# Patient Record
Sex: FEMALE | Race: WHITE | Employment: OTHER | URBAN - METROPOLITAN AREA
[De-identification: names, ages, dates, MRNs, and addresses within clinical notes are randomized per-mention and may not be internally consistent; named-entity substitution may affect disease eponyms.]

---

## 2017-01-30 ENCOUNTER — VIRTUAL VISIT (OUTPATIENT)
Dept: NURSING | Facility: CLINIC | Age: 47
End: 2017-01-30

## 2017-01-30 ENCOUNTER — MYC MEDICAL ADVICE (OUTPATIENT)
Dept: CARDIOLOGY | Facility: CLINIC | Age: 47
End: 2017-01-30

## 2017-01-30 DIAGNOSIS — E11.9 TYPE 2 DIABETES MELLITUS WITHOUT COMPLICATION (H): Primary | ICD-10-CM

## 2017-01-30 DIAGNOSIS — E66.01 MORBID OBESITY DUE TO EXCESS CALORIES (H): ICD-10-CM

## 2017-01-30 DIAGNOSIS — I48.91 ATRIAL FIBRILLATION (H): Primary | ICD-10-CM

## 2017-01-30 PROCEDURE — 99207 ZZC HEALTH COACHING, NO CHARGE: CPT

## 2017-01-30 NOTE — PROGRESS NOTES
January 30, 2017    [facility]  [address]  [place] [address]  [phone]  Health Coaching Progress Note    Patient Name: Zaira Villatoro Date: January 30, 2017      Session Length: 30      DATA    PRM Master Survey Scores Reviewed: Yes    Core Healthy Days Survey:    Would you say that in general your health is: : Poor  Now thinking about your physical health, which includes physical illness and injury, for how many days during the past 30 days was your physical health not good?: 25  Now thinking about your mental health, which includes stress, depression, and problems with emotions, for how many days during the past 30 days was your mental health not good?: 18  During the past 30 days, for about how many days did poor physical or mental health keep you from doing your usual activities, such as self-care, work, or recreation?: 15    MICHAEL Score (Last Two) 11/7/2016 1/30/2017   MICHAEL Raw Score 33 29   Activation Score 65.8 52.9   MICHAEL Level 3 2       PHQ-2 Score 1/30/2017 11/7/2016   PHQ-2 Total Score Interpretation - Positive if 3 or more points; Administer PHQ-9 if positive 2 1   MyC PHQ-2 Score - -       No flowsheet data found.    Treatment Objective(s) Addressed in This Session:  Target Behavior(s): diet/weight loss and disease management/lifestyle changes stress management    Current Stressors / Issues:  Zaira states that she is not sure if she will be able to continue on Humira as some of her lab values are increasing. She will reassess this at her next follow up with her GI provider and will go from there as appropriate.    What Patient Does Well:   Zaira continues to follow up on her GI issues with her clinical team.    Previous Successes:   Zaira feels that the Humira is improving her UC but feels that it is still complicated by bouts of IBS and diverticulitis.     Areas in Need of Improvement:   Zaira identifies no specific  areas in need of improvement today. Zaira talks a bit about diet for diverticulitis today and states that she has never been able to identify any particular foods that trigger her symptoms and just tries to generally stay away from nuts and seeds.    Barriers to Change:   Zaira states that her main barrier at this time is fatigue and states that anytime she gets a moment to herself she usually just falls asleep.     Plan/Goal for the Next 4 Weeks:     GOAL #1: Continue to follow up with clinical team on GI issues  GOAL #1 Progress Toward Goal: 100%  GOAL #2: Continue trying insight timer for short meditations  GOAL #2 Progress Toward Goal: 75%  GOAL #3: Continue checking into [facility]  GOAL #3 Progress Toward Goal: 50%    Intervention:  Motivational Interviewing    MI Intervention: Expressed Empathy/Understanding, Supported Autonomy, Collaboration, Evocation, Permission to raise concern or advise, Open-ended questions, Reflections: simple and complex, Change talk (evoked) and Reframe     Change Talk Expressed by the Patient: Desire to change Committment to change Activation Taking steps    Provider Response to Change Talk: E - Evoked more info from patient about behavior change, A - Affirmed patient's thoughts, decisions, or attempts at behavior change, R - Reflected patient's change talk and S - Summarized patient's change talk statements    Assessment / Progress on Treatment Objective(s) / Homework:    Satisfactory progress - PREPARATION (Decided to change - considering how); Intervened by negotiating a change plan and determining options / strategies for behavior change, identifying triggers, exploring social supports, and working towards setting a date to begin behavior change         Plan: (Homework, other):  Patient was encouraged to continue to seek condition-related information and education. Patient has completed the health coaching program. Patient has set self-identified goals and will monitor progress  until the next appointment.  No further follow up scheduled with health coaching at this time.      Lino Dudley

## 2017-02-02 ENCOUNTER — VIRTUAL VISIT (OUTPATIENT)
Dept: EDUCATION SERVICES | Facility: CLINIC | Age: 47
End: 2017-02-02

## 2017-02-02 DIAGNOSIS — E11.9 TYPE 2 DIABETES MELLITUS WITHOUT COMPLICATION (H): Primary | ICD-10-CM

## 2017-02-02 PROCEDURE — 99207 ZZC NO BILLABLE SERVICE THIS VISIT: CPT

## 2017-02-02 NOTE — PROGRESS NOTES
See My Chart message re blood glucose information and insulin adjustment.    Shannon Kennedy RN  BSN CDE

## 2017-02-07 NOTE — PROGRESS NOTES
"  SUBJECTIVE:                                                    Zaira Villatoro is a 46 year old female who presents to clinic today for the following health issues:    Diabetes Follow-up    Patient is checking blood sugars: three to four times daily.   Blood sugar testing frequency justification: Uncontrolled diabetes and Adjustment of medication(s)  Results are as follows:         am - 200's         lunchtime - all over 140-over 200         suppertime - 160-180         bedtime - 180-237    Diabetic concerns: None and other - medication changes and now blood sugar seem uncontrolled and weight gain     Symptoms of hypoglycemia (low blood sugar): none     Paresthesias (numbness or burning in feet) or sores: No     Date of last diabetic eye exam: December 2016    Medications: Levemir 32-33 units PM, Victoza 18 mg/3mL, Humira 40 mg/0.8mL     Hypertension Follow-up      Outpatient blood pressures are not being checked.    Low Salt Diet: no added salt    Medications: atenololo 25 mg, losartan 25 mg       Amount of exercise or physical activity: None    Problems taking medications regularly: Yes,  Medication has been causing sleepiness and so may miss night medication    Medication side effects: sleepiness possible from UC medication    Diet: lower carb high protein    Diabetes has not been well managed, describes it has \"sucked,\" fasting sugars have been high, sugars before dinner has been stable, but bedtime sugars have been high. Admits she has not changed her eating habits, has been working with Deborah, the diabetic educator.     History of IBS and ulcerative colitis, reports ulcerative colitis has exacerbated again with ongoing rectal bleeding, not feeling well, has been communicating with GI specialist via email, has not seen them in clinic in awhile. Mentions she started Canasa suppositories, on azathioprine and humira as well.    Patient was tearful today in clinic, mood has been down, has been frustrated with " "uncontrolled diabetes and UC flare ups. Has gained 8 lbs since last visit 3 months ago. Does not have a lot of support from her family but does have support from 1 friend she's known since 2nd grade. Always feel tired and \"half asleep,\" has been unproductive due to this, stressed that she has not been there for her children as much as she wants to.    Problem list and histories reviewed & adjusted, as indicated.  Additional history: as documented    Patient Active Problem List   Diagnosis     IBS (irritable bowel syndrome)     Migraine     Ulcerative colitis (H)     Fatty liver     Chronic rhinitis     S/P left hemicolectomy     CARDIOVASCULAR SCREENING; LDL GOAL LESS THAN 100     Hypertension goal BP (blood pressure) < 140/90     History of seizures as a child     Type 2 diabetes mellitus without complication (H)     Morbid obesity due to excess calories (H)     History of diverticular abscess of colon     History of sinus surgery     Adjustment disorder with depressed mood     Immunosuppressed status (H)     Past Surgical History   Procedure Laterality Date     Cryotherapy, cervical  1988     Sinus surgery  2/11/03     LT sinus cyst removal      Arthroscopy knee rt/lt  2004     RT      Hc tooth extraction w/forcep  6/2003     Abscess Tooth / Hospitalized     Colonoscopy  2/12     lt sided colitis     Exploratory laparotomy, partial left vianey collectomy with hartmans procedure, colostomy  8/2013     lt vianey colectomy, bowel perforation, colostomy, Karen's pouche     Colonoscopy  1/9/2014     Appendectomy  april 2014     Biopsy       many     Hernia repair  april 2014     found at time of takedown     Cryotherapy, cervical       mild dysplasia     Takedown colostomy  4-2014     Biopsy  2011 and on     many     Gi surgery  2013     abrupted  bowl     Hernia repair  2014     found at time of takedown       Social History   Substance Use Topics     Smoking status: Former Smoker     Packs/day: 1.00     Years: 15.00 "     Types: Cigarettes     Start date: 1/1/1985     Quit date: 7/1/1998     Smokeless tobacco: Never Used      Comment: soke free household.     Alcohol use No      Comment: occ     Family History   Problem Relation Age of Onset     DIABETES Mother      Allergies Mother      Asthma Mother      Arthritis Mother      HEART DISEASE Mother      heart murmur     Depression Mother      Obesity Mother      Eye Disorder Father      DIABETES Father      CEREBROVASCULAR DISEASE Father      HEART DISEASE Father      Hypertension Father      Unknown/Adopted Paternal Grandmother      HEART DISEASE Paternal Grandfather      left ventrical failure     Gynecology Sister      endometriosis     Asthma Daughter      Breast Cancer Maternal Aunt      Thyroid Disease Maternal Aunt      Glaucoma No family hx of      Macular Degeneration No family hx of      Breast Cancer Other      fathers sister     Anesthesia Reaction Other      Thyroid Disease Other      Asthma Daughter      DIABETES Maternal Grandmother      CEREBROVASCULAR DISEASE Maternal Grandfather      CEREBROVASCULAR DISEASE Paternal Grandfather      Breast Cancer Other      mothers sister     OSTEOPOROSIS Other      Depression Sister      Depression Sister          BP Readings from Last 3 Encounters:   02/13/17 118/84   11/21/16 121/90   11/07/16 112/66    Wt Readings from Last 3 Encounters:   02/13/17 245 lb (111.1 kg)   11/21/16 238 lb (108 kg)   11/07/16 237 lb (107.5 kg)                  Labs reviewed in EPIC  Problem list, Medication list, Allergies, and Medical/Social/Surgical histories reviewed in [facility] and updated as appropriate.    ROS:  Aches and pains with humira, nasal congestion (chronic)10 point ROS of systems including Constitutional, ENT, Respiratory, Cardiovascular, Gastroenterology, Genitourinary, Integumentary, Musculoskeletal, Psychiatric were all negative except for pertinent positives noted in my HPI.    This document serves as a record of the services and  "decisions personally performed and made by  Indu Ramos MD. It was created on his/her behalf by Mirta Keith, a trained medical scribe. The creation of this document is based the provider's statements to the medical scribe.  Jose Kieth 10:28 AM, February 13, 2017    OBJECTIVE:                                                    /84 (BP Location: Right arm, Patient Position: Chair, Cuff Size: Adult Large)  Pulse 77  Temp 96.8  F (36  C) (Oral)  Ht 5' 4.8\" (1.646 m)  Wt 245 lb (111.1 kg)  BMI 41.02 kg/m2  Body mass index is 41.02 kg/(m^2).  GENERAL: alert, no distress and obese  HENT: ear canals and TM's normal, nose and mouth without ulcers or lesions  NECK: no adenopathy, no asymmetry, masses, or scars and thyroid normal to palpation  RESP: lungs clear to auscultation - no rales, rhonchi or wheezes  CV: regular rate and rhythm, normal S1 S2, no S3 or S4, no murmur, click or rub, no peripheral edema and peripheral pulses strong  ABDOMEN: soft, nontender, no hepatosplenomegaly, no masses and bowel sounds normal  PSYCH: affect flat and tearful    Diagnostic Test Results:  Results for orders placed or performed in visit on 02/13/17 (from the past 24 hour(s))   HEMOGLOBIN A1C   Result Value Ref Range    Hemoglobin A1C 8.1 (H) 4.3 - 6.0 %        ASSESSMENT/PLAN:                                                    Diabetes Type II, A1c Uncontrolled, insulin dependent   Associated with the following complications    Renal Complications:  None    Ophthalmologic Complications: None    Neurologic Complications: None    Peripheral Vascular Complications:  None    Other: None   Plan:  Labs:  A1C  The following changes are made - Increase Levemir to 33 units nightly    Hypertension; controlled   Associated with the following complications:    Diabetes   Plan:  No changes in the patient's current treatment plan      BMI:   Estimated body mass index is 41.02 kg/(m^2) as calculated from the following:    " "Height as of this encounter: 5' 4.8\" (1.646 m).    Weight as of this encounter: 245 lb (111.1 kg).   Weight management plan: Discussed healthy diet and exercise guidelines and patient will follow up in 6 months in clinic to re-evaluate.        ICD-10-CM    1. Type 2 diabetes mellitus without complication, with long-term current use of insulin (H) E11.9 HEMOGLOBIN A1C    Z79.4 Albumin Random Urine Quantitative     TSH WITH FREE T4 REFLEX   2. Morbid obesity due to excess calories (H) E66.01    3. Ulcerative colitis with rectal bleeding, unspecified location (H) K51.911    4. Hypertension goal BP (blood pressure) < 140/90 I10    5. Adjustment disorder with depressed mood F43.21    6. Immunosuppressed status (H) D89.9    type 2 DIABETES MELLITUS: A1C not at goal. Advised to increase levimir as directed previously to 33 units once a day. Then will have her communicate regularly with diabetic ed to adjust insulin and get BS down  UC: managed by GI  Morbid obesity: hard for her to make any lifestyle changes now, during her UC flare  Adjustment disorder with depressed mood: offered support. Discussed counseling and/or medication if persists.  Immunosuppression: due to meds for UC. Monitor  Blood pressure: controlled  Lipids: recommend statin typically with diabetes, but has elevated lfts and UC flare, not a good time to add.     Patient Instructions     MY DIABETES TODAY:    1)  Goal A1C is under <7.0  Mine is:      Lab Results   Component Value Date    A1C 8.1 02/13/2017       2)  Goal LDL (bad cholesterol) under 100  (measured at least yearly)- I am currently at:   Lab Results   Component Value Date    LDL 95 11/07/2016       3)  Goal blood pressure under 130/80- mine was 118/84 today    4)  Take aspirin daily     5)  No tobacco use        ACTION PLAN CHANGES FROM TODAY:    Care Plan changes:    Increase to 33 units of Levemir nightly. I'll let Deborah know about this change. Talk with Deborah on Friday with your sugars-- then " she can make another adjustment.     Labs:     I will schedule a lab appointment 3-5 days before my next appointment with my provider.     Follow up:    I will follow up with my physician:  1 month        The information in this document, created by the medical scribe for me, accurately reflects the services I personally performed and the decisions made by me. I have reviewed and approved this document for accuracy prior to leaving the patient care area.  Indu Ramos MD  10:28 AM, 02/13/17    Indu Ramos MD  [facility]

## 2017-02-09 ENCOUNTER — TELEPHONE (OUTPATIENT)
Dept: FAMILY MEDICINE | Facility: CLINIC | Age: 47
End: 2017-02-09

## 2017-02-09 NOTE — TELEPHONE ENCOUNTER
Reason for Call:  Other appointment    Detailed comments:  Patient would like to know if she needs to fast for her diabetes follow up appointment on Monday February 13th . Please call patient .    Phone Number Patient can be reached at: Home number on file [phone] (home)    Best Time: any    Can we leave a detailed message on this number? YES    Call taken on 2/9/2017 at 3:12 PM by Cydney Kincaid

## 2017-02-13 ENCOUNTER — OFFICE VISIT (OUTPATIENT)
Dept: FAMILY MEDICINE | Facility: CLINIC | Age: 47
End: 2017-02-13
Payer: COMMERCIAL

## 2017-02-13 VITALS
DIASTOLIC BLOOD PRESSURE: 84 MMHG | HEART RATE: 77 BPM | WEIGHT: 245 LBS | SYSTOLIC BLOOD PRESSURE: 118 MMHG | TEMPERATURE: 96.8 F | BODY MASS INDEX: 40.82 KG/M2 | HEIGHT: 65 IN

## 2017-02-13 DIAGNOSIS — D84.9 IMMUNOSUPPRESSED STATUS (H): ICD-10-CM

## 2017-02-13 DIAGNOSIS — E66.01 MORBID OBESITY DUE TO EXCESS CALORIES (H): ICD-10-CM

## 2017-02-13 DIAGNOSIS — F43.21 ADJUSTMENT DISORDER WITH DEPRESSED MOOD: ICD-10-CM

## 2017-02-13 DIAGNOSIS — K51.911 ULCERATIVE COLITIS WITH RECTAL BLEEDING, UNSPECIFIED LOCATION (H): ICD-10-CM

## 2017-02-13 DIAGNOSIS — E11.9 TYPE 2 DIABETES MELLITUS WITHOUT COMPLICATION, WITH LONG-TERM CURRENT USE OF INSULIN (H): Primary | ICD-10-CM

## 2017-02-13 DIAGNOSIS — I10 HYPERTENSION GOAL BP (BLOOD PRESSURE) < 140/90: ICD-10-CM

## 2017-02-13 DIAGNOSIS — Z13.6 CARDIOVASCULAR SCREENING; LDL GOAL LESS THAN 100: ICD-10-CM

## 2017-02-13 DIAGNOSIS — Z79.4 TYPE 2 DIABETES MELLITUS WITHOUT COMPLICATION, WITH LONG-TERM CURRENT USE OF INSULIN (H): Primary | ICD-10-CM

## 2017-02-13 LAB
CREAT UR-MCNC: 130 MG/DL
HBA1C MFR BLD: 8.1 % (ref 4.3–6)
MICROALBUMIN UR-MCNC: 7 MG/L
MICROALBUMIN/CREAT UR: 5.04 MG/G CR (ref 0–25)
TSH SERPL DL<=0.005 MIU/L-ACNC: 2.05 MU/L (ref 0.4–4)

## 2017-02-13 PROCEDURE — 99214 OFFICE O/P EST MOD 30 MIN: CPT | Performed by: FAMILY MEDICINE

## 2017-02-13 PROCEDURE — 83036 HEMOGLOBIN GLYCOSYLATED A1C: CPT | Performed by: FAMILY MEDICINE

## 2017-02-13 PROCEDURE — 84443 ASSAY THYROID STIM HORMONE: CPT | Performed by: FAMILY MEDICINE

## 2017-02-13 PROCEDURE — 82043 UR ALBUMIN QUANTITATIVE: CPT | Performed by: FAMILY MEDICINE

## 2017-02-13 PROCEDURE — 36415 COLL VENOUS BLD VENIPUNCTURE: CPT | Performed by: FAMILY MEDICINE

## 2017-02-13 RX ORDER — MESALAMINE 1000 MG/1
1000 SUPPOSITORY RECTAL DAILY
COMMUNITY
Start: 2016-05-17 | End: 2017-08-21

## 2017-02-13 ASSESSMENT — PAIN SCALES - GENERAL: PAINLEVEL: NO PAIN (0)

## 2017-02-13 NOTE — NURSING NOTE
"Chief Complaint   Patient presents with     Diabetes     Hypertension     Health Maintenance     A1C, microalbumin, TSH       Initial /84 (BP Location: Right arm, Patient Position: Chair, Cuff Size: Adult Large)  Pulse 77  Temp 96.8  F (36  C) (Oral)  Ht 5' 4.8\" (1.646 m)  Wt 245 lb (111.1 kg)  BMI 41.02 kg/m2 Estimated body mass index is 41.02 kg/(m^2) as calculated from the following:    Height as of this encounter: 5' 4.8\" (1.646 m).    Weight as of this encounter: 245 lb (111.1 kg).  Medication Reconciliation: complete    "

## 2017-02-13 NOTE — MR AVS SNAPSHOT
After Visit Summary   2/13/2017    Zaira Villatoro    MRN: [id number]           Patient Information     Date Of Birth          1970        Visit Information        Provider Department      2/13/2017 10:00 AM Indu Ramos MD [facility]        Today's Diagnoses     Type 2 diabetes mellitus without complication, with long-term current use of insulin (H)    -  1    Morbid obesity due to excess calories (H)        Ulcerative colitis with rectal bleeding, unspecified location (H)        Hypertension goal BP (blood pressure) < 140/90        Adjustment disorder with depressed mood        Immunosuppressed status (H)          Care Instructions    MY DIABETES TODAY:    1)  Goal A1C is under <7.0  Mine is:      Lab Results   Component Value Date    A1C 8.1 02/13/2017       2)  Goal LDL (bad cholesterol) under 100  (measured at least yearly)- I am currently at:   Lab Results   Component Value Date    LDL 95 11/07/2016       3)  Goal blood pressure under 130/80- mine was 118/84 today    4)  Take aspirin daily     5)  No tobacco use        ACTION PLAN CHANGES FROM TODAY:    Care Plan changes:    Increase to 33 units of Levemir nightly. I'll let Deborah know about this change. Talk with Deborah on Friday with your sugars-- then she can make another adjustment.     Labs:     I will schedule a lab appointment 3-5 days before my next appointment with my provider.     Follow up:    I will follow up with my physician:  1 month       [facility]    If you have any questions regarding to your visit please contact your care team:       Team Purple:   Clinic Hours Telephone Number   HANNAH Kumar Dr., Dr.   7am-7pm  Monday - Thursday   7am-5pm  Fridays  [phone]  (Appointment scheduling available 24/7)    Questions about your Visit?   Team Line:  [phone]   Urgent Care - [place] - 11am-9pm  Monday-Friday Saturday-Sunday- 9am-5pm   [place] - 5pm-9pm Monday-Friday Saturday-Sunday- 9am-5pm  [phone] - [place]   [phone] - [place]       What options do I have for visits at the clinic other than the traditional office visit?  To expand how we care for you, many of our providers are utilizing electronic visits (e-visits) and telephone visits, when medically appropriate, for interactions with their patients rather than a visit in the clinic.   We also offer nurse visits for many medical concerns. Just like any other service, we will bill your insurance company for this type of visit based on time spent on the phone with your provider. Not all insurance companies cover these visits. Please check with your medical insurance if this type of visit is covered. You will be responsible for any charges that are not paid by your insurance.      E-visits via Satmetrix:  generally incur a $35.00 fee.  Telephone visits:  Time spent on the phone: *charged based on time that is spent on the phone in increments of 10 minutes. Estimated cost:   5-10 mins $30.00   11-20 mins. $59.00   21-30 mins. $85.00     Use [email or web address] (secure email communication and access to your chart) to send your primary care provider a message or make an appointment. Ask someone on your Team how to sign up for Satmetrix.  For a Price Quote for your services, please call our Consumer Price Line at [phone].  As always, Thank you for trusting us with your health care needs!    Lizbeth Souza MA          Follow-ups after your visit        Follow-up notes from your care team     Return in about 4 weeks (around 3/13/2017) for recheck mood.      Your next 10 appointments already scheduled     Mar 14, 2017  1:30 PM CDT   Nurse Only with FZ ANCILLARY   [facility] ([facility])    [address] [place] [address]  [place] [address] [phone]            May 01, 2017  1:00 PM CDT   Return Visit with Aron Tellez MD  "  [facility] PHYSICIANS HEART AT [facility] ([facility])    [address] [place] [address] [address]nd Floor  [place] [address] [phone]              Who to contact     If you have questions or need follow up information about today's clinic visit or your schedule please contact [facility] directly at [phone].  Normal or non-critical lab and imaging results will be communicated to you by MyChart, letter or phone within 4 business days after the clinic has received the results. If you do not hear from us within 7 days, please contact the clinic through CloudBilthart or phone. If you have a critical or abnormal lab result, we will notify you by phone as soon as possible.  Submit refill requests through [email or web address] or call your pharmacy and they will forward the refill request to us. Please allow 3 business days for your refill to be completed.          Additional Information About Your Visit        CloudBiltharFitzeal Information     [email or web address] gives you secure access to your electronic health record. If you see a primary care provider, you can also send messages to your care team and make appointments. If you have questions, please call your primary care clinic.  If you do not have a primary care provider, please call [phone] and they will assist you.        Care EveryWhere ID     This is your Care EveryWhere ID. This could be used by other organizations to access your [place] medical records  [id number]        Your Vitals Were     Pulse Temperature Height BMI (Body Mass Index)          77 96.8  F (36  C) (Oral) 5' 4.8\" (1.646 m) 41.02 kg/m2         Blood Pressure from Last 3 Encounters:   02/13/17 118/84   11/21/16 121/90   11/07/16 112/66    Weight from Last 3 Encounters:   02/13/17 245 lb (111.1 kg)   11/21/16 238 lb (108 kg)   11/07/16 237 lb (107.5 kg)              We Performed the Following     Albumin Random Urine Quantitative     HEMOGLOBIN A1C     TSH WITH FREE T4 REFLEX        Primary " Care Provider Office Phone # Fax #    Indu Ramos [phone]       [address]  [place] [address]        Thank you!     Thank you for choosing [facility]  for your care. Our goal is always to provide you with excellent care. Hearing back from our patients is one way we can continue to improve our services. Please take a few minutes to complete the written survey that you may receive in the mail after your visit with us. Thank you!             Your Updated Medication List - Protect others around you: Learn how to safely use, store and throw away your medicines at www.disposemymeds.org.          This list is accurate as of: 2/13/17 11:01 AM.  Always use your most recent med list.                   Brand Name Dispense Instructions for use    aspirin 81 MG tablet     30 tablet    Take by mouth daily       atenolol 25 MG tablet    TENORMIN    180 tablet    Take 1 tablet (25 mg) by mouth 2 times daily       azaTHIOprine 50 MG tablet    IMURAN     Take 1 tablet by mouth daily Reported on 2/13/2017       BENADRYL ALLERGY 25 MG tablet   Generic drug:  diphenhydrAMINE      Reported on 2/13/2017       cholecalciferol 2000 UNITS tablet     30 tablet    Take 1 tablet by mouth daily       CLARITIN 10 MG tablet   Generic drug:  loratadine      Reported on 2/13/2017       DRAMAMINE PO      as needed       eletriptan 20 MG tablet    RELPAX    12 tablet    take 1-2 tablets by mouth at onset of headache for migraine. may repeat dose in 2 hours. do not exceed 80mg in 24 hours.       fluticasone 110 MCG/ACT Inhaler    FLOVENT HFA    36 Inhaler    Spray 2 puffs in nostril 2 times daily       HUMIRA PEN 40 MG/0.8ML pen kit   Generic drug:  adalimumab      Inject as directed every 14 days       insulin detemir 100 UNIT/ML injection    LEVEMIR FLEXTOUCH    3 mL    Take 32-33 units of Levemir nightly before bed.       insulin pen needle 31G X 5 MM     200 each    Use 1  pen needles twice daily or as directed.       * LIALDA 1.2 G EC tablet   Generic drug:  mesalamine      Take 1,200 mg by mouth 2 tablets daily       * mesalamine 1000 MG Suppository    CANASA     Place 1,000 mg rectally daily       losartan 25 MG tablet    COZAAR    15 tablet    TAKE ONE-HALF TABLET BY MOUTH DAILY       methocarbamol 750 MG tablet    ROBAXIN         * order for DME     1 each    Glucometer, brand as covered by insurance.       * order for DME     400 each    Test strips for pt's glucometer, brand as covered by insurance. Test four times daily and prn.       * order for DME     300 each    Lancets.  TID and prn. To go with Rigo Contour next EZ, smallest lancet possible (ie, least painful)       oxyCODONE-acetaminophen 5-325 MG per tablet    PERCOCET         STATIN NOT PRESCRIBED (INTENTIONAL)     0 each    1 each continuous prn Statin not prescribed intentionally due to Refusal by patient and Other:LDL is below 100.       TYLENOL CAPS 500 MG OR      1 CAPSULE EVERY 4 HOURS AS NEEDED       VICTOZA PEN 18 MG/3ML soln   Generic drug:  liraglutide     27 mL    INJECT 3 MG SUB-Q ONCE DAILY       * Notice:  This list has 5 medication(s) that are the same as other medications prescribed for you. Read the directions carefully, and ask your doctor or other care provider to review them with you.

## 2017-02-13 NOTE — PATIENT INSTRUCTIONS
MY DIABETES TODAY:    1)  Goal A1C is under <7.0  Mine is:      Lab Results   Component Value Date    A1C 8.1 02/13/2017       2)  Goal LDL (bad cholesterol) under 100  (measured at least yearly)- I am currently at:   Lab Results   Component Value Date    LDL 95 11/07/2016       3)  Goal blood pressure under 130/80- mine was 118/84 today    4)  Take aspirin daily     5)  No tobacco use        ACTION PLAN CHANGES FROM TODAY:    Care Plan changes:    Increase to 33 units of Levemir nightly. I'll let Deborah know about this change. Talk with Deborah on Friday with your sugars-- then she can make another adjustment.     Labs:     I will schedule a lab appointment 3-5 days before my next appointment with my provider.     Follow up:    I will follow up with my physician:  1 month       [facility]    If you have any questions regarding to your visit please contact your care team:       Team Purple:   Clinic Hours Telephone Number   HANNAH Kumar Dr., Dr.   7am-7pm  Monday - Thursday   7am-5pm  Fridays  [phone]  (Appointment scheduling available 24/7)    Questions about your Visit?   Team Line:  [phone]   Urgent Care - [place] - 11am-9pm Monday-Friday Saturday-Sunday- 9am-5pm   [place] - 5pm-9pm Monday-Friday Saturday-Sunday- 9am-5pm  [phone] - [place]   [phone] - [place]       What options do I have for visits at the clinic other than the traditional office visit?  To expand how we care for you, many of our providers are utilizing electronic visits (e-visits) and telephone visits, when medically appropriate, for interactions with their patients rather than a visit in the clinic.   We also offer nurse visits for many medical concerns. Just like any other service, we will bill your insurance company for this type of visit based on time spent on the phone with your provider. Not all insurance companies cover  these visits. Please check with your medical insurance if this type of visit is covered. You will be responsible for any charges that are not paid by your insurance.      E-visits via SportStylisthart:  generally incur a $35.00 fee.  Telephone visits:  Time spent on the phone: *charged based on time that is spent on the phone in increments of 10 minutes. Estimated cost:   5-10 mins $30.00   11-20 mins. $59.00   21-30 mins. $85.00     Use [employer]rion[employer] (secure email communication and access to your chart) to send your primary care provider a message or make an appointment. Ask someone on your Team how to sign up for Mitrionics.  For a Price Quote for your services, please call our Consumer Price Line at [phone].  As always, Thank you for trusting us with your health care needs!    Lizbeth Souza MA

## 2017-02-14 ASSESSMENT — PATIENT HEALTH QUESTIONNAIRE - PHQ9: SUM OF ALL RESPONSES TO PHQ QUESTIONS 1-9: 10

## 2017-02-17 ENCOUNTER — MYC MEDICAL ADVICE (OUTPATIENT)
Dept: EDUCATION SERVICES | Facility: CLINIC | Age: 47
End: 2017-02-17

## 2017-02-24 ENCOUNTER — VIRTUAL VISIT (OUTPATIENT)
Dept: EDUCATION SERVICES | Facility: CLINIC | Age: 47
End: 2017-02-24

## 2017-02-24 DIAGNOSIS — E11.9 TYPE 2 DIABETES MELLITUS WITHOUT COMPLICATION (H): ICD-10-CM

## 2017-02-24 NOTE — MR AVS SNAPSHOT
After Visit Summary   2/24/2017    Zaira Villatoro    MRN: [id number]           Patient Information     Date Of Birth          1970        Visit Information        Provider Department      2/24/2017 10:30 AM CP DIABETIC ED RESOURCE [facility]        Today's Diagnoses     Type 2 diabetes mellitus without complication (H)           Follow-ups after your visit        Your next 10 appointments already scheduled     Feb 24, 2017 10:30 AM CST   Telephone Visit with CP DIABETIC ED RESOURCE   [facility] ([facility])    [address] [facility] [address] [phone]           Note: this is not an onsite visit; there is no need to come to the facility.            Mar 09, 2017 10:15 AM CST   SHORT with Indu Ramos MD   [facility] ([facility])    [address] [place] [address]   [phone]            Mar 14, 2017  1:30 PM CDT   Nurse Only with FZ ANCILLARY   [facility] ([facility])    [address] [place] [address]   [phone]            May 01, 2017  1:00 PM CDT   Return Visit with Aron Tellez MD   [facility] PHYSICIANS HEART AT [facility] ([facility])    [address]nd Floor  [facility] [address] [phone]              Who to contact     If you have questions or need follow up information about today's clinic visit or your schedule please contact [facility] directly at [phone].  Normal or non-critical lab and imaging results will be communicated to you by MyChart, letter or phone within 4 business days after the clinic has received the results. If you do not hear from us within 7 days, please contact the clinic through MyChart or phone. If you have a critical or abnormal lab result, we will notify you by phone as soon as possible.  Submit  refill requests through [employer] or call your pharmacy and they will forward the refill request to us. Please allow 3 business days for your refill to be completed.          Additional Information About Your Visit        Aramscohart Information     [employer] gives you secure access to your electronic health record. If you see a primary care provider, you can also send messages to your care team and make appointments. If you have questions, please call your primary care clinic.  If you do not have a primary care provider, please call [phone] and they will assist you.        Care EveryWhere ID     This is your Care EveryWhere ID. This could be used by other organizations to access your [place] medical records  [id number]         Blood Pressure from Last 3 Encounters:   02/13/17 118/84   11/21/16 121/90   11/07/16 112/66    Weight from Last 3 Encounters:   02/13/17 111.1 kg (245 lb)   11/21/16 108 kg (238 lb)   11/07/16 107.5 kg (237 lb)              Today, you had the following     No orders found for display         Today's Medication Changes          These changes are accurate as of: 2/24/17  8:23 AM.  If you have any questions, ask your nurse or doctor.               These medicines have changed or have updated prescriptions.        Dose/Directions    insulin detemir 100 UNIT/ML injection   Commonly known as:  LEVEMIR FLEXTOUCH   This may have changed:  additional instructions   Used for:  Type 2 diabetes mellitus without complication (H)        Take 35 units of Levemir nightly before bed.   Quantity:  3 mL   Refills:  3            Where to get your medicines      These medications were sent to [place] [address] [place] [facility] [place] [address] [address] [address] [facility] [address]     Phone:  [phone]     insulin detemir 100 UNIT/ML injection                Primary Care Provider Office Phone # Fax #    Indu Ramos [phone]       [address]  [place] [address]        Thank you!     Thank you for choosing [facility]  for your care. Our goal is always to provide you with excellent care. Hearing back from our patients is one way we can continue to improve our services. Please take a few minutes to complete the written survey that you may receive in the mail after your visit with us. Thank you!             Your Updated Medication List - Protect others around you: Learn how to safely use, store and throw away your medicines at www.disposemymeds.org.          This list is accurate as of: 2/24/17  8:23 AM.  Always use your most recent med list.                   Brand Name Dispense Instructions for use    aspirin 81 MG tablet     30 tablet    Take by mouth daily       atenolol 25 MG tablet    TENORMIN    180 tablet    Take 1 tablet (25 mg) by mouth 2 times daily       azaTHIOprine 50 MG tablet    IMURAN     Take 1 tablet by mouth daily Reported on 2/13/2017       BENADRYL ALLERGY 25 MG tablet   Generic drug:  diphenhydrAMINE      Reported on 2/13/2017       cholecalciferol 2000 UNITS tablet     30 tablet    Take 1 tablet by mouth daily       CLARITIN 10 MG tablet   Generic drug:  loratadine      Reported on 2/13/2017       DRAMAMINE PO      as needed       eletriptan 20 MG tablet    RELPAX    12 tablet    take 1-2 tablets by mouth at onset of headache for migraine. may repeat dose in 2 hours. do not exceed 80mg in 24 hours.       fluticasone 110 MCG/ACT Inhaler    FLOVENT HFA    36 Inhaler    Spray 2 puffs in nostril 2 times daily       HUMIRA PEN 40 MG/0.8ML pen kit   Generic drug:  adalimumab      Inject as directed every 14 days       insulin detemir 100 UNIT/ML injection    LEVEMIR FLEXTOUCH    3 mL    Take 35 units of Levemir nightly before bed.       insulin pen needle 31G X 5 MM     200 each    Use 1 pen needles twice daily or as directed.       * LIALDA 1.2 G EC tablet   Generic drug:  mesalamine      Take 1,200 mg by mouth 2 tablets daily        * mesalamine 1000 MG Suppository    CANASA     Place 1,000 mg rectally daily       losartan 25 MG tablet    COZAAR    15 tablet    TAKE ONE-HALF TABLET BY MOUTH DAILY       methocarbamol 750 MG tablet    ROBAXIN         * order for DME     1 each    Glucometer, brand as covered by insurance.       * order for DME     400 each    Test strips for pt's glucometer, brand as covered by insurance. Test four times daily and prn.       * order for DME     300 each    Lancets.  TID and prn. To go with Rigo Contour next EZ, smallest lancet possible (ie, least painful)       oxyCODONE-acetaminophen 5-325 MG per tablet    PERCOCET         STATIN NOT PRESCRIBED (INTENTIONAL)     0 each    1 each continuous prn Statin not prescribed intentionally due to Refusal by patient and Other:LDL is below 100.       TYLENOL CAPS 500 MG OR      1 CAPSULE EVERY 4 HOURS AS NEEDED       VICTOZA PEN 18 MG/3ML soln   Generic drug:  liraglutide     27 mL    INJECT 3 MG SUB-Q ONCE DAILY       * Notice:  This list has 5 medication(s) that are the same as other medications prescribed for you. Read the directions carefully, and ask your doctor or other care provider to review them with you.

## 2017-03-01 ENCOUNTER — MYC MEDICAL ADVICE (OUTPATIENT)
Dept: EDUCATION SERVICES | Facility: CLINIC | Age: 47
End: 2017-03-01

## 2017-03-02 ENCOUNTER — VIRTUAL VISIT (OUTPATIENT)
Dept: EDUCATION SERVICES | Facility: CLINIC | Age: 47
End: 2017-03-02
Payer: COMMERCIAL

## 2017-03-02 DIAGNOSIS — E11.9 TYPE 2 DIABETES MELLITUS WITHOUT COMPLICATION (H): ICD-10-CM

## 2017-03-02 PROCEDURE — 99207 ZZC NO BILLABLE SERVICE THIS VISIT: CPT

## 2017-03-02 NOTE — PROGRESS NOTES
Please see My Chart message re insulin adjustment.  Patient BG values all above goal range thru the day, several values above 200 mg/dl.  Currently on 35 units of Levemir which is 0.31 u/kg.  Recommend that patient increase her Levemir dose up to 39 units which is 0.35 u/kg.  Request patient send in her BG values for review again next week.    Shannon Kennedy RN  BSN CDE

## 2017-03-02 NOTE — MR AVS SNAPSHOT
After Visit Summary   3/2/2017    Zaira Villatoro    MRN: [id number]           Patient Information     Date Of Birth          1970        Visit Information        Provider Department      3/2/2017 2:45 PM  DIABETIC ED RESOURCE [facility]        Today's Diagnoses     Type 2 diabetes mellitus without complication (H)           Follow-ups after your visit        Your next 10 appointments already scheduled     Mar 02, 2017  2:45 PM CST   Telephone Visit with  DIABETIC ED RESOURCE   [facility]    [address] [place] [address]   [phone]           Note: this is not an onsite visit; there is no need to come to the facility.            Mar 09, 2017 10:15 AM CST   SHORT with Indu Ramos MD   [facility]    [address] [place] [address]   [phone]            Mar 14, 2017  1:30 PM CDT   Nurse Only with FZ ANCILLARY   [facility]    [address] [place] [address]   [phone]            May 01, 2017  1:00 PM CDT   Return Visit with Aron Tellez MD   [facility]YS[facility] AT [facility] ([facility])    [address] [place] [address] 2nd Floor  [facility] [address]   [phone]              Who to contact     If you have questions or need follow up information about today's clinic visit or your schedule please contact [facility] directly at [phone].  Normal or non-critical lab and imaging results will be communicated to you by MyChart, letter or phone within 4 business days after the clinic has received the results. If you do not hear from us within 7 days, please contact the clinic through MyChart or phone. If you have a critical or abnormal lab result, we will notify you by phone as soon as possible.  Submit refill requests through [employer] or call your pharmacy and  they will forward the refill request to us. Please allow 3 business days for your refill to be completed.          Additional Information About Your Visit        Te[employer] Information     [employer] gives you secure access to your electronic health record. If you see a primary care provider, you can also send messages to your care team and make appointments. If you have questions, please call your primary care clinic.  If you do not have a primary care provider, please call [phone] and they will assist you.        Care EveryWhere ID     This is your Care EveryWhere ID. This could be used by other organizations to access your [facility] medical records  [id number]         Blood Pressure from Last 3 Encounters:   02/13/17 118/84   11/21/16 121/90   11/07/16 112/66    Weight from Last 3 Encounters:   02/13/17 245 lb (111.1 kg)   11/21/16 238 lb (108 kg)   11/07/16 237 lb (107.5 kg)              Today, you had the following     No orders found for display         Today's Medication Changes          These changes are accurate as of: 3/2/17 10:44 AM.  If you have any questions, ask your nurse or doctor.               These medicines have changed or have updated prescriptions.        Dose/Directions    insulin detemir 100 UNIT/ML injection   Commonly known as:  LEVEMIR FLEXTOUCH   This may have changed:  additional instructions   Used for:  Type 2 diabetes mellitus without complication (H)        Take 39 units of Levemir nightly before bed.   Quantity:  3 mL   Refills:  3            Where to get your medicines      These medications were sent to [place] [address] [place] [facility] [place] - [address] [facility] [address] [place] [address]     Phone:  [phone]     insulin detemir 100 UNIT/ML injection                Primary Care Provider Office Phone # Fax #    Indu Ramos [phone]       [address]  [place] [address]        Thank you!     Thank you for choosing  [facility]  for your care. Our goal is always to provide you with excellent care. Hearing back from our patients is one way we can continue to improve our services. Please take a few minutes to complete the written survey that you may receive in the mail after your visit with us. Thank you!             Your Updated Medication List - Protect others around you: Learn how to safely use, store and throw away your medicines at www.disposemymeds.org.          This list is accurate as of: 3/2/17 10:44 AM.  Always use your most recent med list.                   Brand Name Dispense Instructions for use    aspirin 81 MG tablet     30 tablet    Take by mouth daily       atenolol 25 MG tablet    TENORMIN    180 tablet    Take 1 tablet (25 mg) by mouth 2 times daily       azaTHIOprine 50 MG tablet    IMURAN     Take 1 tablet by mouth daily Reported on 2/13/2017       BENADRYL ALLERGY 25 MG tablet   Generic drug:  diphenhydrAMINE      Reported on 2/13/2017       cholecalciferol 2000 UNITS tablet     30 tablet    Take 1 tablet by mouth daily       CLARITIN 10 MG tablet   Generic drug:  loratadine      Reported on 2/13/2017       DRAMAMINE PO      as needed       eletriptan 20 MG tablet    RELPAX    12 tablet    take 1-2 tablets by mouth at onset of headache for migraine. may repeat dose in 2 hours. do not exceed 80mg in 24 hours.       fluticasone 110 MCG/ACT Inhaler    FLOVENT HFA    36 Inhaler    Spray 2 puffs in nostril 2 times daily       HUMIRA PEN 40 MG/0.8ML pen kit   Generic drug:  adalimumab      Inject as directed every 14 days       insulin detemir 100 UNIT/ML injection    LEVEMIR FLEXTOUCH    3 mL    Take 39 units of Levemir nightly before bed.       insulin pen needle 31G X 5 MM     200 each    Use 1 pen needles twice daily or as directed.       * LIALDA 1.2 G EC tablet   Generic drug:  mesalamine      Take 1,200 mg by mouth 2 tablets daily       * mesalamine 1000 MG Suppository    CANASA     Place  1,000 mg rectally daily       losartan 25 MG tablet    COZAAR    15 tablet    TAKE ONE-HALF TABLET BY MOUTH DAILY       methocarbamol 750 MG tablet    ROBAXIN         * order for DME     1 each    Glucometer, brand as covered by insurance.       * order for DME     400 each    Test strips for pt's glucometer, brand as covered by insurance. Test four times daily and prn.       * order for DME     300 each    Lancets.  TID and prn. To go with Rigo Contour next EZ, smallest lancet possible (ie, least painful)       oxyCODONE-acetaminophen 5-325 MG per tablet    PERCOCET         STATIN NOT PRESCRIBED (INTENTIONAL)     0 each    1 each continuous prn Statin not prescribed intentionally due to Refusal by patient and Other:LDL is below 100.       TYLENOL CAPS 500 MG OR      1 CAPSULE EVERY 4 HOURS AS NEEDED       VICTOZA PEN 18 MG/3ML soln   Generic drug:  liraglutide     27 mL    INJECT 3 MG SUB-Q ONCE DAILY       * Notice:  This list has 5 medication(s) that are the same as other medications prescribed for you. Read the directions carefully, and ask your doctor or other care provider to review them with you.

## 2017-03-07 ENCOUNTER — TELEPHONE (OUTPATIENT)
Dept: FAMILY MEDICINE | Facility: CLINIC | Age: 47
End: 2017-03-07

## 2017-03-07 NOTE — TELEPHONE ENCOUNTER
Received fax from pharmacy stating patient requires Prior Authorization for Levemir    Insurance information:  Name: [employer]  Phone number: [phone]  ID number: [id number]    Provider to address. Message route to Dr. Ramos. Initiate prior authorization or change medication?  Please advise.      **If a prior authorization is to be initiated, please list the following:    -Any medications the patient has tried and failed or any contraindications. **    -Is the patient currently on this medication, or has tried before? **    -What is the diagnosis? **    - Justification or other information that me by helpful. **

## 2017-03-08 ENCOUNTER — TELEPHONE (OUTPATIENT)
Dept: EDUCATION SERVICES | Facility: OTHER | Age: 47
End: 2017-03-08

## 2017-03-08 NOTE — TELEPHONE ENCOUNTER
See Character Booster message on 3/8/17.  Levemir was increased from 39 to 41 units.    Miracle Del Rio RD, LD, CDE

## 2017-03-09 ENCOUNTER — OFFICE VISIT (OUTPATIENT)
Dept: FAMILY MEDICINE | Facility: CLINIC | Age: 47
End: 2017-03-09
Payer: COMMERCIAL

## 2017-03-09 VITALS
OXYGEN SATURATION: 96 % | TEMPERATURE: 97.1 F | HEART RATE: 84 BPM | DIASTOLIC BLOOD PRESSURE: 76 MMHG | BODY MASS INDEX: 41.19 KG/M2 | WEIGHT: 246 LBS | SYSTOLIC BLOOD PRESSURE: 124 MMHG

## 2017-03-09 DIAGNOSIS — E11.9 TYPE 2 DIABETES MELLITUS WITHOUT COMPLICATION, WITH LONG-TERM CURRENT USE OF INSULIN (H): ICD-10-CM

## 2017-03-09 DIAGNOSIS — Z79.4 TYPE 2 DIABETES MELLITUS WITHOUT COMPLICATION, WITH LONG-TERM CURRENT USE OF INSULIN (H): ICD-10-CM

## 2017-03-09 DIAGNOSIS — K43.2 INCISIONAL HERNIA, WITHOUT OBSTRUCTION OR GANGRENE: ICD-10-CM

## 2017-03-09 DIAGNOSIS — F32.1 MODERATE SINGLE CURRENT EPISODE OF MAJOR DEPRESSIVE DISORDER (H): Primary | ICD-10-CM

## 2017-03-09 PROCEDURE — 99214 OFFICE O/P EST MOD 30 MIN: CPT | Performed by: FAMILY MEDICINE

## 2017-03-09 RX ORDER — LIRAGLUTIDE 6 MG/ML
INJECTION SUBCUTANEOUS
Qty: 27 ML | Refills: 3 | Status: CANCELLED | OUTPATIENT
Start: 2017-03-09

## 2017-03-09 RX ORDER — CITALOPRAM HYDROBROMIDE 20 MG/1
TABLET ORAL
Qty: 30 TABLET | Refills: 0 | Status: SHIPPED | OUTPATIENT
Start: 2017-03-09 | End: 2017-04-13

## 2017-03-09 ASSESSMENT — PAIN SCALES - GENERAL: PAINLEVEL: MODERATE PAIN (4)

## 2017-03-09 NOTE — MR AVS SNAPSHOT
After Visit Summary   3/9/2017    Zaira Villatoro    MRN: [id number]           Patient Information     Date Of Birth          1970        Visit Information        Provider Department      3/9/2017 10:15 AM Indu Ramos MD [facility]        Today's Diagnoses     Type 2 diabetes mellitus without complication, with long-term current use of insulin (H)    -  1    Incisional hernia, without obstruction or gangrene        Moderate single current episode of major depressive disorder (H)          Care Instructions    Will start on a new medication today called citalopram at 10mg. We will trial this for 1 week. After 1 week, follow-up with me and if you are not feeling any side effects, we will increase the dose up to 20mg.    I recommend you follow-up with the surgeon. I've given you a referral for this.    I'll check into the victoza and have someone get back to you  Indu Ramos MD   [facility]    If you have any questions regarding to your visit please contact your care team:       Team Purple:   Clinic Hours Telephone Number   [address] Dr.   7am-7pm  Monday - Thursday   7am-5pm  Fridays  [phone]  (Appointment scheduling available 24/7)    Questions about your Visit?   Team Line:  [phone]   Urgent Care - [place] - 11am-9pm Monday-Friday Saturday-Sunday- 9am-5pm   [place] - 5pm-9pm Monday-Friday Saturday-Sunday- 9am-5pm  [phone] [facility]   [phone] [facility]       What options do I have for visits at the clinic other than the traditional office visit?  To expand how we care for you, many of our providers are utilizing electronic visits (e-visits) and telephone visits, when medically appropriate, for interactions with their patients rather than a visit in the clinic.   We also offer nurse visits for many medical concerns. Just like any other  service, we will bill your insurance company for this type of visit based on time spent on the phone with your provider. Not all insurance companies cover these visits. Please check with your medical insurance if this type of visit is covered. You will be responsible for any charges that are not paid by your insurance.      E-visits via [employer]:  generally incur a $35.00 fee.  Telephone visits:  Time spent on the phone: *charged based on time that is spent on the phone in increments of 10 minutes. Estimated cost:   5-10 mins $30.00   11-20 mins. $59.00   21-30 mins. $85.00     Use Xtime (secure email communication and access to your chart) to send your primary care provider a message or make an appointment. Ask someone on your Team how to sign up for Xtime.  For a Price Quote for your services, please call our Consumer Price Line at [phone].  As always, Thank you for trusting us with your health care needs!    Lizbeth Souza MA          Follow-ups after your visit        Additional Services     GENERAL SURG ADULT REFERRAL       Your provider has referred you to: N: [facility] - [facility] [phone]   [email or web address]  [facility] [phone]   [email or web address]  [facility] [phone]   [email or web address]    Please be aware that coverage of these services is subject to the terms and limitations of your health insurance plan.  Call member services at your health plan with any benefit or coverage questions.      Please bring the following with you to your appointment:    (1) Any X-Rays, CTs or MRIs which have been performed.  Contact the facility where they were done to arrange for  prior to your scheduled appointment.   (2) List of current medications   (3) This referral request   (4) Any documents/labs given to you for this referral                  Your next 10 appointments already scheduled     Mar 14, 2017  1:30 PM  CDT   Nurse Only with FZ ANCILLARY   [facility]    [address]  [place] [address] [phone]            May 01, 2017  1:00 PM CDT   Return Visit with Aron Tellez MD   [facility] PHYSICIANS HEART AT [facility]    [address] [facility] 2nd [address]  [place] [address] [phone]              Who to contact     If you have questions or need follow up information about today's clinic visit or your schedule please contact [facility] directly at [phone].  Normal or non-critical lab and imaging results will be communicated to you by MyChart, letter or phone within 4 business days after the clinic has received the results. If you do not hear from us within 7 days, please contact the clinic through [employer] or phone. If you have a critical or abnormal lab result, we will notify you by phone as soon as possible.  Submit refill requests through [employer] or call your pharmacy and they will forward the refill request to us. Please allow 3 business days for your refill to be completed.          Additional Information About Your Visit        MyChart Information     [employer] gives you secure access to your electronic health record. If you see a primary care provider, you can also send messages to your care team and make appointments. If you have questions, please call your primary care clinic.  If you do not have a primary care provider, please call [phone] and they will assist you.        Care EveryWhere ID     This is your Care EveryWhere ID. This could be used by other organizations to access your [facility] medical records  [id number]        Your Vitals Were     Pulse Temperature Pulse Oximetry BMI (Body Mass Index)          84 97.1  F (36.2  C) (Oral) 96% 41.19 kg/m2         Blood Pressure from Last 3 Encounters:   03/09/17 124/76   02/13/17 118/84   11/21/16 121/90    Weight from Last 3 Encounters:    03/09/17 246 lb (111.6 kg)   02/13/17 245 lb (111.1 kg)   11/21/16 238 lb (108 kg)              We Performed the Following     GENERAL SURG ADULT REFERRAL          Today's Medication Changes          These changes are accurate as of: 3/9/17 11:06 AM.  If you have any questions, ask your nurse or doctor.               Start taking these medicines.        Dose/Directions    citalopram 20 MG tablet   Commonly known as:  celeXA   Used for:  Moderate single current episode of major depressive disorder (H)   Started by:  Indu Ramos MD        Take 1/2 tablet (10 mg) for 1-2 weeks, then increase to 1 tablet orally daily   Quantity:  30 tablet   Refills:  0            Where to get your medicines      These medications were sent to [place] [address] [place] [facility] [place] - [address]  [address] [place] [address]     Phone:  [phone]     citalopram 20 MG tablet                Primary Care Provider Office Phone # Fax #    nIdu Ramos [phone]       [address] [address]        Thank you!     Thank you for choosing [facility]  for your care. Our goal is always to provide you with excellent care. Hearing back from our patients is one way we can continue to improve our services. Please take a few minutes to complete the written survey that you may receive in the mail after your visit with us. Thank you!             Your Updated Medication List - Protect others around you: Learn how to safely use, store and throw away your medicines at www.disposemymeds.org.          This list is accurate as of: 3/9/17 11:06 AM.  Always use your most recent med list.                   Brand Name Dispense Instructions for use    aspirin 81 MG tablet     30 tablet    Take by mouth daily       atenolol 25 MG tablet    TENORMIN    180 tablet    Take 1 tablet (25 mg) by mouth 2 times daily       azaTHIOprine 50 MG tablet    IMURAN     Take 1 tablet by  mouth daily Reported on 3/9/2017       BENADRYL ALLERGY 25 MG tablet   Generic drug:  diphenhydrAMINE      Reported on 2/13/2017       cholecalciferol 2000 UNITS tablet     30 tablet    Take 1 tablet by mouth daily       citalopram 20 MG tablet    celeXA    30 tablet    Take 1/2 tablet (10 mg) for 1-2 weeks, then increase to 1 tablet orally daily       CLARITIN 10 MG tablet   Generic drug:  loratadine      Reported on 3/9/2017       DRAMAMINE PO      as needed       eletriptan 20 MG tablet    RELPAX    12 tablet    take 1-2 tablets by mouth at onset of headache for migraine. may repeat dose in 2 hours. do not exceed 80mg in 24 hours.       fluticasone 110 MCG/ACT Inhaler    FLOVENT HFA    36 Inhaler    Spray 2 puffs in nostril 2 times daily       HUMIRA PEN 40 MG/0.8ML pen kit   Generic drug:  adalimumab      Inject as directed every 14 days       insulin detemir 100 UNIT/ML injection    LEVEMIR FLEXTOUCH    3 mL    Take 39 units of Levemir nightly before bed.       insulin pen needle 31G X 5 MM     200 each    Use 1 pen needles twice daily or as directed.       * LIALDA 1.2 G EC tablet   Generic drug:  mesalamine      Take 1,200 mg by mouth 2 tablets daily       * mesalamine 1000 MG Suppository    CANASA     Place 1,000 mg rectally daily       losartan 25 MG tablet    COZAAR    15 tablet    TAKE ONE-HALF TABLET BY MOUTH DAILY       methocarbamol 750 MG tablet    ROBAXIN     Reported on 3/9/2017       * order for DME     1 each    Glucometer, brand as covered by insurance.       * order for DME     400 each    Test strips for pt's glucometer, brand as covered by insurance. Test four times daily and prn.       * order for DME     300 each    Lancets.  TID and prn. To go with Rigo Contour next EZ, smallest lancet possible (ie, least painful)       oxyCODONE-acetaminophen 5-325 MG per tablet    PERCOCET         STATIN NOT PRESCRIBED (INTENTIONAL)     0 each    1 each continuous prn Statin not prescribed intentionally  due to Refusal by patient and Other:LDL is below 100.       TYLENOL CAPS 500 MG OR      1 CAPSULE EVERY 4 HOURS AS NEEDED       VICTOZA PEN 18 MG/3ML soln   Generic drug:  liraglutide     27 mL    INJECT 3 MG SUB-Q ONCE DAILY       * Notice:  This list has 5 medication(s) that are the same as other medications prescribed for you. Read the directions carefully, and ask your doctor or other care provider to review them with you.

## 2017-03-09 NOTE — TELEPHONE ENCOUNTER
On victoza and levemir  Unable to take metformin due to diarrhea, has ulcerative colitis, glyburide not helpful  Indu Ramos MD

## 2017-03-09 NOTE — PROGRESS NOTES
"  SUBJECTIVE:                                                    Zaira Villatoro is a 47 year old female who presents to clinic today for the following health issues:    Depression Followup    Status since last visit:  Same to slightly Improved     See PHQ-9 for current symptoms.  Other associated symptoms: None    Complicating factors:   Significant life event:  No   Current substance abuse:  None  Anxiety or Panic symptoms:  Yes    PHQ-9  English PHQ-9   Any Language        PHQ-9 SCORE 3/9/2017   Total Score 12          Amount of exercise or physical activity: 3-4 days/week for an average of 15 minutes    Problems taking medications regularly: Yes,  problems remembering to take pills maybe twice per week    Medication side effects: headaches, leg pain, nausea and fatigue  Diet: High protein low carb    1. Mood has been the same since her last visit. Feels things have not been getting better, has been trying to go out and do active things to uplift her mood but states she does not have the energy to do so. Has tried counseling in the past, but feels it hasn't helped much. Feels that counseling \"won't turn things around\" and is hesitant on starting on a medication. Has been having sleeping trouble due waking up at night, will yell at her children but unable to recall it the next morning. She has never been on a medication in the past to help with her depression and mood. Feels her mood is an 8-10 (1 being the best she can feel, 10 being the worst).    2. Reports that she believes she \"ripped her hernia repair\" when she coughed strenuously and began to feel a burning feeling in her lower abdomen. States she will feel it \"popping out\".    Diabetes: needs refill on victoza. Was told taht she could take 3 mg daily  Problem list and histories reviewed & adjusted, as indicated.  Additional history: as documented    Patient Active Problem List   Diagnosis     IBS (irritable bowel syndrome)     Migraine     Ulcerative " colitis (H)     Fatty liver     Chronic rhinitis     S/P left hemicolectomy     CARDIOVASCULAR SCREENING; LDL GOAL LESS THAN 100     Hypertension goal BP (blood pressure) < 140/90     History of seizures as a child     Type 2 diabetes mellitus without complication (H)     Morbid obesity due to excess calories (H)     History of diverticular abscess of colon     History of sinus surgery     Adjustment disorder with depressed mood     Immunosuppressed status (H)     Moderate single current episode of major depressive disorder (H)     Incisional hernia, without obstruction or gangrene     Past Surgical History   Procedure Laterality Date     Cryotherapy, cervical  1988     Sinus surgery  2/11/03     LT sinus cyst removal      Arthroscopy knee rt/lt  2004     RT      Hc tooth extraction w/forcep  6/2003     Abscess Tooth / Hospitalized     Colonoscopy  2/12     lt sided colitis     Exploratory laparotomy, partial left vianey collectomy with hartmans procedure, colostomy  8/2013     lt vianey colectomy, bowel perforation, colostomy, Karen's pouche     Colonoscopy  1/9/2014     Appendectomy  april 2014     Biopsy       many     Hernia repair  april 2014     found at time of takedown     Cryotherapy, cervical       mild dysplasia     Takedown colostomy  4-2014     Biopsy  2011 and on     many     Gi surgery  2013     abrupted  bowl     Hernia repair  2014     found at time of takedown       Social History   Substance Use Topics     Smoking status: Former Smoker     Packs/day: 1.00     Years: 15.00     Types: Cigarettes     Start date: 1/1/1985     Quit date: 7/1/1998     Smokeless tobacco: Never Used      Comment: soke free household.     Alcohol use No      Comment: occ     Family History   Problem Relation Age of Onset     DIABETES Mother      Allergies Mother      Asthma Mother      Arthritis Mother      HEART DISEASE Mother      heart murmur     Depression Mother      Obesity Mother      Eye Disorder Father       DIABETES Father      CEREBROVASCULAR DISEASE Father      HEART DISEASE Father      Hypertension Father      DIABETES Maternal Grandmother      CEREBROVASCULAR DISEASE Maternal Grandfather      Unknown/Adopted Paternal Grandmother      HEART DISEASE Paternal Grandfather      left ventrical failure     CEREBROVASCULAR DISEASE Paternal Grandfather      Gynecology Sister      endometriosis     Depression Sister      Asthma Daughter      Breast Cancer Maternal Aunt      Thyroid Disease Maternal Aunt      Breast Cancer Other      fathers sister     Anesthesia Reaction Other      Thyroid Disease Other      OSTEOPOROSIS Other      Asthma Daughter      Breast Cancer Other      mothers sister     Glaucoma No family hx of      Macular Degeneration No family hx of          Current Outpatient Prescriptions   Medication Sig Dispense Refill     citalopram (CELEXA) 20 MG tablet Take 1/2 tablet (10 mg) for 1-2 weeks, then increase to 1 tablet orally daily 30 tablet 0     insulin detemir (LEVEMIR FLEXTOUCH) 100 UNIT/ML injection Take 39 units of Levemir nightly before bed. 3 mL 3     mesalamine (CANASA) 1000 MG Suppository Place 1,000 mg rectally daily       insulin pen needle 31G X 5 MM Use 1 pen needles twice daily or as directed. 200 each 3     VICTOZA PEN 18 MG/3ML soln INJECT 3 MG SUB-Q ONCE DAILY 27 mL 3     HUMIRA PEN 40 MG/0.8ML pen kit Inject as directed every 14 days  2     diphenhydrAMINE (BENADRYL ALLERGY) 25 MG tablet Reported on 2/13/2017       atenolol (TENORMIN) 25 MG tablet Take 1 tablet (25 mg) by mouth 2 times daily 180 tablet 3     losartan (COZAAR) 25 MG tablet TAKE ONE-HALF TABLET BY MOUTH DAILY 15 tablet 5     order for DME Lancets.  TID and prn. To go with Rigo Contour next EZ, smallest lancet possible (ie, least painful) 300 each 4     order for DME Glucometer, brand as covered by insurance. 1 each 0     order for DME Test strips for pt's glucometer, brand as covered by insurance. Test four times daily and  prn. 400 each 4     methocarbamol (ROBAXIN) 750 MG tablet Reported on 3/9/2017  0     oxyCODONE-acetaminophen (PERCOCET) 5-325 MG per tablet   0     fluticasone (FLOVENT HFA) 110 MCG/ACT inhaler Spray 2 puffs in nostril 2 times daily 36 Inhaler 1     eletriptan (RELPAX) 20 MG tablet take 1-2 tablets by mouth at onset of headache for migraine. may repeat dose in 2 hours. do not exceed 80mg in 24 hours. 12 tablet 1     cholecalciferol 2000 UNITS tablet Take 1 tablet by mouth daily  30 tablet      mesalamine (LIALDA) 1.2 G EC tablet Take 1,200 mg by mouth 2 tablets daily       aspirin 81 MG tablet Take by mouth daily 30 tablet 3     STATIN NOT PRESCRIBED, INTENTIONAL, 1 each continuous prn Statin not prescribed intentionally due to Refusal by patient and Other:LDL is below 100. 0 each 0     TYLENOL CAPS 500 MG OR 1 CAPSULE EVERY 4 HOURS AS NEEDED       DRAMAMINE OR as needed       loratadine (CLARITIN) 10 MG tablet Reported on 3/9/2017       azaTHIOprine (IMURAN) 50 MG tablet Take 1 tablet by mouth daily Reported on 3/9/2017  0     Allergies   Allergen Reactions     Demerol      Very low blood pressure     Metformin GI Disturbance     Nubain  [Nalbuphine]      Topamax [Topiramate] Other (See Comments)     Sleepy and confused.     Tylenol Sinus [Tylenol Sinus Max]      Feet swelling     Recent Labs   Lab Test  02/13/17   1020  11/07/16   1124  10/14/16   0859 07/12/16 03/17/16   0947  02/19/16   1316   11/04/15   1138   12/10/14   1034  08/04/13 07/24/13   A1C  8.1*   --   7.7*  8.0   < >   --    --    < >  6.6*   < >  6.9*   < >  8.3*   < >   --    LDL   --   95   --    --    --    --    --    --   82   --   89   < >   --    --    --    HDL   --   44*   --    --    --    --    --    --   42*   --   51   < >   --    --    --    TRIG   --   92   --    --    --    --    --    --   61   --   68   < >   --    --    --    ALT   --    --    --    --    --   22   --    --    --    --    --    --   8  8   --   38   CR   --     --    --    --    --   0.86  0.78   --    --    < >   --    < >  0.73   --    --    GFRESTIMATED   --    --    --    --    --   72  79   --    --    < >   --    < >   --    --    --    GFRESTBLACK   --    --    --    --    --   87  >90   GFR Calc     --    --    < >   --    < >   --    --    --    POTASSIUM   --    --    --    --    --   4.3  4.3   --    --    < >   --    < >  4.2   --    --    TSH  2.05   --    --    --    --    --    --    --    --    --   1.51   --    --    --    --     < > = values in this interval not displayed.      BP Readings from Last 3 Encounters:   03/09/17 124/76   02/13/17 118/84   11/21/16 121/90    Wt Readings from Last 3 Encounters:   03/09/17 246 lb (111.6 kg)   02/13/17 245 lb (111.1 kg)   11/21/16 238 lb (108 kg)           Reviewed and updated as needed this visit by clinical staff  Tobacco  Allergies  Meds       Reviewed and updated as needed this visit by Provider         ROS:  Constitutional, HEENT, cardiovascular, pulmonary, gi and gu systems are negative, except as otherwise noted.    This document serves as a record of the services and decisions personally performed and made by Indu Ramos MD. It was created on her behalf by Iraida Santoro, a trained medical scribe. The creation of this document is based the provider's statements to the medical scribe.    Iraida Santoro January 4, 2017 8:23 AM    OBJECTIVE:                                                    /76  Pulse 84  Temp 97.1  F (36.2  C) (Oral)  Wt 246 lb (111.6 kg)  SpO2 96%  BMI 41.19 kg/m2  Body mass index is 41.19 kg/(m^2).  GENERAL: obese, alert and no distress  ABDOMEN: Right lower quadrant tenderness to palpation without rebounding or guarding. Left lower quadrant had a pin point area of extreme tenderness to palpation   PSYCH: mentation appears normal, affect normal/bright    Diagnostic Test Results:  No results found for this or any previous visit (from the past 24 hour(s)).      ASSESSMENT/PLAN:                                                        ICD-10-CM    1. Moderate single current episode of major depressive disorder (H) F32.1 citalopram (CELEXA) 20 MG tablet   2. Incisional hernia, without obstruction or gangrene K43.2 GENERAL SURG ADULT REFERRAL   3. Type 2 diabetes mellitus without complication, with long-term current use of insulin (H) E11.9     Z79.4      Total visit time: > 25 min, over half spent counseling about depression, options for treatment    Complex patient with multiple medical problems  Mood definitely worsened by the stress of her chronic illness  Feeling poorly, ambivalent about treatment for her mood, not very engaged.  Very concerned about side effects from medication  Plan trial of citalopram, 10 mg x 1 week, increasing to 20 mg thereafter. Follow up with me in 4-6 weeks to recheck.  Incisional hernia, s/p prior repair, now with ongoing symptoms in this area and has some exquisite tenderness in LLQ. Always has a tender abdomen, but this is worse than her baseline. Recommend follow up with general surgery, has a binder at home, asked to wear it.    Patient Instructions   Will start on a new medication today called citalopram at 10mg. We will trial this for 1 week. After 1 week, follow-up with me and if you are not feeling any side effects, we will increase the dose up to 20mg.    I recommend you follow-up with the surgeon. I've given you a referral for this.    I'll check into the victoza and have someone get back to you    The information in this document, created by the medical scribe for me, accurately reflects the services I personally performed and the decisions made by me. I have reviewed and approved this document for accuracy prior to leaving the patient care area.    Indu Ramos MD  [facility]

## 2017-03-09 NOTE — PATIENT INSTRUCTIONS
Will start on a new medication today called citalopram at 10mg. We will trial this for 1 week. After 1 week, follow-up with me and if you are not feeling any side effects, we will increase the dose up to 20mg.    I recommend you follow-up with the surgeon. I've given you a referral for this.    I'll check into the victoza and have someone get back to you  Indu Ramos MD   [facility]    If you have any questions regarding to your visit please contact your care team:       Team Purple:   Clinic Hours Telephone Number   [address] Dr.   7am-7pm  Monday - Thursday   7am-5pm  Fridays  [phone]  (Appointment scheduling available 24/7)    Questions about your Visit?   Team Line:  [phone]   Urgent Care - [facility] - 11am-9pm Monday-Friday Saturday-Sunday- 9am-5pm   [place] - 5pm-9pm Monday-Friday Saturday-Sunday- 9am-5pm  [phone] - [facility]  [phone] - [place]       What options do I have for visits at the clinic other than the traditional office visit?  To expand how we care for you, many of our providers are utilizing electronic visits (e-visits) and telephone visits, when medically appropriate, for interactions with their patients rather than a visit in the clinic.   We also offer nurse visits for many medical concerns. Just like any other service, we will bill your insurance company for this type of visit based on time spent on the phone with your provider. Not all insurance companies cover these visits. Please check with your medical insurance if this type of visit is covered. You will be responsible for any charges that are not paid by your insurance.      E-visits via [employer]:  generally incur a $35.00 fee.  Telephone visits:  Time spent on the phone: *charged based on time that is spent on the phone in increments of 10 minutes. Estimated cost:   5-10 mins $30.00   11-20 mins.  $59.00   21-30 mins. $85.00     Use [employer] (secure email communication and access to your chart) to send your primary care provider a message or make an appointment. Ask someone on your Team how to sign up for [employer].  For a Price Quote for your services, please call our Consumer Price Line at [phone].  As always, Thank you for trusting us with your health care needs!    Lizbeth Souza MA

## 2017-03-09 NOTE — NURSING NOTE
"Chief Complaint   Patient presents with     Depression       Initial /76  Pulse 84  Temp 97.1  F (36.2  C) (Oral)  Wt 246 lb (111.6 kg)  SpO2 96%  BMI 41.19 kg/m2 Estimated body mass index is 41.19 kg/(m^2) as calculated from the following:    Height as of 2/13/17: 5' 4.8\" (1.646 m).    Weight as of this encounter: 246 lb (111.6 kg).  Medication Reconciliation: complete    "

## 2017-03-10 PROBLEM — K43.2 INCISIONAL HERNIA, WITHOUT OBSTRUCTION OR GANGRENE: Status: ACTIVE | Noted: 2017-03-10

## 2017-03-10 PROBLEM — F32.1 MODERATE SINGLE CURRENT EPISODE OF MAJOR DEPRESSIVE DISORDER (H): Status: ACTIVE | Noted: 2017-03-10

## 2017-03-10 RX ORDER — LIRAGLUTIDE 6 MG/ML
INJECTION SUBCUTANEOUS
Qty: 45 ML | Refills: 1 | Status: SHIPPED | OUTPATIENT
Start: 2017-03-10 | End: 2017-03-29

## 2017-03-10 ASSESSMENT — PATIENT HEALTH QUESTIONNAIRE - PHQ9: SUM OF ALL RESPONSES TO PHQ QUESTIONS 1-9: 12

## 2017-03-10 NOTE — TELEPHONE ENCOUNTER
Received Approval for the Levemir good from 03/02/2017 to 03/02/2018.Called Pharmacy and let them know this information they will let patient know.  Rhina Mckeon,

## 2017-03-14 ENCOUNTER — ALLIED HEALTH/NURSE VISIT (OUTPATIENT)
Dept: NURSING | Facility: CLINIC | Age: 47
End: 2017-03-14
Payer: COMMERCIAL

## 2017-03-14 DIAGNOSIS — Z23 VACCINE FOR VIRAL HEPATITIS: Primary | ICD-10-CM

## 2017-03-14 PROCEDURE — 90471 IMMUNIZATION ADMIN: CPT

## 2017-03-14 PROCEDURE — 90636 HEP A/HEP B VACC ADULT IM: CPT

## 2017-03-14 PROCEDURE — 99207 ZZC NO CHARGE NURSE ONLY: CPT

## 2017-03-14 NOTE — NURSING NOTE
Prior to injection verified patient identity using patient's name and date of birth.Patient instructed to remain in clinic for 20 minutes afterwards, and to report any adverse reaction immediately.  Screening Questionnaire for Adult Immunization    Are you sick today?   No   Do you have allergies to medications, food, a vaccine component or latex?   No   Have you ever had a serious reaction after receiving a vaccination?   No   Do you have a long-term health problem with heart disease, lung disease, asthma, kidney disease, metabolic disease (e.g. diabetes), anemia, or other blood disorder?   Yes   Do you have cancer, leukemia, HIV/AIDS, or any other immune system problem?   No   In the past 3 months, have you taken medications that affect  your immune system, such as prednisone, other steroids, or anticancer drugs; drugs for the treatment of rheumatoid arthritis, Crohn s disease, or psoriasis; or have you had radiation treatments?   Yes   Have you had a seizure, or a brain or other nervous system problem?   No   During the past year, have you received a transfusion of blood or blood     products, or been given immune (gamma) globulin or antiviral drug?   No   For women: Are you pregnant or is there a chance you could become        pregnant during the next month?   No   Have you received any vaccinations in the past 4 weeks?   No     Immunization questionnaire was positive for at least one answer. Positive answers relate only to live vaccine, and are not given today      MNVFC doesn't apply on this patient       Screening performed by PANCHITO CARCAMO on 3/14/2017 at 2:01 PM.

## 2017-03-14 NOTE — MR AVS SNAPSHOT
After Visit Summary   3/14/2017    Zaira Villatoro    MRN: [id number]           Patient Information     Date Of Birth          1970        Visit Information        Provider Department      3/14/2017 1:30 PM [facility] ANCILLARY [facility]        Today's Diagnoses     Vaccine for viral hepatitis    -  1       Follow-ups after your visit        Your next 10 appointments already scheduled     Apr 13, 2017 10:45 AM CDT   SHORT with Indu Ramos MD   [facility] ([facility])    [address] [address]   [phone]            May 01, 2017  1:00 PM CDT   Return Visit with Aron Tellez MD   [facility] PHYSICI[facility] AT [facility])    [address]  [place] [place] [facility] [address]   [phone]            May 26, 2017  9:00 AM CDT   Office Visit with Indu Ramos MD   [facility] ([facility])    [address] [address] [phone]           Bring a current list of meds and any records pertaining to this visit.  For Physicals, please bring immunization records and any forms needing to be filled out.  Please arrive 10 minutes early to complete paperwork.              Who to contact     If you have questions or need follow up information about today's clinic visit or your schedule please contact [facility] directly at [phone].  Normal or non-critical lab and imaging results will be communicated to you by MyChart, letter or phone within 4 business days after the clinic has received the results. If you do not hear from us within 7 days, please contact the clinic through MyChart or phone. If you have a critical or abnormal lab result, we will notify you by phone as soon as possible.  Submit refill requests through [facility] or call your pharmacy and they will forward the refill request to us. Please allow 3 business days for  your refill to be completed.          Additional Information About Your Visit        [employer] Information     [facility] [employer] gives you secure access to your electronic health record. If you see a primary care provider, you can also send messages to your care team and make appointments. If you have questions, please call your primary care clinic.  If you do not have a primary care provider, please call [phone] and they will assist you.        Care EveryWhere ID     This is your Care EveryWhere ID. This could be used by other organizations to access your [facility] medical records  [id number]         Blood Pressure from Last 3 Encounters:   03/09/17 124/76   02/13/17 118/84   11/21/16 121/90    Weight from Last 3 Encounters:   03/09/17 246 lb (111.6 kg)   02/13/17 245 lb (111.1 kg)   11/21/16 238 lb (108 kg)              We Performed the Following     ADMIN 1st VACCINE     HEPA/HEPB VACCINE ADULT IM        Primary Care Provider Office Phone # Fax #    Indu Ramos [phone]       [address] [address]        Thank you!     Thank you for choosing [facility]  for your care. Our goal is always to provide you with excellent care. Hearing back from our patients is one way we can continue to improve our services. Please take a few minutes to complete the written survey that you may receive in the mail after your visit with us. Thank you!             Your Updated Medication List - Protect others around you: Learn how to safely use, store and throw away your medicines at www.disposemymeds.org.          This list is accurate as of: 3/14/17  2:06 PM.  Always use your most recent med list.                   Brand Name Dispense Instructions for use    aspirin 81 MG tablet     30 tablet    Take by mouth daily       atenolol 25 MG tablet    TENORMIN    180 tablet    Take 1 tablet (25 mg) by mouth 2 times daily       azaTHIOprine 50 MG tablet    IMURAN      Take 1 tablet by mouth daily Reported on 3/9/2017       BENADRYL ALLERGY 25 MG tablet   Generic drug:  diphenhydrAMINE      Reported on 2/13/2017       cholecalciferol 2000 UNITS tablet     30 tablet    Take 1 tablet by mouth daily       citalopram 20 MG tablet    celeXA    30 tablet    Take 1/2 tablet (10 mg) for 1-2 weeks, then increase to 1 tablet orally daily       CLARITIN 10 MG tablet   Generic drug:  loratadine      Reported on 3/9/2017       DRAMAMINE PO      as needed       eletriptan 20 MG tablet    RELPAX    12 tablet    take 1-2 tablets by mouth at onset of headache for migraine. may repeat dose in 2 hours. do not exceed 80mg in 24 hours.       fluticasone 110 MCG/ACT Inhaler    FLOVENT HFA    36 Inhaler    Spray 2 puffs in nostril 2 times daily       HUMIRA PEN 40 MG/0.8ML pen kit   Generic drug:  adalimumab      Inject as directed every 14 days       insulin detemir 100 UNIT/ML injection    LEVEMIR FLEXTOUCH    3 mL    Take 39 units of Levemir nightly before bed.       insulin pen needle 31G X 5 MM     200 each    Use 1 pen needles twice daily or as directed.       * LIALDA 1.2 G EC tablet   Generic drug:  mesalamine      Take 1,200 mg by mouth 2 tablets daily       * mesalamine 1000 MG Suppository    CANASA     Place 1,000 mg rectally daily       liraglutide 18 MG/3ML soln    VICTOZA PEN    45 mL    INJECT 3 MG SUB-Q ONCE DAILY       losartan 25 MG tablet    COZAAR    15 tablet    TAKE ONE-HALF TABLET BY MOUTH DAILY       methocarbamol 750 MG tablet    ROBAXIN     Reported on 3/9/2017       * order for DME     1 each    Glucometer, brand as covered by insurance.       * order for DME     400 each    Test strips for pt's glucometer, brand as covered by insurance. Test four times daily and prn.       * order for DME     300 each    Lancets.  TID and prn. To go with Rigo Contour next EZ, smallest lancet possible (ie, least painful)       oxyCODONE-acetaminophen 5-325 MG per tablet    PERCOCET          STATIN NOT PRESCRIBED (INTENTIONAL)     0 each    1 each continuous prn Statin not prescribed intentionally due to Refusal by patient and Other:LDL is below 100.       TYLENOL CAPS 500 MG OR      1 CAPSULE EVERY 4 HOURS AS NEEDED       * Notice:  This list has 5 medication(s) that are the same as other medications prescribed for you. Read the directions carefully, and ask your doctor or other care provider to review them with you.

## 2017-03-15 ENCOUNTER — TELEPHONE (OUTPATIENT)
Dept: FAMILY MEDICINE | Facility: CLINIC | Age: 47
End: 2017-03-15

## 2017-03-15 DIAGNOSIS — Z79.4 TYPE 2 DIABETES MELLITUS WITHOUT COMPLICATION, WITH LONG-TERM CURRENT USE OF INSULIN (H): ICD-10-CM

## 2017-03-15 DIAGNOSIS — E11.9 TYPE 2 DIABETES MELLITUS WITHOUT COMPLICATION, WITH LONG-TERM CURRENT USE OF INSULIN (H): ICD-10-CM

## 2017-03-15 NOTE — TELEPHONE ENCOUNTER
Received fax from pharmacy stating patient requires Prior Authorization for victoza   Dosage Exceeds product labeling limit    Insurance information:  Name: [employer]  Phone number: [phone]  ID number: [id number]    Provider to address. Message route to Dr. Ramos. Initiate prior authorization or change medication?  Please advise.      **If a prior authorization is to be initiated, please list the following:    -Any medications the patient has tried and failed or any contraindications. **    -Is the patient currently on this medication, or has tried before? **    -What is the diagnosis? **    - Justification or other information that me by helpful. **

## 2017-03-16 NOTE — TELEPHONE ENCOUNTER
Received Denial for the Victoza 3-charles Pen Inj. In effort to promote patient safety and appropriate use, your plan has limits on the amount of Victoza that you can get per 30 days. Your [employer] Formul[employer] coverage criteria limit Victoza to 9mL per 30 days. (1.8 mL per day ) According to are records and the information provided  We are requesting more than 9mL per 30 days. Therefore this request can not be approved. Put papers in providers bin to review or do an Appeal.  Rhina Cano  Team Mike,

## 2017-03-16 NOTE — TELEPHONE ENCOUNTER
Called insurance we were given wrong number it should be [facility] [phone]. They faxed a form I filled out and faxed to [employer] awaiting response.  Rhina Mckeon,

## 2017-03-16 NOTE — TELEPHONE ENCOUNTER
Meds tried/failed: unable to take metformin. Has ulcerative coliitis and had severe diarrhea with metformin. Is also treated with levemir 39 untis before bed  Is currently on victoza at this dose  Diagnosis: poorly controlled type 2 DIABETES MELLITUS  Indu Ramos MD

## 2017-03-17 NOTE — TELEPHONE ENCOUNTER
Please notify patient  Her insurance will not cover more than 1.8mL daily of the victoza  Needs to decrease her dose to this amount  Monitor sugars and work with diabetes ed to adjust her insulin based on her sugar readings  Indu Ramos MD

## 2017-03-17 NOTE — TELEPHONE ENCOUNTER
Patient notified of providers message as written.  Patient verbalized understanding.  Due to insurance coverage patient states the pharmacy will not fill prescription until covered by insurance.  advised patient that she should speak to pharmacy about this, she may need to pay out of pocket for medication if it was not covered.  Patient will speak with pharmacy   Pati Malave RN

## 2017-03-17 NOTE — TELEPHONE ENCOUNTER
Recommend follow up with diabetic educator with her sugar log, current medication, to discuss options  Indu Ramos MD

## 2017-03-17 NOTE — TELEPHONE ENCOUNTER
Patient notified of providers message as written.  Patient will start taking 1.8ml per day.  Patient is wondering if there is a different medication she can take.?  Pati Malave RN

## 2017-03-29 ENCOUNTER — TELEPHONE (OUTPATIENT)
Dept: FAMILY MEDICINE | Facility: CLINIC | Age: 47
End: 2017-03-29

## 2017-03-29 DIAGNOSIS — Z79.4 TYPE 2 DIABETES MELLITUS WITHOUT COMPLICATION, WITH LONG-TERM CURRENT USE OF INSULIN (H): ICD-10-CM

## 2017-03-29 DIAGNOSIS — E11.9 TYPE 2 DIABETES MELLITUS WITHOUT COMPLICATION, WITH LONG-TERM CURRENT USE OF INSULIN (H): ICD-10-CM

## 2017-03-29 RX ORDER — LIRAGLUTIDE 6 MG/ML
INJECTION SUBCUTANEOUS
Qty: 45 ML | Refills: 1 | Status: SHIPPED | OUTPATIENT
Start: 2017-03-29 | End: 2017-12-06

## 2017-03-29 NOTE — TELEPHONE ENCOUNTER
See 3/15/17 telephone encounter, dose changed to 1.8mg per insurance coverage.  Order sent to pharmacy.  Detailed message left for patient advising her that this was sent to pharmacy, and to call RN hotline with questions.   Pati Malave RN

## 2017-03-29 NOTE — TELEPHONE ENCOUNTER
Hello,    This message was originally sent to Shannon Kennedy (Diabetes Educator). I was told to send it to Indu Ramos.    I m helping out Karin with Diabetes and Nutrition scheduling this week because James is out of the office. Zaira Villatoro MRN [id number] scheduled an appointment for April 12. She said that before then she needed Victoza refilled. She said that if the prescription is for a lower dose it needs to be sent to [facility]. She can be reached if needed at [phone] and a detailed voicemail is ok.    Thank you,  Erlinda MARC   Central Scheduler

## 2017-03-30 ENCOUNTER — TELEPHONE (OUTPATIENT)
Dept: CARDIOLOGY | Facility: CLINIC | Age: 47
End: 2017-03-30

## 2017-03-30 NOTE — TELEPHONE ENCOUNTER
I called patient to schedule her for a 14 day ZIO Patch that is needed prior to her cardiology follow up appointment with Dr. Aron Tellez on 05/01/2017.  Patient does not want to have ZIO Patch again due to past history of severe skin reactions from the patch.  She also states that she is not interested in starting Warfarin (Coumadin) either if this is the reason for the ZIO Patch testing.  I informed patient that I would pass along this information to Dr. Aron Tellez.  Message routed to _Cardiology Pool.  LISA AminA

## 2017-03-30 NOTE — TELEPHONE ENCOUNTER
Message sent to Dr. Tellez as a FYI about pt not wanting to wear a zio.     Closing encounter. If there are any changes needed prior to appt. I will call pt back.    Wilian Mena RN

## 2017-04-03 ENCOUNTER — TELEPHONE (OUTPATIENT)
Dept: CARDIOLOGY | Facility: CLINIC | Age: 47
End: 2017-04-03

## 2017-04-03 NOTE — TELEPHONE ENCOUNTER
Patient called and states that she is still having intermittent bleeding. She spoke to her GI provider, GI states she feels safe for her to come off of aspirin and change to warfarin as long as she is not bleeding, but seems how she is still bleeding off and on she does not feel safe changing her med.    Patient also reports that last 2 times she wore the zio she got a bad skin reaction after, and really does not want to wear the patch again as to avoid the skin reaction.    Patient would like to discuss her options as to what she should do (without having to wear the patch), she does have a follow up appointment scheduled for 5-1-17 with Dr. Aron Tellez.    Please Mychart Patient, we may call too if its today, but she won't be able to answer her phone all day on 4-4-17.    Thank you,  Nicole Mazariegos CMA.

## 2017-04-04 NOTE — PROGRESS NOTES
SUBJECTIVE:                                                    Zaira Villatoro is a 47 year old female who presents to clinic today for the following health issues:    Patient notes she has not been improving from the Citalopram 20 mg dailyi. She states she wakes up with hiccups-- only effect she can tell from medication.  she has been on the medication for a month now, and is out of the medication. She has not been consistent with her eating pattern and would like to work on her healthy eating behaviors due to diabetes. Working with diabetes education.       Depression Followup    Status since last visit: no improvement, see above    See PHQ-9 for current symptoms.  Other associated symptoms: None    Complicating factors:   Significant life event:  No   Current substance abuse:  None  Anxiety or Panic symptoms:  No    PHQ-9  English PHQ-9   Any Language              Amount of exercise or physical activity: 4-5 days/week for an average of 50,000 per week    Problems taking medications regularly: No    Medication side effects: none    Diet: diabetic and  High protein, no nuts or seeds          Problem list and histories reviewed & adjusted, as indicated.  Additional history: as documented    Patient Active Problem List   Diagnosis     IBS (irritable bowel syndrome)     Migraine     Ulcerative colitis (H)     Fatty liver     Chronic rhinitis     S/P left hemicolectomy     CARDIOVASCULAR SCREENING; LDL GOAL LESS THAN 100     Hypertension goal BP (blood pressure) < 140/90     History of seizures as a child     Type 2 diabetes mellitus without complication (H)     Morbid obesity due to excess calories (H)     History of diverticular abscess of colon     History of sinus surgery     Adjustment disorder with depressed mood     Immunosuppressed status (H)     Moderate single current episode of major depressive disorder (H)     Incisional hernia, without obstruction or gangrene     Past Surgical History:   Procedure  Laterality Date     APPENDECTOMY  april 2014     ARTHROSCOPY KNEE RT/LT  2004    RT      BIOPSY      many     BIOPSY  2011 and on    many     COLONOSCOPY  2/12    lt sided colitis     COLONOSCOPY  1/9/2014     CRYOTHERAPY, CERVICAL  1988     CRYOTHERAPY, CERVICAL      mild dysplasia     EXPLORATORY LAPAROTOMY, PARTIAL LEFT ROLANDA COLLECTOMY WITH HARTMANS PROCEDURE, COLOSTOMY  8/2013    lt rolanda colectomy, bowel perforation, colostomy, Karen's pouche     GI SURGERY  2013    abrupted  bowl     HC TOOTH EXTRACTION W/FORCEP  6/2003    Abscess Tooth / Hospitalized     HERNIA REPAIR  april 2014    found at time of takedown     HERNIA REPAIR  2014    found at time of takedown     SINUS SURGERY  2/11/03    LT sinus cyst removal      TAKEDOWN COLOSTOMY  4-2014       Social History   Substance Use Topics     Smoking status: Former Smoker     Packs/day: 1.00     Years: 15.00     Types: Cigarettes     Start date: 1/1/1985     Quit date: 7/1/1998     Smokeless tobacco: Never Used      Comment: soke free household.     Alcohol use No      Comment: occ     Family History   Problem Relation Age of Onset     DIABETES Mother      Allergies Mother      Asthma Mother      Arthritis Mother      HEART DISEASE Mother      heart murmur     Depression Mother      Obesity Mother      Eye Disorder Father      DIABETES Father      CEREBROVASCULAR DISEASE Father      HEART DISEASE Father      Hypertension Father      DIABETES Maternal Grandmother      CEREBROVASCULAR DISEASE Maternal Grandfather      Unknown/Adopted Paternal Grandmother      HEART DISEASE Paternal Grandfather      left ventrical failure     CEREBROVASCULAR DISEASE Paternal Grandfather      Gynecology Sister      endometriosis     Depression Sister      Asthma Daughter      Breast Cancer Maternal Aunt      Thyroid Disease Maternal Aunt      Breast Cancer Other      fathers sister     Anesthesia Reaction Other      Thyroid Disease Other      OSTEOPOROSIS Other      Asthma  Daughter      Breast Cancer Other      mothers sister     Glaucoma No family hx of      Macular Degeneration No family hx of          Current Outpatient Prescriptions   Medication Sig Dispense Refill     insulin detemir (LEVEMIR FLEXTOUCH) 100 UNIT/ML injection Take 52 units of Levemir nightly before bed. 3 mL 3     blood glucose monitoring (ACCU-CHEK FASTCLIX) lancets 1 each 4 times daily Use to test blood sugar 4 times daily or as directed. 3 Box 1     liraglutide (VICTOZA PEN) 18 MG/3ML soln INJECT 1.8 MG SUB-Q ONCE DAILY 45 mL 1     citalopram (CELEXA) 20 MG tablet Take 1/2 tablet (10 mg) for 1-2 weeks, then increase to 1 tablet orally daily 30 tablet 0     mesalamine (CANASA) 1000 MG Suppository Place 1,000 mg rectally daily       insulin pen needle 31G X 5 MM Use 1 pen needles twice daily or as directed. 200 each 3     HUMIRA PEN 40 MG/0.8ML pen kit Inject as directed every 14 days  2     loratadine (CLARITIN) 10 MG tablet Reported on 4/12/2017       diphenhydrAMINE (BENADRYL ALLERGY) 25 MG tablet Reported on 4/12/2017       azaTHIOprine (IMURAN) 50 MG tablet Take 1 tablet by mouth daily Reported on 4/12/2017  0     atenolol (TENORMIN) 25 MG tablet Take 1 tablet (25 mg) by mouth 2 times daily 180 tablet 3     losartan (COZAAR) 25 MG tablet TAKE ONE-HALF TABLET BY MOUTH DAILY 15 tablet 5     order for DME Glucometer, brand as covered by insurance. 1 each 0     order for DME Test strips for pt's glucometer, brand as covered by insurance. Test four times daily and prn. 400 each 4     methocarbamol (ROBAXIN) 750 MG tablet Reported on 3/9/2017  0     oxyCODONE-acetaminophen (PERCOCET) 5-325 MG per tablet   0     fluticasone (FLOVENT HFA) 110 MCG/ACT inhaler Spray 2 puffs in nostril 2 times daily 36 Inhaler 1     eletriptan (RELPAX) 20 MG tablet take 1-2 tablets by mouth at onset of headache for migraine. may repeat dose in 2 hours. do not exceed 80mg in 24 hours. 12 tablet 1     cholecalciferol 2000 UNITS tablet  Take 1 tablet by mouth daily  30 tablet      mesalamine (LIALDA) 1.2 G EC tablet Take 1,200 mg by mouth 2 tablets daily       aspirin 81 MG tablet Take by mouth daily 30 tablet 3     STATIN NOT PRESCRIBED, INTENTIONAL, 1 each continuous prn Statin not prescribed intentionally due to Refusal by patient and Other:LDL is below 100. 0 each 0     TYLENOL CAPS 500 MG OR 1 CAPSULE EVERY 4 HOURS AS NEEDED       DRAMAMINE OR as needed       BP Readings from Last 3 Encounters:   04/13/17 112/70   03/09/17 124/76   02/13/17 118/84    Wt Readings from Last 3 Encounters:   04/13/17 242 lb (109.8 kg)   04/12/17 241 lb (109.3 kg)   03/09/17 246 lb (111.6 kg)                  Labs reviewed in EPIC    Reviewed and updated as needed this visit by clinical staff       Reviewed and updated as needed this visit by Provider         ROS:  10 point ROS of systems including Constitutional, ENT, Respiratory, Cardiovascular, Gastroenterology, Genitourinary, Integumentary, Musculoskeletal, Psychiatric were all negative except for pertinent positives noted in my HPI.    This document serves as a record of the services and decisions personally performed and made by Indu Ramos MD. It was created on his/her behalf by Mirta Gleason, trained medical scribes. The creation of this document is based the provider's statements to the medical scribes.  Scribes Mirta Gleason 11:08 AM, April 13, 2017    OBJECTIVE:                                                    /70  Pulse 84  Temp 97.4  F (36.3  C) (Oral)  Wt 242 lb (109.8 kg)  SpO2 97%  BMI 40.52 kg/m2  Body mass index is 40.52 kg/(m^2).  GENERAL: obese, alert and no distress    Diagnostic Test Results:  none      ASSESSMENT/PLAN:                                                    Diabetes Type II, A1c Controlled, insulin dependent   Associated with the following complications    Renal Complications:  None    Ophthalmologic Complications:  None    Neurologic Complications: None    Peripheral Vascular Complications:  None    Other: None   Plan:  No changes in the patient's current treatment plan    Depression; recurrent episode-- Moderate   Associated with the following complications:    Diabetes   Plan:  The following changes are made - increase in Citalopram from 20mg-40mg          ICD-10-CM    1. Moderate single current episode of major depressive disorder (H) F32.1 citalopram (CELEXA) 40 MG tablet   2. Uncontrolled type 2 diabetes mellitus without complication, with long-term current use of insulin (H) E11.65 DIABETES EDUCATOR REFERRAL    Z79.4    depression: no response yet to citalopram-- increase to 40 mg and recheck in 4-6 weeks. If no response, then would d/c and use another agent (effexor or wellbutrin). Has declined counseling  Diabetes: continue to work with diabetic ed. Fasting labs at next visit.     Patient Instructions     A1c upon next visit and check kidney functions on the same visit.  Increase Citalopram from 20mg to 40mg daily.   Continue with diabetic education  Talk to GI specialist about UC bowel symptoms   Same dosage of Victoza      The information in this document, created by the medical scribe for me, accurately reflects the services I personally performed and the decisions made by me. I have reviewed and approved this document for accuracy prior to leaving the patient care area.    Indu Ramos MD  11:09 AM, 04/13/17  Indu Ramos MD  [facility]

## 2017-04-04 NOTE — TELEPHONE ENCOUNTER
Per Dr. Tellez, he wanted 1 more holter for reassurance regarding any afib burdency. If patient is unwilling to do the holter monitor then an appointment is not necessary at this time.  This was communicated with the patient and she is agreeable with the plan.  Advised for her to call the clinic if experiencing symptoms of palpitations, chest pain, shortness of breath, ect or if she decides to do the holter.  Patient verbalized understanding.    Vanda Pena, JEY  Care Coordinator  [facility]  [phone]

## 2017-04-12 ENCOUNTER — OFFICE VISIT (OUTPATIENT)
Dept: EDUCATION SERVICES | Facility: CLINIC | Age: 47
End: 2017-04-12
Payer: COMMERCIAL

## 2017-04-12 VITALS — BODY MASS INDEX: 40.35 KG/M2 | WEIGHT: 241 LBS

## 2017-04-12 DIAGNOSIS — E11.9 TYPE 2 DIABETES MELLITUS WITHOUT COMPLICATION (H): ICD-10-CM

## 2017-04-12 PROCEDURE — G0108 DIAB MANAGE TRN  PER INDIV: HCPCS

## 2017-04-12 RX ORDER — LANCETS
1 EACH MISCELLANEOUS 4 TIMES DAILY
Qty: 3 BOX | Refills: 1 | Status: SHIPPED | OUTPATIENT
Start: 2017-04-12 | End: 2018-05-22

## 2017-04-12 NOTE — NURSING NOTE
Diabetes Self Management Training: Follow-up Visit    Zaira Villatoro presents today for education, evaluation of glucose control and consideration of starting pre meal insulin related to Type 2 diabetes.    She is accompanied by self    Patient's diabetes management related comments/concerns: busy schedule and inconsistent meals    Patient would like this visit to be focused around the following diabetes-related behaviors and goals: Taking Medication    ASSESSMENT:  Patient Problem List reviewed for relevant medical history and current medical status.  Patient is now back on Victoza at a lower dose that is now covered by her insurance at 1.8 mg per day which is less than she was on prior.  GI symptoms were better controlled on 3 mg per day than they are now with patient having soft or loose stools.  Patient currently eating low CHO 0-3 CHO choices per meal and she is fairly well versed in counting CHO.  Patient is not excited to start another injected medication but is fairly receptive to the possibility.  Patient has increased her Levemir gradually up to 51 units and BG values have improved but remain elevated especially at HS.   Patient reports that she does not always have at least 3- 4 hours between meals so this could be part of the issue of BG recorded elevations before meals.  She would like to work to spread out her meals as able and record times that she tests her BG and eats her meals and have those values reviewed before proceeding to start pre meal insulin.    Current Diabetes Management per Patient:  Taking diabetes medications?   yes:     Levemir 0-0-0-51   Victoza 1.8 mg per day    *Abbreviated insulin dose documentation key: Insulin Trade Name (ykurlfggy-ncoah-sozkuo-bedtime) - i.e. Humalog 5-5-5-0 (Humalog 5 units at breakfast, 5 units at lunch, and 5 units at dinner).    Patient glucose self monitoring as follows: four times daily.   BG meter: Accu-Chek Lashay meter  BG results: fasting glucose-  "129, 145,136,137,189,153, pre-lunch glucose- 107, pre-supper glucose- 167,135, and bedtime- 167,142,135, 167 mg/dl for the last 7 days.   BG values are: Not in goal  Patient's most recent A1C    Lab Results   Component Value Date    A1C 8.1 02/13/2017    is not meeting goal of <7.0    Nutrition:  Patient eats 3 meals per day and Working to eat low CHO.  Does not eat nuts or seeds, allergic to fish, allergic to seafood.    Breakfast - time varies- 6-8 am  Premier Protein drink, chocolate flavored, 5 grams of CHO and more protein,    Lunch -  2 pm- salad with ranch and cheese or greek yogurt and Premeir Protein drink and banana and cheese stick  Dinner - varies- 4:30-8 pm-   Moo lemuel chicken  Or    Chicken gutierrez salad  Snacks - 11 am- protein shake if hungry, not often, Diet Dew pop,    Beverages:   Protein shakes, Diet Mt Dew, coffee, water,  Bailys on ice infrequently    Cultural/Yarsanism diet restrictions: No     Biggest Challenge to Healthy Eating: lack of time and finding recipes that she can eat as well as her family, lack of family support for diet    Physical Activity:    Type: walking- non Humira week 50,00 steps per week. Dosing Humira week about 30,000-40,000 steps    Diabetes Complications:  Not discussed today.    Vitals:  Wt 241 lb (109.3 kg)  BMI 40.35 kg/m2  Estimated body mass index is 41.19 kg/(m^2) as calculated from the following:    Height as of 2/13/17: 5' 4.8\" (1.646 m).    Weight as of 3/9/17: 246 lb (111.6 kg).   Last 3 BP:   BP Readings from Last 3 Encounters:   03/09/17 124/76   02/13/17 118/84   11/21/16 121/90       History   Smoking Status     Former Smoker     Packs/day: 1.00     Years: 15.00     Types: Cigarettes     Start date: 1/1/1985     Quit date: 7/1/1998   Smokeless Tobacco     Never Used     Comment: soke free household.       Labs:  Lab Results   Component Value Date    A1C 8.1 02/13/2017     Lab Results   Component Value Date     03/17/2016     Lab Results   Component " Value Date    LDL 95 11/07/2016     HDL Cholesterol   Date Value Ref Range Status   11/07/2016 44 (L) >49 mg/dL Final   ]  GFR Estimate   Date Value Ref Range Status   03/17/2016 72 >60 mL/min/1.7m2 Final     Comment:     Non  GFR Calc     GFR Estimate If Black   Date Value Ref Range Status   03/17/2016 87 >60 mL/min/1.7m2 Final     Comment:      GFR Calc     Lab Results   Component Value Date    CR 0.86 03/17/2016     No results found for: MICROALBUMIN    Health Beliefs and Attitudes:   Patient Activation Measure Survey Score:  MICHAEL Score (Last Two) 11/7/2016 1/30/2017   MICHAEL Raw Score 33 29   Activation Score 65.8 52.9   MICHAEL Level 3 2       Stage of Change: CONTEMPLATION (Considering change and yet undecided)    Diabetes knowledge and skills assessment:     Patient is knowledgeable in diabetes management concepts related to: Healthy Eating, Being Active, Monitoring and Taking Medication    Patient needs further education on the following diabetes management concepts: Healthy Eating, Taking Medication and Healthy Coping    Barriers to Learning Assessment: No Barriers identified    Based on learning assessment above, most appropriate setting for further diabetes education would be: Individual setting.    INTERVENTION:    Education provided today on:  AADE Self-Care Behaviors:  Healthy Eating: carbohydrate counting and label reading  Taking Medication: action of premeal insulin, administering and storing injectable diabetes medications, proper site selection and rotation for injections and when to take medications  Problem Solving: high blood glucose - causes, signs/symptoms, treatment and prevention  Healthy Coping: benefits of making appropriate lifestyle changes    Opportunities for ongoing education and support in diabetes-self management were discussed.    Pt verbalized understanding of concepts discussed and recommendations provided today.       Education Materials  Provided:  [facility] Understanding Diabetes Booklet    PLAN:  See Patient Instructions for co-developed, patient-stated behavior change goals.  AVS printed and provided to patient today.    FOLLOW-UP:  Patient will call in BG  Values for review in 1 week.  Chart routed to referring provider.    Ongoing plan for education and support: Follow-up visit with diabetes educator in 1 week and follow up with PCP.    Shannon Kennedy RN  BSN CDE    Time Spent: 60 minutes  Encounter Type: Individual    Any diabetes medication dose changes were made via the CDE Protocol and Collaborative Practice Agreement with the patient's referring provider. A copy of this encounter was shared with the provider.

## 2017-04-12 NOTE — Clinical Note
CHERELLEI.  Also if you would enter an updated diabetes education referral that would be great as hers is close to expiration.  Thank you  Shannon Kennedy RN  BSN CDE

## 2017-04-12 NOTE — MR AVS SNAPSHOT
After Visit Summary   4/12/2017    Zaira Villatoro    MRN: [id number]           Patient Information     Date Of Birth          1970        Visit Information        Provider Department      4/12/2017 9:15 AM  DIABETIC ED RESOURCE [facility]        Today's Diagnoses     Type 2 diabetes mellitus without complication (H)          Care Instructions    Please check with your insurance company to see which pre meal insulin they cover best for you.  Check to see what the difference in cost is for a vial of Levemir vs pens.  Record times for your meals and tests for your blood.  Send your blood glucose values for review next Wed.  Call if concerns        Follow-ups after your visit        Your next 10 appointments already scheduled     Apr 13, 2017 10:45 AM CDT   SHORT with Inud Ramos MD   [facility] ([facility]    [address] [place] [id number]   [phone]            May 26, 2017  9:00 AM CDT   Office Visit with Indu Ramos MD   [facility] ([address] [address]   [phone]           Bring a current list of meds and any records pertaining to this visit.  For Physicals, please bring immunization records and any forms needing to be filled out.  Please arrive 10 minutes early to complete paperwork.              Who to contact     If you have questions or need follow up information about today's clinic visit or your schedule please contact [facility] directly at [phone].  Normal or non-critical lab and imaging results will be communicated to you by MyChart, letter or phone within 4 business days after the clinic has received the results. If you do not hear from us within 7 days, please contact the clinic through MyChart or phone. If you have a critical or abnormal lab result, we will notify you by phone as soon as possible.  Submit refill requests  through [employer] or call your pharmacy and they will forward the refill request to us. Please allow 3 business days for your refill to be completed.          Additional Information About Your Visit        [employer] Information     [employer] gives you secure access to your electronic health record. If you see a primary care provider, you can also send messages to your care team and make appointments. If you have questions, please call your primary care clinic.  If you do not have a primary care provider, please call [phone] and they will assist you.        Care EveryWhere ID     This is your Care EveryWhere ID. This could be used by other organizations to access your [facility] medical records  [id number]        Your Vitals Were     BMI (Body Mass Index)                   40.35 kg/m2            Blood Pressure from Last 3 Encounters:   03/09/17 124/76   02/13/17 118/84   11/21/16 121/90    Weight from Last 3 Encounters:   04/12/17 241 lb (109.3 kg)   03/09/17 246 lb (111.6 kg)   02/13/17 245 lb (111.1 kg)              Today, you had the following     No orders found for display         Today's Medication Changes          These changes are accurate as of: 4/12/17 10:47 AM.  If you have any questions, ask your nurse or doctor.               Start taking these medicines.        Dose/Directions    blood glucose monitoring lancets   Used for:  Type 2 diabetes mellitus without complication (H)        Dose:  1 each   1 each 4 times daily Use to test blood sugar 4 times daily or as directed.   Quantity:  3 Box   Refills:  1         These medicines have changed or have updated prescriptions.        Dose/Directions    insulin detemir 100 UNIT/ML injection   Commonly known as:  LEVEMIR FLEXTOUCH   This may have changed:  additional instructions   Used for:  Type 2 diabetes mellitus without complication (H)        Take 52 units of Levemir nightly before bed.   Quantity:  3 mL   Refills:  3       * order for DME   This may have  changed:  Another medication with the same name was removed. Continue taking this medication, and follow the directions you see here.   Used for:  Type 2 diabetes mellitus without complication (H)        Glucometer, brand as covered by insurance.   Quantity:  1 each   Refills:  0       * order for DME   This may have changed:  Another medication with the same name was removed. Continue taking this medication, and follow the directions you see here.   Used for:  Type 2 diabetes mellitus without complication (H)        Test strips for pt's glucometer, brand as covered by insurance. Test four times daily and prn.   Quantity:  400 each   Refills:  4       * Notice:  This list has 2 medication(s) that are the same as other medications prescribed for you. Read the directions carefully, and ask your doctor or other care provider to review them with you.         Where to get your medicines      Some of these will need a paper prescription and others can be bought over the counter.  Ask your nurse if you have questions.     Bring a paper prescription for each of these medications     blood glucose monitoring lancets    insulin detemir 100 UNIT/ML injection                Primary Care Provider Office Phone # Fax #    Indu Ramos [phone]       [address] [address]        Thank you!     Thank you for choosing [facility]  for your care. Our goal is always to provide you with excellent care. Hearing back from our patients is one way we can continue to improve our services. Please take a few minutes to complete the written survey that you may receive in the mail after your visit with us. Thank you!             Your Updated Medication List - Protect others around you: Learn how to safely use, store and throw away your medicines at www.disposemymeds.org.          This list is accurate as of: 4/12/17 10:47 AM.  Always use your most recent med list.                    Brand Name Dispense Instructions for use    aspirin 81 MG tablet     30 tablet    Take by mouth daily       atenolol 25 MG tablet    TENORMIN    180 tablet    Take 1 tablet (25 mg) by mouth 2 times daily       azaTHIOprine 50 MG tablet    IMURAN     Take 1 tablet by mouth daily Reported on 4/12/2017       BENADRYL ALLERGY 25 MG tablet   Generic drug:  diphenhydrAMINE      Reported on 4/12/2017       blood glucose monitoring lancets     3 Box    1 each 4 times daily Use to test blood sugar 4 times daily or as directed.       cholecalciferol 2000 UNITS tablet     30 tablet    Take 1 tablet by mouth daily       citalopram 20 MG tablet    celeXA    30 tablet    Take 1/2 tablet (10 mg) for 1-2 weeks, then increase to 1 tablet orally daily       CLARITIN 10 MG tablet   Generic drug:  loratadine      Reported on 4/12/2017       DRAMAMINE PO      as needed       eletriptan 20 MG tablet    RELPAX    12 tablet    take 1-2 tablets by mouth at onset of headache for migraine. may repeat dose in 2 hours. do not exceed 80mg in 24 hours.       fluticasone 110 MCG/ACT Inhaler    FLOVENT HFA    36 Inhaler    Spray 2 puffs in nostril 2 times daily       HUMIRA PEN 40 MG/0.8ML pen kit   Generic drug:  adalimumab      Inject as directed every 14 days       insulin detemir 100 UNIT/ML injection    LEVEMIR FLEXTOUCH    3 mL    Take 52 units of Levemir nightly before bed.       insulin pen needle 31G X 5 MM     200 each    Use 1 pen needles twice daily or as directed.       * LIALDA 1.2 G EC tablet   Generic drug:  mesalamine      Take 1,200 mg by mouth 2 tablets daily       * mesalamine 1000 MG Suppository    CANASA     Place 1,000 mg rectally daily       liraglutide 18 MG/3ML soln    VICTOZA PEN    45 mL    INJECT 1.8 MG SUB-Q ONCE DAILY       losartan 25 MG tablet    COZAAR    15 tablet    TAKE ONE-HALF TABLET BY MOUTH DAILY       methocarbamol 750 MG tablet    ROBAXIN     Reported on 3/9/2017       * order for DME      1 each    Glucometer, brand as covered by insurance.       * order for DME     400 each    Test strips for pt's glucometer, brand as covered by insurance. Test four times daily and prn.       oxyCODONE-acetaminophen 5-325 MG per tablet    PERCOCET         STATIN NOT PRESCRIBED (INTENTIONAL)     0 each    1 each continuous prn Statin not prescribed intentionally due to Refusal by patient and Other:LDL is below 100.       TYLENOL CAPS 500 MG OR      1 CAPSULE EVERY 4 HOURS AS NEEDED       * Notice:  This list has 4 medication(s) that are the same as other medications prescribed for you. Read the directions carefully, and ask your doctor or other care provider to review them with you.

## 2017-04-12 NOTE — PATIENT INSTRUCTIONS
Please check with your insurance company to see which pre meal insulin they cover best for you.  Check to see what the difference in cost is for a vial of Levemir vs pens.  Record times for your meals and tests for your blood.  Send your blood glucose values for review next Wed.  Call if concerns.

## 2017-04-13 ENCOUNTER — OFFICE VISIT (OUTPATIENT)
Dept: FAMILY MEDICINE | Facility: CLINIC | Age: 47
End: 2017-04-13
Payer: COMMERCIAL

## 2017-04-13 ENCOUNTER — TELEPHONE (OUTPATIENT)
Dept: EDUCATION SERVICES | Facility: CLINIC | Age: 47
End: 2017-04-13

## 2017-04-13 VITALS
HEART RATE: 84 BPM | OXYGEN SATURATION: 97 % | SYSTOLIC BLOOD PRESSURE: 112 MMHG | TEMPERATURE: 97.4 F | BODY MASS INDEX: 40.52 KG/M2 | DIASTOLIC BLOOD PRESSURE: 70 MMHG | WEIGHT: 242 LBS

## 2017-04-13 DIAGNOSIS — F32.1 MODERATE SINGLE CURRENT EPISODE OF MAJOR DEPRESSIVE DISORDER (H): Primary | ICD-10-CM

## 2017-04-13 PROCEDURE — 99213 OFFICE O/P EST LOW 20 MIN: CPT | Performed by: FAMILY MEDICINE

## 2017-04-13 RX ORDER — CITALOPRAM HYDROBROMIDE 40 MG/1
40 TABLET ORAL DAILY
Qty: 30 TABLET | Refills: 1 | Status: SHIPPED | OUTPATIENT
Start: 2017-04-13 | End: 2017-05-26

## 2017-04-13 ASSESSMENT — PAIN SCALES - GENERAL: PAINLEVEL: MILD PAIN (2)

## 2017-04-13 NOTE — NURSING NOTE
"Chief Complaint   Patient presents with     Depression     recheck       Initial /70  Pulse 84  Temp 97.4  F (36.3  C) (Oral)  Wt 242 lb (109.8 kg)  SpO2 97%  BMI 40.52 kg/m2 Estimated body mass index is 40.52 kg/(m^2) as calculated from the following:    Height as of 2/13/17: 5' 4.8\" (1.646 m).    Weight as of this encounter: 242 lb (109.8 kg).  Medication Reconciliation: complete    "

## 2017-04-13 NOTE — MR AVS SNAPSHOT
After Visit Summary   4/13/2017    Zaira Villatoro    MRN: [id number]           Patient Information     Date Of Birth          1970        Visit Information        Provider Department      4/13/2017 10:45 AM Indu Ramos MD [facility]        Today's Diagnoses     Moderate single current episode of major depressive disorder (H)    -  1    Uncontrolled type 2 diabetes mellitus without complication, with long-term current use of insulin (H)          Care Instructions    A1c upon next visit and check kidney functions on the same visit.  Increase Citalopram from 20mg to 40mg daily.   Continue with diabetic education  Talk to GI specialist about UC bowel symptoms   Same dosage of Victoza    [facility]    If you have any questions regarding to your visit please contact your care team:       Team Purple:   Clinic Hours Telephone Number   HANNAH Kumar Dr., Dr.   7am-7pm  Monday - Thursday   7am-5pm  Fridays  [phone]  (Appointment scheduling available 24/7)    Questions about your Visit?   Team Line:  [phone]   Urgent Care - [place] [facility] - 11am-9pm Monday-Friday Saturday-Sunday- 9am-5pm   [place] - 5pm-9pm Monday-Friday Saturday-Sunday- 9am-5pm  [phone] - [place]   [phone] - [place]       What options do I have for visits at the clinic other than the traditional office visit?  To expand how we care for you, many of our providers are utilizing electronic visits (e-visits) and telephone visits, when medically appropriate, for interactions with their patients rather than a visit in the clinic.   We also offer nurse visits for many medical concerns. Just like any other service, we will bill your insurance company for this type of visit based on time spent on the phone with your provider. Not all insurance companies cover these visits. Please check with your medical  insurance if this type of visit is covered. You will be responsible for any charges that are not paid by your insurance.      E-visits via [employer]:  generally incur a $35.00 fee.  Telephone visits:  Time spent on the phone: *charged based on time that is spent on the phone in increments of 10 minutes. Estimated cost:   5-10 mins $30.00   11-20 mins. $59.00   21-30 mins. $85.00     Use [employer] (secure email communication and access to your chart) to send your primary care provider a message or make an appointment. Ask someone on your Team how to sign up for [employer].  For a Price Quote for your services, please call our [employer] Line at [phone].  As always, Thank you for trusting us with your health care needs!    Discharged By: An          Follow-ups after your visit        Additional Services     DIABETES EDUCATOR REFERRAL       DIABETES SELF MANAGEMENT TRAINING (DSMT)      Your provider has referred you to Diabetes Education: FMG: Diabetes Education - All [facility] [phone]   [email or web address]    Type of training and number of hours: Previous Diagnosis: Follow-up DSMT - 2 hours.    Medicare covers: 10 hours of initial DSMT in 12 month period from the time of first visit, plus 2 hours of follow-up DSMT annually, and additional hours as requested for insulin training.    Diabetes Type: Type 2 - On Insulin             Diabetes Co-Morbidities: dyslipidemia, mental/affective disorder and obesity               A1C Goal:  <8.0       A1C is: Lab Results       Component                Value               Date                       A1C                      8.1                 02/13/2017              If an urgent visit is needed or A1C is above 12, Care Team to call the Diabetes Education Team at [phone] or send an In Basket message to the Diabetes Education Pool (P DIAB ED-PATIENT CARE).    Diabetes Education Topics: Other: continue medication  management    Special Educational Needs Requiring Individual DSMT: None       MEDICAL NUTRITION THERAPY (MNT) for Diabetes    Medical Nutrition Therapy with a Registered Dietitian can be provided in coordination with Diabetes Self-Management Training to assist in achieving optimal diabetes management.    MNT Type and Hours: Do not initiate MNT at this time.                       Medicare will cover: 3 hours initial MNT in 12 month period after first visit, plus 2 hours of follow-up MNT annually    Please be aware that coverage of these services is subject to the terms and limitations of your health insurance plan.  Call member services at your health plan to determine Diabetes Self-Management Training benefits and ask which blood glucose monitor brands are covered by your plan.      Please bring the following with you to your appointment:    (1)  List of current medications   (2)  List of Blood Glucose Monitor brands that are covered by your insurance plan  (3)  Blood Glucose Monitor and log book  (4)   Food records for the 3 days prior to your visit    The Certified Diabetes Educator may make diabetes medication adjustments per the CDE Protocol and Collaborative Practice Agreement.                  Your next 10 appointments already scheduled     May 26, 2017  9:00 AM CDT   Office Visit with MD Ift[place] Ramos ([facility] [place])    [address] [place] [address]  [place] [address] [phone]           Bring a current list of meds and any records pertaining to this visit.  For Physicals, please bring immunization records and any forms needing to be filled out.  Please arrive 10 minutes early to complete paperwork.              Who to contact     If you have questions or need follow up information about today's clinic visit or your schedule please contact [place] [facility] directly at [phone].  Normal or non-critical lab and imaging results will be communicated to you by  MyChart, letter or phone within 4 business days after the clinic has received the results. If you do not hear from us within 7 days, please contact the clinic through [employer] or phone. If you have a critical or abnormal lab result, we will notify you by phone as soon as possible.  Submit refill requests through Well[employer] or call your pharmacy and they will forward the refill request to us. Please allow 3 business days for your refill to be completed.          Additional Information About Your Visit        [employer] Information     [employer] gives you secure access to your electronic health record. If you see a primary care provider, you can also send messages to your care team and make appointments. If you have questions, please call your primary care clinic.  If you do not have a primary care provider, please call [phone] and they will assist you.        Care EveryWhere ID     This is your Care EveryWhere ID. This could be used by other organizations to access your [facility] medical records  [id number]        Your Vitals Were     Pulse Temperature Pulse Oximetry BMI (Body Mass Index)          84 97.4  F (36.3  C) (Oral) 97% 40.52 kg/m2         Blood Pressure from Last 3 Encounters:   04/13/17 112/70   03/09/17 124/76   02/13/17 118/84    Weight from Last 3 Encounters:   04/13/17 242 lb (109.8 kg)   04/12/17 241 lb (109.3 kg)   03/09/17 246 lb (111.6 kg)              We Performed the Following     DIABETES EDUCATOR REFERRAL          Today's Medication Changes          These changes are accurate as of: 4/13/17 11:22 AM.  If you have any questions, ask your nurse or doctor.               These medicines have changed or have updated prescriptions.        Dose/Directions    citalopram 40 MG tablet   Commonly known as:  celeXA   This may have changed:    - medication strength  - how much to take  - how to take this  - when to take this  - additional instructions   Used for:  Moderate single current episode of major  depressive disorder (H)        Dose:  40 mg   Take 1 tablet (40 mg) by mouth daily   Quantity:  30 tablet   Refills:  1            Where to get your medicines      These medications were sent to [place] [address] [facility] - [place] - [address]  [address] [place] [address]     Phone:  [phone]     citalopram 40 MG tablet                Primary Care Provider Office Phone # Fax #    Indu Ramos [phone]       [facility] [address] [facility]  [place] [address]        Thank you!     Thank you for choosing [facility]  for your care. Our goal is always to provide you with excellent care. Hearing back from our patients is one way we can continue to improve our services. Please take a few minutes to complete the written survey that you may receive in the mail after your visit with us. Thank you!             Your Updated Medication List - Protect others around you: Learn how to safely use, store and throw away your medicines at www.disposemymeds.org.          This list is accurate as of: 4/13/17 11:22 AM.  Always use your most recent med list.                   Brand Name Dispense Instructions for use    aspirin 81 MG tablet     30 tablet    Take by mouth daily       atenolol 25 MG tablet    TENORMIN    180 tablet    Take 1 tablet (25 mg) by mouth 2 times daily       azaTHIOprine 50 MG tablet    IMURAN     Take 1 tablet by mouth daily Reported on 4/12/2017       BENADRYL ALLERGY 25 MG tablet   Generic drug:  diphenhydrAMINE      Reported on 4/12/2017       blood glucose monitoring lancets     3 Box    1 each 4 times daily Use to test blood sugar 4 times daily or as directed.       cholecalciferol 2000 UNITS tablet     30 tablet    Take 1 tablet by mouth daily       citalopram 40 MG tablet    celeXA    30 tablet    Take 1 tablet (40 mg) by mouth daily       CLARITIN 10 MG tablet   Generic drug:  loratadine      Reported on 4/12/2017       DRAMAMINE PO      as  needed       eletriptan 20 MG tablet    RELPAX    12 tablet    take 1-2 tablets by mouth at onset of headache for migraine. may repeat dose in 2 hours. do not exceed 80mg in 24 hours.       fluticasone 110 MCG/ACT Inhaler    FLOVENT HFA    36 Inhaler    Spray 2 puffs in nostril 2 times daily       HUMIRA PEN 40 MG/0.8ML pen kit   Generic drug:  adalimumab      Inject as directed every 14 days       insulin detemir 100 UNIT/ML injection    LEVEMIR FLEXTOUCH    3 mL    Take 52 units of Levemir nightly before bed.       insulin pen needle 31G X 5 MM     200 each    Use 1 pen needles twice daily or as directed.       * LIALDA 1.2 G EC tablet   Generic drug:  mesalamine      Take 1,200 mg by mouth 2 tablets daily       * mesalamine 1000 MG Suppository    CANASA     Place 1,000 mg rectally daily       liraglutide 18 MG/3ML soln    VICTOZA PEN    45 mL    INJECT 1.8 MG SUB-Q ONCE DAILY       losartan 25 MG tablet    COZAAR    15 tablet    TAKE ONE-HALF TABLET BY MOUTH DAILY       methocarbamol 750 MG tablet    ROBAXIN     Reported on 3/9/2017       * order for DME     1 each    Glucometer, brand as covered by insurance.       * order for DME     400 each    Test strips for pt's glucometer, brand as covered by insurance. Test four times daily and prn.       oxyCODONE-acetaminophen 5-325 MG per tablet    PERCOCET         STATIN NOT PRESCRIBED (INTENTIONAL)     0 each    1 each continuous prn Statin not prescribed intentionally due to Refusal by patient and Other:LDL is below 100.       TYLENOL CAPS 500 MG OR      1 CAPSULE EVERY 4 HOURS AS NEEDED       * Notice:  This list has 4 medication(s) that are the same as other medications prescribed for you. Read the directions carefully, and ask your doctor or other care provider to review them with you.

## 2017-04-13 NOTE — PATIENT INSTRUCTIONS
A1c upon next visit and check kidney functions on the same visit.  Increase Citalopram from 20mg to 40mg daily.   Continue with diabetic education  Talk to GI specialist about UC bowel symptoms   Same dosage of Victoza    [facility]    If you have any questions regarding to your visit please contact your care team:       Team Purple:   Clinic Hours Telephone Number   HANNAH Kumar Dr., Dr.   7am-7pm  Monday - Thursday   7am-5pm  Fridays  [phone]  (Appointment scheduling available 24/7)    Questions about your Visit?   Team Line:  [phone]   [facility] [place] [facility] [place] [facility] [place] 11am-9pm Monday-Friday Saturday-Sunday- 9am-5pm   [place] - 5pm-9pm Monday-Friday Saturday-Sunday- 9am-5pm  [phone] - [place]   [phone] - [place]       What options do I have for visits at the clinic other than the traditional office visit?  To expand how we care for you, many of our providers are utilizing electronic visits (e-visits) and telephone visits, when medically appropriate, for interactions with their patients rather than a visit in the clinic.   We also offer nurse visits for many medical concerns. Just like any other service, we will bill your insurance company for this type of visit based on time spent on the phone with your provider. Not all insurance companies cover these visits. Please check with your medical insurance if this type of visit is covered. You will be responsible for any charges that are not paid by your insurance.      E-visits via [employer]:  generally incur a $35.00 fee.  Telephone visits:  Time spent on the phone: *charged based on time that is spent on the phone in increments of 10 minutes. Estimated cost:   5-10 mins $30.00   11-20 mins. $59.00   21-30 mins. $85.00     Use [employer] (secure email communication and access to your chart) to send your primary care provider a message or make an  appointment. Ask someone on your Team how to sign up for [employer].  For a Price Quote for your services, please call our Consumer Price Line at [phone].  As always, Thank you for trusting us with your health care needs!    Discharged By: Kristie

## 2017-04-14 ENCOUNTER — TELEPHONE (OUTPATIENT)
Dept: EDUCATION SERVICES | Facility: CLINIC | Age: 47
End: 2017-04-14

## 2017-04-19 ENCOUNTER — TELEPHONE (OUTPATIENT)
Dept: EDUCATION SERVICES | Facility: CLINIC | Age: 47
End: 2017-04-19

## 2017-04-19 NOTE — NURSING NOTE
This is fine with me            Previous Messages       ----- Message -----      From: Shannon Kennedy      Sent: 4/14/2017   5:29 PM        To: Indu Ramos MD   Subject: meal planning                                     Hi Dr Ramos,   I just wanted to coordinate with you re meal planning recommendations for this patient. I consulted with Amanda Gomes RD and this is what her response was.  Are you agreeable with this plan or do you have alternate recommendations?   Please let me know your thoughts. Thank you  Shannon Kennedy RN  BSN CDE             This information sent for review to Dr Ramos.  Her response above.  Shannon Kennedy RN  BSN CDE    Hey! So I took a look at her chart.   It looks like she is consuming 1-3 of the premier protein shakes daily? I feel like 1 or 2 maximum would be my recommendation just because they are rather high in protein (30 grams each). Her kidneys are fine so the protein isn't necessarily bad but I just feel like 90 grams of protein (if consuming 3 shakes) coming from just protein shakes daily is too much.     Using her height/weight I would say she could consume around 7148-2857 calories/day when working toward weight loss. On the very lowest end I would recommend 35% of daily calories from carbohydrates. Ideally maybe more toward 40-45% of daily calories from carbohydrates. If she could aim for around 120-130 grams of carbohydrate per day this would likely help promote weight loss and blood glucose control. Less than this can raise blood glucose from the liver plus will leave her brain wanting more, so cravings increase.     If she had 30 grams at meals and 15 grams at snacks, that would be fine. If she is not snacking then I would encourage 30-45 grams at meals. Less than this is likely not sustainable beyond a couple of months.     I hope that helps! Let me know if you have further questions :   Message to Amanda Gomes RD re meal plan recommendation for this  patient.  Her response above. Shannon Kennedy RN  BSN CDE

## 2017-04-19 NOTE — TELEPHONE ENCOUNTER
Call out to patient to follow up with her re her blood glucose control and recommendations from RD for her meal plan.  I was only able to leave a message on her phone line and requested that she call in to let me know when she would be available tomorrow to take a phone call.  Shannon Kennedy RN  BSN CDE

## 2017-04-28 ENCOUNTER — TELEPHONE (OUTPATIENT)
Dept: EDUCATION SERVICES | Facility: CLINIC | Age: 47
End: 2017-04-28

## 2017-04-28 NOTE — TELEPHONE ENCOUNTER
Diabetes Follow-up    Subjective/Objective:    Zaira Villatoro sent in blood glucose log for review. Last date of communication was: 4/19/17.    Diabetes is being managed with Lifestyle (diet/activity),  Injectable Medications: Levemir 0-0-0-52 and Victoza 1.8mg once daily    BG/Food Log:       Assessment:   ( 4/20-4/26/17)                                               Last week: fasting in goal 57%, 0% pre lunch, pre supper 0 % and HS 14% in goal range.                                                                                                 (4/13-4/19/17)  Fasting blood glucose: 43% in target.    Before lunch glucose: 50% in target.    Before dinner glucose: 50% in target    Bedtime glucose: 17% in target.    Plan/Response:  Not sure if patient has made the dietary changes that were presented last week.  BG tested less often and continue to be above target range especially at HS.  See My Chart message from 4/26/17.   Patient taking Levemir dose at inconsistent times.  May benefit from more consistent dosing.  Patient may also benefit from the addition of pre supper Novolog to prevent HS elevations of BG  This is off label at this time but will discuss with PCP.  My Chart message back to patient.    Milton Meneses,    Thank you for sending in your blood glucose values for review.  Were you able to make any of the dietary changes that were recommended to you during this last week?  If so, which ones and what did you think about eating that way? Please let me know.    I noted that you are taking your Levemir at quite different times before bed.  It would be better if you could pick a consistent 2 hour block of time when you would be home at night that you could take your Levemir even if you were not actually going to bed.  Something like between 10 pm-midnight as an example. Then the insulin dose will be working at more consistent times.      Thank you and have a good weekend.    Shannon Kennedy RN  BSN  CDE        Any diabetes medication dose changes were made via the CDE Protocol and Collaborative Practice Agreement with the patient's referring provider. A copy of this encounter was shared with the provider.

## 2017-05-01 DIAGNOSIS — I10 ESSENTIAL HYPERTENSION WITH GOAL BLOOD PRESSURE LESS THAN 140/90: ICD-10-CM

## 2017-05-01 RX ORDER — LOSARTAN POTASSIUM 25 MG/1
TABLET ORAL
Qty: 15 TABLET | Refills: 0 | Status: SHIPPED | OUTPATIENT
Start: 2017-05-01 | End: 2017-06-01

## 2017-05-01 NOTE — TELEPHONE ENCOUNTER
Reason for Call:  Other prescription    Detailed comments: [facility] pharmacy called to request a refill for the patients prescription Lorsartan    Pharmacy [facility] fax number     Phone Number Patient can be reached at: Other phone number:  [phone]  Best Time: today    Can we leave a detailed message on this number? no    Call taken on 5/1/2017 at 11:34 AM by Anthony Enriquez

## 2017-05-01 NOTE — TELEPHONE ENCOUNTER
losartan (COZAAR) 25 MG tablet      Last Written Prescription Date: 09/14/2016  Last Fill Quantity: 15, # refills: 5  Last Office Visit with G, P or [facility] prescribing provider: 04/13/2017  Next 5 appointments (look out 90 days)     May 26, 2017  9:00 AM CDT   Office Visit with Indu Ramos MD   [facility] ([facility]    [address] [address]   [phone]                   Potassium   Date Value Ref Range Status   03/17/2016 4.3 3.4 - 5.3 mmol/L Final     Creatinine   Date Value Ref Range Status   03/17/2016 0.86 0.52 - 1.04 mg/dL Final     BP Readings from Last 3 Encounters:   04/13/17 112/70   03/09/17 124/76   02/13/17 118/84     Kristie Villalobos MA

## 2017-05-01 NOTE — TELEPHONE ENCOUNTER
Prescription approved per [facility] Refill Protocol.  Patient due for labs for further refills.  Jennifer Landon RN - BC

## 2017-05-18 NOTE — PROGRESS NOTES
SUBJECTIVE:                                                    Zaira Villatoro is a 47 year old female who presents to clinic today for the following health issues:    Diabetes Follow-up    Patient is checking blood sugars: three times daily.   Blood sugar testing frequency justification: Uncontrolled diabetes, on insulin  Results are as follows:         am - 100         lunchtime - 100-200         suppertime - 100-200         bedtime - 100-200    Diabetic concerns: None     Symptoms of hypoglycemia (low blood sugar): none     Paresthesias (numbness or burning in feet) or sores: No     Date of last diabetic eye exam: June 2016    On levimir 52 units at bed and victoza 1.8 mg daily     Hypertension Follow-up      Outpatient blood pressures are not being checked.    Low Salt Diet: not monitoring salt    On losartan 12.5 mg daily and atenolol     Depression Followup    Status since last visit: Stable     See PHQ-9 for current symptoms.  Other associated symptoms: sleep talking    Complicating factors:   Significant life event:  Yes-  Son is graduating and going to college   Current substance abuse:  None  Anxiety or Panic symptoms:  No  On citalopram 40 mg  PHQ-9  English PHQ-9   Any Language        Hyperlipidemia  Recent Labs   Lab Test  11/07/16   1124  11/04/15   1138  12/10/14   1034   CHOL  157  136  154   HDL  44*  42*  51   LDL  95  82  89   TRIG  92  61  68   CHOLHDLRATIO   --   3.2  3.0     Not taking statin; has refused in past  Has been itchy on her skin and scalp. 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 Did not see nits, but did a treatment at home. Also itchy on her arms. History of fatty liver.       Amount of exercise or physical activity: 4 days/week for an average of 15-20 minutes    Problems taking medications regularly: No    Medication side effects: Fatigue    Diet: Watching carbohydrate and high protein      MICHAEL Score (Last Two) 11/7/2016 1/30/2017   MICHAEL Raw Score 33 29    Activation Score 65.8 52.9   MICHAEL Level 3 2         Problem list and histories reviewed & adjusted, as indicated.  Additional history: as documented    Patient Active Problem List   Diagnosis     IBS (irritable bowel syndrome)     Migraine     Ulcerative colitis (H)     Fatty liver     Chronic rhinitis     S/P left hemicolectomy     CARDIOVASCULAR SCREENING; LDL GOAL LESS THAN 100     Hypertension goal BP (blood pressure) < 140/90     History of seizures as a child     Type 2 diabetes mellitus without complication (H)     Morbid obesity due to excess calories (H)     History of diverticular abscess of colon     History of sinus surgery     Immunosuppressed status (H)     Moderate single current episode of major depressive disorder (H)     Incisional hernia, without obstruction or gangrene     Past Surgical History:   Procedure Laterality Date     APPENDECTOMY  april 2014     ARTHROSCOPY KNEE RT/LT  2004    RT      BIOPSY      many     BIOPSY  2011 and on    many     COLONOSCOPY  2/12    lt sided colitis     COLONOSCOPY  1/9/2014     CRYOTHERAPY, CERVICAL  1988     CRYOTHERAPY, CERVICAL      mild dysplasia     EXPLORATORY LAPAROTOMY, PARTIAL LEFT ROLANDA COLLECTOMY WITH HARTMANS PROCEDURE, COLOSTOMY  8/2013    lt rolanda colectomy, bowel perforation, colostomy, Karen's pouche     GI SURGERY  2013    abrupted  bowl     HC TOOTH EXTRACTION W/FORCEP  6/2003    Abscess Tooth / Hospitalized     HERNIA REPAIR  april 2014    found at time of takedown     HERNIA REPAIR  2014    found at time of takedown     SINUS SURGERY  2/11/03    LT sinus cyst removal      TAKEDOWN COLOSTOMY  4-2014       Social History   Substance Use Topics     Smoking status: Former Smoker     Packs/day: 1.00     Years: 15.00     Types: Cigarettes     Start date: 1/1/1985     Quit date: 7/1/1998     Smokeless tobacco: Never Used      Comment: soke free household.     Alcohol use No      Comment: occ     Family History   Problem Relation Age of Onset      DIABETES Mother      Allergies Mother      Asthma Mother      Arthritis Mother      HEART DISEASE Mother      heart murmur     Depression Mother      Obesity Mother      Eye Disorder Father      DIABETES Father      CEREBROVASCULAR DISEASE Father      HEART DISEASE Father      Hypertension Father      DIABETES Maternal Grandmother      CEREBROVASCULAR DISEASE Maternal Grandfather      Unknown/Adopted Paternal Grandmother      HEART DISEASE Paternal Grandfather      left ventrical failure     CEREBROVASCULAR DISEASE Paternal Grandfather      Gynecology Sister      endometriosis     Depression Sister      Asthma Daughter      Breast Cancer Maternal Aunt      Thyroid Disease Maternal Aunt      Breast Cancer Other      fathers sister     Anesthesia Reaction Other      Thyroid Disease Other      OSTEOPOROSIS Other      Asthma Daughter      Breast Cancer Other      mothers sister     Glaucoma No family hx of      Macular Degeneration No family hx of          Recent Labs   Lab Test  05/26/17   0922  02/13/17   1020  11/07/16   1124  10/14/16   0859   03/17/16   0947  02/19/16   1316   11/04/15   1138   12/10/14   1034  08/04/13 07/24/13   A1C  7.0*  8.1*   --   7.7*   < >   --    --    < >  6.6*   < >  6.9*   < >  8.3*   < >   --    LDL   --    --   95   --    --    --    --    --   82   --   89   < >   --    --    --    HDL   --    --   44*   --    --    --    --    --   42*   --   51   < >   --    --    --    TRIG   --    --   92   --    --    --    --    --   61   --   68   < >   --    --    --    ALT   --    --    --    --    --   22   --    --    --    --    --    --   8  8   --   38   CR   --    --    --    --    --   0.86  0.78   --    --    < >   --    < >  0.73   --    --    GFRESTIMATED   --    --    --    --    --   72  79   --    --    < >   --    < >   --    --    --    GFRESTBLACK   --    --    --    --    --   87  >90   GFR Calc     --    --    < >   --    < >   --    --    --    POTASSIUM    --    --    --    --    --   4.3  4.3   --    --    < >   --    < >  4.2   --    --    TSH   --   2.05   --    --    --    --    --    --    --    --   1.51   --    --    --    --     < > = values in this interval not displayed.      BP Readings from Last 3 Encounters:   05/26/17 126/78   04/13/17 112/70   03/09/17 124/76    Wt Readings from Last 3 Encounters:   05/26/17 240 lb (108.9 kg)   04/13/17 242 lb (109.8 kg)   04/12/17 241 lb (109.3 kg)                  Labs reviewed in EPIC    Reviewed and updated as needed this visit by clinical staff  Tobacco  Allergies  Meds       Reviewed and updated as needed this visit by Provider         ROS:  Constitutional, HEENT, cardiovascular, pulmonary, gi and gu systems are negative, except as otherwise noted.    OBJECTIVE:                                                    /78  Pulse 72  Temp 96.8  F (36  C) (Oral)  Wt 240 lb (108.9 kg)  SpO2 97%  BMI 40.18 kg/m2  Body mass index is 40.18 kg/(m^2).  GENERAL: healthy, alert, no distress and obese  NECK: no adenopathy, no asymmetry, masses, or scars and thyroid normal to palpation  RESP: lungs clear to auscultation - no rales, rhonchi or wheezes  CV: regular rate and rhythm, normal S1 S2, no S3 or S4, no murmur, click or rub, no peripheral edema and peripheral pulses strong  SKIN: erythema with some excoriations from scratching on arms. No scalp lesions, nits or lice.   Diabetic foot exam: normal DP and PT pulses, no trophic changes or ulcerative lesions, normal sensory exam and normal monofilament exam    Diagnostic Test Results:  Results for orders placed or performed in visit on 05/26/17 (from the past 24 hour(s))   HEMOGLOBIN A1C   Result Value Ref Range    Hemoglobin A1C 7.0 (H) 4.3 - 6.0 %        ASSESSMENT/PLAN:                                                    Diabetes Type II, A1c Controlled, insulin dependent   Associated with the following complications    Renal Complications:  None    Ophthalmologic  "Complications: None    Neurologic Complications: None    Peripheral Vascular Complications:  None    Other: None   Plan:  Referrals:  Ophthalmology  Other:  Dietary changes stressed, Regular exercise, Information on diabetes provided and Weight loss    Hyperlipidemia; not on statin   Plan: given her history of fatty liver and itching today, plan to check lfts. Will readdress statin at next visit-- she has declined in the past.       Hypertension; controlled   Associated with the following complications:    Diabetes   Plan:  No changes in the patient's current treatment plan        BMI:   Estimated body mass index is 40.18 kg/(m^2) as calculated from the following:    Height as of 2/13/17: 5' 4.8\" (1.646 m).    Weight as of this encounter: 240 lb (108.9 kg).   Weight management plan: Discussed healthy diet and exercise guidelines and patient will follow up in 3 months in clinic to re-evaluate.        ICD-10-CM    1. Screening for diabetic peripheral neuropathy Z13.89 FOOT EXAM  NO CHARGE [24827.114]     HEMOGLOBIN A1C   2. Moderate single current episode of major depressive disorder (H) F32.1    3. Morbid obesity due to excess calories (H) E66.01    4. Type 2 diabetes mellitus without complication, with long-term current use of insulin (H) E11.9 OPHTHALMOLOGY ADULT REFERRAL    Z79.4 Comprehensive metabolic panel   5. Hypertension goal BP (blood pressure) < 140/90 I10 Comprehensive metabolic panel   6. Fatty liver K76.0 Comprehensive metabolic panel   7. Pruritic disorder L29.9 Comprehensive metabolic panel       Diabetes: A1C down to 7.0 today. Continue current treatment without changes  MDD: continue citalopram. About 30% improved. Lots of stressors. Discussed counseling/cbt-- not intersted.  Morbid obesity; not activated for weight loss  Hypertension: at goal. Continue current medication. Check cmp  Fatty liver with current pruritis: check lfts. Pt has sensitivity to multiple otc lotions/creams and does not want to " consider today. Would recommend cerave in future if needed.   Follow up in 3 months, sooner if needed.   See Patient Instructions    Indu Ramos MD  [facility]

## 2017-05-26 ENCOUNTER — OFFICE VISIT (OUTPATIENT)
Dept: FAMILY MEDICINE | Facility: CLINIC | Age: 47
End: 2017-05-26
Payer: COMMERCIAL

## 2017-05-26 VITALS
WEIGHT: 240 LBS | DIASTOLIC BLOOD PRESSURE: 78 MMHG | OXYGEN SATURATION: 97 % | SYSTOLIC BLOOD PRESSURE: 126 MMHG | TEMPERATURE: 96.8 F | HEART RATE: 72 BPM | BODY MASS INDEX: 40.18 KG/M2

## 2017-05-26 DIAGNOSIS — K76.0 FATTY LIVER: ICD-10-CM

## 2017-05-26 DIAGNOSIS — Z79.4 TYPE 2 DIABETES MELLITUS WITHOUT COMPLICATION, WITH LONG-TERM CURRENT USE OF INSULIN (H): Primary | ICD-10-CM

## 2017-05-26 DIAGNOSIS — E66.01 MORBID OBESITY DUE TO EXCESS CALORIES (H): ICD-10-CM

## 2017-05-26 DIAGNOSIS — E11.9 TYPE 2 DIABETES MELLITUS WITHOUT COMPLICATION, WITH LONG-TERM CURRENT USE OF INSULIN (H): Primary | ICD-10-CM

## 2017-05-26 DIAGNOSIS — F32.1 MODERATE SINGLE CURRENT EPISODE OF MAJOR DEPRESSIVE DISORDER (H): ICD-10-CM

## 2017-05-26 DIAGNOSIS — I10 HYPERTENSION GOAL BP (BLOOD PRESSURE) < 140/90: ICD-10-CM

## 2017-05-26 DIAGNOSIS — Z13.89 SCREENING FOR DIABETIC PERIPHERAL NEUROPATHY: ICD-10-CM

## 2017-05-26 DIAGNOSIS — L29.9 PRURITIC DISORDER: ICD-10-CM

## 2017-05-26 PROBLEM — F43.21 ADJUSTMENT DISORDER WITH DEPRESSED MOOD: Status: RESOLVED | Noted: 2017-02-13 | Resolved: 2017-05-26

## 2017-05-26 LAB
ALBUMIN SERPL-MCNC: 3.4 G/DL (ref 3.4–5)
ALP SERPL-CCNC: 46 U/L (ref 40–150)
ALT SERPL W P-5'-P-CCNC: 25 U/L (ref 0–50)
ANION GAP SERPL CALCULATED.3IONS-SCNC: 10 MMOL/L (ref 3–14)
AST SERPL W P-5'-P-CCNC: 26 U/L (ref 0–45)
BILIRUB SERPL-MCNC: 1.3 MG/DL (ref 0.2–1.3)
BUN SERPL-MCNC: 18 MG/DL (ref 7–30)
CALCIUM SERPL-MCNC: 8.3 MG/DL (ref 8.5–10.1)
CHLORIDE SERPL-SCNC: 107 MMOL/L (ref 94–109)
CO2 SERPL-SCNC: 23 MMOL/L (ref 20–32)
CREAT SERPL-MCNC: 0.76 MG/DL (ref 0.52–1.04)
GFR SERPL CREATININE-BSD FRML MDRD: 82 ML/MIN/1.7M2
GLUCOSE SERPL-MCNC: 100 MG/DL (ref 70–99)
HBA1C MFR BLD: 7 % (ref 4.3–6)
POTASSIUM SERPL-SCNC: 4.2 MMOL/L (ref 3.4–5.3)
PROT SERPL-MCNC: 6.6 G/DL (ref 6.8–8.8)
SODIUM SERPL-SCNC: 140 MMOL/L (ref 133–144)

## 2017-05-26 PROCEDURE — 99214 OFFICE O/P EST MOD 30 MIN: CPT | Performed by: FAMILY MEDICINE

## 2017-05-26 PROCEDURE — 36415 COLL VENOUS BLD VENIPUNCTURE: CPT | Performed by: FAMILY MEDICINE

## 2017-05-26 PROCEDURE — 99207 C FOOT EXAM  NO CHARGE: CPT | Performed by: FAMILY MEDICINE

## 2017-05-26 PROCEDURE — 83036 HEMOGLOBIN GLYCOSYLATED A1C: CPT | Performed by: FAMILY MEDICINE

## 2017-05-26 PROCEDURE — 80053 COMPREHEN METABOLIC PANEL: CPT | Performed by: FAMILY MEDICINE

## 2017-05-26 RX ORDER — CITALOPRAM HYDROBROMIDE 40 MG/1
40 TABLET ORAL DAILY
Qty: 30 TABLET | Refills: 2 | Status: SHIPPED | OUTPATIENT
Start: 2017-05-26 | End: 2017-08-21

## 2017-05-26 ASSESSMENT — ANXIETY QUESTIONNAIRES
5. BEING SO RESTLESS THAT IT IS HARD TO SIT STILL: NOT AT ALL
2. NOT BEING ABLE TO STOP OR CONTROL WORRYING: MORE THAN HALF THE DAYS
6. BECOMING EASILY ANNOYED OR IRRITABLE: SEVERAL DAYS
IF YOU CHECKED OFF ANY PROBLEMS ON THIS QUESTIONNAIRE, HOW DIFFICULT HAVE THESE PROBLEMS MADE IT FOR YOU TO DO YOUR WORK, TAKE CARE OF THINGS AT HOME, OR GET ALONG WITH OTHER PEOPLE: SOMEWHAT DIFFICULT
3. WORRYING TOO MUCH ABOUT DIFFERENT THINGS: SEVERAL DAYS
GAD7 TOTAL SCORE: 6
1. FEELING NERVOUS, ANXIOUS, OR ON EDGE: NOT AT ALL
7. FEELING AFRAID AS IF SOMETHING AWFUL MIGHT HAPPEN: NOT AT ALL

## 2017-05-26 ASSESSMENT — PAIN SCALES - GENERAL: PAINLEVEL: MODERATE PAIN (4)

## 2017-05-26 ASSESSMENT — PATIENT HEALTH QUESTIONNAIRE - PHQ9: 5. POOR APPETITE OR OVEREATING: MORE THAN HALF THE DAYS

## 2017-05-26 NOTE — TELEPHONE ENCOUNTER
Prescription approved per [facility] Refill Protocol.  Jennifer Landon, RN - BC

## 2017-05-26 NOTE — NURSING NOTE
"Chief Complaint   Patient presents with     Diabetes     Hypertension     Depression       Initial /78  Pulse 72  Temp 96.8  F (36  C) (Oral)  Wt 240 lb (108.9 kg)  SpO2 97%  BMI 40.18 kg/m2 Estimated body mass index is 40.18 kg/(m^2) as calculated from the following:    Height as of 2/13/17: 5' 4.8\" (1.646 m).    Weight as of this encounter: 240 lb (108.9 kg).  Medication Reconciliation: complete    "

## 2017-05-26 NOTE — TELEPHONE ENCOUNTER
citalopram (CELEXA) 40 MG tablet     Last Written Prescription Date: 4/13/17  Last Fill Quantity: 30, # refills: 1  [place] Office Visit with [facility] [place] care provider:  5/26/17   Next 5 appointments (look out 90 days)     Aug 21, 2017 11:45 AM CDT   Office Visit with Indu Ramos MD   [facility] ([facility])    [address]  [place] [address]   [phone]                   Last PHQ-9 score on record=   PHQ-9 SCORE 5/26/2017   Total Score 9

## 2017-05-26 NOTE — LETTER
My Depression Action Plan  Name: Zaira Villatoro   Date of Birth 1970  Date: 5/26/2017    My doctor: Indu Ramos   My clinic: [address]  [place] [address]  [phone]          GREEN    ZONE   Good Control    What it looks like:     Things are going generally well. You have normal up s and down s. You may even feel depressed from time to time, but bad moods usually last less than a day.   What you need to do:  1. Continue to care for yourself (see self care plan)  2. Check your depression survival kit and update it as needed  3. Follow your physician s recommendations including any medication.  4. Do not stop taking medication unless you consult with your physician first.           YELLOW         ZONE Getting Worse    What it looks like:     Depression is starting to interfere with your life.     It may be hard to get out of bed; you may be starting to isolate yourself from others.    Symptoms of depression are starting to last most all day and this has happened for several days.     You may have suicidal thoughts but they are not constant.   What you need to do:     1. Call your care team, your response to treatment will improve if you keep your care team informed of your progress. Yellow periods are signs an adjustment may need to be made.     2. Continue your self-care, even if you have to fake it!    3. Talk to someone in your support network    4. Open up your depression survival kit           RED    ZONE Medical Alert - Get Help    What it looks like:     Depression is seriously interfering with your life.     You may experience these or other symptoms: You can t get out of bed most days, can t work or engage in other necessary activities, you have trouble taking care of basic hygiene, or basic responsibilities, thoughts of suicide or death that will not go away, self-injurious behavior.     What you need to do:  1. Call your care team and  request a same-day appointment. If they are not available (weekends or after hours) call your local crisis line, emergency room or 911.      Electronically signed by: Indu Ramos, May 26, 2017    Depression Self Care Plan / Survival Kit    Self-Care for Depression  Here s the deal. Your body and mind are really not as separate as most people think.  What you do and think affects how you feel and how you feel influences what you do and think. This means if you do things that people who feel good do, it will help you feel better.  Sometimes this is all it takes.  There is also a place for medication and therapy depending on how severe your depression is, so be sure to consult with your medical provider and/ or Behavioral Health Consultant if your symptoms are worsening or not improving.     In order to better manage my stress, I will:    Exercise  Get some form of exercise, every day. This will help reduce pain and release endorphins, the  feel good  chemicals in your brain. This is almost as good as taking antidepressants!  This is not the same as joining a gym and then never going! (they count on that by the way ) It can be as simple as just going for a walk or doing some gardening, anything that will get you moving.      Hygiene   Maintain good hygiene (Get out of bed in the morning, Make your bed, Brush your teeth, Take a shower, and Get dressed like you were going to work, even if you are unemployed).  If your clothes don't fit try to get ones that do.    Diet  I will strive to eat foods that are good for me, drink plenty of water, and avoid excessive sugar, caffeine, alcohol, and other mood-altering substances.  Some foods that are helpful in depression are: complex carbohydrates, B vitamins, flaxseed, fish or fish oil, fresh fruits and vegetables.    Psychotherapy  I agree to participate in Individual Therapy (if recommended).    Medication  If prescribed medications, I agree to take them.  Missing doses  can result in serious side effects.  I understand that drinking alcohol, or other illicit drug use, may cause potential side effects.  I will not stop my medication abruptly without first discussing it with my provider.    Staying Connected With Others  I will stay in touch with my friends, family members, and my primary care provider/team.    Use your imagination  Be creative.  We all have a creative side; it doesn t matter if it s oil painting, sand castles, or mud pies! This will also kick up the endorphins.    Witness Beauty  (AKA stop and smell the roses) Take a look outside, even in mid-winter. Notice colors, textures. Watch the squirrels and birds.     Service to others  Be of service to others.  There is always someone else in need.  By helping others we can  get out of ourselves  and remember the really important things.  This also provides opportunities for practicing all the other parts of the program.    Humor  Laugh and be silly!  Adjust your TV habits for less news and crime-drama and more comedy.    Control your stress  Try breathing deep, massage therapy, biofeedback, and meditation. Find time to relax each day.     My support system    Clinic Contact:  Phone number:    Contact 1:  Phone number:    Contact 2:  Phone number:    Rastafarian/:  Phone number:    Therapist:  Phone number:    Local crisis center:    Phone number:    Other community support:  Phone number:

## 2017-05-26 NOTE — PATIENT INSTRUCTIONS
MY DIABETES TODAY:    1)  Goal A1C is under <7.0  Mine is:      Lab Results   Component Value Date    A1C 7.0 05/26/2017       2)  Goal LDL (bad cholesterol) under 100  (measured at least yearly)- I am currently at:   Lab Results   Component Value Date    LDL 95 11/07/2016       3)  Goal blood pressure under 130/80- mine was 126/78 today    4)  Take aspirin daily     5)  No tobacco use          ACTION PLAN CHANGES FROM TODAY:    Care Plan changes:    We will check your liver today because of the itching  Continue to work on your diet, more exercise and weight loss.    Labs:     I will schedule a lab appointment 3-5 days before my next appointment with my provider. , I will fast (nothing to eat or drink except water with medications) 12 hours before my lab appointment.    Follow up:    I will follow up with my physician:  In three months.          Weight Management: Overcoming Your Barriers  You may have many reasons why you re not ready to lose weight. You may not feel you have the time or the skills. You may be afraid of losing weight and gaining it back again. Well, you can lose weight. And you can keep the weight off, if you make changes slowly and stick with them. Remember that you may never find the perfect time to lose weight. Decide that the right time to be healthier is now.    Common barriers  Barrier 1: I don t want to deny myself.  Barrier Buster: You don t have to! Moderation is the key.    Watch portion sizes and know when you're eating more than one serving.    Plan to ask for a doggy bag when you eat out.    Have just one.    Choose lower-fat and lower-calorie versions of your favorites.  Barrier 2: I lost weight before but I gained it right back.  Barrier Buster: Make this time different.    List what worked and didn t work last time and what you can try this time.    Choose changes that you are willing to stick with.    Work exercise into your weight-loss plan.    Be realistic about what is  possible.   Barrier 3: I don t have the time to be active.  Barrier Buster: It takes just a few minutes a day!    Be active with a pet or the kids.    Block off activity time in your schedule.    Borrow some time that you usually spend watching TV.    You are too important not to take time to exercise   it is your life!   Feel good about yourself  Do you eat more because you feel bad about yourself, then feel even worse as you gain weight? This is a  vicious cycle.  Breaking this cycle is not easy. You may need group support or counseling. Always remember that you are a valuable person, no matter what size or shape you are.  Do you have a health problem? If so, don t use it as an excuse for not losing weight. Ask your doctor, dietitian, or other health care provider about methods to lose weight that are safe for you. For example, even if you have severe arthritis, you can walk in a pool. Get advice from a .      1045-6729 [employer]. [address] [place] [address]. All rights reserved. This information is not intended as a substitute for professional medical care. Always follow your healthcare professional's instructions.      [facility]    If you have any questions regarding to your visit please contact your care team:       Team Purple:   Clinic Hours Telephone Number   [address] Dr.   7am-7pm  Monday - Thursday   7am-5pm  Fridays  [phone]  (Appointment scheduling available 24/7)    Questions about your Visit?   Team Line:  [phone]   Urgent [facility] - [place] - 11am-9pm Monday-Friday Saturday-Sunday- 9am-5pm   [place] - 5pm-9pm Monday-Friday Saturday-Sunday- 9am-5pm  [phone] - [place]   [phone] - [place]       What options do I have for visits at the clinic other than the traditional office visit?  To expand how we care for you, many of our  providers are utilizing electronic visits (e-visits) and telephone visits, when medically appropriate, for interactions with their patients rather than a visit in the clinic.   We also offer nurse visits for many medical concerns. Just like any other service, we will bill your insurance company for this type of visit based on time spent on the phone with your provider. Not all insurance companies cover these visits. Please check with your medical insurance if this type of visit is covered. You will be responsible for any charges that are not paid by your insurance.      E-visits via [employer]:  generally incur a $35.00 fee.  Telephone visits:  Time spent on the phone: *charged based on time that is spent on the phone in increments of 10 minutes. Estimated cost:   5-10 mins $30.00   11-20 mins. $59.00   21-30 mins. $85.00     Use [employer] (secure email communication and access to your chart) to send your primary care provider a message or make an appointment. Ask someone on your Team how to sign up for [employer].  For a Price Quote for your services, please call our Consumer Price Line at [phone].  As always, Thank you for trusting us with your health care needs!    Discharged by AYDE Pruett

## 2017-05-27 ASSESSMENT — ANXIETY QUESTIONNAIRES: GAD7 TOTAL SCORE: 6

## 2017-05-27 ASSESSMENT — PATIENT HEALTH QUESTIONNAIRE - PHQ9: SUM OF ALL RESPONSES TO PHQ QUESTIONS 1-9: 9

## 2017-06-01 ENCOUNTER — TELEPHONE (OUTPATIENT)
Dept: FAMILY MEDICINE | Facility: CLINIC | Age: 47
End: 2017-06-01

## 2017-06-01 DIAGNOSIS — I10 ESSENTIAL HYPERTENSION WITH GOAL BLOOD PRESSURE LESS THAN 140/90: ICD-10-CM

## 2017-06-01 RX ORDER — LOSARTAN POTASSIUM 25 MG/1
TABLET ORAL
Qty: 15 TABLET | Refills: 5 | Status: SHIPPED | OUTPATIENT
Start: 2017-06-01 | End: 2017-12-22

## 2017-06-01 NOTE — TELEPHONE ENCOUNTER
Prescription approved per [facility] Refill Protocol.  Marianne Keith RN

## 2017-06-01 NOTE — TELEPHONE ENCOUNTER
Losartan 25mg      Last Written Prescription Date: 05/01/2017  Last Fill Quantity: 15, # refills: 0  Last Office Visit with G, P or [facility] prescribing provider: 05/26/2017  Next 5 appointments (look out 90 days)     Aug 21, 2017 11:45 AM CDT   [facility] Visit with Indu Ramos MD   [facility]    [address] [address]   [phone]                   Potassium   Date Value Ref Range Status   05/26/2017 4.2 3.4 - 5.3 mmol/L Final     Creatinine   Date Value Ref Range Status   05/26/2017 0.76 0.52 - 1.04 mg/dL Final     BP Readings from Last 3 Encounters:   05/26/17 126/78   04/13/17 112/70   03/09/17 124/76     Kristie Villalobos MA

## 2017-06-01 NOTE — TELEPHONE ENCOUNTER
Reason for call:  Medication   If this is a refill request, has the caller requested the refill from the pharmacy already? Yes  Will the patient be using a [facility]? No  Name of the pharmacy and phone number for the current request: [facility] [id number] [place] [facility], [place] - [address]    Name of the medication requested: losartan (COZAAR) 25 MG tablet    Other request: Pt needs refill    Phone number to reach patient:  Other phone number:  [phone]    Best Time:  ANYTIME    Can we leave a detailed message on this number?  YES

## 2017-07-11 ENCOUNTER — TELEPHONE (OUTPATIENT)
Dept: FAMILY MEDICINE | Facility: CLINIC | Age: 47
End: 2017-07-11

## 2017-07-11 NOTE — TELEPHONE ENCOUNTER
Panel Management Review      Patient has the following on her problem list:     Depression / Dysthymia review  PHQ-9 SCORE 2/13/2017 3/9/2017 5/26/2017   Total Score 10 12 9      Patient is due for:  DAP    Diabetes    ASA: Passed    Last A1C  Lab Results   Component Value Date    A1C 7.0 05/26/2017    A1C 8.1 02/13/2017    A1C 7.7 10/14/2016    A1C 8.0 07/12/2016    A1C 8.5 05/19/2016     A1C tested: Passed    Last LDL:    Lab Results   Component Value Date    CHOL 157 11/07/2016     Lab Results   Component Value Date    HDL 44 11/07/2016     Lab Results   Component Value Date    LDL 95 11/07/2016     Lab Results   Component Value Date    TRIG 92 11/07/2016     Lab Results   Component Value Date    CHOLHDLRATIO 3.2 11/04/2015     Lab Results   Component Value Date    NHDL 113 11/07/2016       Is the patient on a Statin? YES             Is the patient on Aspirin? NO    Medications     HMG CoA Reductase Inhibitors    STATIN NOT PRESCRIBED, INTENTIONAL,    Salicylates    aspirin 81 MG tablet          Last three blood pressure readings:  BP Readings from Last 3 Encounters:   05/26/17 126/78   04/13/17 112/70   03/09/17 124/76       Date of last diabetes office visit: 05/26/17     Tobacco History:     History   Smoking Status     Former Smoker     Packs/day: 1.00     Years: 15.00     Types: Cigarettes     Start date: 1/1/1985     Quit date: 7/1/1998   Smokeless Tobacco     Never Used     Comment: [employer] free household.           Composite cancer screening  Chart review shows that this patient is due/due soon for the following None  Summary:    Patient is due/failing the following:   Eye exam and DAP    Action needed:   Patient has appointment scheduled    Type of outreach:    none    Questions for provider review:    None                                                                                                                                    Lizbeth Souza MA       Chart routed to  .

## 2017-07-26 ENCOUNTER — TELEPHONE (OUTPATIENT)
Dept: FAMILY MEDICINE | Facility: CLINIC | Age: 47
End: 2017-07-26

## 2017-07-26 DIAGNOSIS — I10 ESSENTIAL HYPERTENSION WITH GOAL BLOOD PRESSURE LESS THAN 140/90: ICD-10-CM

## 2017-07-26 RX ORDER — LOSARTAN POTASSIUM 25 MG/1
TABLET ORAL
Qty: 15 TABLET | Refills: 0
Start: 2017-07-26

## 2017-07-26 NOTE — TELEPHONE ENCOUNTER
Med request for Losartan removed from encounter    Was the pharmacy contacted to verify refills?  If so, please close encounter.  If not, please contact pharmacy to verify    Marianne Keith RN

## 2017-07-27 NOTE — TELEPHONE ENCOUNTER
Called pharmacy and they confirmed a duplicate order.  Madeleine Flores CMA ([facility])

## 2017-08-10 NOTE — PROGRESS NOTES
SUBJECTIVE:                                                    Zaira Villatoro is a 47 year old female who presents to clinic today for the following health issues:      Diabetes Follow-up      Patient is checking blood sugars: 2-4 times per day    AM:120 to 130    Lunch: not sure    Dinner: not sure    Bedtime: 120 to 160    Diabetic concerns: None     Symptoms of hypoglycemia (low blood sugar): none     Paresthesias (numbness or burning in feet) or sores: No     Date of last diabetic eye exam: 06/2016    Exercising: rarely has time to exercise due to feeling fatigue all the time. Reports that she has her daughter drive her to places if it will be long commutes.     Hypertension Follow-up      Outpatient blood pressures are not being checked.    Low Salt Diet: not monitoring salt but no added salt to food     Depression Followup    Status since last visit: Stable     See PHQ-9 for current symptoms.  Other associated symptoms: None    Complicating factors:   Significant life event:  Yes-  Sent oldest child to college   Current substance abuse:  None  Anxiety or Panic symptoms:  Yes-  Anxiety  Side effects: YES - fatigue    PHQ-9  English  PHQ-9   Any Language          Amount of exercise or physical activity: 0-4 days/week for an average of 20-40  minutes    Problems taking medications regularly: No    Medication side effects: tiredness     Diet: low  Carbohydrate, High protein          Pain: Patient reports of a pinching pain located in her upper L back, close to L side of spine/neck. She states that moving her L arm up or back and forth may aggravate the pain. Sleeping in the wrong position aggravates the pain as well. She has tried tylenol and ibuprofen, but mostly tries not to aggravate the pain. She denies radiating numbness or tingliness down arm and hands.       UC: Patient reports that she is bleeding again with bowel movements. She is followed by GI for her UC. She reports that the bleeding will stop  within 1-2 days. She states that she has sent a message to them electronically but has not received a response back.     Tired all the time, sleep does not feel restful. Snores. Known sleep apnea, but does not wear cpap.     Problem list and histories reviewed & adjusted, as indicated.  Additional history: as documented    Patient Active Problem List   Diagnosis     IBS (irritable bowel syndrome)     Migraine     Ulcerative colitis (H)     Fatty liver     Chronic rhinitis     S/P left hemicolectomy     CARDIOVASCULAR SCREENING; LDL GOAL LESS THAN 100     Hypertension goal BP (blood pressure) < 140/90     History of seizures as a child     Type 2 diabetes mellitus without complication (H)     Morbid obesity due to excess calories (H)     History of diverticular abscess of colon     History of sinus surgery     Immunosuppressed status (H)     Moderate single current episode of major depressive disorder (H)     Incisional hernia, without obstruction or gangrene     Past Surgical History:   Procedure Laterality Date     APPENDECTOMY  april 2014     ARTHROSCOPY KNEE RT/LT  2004    RT      BIOPSY      many     BIOPSY  2011 and on    many     COLONOSCOPY  2/12    lt sided colitis     COLONOSCOPY  1/9/2014     CRYOTHERAPY, CERVICAL  1988     CRYOTHERAPY, CERVICAL      mild dysplasia     EXPLORATORY LAPAROTOMY, PARTIAL LEFT ROLANDA COLLECTOMY WITH HARTMANS PROCEDURE, COLOSTOMY  8/2013    lt rolanda colectomy, bowel perforation, colostomy, Karen's pouche     GI SURGERY  2013    abrupted  bowl     HC TOOTH EXTRACTION W/FORCEP  6/2003    Abscess Tooth / Hospitalized     HERNIA REPAIR  april 2014    found at time of takedown     HERNIA REPAIR  2014    found at time of takedown     SINUS SURGERY  2/11/03    LT sinus cyst removal      TAKEDOWN COLOSTOMY  4-2014       Social History   Substance Use Topics     Smoking status: Former Smoker     Packs/day: 1.00     Years: 15.00     Types: Cigarettes     Start date: 1/1/1985     Quit  date: 7/1/1998     Smokeless tobacco: Never Used      Comment: [employer] free household.     Alcohol use No      Comment: occ     Family History   Problem Relation Age of Onset     DIABETES Mother      Allergies Mother      Asthma Mother      Arthritis Mother      HEART DISEASE Mother      heart murmur     Depression Mother      Obesity Mother      Eye Disorder Father      DIABETES Father      CEREBROVASCULAR DISEASE Father      HEART DISEASE Father      Hypertension Father      DIABETES Maternal Grandmother      CEREBROVASCULAR DISEASE Maternal Grandfather      Unknown/Adopted Paternal Grandmother      HEART DISEASE Paternal Grandfather      left ventrical failure     CEREBROVASCULAR DISEASE Paternal Grandfather      Gynecology Sister      endometriosis     Depression Sister      Asthma Daughter      Breast Cancer Maternal Aunt      Thyroid Disease Maternal Aunt      Breast Cancer Other      fathers sister     Anesthesia Reaction Other      Thyroid Disease Other      OSTEOPOROSIS Other      Asthma Daughter      Breast Cancer Other      mothers sister     Glaucoma No family hx of      Macular Degeneration No family hx of          Current Outpatient Prescriptions   Medication Sig Dispense Refill     losartan (COZAAR) 25 MG tablet TAKE ONE-HALF TABLET BY MOUTH DAILY 15 tablet 5     citalopram (CELEXA) 40 MG tablet Take 1 tablet (40 mg) by mouth daily 30 tablet 2     insulin detemir (LEVEMIR FLEXTOUCH) 100 UNIT/ML injection Take 52 units of Levemir nightly before bed. 3 mL 3     blood glucose monitoring (ACCU-CHEK FASTCLIX) lancets 1 each 4 times daily Use to test blood sugar 4 times daily or as directed. 3 Box 1     liraglutide (VICTOZA PEN) 18 MG/3ML soln INJECT 1.8 MG SUB-Q ONCE DAILY 45 mL 1     insulin pen needle 31G X 5 MM Use 1 pen needles twice daily or as directed. 200 each 3     HUMIRA PEN 40 MG/0.8ML pen kit Inject as directed every 14 days  2     diphenhydrAMINE (BENADRYL ALLERGY) 25 MG tablet as needed Reported  on 4/12/2017       atenolol (TENORMIN) 25 MG tablet Take 1 tablet (25 mg) by mouth 2 times daily 180 tablet 3     order for DME Glucometer, brand as covered by insurance. 1 each 0     order for DME Test strips for pt's glucometer, brand as covered by insurance. Test four times daily and prn. 400 each 4     methocarbamol (ROBAXIN) 750 MG tablet Reported on 3/9/2017  0     oxyCODONE-acetaminophen (PERCOCET) 5-325 MG per tablet   0     fluticasone (FLOVENT HFA) 110 MCG/ACT inhaler Spray 2 puffs in nostril 2 times daily 36 Inhaler 1     eletriptan (RELPAX) 20 MG tablet take 1-2 tablets by mouth at onset of headache for migraine. may repeat dose in 2 hours. do not exceed 80mg in 24 hours. 12 tablet 1     cholecalciferol 2000 UNITS tablet Take 1 tablet by mouth daily  30 tablet      mesalamine (LIALDA) 1.2 G EC tablet Take 1,200 mg by mouth 2 tablets daily       aspirin 81 MG tablet Take by mouth daily 30 tablet 3     STATIN NOT PRESCRIBED, INTENTIONAL, 1 each continuous prn Statin not prescribed intentionally due to Refusal by patient and Other:LDL is below 100. 0 each 0     TYLENOL CAPS 500 MG OR 1 CAPSULE EVERY 4 HOURS AS NEEDED       DRAMAMINE OR as needed       mesalamine (CANASA) 1000 MG Suppository Place 1,000 mg rectally daily       loratadine (CLARITIN) 10 MG tablet Reported on 4/12/2017       azaTHIOprine (IMURAN) 50 MG tablet Take 1 tablet by mouth daily Reported on 4/12/2017  0     Allergies   Allergen Reactions     Demerol      Very low blood pressure     Metformin GI Disturbance     Nubain  [Nalbuphine]      Topamax [Topiramate] Other (See Comments)     Sleepy and confused.     Tylenol Sinus [Tylenol Sinus Max]      Feet swelling     Recent Labs   Lab Test  08/21/17   1128  05/26/17   0922  02/13/17   1020  11/07/16   1124   03/17/16   0947   11/04/15   1138   12/10/14   1034  08/04/13   A1C  6.6*  7.0*  8.1*   --    < >   --    < >  6.6*   < >  6.9*   < >  8.3*   LDL   --    --    --   95   --    --    --    "82   --   89   < >   --    HDL   --    --    --   44*   --    --    --   42*   --   51   < >   --    TRIG   --    --    --   92   --    --    --   61   --   68   < >   --    ALT   --   25   --    --    --   22   --    --    --    --    --   8  8   CR   --   0.76   --    --    --   0.86   < >   --    < >   --    < >  0.73   GFRESTIMATED   --   82   --    --    --   72   < >   --    < >   --    < >   --    GFRESTBLACK   --   >90   GFR Calc     --    --    --   87   < >   --    < >   --    < >   --    POTASSIUM   --   4.2   --    --    --   4.3   < >   --    < >   --    < >  4.2   TSH   --    --   2.05   --    --    --    --    --    --   1.51   --    --     < > = values in this interval not displayed.      BP Readings from Last 3 Encounters:   08/21/17 108/70   05/26/17 126/78   04/13/17 112/70    Wt Readings from Last 3 Encounters:   08/21/17 240 lb (108.9 kg)   05/26/17 240 lb (108.9 kg)   04/13/17 242 lb (109.8 kg)         Reviewed and updated as needed this visit by clinical staff       Reviewed and updated as needed this visit by Provider     ROS:  Constitutional, HEENT, cardiovascular, pulmonary, GI, , musculoskeletal, neuro, skin, endocrine and psych systems are negative, except as otherwise noted.    This document serves as a record of the services and decisions personally performed and made by Indu Ramos MD. It was created on her behalf by Iraida Santoro, a trained medical scribe. The creation of this document is based the provider's statements to the medical scribe.    Iraida Santoro August 21, 2017 11:53 AM    OBJECTIVE:   /70  Pulse 83  Temp 97.6  F (36.4  C) (Oral)  Ht 5' 4.8\" (1.646 m)  Wt 240 lb (108.9 kg)  SpO2 95%  BMI 40.18 kg/m2  Body mass index is 40.18 kg/(m^2).  GENERAL: alert and no distress, obese  NECK: no adenopathy, no asymmetry, masses, or scars and thyroid normal to palpation  RESP: lungs clear to auscultation - no rales, rhonchi or wheezes  CV: regular rate " "and rhythm, normal S1 S2, no S3 or S4, no murmur, click or rub, no peripheral edema and peripheral pulses strong  ABDOMEN: soft, nontender, no hepatosplenomegaly, no masses and bowel sounds normal  MS: no gross musculoskeletal defects noted, no edema  PSYCH: mentation appears normal, flat affect    Diagnostic Test Results:  Results for orders placed or performed in visit on 08/21/17 (from the past 24 hour(s))   HEMOGLOBIN A1C   Result Value Ref Range    Hemoglobin A1C 6.6 (H) 4.3 - 6.0 %       ASSESSMENT/PLAN:     BMI:   Estimated body mass index is 40.18 kg/(m^2) as calculated from the following:    Height as of this encounter: 5' 4.8\" (1.646 m).    Weight as of this encounter: 240 lb (108.9 kg).   Weight management plan: Discussed healthy diet and exercise guidelines and patient will follow up in 6 months in clinic to re-evaluate.        ICD-10-CM    1. Type 2 diabetes mellitus without complication, with long-term current use of insulin (H) E11.9 HEMOGLOBIN A1C    Z79.4    2. Moderate single current episode of major depressive disorder (H) F32.1 FLUoxetine (PROZAC) 40 MG capsule   3. Ulcerative colitis with rectal bleeding, unspecified location (H) K51.911    4. Hypertension goal BP (blood pressure) < 140/90 I10    5. MAHAD (obstructive sleep apnea) G47.33 SLEEP EVALUATION & MANAGEMENT REFERRAL - ADULT   6. Excessive daytime sleepiness G47.19 SLEEP EVALUATION & MANAGEMENT REFERRAL - ADULT     Diabetes:  Lab Results   Component Value Date    A1C 6.6 08/21/2017    A1C 7.0 05/26/2017    A1C 8.1 02/13/2017    A1C 7.7 10/14/2016    A1C 8.0 07/12/2016     Continue current medication with no changes  MDD: continue fluoxetine  UC with rectal bleeding: encourage her to continue to work with her specialist  BP: well controlled on current medicaiton  MAHAD with excessive daytime sleepiness: plan referral to sleep medicine. Does not wear CPAP-- this could help her to feel better if she can give it a try again and stick with it " until we find something that works.   Patient Instructions:  We will do an x-ray today for your upper left back pain.    I think another sleep study will be a good idea. Call to get scheduled    Call your GI doctor if you don't hear back from them electronically.    Stop the citalopram. Will try Prozac 40mg once daily.      See Patient Instructions    The information in this document, created by the medical scribe for me, accurately reflects the services I personally performed and the decisions made by me. I have reviewed and approved this document for accuracy prior to leaving the patient care area.    Indu Ramos MD  [facility]

## 2017-08-18 DIAGNOSIS — Z79.4 TYPE 2 DIABETES MELLITUS WITHOUT COMPLICATION, WITH LONG-TERM CURRENT USE OF INSULIN (H): Primary | ICD-10-CM

## 2017-08-18 DIAGNOSIS — E11.9 TYPE 2 DIABETES MELLITUS WITHOUT COMPLICATION, WITH LONG-TERM CURRENT USE OF INSULIN (H): Primary | ICD-10-CM

## 2017-08-20 ENCOUNTER — TELEPHONE (OUTPATIENT)
Dept: FAMILY MEDICINE | Facility: CLINIC | Age: 47
End: 2017-08-20

## 2017-08-21 ENCOUNTER — OFFICE VISIT (OUTPATIENT)
Dept: FAMILY MEDICINE | Facility: CLINIC | Age: 47
End: 2017-08-21
Payer: COMMERCIAL

## 2017-08-21 ENCOUNTER — DOCUMENTATION ONLY (OUTPATIENT)
Dept: LAB | Facility: CLINIC | Age: 47
End: 2017-08-21

## 2017-08-21 ENCOUNTER — RADIANT APPOINTMENT (OUTPATIENT)
Dept: GENERAL RADIOLOGY | Facility: CLINIC | Age: 47
End: 2017-08-21
Attending: FAMILY MEDICINE
Payer: COMMERCIAL

## 2017-08-21 VITALS
OXYGEN SATURATION: 95 % | BODY MASS INDEX: 39.99 KG/M2 | WEIGHT: 240 LBS | HEART RATE: 83 BPM | HEIGHT: 65 IN | TEMPERATURE: 97.6 F | DIASTOLIC BLOOD PRESSURE: 70 MMHG | SYSTOLIC BLOOD PRESSURE: 108 MMHG

## 2017-08-21 DIAGNOSIS — I10 HYPERTENSION GOAL BP (BLOOD PRESSURE) < 140/90: ICD-10-CM

## 2017-08-21 DIAGNOSIS — F32.1 MODERATE SINGLE CURRENT EPISODE OF MAJOR DEPRESSIVE DISORDER (H): ICD-10-CM

## 2017-08-21 DIAGNOSIS — E11.9 TYPE 2 DIABETES MELLITUS WITHOUT COMPLICATION, WITH LONG-TERM CURRENT USE OF INSULIN (H): ICD-10-CM

## 2017-08-21 DIAGNOSIS — M54.2 CERVICALGIA: ICD-10-CM

## 2017-08-21 DIAGNOSIS — Z79.4 TYPE 2 DIABETES MELLITUS WITHOUT COMPLICATION, WITH LONG-TERM CURRENT USE OF INSULIN (H): Primary | ICD-10-CM

## 2017-08-21 DIAGNOSIS — E11.9 TYPE 2 DIABETES MELLITUS WITHOUT COMPLICATION, WITH LONG-TERM CURRENT USE OF INSULIN (H): Primary | ICD-10-CM

## 2017-08-21 DIAGNOSIS — K51.911 ULCERATIVE COLITIS WITH RECTAL BLEEDING, UNSPECIFIED LOCATION (H): ICD-10-CM

## 2017-08-21 DIAGNOSIS — G47.33 OSA (OBSTRUCTIVE SLEEP APNEA): ICD-10-CM

## 2017-08-21 DIAGNOSIS — Z79.4 TYPE 2 DIABETES MELLITUS WITHOUT COMPLICATION, WITH LONG-TERM CURRENT USE OF INSULIN (H): ICD-10-CM

## 2017-08-21 DIAGNOSIS — E11.9 TYPE 2 DIABETES MELLITUS WITHOUT COMPLICATION (H): ICD-10-CM

## 2017-08-21 DIAGNOSIS — G47.19 EXCESSIVE DAYTIME SLEEPINESS: ICD-10-CM

## 2017-08-21 LAB — HBA1C MFR BLD: 6.6 % (ref 4.3–6)

## 2017-08-21 PROCEDURE — 99214 OFFICE O/P EST MOD 30 MIN: CPT | Performed by: FAMILY MEDICINE

## 2017-08-21 PROCEDURE — 72040 X-RAY EXAM NECK SPINE 2-3 VW: CPT

## 2017-08-21 PROCEDURE — 83036 HEMOGLOBIN GLYCOSYLATED A1C: CPT | Performed by: FAMILY MEDICINE

## 2017-08-21 PROCEDURE — 36415 COLL VENOUS BLD VENIPUNCTURE: CPT | Performed by: FAMILY MEDICINE

## 2017-08-21 RX ORDER — FLUOXETINE 40 MG/1
40 CAPSULE ORAL DAILY
Qty: 30 CAPSULE | Refills: 1 | Status: SHIPPED | OUTPATIENT
Start: 2017-08-21 | End: 2017-10-02

## 2017-08-21 ASSESSMENT — PATIENT HEALTH QUESTIONNAIRE - PHQ9
5. POOR APPETITE OR OVEREATING: NOT AT ALL
SUM OF ALL RESPONSES TO PHQ QUESTIONS 1-9: 12

## 2017-08-21 ASSESSMENT — ANXIETY QUESTIONNAIRES
GAD7 TOTAL SCORE: 7
3. WORRYING TOO MUCH ABOUT DIFFERENT THINGS: SEVERAL DAYS
IF YOU CHECKED OFF ANY PROBLEMS ON THIS QUESTIONNAIRE, HOW DIFFICULT HAVE THESE PROBLEMS MADE IT FOR YOU TO DO YOUR WORK, TAKE CARE OF THINGS AT HOME, OR GET ALONG WITH OTHER PEOPLE: SOMEWHAT DIFFICULT
6. BECOMING EASILY ANNOYED OR IRRITABLE: SEVERAL DAYS
2. NOT BEING ABLE TO STOP OR CONTROL WORRYING: MORE THAN HALF THE DAYS
1. FEELING NERVOUS, ANXIOUS, OR ON EDGE: MORE THAN HALF THE DAYS
7. FEELING AFRAID AS IF SOMETHING AWFUL MIGHT HAPPEN: SEVERAL DAYS
5. BEING SO RESTLESS THAT IT IS HARD TO SIT STILL: NOT AT ALL

## 2017-08-21 NOTE — TELEPHONE ENCOUNTER
Pt coming in for diabetes lab for tomorrow 8/21/17 and 3 month diabetes follow up. Please put in order for pt. Please advise.     Thank You,    Central Scheduler  Audrey MARC

## 2017-08-21 NOTE — TELEPHONE ENCOUNTER
Accu-Chek Lashay Plus Test Strip      Last Written Prescription Date: 8/17/16  Last Fill Quantity: 400,  # refills: 4   Last Office Visit with G, [facility] or [facility] prescribing provider: 5/26/17                                         Next 5 appointments (look out 90 days)     Aug 21, 2017 11:45 AM CDT   Office Visit with Indu Ramos MD   [facility]    [address]  [place] [address] [phone]                JESSICA/MA

## 2017-08-21 NOTE — NURSING NOTE
"Chief Complaint   Patient presents with     Diabetes     Health Maintenance     A1C, PHQ-9     Medication Reconciliation     shortage of atenolol (TENORMIN) 25 MG tablet       Initial /70  Pulse 83  Temp 97.6  F (36.4  C) (Oral)  Ht 5' 4.8\" (1.646 m)  Wt 240 lb (108.9 kg)  SpO2 95%  BMI 40.18 kg/m2 Estimated body mass index is 40.18 kg/(m^2) as calculated from the following:    Height as of this encounter: 5' 4.8\" (1.646 m).    Weight as of this encounter: 240 lb (108.9 kg).  Medication Reconciliation: complete     An BRITTNEY Villalobos    "

## 2017-08-21 NOTE — MR AVS SNAPSHOT
After Visit Summary   8/21/2017    Zaira Villatoro    MRN: [id number]           Patient Information     Date Of Birth          1970        Visit Information        Provider Department      8/21/2017 11:45 AM Indu Ramos MD [facility]        Today's Diagnoses     Type 2 diabetes mellitus without complication, with long-term current use of insulin (H)    -  1    Moderate single current episode of major depressive disorder (H)        Ulcerative colitis with rectal bleeding, unspecified location (H)        Hypertension goal BP (blood pressure) < 140/90        MAHAD (obstructive sleep apnea)        Excessive daytime sleepiness        Cervicalgia          Care Instructions    We will do an x-ray today for your upper left back pain.    I think another sleep study will be a good idea. Call to get scheduled    Call your GI doctor if you don't hear back from them electronically.    Stop the citalopram. Will try Prozac 40mg once daily.                 Neck Spasm Rehabilitation Exercises   You may do all of these exercises right away but avoid any movements that increase your pain.     Neck rotation with flexion:   Right: Turn your head to the right and clasp your hands behind your head. Let the weight of your arms pull your chin to the right side of your chest. Relax. Hold for a count of 15. Do this 3 times.   Left: Turn your head to the left and clasp your hands behind your head. Let the weight of your arms pull your chin to the left side of your chest. Relax. Hold for a count of 15. Do this 3 times.     Chin tuck: Place your fingertips on your chin and gently push your head straight back as if you are trying to make a double chin. Keep looking forward as your head moves back. Hold 5 seconds and repeat 5 times.     Scalene stretch: This stretches the neck muscles that attach to your ribs. Sitting in an upright position, clasp both hands behind your back, lower your left shoulder, and  tilt your head toward the right. Hold this position for 15 to 30 seconds and then come back to the starting position. Lower your right shoulder and tilt your head toward the left until you feel a stretch. Hold for 15 to 30 seconds. Repeat 3 times on each side.     Neck rotation stretch   Right side: Rotate your neck by looking over your right shoulder. Lift your right hand and place your palm on the left side of your chin. Push your chin with your palm toward your right shoulder. Hold for a count of 10. Do this 3 times.   Left side: Rotate your neck by looking over your left shoulder. Lift your left hand and place your palm on the right side of your chin. Push your chin with your palm toward your left shoulder. Hold for a count of 10. Do this 3 times.     Scapular squeeze: While sitting or standing with your arms by your sides, squeeze your shoulder blades together and hold for 5 seconds. Do 3 sets of 10.     Thoracic extension: While sitting in a chair, clasp both arms behind your head. Gently arch backward and look up toward the ceiling. Repeat 10 times. Do this several times per day.           [facility]    If you have any questions regarding to your visit please contact your care team:       Team Purple:   Clinic Hours Telephone Number   Dr. Rosaline Ramos     7am-7pm  Monday - Thursday   7am-5pm  Fridays  [phone]  (Appointment scheduling available 24/7)    Questions about your Visit?   Team Line:  [phone]   Urgent Care - [place] - 11am-9pm Monday-Friday Saturday-Sunday- 9am-5pm   [place] - 5pm-9pm Monday-Friday Saturday-Sunday- 9am-5pm  [phone] - [place]   [phone] - [place]       What options do I have for visits at the clinic other than the traditional office visit?  To expand how we care for you, many of our providers are utilizing electronic visits (e-visits) and telephone visits, when medically  appropriate, for interactions with their patients rather than a visit in the clinic.   We also offer nurse visits for many medical concerns. Just like any other service, we will bill your insurance company for this type of visit based on time spent on the phone with your provider. Not all insurance companies cover these visits. Please check with your medical insurance if this type of visit is covered. You will be responsible for any charges that are not paid by your insurance.      E-visits via HStreaminghart:  generally incur a $35.00 fee.  Telephone visits:  Time spent on the phone: *charged based on time that is spent on the phone in increments of 10 minutes. Estimated cost:   5-10 mins $30.00   11-20 mins. $59.00   21-30 mins. $85.00     Use [employer] (secure email communication and access to your chart) to send your primary care provider a message or make an appointment. Ask someone on your Team how to sign up for [employer].  For a Price Quote for your services, please call our [place] Line at [phone].  As always, Thank you for trusting us with your health care needs!    Lizbeth Souza MA                Follow-ups after your visit        Additional Services     SLEEP EVALUATION & MANAGEMENT REFERRAL - ADULT       Please be aware that coverage of these services is subject to the terms and limitations of your health insurance plan.  Call member services at your health plan with any benefit or coverage questions.      Please bring the following to your appointment:    >>   List of current medications   >>   This referral request   >>   Any documents/labs given to you for this referral    [facility] [phone] (Age 15 and up)                  Future tests that were ordered for you today     Open Future Orders        Priority Expected Expires Ordered    XR Cervical Spine 2/3 Views Routine 8/21/2017 8/21/2018 8/21/2017    SLEEP EVALUATION & MANAGEMENT REFERRAL - ADULT Routine   "8/21/2018 8/21/2017            Who to contact     If you have questions or need follow up information about today's clinic visit or your schedule please contact [facility] [place] directly at [phone].  Normal or non-critical lab and imaging results will be communicated to you by IRIS-RFIDhart, letter or phone within 4 business days after the clinic has received the results. If you do not hear from us within 7 days, please contact the clinic through IRIS-RFIDhart or phone. If you have a critical or abnormal lab result, we will notify you by phone as soon as possible.  Submit refill requests through [employer] or call your pharmacy and they will forward the refill request to us. Please allow 3 business days for your refill to be completed.          Additional Information About Your Visit        IRIS-RFIDharHowAboutWe Information     Devunity gives you secure access to your electronic health record. If you see a primary care provider, you can also send messages to your care team and make appointments. If you have questions, please call your primary care clinic.  If you do not have a primary care provider, please call [phone] and they will assist you.        Care EveryWhere ID     This is your Care EveryWhere ID. This could be used by other organizations to access your [place] medical records  [id number]        Your Vitals Were     Pulse Temperature Height Pulse Oximetry BMI (Body Mass Index)       83 97.6  F (36.4  C) (Oral) 5' 4.8\" (1.646 m) 95% 40.18 kg/m2        Blood Pressure from Last 3 Encounters:   08/21/17 108/70   05/26/17 126/78   04/13/17 112/70    Weight from Last 3 Encounters:   08/21/17 240 lb (108.9 kg)   05/26/17 240 lb (108.9 kg)   04/13/17 242 lb (109.8 kg)              We Performed the Following     HEMOGLOBIN A1C          Today's Medication Changes          These changes are accurate as of: 8/21/17 12:43 PM.  If you have any questions, ask your nurse or doctor.               Start taking these medicines.        " Dose/Directions    FLUoxetine 40 MG capsule   Commonly known as:  PROzac   Used for:  Moderate single current episode of major depressive disorder (H)   Started by:  Indu Ramos MD        Dose:  40 mg   Take 1 capsule (40 mg) by mouth daily   Quantity:  30 capsule   Refills:  1         These medicines have changed or have updated prescriptions.        Dose/Directions    LIALDA 1.2 G EC tablet   This may have changed:  Another medication with the same name was removed. Continue taking this medication, and follow the directions you see here.   Generic drug:  mesalamine   Changed by:  Tayler Bergman MD        Dose:  1200 mg   Take 1,200 mg by mouth 2 tablets daily   Refills:  0         Stop taking these medicines if you haven't already. Please contact your care team if you have questions.     azaTHIOprine 50 MG tablet   Commonly known as:  IMURAN   Stopped by:  Indu Ramos MD           citalopram 40 MG tablet   Commonly known as:  celeXA   Stopped by:  Indu Ramos MD           CLARITIN 10 MG tablet   Generic drug:  loratadine   Stopped by:  Indu Ramos MD           methocarbamol 750 MG tablet   Commonly known as:  ROBAXIN   Stopped by:  Indu Ramos MD                Where to get your medicines      These medications were sent to [facility] [id number] [facility] - [place], [place] - [address]  [address] [place] [address]     Phone:  [phone]     FLUoxetine 40 MG capsule                Primary Care Provider Office Phone # Fax #    Indu Ramos [phone]       [address] [address] [address]  [place] [address]        Equal Access to Services     [facility]: Hadii aad ku hadasho Soomaali, waaxda luqadaha, qaybta kaalmada adeegmaira wood . So [facility] [phone].    ATENCIÓN: Si habla español, tiene a matt disposición servicios gratuitos de asistencia lingüística. Llame al [phone].    We comply with applicable federal civil rights laws  and [place] laws. We do not discriminate on the basis of race, color, national origin, age, disability sex, sexual orientation or gender identity.            Thank you!     Thank you for choosing [facility]  for your care. Our goal is always to provide you with excellent care. Hearing back from our patients is one way we can continue to improve our services. Please take a few minutes to complete the written survey that you may receive in the mail after your visit with us. Thank you!             Your Updated Medication List - Protect others around you: Learn how to safely use, store and throw away your medicines at www.disposemymeds.org.          This list is accurate as of: 8/21/17 12:43 PM.  Always use your most recent med list.                   Brand Name Dispense Instructions for use Diagnosis    aspirin 81 MG tablet     30 tablet    Take by mouth daily        atenolol 25 MG tablet    TENORMIN    180 tablet    Take 1 tablet (25 mg) by mouth 2 times daily        BENADRYL ALLERGY 25 MG tablet   Generic drug:  diphenhydrAMINE      as needed Reported on 4/12/2017        blood glucose monitoring lancets     3 Box    1 each 4 times daily Use to test blood sugar 4 times daily or as directed.    Type 2 diabetes mellitus without complication (H)       cholecalciferol 2000 UNITS tablet     30 tablet    Take 1 tablet by mouth daily        DRAMAMINE PO      as needed        eletriptan 20 MG tablet    RELPAX    12 tablet    take 1-2 tablets by mouth at onset of headache for migraine. may repeat dose in 2 hours. do not exceed 80mg in 24 hours.    Migraine, unspecified, without mention of intractable migraine without mention of status migrainosus       FLUoxetine 40 MG capsule    PROzac    30 capsule    Take 1 capsule (40 mg) by mouth daily    Moderate single current episode of major depressive disorder (H)       fluticasone 110 MCG/ACT Inhaler    FLOVENT HFA    36 Inhaler    Spray 2 puffs in nostril 2 times  daily    Chronic rhinitis       HUMIRA PEN 40 MG/0.8ML pen kit   Generic drug:  adalimumab      Inject as directed every 14 days        insulin detemir 100 UNIT/ML injection    LEVEMIR FLEXTOUCH    3 mL    Take 52 units of Levemir nightly before bed.    Type 2 diabetes mellitus without complication (H)       insulin pen needle 31G X 5 MM     200 each    Use 1 pen needles twice daily or as directed.    Type 2 diabetes mellitus without complication (H)       LIALDA 1.2 G EC tablet   Generic drug:  mesalamine      Take 1,200 mg by mouth 2 tablets daily        liraglutide 18 MG/3ML soln    VICTOZA PEN    45 mL    INJECT 1.8 MG SUB-Q ONCE DAILY    Type 2 diabetes mellitus without complication, with long-term current use of insulin (H)       losartan 25 MG tablet    COZAAR    15 tablet    TAKE ONE-HALF TABLET BY MOUTH DAILY    Diabetes type 2, uncontrolled (H), Essential hypertension with goal blood pressure less than 140/90       * order for DME     1 each    Glucometer, brand as covered by insurance.    Type 2 diabetes mellitus without complication (H)       * order for DME     400 each    Test strips for pt's glucometer, brand as covered by insurance. Test four times daily and prn.    Type 2 diabetes mellitus without complication (H)       oxyCODONE-acetaminophen 5-325 MG per tablet    PERCOCET          STATIN NOT PRESCRIBED (INTENTIONAL)     0 each    1 each continuous prn Statin not prescribed intentionally due to Refusal by patient and Other:LDL is below 100.    CARDIOVASCULAR SCREENING; LDL GOAL LESS THAN 100       TYLENOL CAPS 500 MG OR      1 CAPSULE EVERY 4 HOURS AS NEEDED        * Notice:  This list has 2 medication(s) that are the same as other medications prescribed for you. Read the directions carefully, and ask your doctor or other care provider to review them with you.

## 2017-08-21 NOTE — PATIENT INSTRUCTIONS
We will do an x-ray today for your upper left back pain.    I think another sleep study will be a good idea. Call to get scheduled    Call your GI doctor if you don't hear back from them electronically.    Stop the citalopram. Will try Prozac 40mg once daily.                 Neck Spasm Rehabilitation Exercises   You may do all of these exercises right away but avoid any movements that increase your pain.     Neck rotation with flexion:   Right: Turn your head to the right and clasp your hands behind your head. Let the weight of your arms pull your chin to the right side of your chest. Relax. Hold for a count of 15. Do this 3 times.   Left: Turn your head to the left and clasp your hands behind your head. Let the weight of your arms pull your chin to the left side of your chest. Relax. Hold for a count of 15. Do this 3 times.     Chin tuck: Place your fingertips on your chin and gently push your head straight back as if you are trying to make a double chin. Keep looking forward as your head moves back. Hold 5 seconds and repeat 5 times.     Scalene stretch: This stretches the neck muscles that attach to your ribs. Sitting in an upright position, clasp both hands behind your back, lower your left shoulder, and tilt your head toward the right. Hold this position for 15 to 30 seconds and then come back to the starting position. Lower your right shoulder and tilt your head toward the left until you feel a stretch. Hold for 15 to 30 seconds. Repeat 3 times on each side.     Neck rotation stretch   Right side: Rotate your neck by looking over your right shoulder. Lift your right hand and place your palm on the left side of your chin. Push your chin with your palm toward your right shoulder. Hold for a count of 10. Do this 3 times.   Left side: Rotate your neck by looking over your left shoulder. Lift your left hand and place your palm on the right side of your chin. Push your chin with your palm toward your left shoulder.  Hold for a count of 10. Do this 3 times.     Scapular squeeze: While sitting or standing with your arms by your sides, squeeze your shoulder blades together and hold for 5 seconds. Do 3 sets of 10.     Thoracic extension: While sitting in a chair, clasp both arms behind your head. Gently arch backward and look up toward the ceiling. Repeat 10 times. Do this several times per day.           [facility] [place]    If you have any questions regarding to your visit please contact your care team:       Team Purple:   Clinic Hours Telephone Number   Dr. Rosaline Ramos     7am-7pm  Monday - Thursday   7am-5pm  Fridays  [phone]  (Appointment scheduling available 24/7)    Questions about your Visit?   Team Line:  [phone]   Urgent Care - [place] and [facility] - 11am-9pm Monday-Friday Saturday-Sunday- 9am-5pm   [place] - 5pm-9pm Monday-Friday Saturday-Sunday- 9am-5pm  [phone] - [facility]  [phone] - [place]       What options do I have for visits at the clinic other than the traditional office visit?  To expand how we care for you, many of our providers are utilizing electronic visits (e-visits) and telephone visits, when medically appropriate, for interactions with their patients rather than a visit in the clinic.   We also offer nurse visits for many medical concerns. Just like any other service, we will bill your insurance company for this type of visit based on time spent on the phone with your provider. Not all insurance companies cover these visits. Please check with your medical insurance if this type of visit is covered. You will be responsible for any charges that are not paid by your insurance.      E-visits via MobilePaks:  generally incur a $35.00 fee.  Telephone visits:  Time spent on the phone: *charged based on time that is spent on the phone in increments of 10 minutes. Estimated cost:   5-10 mins $30.00   11-20 mins. $59.00    21-30 mins. $85.00     Use [employer] (secure email communication and access to your chart) to send your primary care provider a message or make an appointment. Ask someone on your Team how to sign up for [employer].  For a Price Quote for your services, please call our Consumer Price Line at [phone].  As always, Thank you for trusting us with your health care needs!    Lizbeth Souza MA

## 2017-08-22 RX ORDER — BLOOD SUGAR DIAGNOSTIC
STRIP MISCELLANEOUS
Qty: 400 STRIP | Refills: 1 | Status: SHIPPED | OUTPATIENT
Start: 2017-08-22 | End: 2018-02-26

## 2017-08-22 RX ORDER — INSULIN DETEMIR 100 [IU]/ML
INJECTION, SOLUTION SUBCUTANEOUS
Qty: 9 ML | Refills: 1 | Status: SHIPPED | OUTPATIENT
Start: 2017-08-22 | End: 2017-10-01

## 2017-08-22 ASSESSMENT — ANXIETY QUESTIONNAIRES: GAD7 TOTAL SCORE: 7

## 2017-08-22 NOTE — TELEPHONE ENCOUNTER
Prescription approved per [facility] [place].  Jennifer Landon, RN - BC

## 2017-08-22 NOTE — TELEPHONE ENCOUNTER
insulin detemir (LEVEMIR FLEXTOUCH) 100 UNIT/ML injection         Last Written Prescription Date: 4/12/2017  Last Fill Quantity: 3 mL, # refills: 3  Last Office Visit with G, P or [facility] prescribing provider:  8/21/2017        BP Readings from Last 3 Encounters:   08/21/17 108/70   05/26/17 126/78   04/13/17 112/70     Lab Results   Component Value Date    MICROL 7 02/13/2017     Lab Results   Component Value Date    UMALCR 5.04 02/13/2017     Creatinine   Date Value Ref Range Status   05/26/2017 0.76 0.52 - 1.04 mg/dL Final   ]  GFR Estimate   Date Value Ref Range Status   05/26/2017 82 >60 mL/min/1.7m2 Final     Comment:     Non  GFR Calc   03/17/2016 72 >60 mL/min/1.7m2 Final     Comment:     Non  GFR Calc   02/19/2016 79 >60 mL/min/1.7m2 Final     Comment:     Non  GFR Calc     GFR Estimate If Black   Date Value Ref Range Status   05/26/2017 >90   GFR Calc   >60 mL/min/1.7m2 Final   03/17/2016 87 >60 mL/min/1.7m2 Final     Comment:      GFR Calc   02/19/2016 >90   GFR Calc   >60 mL/min/1.7m2 Final     Lab Results   Component Value Date    CHOL 157 11/07/2016     Lab Results   Component Value Date    HDL 44 11/07/2016     Lab Results   Component Value Date    LDL 95 11/07/2016     Lab Results   Component Value Date    TRIG 92 11/07/2016     Lab Results   Component Value Date    CHOLHDLRATIO 3.2 11/04/2015     Lab Results   Component Value Date    AST 26 05/26/2017     Lab Results   Component Value Date    ALT 25 05/26/2017     Lab Results   Component Value Date    A1C 6.6 08/21/2017    A1C 7.0 05/26/2017    A1C 8.1 02/13/2017    A1C 7.7 10/14/2016    A1C 8.0 07/12/2016     Potassium   Date Value Ref Range Status   05/26/2017 4.2 3.4 - 5.3 mmol/L Final

## 2017-08-22 NOTE — TELEPHONE ENCOUNTER
Prescription approved per [facility] [place].  Phyllis Solomon RN

## 2017-09-25 PROBLEM — F32.1 MODERATE SINGLE CURRENT EPISODE OF MAJOR DEPRESSIVE DISORDER (H): Chronic | Status: ACTIVE | Noted: 2017-03-10

## 2017-09-26 ENCOUNTER — OFFICE VISIT (OUTPATIENT)
Dept: SLEEP MEDICINE | Facility: CLINIC | Age: 47
End: 2017-09-26
Attending: FAMILY MEDICINE
Payer: COMMERCIAL

## 2017-09-26 VITALS
OXYGEN SATURATION: 99 % | BODY MASS INDEX: 40.63 KG/M2 | HEIGHT: 64 IN | SYSTOLIC BLOOD PRESSURE: 118 MMHG | WEIGHT: 238 LBS | DIASTOLIC BLOOD PRESSURE: 83 MMHG

## 2017-09-26 DIAGNOSIS — G47.19 EXCESSIVE DAYTIME SLEEPINESS: ICD-10-CM

## 2017-09-26 DIAGNOSIS — E66.01 MORBID OBESITY DUE TO EXCESS CALORIES (H): Chronic | ICD-10-CM

## 2017-09-26 DIAGNOSIS — E11.9 TYPE 2 DIABETES MELLITUS WITHOUT COMPLICATION, WITH LONG-TERM CURRENT USE OF INSULIN (H): Chronic | ICD-10-CM

## 2017-09-26 DIAGNOSIS — I10 HYPERTENSION GOAL BP (BLOOD PRESSURE) < 140/90: Chronic | ICD-10-CM

## 2017-09-26 DIAGNOSIS — Z79.4 TYPE 2 DIABETES MELLITUS WITHOUT COMPLICATION, WITH LONG-TERM CURRENT USE OF INSULIN (H): Chronic | ICD-10-CM

## 2017-09-26 DIAGNOSIS — G47.9 DISTURBANCE IN SLEEP BEHAVIOR: ICD-10-CM

## 2017-09-26 DIAGNOSIS — R06.00 DYSPNEA AND RESPIRATORY ABNORMALITY: ICD-10-CM

## 2017-09-26 DIAGNOSIS — K51.919 ULCERATIVE COLITIS WITH COMPLICATION, UNSPECIFIED LOCATION (H): Chronic | ICD-10-CM

## 2017-09-26 DIAGNOSIS — G47.10 ORGANIC HYPERSOMNIA: ICD-10-CM

## 2017-09-26 DIAGNOSIS — G47.33 OSA (OBSTRUCTIVE SLEEP APNEA): Primary | ICD-10-CM

## 2017-09-26 DIAGNOSIS — R06.89 DYSPNEA AND RESPIRATORY ABNORMALITY: ICD-10-CM

## 2017-09-26 PROBLEM — K43.2 INCISIONAL HERNIA, WITHOUT OBSTRUCTION OR GANGRENE: Chronic | Status: ACTIVE | Noted: 2017-03-10

## 2017-09-26 PROCEDURE — 99203 OFFICE O/P NEW LOW 30 MIN: CPT | Performed by: INTERNAL MEDICINE

## 2017-09-26 NOTE — MR AVS SNAPSHOT
After Visit Summary   9/26/2017    Zaira Villatoro    MRN: [id number]           Patient Information     Date Of Birth          1970        Visit Information        Provider Department      9/26/2017 11:00 AM Ramana Palencia MD [facility]        Today's Diagnoses     MAHAD (obstructive sleep apnea)    -  1    Excessive daytime sleepiness        Ulcerative colitis with complication, unspecified location (H)        Organic hypersomnia        Disturbance in sleep behavior        Dyspnea and respiratory abnormality        Hypertension goal BP (blood pressure) < 140/90        Morbid obesity due to excess calories (H)        Type 2 diabetes mellitus without complication, with long-term current use of insulin (H)          Care Instructions      Your BMI is Body mass index is 40.85 kg/(m^2).  Weight management is a personal decision.  If you are interested in exploring weight loss strategies, the following discussion covers the approaches that may be successful. Body mass index (BMI) is one way to tell whether you are at a healthy weight, overweight, or obese. It measures your weight in relation to your height.  A BMI of 18.5 to 24.9 is in the healthy range. A person with a BMI of 25 to 29.9 is considered overweight, and someone with a BMI of 30 or greater is considered obese. More than two-thirds of American adults are considered overweight or obese.  Being overweight or obese increases the risk for further weight gain. Excess weight may lead to heart disease and diabetes.  Creating and following plans for healthy eating and physical activity may help you improve your health.  Weight control is part of healthy lifestyle and includes exercise, emotional health, and healthy eating habits. Careful eating habits lifelong are the mainstay of weight control. Though there are significant health benefits from weight loss, long-term weight loss with diet alone may be very difficult to achieve- studies  show long-term success with dietary management in less than 10% of people. Attaining a healthy weight may be especially difficult to achieve in those with severe obesity. In some cases, medications, devices and surgical management might be considered.  What can you do?  If you are overweight or obese and are interested in methods for weight loss, you should discuss this with your provider.     Consider reducing daily calorie intake by 500 calories.     Keep a food journal.     Avoiding skipping meals, consider cutting portions instead.    Diet combined with exercise helps maintain muscle while optimizing fat loss. Strength training is particularly important for building and maintaining muscle mass. Exercise helps reduce stress, increase energy, and improves fitness. Increasing exercise without diet control, however, may not burn enough calories to loose weight.       Start walking three days a week 10-20 minutes at a time    Work towards walking thirty minutes five days a week     Eventually, increase the speed of your walking for 1-2 minutes at time    In addition, we recommend that you review healthy lifestyles and methods for weight loss available through the National Institutes of Health patient information sites:  http://win.niddk.nih.gov/publications/index.htm    And look into health and wellness programs that may be available through your health insurance provider, employer, local community center, or k[employer] club.    Weight management plan: Patient was referred to their PCP to discuss a diet and exercise plan.              Follow-ups after your visit        Follow-up notes from your care team     Return in about 1 day (around 9/27/2017) for results.      Your next 10 appointments already scheduled     Oct 23, 2017 11:30 AM CDT   HST  with BK BED 5   [facility] ([facility])    [address] [place] [id number] [phone]             Oct 24, 2017 10:00 AM CDT   HST Drop Off with EMILY MARCUS   [facility] ([facility])    [address] [address]  Health system [id number] [phone]            Oct 24, 2017 10:40 AM CDT   Return Sleep Patient with Ramana Palencia MD   [facility] ([facility])    [address] [address]  Health system [id number]   [phone]              Future tests that were ordered for you today     Open Future Orders        Priority Expected Expires Ordered    HST-Home Sleep Apnea Test Routine  3/28/2018 9/26/2017            Who to contact     If you have questions or need follow up information about today's clinic visit or your schedule please contact [facility] directly at [phone].  Normal or non-critical lab and imaging results will be communicated to you by MyChart, letter or phone within 4 business days after the clinic has received the results. If you do not hear from us within 7 days, please contact the clinic through [employer] or phone. If you have a critical or abnormal lab result, we will notify you by phone as soon as possible.  Submit refill requests through [employer] or call your pharmacy and they will forward the refill request to us. Please allow 3 business days for your refill to be completed.          Additional Information About Your Visit        MyChart Information     [employer] gives you secure access to your electronic health record. If you see a primary care provider, you can also send messages to your care team and make appointments. If you have questions, please call your primary care clinic.  If you do not have a primary care provider, please call [phone] and they will assist you.        Care EveryWhere ID     This is your Care EveryWhere ID. This could be used by other organizations to access your [facility] medical records  [id number]        Your Vitals Were     Height  "Pulse Oximetry BMI (Body Mass Index)             1.626 m (5' 4\") 99% 40.85 kg/m2          Blood Pressure from Last 3 Encounters:   09/26/17 118/83   08/21/17 108/70   05/26/17 126/78    Weight from Last 3 Encounters:   09/26/17 108 kg (238 lb)   08/21/17 108.9 kg (240 lb)   05/26/17 108.9 kg (240 lb)              We Performed the Following     SLEEP EVALUATION & MANAGEMENT REFERRAL - ADULT        Primary Care Provider Office Phone # Fax #    Indu Ramos [phone]       [facility] [facility] [address]        Equal Access to Services     GAYATHRI RIBERA : Hadii ankit ritchieo Someredith, waaxda luqadaha, qaybta kaalmada adeegyada, maira anaya . So [facility] [phone].    ATENCIÓN: Si habla español, tiene a matt disposición servicios gratuitos de asistencia lingüística. Llame al [phone].    We comply with applicable federal civil rights laws and [place] laws. We do not discriminate on the basis of race, color, national origin, age, disability sex, sexual orientation or gender identity.            Thank you!     Thank you for choosing [facility]  for your care. Our goal is always to provide you with excellent care. Hearing back from our patients is one way we can continue to improve our services. Please take a few minutes to complete the written survey that you may receive in the mail after your visit with us. Thank you!             Your Updated Medication List - Protect others around you: Learn how to safely use, store and throw away your medicines at www.disposemymeds.org.          This list is accurate as of: 9/26/17 11:43 AM.  Always use your most recent med list.                   Brand Name Dispense Instructions for use Diagnosis    ACCU-CHEK SHARMILA PLUS test strip   Generic drug:  blood glucose monitoring     400 strip    TEST 4 TIMES DAILY AND AS NEEDED    Type 2 diabetes mellitus without complication, with long-term current use of " insulin (H)       aspirin 81 MG tablet     30 tablet    Take by mouth daily        atenolol 25 MG tablet    TENORMIN    180 tablet    Take 1 tablet (25 mg) by mouth 2 times daily        BENADRYL ALLERGY 25 MG tablet   Generic drug:  diphenhydrAMINE      as needed Reported on 4/12/2017        blood glucose monitoring lancets     3 Box    1 each 4 times daily Use to test blood sugar 4 times daily or as directed.    Type 2 diabetes mellitus without complication (H)       cholecalciferol 2000 UNITS tablet     30 tablet    Take 1 tablet by mouth daily        DRAMAMINE PO      as needed        eletriptan 20 MG tablet    RELPAX    12 tablet    take 1-2 tablets by mouth at onset of headache for migraine. may repeat dose in 2 hours. do not exceed 80mg in 24 hours.    Migraine, unspecified, without mention of intractable migraine without mention of status migrainosus       FLUoxetine 40 MG capsule    PROzac    30 capsule    Take 1 capsule (40 mg) by mouth daily    Moderate single current episode of major depressive disorder (H)       fluticasone 110 MCG/ACT Inhaler    FLOVENT HFA    36 Inhaler    Spray 2 puffs in nostril 2 times daily    Chronic rhinitis       HUMIRA PEN 40 MG/0.8ML pen kit   Generic drug:  adalimumab      Inject as directed every 14 days        insulin pen needle 31G X 5 MM     200 each    Use 1 pen needles twice daily or as directed.    Type 2 diabetes mellitus without complication (H)       LEVEMIR FLEXTOUCH 100 UNIT/ML injection   Generic drug:  insulin detemir     9 mL    INJECT 52 UNITS SUBCUTANEOUSLY BEFORE BED    Type 2 diabetes mellitus without complication (H)       LIALDA 1.2 G EC tablet   Generic drug:  mesalamine      Take 1,200 mg by mouth 2 tablets daily        liraglutide 18 MG/3ML soln    VICTOZA PEN    45 mL    INJECT 1.8 MG SUB-Q ONCE DAILY    Type 2 diabetes mellitus without complication, with long-term current use of insulin (H)       losartan 25 MG tablet    COZAAR    15 tablet    TAKE  ONE-HALF TABLET BY MOUTH DAILY    Diabetes type 2, uncontrolled (H), Essential hypertension with goal blood pressure less than 140/90       * order for DME     1 each    Glucometer, brand as covered by insurance.    Type 2 diabetes mellitus without complication (H)       * order for DME     400 each    Test strips for pt's glucometer, brand as covered by insurance. Test four times daily and prn.    Type 2 diabetes mellitus without complication (H)       oxyCODONE-acetaminophen 5-325 MG per tablet    PERCOCET          STATIN NOT PRESCRIBED (INTENTIONAL)     0 each    1 each continuous prn Statin not prescribed intentionally due to Refusal by patient and Other:LDL is below 100.    CARDIOVASCULAR SCREENING; LDL GOAL LESS THAN 100       TYLENOL CAPS 500 MG OR      1 CAPSULE EVERY 4 HOURS AS NEEDED        * Notice:  This list has 2 medication(s) that are the same as other medications prescribed for you. Read the directions carefully, and ask your doctor or other care provider to review them with you.

## 2017-09-26 NOTE — PATIENT INSTRUCTIONS
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
## 2017-09-26 NOTE — NURSING NOTE
"Chief Complaint   Patient presents with     Sleep Problem     Consult- wanted me to revisit the issue       Initial There were no vitals taken for this visit. Estimated body mass index is 40.18 kg/(m^2) as calculated from the following:    Height as of 8/21/17: 1.646 m (5' 4.8\").    Weight as of 8/21/17: 108.9 kg (240 lb).  Medication Reconciliation: complete   Neck circumference: 16.5 inches/42 cm      "

## 2017-10-01 DIAGNOSIS — E11.9 TYPE 2 DIABETES MELLITUS WITHOUT COMPLICATION (H): ICD-10-CM

## 2017-10-01 DIAGNOSIS — F32.1 MODERATE SINGLE CURRENT EPISODE OF MAJOR DEPRESSIVE DISORDER (H): ICD-10-CM

## 2017-10-02 DIAGNOSIS — F32.1 MODERATE SINGLE CURRENT EPISODE OF MAJOR DEPRESSIVE DISORDER (H): ICD-10-CM

## 2017-10-02 NOTE — TELEPHONE ENCOUNTER
Prozac      Last Written Prescription Date: 08/21/2017  Last Fill Quantity: 30, # refills: 1  Last Office Visit with [facility] primary care provider:  08/21/2017        Last PHQ-9 score on record=   PHQ-9 SCORE 8/21/2017   Total Score 12

## 2017-10-02 NOTE — TELEPHONE ENCOUNTER
citalopram (CELEXA) 40 MG tablet       Last Written Prescription Date:  5/26/2017  Last Fill Quantity: 30,   # refills: 2  Last Office Visit with [facility], [facility] or [facility] prescribing provider: 8/21/2017  Future Office visit:       Routing refill request to provider for review/approval because:  Drug not active on patient's medication list  LEVEMIR FLEXTOUCH 100 UNIT/ML injection         Last Written Prescription Date: 8/22/2017  Last Fill Quantity: 9 mL, # refills: 1  Last Office Visit with [facility], [facility] or [facility] prescribing provider:  8/21/2017        BP Readings from Last 3 Encounters:   09/26/17 118/83   08/21/17 108/70   05/26/17 126/78     Lab Results   Component Value Date    MICROL 7 02/13/2017     Lab Results   Component Value Date    UMALCR 5.04 02/13/2017     Creatinine   Date Value Ref Range Status   05/26/2017 0.76 0.52 - 1.04 mg/dL Final   ]  GFR Estimate   Date Value Ref Range Status   05/26/2017 82 >60 mL/min/1.7m2 Final     Comment:     Non  GFR Calc   03/17/2016 72 >60 mL/min/1.7m2 Final     Comment:     Non  GFR Calc   02/19/2016 79 >60 mL/min/1.7m2 Final     Comment:     Non  GFR Calc     GFR Estimate If Black   Date Value Ref Range Status   05/26/2017 >90   GFR Calc   >60 mL/min/1.7m2 Final   03/17/2016 87 >60 mL/min/1.7m2 Final     Comment:      GFR Calc   02/19/2016 >90   GFR Calc   >60 mL/min/1.7m2 Final     Lab Results   Component Value Date    CHOL 157 11/07/2016     Lab Results   Component Value Date    HDL 44 11/07/2016     Lab Results   Component Value Date    LDL 95 11/07/2016     Lab Results   Component Value Date    TRIG 92 11/07/2016     Lab Results   Component Value Date    CHOLHDLRATIO 3.2 11/04/2015     Lab Results   Component Value Date    AST 26 05/26/2017     Lab Results   Component Value Date    ALT 25 05/26/2017     Lab Results   Component Value Date    A1C 6.6 08/21/2017    A1C 7.0  05/26/2017    A1C 8.1 02/13/2017    A1C 7.7 10/14/2016    A1C 8.0 07/12/2016     Potassium   Date Value Ref Range Status   05/26/2017 4.2 3.4 - 5.3 mmol/L Final

## 2017-10-03 RX ORDER — INSULIN DETEMIR 100 [IU]/ML
INJECTION, SOLUTION SUBCUTANEOUS
Qty: 15 ML | Refills: 3 | Status: SHIPPED | OUTPATIENT
Start: 2017-10-03 | End: 2018-05-11

## 2017-10-03 RX ORDER — CITALOPRAM HYDROBROMIDE 40 MG/1
TABLET ORAL
Qty: 30 TABLET | Refills: 2 | OUTPATIENT
Start: 2017-10-03

## 2017-10-03 NOTE — TELEPHONE ENCOUNTER
celexa was stopped at last visit, prescription denied.    Levemir Prescription approved per [facility] Refill Protocol.    Pati Malaev RN

## 2017-10-04 ASSESSMENT — PATIENT HEALTH QUESTIONNAIRE - PHQ9: SUM OF ALL RESPONSES TO PHQ QUESTIONS 1-9: 9

## 2017-10-04 NOTE — TELEPHONE ENCOUNTER
Spoke to patient and had patient completed PHQ-9. Due to protocol MA set up appointment with provider 10/30/2017. If appropriate, please refill medication.  Indu DORADO CMA ([facility])

## 2017-10-04 NOTE — TELEPHONE ENCOUNTER
PHQ-9 SCORE 5/26/2017 8/21/2017 10/4/2017   Total Score 9 12 9     Routing refill request to provider for review/approval because:  PHQ-9 is greater than 4. Patient is scheduled for follow up appointment 10/30/2017, routing to provider to determine if will authorize refill to cover the gap.   Sophia Rader RN ............   10/4/2017...4:22 PM

## 2017-10-04 NOTE — TELEPHONE ENCOUNTER
Patient's last PHQ-9 greater than 5 (score was 12) at LOV 8/21/2017. Please call patient and do a PHQ-9 over the phone.   Sophia Rader RN ............   10/4/2017...2:08 PM

## 2017-10-05 RX ORDER — FLUOXETINE 40 MG/1
CAPSULE ORAL
Qty: 30 CAPSULE | Refills: 1 | Status: SHIPPED | OUTPATIENT
Start: 2017-10-05 | End: 2017-10-30

## 2017-10-17 NOTE — PROGRESS NOTES
"  SUBJECTIVE:   Zaira Villatoro is a 47 year old female who presents to clinic today for the following health issues:      Depression Followup    Status since last visit: Stable     See PHQ-9 for current symptoms.  Other associated symptoms: None    Complicating factors:   Significant life event:  Yes-  Just have the sleep study on 10/23/2017   Current substance abuse:  None  Anxiety or Panic symptoms:  No    PHQ-9 Score and MyChart F/U Questions 8/21/2017 10/4/2017 10/30/2017   Total Score 12 9 -   Q9: Suicide Ideation Not at all Not at all Not at all       PHQ-9  English  PHQ-9   Any Language  Suicide Assessment Five-step Evaluation and Treatment (SAFE-T)      Amount of exercise or physical activity: 4 days/week of walking.    Problems taking medications regularly: No    Medication side effects: none    Diet: Low carbohydrate,high protein       Sleep Apnea:10/24/17 appointment with Dr. Palencia --Sleep medicine  She is here to follow up about appointment with sleep medicine. Has severe Obstructive sleep apnea. Planning sleep study with full night PAP titration with TCM  Finds results and surgery option disturbing. Had a hard time keeping CPAP machine on last time.    Obesity: Feels defeated about not losing as much weight as she would like.  Not doing anything with diet or exercise, \"I don't feel fat, but everyone tells me I am, and need to do something about it.\" has been recommended to have bariatric surgery in the past-- does not want this. Not particularly interested in diet/exercise.     Ulcerative colitis: concerned that she is having side effects from humira.    Problem list and histories reviewed & adjusted, as indicated.  Additional history: as documented    Patient Active Problem List   Diagnosis     IBS (irritable bowel syndrome)     Migraine     Ulcerative colitis (H)     Fatty liver     CARDIOVASCULAR SCREENING; LDL GOAL LESS THAN 100     Hypertension goal BP (blood pressure) < 140/90     Type 2 " diabetes mellitus without complication (H)     Morbid obesity due to excess calories (H)     Moderate single current episode of major depressive disorder (H)     Incisional hernia, without obstruction or gangrene     Low back pain     MAHAD (obstructive sleep apnea)- severe (AHI 80)     Past Surgical History:   Procedure Laterality Date     APPENDECTOMY  04/2014     ARTHROSCOPY KNEE RT/LT  2004    RT      BIOPSY  2011 many 2011 and on     COLONOSCOPY  02/2012    lt sided colitis     COLONOSCOPY  1/9/2014     CRYOTHERAPY, CERVICAL  1988     EXPLORATORY LAPAROTOMY, PARTIAL LEFT ROLANDA COLLECTOMY WITH HARTMANS PROCEDURE, COLOSTOMY  8/2013    lt rolanda colectomy, bowel perforation, colostomy, Karen's pouche     GI SURGERY  2013    abrupted  bowl     HC TOOTH EXTRACTION W/FORCEP  6/2003    Abscess Tooth / Hospitalized     HERNIA REPAIR  04/2014    found at time of takedown     SINUS SURGERY  2/11/03    LT sinus cyst removal      TAKEDOWN COLOSTOMY  04/2014       Social History   Substance Use Topics     Smoking status: Former Smoker     Packs/day: 1.00     Years: 15.00     Types: Cigarettes     Start date: 1/1/1985     Quit date: 7/1/1998     Smokeless tobacco: Never Used      Comment: soke free household.     Alcohol use No      Comment: occ     Family History   Problem Relation Age of Onset     DIABETES Mother      Allergies Mother      Asthma Mother      Arthritis Mother      HEART DISEASE Mother      heart murmur     Depression Mother      Obesity Mother      Eye Disorder Father      DIABETES Father      CEREBROVASCULAR DISEASE Father      HEART DISEASE Father      Hypertension Father      DIABETES Maternal Grandmother      CEREBROVASCULAR DISEASE Maternal Grandfather      Unknown/Adopted Paternal Grandmother      HEART DISEASE Paternal Grandfather      left ventrical failure     CEREBROVASCULAR DISEASE Paternal Grandfather      Gynecology Sister      endometriosis     Depression Sister      Asthma Daughter       Breast Cancer Maternal Aunt      Thyroid Disease Maternal Aunt      Breast Cancer Other      fathers sister     Anesthesia Reaction Other      Thyroid Disease Other      OSTEOPOROSIS Other      Asthma Daughter      Breast Cancer Other      mothers sister     Glaucoma No family hx of      Macular Degeneration No family hx of          Current Outpatient Prescriptions   Medication Sig Dispense Refill     atenolol (TENORMIN) 25 MG tablet Take 1 tablet (25 mg) by mouth 2 times daily 180 tablet 3     FLUoxetine (PROZAC) 40 MG capsule TAKE 1 CAPSULE BY MOUTH DAILY 90 capsule 1     zolpidem (AMBIEN) 5 MG tablet Take tablet by mouth 15 minutes prior to sleep, for Sleep Study 1 tablet 0     LEVEMIR FLEXTOUCH 100 UNIT/ML injection INJECT 52 UNITS SUBCUTANEOUSLY BEFORE BED 15 mL 3     ACCU-CHEK SHARMILA PLUS test strip TEST 4 TIMES DAILY AND AS NEEDED 400 strip 1     losartan (COZAAR) 25 MG tablet TAKE ONE-HALF TABLET BY MOUTH DAILY 15 tablet 5     blood glucose monitoring (ACCU-CHEK FASTCLIX) lancets 1 each 4 times daily Use to test blood sugar 4 times daily or as directed. 3 Box 1     liraglutide (VICTOZA PEN) 18 MG/3ML soln INJECT 1.8 MG SUB-Q ONCE DAILY 45 mL 1     insulin pen needle 31G X 5 MM Use 1 pen needles twice daily or as directed. 200 each 3     HUMIRA PEN 40 MG/0.8ML pen kit Inject as directed every 14 days  2     diphenhydrAMINE (BENADRYL ALLERGY) 25 MG tablet as needed Reported on 4/12/2017       order for DME Glucometer, brand as covered by insurance. 1 each 0     order for DME Test strips for pt's glucometer, brand as covered by insurance. Test four times daily and prn. 400 each 4     oxyCODONE-acetaminophen (PERCOCET) 5-325 MG per tablet   0     fluticasone (FLOVENT HFA) 110 MCG/ACT inhaler Spray 2 puffs in nostril 2 times daily 36 Inhaler 1     eletriptan (RELPAX) 20 MG tablet take 1-2 tablets by mouth at onset of headache for migraine. may repeat dose in 2 hours. do not exceed 80mg in 24 hours. 12 tablet 1      cholecalciferol 2000 UNITS tablet Take 1 tablet by mouth daily  30 tablet      mesalamine (LIALDA) 1.2 G EC tablet Take 1,200 mg by mouth 2 tablets daily       aspirin 81 MG tablet Take by mouth daily 30 tablet 3     STATIN NOT PRESCRIBED, INTENTIONAL, 1 each continuous prn Statin not prescribed intentionally due to Refusal by patient and Other:LDL is below 100. 0 each 0     TYLENOL CAPS 500 MG OR 1 CAPSULE EVERY 4 HOURS AS NEEDED       DRAMAMINE OR as needed       [DISCONTINUED] FLUoxetine (PROZAC) 40 MG capsule TAKE 1 CAPSULE BY MOUTH DAILY 30 capsule 1     [DISCONTINUED] atenolol (TENORMIN) 25 MG tablet Take 1 tablet (25 mg) by mouth 2 times daily 180 tablet 3     Recent Labs   Lab Test  08/21/17   1128  05/26/17   0922  02/13/17   1020  11/07/16   1124   03/17/16   0947   11/04/15   1138   12/10/14   1034  08/04/13   A1C  6.6*  7.0*  8.1*   --    < >   --    < >  6.6*   < >  6.9*   < >  8.3*   LDL   --    --    --   95   --    --    --   82   --   89   < >   --    HDL   --    --    --   44*   --    --    --   42*   --   51   < >   --    TRIG   --    --    --   92   --    --    --   61   --   68   < >   --    ALT   --   25   --    --    --   22   --    --    --    --    --   8  8   CR   --   0.76   --    --    --   0.86   < >   --    < >   --    < >  0.73   GFRESTIMATED   --   82   --    --    --   72   < >   --    < >   --    < >   --    GFRESTBLACK   --   >90   GFR Calc     --    --    --   87   < >   --    < >   --    < >   --    POTASSIUM   --   4.2   --    --    --   4.3   < >   --    < >   --    < >  4.2   TSH   --    --   2.05   --    --    --    --    --    --   1.51   --    --     < > = values in this interval not displayed.      BP Readings from Last 3 Encounters:   10/30/17 120/76   10/24/17 122/77   09/26/17 118/83    Wt Readings from Last 3 Encounters:   10/30/17 237 lb (107.5 kg)   10/24/17 238 lb (108 kg)   09/26/17 238 lb (108 kg)         Reviewed and updated as needed this visit  "by clinical staff  Tobacco  Allergies  Meds  Med Hx  Surg Hx  Fam Hx  Soc Hx      Reviewed and updated as needed this visit by Provider       ROS:  Constitutional, HEENT, cardiovascular, pulmonary, gi and gu systems are negative, except as otherwise noted.  Constitutional, HEENT, cardiovascular, pulmonary, GI, , musculoskeletal, neuro, skin, endocrine and psych systems are negative, except as otherwise noted.    This document serves as a record of the services and decisions personally performed and made by Indu Ramos MD. It was created on her behalf by Mayo Calderon, a trained medical scribe. The creation of this document is based the provider's statements to the medical scribe.    Mayo Calderon October 30, 2017 1:46 PM      OBJECTIVE:   /76  Pulse 80  Temp 96.8  F (36  C) (Oral)  Ht 5' 4\" (1.626 m)  Wt 237 lb (107.5 kg)  SpO2 98%  BMI 40.68 kg/m2  Body mass index is 40.68 kg/(m^2).  GENERAL: alert, no distress and obese  PSYCH: mentation appears normal, affect flat, judgement and insight intact and appearance well groomed    Diagnostic Test Results:  none     ASSESSMENT/PLAN:     BMI:   Estimated body mass index is 40.68 kg/(m^2) as calculated from the following:    Height as of this encounter: 5' 4\" (1.626 m).    Weight as of this encounter: 237 lb (107.5 kg).   Weight management plan: Discussed healthy diet and exercise guidelines and patient will follow up in 6 months in clinic to re-evaluate.        ICD-10-CM    1. MAHAD (obstructive sleep apnea) G47.33    2. Morbid obesity due to excess calories (H) E66.01    3. Moderate single current episode of major depressive disorder (H) F32.1 FLUoxetine (PROZAC) 40 MG capsule   4. Hypertension goal BP (blood pressure) < 140/90 I10 atenolol (TENORMIN) 25 MG tablet   5. Need for prophylactic vaccination and inoculation against influenza Z23        Mahad: discussed her sleep medicine findings with her, and the fact that she is quite likely to tolerate cpap-- " "she should keep an open mind. She will contact dr. Palencia to see if she can get in sooner than 1 month from now for her sleep study and cpap titration  Obesity: discussed that her obesity is the cause of many of her medical issues. Reviewed again why we call it morbid obesity (\"it's the kind that can kill you.\"). And that treatment may need to be multidisciplinary with nutrition, PT, supportive exercise, bariatric specialist including possible surgery. She is not interested today.  MDD; likely worse due to MAHAD untreated and has a general negative outlook that \"bad things happen\" to her specifically. Talked about the potential benefits of COGNITIVE BEHAVIORAL THERAPY, again recommended therapy. She declines  Hypertension: controlled. Continue current medication  Flu vaccine today  Asked her to contact her GI specialist re: possible side effects from Humira.  Follow up in 2 months for diabetes visit.     Patient Instructions:  See a therapist to get through evaluation and sleep apnea.     Continue working with Dr. Palencia to find methods that will improve your sleeping.     Tell yourself positive messages by believing the treatment will work.     I will get in contact with Dr. Hull about your response to Humira.     Follow up with me in December to do lab work.       The information in this document, created by the medical scribe for me, accurately reflects the services I personally performed and the decisions made by me. I have reviewed and approved this document for accuracy prior to leaving the patient care area.    Indu Ramos MD  [facility]  "

## 2017-10-23 ENCOUNTER — OFFICE VISIT (OUTPATIENT)
Dept: SLEEP MEDICINE | Facility: CLINIC | Age: 47
End: 2017-10-23
Payer: COMMERCIAL

## 2017-10-23 DIAGNOSIS — G47.33 OSA (OBSTRUCTIVE SLEEP APNEA): ICD-10-CM

## 2017-10-23 DIAGNOSIS — R06.89 DYSPNEA AND RESPIRATORY ABNORMALITY: ICD-10-CM

## 2017-10-23 DIAGNOSIS — E66.01 MORBID OBESITY DUE TO EXCESS CALORIES (H): ICD-10-CM

## 2017-10-23 DIAGNOSIS — G47.19 EXCESSIVE DAYTIME SLEEPINESS: ICD-10-CM

## 2017-10-23 DIAGNOSIS — G47.10 ORGANIC HYPERSOMNIA: ICD-10-CM

## 2017-10-23 DIAGNOSIS — R06.00 DYSPNEA AND RESPIRATORY ABNORMALITY: ICD-10-CM

## 2017-10-23 DIAGNOSIS — G47.9 DISTURBANCE IN SLEEP BEHAVIOR: ICD-10-CM

## 2017-10-23 NOTE — PROGRESS NOTES
Pt is completing a home sleep test for concerns primarily related to snoring and suspected MAHAD. She was instructed on how to put on the Noxturnal T3 device and associated equipment before going to bed and given the opportunity to practice putting it on before leaving the sleep center. She was reminded to bring the home sleep test kit back to the center  tomorrow for download and reporting.

## 2017-10-23 NOTE — MR AVS SNAPSHOT
After Visit Summary   10/23/2017    Zaira Villatoro    MRN: [id number]           Patient Information     Date Of Birth          1970        Visit Information        Provider Department      10/23/2017 11:30 AM [facility] BED 5 [facility]        Today's Diagnoses     MAHAD (obstructive sleep apnea)        Excessive daytime sleepiness        Organic hypersomnia        Disturbance in sleep behavior        Dyspnea and respiratory abnormality           Follow-ups after your visit        Your next 10 appointments already scheduled     Oct 24, 2017 10:00 AM CDT   HST Drop Off with BK SC DME   [facility] ([facility]    [address]  [place] [address]   [phone]            Oct 24, 2017 10:40 AM CDT   Return Sleep Patient with Ramana Palencia MD   [facility] ([facility])    [address]  [place] [address]   [phone]            Oct 30, 2017 11:15 AM CDT   SHORT with Indu Ramos MD   [facility] ([address] [address] [phone]              Who to contact     If you have questions or need follow up information about today's clinic visit or your schedule please contact [facility] directly at [phone].  Normal or non-critical lab and imaging results will be communicated to you by MyChart, letter or phone within 4 business days after the clinic has received the results. If you do not hear from us within 7 days, please contact the clinic through MyChart or phone. If you have a critical or abnormal lab result, we will notify you by phone as soon as possible.  Submit refill requests through [employer] or call your pharmacy and they will forward the refill request to us. Please allow 3 business days for your refill to be completed.          Additional Information  About Your Visit        [employer] Information     [employer] gives you secure access to your electronic health record. If you see a primary care provider, you can also send messages to your care team and make appointments. If you have questions, please call your primary care clinic.  If you do not have a primary care provider, please call [phone] and they will assist you.        Care EveryWhere ID     This is your Care EveryWhere ID. This could be used by other organizations to access your [facility] medical records  [id number]         Blood Pressure from Last 3 Encounters:   09/26/17 118/83   08/21/17 108/70   05/26/17 126/78    Weight from Last 3 Encounters:   09/26/17 108 kg (238 lb)   08/21/17 108.9 kg (240 lb)   05/26/17 108.9 kg (240 lb)              We Performed the Following     HST-Home Sleep Apnea Test        Primary Care Provider Office Phone # Fax #    Indu Ramos [phone]       [facility] [address]        Equal Access to Services     [facility]: Hadii aad ku hadasho Soomaali, waaxda luqadaha, qaybta kaalmada adeegyada, waxay jack anaya . So [facility] [phone].    ATENCIÓN: Si habla español, tiene a matt disposición servicios gratuitos de asistencia lingüística. Ll[facility] [phone].    We comply with applicable federal civil rights laws and [place] laws. We do not discriminate on the basis of race, color, national origin, age, disability, sex, sexual orientation, or gender identity.            Thank you!     Thank you for choosing [facility]  for your care. Our goal is always to provide you with excellent care. Hearing back from our patients is one way we can continue to improve our services. Please take a few minutes to complete the written survey that you may receive in the mail after your visit with us. Thank you!             Your Updated Medication List - Protect others around you: Learn how to safely use, store  and throw away your medicines at www.disposemymeds.org.          This list is accurate as of: 10/23/17 11:33 AM.  Always use your most recent med list.                   Brand Name Dispense Instructions for use Diagnosis    ACCU-CHEK SHARMILA PLUS test strip   Generic drug:  blood glucose monitoring     400 strip    TEST 4 TIMES DAILY AND AS NEEDED    Type 2 diabetes mellitus without complication, with long-term current use of insulin (H)       aspirin 81 MG tablet     30 tablet    Take by mouth daily        atenolol 25 MG tablet    TENORMIN    180 tablet    Take 1 tablet (25 mg) by mouth 2 times daily        BENADRYL ALLERGY 25 MG tablet   Generic drug:  diphenhydrAMINE      as needed Reported on 4/12/2017        blood glucose monitoring lancets     3 Box    1 each 4 times daily Use to test blood sugar 4 times daily or as directed.    Type 2 diabetes mellitus without complication (H)       cholecalciferol 2000 UNITS tablet     30 tablet    Take 1 tablet by mouth daily        DRAMAMINE PO      as needed        eletriptan 20 MG tablet    RELPAX    12 tablet    take 1-2 tablets by mouth at onset of headache for migraine. may repeat dose in 2 hours. do not exceed 80mg in 24 hours.    Migraine, unspecified, without mention of intractable migraine without mention of status migrainosus       FLUoxetine 40 MG capsule    PROzac    30 capsule    TAKE 1 CAPSULE BY MOUTH DAILY    Moderate single current episode of major depressive disorder (H)       fluticasone 110 MCG/ACT Inhaler    FLOVENT HFA    36 Inhaler    Spray 2 puffs in nostril 2 times daily    Chronic rhinitis       HUMIRA PEN 40 MG/0.8ML pen kit   Generic drug:  adalimumab      Inject as directed every 14 days        insulin pen needle 31G X 5 MM     200 each    Use 1 pen needles twice daily or as directed.    Type 2 diabetes mellitus without complication (H)       LEVEMIR FLEXTOUCH 100 UNIT/ML injection   Generic drug:  insulin detemir     15 mL    INJECT 52 UNITS  SUBCUTANEOUSLY BEFORE BED    Type 2 diabetes mellitus without complication (H)       LIALDA 1.2 G EC tablet   Generic drug:  mesalamine      Take 1,200 mg by mouth 2 tablets daily        liraglutide 18 MG/3ML soln    VICTOZA PEN    45 mL    INJECT 1.8 MG SUB-Q ONCE DAILY    Type 2 diabetes mellitus without complication, with long-term current use of insulin (H)       losartan 25 MG tablet    COZAAR    15 tablet    TAKE ONE-HALF TABLET BY MOUTH DAILY    Diabetes type 2, uncontrolled (H), Essential hypertension with goal blood pressure less than 140/90       * order for DME     1 each    Glucometer, brand as covered by insurance.    Type 2 diabetes mellitus without complication (H)       * order for DME     400 each    Test strips for pt's glucometer, brand as covered by insurance. Test four times daily and prn.    Type 2 diabetes mellitus without complication (H)       oxyCODONE-acetaminophen 5-325 MG per tablet    PERCOCET          STATIN NOT PRESCRIBED (INTENTIONAL)     0 each    1 each continuous prn Statin not prescribed intentionally due to Refusal by patient and Other:LDL is below 100.    CARDIOVASCULAR SCREENING; LDL GOAL LESS THAN 100       TYLENOL CAPS 500 MG OR      1 CAPSULE EVERY 4 HOURS AS NEEDED        * Notice:  This list has 2 medication(s) that are the same as other medications prescribed for you. Read the directions carefully, and ask your doctor or other care provider to review them with you.

## 2017-10-24 ENCOUNTER — OFFICE VISIT (OUTPATIENT)
Dept: SLEEP MEDICINE | Facility: CLINIC | Age: 47
End: 2017-10-24
Payer: COMMERCIAL

## 2017-10-24 ENCOUNTER — DOCUMENTATION ONLY (OUTPATIENT)
Dept: SLEEP MEDICINE | Facility: CLINIC | Age: 47
End: 2017-10-24
Payer: COMMERCIAL

## 2017-10-24 VITALS
BODY MASS INDEX: 40.63 KG/M2 | OXYGEN SATURATION: 97 % | HEART RATE: 73 BPM | DIASTOLIC BLOOD PRESSURE: 77 MMHG | WEIGHT: 238 LBS | HEIGHT: 64 IN | SYSTOLIC BLOOD PRESSURE: 122 MMHG

## 2017-10-24 DIAGNOSIS — G47.33 OSA (OBSTRUCTIVE SLEEP APNEA): Primary | ICD-10-CM

## 2017-10-24 DIAGNOSIS — E66.01 MORBID OBESITY DUE TO EXCESS CALORIES (H): ICD-10-CM

## 2017-10-24 PROCEDURE — 99214 OFFICE O/P EST MOD 30 MIN: CPT | Performed by: INTERNAL MEDICINE

## 2017-10-24 PROCEDURE — G0399 HOME SLEEP TEST/TYPE 3 PORTA: HCPCS

## 2017-10-24 RX ORDER — ZOLPIDEM TARTRATE 5 MG/1
TABLET ORAL
Qty: 1 TABLET | Refills: 0 | Status: SHIPPED | OUTPATIENT
Start: 2017-10-24 | End: 2017-12-11

## 2017-10-24 NOTE — PROCEDURES
"HOME SLEEP STUDY INTERPRETATION    Patient: Zaira Villatoro  MRN: [id number]  YOB: 1970  Study Date: 10/23/2017  Referring Provider: Indu Ramos  Ordering Provider: Ramana Palencia MD     Indications for Home Study: Zaira Villatoro is a 47 year old female with with symptoms suggestive of obstructive sleep apnea.    Estimated body mass index is 40.85 kg/(m^2) as calculated from the following:    Height as of 10/24/17: 1.626 m (5' 4\").    Weight as of 10/24/17: 108 kg (238 lb).  Total score - [place]: 17 (9/26/2017 10:00 AM)  StopBang Total Score: 6 (9/26/2017 11:00 AM)    Data: A full night home sleep study was performed recording the standard physiologic parameters including body position, movement, sound, nasal pressure, thermal oral airflow, chest and abdominal movements with respiratory inductance plethysmography, and oxygen saturation by pulse oximetry. Pulse rate was estimated by oximetry recording. This study was considered adequate based on > 4 hours of quality oximetry and respiratory recording. As specified by the AASM Manual for the Scoring of Sleep and Associated events, version 2.3, Rule VIII.D 1B, 4% oxygen desaturation scoring for hypopneas is used as a standard of care on all home sleep apnea testing.    Analysis Time:  545 minutes    Respiration:   Sleep Associated Hypoxemia: sustained hypoxemia was present. Baseline oxygen saturation was 92%.  Time with saturation less than or equal to 88% was 162 minutes. The lowest oxygen saturation was 65%.   Snoring: Snoring was present.  Respiratory events: The home study revealed a presence of 458 obstructive apneas and 13 mixed and central apneas. There were 256 hypopneas resulting in a combined apnea/hypopnea index [AHI] of 80 events per hour.  AHI was 90.8 per hour supine, n/a per hour prone, 71.1 per hour on left side, and 90.7 per hour on right side.   Pattern: Respiratory pattern was somewhat periodic at times.    Position: Percent " of time spent: supine - 29%, prone - 0%, on left - 54%, on right - 16%.    Heart Rate: By pulse oximetry normal rate was noted.     Assessment:   Severe obstructive sleep apnea.  Sleep associated hypoxemia was present.  Possible periodic breathing    Recommendations:  Consider polysomnography with full night PAP titration.  Suggest optimizing sleep hygiene and avoiding sleep deprivation.  Weight management.    Diagnosis Code(s): Obstructive Sleep Apnea G47.33, Hypoxemia G47.36    Ramana Palencia MD, October 24, 2017   Diplomate, American Board of Internal Medicine, Sleep Medicine

## 2017-10-24 NOTE — PROGRESS NOTES
This HST performed using a Noxturnal T3 device which recorded snore, sound, movement activity, body position, nasal pressure, oronasal thermal airflow, pulse, oximetry and both chest and abdominal respiratory effort. HST data was confined to the time patients states they were in bed.   Patient was score using 1B rules. Patient respiratory events showed an AHI 80.0 with loud snoring. Patient SP02 below 89% was 274.2 minutes. Overall signal quality was good.    Pt will follow up with sleep provider to determine appropriate therapy.     Ordering Slime GARG

## 2017-10-24 NOTE — MR AVS SNAPSHOT
After Visit Summary   10/24/2017    Zaira Villatoro    MRN: [id number]           Patient Information     Date Of Birth          1970        Visit Information        Provider Department      10/24/2017 10:40 AM Ramana Palencia MD [facility]        Today's Diagnoses     MAHAD (obstructive sleep apnea)    -  1    Morbid obesity due to excess calories (H)          Care Instructions      Your BMI is Body mass index is 40.85 kg/(m^2).  Weight management is a personal decision.  If you are interested in exploring weight loss strategies, the following discussion covers the approaches that may be successful. Body mass index (BMI) is one way to tell whether you are at a healthy weight, overweight, or obese. It measures your weight in relation to your height.  A BMI of 18.5 to 24.9 is in the healthy range. A person with a BMI of 25 to 29.9 is considered overweight, and someone with a BMI of 30 or greater is considered obese. More than two-thirds of American adults are considered overweight or obese.  Being overweight or obese increases the risk for further weight gain. Excess weight may lead to heart disease and diabetes.  Creating and following plans for healthy eating and physical activity may help you improve your health.  Weight control is part of healthy lifestyle and includes exercise, emotional health, and healthy eating habits. Careful eating habits lifelong are the mainstay of weight control. Though there are significant health benefits from weight loss, long-term weight loss with diet alone may be very difficult to achieve- studies show long-term success with dietary management in less than 10% of people. Attaining a healthy weight may be especially difficult to achieve in those with severe obesity. In some cases, medications, devices and surgical management might be considered.  What can you do?  If you are overweight or obese and are interested in methods for weight loss, you should  discuss this with your provider.     Consider reducing daily calorie intake by 500 calories.     Keep a food journal.     Avoiding skipping meals, consider cutting portions instead.    Diet combined with exercise helps maintain muscle while optimizing fat loss. Strength training is particularly important for building and maintaining muscle mass. Exercise helps reduce stress, increase energy, and improves fitness. Increasing exercise without diet control, however, may not burn enough calories to loose weight.       Start walking three days a week 10-20 minutes at a time    Work towards walking thirty minutes five days a week     Eventually, increase the speed of your walking for 1-2 minutes at time    In addition, we recommend that you review healthy lifestyles and methods for weight loss available through the National Institutes of Health patient information sites:  http://win.niddk.nih.gov/publications/index.htm    And look into health and wellness programs that may be available through your health insurance provider, employer, local community center, or kareem club.    Weight management plan: Patient was referred to their PCP to discuss a diet and exercise plan.              Follow-ups after your visit        Follow-up notes from your care team     Return in about 2 weeks (around 11/7/2017) for results.      Your next 10 appointments already scheduled     Oct 30, 2017 11:15 AM CDT   SHORT with Indu Ramos MD   [facility] ([address] [address]   [phone]            Nov 26, 2017  8:00 PM CST   PSG Titration w/TCM with BK BED 2   [facility] [place]    [address] [address] [address]   [phone]            Dec 11, 2017 10:30 AM CST   Return Sleep Patient with Ramana Palencia MD   [facility] ([facility]    [address] [facility]  [address]  [address]  [facility] [address]   [phone]              Future tests that were ordered for you today     Open Future Orders        Priority Expected Expires Ordered    Comprehensive Sleep Study Routine  4/22/2018 10/24/2017            Who to contact     If you have questions or need follow up information about today's clinic visit or your schedule please contact [facility] directly at [phone].  Normal or non-critical lab and imaging results will be communicated to you by [employer], letter or phone within 4 business days after the clinic has received the results. If you do not hear from us within 7 days, please contact the clinic through [employer] or phone. If you have a critical or abnormal lab result, we will notify you by phone as soon as possible.  Submit refill requests through [employer] or call your pharmacy and they will forward the refill request to us. Please allow 3 business days for your refill to be completed.          Additional Information About Your Visit        Supernova Information     [employer]ova gives you secure access to your electronic health record. If you see a primary care provider, you can also send messages to your care team and make appointments. If you have questions, please call your primary care clinic.  If you do not have a primary care provider, please call [phone] and they will assist you.        Care EveryWhere ID     This is your Care EveryWhere ID. This could be used by other organizations to access your [facility] medical records  [id number]        Your Vitals Were     Pulse Height Pulse Oximetry BMI (Body Mass Index)          73 1.626 m (5' 4\") 97% 40.85 kg/m2         Blood Pressure from Last 3 Encounters:   10/24/17 122/77   09/26/17 118/83   08/21/17 108/70    Weight from Last 3 Encounters:   10/24/17 108 kg (238 lb)   09/26/17 108 kg (238 lb)   08/21/17 108.9 kg (240 lb)                 Today's Medication Changes          These changes are " accurate as of: 10/24/17 11:10 AM.  If you have any questions, ask your nurse or doctor.               Start taking these medicines.        Dose/Directions    zolpidem 5 MG tablet   Commonly known as:  AMBIEN   Used for:  MAHAD (obstructive sleep apnea)   Started by:  Ramana Palencia MD        Take tablet by mouth 15 minutes prior to sleep, for Sleep Study   Quantity:  1 tablet   Refills:  0            Where to get your medicines      Some of these will need a paper prescription and others can be bought over the counter.  Ask your nurse if you have questions.     Bring a paper prescription for each of these medications     zolpidem 5 MG tablet                Primary Care Provider Office Phone # Fax #    Indu Ramos [phone]       [address] [facility] [address]        Equal Access to Services     VICENTE RIBERA : Hadii ankit salgado hadasho Soomaali, waaxda luqadaha, qaybta kaalmada adeegyada, maira anaya . So [facility] [phone].    ATENCIÓN: Si habla español, tiene a matt disposición servicios gratuitos de asistencia lingüística. Llame al [phone].    We comply with applicable federal civil rights laws and [place] laws. We do not discriminate on the basis of race, color, national origin, age, disability, sex, sexual orientation, or gender identity.            Thank you!     Thank you for choosing [facility]  for your care. Our goal is always to provide you with excellent care. Hearing back from our patients is one way we can continue to improve our services. Please take a few minutes to complete the written survey that you may receive in the mail after your visit with us. Thank you!             Your Updated Medication List - Protect others around you: Learn how to safely use, store and throw away your medicines at www.disposemymeds.org.          This list is accurate as of: 10/24/17 11:10 AM.  Always use your most recent med list.                    Brand Name Dispense Instructions for use Diagnosis    ACCU-CHEK SHARMILA PLUS test strip   Generic drug:  blood glucose monitoring     400 strip    TEST 4 TIMES DAILY AND AS NEEDED    Type 2 diabetes mellitus without complication, with long-term current use of insulin (H)       aspirin 81 MG tablet     30 tablet    Take by mouth daily        atenolol 25 MG tablet    TENORMIN    180 tablet    Take 1 tablet (25 mg) by mouth 2 times daily        BENADRYL ALLERGY 25 MG tablet   Generic drug:  diphenhydrAMINE      as needed Reported on 4/12/2017        blood glucose monitoring lancets     3 Box    1 each 4 times daily Use to test blood sugar 4 times daily or as directed.    Type 2 diabetes mellitus without complication (H)       cholecalciferol 2000 UNITS tablet     30 tablet    Take 1 tablet by mouth daily        DRAMAMINE PO      as needed        eletriptan 20 MG tablet    RELPAX    12 tablet    take 1-2 tablets by mouth at onset of headache for migraine. may repeat dose in 2 hours. do not exceed 80mg in 24 hours.    Migraine, unspecified, without mention of intractable migraine without mention of status migrainosus       FLUoxetine 40 MG capsule    PROzac    30 capsule    TAKE 1 CAPSULE BY MOUTH DAILY    Moderate single current episode of major depressive disorder (H)       fluticasone 110 MCG/ACT Inhaler    FLOVENT HFA    36 Inhaler    Spray 2 puffs in nostril 2 times daily    Chronic rhinitis       HUMIRA PEN 40 MG/0.8ML pen kit   Generic drug:  adalimumab      Inject as directed every 14 days        insulin pen needle 31G X 5 MM     200 each    Use 1 pen needles twice daily or as directed.    Type 2 diabetes mellitus without complication (H)       LEVEMIR FLEXTOUCH 100 UNIT/ML injection   Generic drug:  insulin detemir     15 mL    INJECT 52 UNITS SUBCUTANEOUSLY BEFORE BED    Type 2 diabetes mellitus without complication (H)       LIALDA 1.2 G EC tablet   Generic drug:  mesalamine      Take 1,200 mg by mouth 2  tablets daily        liraglutide 18 MG/3ML soln    VICTOZA PEN    45 mL    INJECT 1.8 MG SUB-Q ONCE DAILY    Type 2 diabetes mellitus without complication, with long-term current use of insulin (H)       losartan 25 MG tablet    COZAAR    15 tablet    TAKE ONE-HALF TABLET BY MOUTH DAILY    Diabetes type 2, uncontrolled (H), Essential hypertension with goal blood pressure less than 140/90       * order for DME     1 each    Glucometer, brand as covered by insurance.    Type 2 diabetes mellitus without complication (H)       * order for DME     400 each    Test strips for pt's glucometer, brand as covered by insurance. Test four times daily and prn.    Type 2 diabetes mellitus without complication (H)       oxyCODONE-acetaminophen 5-325 MG per tablet    PERCOCET          STATIN NOT PRESCRIBED (INTENTIONAL)     0 each    1 each continuous prn Statin not prescribed intentionally due to Refusal by patient and Other:LDL is below 100.    CARDIOVASCULAR SCREENING; LDL GOAL LESS THAN 100       TYLENOL CAPS 500 MG OR      1 CAPSULE EVERY 4 HOURS AS NEEDED        zolpidem 5 MG tablet    AMBIEN    1 tablet    Take tablet by mouth 15 minutes prior to sleep, for Sleep Study    MAHAD (obstructive sleep apnea)       * Notice:  This list has 2 medication(s) that are the same as other medications prescribed for you. Read the directions carefully, and ask your doctor or other care provider to review them with you.

## 2017-10-24 NOTE — NURSING NOTE
"Chief Complaint   Patient presents with     Study Results     HSt results       Initial There were no vitals taken for this visit. Estimated body mass index is 40.85 kg/(m^2) as calculated from the following:    Height as of 9/26/17: 1.626 m (5' 4\").    Weight as of 9/26/17: 108 kg (238 lb).  Medication Reconciliation: complete    "

## 2017-10-24 NOTE — PROGRESS NOTES
"Home Sleep Apnea Testing Results Visit:    Chief Complaint   Patient presents with     Study Results     HSt results       Zaira Villatoro is a 47 year old female who returns to [facility] after having had Home Sleep Apnea Testing.  She presented with history of obstructive sleep apnea of unknown severity by sleep study >15 years ago, 20-40# weight gain since then.  She previously did not tolerate CPAP because of noise, comfort issues and taking mask ff because she 'couldn;t breathe'.  Current symptoms of excessive daytime sleepiness (ESS17), snoring, witnessed apneas, frequent awakenings/nocturia, morning headaches, obesity, large neck, narrowed oropharynx. Comorbid hypertension, diabetes mellitus. 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     Estimated body mass index is 40.85 kg/(m^2) as calculated from the following:    Height as of this encounter: 1.626 m (5' 4\").    Weight as of this encounter: 108 kg (238 lb).     Total score - [place]: 17 (9/26/2017 10:00 AM)  StopBang Total Score: 6 (9/26/2017 11:00 AM)    Home Sleep Apnea Testing - 10/23/17: 238 lbs 0 oz: AHI 80/hr. Supine AHI 91/hr.   Oxygen Ivet of 92%.  Baseline 92%.  Sp02 =< 88% for 30% of study (164 minutes)   She slept on her back (29%), prone (0%), left (54) and right (16%) sides.   Analysis time: 545 minutes.     Signal quality of Oxymeter 100% Good  Nasal Cannula 100% Good  RIP belts 100% Good.     Zaira Villatoro reports that she slept Fair .     These findings were reviewed with patient.     Recent Labs   Lab Test  05/26/17   0922  03/17/16   0947   NA  140  139   POTASSIUM  4.2  4.3   CHLORIDE  107  105   CO2  23  29   ANIONGAP  10  5   GLC  100*  166*   BUN  18  10   CR  0.76  0.86   SAMANTA  8.3*  8.8         Past medical/surgical history, family history, social history, medications and allergies were reviewed.      /77  Pulse 73  Ht 1.626 m (5' 4\")  Wt 108 kg (238 lb)  SpO2 97%  BMI 40.85 " kg/m2    Impression/Plan:  Severe Obstructive Sleep Apnea.   Very severe sleep associated hypoxemia was present.  She previously did not tolerate CPAP, had noise, comfort issues and taking mask ff because she 'couldn't breathe'.      Home Sleep Apnea Testing was reviewed in detail today with Zaira and a copy given to her for her records.     Treatment options discussed today including mandibular advancement device, polysomnography with full night PAP titration with TCM (recommended) or surgical options.     Elected treatment with polysomnography with full night PAP titration and TCM, may need bipap. Ambien if needed.     Recommend follow-up wendi discuss tolerance, etc    25 minutes spent with patient with >50% spent in counseling and coordination of care.      CC:  Indu Ramos

## 2017-10-30 ENCOUNTER — OFFICE VISIT (OUTPATIENT)
Dept: FAMILY MEDICINE | Facility: CLINIC | Age: 47
End: 2017-10-30
Payer: COMMERCIAL

## 2017-10-30 VITALS
OXYGEN SATURATION: 98 % | SYSTOLIC BLOOD PRESSURE: 120 MMHG | HEART RATE: 80 BPM | DIASTOLIC BLOOD PRESSURE: 76 MMHG | WEIGHT: 237 LBS | BODY MASS INDEX: 40.46 KG/M2 | TEMPERATURE: 96.8 F | HEIGHT: 64 IN

## 2017-10-30 DIAGNOSIS — Z23 NEED FOR PROPHYLACTIC VACCINATION AND INOCULATION AGAINST INFLUENZA: ICD-10-CM

## 2017-10-30 DIAGNOSIS — G47.33 OSA (OBSTRUCTIVE SLEEP APNEA): Primary | ICD-10-CM

## 2017-10-30 DIAGNOSIS — E66.01 MORBID OBESITY DUE TO EXCESS CALORIES (H): Chronic | ICD-10-CM

## 2017-10-30 DIAGNOSIS — I10 HYPERTENSION GOAL BP (BLOOD PRESSURE) < 140/90: Chronic | ICD-10-CM

## 2017-10-30 DIAGNOSIS — F32.1 MODERATE SINGLE CURRENT EPISODE OF MAJOR DEPRESSIVE DISORDER (H): ICD-10-CM

## 2017-10-30 PROCEDURE — 99214 OFFICE O/P EST MOD 30 MIN: CPT | Performed by: FAMILY MEDICINE

## 2017-10-30 RX ORDER — FLUOXETINE 40 MG/1
CAPSULE ORAL
Qty: 90 CAPSULE | Refills: 1 | Status: SHIPPED | OUTPATIENT
Start: 2017-10-30 | End: 2017-12-22

## 2017-10-30 RX ORDER — ATENOLOL 25 MG/1
25 TABLET ORAL 2 TIMES DAILY
Qty: 180 TABLET | Refills: 3 | Status: SHIPPED | OUTPATIENT
Start: 2017-10-30 | End: 2017-12-22

## 2017-10-30 ASSESSMENT — ANXIETY QUESTIONNAIRES
GAD7 TOTAL SCORE: 7
5. BEING SO RESTLESS THAT IT IS HARD TO SIT STILL: NOT AT ALL
6. BECOMING EASILY ANNOYED OR IRRITABLE: SEVERAL DAYS
1. FEELING NERVOUS, ANXIOUS, OR ON EDGE: NOT AT ALL
7. FEELING AFRAID AS IF SOMETHING AWFUL MIGHT HAPPEN: SEVERAL DAYS
2. NOT BEING ABLE TO STOP OR CONTROL WORRYING: MORE THAN HALF THE DAYS
IF YOU CHECKED OFF ANY PROBLEMS ON THIS QUESTIONNAIRE, HOW DIFFICULT HAVE THESE PROBLEMS MADE IT FOR YOU TO DO YOUR WORK, TAKE CARE OF THINGS AT HOME, OR GET ALONG WITH OTHER PEOPLE: SOMEWHAT DIFFICULT
3. WORRYING TOO MUCH ABOUT DIFFERENT THINGS: SEVERAL DAYS

## 2017-10-30 ASSESSMENT — PATIENT HEALTH QUESTIONNAIRE - PHQ9: 5. POOR APPETITE OR OVEREATING: MORE THAN HALF THE DAYS

## 2017-10-30 NOTE — NURSING NOTE
"Chief Complaint   Patient presents with     Depression     Health Maintenance     PHQ-9, DAP, Eye exam       Initial /76  Pulse 80  Temp 96.8  F (36  C) (Oral)  Ht 5' 4\" (1.626 m)  Wt 237 lb (107.5 kg)  SpO2 98%  BMI 40.68 kg/m2 Estimated body mass index is 40.68 kg/(m^2) as calculated from the following:    Height as of this encounter: 5' 4\" (1.626 m).    Weight as of this encounter: 237 lb (107.5 kg).  Medication Reconciliation: complete     An BRITTNEY Villalobos    "

## 2017-10-30 NOTE — PATIENT INSTRUCTIONS
See a therapist to get through evaluation and sleep apnea.     Continue working with Dr. Palencia to find methods that will improve your sleeping.     Tell yourself positive messages by believing the treatment will work.     I will get in contact with Dr. Hull about your response to Humira.     Follow up with me in December to do lab work.     [facility]    If you have any questions regarding to your visit please contact your care team:       Team Purple:   Clinic Hours Telephone Number   Dr. Rosaline Byrd   7am-7pm  Monday - Thursday   7am-5pm  Fridays  [phone]  (Appointment scheduling available 24/7)    Questions about your Visit?   Team Line:  [phone]   Urgent Care - [place] [facility] - 11am-9pm Monday-Friday Saturday-Sunday- 9am-5pm   [place] - 5pm-9pm Monday-Friday Saturday-Sunday- 9am-5pm  [phone] - [facility]  [phone] [facility]       What options do I have for visits at the clinic other than the traditional office visit?  To expand how we care for you, many of our providers are utilizing electronic visits (e-visits) and telephone visits, when medically appropriate, for interactions with their patients rather than a visit in the clinic.   We also offer nurse visits for many medical concerns. Just like any other service, we will bill your insurance company for this type of visit based on time spent on the phone with your provider. Not all insurance companies cover these visits. Please check with your medical insurance if this type of visit is covered. You will be responsible for any charges that are not paid by your insurance.      E-visits via [employer]:  generally incur a $35.00 fee.  Telephone visits:  Time spent on the phone: *charged based on time that is spent on the phone in increments of 10 minutes. Estimated cost:   5-10 mins $30.00   11-20 mins. $59.00   21-30 mins. $85.00     Use  MyChart (secure email communication and access to your chart) to send your primary care provider a message or make an appointment. Ask someone on your Team how to sign up for [email or web address].  For a Price Quote for your services, please call our [employer] Price Line at [phone].  As always, Thank you for trusting us with your health care needs!    Discharged By: An

## 2017-10-30 NOTE — MR AVS SNAPSHOT
After Visit Summary   10/30/2017    Zaira Villatoro    MRN: [id number]           Patient Information     Date Of Birth          1970        Visit Information        Provider Department      10/30/2017 11:15 AM Indu Ramos MD [facility]        Today's Diagnoses     Morbid obesity due to excess calories (H)    -  1    Screening for diabetic retinopathy        Need for prophylactic vaccination and inoculation against influenza        Moderate single current episode of major depressive disorder (H)        Hypertension goal BP (blood pressure) < 140/90          Care Instructions    See a therapist to get through evaluation and sleep apnea.     Continue working with Dr. Palencia to find methods that will improve your sleeping.     Tell yourself positive messages by believing the treatment will work.     I will get in contact with Dr. Hull about your response to Humira.     Follow up with me in December to do lab work.     [facility]    If you have any questions regarding to your visit please contact your care team:       Team Purple:   Clinic Hours Telephone Number   Dr. Rosaline Byrd   7am-7pm  Monday - Thursday   7am-5pm  Fridays  [phone]  (Appointment scheduling available 24/7)    Questions about your Visit?   Team Line:  [phone]   Urgent [facility] - [place] [facility] - 11am-9pm Monday-Friday Saturday-Sunday- 9am-5pm   [place] - 5pm-9pm Monday-Friday Saturday-Sunday- 9am-5pm  [phone] - [place]   [phone] - [place]       What options do I have for visits at the clinic other than the traditional office visit?  To expand how we care for you, many of our providers are utilizing electronic visits (e-visits) and telephone visits, when medically appropriate, for interactions with their patients rather than a visit in the clinic.   We also offer nurse visits for many  medical concerns. Just like any other service, we will bill your insurance company for this type of visit based on time spent on the phone with your provider. Not all insurance companies cover these visits. Please check with your medical insurance if this type of visit is covered. You will be responsible for any charges that are not paid by your insurance.      E-visits via Tracabhart:  generally incur a $35.00 fee.  Telephone visits:  Time spent on the phone: *charged based on time that is spent on the phone in increments of 10 minutes. Estimated cost:   5-10 mins $30.00   11-20 mins. $59.00   21-30 mins. $85.00     Use [employer] (secure email communication and access to your chart) to send your primary care provider a message or make an appointment. Ask someone on your Team how to sign up for [employer].  For a Price Quote for your services, please call our Inforce[employer] Line at [phone].  As always, Thank you for trusting us with your health care needs!    Discharged By: An                Follow-ups after your visit        Your next 10 appointments already scheduled     Nov 26, 2017  8:00 PM CST   PSG Titration w/TCM with BK BED 2   [facility] ([facility]    [address] [place] [id number] [phone]            Dec 11, 2017 10:30 AM CST   Return Sleep Patient with Ramana Palencia MD   [facility] ([facility]    [address] [address] [id number] [phone]              Who to contact     If you have questions or need follow up information about today's clinic visit or your schedule please contact [facility] [place] directly at [phone].  Normal or non-critical lab and imaging results will be communicated to you by MyChart, letter or phone within 4 business days after the clinic has received the results. If you do not hear from us within 7 days, please  "contact the clinic through [employer] or phone. If you have a critical or abnormal lab result, we will notify you by phone as soon as possible.  Submit refill requests through [employer] or call your pharmacy and they will forward the refill request to us. Please allow 3 business days for your refill to be completed.          Additional Information About Your Visit        [employer] Information     [employer] gives you secure access to your electronic health record. If you see a primary care provider, you can also send messages to your care team and make appointments. If you have questions, please call your primary care clinic.  If you do not have a primary care provider, please call [phone] and they will assist you.        Care EveryWhere ID     This is your Care EveryWhere ID. This could be used by other organizations to access your [place] medical records  [id number]        Your Vitals Were     Pulse Temperature Height Pulse Oximetry BMI (Body Mass Index)       80 96.8  F (36  C) (Oral) 5' 4\" (1.626 m) 98% 40.68 kg/m2        Blood Pressure from Last 3 Encounters:   10/30/17 120/76   10/24/17 122/77   09/26/17 118/83    Weight from Last 3 Encounters:   10/30/17 237 lb (107.5 kg)   10/24/17 238 lb (108 kg)   09/26/17 238 lb (108 kg)              Today, you had the following     No orders found for display         Today's Medication Changes          These changes are accurate as of: 10/30/17 11:46 AM.  If you have any questions, ask your nurse or doctor.               These medicines have changed or have updated prescriptions.        Dose/Directions    FLUoxetine 40 MG capsule   Commonly known as:  PROzac   This may have changed:  See the new instructions.   Used for:  Moderate single current episode of major depressive disorder (H)   Changed by:  Indu Ramos MD        TAKE 1 CAPSULE BY MOUTH DAILY   Quantity:  90 capsule   Refills:  1            Where to get your medicines      These medications were sent to [facility] " [id number] [facility] - [place], [place] - [address]  [address], [place] [address]     Phone:  [phone]     atenolol 25 MG tablet    FLUoxetine 40 MG capsule                Primary Care Provider Office Phone # Fax #    Indu Ramso [phone] [address]  [place] [address]        Equal Access to Services     [facility]: Hadii aad ku hadasho Soomaali, waaxda luqadaha, qaybta kaalmada adeegyada, waxay idiin hayaan adeeg kharash la'aan . So [facility] [phone].    ATENCIÓN: Si habla español, tiene a matt disposición servicios gratuitos de asistencia lingüística. [facility] [phone].    We comply with applicable federal civil rights laws and [place] laws. We do not discriminate on the basis of race, color, national origin, age, disability, sex, sexual orientation, or gender identity.            Thank you!     Thank you for choosing [facility]  for your care. Our goal is always to provide you with excellent care. Hearing back from our patients is one way we can continue to improve our services. Please take a few minutes to complete the written survey that you may receive in the mail after your visit with us. Thank you!             Your Updated Medication List - Protect others around you: Learn how to safely use, store and throw away your medicines at www.disposemymeds.org.          This list is accurate as of: 10/30/17 11:46 AM.  Always use your most recent med list.                   Brand Name Dispense Instructions for use Diagnosis    ACCU-CHEK SHARMILA PLUS test strip   Generic drug:  blood glucose monitoring     400 strip    TEST 4 TIMES DAILY AND AS NEEDED    Type 2 diabetes mellitus without complication, with long-term current use of insulin (H)       aspirin 81 MG tablet     30 tablet    Take by mouth daily        atenolol 25 MG tablet    TENORMIN    180 tablet    Take 1 tablet (25 mg) by mouth 2 times daily    Hypertension goal BP (blood pressure) <  140/90       BENADRYL ALLERGY 25 MG tablet   Generic drug:  diphenhydrAMINE      as needed Reported on 4/12/2017        blood glucose monitoring lancets     3 Box    1 each 4 times daily Use to test blood sugar 4 times daily or as directed.    Type 2 diabetes mellitus without complication (H)       cholecalciferol 2000 UNITS tablet     30 tablet    Take 1 tablet by mouth daily        DRAMAMINE PO      as needed        eletriptan 20 MG tablet    RELPAX    12 tablet    take 1-2 tablets by mouth at onset of headache for migraine. may repeat dose in 2 hours. do not exceed 80mg in 24 hours.    Migraine, unspecified, without mention of intractable migraine without mention of status migrainosus       FLUoxetine 40 MG capsule    PROzac    90 capsule    TAKE 1 CAPSULE BY MOUTH DAILY    Moderate single current episode of major depressive disorder (H)       fluticasone 110 MCG/ACT Inhaler    FLOVENT HFA    36 Inhaler    Spray 2 puffs in nostril 2 times daily    Chronic rhinitis       HUMIRA PEN 40 MG/0.8ML pen kit   Generic drug:  adalimumab      Inject as directed every 14 days        insulin pen needle 31G X 5 MM     200 each    Use 1 pen needles twice daily or as directed.    Type 2 diabetes mellitus without complication (H)       LEVEMIR FLEXTOUCH 100 UNIT/ML injection   Generic drug:  insulin detemir     15 mL    INJECT 52 UNITS SUBCUTANEOUSLY BEFORE BED    Type 2 diabetes mellitus without complication (H)       LIALDA 1.2 G EC tablet   Generic drug:  mesalamine      Take 1,200 mg by mouth 2 tablets daily        liraglutide 18 MG/3ML soln    VICTOZA PEN    45 mL    INJECT 1.8 MG SUB-Q ONCE DAILY    Type 2 diabetes mellitus without complication, with long-term current use of insulin (H)       losartan 25 MG tablet    COZAAR    15 tablet    TAKE ONE-HALF TABLET BY MOUTH DAILY    Diabetes type 2, uncontrolled (H), Essential hypertension with goal blood pressure less than 140/90       * order for DME     1 each    Glucometer,  brand as covered by insurance.    Type 2 diabetes mellitus without complication (H)       * order for DME     400 each    Test strips for pt's glucometer, brand as covered by insurance. Test four times daily and prn.    Type 2 diabetes mellitus without complication (H)       oxyCODONE-acetaminophen 5-325 MG per tablet    PERCOCET          STATIN NOT PRESCRIBED (INTENTIONAL)     0 each    1 each continuous prn Statin not prescribed intentionally due to Refusal by patient and Other:LDL is below 100.    CARDIOVASCULAR SCREENING; LDL GOAL LESS THAN 100       TYLENOL CAPS 500 MG OR      1 CAPSULE EVERY 4 HOURS AS NEEDED        zolpidem 5 MG tablet    AMBIEN    1 tablet    Take tablet by mouth 15 minutes prior to sleep, for Sleep Study    MAHAD (obstructive sleep apnea)       * Notice:  This list has 2 medication(s) that are the same as other medications prescribed for you. Read the directions carefully, and ask your doctor or other care provider to review them with you.

## 2017-10-31 ASSESSMENT — ANXIETY QUESTIONNAIRES: GAD7 TOTAL SCORE: 7

## 2017-11-02 DIAGNOSIS — I10 HYPERTENSION GOAL BP (BLOOD PRESSURE) < 140/90: Chronic | ICD-10-CM

## 2017-11-02 NOTE — TELEPHONE ENCOUNTER
Pharmacy closed. Will call after 9 to confirm duplicate.    atenolol (TENORMIN) 25 MG tablet 180 tablet 3 10/30/2017  No      Sig: Take 1 tablet (25 mg) by mouth 2 times daily     Class: E-Prescribe     Route: Oral     Order: [id number]     E-Prescribing Status: Receipt confirmed by pharmacy (10/30/2017 11:43 AM CDT)       Indu DORADO CMA ([facility])

## 2017-11-02 NOTE — TELEPHONE ENCOUNTER
Spoke to pharmacy and confirmed duplicate request.  Indu DORADO CMA ([facility])

## 2017-11-03 RX ORDER — ATENOLOL 25 MG/1
TABLET ORAL
Qty: 180 TABLET | Refills: 0 | OUTPATIENT
Start: 2017-11-03

## 2017-11-24 DIAGNOSIS — I10 HYPERTENSION GOAL BP (BLOOD PRESSURE) < 140/90: Chronic | ICD-10-CM

## 2017-11-26 ENCOUNTER — THERAPY VISIT (OUTPATIENT)
Dept: SLEEP MEDICINE | Facility: CLINIC | Age: 47
End: 2017-11-26
Payer: COMMERCIAL

## 2017-11-26 DIAGNOSIS — G47.33 OSA (OBSTRUCTIVE SLEEP APNEA): ICD-10-CM

## 2017-11-26 PROCEDURE — 95811 POLYSOM 6/>YRS CPAP 4/> PARM: CPT | Performed by: INTERNAL MEDICINE

## 2017-11-26 NOTE — MR AVS SNAPSHOT
After Visit Summary   11/26/2017    Zaira Villatoro    MRN: [id number]           Patient Information     Date Of Birth          1970        Visit Information        Provider Department      11/26/2017 8:00 PM BK BED 2 [facility]        Today's Diagnoses     MAHAD (obstructive sleep apnea)           Follow-ups after your visit        Your next 10 appointments already scheduled     Dec 11, 2017 10:30 AM CST   Return Sleep Patient with Ramana Palencia MD   [facility] [place]    [address] [address] [id number] [phone]            Dec 22, 2017 11:00 AM CST   Office Visit with Indu Ramos MD   [facility] ([address] [id number] [phone]           Bring a current list of meds and any records pertaining to this visit. For Physicals, please bring immunization records and any forms needing to be filled out. Please arrive 10 minutes early to complete paperwork.              Who to contact     If you have questions or need follow up information about today's clinic visit or your schedule please contact [facility] [place] directly at [phone].  Normal or non-critical lab and imaging results will be communicated to you by SpotlessCityhart, letter or phone within 4 business days after the clinic has received the results. If you do not hear from us within 7 days, please contact the clinic through [employer] or phone. If you have a critical or abnormal lab result, we will notify you by phone as soon as possible.  Submit refill requests through [employer]Lit or call your pharmacy and they will forward the refill request to us. Please allow 3 business days for your refill to be completed.          Additional Information About Your Visit        [employer] Information     [employer] gives you secure access to your electronic health record. If you see a  primary care provider, you can also send messages to your care team and make appointments. If you have questions, please call your primary care clinic.  If you do not have a primary care provider, please call [phone] and they will assist you.        Care EveryWhere ID     This is your Care EveryWhere ID. This could be used by other organizations to access your [place] medical records  [id number]         Blood Pressure from Last 3 Encounters:   10/30/17 120/76   10/24/17 122/77   09/26/17 118/83    Weight from Last 3 Encounters:   10/30/17 107.5 kg (237 lb)   10/24/17 108 kg (238 lb)   09/26/17 108 kg (238 lb)              We Performed the Following     Comprehensive Sleep Study        Primary Care Provider Office Phone # Fax #    Indu Ramos [phone] [address] [facility] [address]        Equal Access to Services     [facility] : Hadii aad ku hadasho Soomaali, waaxda luqadaha, qaybta kaalmada adeegyada, maira anaya . So [facility] [phone].    ATENCIÓN: Si habla español, tiene a matt disposición servicios gratuitos de asistencia lingüística. Llame al [phone].    We comply with applicable federal civil rights laws and [place] laws. We do not discriminate on the basis of race, color, national origin, age, disability, sex, sexual orientation, or gender identity.            Thank you!     Thank you for choosing [facility]  for your care. Our goal is always to provide you with excellent care. Hearing back from our patients is one way we can continue to improve our services. Please take a few minutes to complete the written survey that you may receive in the mail after your visit with us. Thank you!             Your Updated Medication List - Protect others around you: Learn how to safely use, store and throw away your medicines at www.disposemymeds.org.          This list is accurate as of: 11/26/17 11:59 PM.  Always use  your most recent med list.                   Brand Name Dispense Instructions for use Diagnosis    ACCU-CHEK SHARMILA PLUS test strip   Generic drug:  blood glucose monitoring     400 strip    TEST 4 TIMES DAILY AND AS NEEDED    Type 2 diabetes mellitus without complication, with long-term current use of insulin (H)       aspirin 81 MG tablet     30 tablet    Take by mouth daily        atenolol 25 MG tablet    TENORMIN    180 tablet    Take 1 tablet (25 mg) by mouth 2 times daily    Hypertension goal BP (blood pressure) < 140/90       BENADRYL ALLERGY 25 MG tablet   Generic drug:  diphenhydrAMINE      as needed Reported on 4/12/2017        blood glucose monitoring lancets     3 Box    1 each 4 times daily Use to test blood sugar 4 times daily or as directed.    Type 2 diabetes mellitus without complication (H)       cholecalciferol 2000 UNITS tablet     30 tablet    Take 1 tablet by mouth daily        DRAMAMINE PO      as needed        eletriptan 20 MG tablet    RELPAX    12 tablet    take 1-2 tablets by mouth at onset of headache for migraine. may repeat dose in 2 hours. do not exceed 80mg in 24 hours.    Migraine, unspecified, without mention of intractable migraine without mention of status migrainosus       FLUoxetine 40 MG capsule    PROzac    90 capsule    TAKE 1 CAPSULE BY MOUTH DAILY    Moderate single current episode of major depressive disorder (H)       fluticasone 110 MCG/ACT Inhaler    FLOVENT HFA    36 Inhaler    Spray 2 puffs in nostril 2 times daily    Chronic rhinitis       HUMIRA PEN 40 MG/0.8ML pen kit   Generic drug:  adalimumab      Inject as directed every 14 days        insulin pen needle 31G X 5 MM     200 each    Use 1 pen needles twice daily or as directed.    Type 2 diabetes mellitus without complication (H)       LEVEMIR FLEXTOUCH 100 UNIT/ML injection   Generic drug:  insulin detemir     15 mL    INJECT 52 UNITS SUBCUTANEOUSLY BEFORE BED    Type 2 diabetes mellitus without complication (H)        LIALDA 1.2 G EC tablet   Generic drug:  mesalamine      Take 1,200 mg by mouth 2 tablets daily        liraglutide 18 MG/3ML soln    VICTOZA PEN    45 mL    INJECT 1.8 MG SUB-Q ONCE DAILY    Type 2 diabetes mellitus without complication, with long-term current use of insulin (H)       losartan 25 MG tablet    COZAAR    15 tablet    TAKE ONE-HALF TABLET BY MOUTH DAILY    Diabetes type 2, uncontrolled (H), Essential hypertension with goal blood pressure less than 140/90       * order for DME     1 each    Glucometer, brand as covered by insurance.    Type 2 diabetes mellitus without complication (H)       * order for DME     400 each    Test strips for pt's glucometer, brand as covered by insurance. Test four times daily and prn.    Type 2 diabetes mellitus without complication (H)       oxyCODONE-acetaminophen 5-325 MG per tablet    PERCOCET          STATIN NOT PRESCRIBED (INTENTIONAL)     0 each    1 each continuous prn Statin not prescribed intentionally due to Refusal by patient and Other:LDL is below 100.    CARDIOVASCULAR SCREENING; LDL GOAL LESS THAN 100       TYLENOL CAPS 500 MG OR      1 CAPSULE EVERY 4 HOURS AS NEEDED        zolpidem 5 MG tablet    AMBIEN    1 tablet    Take tablet by mouth 15 minutes prior to sleep, for Sleep Study    MAHAD (obstructive sleep apnea)       * Notice:  This list has 2 medication(s) that are the same as other medications prescribed for you. Read the directions carefully, and ask your doctor or other care provider to review them with you.

## 2017-11-27 RX ORDER — ATENOLOL 25 MG/1
TABLET ORAL
Qty: 180 TABLET | Refills: 0 | OUTPATIENT
Start: 2017-11-27

## 2017-11-27 NOTE — TELEPHONE ENCOUNTER
Duplicate?  Phyllis Solomon RN    tablet 3 10/30/2017  No    Sig: Take 1 tablet (25 mg) by mouth 2 times daily   Class: E-Prescribe   Route: Oral   Order: [id number]   E-Prescribing Status: Receipt confirmed by pharmacy (10/30/2017 11:43 AM CDT)   Printout Tracking   External Result Report   Pharmacy   [facility] [id number] [facility] - [place], [place] - [address]

## 2017-11-27 NOTE — PROCEDURES
"SLEEP STUDY INTERPRETATION  TITRATION STUDY      Patient: Zaira Villatoro  YOB: 1970  Study Date: 11/26/2017  MRN: [id number]  Ordering Provider: Dr. Palencia    Indications for Polysomnography: The patient is a 47 y old Female who is 5' 4\" and weighs 238.0 lbs.  Her BMI is 41.1, [place] sleepiness scale is 17.0 and neck size is 16.5.  A diagnostic polysomnogram PAP titration was performed for very sleep apnea with severe sleep related hypoxemia by hospitalsT.    Polysomnogram Data:  A full night polysomnogram recorded the standard physiologic parameters including EEG, EOG, EMG, ECG, nasal and oral airflow.  Respiratory parameters of chest and abdominal movements were recorded with respiratory inductance plethysmography.  Oxygen saturation was recorded by pulse oximetry.      Treatment PSG:  Sleep Architecture:   The total recording time of the study was 448.0 minutes.  The total sleep time was 402.5 minutes.  Sleep latency was increased at 23.5 minutes with the use of a sleep aid.  REM latency was 73.5 minutes.  Arousal index was 10.9 arousals per hour.  Sleep efficiency was normal at 89.9%.  Wake after sleep onset was 3.5 minutes.   The patient spent 2.0% of total sleep time in Stage N1, 30.8% in Stage N2, 20.0% in Stage N3 and 47.2% in REM.     Respiration:     The patient was titrated at pressures ranging from 5 cmH2O up to 9 cmH2O.  The optimal pressure achieved was 9 cmH2O with a residual AHI of 4.1 events per hour and intermittent airflow limitations. Time in REM supine on final pressure was 181.5 minutes.   - This titration was optimal (residual AHI < 5 events per hour including REM-supine sleep at final pressure).     Respiratory rate and pattern - was notable for normal respiratory rate and pattern.    Sustained Sleep Associated Hypoventilation - Transcutaneous carbon dioxide monitoring was used, however significant hypoventilation was not suggested.Sleep Associated Hypoxemia - (Greater than 5 minutes " O2 sat below 89%) Baseline oxygen saturation was 93.8%. Lowest oxygen saturation was 80.2%.  Time spent less than or equal to 88% was 8.0 minutes.  Time spent less than or equal to 89% was 10.6 minutes.         Movement Activity:     Periodic Limb Activity - There were 183 PLMs during the entire study. The PLM index was 27.3 movements per hour.  The PLM Arousal Index was 0.6 per hour.    REM EMG Activity - Excessive transient / sustained muscle activity was not definitively present.    Nocturnal Behavior - Abnormal sleep related behaviors were not noted    Bruxism - None apparent.    Cardiac Summary:   The average pulse rate was 70.6 bpm.  The minimum pulse rate was 61.9 bpm while the maximum pulse rate was 86.9 bpm. The rhythm is normal sinus. Arrhythmias were not noted.    Assessment:     Optimal CPAP titration at 9 cmH20    Periodic limb movements of sleep    Recommendations:    Treatment of MAHAD with CPAP at 9 cmH2O.  Recommend clinical follow up with sleep management team, for coaching and review of effectiveness and compliance measures.    Weight management (if BMI > 30).    Pharmacologic therapy should be used for management of restless legs syndrome only if present and clinically indicated and not based on the presence of periodic limb movements alone.        _____________________________________   Ramana Palencia MD         Table of Oximetry Distribution    Range(%) Time in range (min) Time in range (%) Time in or below range (min) Time in or below range (%)   0.0 - 88.0 8.0 1.8% 8.0 1.8%   0.0 - 89.0 10.6 2.4% 10.6 2.4%

## 2017-11-27 NOTE — NURSING NOTE
Completed a all night titration PSG per provider order.    Preliminary AHI 80.  A final therapeutic PAP pressure was achieved.    Supine REM was seen on therapeutic pressure.    Patient reports feeling refreshed in AM.

## 2017-12-11 ENCOUNTER — OFFICE VISIT (OUTPATIENT)
Dept: SLEEP MEDICINE | Facility: CLINIC | Age: 47
End: 2017-12-11
Payer: COMMERCIAL

## 2017-12-11 VITALS
OXYGEN SATURATION: 98 % | WEIGHT: 237 LBS | BODY MASS INDEX: 40.46 KG/M2 | SYSTOLIC BLOOD PRESSURE: 128 MMHG | DIASTOLIC BLOOD PRESSURE: 84 MMHG | HEIGHT: 64 IN | HEART RATE: 94 BPM

## 2017-12-11 DIAGNOSIS — G47.33 OSA (OBSTRUCTIVE SLEEP APNEA): Primary | ICD-10-CM

## 2017-12-11 DIAGNOSIS — E66.01 MORBID OBESITY DUE TO EXCESS CALORIES (H): ICD-10-CM

## 2017-12-11 PROCEDURE — 99214 OFFICE O/P EST MOD 30 MIN: CPT | Performed by: INTERNAL MEDICINE

## 2017-12-11 NOTE — PATIENT INSTRUCTIONS
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
## 2017-12-11 NOTE — MR AVS SNAPSHOT
After Visit Summary   12/11/2017    Zaira Villatoro    MRN: [id number]           Patient Information     Date Of Birth          1970        Visit Information        Provider Department      12/11/2017 10:30 AM Ramana Palencia MD [facility]        Today's Diagnoses     MAHAD (obstructive sleep apnea)    -  1    Morbid obesity due to excess calories (H)          Care Instructions      Your BMI is Body mass index is 40.68 kg/(m^2).  Weight management is a personal decision.  If you are interested in exploring weight loss strategies, the following discussion covers the approaches that may be successful. Body mass index (BMI) is one way to tell whether you are at a healthy weight, overweight, or obese. It measures your weight in relation to your height.  A BMI of 18.5 to 24.9 is in the healthy range. A person with a BMI of 25 to 29.9 is considered overweight, and someone with a BMI of 30 or greater is considered obese. More than two-thirds of American adults are considered overweight or obese.  Being overweight or obese increases the risk for further weight gain. Excess weight may lead to heart disease and diabetes.  Creating and following plans for healthy eating and physical activity may help you improve your health.  Weight control is part of healthy lifestyle and includes exercise, emotional health, and healthy eating habits. Careful eating habits lifelong are the mainstay of weight control. Though there are significant health benefits from weight loss, long-term weight loss with diet alone may be very difficult to achieve- studies show long-term success with dietary management in less than 10% of people. Attaining a healthy weight may be especially difficult to achieve in those with severe obesity. In some cases, medications, devices and surgical management might be considered.  What can you do?  If you are overweight or obese and are interested in methods for weight loss, you should  discuss this with your provider.     Consider reducing daily calorie intake by 500 calories.     Keep a food journal.     Avoiding skipping meals, consider cutting portions instead.    Diet combined with exercise helps maintain muscle while optimizing fat loss. Strength training is particularly important for building and maintaining muscle mass. Exercise helps reduce stress, increase energy, and improves fitness. Increasing exercise without diet control, however, may not burn enough calories to loose weight.       Start walking three days a week 10-20 minutes at a time    Work towards walking thirty minutes five days a week     Eventually, increase the speed of your walking for 1-2 minutes at time    In addition, we recommend that you review healthy lifestyles and methods for weight loss available through the National Institutes of Health patient information sites:  http://win.niddk.nih.gov/publications/index.htm    And look into health and wellness programs that may be available through your health insurance provider, employer, local community center, or kareem club.    Weight management plan: Patient was referred to their PCP to discuss a diet and exercise plan.              Follow-ups after your visit        Follow-up notes from your care team     Return in about 2 months (around 2/11/2018).      Your next 10 appointments already scheduled     Dec 13, 2017  1:00 PM CST   DME RETURN with EMILY MARCUS   [facility] ([facility])    [address] [place] [id number] [phone]            Dec 22, 2017 11:00 AM CST   [facility] with Indu Ramos MD   [facility]    [address] [place] [id number] [phone]           Bring a current list of meds and any records pertaining to this visit. For Physicals, please bring immunization records and any forms needing to be filled out. Please  "arrive 10 minutes early to complete paperwork.            Feb 05, 2018 10:30 AM CST   Return Sleep Patient with Ramana Palencia MD   [facility]    [address] [place] [id number] [phone]              Who to contact     If you have questions or need follow up information about today's clinic visit or your schedule please contact [facility] directly at [phone].  Normal or non-critical lab and imaging results will be communicated to you by Virallyhart, letter or phone within 4 business days after the clinic has received the results. If you do not hear from us within 7 days, please contact the clinic through Echodio or phone. If you have a critical or abnormal lab result, we will notify you by phone as soon as possible.  Submit refill requests through Echodio or call your pharmacy and they will forward the refill request to us. Please allow 3 business days for your refill to be completed.          Additional Information About Your Visit        Virallyhart Information     Echodio gives you secure access to your electronic health record. If you see a primary care provider, you can also send messages to your care team and make appointments. If you have questions, please call your primary care clinic.  If you do not have a primary care provider, please call [phone] and they will assist you.        Care EveryWhere ID     This is your Care EveryWhere ID. This could be used by other organizations to access your [place] medical records  [id number]        Your Vitals Were     Pulse Height Pulse Oximetry BMI (Body Mass Index)          94 1.626 m (5' 4\") 98% 40.68 kg/m2         Blood Pressure from Last 3 Encounters:   12/11/17 128/84   10/30/17 120/76   10/24/17 122/77    Weight from Last 3 Encounters:   12/11/17 107.5 kg (237 lb)   10/30/17 107.5 kg (237 lb)   10/24/17 108 kg (238 lb)              Today, you had the " following     No orders found for display       Primary Care Provider Office Phone # Fax #    Indu Ramos [phone] [address] [facility] [address]        Equal Access to Services     VICENTE RIBERA : Hadii aad ku hadmarylino Soomaali, waaxda luqadaha, qaybta kaalmada adeegyada, waxkristina idiin hayaan steven larson laBryanzuri mcghee. So [facility] [phone].    ATENCIÓN: Si habla español, tiene a matt disposición servicios gratuitos de asistencia lingüística. Llame al [phone].    We comply with applicable federal civil rights laws and [place] laws. We do not discriminate on the basis of race, color, national origin, age, disability, sex, sexual orientation, or gender identity.            Thank you!     Thank you for choosing [facility]  for your care. Our goal is always to provide you with excellent care. Hearing back from our patients is one way we can continue to improve our services. Please take a few minutes to complete the written survey that you may receive in the mail after your visit with us. Thank you!             Your Updated Medication List - Protect others around you: Learn how to safely use, store and throw away your medicines at www.disposemymeds.org.          This list is accurate as of: 12/11/17 11:23 AM.  Always use your most recent med list.                   Brand Name Dispense Instructions for use Diagnosis    ACCU-CHEK SHARMILA PLUS test strip   Generic drug:  blood glucose monitoring     400 strip    TEST 4 TIMES DAILY AND AS NEEDED    Type 2 diabetes mellitus without complication, with long-term current use of insulin (H)       aspirin 81 MG tablet     30 tablet    Take by mouth daily        atenolol 25 MG tablet    TENORMIN    180 tablet    Take 1 tablet (25 mg) by mouth 2 times daily    Hypertension goal BP (blood pressure) < 140/90       BENADRYL ALLERGY 25 MG tablet   Generic drug:  diphenhydrAMINE      as needed Reported on 4/12/2017        blood  glucose monitoring lancets     3 Box    1 each 4 times daily Use to test blood sugar 4 times daily or as directed.    Type 2 diabetes mellitus without complication (H)       cholecalciferol 2000 UNITS tablet     30 tablet    Take 1 tablet by mouth daily        DRAMAMINE PO      as needed        eletriptan 20 MG tablet    RELPAX    12 tablet    take 1-2 tablets by mouth at onset of headache for migraine. may repeat dose in 2 hours. do not exceed 80mg in 24 hours.    Migraine, unspecified, without mention of intractable migraine without mention of status migrainosus       FLUoxetine 40 MG capsule    PROzac    90 capsule    TAKE 1 CAPSULE BY MOUTH DAILY    Moderate single current episode of major depressive disorder (H)       fluticasone 110 MCG/ACT Inhaler    FLOVENT HFA    36 Inhaler    Spray 2 puffs in nostril 2 times daily    Chronic rhinitis       HUMIRA PEN 40 MG/0.8ML pen kit   Generic drug:  adalimumab      Inject as directed every 14 days        insulin pen needle 31G X 5 MM     200 each    Use 1 pen needles twice daily or as directed.    Type 2 diabetes mellitus without complication (H)       LEVEMIR FLEXTOUCH 100 UNIT/ML injection   Generic drug:  insulin detemir     15 mL    INJECT 52 UNITS SUBCUTANEOUSLY BEFORE BED    Type 2 diabetes mellitus without complication (H)       LIALDA 1.2 G EC tablet   Generic drug:  mesalamine      Take 1,200 mg by mouth 2 tablets daily        losartan 25 MG tablet    COZAAR    15 tablet    TAKE ONE-HALF TABLET BY MOUTH DAILY    Diabetes type 2, uncontrolled (H), Essential hypertension with goal blood pressure less than 140/90       * order for DME     1 each    Glucometer, brand as covered by insurance.    Type 2 diabetes mellitus without complication (H)       * order for DME     400 each    Test strips for pt's glucometer, brand as covered by insurance. Test four times daily and prn.    Type 2 diabetes mellitus without complication (H)       oxyCODONE-acetaminophen 5-325 MG  per tablet    PERCOCET          STATIN NOT PRESCRIBED (INTENTIONAL)     0 each    1 each continuous prn Statin not prescribed intentionally due to Refusal by patient and Other:LDL is below 100.    CARDIOVASCULAR SCREENING; LDL GOAL LESS THAN 100       TYLENOL CAPS 500 MG OR      1 CAPSULE EVERY 4 HOURS AS NEEDED        VICTOZA PEN 18 MG/3ML soln   Generic drug:  liraglutide     9 mL    INJECT 1.8 MG SUB-Q ONCE DAILY    Type 2 diabetes mellitus without complication, with long-term current use of insulin (H)       * Notice:  This list has 2 medication(s) that are the same as other medications prescribed for you. Read the directions carefully, and ask your doctor or other care provider to review them with you.

## 2017-12-11 NOTE — NURSING NOTE
"Chief Complaint   Patient presents with     Study Results       Initial /84  Pulse 94  Ht 1.626 m (5' 4\")  Wt 107.5 kg (237 lb)  SpO2 98%  BMI 40.68 kg/m2 Estimated body mass index is 40.68 kg/(m^2) as calculated from the following:    Height as of this encounter: 1.626 m (5' 4\").    Weight as of this encounter: 107.5 kg (237 lb).  Medication Reconciliation: complete    "

## 2017-12-13 ENCOUNTER — DOCUMENTATION ONLY (OUTPATIENT)
Dept: SLEEP MEDICINE | Facility: CLINIC | Age: 47
End: 2017-12-13

## 2017-12-13 ENCOUNTER — DOCUMENTATION ONLY (OUTPATIENT)
Dept: SLEEP MEDICINE | Facility: CLINIC | Age: 47
End: 2017-12-13
Payer: COMMERCIAL

## 2017-12-13 DIAGNOSIS — E66.01 MORBID OBESITY DUE TO EXCESS CALORIES (H): ICD-10-CM

## 2017-12-13 DIAGNOSIS — G47.33 OSA (OBSTRUCTIVE SLEEP APNEA): Primary | ICD-10-CM

## 2017-12-13 DIAGNOSIS — G47.33 OSA (OBSTRUCTIVE SLEEP APNEA): ICD-10-CM

## 2017-12-13 NOTE — PROGRESS NOTES
SUBJECTIVE:   Zaira Villatoro is a 47 year old female who presents to clinic today for the following health issues:      Diabetes Follow-up    Patient is checking blood sugars: twice daily.  But admits she is not checking her blood sugars  Blood sugar testing frequency justification: On insulin, frequency appropriate   Results are as follows:       Varies throughout the day, depending on the day.         Diabetic concerns: None     Symptoms of hypoglycemia (low blood sugar): none     Paresthesias (numbness or burning in feet) or sores: No     Date of last diabetic eye exam: 06/08/2016  BP Readings from Last 2 Encounters:   12/22/17 104/72   12/20/17 105/80     Hemoglobin A1C (%)   Date Value   12/22/2017 6.8 (H)   08/21/2017 6.6 (H)     LDL Cholesterol Calculated (mg/dL)   Date Value   11/07/2016 95   11/04/2015 82         Amount of exercise or physical activity: 2-3 days/week for an average of 15-30 minutes    Problems taking medications regularly: No    Medication side effects: none    Diet: High protein, low carb          Arthritis in knee, seen orthopedist and had injection 2 days ago. It took time to feel better. Pain is still present in foot and lower calf. Pain in knee feels better.  Will start PT next week  DM:Has not brought in Diabetes reading today. A1c is 6.8 today. No eye check up within the last year. Sugar reading at home is often 130-140s, but does not check it often.     Lab Results   Component Value Date    A1C 6.8 12/22/2017    A1C 6.6 08/21/2017    A1C 7.0 05/26/2017    A1C 8.1 02/13/2017    A1C 7.7 10/14/2016       Wt Readings from Last 5 Encounters:   12/22/17 233 lb 8 oz (105.9 kg)   12/20/17 237 lb (107.5 kg)   12/11/17 237 lb (107.5 kg)   10/30/17 237 lb (107.5 kg)   10/24/17 238 lb (108 kg)       HTN: well controlled. /72 with pulse 74. Lost 13 lbs since march. Weighs 233 today.  Was switched to a higher dose atenolol and has to cut it. Continues atenolol and losartan.     CPAP:  does not like the machine much. Has had it for the past 4 days. States it is uncomfortable.     Ulcercative colitis: Stomach flu. Had diarrhea.  Managed by gastroenterology.  Has regular bloody stools, no GI follow-up scheduled per her    Admits to feeling overwhelmed by her health    -Uses flovent for nose in case of an allergy flare up.   -Needs refill for antidepressant --fluoxetine.           Problem list and histories reviewed & adjusted, as indicated.  Additional history: as documented    Patient Active Problem List   Diagnosis     IBS (irritable bowel syndrome)     Migraine     Ulcerative colitis (H)     Fatty liver     CARDIOVASCULAR SCREENING; LDL GOAL LESS THAN 100     Hypertension goal BP (blood pressure) < 140/90     Type 2 diabetes mellitus without complication (H)     Morbid obesity due to excess calories (H)     Moderate single current episode of major depressive disorder (H)     Incisional hernia, without obstruction or gangrene     Low back pain     MAHAD (obstructive sleep apnea)- severe (AHI 80)     Past Surgical History:   Procedure Laterality Date     APPENDECTOMY  04/2014     ARTHROSCOPY KNEE RT/LT  2004    RT      BIOPSY  2011 many 2011 and on     COLONOSCOPY  02/2012    lt sided colitis     COLONOSCOPY  1/9/2014     CRYOTHERAPY, CERVICAL  1988     EXPLORATORY LAPAROTOMY, PARTIAL LEFT ROLANDA COLLECTOMY WITH HARTMANS PROCEDURE, COLOSTOMY  8/2013    lt rolanda colectomy, bowel perforation, colostomy, Karen's pouche     GI SURGERY  2013    abrupted  bowl     HC TOOTH EXTRACTION W/FORCEP  6/2003    Abscess Tooth / Hospitalized     HERNIA REPAIR  04/2014    found at time of takedown     SINUS SURGERY  2/11/03    LT sinus cyst removal      TAKEDOWN COLOSTOMY  04/2014       Social History   Substance Use Topics     Smoking status: Former Smoker     Packs/day: 1.00     Years: 15.00     Types: Cigarettes     Start date: 1/1/1985     Quit date: 7/1/1998     Smokeless tobacco: Never Used      Comment:  soke free household.     Alcohol use No      Comment: occ     Family History   Problem Relation Age of Onset     DIABETES Mother      Allergies Mother      Asthma Mother      Arthritis Mother      HEART DISEASE Mother      heart murmur     Depression Mother      Obesity Mother      Eye Disorder Father      DIABETES Father      CEREBROVASCULAR DISEASE Father      HEART DISEASE Father      Hypertension Father      DIABETES Maternal Grandmother      CEREBROVASCULAR DISEASE Maternal Grandfather      Unknown/Adopted Paternal Grandmother      HEART DISEASE Paternal Grandfather      left ventrical failure     CEREBROVASCULAR DISEASE Paternal Grandfather      Gynecology Sister      endometriosis     Depression Sister      Asthma Daughter      Breast Cancer Maternal Aunt      Thyroid Disease Maternal Aunt      Breast Cancer Other      fathers sister     Anesthesia Reaction Other      Thyroid Disease Other      OSTEOPOROSIS Other      Asthma Daughter      Breast Cancer Other      mothers sister     Glaucoma No family hx of      Macular Degeneration No family hx of          Current Outpatient Prescriptions   Medication Sig Dispense Refill     losartan (COZAAR) 25 MG tablet TAKE 1/2 TABLET BY MOUTH DAILY 15 tablet 5     FLUoxetine (PROZAC) 40 MG capsule TAKE 1 CAPSULE BY MOUTH DAILY 90 capsule 1     atenolol (TENORMIN) 25 MG tablet Take 1 tablet (25 mg) by mouth 2 times daily 180 tablet 3     order for DME Equipment being ordered: CPAP 9 c, 1 Units 0     VICTOZA PEN 18 MG/3ML soln INJECT 1.8 MG SUB-Q ONCE DAILY 9 mL 0     LEVEMIR FLEXTOUCH 100 UNIT/ML injection INJECT 52 UNITS SUBCUTANEOUSLY BEFORE BED 15 mL 3     ACCU-CHEK SHARMILA PLUS test strip TEST 4 TIMES DAILY AND AS NEEDED 400 strip 1     blood glucose monitoring (ACCU-CHEK FASTCLIX) lancets 1 each 4 times daily Use to test blood sugar 4 times daily or as directed. 3 Box 1     insulin pen needle 31G X 5 MM Use 1 pen needles twice daily or as directed. 200 each 3     HUMIRA  PEN 40 MG/0.8ML pen kit Inject as directed every 14 days  2     diphenhydrAMINE (BENADRYL ALLERGY) 25 MG tablet as needed Reported on 4/12/2017       order for DME Glucometer, brand as covered by insurance. 1 each 0     order for DME Test strips for pt's glucometer, brand as covered by insurance. Test four times daily and prn. 400 each 4     oxyCODONE-acetaminophen (PERCOCET) 5-325 MG per tablet   0     fluticasone (FLOVENT HFA) 110 MCG/ACT inhaler Spray 2 puffs in nostril 2 times daily 36 Inhaler 1     eletriptan (RELPAX) 20 MG tablet take 1-2 tablets by mouth at onset of headache for migraine. may repeat dose in 2 hours. do not exceed 80mg in 24 hours. 12 tablet 1     cholecalciferol 2000 UNITS tablet Take 1 tablet by mouth daily  30 tablet      mesalamine (LIALDA) 1.2 G EC tablet Take 1,200 mg by mouth 2 tablets daily       aspirin 81 MG tablet Take by mouth daily 30 tablet 3     STATIN NOT PRESCRIBED, INTENTIONAL, 1 each continuous prn Statin not prescribed intentionally due to Refusal by patient and Other:LDL is below 100. 0 each 0     TYLENOL CAPS 500 MG OR 1 CAPSULE EVERY 4 HOURS AS NEEDED       DRAMAMINE OR as needed       [DISCONTINUED] atenolol (TENORMIN) 25 MG tablet Take 1 tablet (25 mg) by mouth 2 times daily 180 tablet 3     [DISCONTINUED] FLUoxetine (PROZAC) 40 MG capsule TAKE 1 CAPSULE BY MOUTH DAILY 90 capsule 1     [DISCONTINUED] losartan (COZAAR) 25 MG tablet TAKE ONE-HALF TABLET BY MOUTH DAILY 15 tablet 5     Recent Labs   Lab Test  12/22/17   1102  08/21/17   1128  05/26/17   0922  02/13/17   1020  11/07/16   1124   03/17/16   0947   11/04/15   1138   12/10/14   1034  08/04/13   A1C  6.8*  6.6*  7.0*  8.1*   --    < >   --    < >  6.6*   < >  6.9*   < >  8.3*   LDL   --    --    --    --   95   --    --    --   82   --   89   < >   --    HDL   --    --    --    --   44*   --    --    --   42*   --   51   < >   --    TRIG   --    --    --    --   92   --    --    --   61   --   68   < >   --     ALT   --    --   25   --    --    --   22   --    --    --    --    --   8  8   CR   --    --   0.76   --    --    --   0.86   < >   --    < >   --    < >  0.73   GFRESTIMATED   --    --   82   --    --    --   72   < >   --    < >   --    < >   --    GFRESTBLACK   --    --   >90   GFR Calc     --    --    --   87   < >   --    < >   --    < >   --    POTASSIUM   --    --   4.2   --    --    --   4.3   < >   --    < >   --    < >  4.2   TSH   --    --    --   2.05   --    --    --    --    --    --   1.51   --    --     < > = values in this interval not displayed.      BP Readings from Last 3 Encounters:   12/22/17 104/72   12/20/17 105/80   12/11/17 128/84    Wt Readings from Last 3 Encounters:   12/22/17 233 lb 8 oz (105.9 kg)   12/20/17 237 lb (107.5 kg)   12/11/17 237 lb (107.5 kg)         Reviewed and updated as needed this visit by clinical staff  Tobacco  Allergies  Meds  Med Hx  Surg Hx  Fam Hx  Soc Hx      Reviewed and updated as needed this visit by Provider       ROS:  Constitutional, HEENT, cardiovascular, pulmonary, GI, , musculoskeletal, neuro, skin, endocrine and psych systems are negative, except as otherwise noted.    This document serves as a record of the services and decisions personally performed and made by Indu Ramos MD. It was created on her behalf by Mayo Calderon, a trained medical scribe. The creation of this document is based the provider's statements to the medical scribe.    Mayo Calderon December 22, 2017 11:18 AM    OBJECTIVE:   /72  Pulse 74  Temp 97.5  F (36.4  C) (Oral)  Wt 233 lb 8 oz (105.9 kg)  SpO2 97%  BMI 40.08 kg/m2  Body mass index is 40.08 kg/(m^2).  GENERAL: healthy, alert and no distress, obese  HENT: ear canals and TM's normal, nose and mouth without ulcers or lesions  NECK: no adenopathy, no asymmetry, masses, or scars and thyroid normal to palpation  RESP: lungs clear to auscultation - no rales, rhonchi or wheezes  CV: regular rate  "and rhythm, normal S1 S2, no S3 or S4, no murmur, click or rub, no peripheral edema and peripheral pulses strong  ABDOMEN: soft, mild left upper quadrant tenderness, otherwise nontender, no hepatosplenomegaly, no masses and bowel sounds normal  Flat affectPsych:       Diagnostic Test Results:  Results for orders placed or performed in visit on 12/22/17 (from the past 24 hour(s))   HEMOGLOBIN A1C   Result Value Ref Range    Hemoglobin A1C 6.8 (H) 4.3 - 6.0 %       ASSESSMENT/PLAN:     BMI:   Estimated body mass index is 40.08 kg/(m^2) as calculated from the following:    Height as of 12/20/17: 5' 4\" (1.626 m).    Weight as of this encounter: 233 lb 8 oz (105.9 kg).   Weight management plan: Discussed healthy diet and exercise guidelines and patient will follow up in 6 months in clinic to re-evaluate.        ICD-10-CM    1. Type 2 diabetes mellitus without complication, with long-term current use of insulin (H) E11.9 HEMOGLOBIN A1C    Z79.4 **A1C FUTURE 3mo   2. Screening for diabetic retinopathy Z13.5 OPHTHALMOLOGY ADULT REFERRAL   3. Hypertension goal BP (blood pressure) < 140/90 I10 atenolol (TENORMIN) 25 MG tablet   4. Morbid obesity due to excess calories (H) E66.01    5. Moderate single current episode of major depressive disorder (H) F32.1 FLUoxetine (PROZAC) 40 MG capsule   6. CARDIOVASCULAR SCREENING; LDL GOAL LESS THAN 100 Z13.6 Lipid panel reflex to direct LDL Fasting       DM: A1c today is 6.8. On 08/21/17 6.6.  Good control patient states that she rarely checks her sugar readings at home but often ranges in the 140s-150s. Reffered patient to follow up with opthalmology for routine eye exam.  Continue current medication  Mammogram: will have screening.     HTN: Having difficulty getting atenolol 25 mg tabs, currently cutting in half 50 mg tabs.  Will attempt to get atenolol from [facility].  She would like to get off atenolol eventually.  Discussed with patient that  once she has her  CPAP machine " under control, she can slowly taper off atenolol by taking tabs 1x daily for 1 week.  Monitor blood pressure    Morbid obesity: Patient is pre-contemplative about making changes with her weight.  Continue to monitor    Major depressive disorder: Continue fluoxetine.  Would benefit from counseling likely but is not interested    Obstructive sleep apnea: Patient states that it is uncomfortable wearing her CPAP machine. Has been using it for the past 4 days.     IBS: advised patient to see the GI specialist at least 1-2x yearly.     Follow up with me in 6 months, and in 3 months for lab work.         Patient instructions:    Try and see if the [place] pharmacy can help you figure out your atenolol medication.     Once your CPAP is under control, then you can start taking atenolol 1x day for a week and stop. You likely do not need it.    I recommend that you see your GI doctor 1-2x yearly.     You will have your blood work test in 3 months and follow up with me in 6 months.     The information in this document, created by the medical scribe for me, accurately reflects the services I personally performed and the decisions made by me. I have reviewed and approved this document for accuracy prior to leaving the patient care area.  Indu Ramos MD  [facility]

## 2017-12-13 NOTE — PROGRESS NOTES
Patient brought in her Respironics Remstar Plus M Series machine to have the pressure changed and get new supplies. Karely Barney and I were unable to get into the clinical menu to change the pressure. Patient's machine is over 10 years old. I will have provider order a new replacement machine.

## 2017-12-18 ENCOUNTER — DOCUMENTATION ONLY (OUTPATIENT)
Dept: SLEEP MEDICINE | Facility: CLINIC | Age: 47
End: 2017-12-18
Payer: COMMERCIAL

## 2017-12-18 DIAGNOSIS — G47.33 OSA (OBSTRUCTIVE SLEEP APNEA): ICD-10-CM

## 2017-12-18 DIAGNOSIS — E66.01 MORBID OBESITY DUE TO EXCESS CALORIES (H): ICD-10-CM

## 2017-12-18 PROCEDURE — 99207 ZZC NO CHARGE LOS: CPT

## 2017-12-18 NOTE — PROGRESS NOTES
Patient was offered choice of vendor and chose [facility].  Patient Zaira Villatoro was set up at [place] on December 18, 2017. Patient received a Resmed AirSense 10 CPAP. Pressures were set at 9 cm H2O.   Patient s ramp is 5 cm H2O for Auto and FLEX/EPR is EPR, 2.  Patient received a Resmed Mask name: Airfit F20  Full Face mask Size Small, heated tubing and heated humidifier.  Patient is enrolled in the STM Program and does not need to meet compliance. Patient has a follow up on 2/5/18 with Dr. Palencia.    Gretchen Turner

## 2017-12-19 NOTE — PROGRESS NOTES
HISTORY OF PRESENT ILLNESS    Zaira Villatoro is a 47 year old female who is seen in consultation at the request of Indu Ramos for evaluation of  left knee pain and leg pain  that has been present a few  weeks.  No known injury.    Present symptoms: pain anteriorly that started a few weeks ago, and posteriorly, no swelling, no catching/popping, no locking, +giving way, pain radiates to lateral leg and up to lateral thigh, groin and hip.  Symptoms occur walking, extending knee with foot up, flexing knee, squatting, pain with stairs..  The symptoms occur frequently.  Paresthesias in lateral leg and feels like its cold.    Treatments tried to this point: NSAIDs, Tylenol.  Ice, heat.  Knee sleeve.  No help.    Orthopedic PMH: history of low back pain and DEGENERATIVE DISC DISEASE in Lspine.    History of EPIDURAL STEROID INJECTION 10-15 years ago.  Right knee arthroscopy     Other PMH:  has a past medical history of Acute diverticulitis (7/31/2013); Gestational diabetes (2000); History of seizures as a child (1981); Menorrhagia; Mild cervical dysplasia (1988); Pelvic pain; Perforation of sigmoid colon (H) (8/3/2013); PONV (postoperative nausea and vomiting); S/P left hemicolectomy (8/16/2013); Sepsis (H) (8/1/2013); and Transient atrial fibrillation or flutter (08/03/2013).     Surgical:  has a past surgical history that includes cryotherapy, cervical (1988); sinus surgery (2/11/03); arthroscopy knee rt/lt (2004); TOOTH EXTRACTION W/FORCEP (6/2003); colonoscopy (02/2012); EXPLORATORY LAPAROTOMY, PARTIAL LEFT ROLANDA COLLECTOMY WITH HARTMANS PROCEDURE, COLOSTOMY (8/2013); colonoscopy (1/9/2014); appendectomy (04/2014); hernia repair (04/2014); Takedown colostomy (04/2014); biopsy (2011); and GI surgery (2013).    Family Hx:  family history includes Allergies in her mother; Anesthesia Reaction in an other family member; Arthritis in her mother; Asthma in her daughter, daughter, and mother; Breast Cancer in her  "maternal aunt and other family members; CEREBROVASCULAR DISEASE in her father, maternal grandfather, and paternal grandfather; DIABETES in her father, maternal grandmother, and mother; Depression in her mother and sister; Eye Disorder in her father; Gynecology in her sister; HEART DISEASE in her father, mother, and paternal grandfather; Hypertension in her father; OSTEOPOROSIS in an other family member; Obesity in her mother; Thyroid Disease in her maternal aunt and another family member; Unknown/Adopted in her paternal grandmother. There is no history of Glaucoma or Macular Degeneration.    Social Hx:  reports that she quit smoking about 19 years ago. Her smoking use included Cigarettes. She started smoking about 32 years ago. She has a 15.00 pack-year smoking history. She has never used smokeless tobacco. She reports that she does not drink alcohol or use illicit drugs.    REVIEW OF SYSTEMS:    CONSTITUTIONAL:  POSITIVE  For diarreha chills.   INTEGUMENTARY/SKIN:  NEGATIVE for worrisome rashes, moles or lesions  EYES:  NEGATIVE for vision changes or irritation  ENT/MOUTH:  NEGATIVE for ear, mouth and throat problems  RESP:  NEGATIVE for significant cough or SOB  BREAST:  NEGATIVE for masses, tenderness or discharge  CV:  NEGATIVE for chest pain, palpitations or peripheral edema  GI:  NEGATIVE for nausea, abdominal pain, heartburn, or change in bowel habits  :  Negative   MUSCULOSKELETAL:  See HPI above  NEURO:  NEGATIVE for weakness, dizziness or paresthesias  ENDOCRINE:  NEGATIVE for temperature intolerance, skin/hair changes  HEME/ALLERGY/IMMUNE:  NEGATIVE for bleeding problems  PSYCHIATRIC:  NEGATIVE for changes in mood or affect    PHYSICAL EXAM:  /80  Pulse 84  Ht 1.626 m (5' 4\")  Wt 107.5 kg (237 lb)  BMI 40.68 kg/m2   GENERAL APPEARANCE: healthy, alert and no distress   SKIN: no suspicious lesions or rashes  NEURO: Normal strength and tone, mentation intact, speech normal and sensory deficit " medial thigh, medial calf, lateral calf, lateral and medial foot.  Non radicular pattern.  VASCULAR:  good pulses, and cappillary refill   LYMPH: no lymphadenopathy   PSYCH:  mentation appears normal and affect normal/bright  RESP: no increased work of breathing     KNEE EXAM:   Gait: walks with normal gait  Alignment: normal   Squat: 50 % limited by pain.    Patellofemoral joint: severe crepitations in the patellofemoral joint.  Effusion: mild  ROM: 0-110  Tender: medial joint line  Masses: some prominence popliteal fossa  Ligaments:  Lachman's Stable, Anterior and posterior drawer stable, stable to varus and valgus stress.  no pain with Varus/Valgus stress testing.    McMurrays: pain but no catching with hyperflexion  Lateral retinaculum is not tight  Facet Tenderness: none  Apprehension: negative  Q-angle: within normal limits   Tubercle-sulcus angle: within normal limits     LEFT HIP:  Palpation: Tender:   Non-tender     Range of Motion:  Full ROM, both hips  Strength:  full strength    Lumbar range of motion: flexion to touch ankles, extension good, no pain, side bend: adequate, no pain  Toe stand: able.    Heel stand: Able  Seated SLR: negative  Supine SLR: negative  Sensation:decreased in non anatomic, distribution: see above  Motor: all normal  DTR's: intact  Pathologic reflexes: None    X-RAY:  From today 12/19/2017: shows mild medial joint osteophytes, but good spacing, shows no lateral joint space narrowing and shows severe narrowing of the patellofemoral joint, with some central notching in the trochlea     MRI:  none     Impression: Osteoarthritis mainly in the patellofemoral joint, left knee.  I think this is the main cause of her symptoms.    Possible medial meniscus tear  Possible popliteal cyst  Possible lumbar radicular cause of numbness.  Those symptoms below the knee could be due to a cyst, but don't explain those symptoms above the knee.    Plan:  Injection therapy: Discussed findings and  diagnosis with patient.  We talked about treatment options.  We decided that corticosteroid injection would be a good option.  Thus, With the patient's consent, left knee(s) injected intra-articularly with 80mg of Depomedrol and 4cc of local anesthetic after sterile prep.    NSAIDS  Strengthening  PT ordered  Tylenol    Return to clinic PRN, consider MRI.  Consider viscosupplementation injection.    ROBBI Palm MD  Dept. Orthopedic Surgery  [facility]

## 2017-12-20 ENCOUNTER — RADIANT APPOINTMENT (OUTPATIENT)
Dept: GENERAL RADIOLOGY | Facility: CLINIC | Age: 47
End: 2017-12-20
Attending: ORTHOPAEDIC SURGERY
Payer: COMMERCIAL

## 2017-12-20 ENCOUNTER — OFFICE VISIT (OUTPATIENT)
Dept: ORTHOPEDICS | Facility: CLINIC | Age: 47
End: 2017-12-20
Payer: COMMERCIAL

## 2017-12-20 VITALS
DIASTOLIC BLOOD PRESSURE: 80 MMHG | HEIGHT: 64 IN | WEIGHT: 237 LBS | SYSTOLIC BLOOD PRESSURE: 105 MMHG | BODY MASS INDEX: 40.46 KG/M2 | HEART RATE: 84 BPM

## 2017-12-20 DIAGNOSIS — M17.12 PRIMARY OSTEOARTHRITIS OF LEFT KNEE: Primary | ICD-10-CM

## 2017-12-20 DIAGNOSIS — M25.562 LEFT KNEE PAIN: ICD-10-CM

## 2017-12-20 PROCEDURE — 73562 X-RAY EXAM OF KNEE 3: CPT | Mod: LT

## 2017-12-20 PROCEDURE — 99203 OFFICE O/P NEW LOW 30 MIN: CPT | Mod: 25 | Performed by: ORTHOPAEDIC SURGERY

## 2017-12-20 PROCEDURE — 20610 DRAIN/INJ JOINT/BURSA W/O US: CPT | Mod: LT | Performed by: ORTHOPAEDIC SURGERY

## 2017-12-20 ASSESSMENT — PAIN SCALES - GENERAL: PAINLEVEL: MODERATE PAIN (4)

## 2017-12-20 NOTE — MR AVS SNAPSHOT
After Visit Summary   12/20/2017    Zaira Villatoro    MRN: [id number]           Patient Information     Date Of Birth          1970        Visit Information        Provider Department      12/20/2017 2:00 PM Jhon Palm MD [facility]        Today's Diagnoses     Primary osteoarthritis of left knee    -  1       Follow-ups after your visit        Your next 10 appointments already scheduled     Dec 22, 2017 11:00 AM CST   Office Visit with Indu Ramos MD   [facility] ([facility]    [address] [address] [phone]           Bring a current list of meds and any records pertaining to this visit. For Physicals, please bring immunization records and any forms needing to be filled out. Please arrive 10 minutes early to complete paperwork.            Dec 22, 2017 12:45 PM CST   (Arrive by 12:30 PM)   MA SCREENING DIGITAL BILATERAL with FKMA1   [facility] ([facility])    [address] [id number] [phone]           Do not use any powder, lotion or deodorant under your arms or on your breast. If you do, we will ask you to remove it before your exam.  Wear comfortable, two-piece clothing.  If you have any allergies, tell your care team.  Bring any previous mammograms from other facilities or have them mailed to the breast center.            Feb 05, 2018 10:30 AM CST   Return Sleep Patient with Ramana Palencia MD   [facility] ([facility])    [address] [place] [id number] [phone]              Who to contact     If you have questions or need follow up information about today's clinic visit or your schedule please contact [facility] directly at [phone].  Normal or non-critical lab and imaging results will be communicated to you by MyChart, letter or phone within 4 business  "days after the clinic has received the results. If you do not hear from us within 7 days, please contact the clinic through [employer] or phone. If you have a critical or abnormal lab result, we will notify you by phone as soon as possible.  Submit refill requests through [employer] or call your pharmacy and they will forward the refill request to us. Please allow 3 business days for your refill to be completed.          Additional Information About Your Visit        [employer] Information     [employer] gives you secure access to your electronic health record. If you see a primary care provider, you can also send messages to your care team and make appointments. If you have questions, please call your primary care clinic.  If you do not have a primary care provider, please call [phone] and they will assist you.        Care EveryWhere ID     This is your Care EveryWhere ID. This could be used by other organizations to access your [facility] medical records  [id number]        Your Vitals Were     Pulse Height BMI (Body Mass Index)             84 1.626 m (5' 4\") 40.68 kg/m2          Blood Pressure from Last 3 Encounters:   12/20/17 105/80   12/11/17 128/84   10/30/17 120/76    Weight from Last 3 Encounters:   12/20/17 107.5 kg (237 lb)   12/11/17 107.5 kg (237 lb)   10/30/17 107.5 kg (237 lb)              We Performed the Following     DRAIN/INJECT LARGE JOINT/BURSA     METHYLPREDNISOLONE 80 MG INJ        Primary Care Provider Office Phone # Fax #    Indu Ramos [phone]       [facility] [facility] [address]        Equal Access to Services     [facility]: Hadii aad ku hadasho Soomaali, waaxda luqadaha, qaybta kaalmada maira matthew . So [facility] [phone].    ATENCIÓN: Si habla español, tiene a matt disposición servicios gratuitos de asistencia lingüística. Llame al [phone].    We comply with applicable federal civil rights laws and [place] laws. " We do not discriminate on the basis of race, color, national origin, age, disability, sex, sexual orientation, or gender identity.            Thank you!     Thank you for choosing [facility]  for your care. Our goal is always to provide you with excellent care. Hearing back from our patients is one way we can continue to improve our services. Please take a few minutes to complete the written survey that you may receive in the mail after your visit with us. Thank you!             Your Updated Medication List - Protect others around you: Learn how to safely use, store and throw away your medicines at www.disposemymeds.org.          This list is accurate as of: 12/20/17  7:16 PM.  Always use your most recent med list.                   Brand Name Dispense Instructions for use Diagnosis    ACCU-CHEK SHARMILA PLUS test strip   Generic drug:  blood glucose monitoring     400 strip    TEST 4 TIMES DAILY AND AS NEEDED    Type 2 diabetes mellitus without complication, with long-term current use of insulin (H)       aspirin 81 MG tablet     30 tablet    Take by mouth daily        atenolol 25 MG tablet    TENORMIN    180 tablet    Take 1 tablet (25 mg) by mouth 2 times daily    Hypertension goal BP (blood pressure) < 140/90       BENADRYL ALLERGY 25 MG tablet   Generic drug:  diphenhydrAMINE      as needed Reported on 4/12/2017        blood glucose monitoring lancets     3 Box    1 each 4 times daily Use to test blood sugar 4 times daily or as directed.    Type 2 diabetes mellitus without complication (H)       cholecalciferol 2000 UNITS tablet     30 tablet    Take 1 tablet by mouth daily        DRAMAMINE PO      as needed        eletriptan 20 MG tablet    RELPAX    12 tablet    take 1-2 tablets by mouth at onset of headache for migraine. may repeat dose in 2 hours. do not exceed 80mg in 24 hours.    Migraine, unspecified, without mention of intractable migraine without mention of status migrainosus        FLUoxetine 40 MG capsule    PROzac    90 capsule    TAKE 1 CAPSULE BY MOUTH DAILY    Moderate single current episode of major depressive disorder (H)       fluticasone 110 MCG/ACT Inhaler    FLOVENT HFA    36 Inhaler    Spray 2 puffs in nostril 2 times daily    Chronic rhinitis       HUMIRA PEN 40 MG/0.8ML pen kit   Generic drug:  adalimumab      Inject as directed every 14 days        insulin pen needle 31G X 5 MM     200 each    Use 1 pen needles twice daily or as directed.    Type 2 diabetes mellitus without complication (H)       LEVEMIR FLEXTOUCH 100 UNIT/ML injection   Generic drug:  insulin detemir     15 mL    INJECT 52 UNITS SUBCUTANEOUSLY BEFORE BED    Type 2 diabetes mellitus without complication (H)       LIALDA 1.2 G EC tablet   Generic drug:  mesalamine      Take 1,200 mg by mouth 2 tablets daily        losartan 25 MG tablet    COZAAR    15 tablet    TAKE ONE-HALF TABLET BY MOUTH DAILY    Diabetes type 2, uncontrolled (H), Essential hypertension with goal blood pressure less than 140/90       * order for DME     1 each    Glucometer, brand as covered by insurance.    Type 2 diabetes mellitus without complication (H)       * order for DME     400 each    Test strips for pt's glucometer, brand as covered by insurance. Test four times daily and prn.    Type 2 diabetes mellitus without complication (H)       * order for DME     1 Units    Equipment being ordered: CPAP 9 c,        oxyCODONE-acetaminophen 5-325 MG per tablet    PERCOCET          STATIN NOT PRESCRIBED (INTENTIONAL)     0 each    1 each continuous prn Statin not prescribed intentionally due to Refusal by patient and Other:LDL is below 100.    CARDIOVASCULAR SCREENING; LDL GOAL LESS THAN 100       TYLENOL CAPS 500 MG OR      1 CAPSULE EVERY 4 HOURS AS NEEDED        VICTOZA PEN 18 MG/3ML soln   Generic drug:  liraglutide     9 mL    INJECT 1.8 MG SUB-Q ONCE DAILY    Type 2 diabetes mellitus without complication, with long-term current use of  insulin (H)       * Notice:  This list has 3 medication(s) that are the same as other medications prescribed for you. Read the directions carefully, and ask your doctor or other care provider to review them with you.

## 2017-12-20 NOTE — NURSING NOTE
"Chief Complaint   Patient presents with     Consult     Left knee pain. Pt states pain has been going on for couple of month on and off but now pain is all the time. Pain will start in the back of thigh and go down into toes pain is sharp. Foot becomes numb feel like foot is going to wake up but does not. While sitting pain is dull and achcy but when goes to stand or walk pain shoot up and feels like pain is more in the bone than in the muscle. Therapies    heat, ice, Tylenol, and Ibuprofen    Initial /80  Pulse 84  Ht 1.626 m (5' 4\")  Wt 107.5 kg (237 lb)  BMI 40.68 kg/m2 Estimated body mass index is 40.68 kg/(m^2) as calculated from the following:    Height as of this encounter: 1.626 m (5' 4\").    Weight as of this encounter: 107.5 kg (237 lb).  Medication Reconciliation: complete   Shae Feliz CMA 12/20/2017 1:58 PM      "

## 2017-12-20 NOTE — NURSING NOTE
"A steroid and or Hylan G - F 20 injection was performed on 12/20/2017 at 2:34 PM. Risks,benefits and complications of the injection were discussed with the patient and the patient has elected to proceed after verbal consent. Using sterile technique, the area was prepped with betadine . A 22 Gauge 1.5\" was used to inject the medication(s) listed below.This was well tolerated. No apparent complications. . Did also discuss that if diabetic, recommend close monitoring of blood sugars over the next week as cortisone injections can temporarily elevate blood sugar.    The following medication(s) was given:     MEDICATION: Depo Medrol 80mg per mL  ROUTE: intraarticular  SITE:Left Knee   : CogniFit (Depo-Medrol)  DOSE: 1 ml  LOT #: [id number]  EXPIRATION DATE:  3/2020    MEDICATION: Lidocaine HCL  1% per mL  : Hospira (Marcaine,Lidoncaine)  DOSE: 4 ml  LOT #: [id number]  EXPIRATION DATE:  1 aug 2019    Mynor REED    "

## 2017-12-20 NOTE — LETTER
12/20/2017         RE: Zaira Villatoro  [address] [address]        Dear Colleague,    Thank you for referring your patient, Zaira Villatoro, to the [facility]. Please see a copy of my visit note below.    HISTORY OF PRESENT ILLNESS    Zaira Villatoro is a 47 year old female who is seen in consultation at the request of Indu Ramos for evaluation of  left knee pain and leg pain  that has been present a few  weeks.  No known injury.    Present symptoms: pain anteriorly that started a few weeks ago, and posteriorly, no swelling, no catching/popping, no locking, +giving way, pain radiates to lateral leg and up to lateral thigh, groin and hip.  Symptoms occur walking, extending knee with foot up, flexing knee, squatting, pain with stairs..  The symptoms occur frequently.  Paresthesias in lateral leg and feels like its cold.    Treatments tried to this point: NSAIDs, Tylenol.  Ice, heat.  Knee sleeve.  No help.    Orthopedic PMH: history of low back pain and DEGENERATIVE DISC DISEASE in Lspine.    History of EPIDURAL STEROID INJECTION 10-15 years ago.  Right knee arthroscopy     Other PMH:  has a past medical history of Acute diverticulitis (7/31/2013); Gestational diabetes (2000); History of seizures as a child (1981); Menorrhagia; Mild cervical dysplasia (1988); Pelvic pain; Perforation of sigmoid colon (H) (8/3/2013); PONV (postoperative nausea and vomiting); S/P left hemicolectomy (8/16/2013); Sepsis (H) (8/1/2013); and Transient atrial fibrillation or flutter (08/03/2013).     Surgical:  has a past surgical history that includes cryotherapy, cervical (1988); sinus surgery (2/11/03); arthroscopy knee rt/lt (2004); TOOTH EXTRACTION W/FORCEP (6/2003); colonoscopy (02/2012); EXPLORATORY LAPAROTOMY, PARTIAL LEFT ROLANDA COLLECTOMY WITH HARTMANS PROCEDURE, COLOSTOMY (8/2013); colonoscopy (1/9/2014); appendectomy (04/2014); hernia repair (04/2014); Takedown colostomy  (04/2014); biopsy (2011); and GI surgery (2013).    Family Hx:  family history includes Allergies in her mother; Anesthesia Reaction in an other family member; Arthritis in her mother; Asthma in her daughter, daughter, and mother; Breast Cancer in her maternal aunt and other family members; CEREBROVASCULAR DISEASE in her father, maternal grandfather, and paternal grandfather; DIABETES in her father, maternal grandmother, and mother; Depression in her mother and sister; Eye Disorder in her father; Gynecology in her sister; HEART DISEASE in her father, mother, and paternal grandfather; Hypertension in her father; OSTEOPOROSIS in an other family member; Obesity in her mother; Thyroid Disease in her maternal aunt and another family member; Unknown/Adopted in her paternal grandmother. There is no history of Glaucoma or Macular Degeneration.    Social Hx:  reports that she quit smoking about 19 years ago. Her smoking use included Cigarettes. She started smoking about 32 years ago. She has a 15.00 pack-year smoking history. She has never used smokeless tobacco. She reports that she does not drink alcohol or use illicit drugs.    REVIEW OF SYSTEMS:    CONSTITUTIONAL:  POSITIVE  For diarreha chills.   INTEGUMENTARY/SKIN:  NEGATIVE for worrisome rashes, moles or lesions  EYES:  NEGATIVE for vision changes or irritation  ENT/MOUTH:  NEGATIVE for ear, mouth and throat problems  RESP:  NEGATIVE for significant cough or SOB  BREAST:  NEGATIVE for masses, tenderness or discharge  CV:  NEGATIVE for chest pain, palpitations or peripheral edema  GI:  NEGATIVE for nausea, abdominal pain, heartburn, or change in bowel habits  :  Negative   MUSCULOSKELETAL:  See HPI above  NEURO:  NEGATIVE for weakness, dizziness or paresthesias  ENDOCRINE:  NEGATIVE for temperature intolerance, skin/hair changes  HEME/ALLERGY/IMMUNE:  NEGATIVE for bleeding problems  PSYCHIATRIC:  NEGATIVE for changes in mood or affect    PHYSICAL EXAM:  /80   "Pulse 84  Ht 1.626 m (5' 4\")  Wt 107.5 kg (237 lb)  BMI 40.68 kg/m2   GENERAL APPEARANCE: healthy, alert and no distress   SKIN: no suspicious lesions or rashes  NEURO: Normal strength and tone, mentation intact, speech normal and sensory deficit medial thigh, medial calf, lateral calf, lateral and medial foot.  Non radicular pattern.  VASCULAR:  good pulses, and cappillary refill   LYMPH: no lymphadenopathy   PSYCH:  mentation appears normal and affect normal/bright  RESP: no increased work of breathing     KNEE EXAM:   Gait: walks with normal gait  Alignment: normal   Squat: 50 % limited by pain.    Patellofemoral joint: severe crepitations in the patellofemoral joint.  Effusion: mild  ROM: 0-110  Tender: medial joint line  Masses: some prominence popliteal fossa  Ligaments:  Lachman's Stable, Anterior and posterior drawer stable, stable to varus and valgus stress.  no pain with Varus/Valgus stress testing.    McMurrays: pain but no catching with hyperflexion  Lateral retinaculum is not tight  Facet Tenderness: none  Apprehension: negative  Q-angle: within normal limits   Tubercle-sulcus angle: within normal limits     LEFT HIP:  Palpation: Tender:   Non-tender     Range of Motion:  Full ROM, both hips  Strength:  full strength    Lumbar range of motion: flexion to touch ankles, extension good, no pain, side bend: adequate, no pain  Toe stand: able.    Heel stand: Able  Seated SLR: negative  Supine SLR: negative  Sensation:decreased in non anatomic, distribution: see above  Motor: all normal  DTR's: intact  Pathologic reflexes: None    X-RAY:  From today 12/19/2017: shows mild medial joint osteophytes, but good spacing, shows no lateral joint space narrowing and shows severe narrowing of the patellofemoral joint, with some central notching in the trochlea     MRI:  none     Impression: Osteoarthritis mainly in the patellofemoral joint, left knee.  I think this is the main cause of her symptoms.    Possible " medial meniscus tear  Possible popliteal cyst  Possible lumbar radicular cause of numbness.  Those symptoms below the knee could be due to a cyst, but don't explain those symptoms above the knee.    Plan:  Injection therapy: Discussed findings and diagnosis with patient.  We talked about treatment options.  We decided that corticosteroid injection would be a good option.  Thus, With the patient's consent, left knee(s) injected intra-articularly with 80mg of Depomedrol and 4cc of local anesthetic after sterile prep.    NSAIDS  Strengthening  PT ordered  Tylenol    Return to clinic PRN, consider MRI.  Consider viscosupplementation injection.    ROBBI Palm MD  Dept. Orthopedic Surgery  [facility]     Again, thank you for allowing me to participate in the care of your patient.        Sincerely,        Jhon Palm MD

## 2017-12-21 ENCOUNTER — DOCUMENTATION ONLY (OUTPATIENT)
Dept: SLEEP MEDICINE | Facility: CLINIC | Age: 47
End: 2017-12-21
Payer: COMMERCIAL

## 2017-12-21 NOTE — PROGRESS NOTES
3 DAY STM VISIT    Patient contacted for 3 day STM visit  Subjective measures:  Patient had the flu the first two nights. Patient turned up humidity and now is getting water in her mask. Patient CPAP machine is even with her bed.      Device type: Auto-CPAP  PAP settings: CPAP min 9 cm  H20     CPAP max 9 cm  H20    CPAP fixed 9 cm  H20       Assessment: Nightly usage over four hours. Told patient to lower her machine since it's even with her bed.   Action plan: Pt to have f/u 14 day STM visit.  Diagnostic AHI: 8.9

## 2017-12-22 ENCOUNTER — RADIANT APPOINTMENT (OUTPATIENT)
Dept: MAMMOGRAPHY | Facility: CLINIC | Age: 47
End: 2017-12-22
Attending: FAMILY MEDICINE
Payer: COMMERCIAL

## 2017-12-22 ENCOUNTER — OFFICE VISIT (OUTPATIENT)
Dept: FAMILY MEDICINE | Facility: CLINIC | Age: 47
End: 2017-12-22
Payer: COMMERCIAL

## 2017-12-22 VITALS
TEMPERATURE: 97.5 F | WEIGHT: 233.5 LBS | SYSTOLIC BLOOD PRESSURE: 104 MMHG | OXYGEN SATURATION: 97 % | DIASTOLIC BLOOD PRESSURE: 72 MMHG | BODY MASS INDEX: 40.08 KG/M2 | HEART RATE: 74 BPM

## 2017-12-22 DIAGNOSIS — Z13.6 CARDIOVASCULAR SCREENING; LDL GOAL LESS THAN 100: Chronic | ICD-10-CM

## 2017-12-22 DIAGNOSIS — E11.9 TYPE 2 DIABETES MELLITUS WITHOUT COMPLICATION, WITH LONG-TERM CURRENT USE OF INSULIN (H): Primary | Chronic | ICD-10-CM

## 2017-12-22 DIAGNOSIS — Z13.5 SCREENING FOR DIABETIC RETINOPATHY: ICD-10-CM

## 2017-12-22 DIAGNOSIS — Z79.4 TYPE 2 DIABETES MELLITUS WITHOUT COMPLICATION, WITH LONG-TERM CURRENT USE OF INSULIN (H): Primary | Chronic | ICD-10-CM

## 2017-12-22 DIAGNOSIS — Z12.31 VISIT FOR SCREENING MAMMOGRAM: ICD-10-CM

## 2017-12-22 DIAGNOSIS — E66.01 MORBID OBESITY DUE TO EXCESS CALORIES (H): Chronic | ICD-10-CM

## 2017-12-22 DIAGNOSIS — I10 HYPERTENSION GOAL BP (BLOOD PRESSURE) < 140/90: Chronic | ICD-10-CM

## 2017-12-22 DIAGNOSIS — F32.1 MODERATE SINGLE CURRENT EPISODE OF MAJOR DEPRESSIVE DISORDER (H): ICD-10-CM

## 2017-12-22 LAB
CHOLEST SERPL-MCNC: 122 MG/DL
HBA1C MFR BLD: 6.8 % (ref 4.3–6)
HDLC SERPL-MCNC: 35 MG/DL
LDLC SERPL CALC-MCNC: 72 MG/DL
NONHDLC SERPL-MCNC: 87 MG/DL
TRIGL SERPL-MCNC: 73 MG/DL

## 2017-12-22 PROCEDURE — 83036 HEMOGLOBIN GLYCOSYLATED A1C: CPT | Performed by: FAMILY MEDICINE

## 2017-12-22 PROCEDURE — 80061 LIPID PANEL: CPT | Performed by: FAMILY MEDICINE

## 2017-12-22 PROCEDURE — 36415 COLL VENOUS BLD VENIPUNCTURE: CPT | Performed by: FAMILY MEDICINE

## 2017-12-22 PROCEDURE — G0202 SCR MAMMO BI INCL CAD: HCPCS | Mod: TC

## 2017-12-22 PROCEDURE — 99214 OFFICE O/P EST MOD 30 MIN: CPT | Performed by: FAMILY MEDICINE

## 2017-12-22 RX ORDER — FLUOXETINE 40 MG/1
CAPSULE ORAL
Qty: 90 CAPSULE | Refills: 1 | Status: SHIPPED | OUTPATIENT
Start: 2017-12-22 | End: 2018-05-22

## 2017-12-22 RX ORDER — ATENOLOL 25 MG/1
25 TABLET ORAL 2 TIMES DAILY
Qty: 180 TABLET | Refills: 3 | Status: SHIPPED | OUTPATIENT
Start: 2017-12-22 | End: 2019-03-05

## 2017-12-22 NOTE — PATIENT INSTRUCTIONS
Try and see if the [facility] can help you figure out your atenolol medication.     Once your CPAP is under control, then you can start taking atenolol 1x day for a week and stop. You likely do not need it.    I recommend that you see your GI doctor 1-2x yearly.     You will have your blood work test in 3 months and follow up with me in 6 months.     [facility]    If you have any questions regarding to your visit please contact your care team:       Team Purple:   Clinic Hours Telephone Number   Dr. Rosaline Byrd   7am-7pm  Monday - Thursday   7am-5pm  Fridays  [phone]  (Appointment scheduling available 24/7)    Questions about your Visit?   Team Line:  [phone]   Urgent Care - [facility] - 11am-9pm Monday-Friday Saturday-Sunday- 9am-5pm   [place] - 5pm-9pm Monday-Friday Saturday-Sunday- 9am-5pm  [phone] - [facility]  [phone] - [place]       What options do I have for visits at the clinic other than the traditional office visit?  To expand how we care for you, many of our providers are utilizing electronic visits (e-visits) and telephone visits, when medically appropriate, for interactions with their patients rather than a visit in the clinic.   We also offer nurse visits for many medical concerns. Just like any other service, we will bill your insurance company for this type of visit based on time spent on the phone with your provider. Not all insurance companies cover these visits. Please check with your medical insurance if this type of visit is covered. You will be responsible for any charges that are not paid by your insurance.      E-visits via [email or web address]:  generally incur a $35.00 fee.  Telephone visits:  Time spent on the phone: *charged based on time that is spent on the phone in increments of 10 minutes. Estimated cost:   5-10 mins $30.00   11-20 mins. $59.00   21-30 mins.  $85.00     Use [employer] (secure email communication and access to your chart) to send your primary care provider a message or make an appointment. Ask someone on your Team how to sign up for [employer].  For a Price Quote for your services, please call our Consumer Price Line at [phone].  As always, Thank you for trusting us with your health care needs!    Discharge by BRITTNEY ROBERTS

## 2017-12-22 NOTE — NURSING NOTE
"Chief Complaint   Patient presents with     Diabetes     Health Maintenance     A1C, eye exam, Mammo        Initial /72  Pulse 74  Temp 97.5  F (36.4  C) (Oral)  Wt 233 lb 8 oz (105.9 kg)  SpO2 97%  BMI 40.08 kg/m2 Estimated body mass index is 40.08 kg/(m^2) as calculated from the following:    Height as of 12/20/17: 5' 4\" (1.626 m).    Weight as of this encounter: 233 lb 8 oz (105.9 kg).  Medication Reconciliation: complete   Amanda Agarwal MA      "

## 2017-12-22 NOTE — MR AVS SNAPSHOT
After Visit Summary   12/22/2017    Zaira Villatoro    MRN: [id number]           Patient Information     Date Of Birth          1970        Visit Information        Provider Department      12/22/2017 11:00 AM Indu Ramos MD [facility]        Today's Diagnoses     Type 2 diabetes mellitus without complication, with long-term current use of insulin (H)    -  1    Screening for diabetic retinopathy        Hypertension goal BP (blood pressure) < 140/90        Morbid obesity due to excess calories (H)        Moderate single current episode of major depressive disorder (H)        CARDIOVASCULAR SCREENING; LDL GOAL LESS THAN 100          Care Instructions    Try and see if the [place] pharmacy can help you figure out your atenolol medication.     Once your CPAP is under control, then you can start taking atenolol 1x day for a week and stop. You likely do not need it.    I recommend that you see your GI doctor 1-2x yearly.     You will have your blood work test in 3 months and follow up with me in 6 months.     [facility]    If you have any questions regarding to your visit please contact your care team:       Team Purple:   Clinic Hours Telephone Number   Dr. Rosaline Byrd   7am-7pm  Monday - Thursday   7am-5pm  Fridays  [phone]  (Appointment scheduling available 24/7)    Questions about your Visit?   Team Line:  [phone]   [facility] 11am-9pm Monday-Friday Saturday-Sunday- 9am-5pm   [place] - 5pm-9pm Monday-Friday Saturday-Sunday- 9am-5pm  [phone] - [place]   [phone] - [place]       What options do I have for visits at the clinic other than the traditional office visit?  To expand how we care for you, many of our providers are utilizing electronic visits (e-visits) and telephone visits, when medically appropriate, for interactions with  their patients rather than a visit in the clinic.   We also offer nurse visits for many medical concerns. Just like any other service, we will bill your insurance company for this type of visit based on time spent on the phone with your provider. Not all insurance companies cover these visits. Please check with your medical insurance if this type of visit is covered. You will be responsible for any charges that are not paid by your insurance.      E-visits via [employer]:  generally incur a $35.00 fee.  Telephone visits:  Time spent on the phone: *charged based on time that is spent on the phone in increments of 10 minutes. Estimated cost:   5-10 mins $30.00   11-20 mins. $59.00   21-30 mins. $85.00     Use [employer] (secure email communication and access to your chart) to send your primary care provider a message or make an appointment. Ask someone on your Team how to sign up for [employer].  For a Price Quote for your services, please call our [employer] Line at [phone].  As always, Thank you for trusting us with your health care needs!    Discharge by BRITTNEY ROBERTS             Follow-ups after your visit        Additional Services     OPHTHALMOLOGY ADULT REFERRAL       Your provider has referred you to: [facility]: [facility] [phone]   [email or web address]    Please be aware that coverage of these services is subject to the terms and limitations of your health insurance plan.  Call member services at your health plan with any benefit or coverage questions.      Please bring the following with you to your appointment:    (1) Any X-Rays, CTs or MRIs which have been performed.  Contact the facility where they were done to arrange for  prior to your scheduled appointment.    (2) List of current medications  (3) This referral request   (4) Any documents/labs given to you for this referral                  Follow-up notes from your care team     Return in about 3 months  (around 3/22/2018) for Lab Work.      Your next 10 appointments already scheduled     Dec 22, 2017 12:45 PM CST   (Arrive by 12:30 PM)   MA SCREENING DIGITAL BILATERAL with FKMA1   [facility] [place] [facility]    [address] [place] [address]  [place] [address]   [phone]           Do not use any powder, lotion or deodorant under your arms or on your breast. If you do, we will ask you to remove it before your exam.  Wear comfortable, two-piece clothing.  If you have any allergies, tell your care team.  Bring any previous mammograms from other facilities or have them mailed to the breast center.            Feb 05, 2018 10:30 AM CST   Return Sleep Patient with Ramana Palencia MD   [facility] ([facility])    [address] [address] [address]   [phone]              Future tests that were ordered for you today     Open Future Orders        Priority Expected Expires Ordered    **A1C FUTURE 3mo Routine 3/22/2018 4/21/2018 12/22/2017            Who to contact     If you have questions or need follow up information about today's clinic visit or your schedule please contact [facility] directly at [phone].  Normal or non-critical lab and imaging results will be communicated to you by MyChart, letter or phone within 4 business days after the clinic has received the results. If you do not hear from us within 7 days, please contact the clinic through MyChart or phone. If you have a critical or abnormal lab result, we will notify you by phone as soon as possible.  Submit refill requests through [employer] or call your pharmacy and they will forward the refill request to us. Please allow 3 business days for your refill to be completed.          Additional Information About Your Visit        [employer] Information     [employer] gives you secure access to your electronic health record. If you see a primary care provider,  you can also send messages to your care team and make appointments. If you have questions, please call your primary care clinic.  If you do not have a primary care provider, please call [phone] and they will assist you.        Care EveryWhere ID     This is your Care EveryWhere ID. This could be used by other organizations to access your [employer] medical records  [id number]        Your Vitals Were     Pulse Temperature Pulse Oximetry BMI (Body Mass Index)          74 97.5  F (36.4  C) (Oral) 97% 40.08 kg/m2         Blood Pressure from Last 3 Encounters:   12/22/17 104/72   12/20/17 105/80   12/11/17 128/84    Weight from Last 3 Encounters:   12/22/17 233 lb 8 oz (105.9 kg)   12/20/17 237 lb (107.5 kg)   12/11/17 237 lb (107.5 kg)              We Performed the Following     HEMOGLOBIN A1C     Lipid panel reflex to direct LDL Fasting     OPHTHALMOLOGY ADULT REFERRAL          Today's Medication Changes          These changes are accurate as of: 12/22/17 11:38 AM.  If you have any questions, ask your nurse or doctor.               These medicines have changed or have updated prescriptions.        Dose/Directions    losartan 25 MG tablet   Commonly known as:  COZAAR   This may have changed:  See the new instructions.   Used for:  Diabetes type 2, uncontrolled (H), Essential hypertension with goal blood pressure less than 140/90   Changed by:  Indu Ramos MD        TAKE 1/2 TABLET BY MOUTH DAILY   Quantity:  15 tablet   Refills:  5            Where to get your medicines      These medications were sent to [place] [address] [place] [facility]  [address]  [facility] [address] [place] [address]     Phone:  [phone]     FLUoxetine 40 MG capsule    losartan 25 MG tablet         Some of these will need a paper prescription and others can be bought over the counter.  Ask your nurse if you have questions.     Bring a paper prescription for each of these medications     atenolol 25 MG tablet                Primary  Care Provider Office Phone # Fax #    Indu Ramos [phone] [address] [facility] [address]        Equal Access to Services     VICENTE ALMAZANMELINDA : Hadii aad ku hadmarylino Soomaali, waaxda luqadaha, qaybta kaalmada adeadrianyada, maira osorion steven perdomozuri taz. So [facility] [phone].    ATENCIÓN: Si habla español, tiene a matt disposición servicios gratuitos de asistencia lingüística. Llame al [phone].    We comply with applicable federal civil rights laws and [place] laws. We do not discriminate on the basis of race, color, national origin, age, disability, sex, sexual orientation, or gender identity.            Thank you!     Thank you for choosing [facility]  for your care. Our goal is always to provide you with excellent care. Hearing back from our patients is one way we can continue to improve our services. Please take a few minutes to complete the written survey that you may receive in the mail after your visit with us. Thank you!             Your Updated Medication List - Protect others around you: Learn how to safely use, store and throw away your medicines at www.disposemymeds.org.          This list is accurate as of: 12/22/17 11:38 AM.  Always use your most recent med list.                   Brand Name Dispense Instructions for use Diagnosis    ACCU-CHEK SHARMILA PLUS test strip   Generic drug:  blood glucose monitoring     400 strip    TEST 4 TIMES DAILY AND AS NEEDED    Type 2 diabetes mellitus without complication, with long-term current use of insulin (H)       aspirin 81 MG tablet     30 tablet    Take by mouth daily        atenolol 25 MG tablet    TENORMIN    180 tablet    Take 1 tablet (25 mg) by mouth 2 times daily    Hypertension goal BP (blood pressure) < 140/90       BENADRYL ALLERGY 25 MG tablet   Generic drug:  diphenhydrAMINE      as needed Reported on 4/12/2017        blood glucose monitoring lancets     3 Box    1 each 4 times daily Use  to test blood sugar 4 times daily or as directed.    Type 2 diabetes mellitus without complication (H)       cholecalciferol 2000 UNITS tablet     30 tablet    Take 1 tablet by mouth daily        DRAMAMINE PO      as needed        eletriptan 20 MG tablet    RELPAX    12 tablet    take 1-2 tablets by mouth at onset of headache for migraine. may repeat dose in 2 hours. do not exceed 80mg in 24 hours.    Migraine, unspecified, without mention of intractable migraine without mention of status migrainosus       FLUoxetine 40 MG capsule    PROzac    90 capsule    TAKE 1 CAPSULE BY MOUTH DAILY    Moderate single current episode of major depressive disorder (H)       fluticasone 110 MCG/ACT Inhaler    FLOVENT HFA    36 Inhaler    Spray 2 puffs in nostril 2 times daily    Chronic rhinitis       HUMIRA PEN 40 MG/0.8ML pen kit   Generic drug:  adalimumab      Inject as directed every 14 days        insulin pen needle 31G X 5 MM     200 each    Use 1 pen needles twice daily or as directed.    Type 2 diabetes mellitus without complication (H)       LEVEMIR FLEXTOUCH 100 UNIT/ML injection   Generic drug:  insulin detemir     15 mL    INJECT 52 UNITS SUBCUTANEOUSLY BEFORE BED    Type 2 diabetes mellitus without complication (H)       LIALDA 1.2 G EC tablet   Generic drug:  mesalamine      Take 1,200 mg by mouth 2 tablets daily        losartan 25 MG tablet    COZAAR    15 tablet    TAKE 1/2 TABLET BY MOUTH DAILY    Diabetes type 2, uncontrolled (H), Essential hypertension with goal blood pressure less than 140/90       * order for DME     1 each    Glucometer, brand as covered by insurance.    Type 2 diabetes mellitus without complication (H)       * order for DME     400 each    Test strips for pt's glucometer, brand as covered by insurance. Test four times daily and prn.    Type 2 diabetes mellitus without complication (H)       * order for DME     1 Units    Equipment being ordered: CPAP 9 c,        oxyCODONE-acetaminophen 5-325  MG per tablet    PERCOCET          STATIN NOT PRESCRIBED (INTENTIONAL)     0 each    1 each continuous prn Statin not prescribed intentionally due to Refusal by patient and Other:LDL is below 100.    CARDIOVASCULAR SCREENING; LDL GOAL LESS THAN 100       TYLENOL CAPS 500 MG OR      1 CAPSULE EVERY 4 HOURS AS NEEDED        VICTOZA PEN 18 MG/3ML soln   Generic drug:  liraglutide     9 mL    INJECT 1.8 MG SUB-Q ONCE DAILY    Type 2 diabetes mellitus without complication, with long-term current use of insulin (H)       * Notice:  This list has 3 medication(s) that are the same as other medications prescribed for you. Read the directions carefully, and ask your doctor or other care provider to review them with you.

## 2017-12-27 DIAGNOSIS — E11.9 TYPE 2 DIABETES MELLITUS WITHOUT COMPLICATION, WITH LONG-TERM CURRENT USE OF INSULIN (H): ICD-10-CM

## 2017-12-27 DIAGNOSIS — Z79.4 TYPE 2 DIABETES MELLITUS WITHOUT COMPLICATION, WITH LONG-TERM CURRENT USE OF INSULIN (H): ICD-10-CM

## 2017-12-27 RX ORDER — LIRAGLUTIDE 6 MG/ML
INJECTION SUBCUTANEOUS
Qty: 27 ML | Refills: 0 | Status: SHIPPED | OUTPATIENT
Start: 2017-12-27 | End: 2018-03-19

## 2017-12-27 NOTE — TELEPHONE ENCOUNTER
Prescription approved per [facility] Refill Protocol.  Phyllis Solomon RN

## 2018-01-01 NOTE — MR AVS SNAPSHOT
After Visit Summary   5/26/2017    Zaira Villatoro    MRN: [id number]           Patient Information     Date Of Birth          1970        Visit Information        Provider Department      5/26/2017 9:00 AM Indu Ramos MD [facility]        Today's Diagnoses     Screening for diabetic peripheral neuropathy    -  1    Moderate single current episode of major depressive disorder (H)        Morbid obesity due to excess calories (H)        Type 2 diabetes mellitus without complication, with long-term current use of insulin (H)        Hypertension goal BP (blood pressure) < 140/90        Fatty liver        Pruritic disorder          Care Instructions    MY DIABETES TODAY:    1)  Goal A1C is under <7.0  Mine is:      Lab Results   Component Value Date    A1C 7.0 05/26/2017       2)  Goal LDL (bad cholesterol) under 100  (measured at least yearly)- I am currently at:   Lab Results   Component Value Date    LDL 95 11/07/2016       3)  Goal blood pressure under 130/80- mine was 126/78 today    4)  Take aspirin daily     5)  No tobacco use          ACTION PLAN CHANGES FROM TODAY:    Care Plan changes:    We will check your liver today because of the itching  Continue to work on your diet, more exercise and weight loss.    Labs:     I will schedule a lab appointment 3-5 days before my next appointment with my provider. , I will fast (nothing to eat or drink except water with medications) 12 hours before my lab appointment.    Follow up:    I will follow up with my physician:  In three months.          Weight Management: Overcoming Your Barriers  You may have many reasons why you re not ready to lose weight. You may not feel you have the time or the skills. You may be afraid of losing weight and gaining it back again. Well, you can lose weight. And you can keep the weight off, if you make changes slowly and stick with them. Remember that you may never find the perfect time to lose weight.  Decide that the right time to be healthier is now.    Common barriers  Barrier 1: I don t want to deny myself.  Barrier Buster: You don t have to! Moderation is the key.    Watch portion sizes and know when you're eating more than one serving.    Plan to ask for a doggy bag when you eat out.    Have just one.    Choose lower-fat and lower-calorie versions of your favorites.  Barrier 2: I lost weight before but I gained it right back.  Barrier Buster: Make this time different.    List what worked and didn t work last time and what you can try this time.    Choose changes that you are willing to stick with.    Work exercise into your weight-loss plan.    Be realistic about what is possible.   Barrier 3: I don t have the time to be active.  Barrier Buster: It takes just a few minutes a day!    Be active with a pet or the kids.    Block off activity time in your schedule.    Borrow some time that you usually spend watching TV.    You are too important not to take time to exercise -- it is your life!   Feel good about yourself  Do you eat more because you feel bad about yourself, then feel even worse as you gain weight? This is a  vicious cycle.  Breaking this cycle is not easy. You may need group support or counseling. Always remember that you are a valuable person, no matter what size or shape you are.  Do you have a health problem? If so, don t use it as an excuse for not losing weight. Ask your doctor, dietitian, or other health care provider about methods to lose weight that are safe for you. For example, even if you have severe arthritis, you can walk in a pool. Get advice from a .      3778-0744 [employer]. [address] [place] [address], [place] [address]. All rights reserved. This information is not intended as a substitute for professional medical care. Always follow your healthcare professional's instructions.      [facility]    If you have any questions regarding to  your visit please contact your care team:       Team Purple:   Clinic Hours Telephone Number   [address] Dr.   7am-7pm  Monday - Thursday   7am-5pm  Fridays  [phone]  (Appointment scheduling available 24/7)    Questions about your Visit?   Team Line:  [phone]   Urgent Care - [place] and [facility] - 11am-9pm Monday-Friday Saturday-Sunday- 9am-5pm   [place] - 5pm-9pm Monday-Friday Saturday-Sunday- 9am-5pm  [phone] - [place]   [phone] - [place]       What options do I have for visits at the clinic other than the traditional office visit?  To expand how we care for you, many of our providers are utilizing electronic visits (e-visits) and telephone visits, when medically appropriate, for interactions with their patients rather than a visit in the clinic.   We also offer nurse visits for many medical concerns. Just like any other service, we will bill your insurance company for this type of visit based on time spent on the phone with your provider. Not all insurance companies cover these visits. Please check with your medical insurance if this type of visit is covered. You will be responsible for any charges that are not paid by your insurance.      E-visits via Websand:  generally incur a $35.00 fee.  Telephone visits:  Time spent on the phone: *charged based on time that is spent on the phone in increments of 10 minutes. Estimated cost:   5-10 mins $30.00   11-20 mins. $59.00   21-30 mins. $85.00     Use [employer] (secure email communication and access to your chart) to send your primary care provider a message or make an appointment. Ask someone on your Team how to sign up for [employer].  For a Price Quote for your services, please call our Consumer Price Line at [phone].  As always, Thank you for trusting us with your health care needs!    Discharged by ADYE Pruett            Follow-ups after your visit         Additional Services     OPHTHALMOLOGY ADULT REFERRAL       Your provider has referred you to: [facility] [place] [phone]   [email or web address]    Please be aware that coverage of these services is subject to the terms and limitations of your health insurance plan.  Call member services at your health plan with any benefit or coverage questions.      Please bring the following with you to your appointment:    (1) Any X-Rays, CTs or MRIs which have been performed.  Contact the facility where they were done to arrange for  prior to your scheduled appointment.    (2) List of current medications  (3) This referral request   (4) Any documents/labs given to you for this referral                  Who to contact     If you have questions or need follow up information about today's clinic visit or your schedule please contact [facility] directly at [phone].  Normal or non-critical lab and imaging results will be communicated to you by MyChart, letter or phone within 4 business days after the clinic has received the results. If you do not hear from us within 7 days, please contact the clinic through Droid[employer] or phone. If you have a critical or abnormal lab result, we will notify you by phone as soon as possible.  Submit refill requests through SNAPP' or call your pharmacy and they will forward the refill request to us. Please allow 3 business days for your refill to be completed.          Additional Information About Your Visit        DroidhenharSlideBatch Information     SNAPP' gives you secure access to your electronic health record. If you see a primary care provider, you can also send messages to your care team and make appointments. If you have questions, please call your primary care clinic.  If you do not have a primary care provider, please call [phone] and they will assist you.        Care EveryWhere ID     This is your Care EveryWhere ID. This could  be used by other organizations to access your [place] medical records  [id number]        Your Vitals Were     Pulse Temperature Pulse Oximetry BMI (Body Mass Index)          72 96.8  F (36  C) (Oral) 97% 40.18 kg/m2         Blood Pressure from Last 3 Encounters:   05/26/17 126/78   04/13/17 112/70   03/09/17 124/76    Weight from Last 3 Encounters:   05/26/17 240 lb (108.9 kg)   04/13/17 242 lb (109.8 kg)   04/12/17 241 lb (109.3 kg)              We Performed the Following     Comprehensive metabolic panel     FOOT EXAM  NO CHARGE [[id number]]     HEMOGLOBIN A1C     OPHTHALMOLOGY ADULT REFERRAL        Primary Care Provider Office Phone # Fax #    Indu Ramos [phone]       [address] [address]        Thank you!     Thank you for choosing [facility]  for your care. Our goal is always to provide you with excellent care. Hearing back from our patients is one way we can continue to improve our services. Please take a few minutes to complete the written survey that you may receive in the mail after your visit with us. Thank you!             Your Updated Medication List - Protect others around you: Learn how to safely use, store and throw away your medicines at www.disposemymeds.org.          This list is accurate as of: 5/26/17 10:01 AM.  Always use your most recent med list.                   Brand Name Dispense Instructions for use    aspirin 81 MG tablet     30 tablet    Take by mouth daily       atenolol 25 MG tablet    TENORMIN    180 tablet    Take 1 tablet (25 mg) by mouth 2 times daily       azaTHIOprine 50 MG tablet    IMURAN     Take 1 tablet by mouth daily Reported on 4/12/2017       BENADRYL ALLERGY 25 MG tablet   Generic drug:  diphenhydrAMINE      Reported on 4/12/2017       blood glucose monitoring lancets     3 Box    1 each 4 times daily Use to test blood sugar 4 times daily or as directed.       cholecalciferol 2000  UNITS tablet     30 tablet    Take 1 tablet by mouth daily       citalopram 40 MG tablet    celeXA    30 tablet    Take 1 tablet (40 mg) by mouth daily       CLARITIN 10 MG tablet   Generic drug:  loratadine      Reported on 4/12/2017       DRAMAMINE PO      as needed       eletriptan 20 MG tablet    RELPAX    12 tablet    take 1-2 tablets by mouth at onset of headache for migraine. may repeat dose in 2 hours. do not exceed 80mg in 24 hours.       fluticasone 110 MCG/ACT Inhaler    FLOVENT HFA    36 Inhaler    Spray 2 puffs in nostril 2 times daily       HUMIRA PEN 40 MG/0.8ML pen kit   Generic drug:  adalimumab      Inject as directed every 14 days       insulin detemir 100 UNIT/ML injection    LEVEMIR FLEXTOUCH    3 mL    Take 52 units of Levemir nightly before bed.       insulin pen needle 31G X 5 MM     200 each    Use 1 pen needles twice daily or as directed.       * LIALDA 1.2 G EC tablet   Generic drug:  mesalamine      Take 1,200 mg by mouth 2 tablets daily       * mesalamine 1000 MG Suppository    CANASA     Place 1,000 mg rectally daily       liraglutide 18 MG/3ML soln    VICTOZA PEN    45 mL    INJECT 1.8 MG SUB-Q ONCE DAILY       losartan 25 MG tablet    COZAAR    15 tablet    TAKE ONE-HALF TABLET BY MOUTH DAILY       methocarbamol 750 MG tablet    ROBAXIN     Reported on 3/9/2017       * order for DME     1 each    Glucometer, brand as covered by insurance.       * order for DME     400 each    Test strips for pt's glucometer, brand as covered by insurance. Test four times daily and prn.       oxyCODONE-acetaminophen 5-325 MG per tablet    PERCOCET         STATIN NOT PRESCRIBED (INTENTIONAL)     0 each    1 each continuous prn Statin not prescribed intentionally due to Refusal by patient and Other:LDL is below 100.       TYLENOL CAPS 500 MG OR      1 CAPSULE EVERY 4 HOURS AS NEEDED       * Notice:  This list has 4 medication(s) that are the same as other medications prescribed for you. Read the  directions carefully, and ask your doctor or other care provider to review them with you.       2018

## 2018-01-05 ENCOUNTER — DOCUMENTATION ONLY (OUTPATIENT)
Dept: SLEEP MEDICINE | Facility: CLINIC | Age: 48
End: 2018-01-05

## 2018-01-05 DIAGNOSIS — E66.01 MORBID OBESITY DUE TO EXCESS CALORIES (H): ICD-10-CM

## 2018-01-05 DIAGNOSIS — G47.33 OSA (OBSTRUCTIVE SLEEP APNEA): ICD-10-CM

## 2018-01-05 NOTE — PROGRESS NOTES
14 DAY STM VISIT    Subjective measures:    Pt states things are going well. She is having irritation from her mask and she doesn't feel like her mask fits the bridge of her nose.   Pt is benefiting from therapy.    Assessment: Pt meeting objective benchmarks.  Patient failing following subjective benchmarks: mask discomfort  (soreness)  Action plan: pt to have 30 day STM visit.    Device type: Auto-CPAP  PAP settings: CPAP min 9 cm  H20     CPAP max 9 cm  H20   Objective measures: 14 day rolling measures      Compliance  100 %      Leak  5.12 lpm  last  upload      AHI 3.15   last  upload      Average number of minutes 437    Diagnostic AHI: 8.9     Objective measure goal  Compliance   Goal >70%  Leak   Goal < 24 lpm  AHI  Goal < 5  Usage  Goal >240

## 2018-01-08 ENCOUNTER — APPOINTMENT (OUTPATIENT)
Dept: SLEEP MEDICINE | Facility: CLINIC | Age: 48
End: 2018-01-08
Payer: COMMERCIAL

## 2018-01-17 ENCOUNTER — OFFICE VISIT (OUTPATIENT)
Dept: OPTOMETRY | Facility: CLINIC | Age: 48
End: 2018-01-17
Payer: COMMERCIAL

## 2018-01-17 DIAGNOSIS — H52.4 MYOPIA OF BOTH EYES WITH ASTIGMATISM AND PRESBYOPIA: ICD-10-CM

## 2018-01-17 DIAGNOSIS — H52.13 MYOPIA OF BOTH EYES WITH ASTIGMATISM AND PRESBYOPIA: ICD-10-CM

## 2018-01-17 DIAGNOSIS — E11.9 TYPE 2 DIABETES MELLITUS WITHOUT RETINOPATHY (H): Primary | ICD-10-CM

## 2018-01-17 DIAGNOSIS — H52.203 MYOPIA OF BOTH EYES WITH ASTIGMATISM AND PRESBYOPIA: ICD-10-CM

## 2018-01-17 PROCEDURE — 92014 COMPRE OPH EXAM EST PT 1/>: CPT | Performed by: OPTOMETRIST

## 2018-01-17 PROCEDURE — 92015 DETERMINE REFRACTIVE STATE: CPT | Performed by: OPTOMETRIST

## 2018-01-17 ASSESSMENT — REFRACTION_WEARINGRX
OS_CYLINDER: +0.75
OD_CYLINDER: +1.00
OS_ADD: +1.75
OD_SPHERE: -5.75
OS_AXIS: 091
OD_AXIS: 096
OS_SPHERE: -4.25
OD_ADD: +1.75

## 2018-01-17 ASSESSMENT — REFRACTION_MANIFEST
OS_CYLINDER: +0.75
OD_ADD: +2.00
OS_AXIS: 095
OD_CYLINDER: +1.00
OD_SPHERE: -5.50
OS_ADD: +2.00
OS_SPHERE: -4.50
OD_AXIS: 100

## 2018-01-17 ASSESSMENT — SLIT LAMP EXAM - LIDS
COMMENTS: NORMAL
COMMENTS: NORMAL

## 2018-01-17 ASSESSMENT — CUP TO DISC RATIO
OS_RATIO: 0.2
OD_RATIO: 0.2

## 2018-01-17 ASSESSMENT — VISUAL ACUITY
OD_SC: 20/200
OS_CC: 20/30 -1
OS_SC: 20/300
OS_SC: 20/120
OD_SC: 20/250
CORRECTION_TYPE: GLASSES
OS_CC: 20/20
METHOD: SNELLEN - LINEAR
OD_CC: 20/20
OD_CC: 20/80

## 2018-01-17 ASSESSMENT — TONOMETRY
IOP_METHOD: APPLANATION
OS_IOP_MMHG: 22
OD_IOP_MMHG: 22

## 2018-01-17 ASSESSMENT — CONF VISUAL FIELD
OS_NORMAL: 1
OD_NORMAL: 1

## 2018-01-17 ASSESSMENT — EXTERNAL EXAM - RIGHT EYE: OD_EXAM: NORMAL

## 2018-01-17 ASSESSMENT — EXTERNAL EXAM - LEFT EYE: OS_EXAM: NORMAL

## 2018-01-17 NOTE — MR AVS SNAPSHOT
After Visit Summary   1/17/2018    Zaira Villatoro    MRN: [id number]           Patient Information     Date Of Birth          1970        Visit Information        Provider Department      1/17/2018 11:40 AM Christine Simmons OD [facility]        Today's Diagnoses     Type 2 diabetes mellitus without retinopathy (H)    -  1    Myopia of both eyes with astigmatism and presbyopia          Care Instructions        Monitor, Keep blood sugar under control  Sent letter to primary care provider regarding diabetes  A final glasses prescription was given.  Allow time for adaptation.  The glasses may cause dizziness and affect depth perception for awhile.  Return to clinic 1 year for Comprehensive Vision Exam      Christine Simmons O.D  [facility] [place]  [address] [place] [address]. LUIS Coley  [id number] [phone]                Follow-ups after your visit        Follow-up notes from your care team     Return in about 1 year (around 1/17/2019) for Eye Exam.      Your next 10 appointments already scheduled     Feb 05, 2018 10:30 AM CST   Return Sleep Patient with Ramana Palencia MD   [facility] ([facility])    [address] [place] [address]   [phone]            Mar 22, 2018 10:00 AM CDT   LAB with [facility]   [facility] ([facility])    [address] [place] [address]  [place] [address] [phone]           Please do not eat 10-12 hours before your appointment if you are coming in fasting for labs on lipids, cholesterol, or glucose (sugar). This does not apply to pregnant women. Water, hot tea and black coffee (with nothing added) are okay. Do not drink other fluids, diet soda or chew gum.              Who to contact     If you have questions or need follow up information about today's clinic visit or your schedule please contact [facility] directly at [phone].  Normal  or non-critical lab and imaging results will be communicated to you by MyChart, letter or phone within 4 business days after the clinic has received the results. If you do not hear from us within 7 days, please contact the clinic through Provesicat or phone. If you have a critical or abnormal lab result, we will notify you by phone as soon as possible.  Submit refill requests through [facility] or call your pharmacy and they will forward the refill request to us. Please allow 3 business days for your refill to be completed.          Additional Information About Your Visit        [employer] Information     [facility] gives you secure access to your electronic health record. If you see a primary care provider, you can also send messages to your care team and make appointments. If you have questions, please call your primary care clinic.  If you do not have a primary care provider, please call [phone] and they will assist you.        Care EveryWhere ID     This is your Care EveryWhere ID. This could be used by other organizations to access your [place] medical records  [id number]         Blood Pressure from Last 3 Encounters:   12/22/17 104/72   12/20/17 105/80   12/11/17 128/84    Weight from Last 3 Encounters:   12/22/17 105.9 kg (233 lb 8 oz)   12/20/17 107.5 kg (237 lb)   12/11/17 107.5 kg (237 lb)              We Performed the Following     EYE EXAM (SIMPLE-NONBILLABLE)     REFRACTION        Primary Care Provider Office Phone # Fax #    Indu DELMAR Ramos [phone]       [facility] [address]        Equal Access to Services     [facility]: Hadii aad ku hadasho Soomaali, waaxda luqadaha, qaybta kaalmada maira matthew . So [facility] [phone].    ATENCIÓN: Si habla español, tiene a matt disposición servicios gratuitos de asistencia lingüística. Llame al [phone].    We comply with applicable federal civil rights laws and [place] laws. We do  not discriminate on the basis of race, color, national origin, age, disability, sex, sexual orientation, or gender identity.            Thank you!     Thank you for choosing [facility]  for your care. Our goal is always to provide you with excellent care. Hearing back from our patients is one way we can continue to improve our services. Please take a few minutes to complete the written survey that you may receive in the mail after your visit with us. Thank you!             Your Updated Medication List - Protect others around you: Learn how to safely use, store and throw away your medicines at www.disposemymeds.org.          This list is accurate as of: 1/17/18 12:40 PM.  Always use your most recent med list.                   Brand Name Dispense Instructions for use Diagnosis    ACCU-CHEK SHARMILA PLUS test strip   Generic drug:  blood glucose monitoring     400 strip    TEST 4 TIMES DAILY AND AS NEEDED    Type 2 diabetes mellitus without complication, with long-term current use of insulin (H)       aspirin 81 MG tablet     30 tablet    Take by mouth daily        atenolol 25 MG tablet    TENORMIN    180 tablet    Take 1 tablet (25 mg) by mouth 2 times daily    Hypertension goal BP (blood pressure) < 140/90       BENADRYL ALLERGY 25 MG tablet   Generic drug:  diphenhydrAMINE      as needed Reported on 4/12/2017        blood glucose monitoring lancets     3 Box    1 each 4 times daily Use to test blood sugar 4 times daily or as directed.    Type 2 diabetes mellitus without complication (H)       cholecalciferol 2000 UNITS tablet     30 tablet    Take 1 tablet by mouth daily        DRAMAMINE PO      as needed        eletriptan 20 MG tablet    RELPAX    12 tablet    take 1-2 tablets by mouth at onset of headache for migraine. may repeat dose in 2 hours. do not exceed 80mg in 24 hours.    Migraine, unspecified, without mention of intractable migraine without mention of status migrainosus       FLUoxetine 40 MG  capsule    PROzac    90 capsule    TAKE 1 CAPSULE BY MOUTH DAILY    Moderate single current episode of major depressive disorder (H)       fluticasone 110 MCG/ACT Inhaler    FLOVENT HFA    36 Inhaler    Spray 2 puffs in nostril 2 times daily    Chronic rhinitis       HUMIRA PEN 40 MG/0.8ML pen kit   Generic drug:  adalimumab      Inject as directed every 14 days        insulin pen needle 31G X 5 MM     200 each    Use 1 pen needles twice daily or as directed.    Type 2 diabetes mellitus without complication (H)       LEVEMIR FLEXTOUCH 100 UNIT/ML injection   Generic drug:  insulin detemir     15 mL    INJECT 52 UNITS SUBCUTANEOUSLY BEFORE BED    Type 2 diabetes mellitus without complication (H)       LIALDA 1.2 G EC tablet   Generic drug:  mesalamine      Take 1,200 mg by mouth 2 tablets daily        losartan 25 MG tablet    COZAAR    15 tablet    TAKE 1/2 TABLET BY MOUTH DAILY    Diabetes type 2, uncontrolled (H), Essential hypertension with goal blood pressure less than 140/90       * order for DME     1 each    Glucometer, brand as covered by insurance.    Type 2 diabetes mellitus without complication (H)       * order for DME     400 each    Test strips for pt's glucometer, brand as covered by insurance. Test four times daily and prn.    Type 2 diabetes mellitus without complication (H)       * order for DME     1 Units    Equipment being ordered: CPAP 9 c,        oxyCODONE-acetaminophen 5-325 MG per tablet    PERCOCET          STATIN NOT PRESCRIBED (INTENTIONAL)     0 each    1 each continuous prn Statin not prescribed intentionally due to Refusal by patient and Other:LDL is below 100.    CARDIOVASCULAR SCREENING; LDL GOAL LESS THAN 100       TYLENOL CAPS 500 MG OR      1 CAPSULE EVERY 4 HOURS AS NEEDED        VICTOZA PEN 18 MG/3ML soln   Generic drug:  liraglutide     27 mL    INJECT 1.8 MG SUB-Q ONCE DAILY    Type 2 diabetes mellitus without complication, with long-term current use of insulin (H)       *  Notice:  This list has 3 medication(s) that are the same as other medications prescribed for you. Read the directions carefully, and ask your doctor or other care provider to review them with you.

## 2018-01-17 NOTE — LETTER
1/17/2018         RE: Zaira Villatoro  [address] [facility] [address]        Dear Colleague,    Thank you for referring your patient, Zaira Villatoro, to the [facility]. Please see a copy of my visit note below.    Chief Complaint   Patient presents with     Eye Exam For Diabetes     Accompanied by self  Lab Results   Component Value Date    A1C 6.8 12/22/2017    A1C 6.6 08/21/2017    A1C 7.0 05/26/2017    A1C 8.1 02/13/2017    A1C 7.7 10/14/2016       Last Eye Exam: 1.5 years ago  Dilated Previously: Yes    What are you currently using to see?  glasses    Distance Vision Acuity: Noticed gradual change in both eyes    Near Vision Acuity: Not satisfied     Eye Comfort: good  Do you use eye drops? 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       OBJECTIVE: See Ophthalmology exam    ASSESSMENT:    ICD-10-CM    1. Type 2 diabetes mellitus without retinopathy (H) E11.9 EYE EXAM (SIMPLE-NONBILLABLE)     REFRACTION   2. Myopia of both eyes with astigmatism and presbyopia H52.13 EYE EXAM (SIMPLE-NONBILLABLE)    H52.203     H52.4       PLAN:  Monitor, Keep blood sugar under control  Sent letter to primary care provider regarding diabetes.   A final glasses prescription was given.  Allow time for adaptation.  The glasses may cause dizziness and affect depth perception for awhile.  Return to clinic 1 year for Comprehensive Vision Exam      Christine Simmons O.D  [address] [place]  [address]    [phone]        Again, thank you for allowing me to participate in the care of your patient.        Sincerely,        Christine Simmons, OD

## 2018-01-17 NOTE — PROGRESS NOTES
Chief Complaint   Patient presents with     Eye Exam For Diabetes     Accompanied by self  Lab Results   Component Value Date    A1C 6.8 12/22/2017    A1C 6.6 08/21/2017    A1C 7.0 05/26/2017    A1C 8.1 02/13/2017    A1C 7.7 10/14/2016       Last Eye Exam: 1.5 years ago  Dilated Previously: Yes    What are you currently using to see?  glasses    Distance Vision Acuity: Noticed gradual change in both eyes    Near Vision Acuity: Not satisfied     Eye Comfort: good  Do you use eye drops? : No  Occupation or Hobbies: sub for school [employer]    Vanessa Bonds, [employer]     Medical, surgical and family histories reviewed and updated 1/17/2018.       OBJECTIVE: See Ophthalmology exam    ASSESSMENT:    ICD-10-CM    1. Type 2 diabetes mellitus without retinopathy (H) E11.9 EYE EXAM (SIMPLE-NONBILLABLE)     REFRACTION   2. Myopia of both eyes with astigmatism and presbyopia H52.13 EYE EXAM (SIMPLE-NONBILLABLE)    H52.203     H52.4       PLAN:  Monitor, Keep blood sugar under control  Sent letter to primary care provider regarding diabetes.   A final glasses prescription was given.  Allow time for adaptation.  The glasses may cause dizziness and affect depth perception for awhile.  Return to clinic 1 year for Comprehensive Vision Exam      Christine Simmons O.D  [facility]  [address] [place] [address]. [facility] [place]  [address] [phone]

## 2018-01-17 NOTE — PATIENT INSTRUCTIONS
Monitor, Keep blood sugar under control  Sent letter to primary care provider regarding diabetes  A final glasses prescription was given.  Allow time for adaptation.  The glasses may cause dizziness and affect depth perception for awhile.  Return to clinic 1 year for Comprehensive Vision Exam      Christine Simmons O.D  [address] [place]  [address]    [phone]

## 2018-01-22 ENCOUNTER — DOCUMENTATION ONLY (OUTPATIENT)
Dept: SLEEP MEDICINE | Facility: CLINIC | Age: 48
End: 2018-01-22

## 2018-01-22 NOTE — PROGRESS NOTES
30 DAY STM VISIT    Subjective measures:   Pt states that things are going ok.  She feels like her mask is leaking and has irritation from her mask.  She's already done a mask exchange so she is going to try putting a t-shirt barrier between her mask and skin to see if that helps.  If not, I will loan her a demo pillow mask to try.     Assessment: Pt meeting objective benchmarks.  Patient failing following subjective benchmarks: mask discomfort  (soreness)  Action plan: pt to have 6 month [facility] visit  Patient has a follow up visit with Dr. Palencia on 2/5/18.   Device type: Auto-CPAP  PAP settings: CPAP min 9 cm  H20     CPAP max 9 cm  H20  Objective measures: 14 day rolling measures      Compliance  78 %      Leak  9.44 lpm  last  upload      AHI 2.57   last  upload      Average number of minutes 343    Diagnostic AHI: 8.9         Objective measure goal  Compliance   Goal >70%  Leak   Goal < 24 lpm  AHI  Goal < 5  Usage  Goal >240

## 2018-02-05 ENCOUNTER — OFFICE VISIT (OUTPATIENT)
Dept: SLEEP MEDICINE | Facility: CLINIC | Age: 48
End: 2018-02-05
Payer: COMMERCIAL

## 2018-02-05 ENCOUNTER — DOCUMENTATION ONLY (OUTPATIENT)
Dept: SLEEP MEDICINE | Facility: CLINIC | Age: 48
End: 2018-02-05
Payer: COMMERCIAL

## 2018-02-05 VITALS
BODY MASS INDEX: 39.78 KG/M2 | HEART RATE: 78 BPM | OXYGEN SATURATION: 97 % | HEIGHT: 64 IN | WEIGHT: 233 LBS | SYSTOLIC BLOOD PRESSURE: 121 MMHG | DIASTOLIC BLOOD PRESSURE: 83 MMHG

## 2018-02-05 DIAGNOSIS — E66.01 MORBID OBESITY DUE TO EXCESS CALORIES (H): ICD-10-CM

## 2018-02-05 DIAGNOSIS — G47.33 OSA (OBSTRUCTIVE SLEEP APNEA): ICD-10-CM

## 2018-02-05 PROCEDURE — 99214 OFFICE O/P EST MOD 30 MIN: CPT | Performed by: INTERNAL MEDICINE

## 2018-02-05 NOTE — MR AVS SNAPSHOT
After Visit Summary   2/5/2018    Zaira Villatoro    MRN: [id number]           Patient Information     Date Of Birth          1970        Visit Information        Provider Department      2/5/2018 10:30 AM Ramana Palencia MD [facility]        Today's Diagnoses     MAHAD (obstructive sleep apnea)        Morbid obesity due to excess calories (H)          Care Instructions      Your BMI is Body mass index is 39.99 kg/(m^2).  Weight management is a personal decision.  If you are interested in exploring weight loss strategies, the following discussion covers the approaches that may be successful. Body mass index (BMI) is one way to tell whether you are at a healthy weight, overweight, or obese. It measures your weight in relation to your height.  A BMI of 18.5 to 24.9 is in the healthy range. A person with a BMI of 25 to 29.9 is considered overweight, and someone with a BMI of 30 or greater is considered obese. More than two-thirds of American adults are considered overweight or obese.  Being overweight or obese increases the risk for further weight gain. Excess weight may lead to heart disease and diabetes.  Creating and following plans for healthy eating and physical activity may help you improve your health.  Weight control is part of healthy lifestyle and includes exercise, emotional health, and healthy eating habits. Careful eating habits lifelong are the mainstay of weight control. Though there are significant health benefits from weight loss, long-term weight loss with diet alone may be very difficult to achieve- studies show long-term success with dietary management in less than 10% of people. Attaining a healthy weight may be especially difficult to achieve in those with severe obesity. In some cases, medications, devices and surgical management might be considered.  What can you do?  If you are overweight or obese and are interested in methods for weight loss, you should discuss  this with your provider.     Consider reducing daily calorie intake by 500 calories.     Keep a food journal.     Avoiding skipping meals, consider cutting portions instead.    Diet combined with exercise helps maintain muscle while optimizing fat loss. Strength training is particularly important for building and maintaining muscle mass. Exercise helps reduce stress, increase energy, and improves fitness. Increasing exercise without diet control, however, may not burn enough calories to loose weight.       Start walking three days a week 10-20 minutes at a time    Work towards walking thirty minutes five days a week     Eventually, increase the speed of your walking for 1-2 minutes at time    In addition, we recommend that you review healthy lifestyles and methods for weight loss available through the National Institutes of Health patient information sites:  http://win.niddk.nih.gov/publications/index.htm    And look into health and wellness programs that may be available through your health insurance provider, employer, local community center, or kareem club.    Weight management plan: Patient was referred to their PCP to discuss a diet and exercise plan.              Follow-ups after your visit        Follow-up notes from your care team     Return in about 6 months (around 8/5/2018) for Routine Visit.      Your next 10 appointments already scheduled     Mar 22, 2018 10:00 AM CDT   LAB with [facility]   [facility]    [address] [id number] [phone]           Please do not eat 10-12 hours before your appointment if you are coming in fasting for labs on lipids, cholesterol, or glucose (sugar). This does not apply to pregnant women. Water, hot tea and black coffee (with nothing added) are okay. Do not drink other fluids, diet soda or chew gum.            Aug 06, 2018 10:00 AM CDT   Return Sleep Patient with Ramana Palencia MD   [facility]  "([facility])    [address] [address] [id number] [phone]              Who to contact     If you have questions or need follow up information about today's clinic visit or your schedule please contact [facility] directly at [phone].  Normal or non-critical lab and imaging results will be communicated to you by MyChart, letter or phone within 4 business days after the clinic has received the results. If you do not hear from us within 7 days, please contact the clinic through [employer] or phone. If you have a critical or abnormal lab result, we will notify you by phone as soon as possible.  Submit refill requests through [employer] or call your pharmacy and they will forward the refill request to us. Please allow 3 business days for your refill to be completed.          Additional Information About Your Visit        [employer] Information     [employer] gives you secure access to your electronic health record. If you see a primary care provider, you can also send messages to your care team and make appointments. If you have questions, please call your primary care clinic.  If you do not have a primary care provider, please call [phone] and they will assist you.        Care EveryWhere ID     This is your Care EveryWhere ID. This could be used by other organizations to access your [place] medical records  [id number]        Your Vitals Were     Pulse Height Pulse Oximetry BMI (Body Mass Index)          78 1.626 m (5' 4\") 97% 39.99 kg/m2         Blood Pressure from Last 3 Encounters:   02/05/18 121/83   12/22/17 104/72   12/20/17 105/80    Weight from Last 3 Encounters:   02/05/18 105.7 kg (233 lb)   12/22/17 105.9 kg (233 lb 8 oz)   12/20/17 107.5 kg (237 lb)              Today, you had the following     No orders found for display       Primary Care Provider Office Phone # Fax #    Indu Ramos [phone]       [address] [facility] [address]        Equal Access to Services     VICENTE RIBERA : Hadii aad ku hadmarylinjitendra Tomlin, wajodeeda janny, qajudithta cherieana rosageneva matthew, maira jerrynicolecharlotte mcghee. So [facility] [phone].    ATENCIÓN: Si habla español, tiene a matt disposición servicios gratuitos de asistencia lingüística. [facility] [phone].    We comply with applicable federal civil rights laws and [place] laws. We do not discriminate on the basis of race, color, national origin, age, disability, sex, sexual orientation, or gender identity.            Thank you!     Thank you for choosing [facility]  for your care. Our goal is always to provide you with excellent care. Hearing back from our patients is one way we can continue to improve our services. Please take a few minutes to complete the written survey that you may receive in the mail after your visit with us. Thank you!             Your Updated Medication List - Protect others around you: Learn how to safely use, store and throw away your medicines at www.disposemymeds.org.          This list is accurate as of 2/5/18 11:16 AM.  Always use your most recent med list.                   Brand Name Dispense Instructions for use Diagnosis    ACCU-CHEK SHARMILA PLUS test strip   Generic drug:  blood glucose monitoring     400 strip    TEST 4 TIMES DAILY AND AS NEEDED    Type 2 diabetes mellitus without complication, with long-term current use of insulin (H)       aspirin 81 MG tablet     30 tablet    Take by mouth daily        atenolol 25 MG tablet    TENORMIN    180 tablet    Take 1 tablet (25 mg) by mouth 2 times daily    Hypertension goal BP (blood pressure) < 140/90       B-D U/F 31G X 5 MM   Generic drug:  insulin pen needle     180 each    USE TWICE DAILY (OR AS DIRECTED)    Type 2 diabetes mellitus without complication (H)       BENADRYL ALLERGY 25 MG tablet   Generic drug:  diphenhydrAMINE      as needed Reported on 4/12/2017         blood glucose monitoring lancets     3 Box    1 each 4 times daily Use to test blood sugar 4 times daily or as directed.    Type 2 diabetes mellitus without complication (H)       cholecalciferol 2000 UNITS tablet     30 tablet    Take 1 tablet by mouth daily        DRAMAMINE PO      as needed        eletriptan 20 MG tablet    RELPAX    12 tablet    take 1-2 tablets by mouth at onset of headache for migraine. may repeat dose in 2 hours. do not exceed 80mg in 24 hours.    Migraine, unspecified, without mention of intractable migraine without mention of status migrainosus       FLUoxetine 40 MG capsule    PROzac    90 capsule    TAKE 1 CAPSULE BY MOUTH DAILY    Moderate single current episode of major depressive disorder (H)       fluticasone 110 MCG/ACT Inhaler    FLOVENT HFA    36 Inhaler    Spray 2 puffs in nostril 2 times daily    Chronic rhinitis       HUMIRA PEN 40 MG/0.8ML pen kit   Generic drug:  adalimumab      Inject as directed every 14 days        * LEVEMIR FLEXTOUCH 100 UNIT/ML injection   Generic drug:  insulin detemir     15 mL    INJECT 52 UNITS SUBCUTANEOUSLY BEFORE BED    Type 2 diabetes mellitus without complication (H)       * LEVEMIR FLEXTOUCH 100 UNIT/ML injection   Generic drug:  insulin detemir     16 mL    INJECT 52 UNITS SUBCUTANEOUSLY BEFORE BED    Type 2 diabetes mellitus without complication (H)       LIALDA 1.2 G EC tablet   Generic drug:  mesalamine      Take 1,200 mg by mouth 2 tablets daily        losartan 25 MG tablet    COZAAR    15 tablet    TAKE 1/2 TABLET BY MOUTH DAILY    Diabetes type 2, uncontrolled (H), Essential hypertension with goal blood pressure less than 140/90       * order for DME     1 each    Glucometer, brand as covered by insurance.    Type 2 diabetes mellitus without complication (H)       * order for DME     400 each    Test strips for pt's glucometer, brand as covered by insurance. Test four times daily and prn.    Type 2 diabetes mellitus without complication  (H)       * order for DME     1 Units    Equipment being ordered: CPAP 9 c,        oxyCODONE-acetaminophen 5-325 MG per tablet    PERCOCET          STATIN NOT PRESCRIBED (INTENTIONAL)     0 each    1 each continuous prn Statin not prescribed intentionally due to Refusal by patient and Other:LDL is below 100.    CARDIOVASCULAR SCREENING; LDL GOAL LESS THAN 100       TYLENOL CAPS 500 MG OR      1 CAPSULE EVERY 4 HOURS AS NEEDED        VICTOZA PEN 18 MG/3ML soln   Generic drug:  liraglutide     27 mL    INJECT 1.8 MG SUB-Q ONCE DAILY    Type 2 diabetes mellitus without complication, with long-term current use of insulin (H)       * Notice:  This list has 5 medication(s) that are the same as other medications prescribed for you. Read the directions carefully, and ask your doctor or other care provider to review them with you.

## 2018-02-05 NOTE — PROGRESS NOTES
Patient picked up the Resmed Airfit P10 nasal pillow demo mask that Jessica left with me. Patient was shown how to put the mask on and remove the parts.
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
## 2018-02-05 NOTE — NURSING NOTE
"Chief Complaint   Patient presents with     CPAP Follow Up       Initial /83  Pulse 78  Ht 1.626 m (5' 4\")  Wt 105.7 kg (233 lb)  SpO2 97%  BMI 39.99 kg/m2 Estimated body mass index is 39.99 kg/(m^2) as calculated from the following:    Height as of this encounter: 1.626 m (5' 4\").    Weight as of this encounter: 105.7 kg (233 lb).  Medication Reconciliation: complete    "

## 2018-02-05 NOTE — PATIENT INSTRUCTIONS

## 2018-02-07 ENCOUNTER — OFFICE VISIT (OUTPATIENT)
Dept: FAMILY MEDICINE | Facility: CLINIC | Age: 48
End: 2018-02-07
Payer: COMMERCIAL

## 2018-02-07 VITALS
BODY MASS INDEX: 40.96 KG/M2 | TEMPERATURE: 99 F | HEART RATE: 82 BPM | SYSTOLIC BLOOD PRESSURE: 106 MMHG | OXYGEN SATURATION: 96 % | DIASTOLIC BLOOD PRESSURE: 74 MMHG | WEIGHT: 238.6 LBS

## 2018-02-07 DIAGNOSIS — R07.0 THROAT PAIN: ICD-10-CM

## 2018-02-07 DIAGNOSIS — J01.01 ACUTE RECURRENT MAXILLARY SINUSITIS: Primary | ICD-10-CM

## 2018-02-07 LAB
DEPRECATED S PYO AG THROAT QL EIA: NORMAL
SPECIMEN SOURCE: NORMAL

## 2018-02-07 PROCEDURE — 99213 OFFICE O/P EST LOW 20 MIN: CPT | Performed by: NURSE PRACTITIONER

## 2018-02-07 PROCEDURE — 87880 STREP A ASSAY W/OPTIC: CPT | Performed by: NURSE PRACTITIONER

## 2018-02-07 PROCEDURE — 87081 CULTURE SCREEN ONLY: CPT | Performed by: NURSE PRACTITIONER

## 2018-02-07 RX ORDER — FLUTICASONE PROPIONATE 50 MCG
2 SPRAY, SUSPENSION (ML) NASAL DAILY
Qty: 16 G | Refills: 3 | Status: SHIPPED | OUTPATIENT
Start: 2018-02-07 | End: 2018-08-01

## 2018-02-07 NOTE — PATIENT INSTRUCTIONS
[facility] [place]    If you have any questions regarding to your visit please contact your care team:     Team Pink:   Clinic Hours Telephone Number   Internal Medicine:  Dr. Tayler Garibay NP       7am-7pm  Monday - Thursday   7am-5pm  Fridays  [phone]  (Appointment scheduling available 24/7)    Questions about your visit?  Team Line  [phone]   [facility] [place] [facility] - 11am-9pm Monday-Friday Saturday-Sunday- 9am-5pm   [place] - 5pm-9pm Monday-Friday Saturday-Sunday- 9am-5pm  [phone] - [place]   [phone] - [place]       What options do I have for visits at the clinic other than the traditional office visit?  To expand how we care for you, many of our providers are utilizing electronic visits (e-visits) and telephone visits, when medically appropriate, for interactions with their patients rather than a visit in the clinic.   We also offer nurse visits for many medical concerns. Just like any other service, we will bill your insurance company for this type of visit based on time spent on the phone with your provider. Not all insurance companies cover these visits. Please check with your medical insurance if this type of visit is covered. You will be responsible for any charges that are not paid by your insurance.      E-visits via [employer]):  generally incur a $35.00 fee.  Telephone visits:  Time spent on the phone: *charged based on time that is spent on the phone in increments of 10 minutes. Estimated cost:   5-10 mins $30.00   11-20 mins. $59.00   21-30 mins. $85.00   Use [employer] networkt (secure email communication and access to your chart) to send your primary care provider a message or make an appointment. Ask someone on your Team how to sign up for [employer]    For a Price Quote for your services, please call our Consumer Price Line at [phone].    As always, Thank you for trusting us with your health care  needs!    Elda Patiño CMA

## 2018-02-07 NOTE — MR AVS SNAPSHOT
After Visit Summary   2/7/2018    Zaira Villatoro    MRN: [id number]           Patient Information     Date Of Birth          1970        Visit Information        Provider Department      2/7/2018 10:00 AM Maria C Garibay APRN [facility]        Today's Diagnoses     Acute recurrent maxillary sinusitis    -  1    Throat pain          Care Instructions    [facility]    If you have any questions regarding to your visit please contact your care team:     Team Pink:   Clinic Hours Telephone Number   Internal Medicine:  Dr. Tayler Garibay, NP       7am-7pm  Monday - Thursday   7am-5pm  Fridays  [phone]  (Appointment scheduling available 24/7)    Questions about your visit?  Team Line  [phone]   Urgent Care - [place] [facility] - 11am-9pm Monday-Friday Saturday-Sunday- 9am-5pm   [place] - 5pm-9pm Monday-Friday Saturday-Sunday- 9am-5pm  [phone] - [place]   [phone] - [place]       What options do I have for visits at the clinic other than the traditional office visit?  To expand how we care for you, many of our providers are utilizing electronic visits (e-visits) and telephone visits, when medically appropriate, for interactions with their patients rather than a visit in the clinic.   We also offer nurse visits for many medical concerns. Just like any other service, we will bill your insurance company for this type of visit based on time spent on the phone with your provider. Not all insurance companies cover these visits. Please check with your medical insurance if this type of visit is covered. You will be responsible for any charges that are not paid by your insurance.      E-visits via [employer]:  generally incur a $35.00 fee.  Telephone visits:  Time spent on the phone: *charged based on time that is spent on the phone in increments of 10 minutes. Estimated cost:   5-10 mins  $30.00   11-20 mins. $59.00   21-30 mins. $85.00   Use [employer]une [employer] (secure email communication and access to your chart) to send your primary care provider a message or make an appointment. Ask someone on your Team how to sign up for [employer].    For a Price Quote for your services, please call our Consumer Price Line at [phone].    As always, Thank you for trusting us with your health care needs!    Elda Patiño CMA             Follow-ups after your visit        Your next 10 appointments already scheduled     Mar 22, 2018 10:00 AM CDT   LAB with [facility] LAB   [facility] ([facility])    [address] [place] [id number] [phone]           Please do not eat 10-12 hours before your appointment if you are coming in fasting for labs on lipids, cholesterol, or glucose (sugar). This does not apply to pregnant women. Water, hot tea and black coffee (with nothing added) are okay. Do not drink other fluids, diet soda or chew gum.            Aug 06, 2018 10:00 AM CDT   Return Sleep Patient with Ramana Palencia MD   [facility] ([facility])    [address] [place] [address] [phone]              Who to contact     If you have questions or need follow up information about today's clinic visit or your schedule please contact [facility] directly at [phone].  Normal or non-critical lab and imaging results will be communicated to you by [employer], letter or phone within 4 business days after the clinic has received the results. If you do not hear from us within 7 days, please contact the clinic through [employer] or phone. If you have a critical or abnormal lab result, we will notify you by phone as soon as possible.  Submit refill requests through [employer] or call your pharmacy and they will forward the refill request to us. Please allow 3 business days for your refill to be completed.           Additional Information About Your Visit        Runn[employer] Information     [email or web address] gives you secure access to your electronic health record. If you see a primary care provider, you can also send messages to your care team and make appointments. If you have questions, please call your primary care clinic.  If you do not have a primary care provider, please call [phone] and they will assist you.        Care EveryWhere ID     This is your Care EveryWhere ID. This could be used by other organizations to access your [facility] medical records  [id number]        Your Vitals Were     Pulse Temperature Pulse Oximetry BMI (Body Mass Index)          82 99  F (37.2  C) (Oral) 96% 40.96 kg/m2         Blood Pressure from Last 3 Encounters:   02/07/18 106/74   02/05/18 121/83   12/22/17 104/72    Weight from Last 3 Encounters:   02/07/18 238 lb 9.6 oz (108.2 kg)   02/05/18 233 lb (105.7 kg)   12/22/17 233 lb 8 oz (105.9 kg)              We Performed the Following     Strep, Rapid Screen          Today's Medication Changes          These changes are accurate as of 2/7/18 10:25 AM.  If you have any questions, ask your nurse or doctor.               Start taking these medicines.        Dose/Directions    amoxicillin-clavulanate 875-125 MG per tablet   Commonly known as:  AUGMENTIN   Used for:  Acute recurrent maxillary sinusitis   Started by:  Maria C Garibay APRN CNP        Dose:  1 tablet   Take 1 tablet by mouth 2 times daily for 10 days   Quantity:  20 tablet   Refills:  0       fluticasone 50 MCG/ACT spray   Commonly known as:  FLONASE   Used for:  Acute recurrent maxillary sinusitis   Started by:  Maria C Garibay APRN CNP        Dose:  2 spray   Spray 2 sprays into both nostrils daily   Quantity:  16 g   Refills:  3            Where to get your medicines      These medications were sent to [facility] - [address]  [address], [place] [address]      Phone:  [phone]     amoxicillin-clavulanate 875-125 MG per tablet    fluticasone 50 MCG/ACT spray                Primary Care Provider Office Phone # Fax #    Indu Ramos [phone] [address] [address]        Equal Access to Services     [facility]: Hadii aad ku hadasho Soomaali, waaxda luqadaha, qaybta kaalmada adeegyada, waxay idiin hayaan adeeg rosalinonicolecharlotte laamauryn . So [facility] [phone].    ATENCIÓN: Si habla español, tiene a matt disposición servicios gratuitos de asistencia lingüística. [facility] [phone].    We comply with applicable federal civil rights laws and [place] laws. We do not discriminate on the basis of race, color, national origin, age, disability, sex, sexual orientation, or gender identity.            Thank you!     Thank you for choosing [facility]  for your care. Our goal is always to provide you with excellent care. Hearing back from our patients is one way we can continue to improve our services. Please take a few minutes to complete the written survey that you may receive in the mail after your visit with us. Thank you!             Your Updated Medication List - Protect others around you: Learn how to safely use, store and throw away your medicines at www.disposemymeds.org.          This list is accurate as of 2/7/18 10:25 AM.  Always use your most recent med list.                   Brand Name Dispense Instructions for use Diagnosis    ACCU-CHEK SHARMILA PLUS test strip   Generic drug:  blood glucose monitoring     400 strip    TEST 4 TIMES DAILY AND AS NEEDED    Type 2 diabetes mellitus without complication, with long-term current use of insulin (H)       amoxicillin-clavulanate 875-125 MG per tablet    AUGMENTIN    20 tablet    Take 1 tablet by mouth 2 times daily for 10 days    Acute recurrent maxillary sinusitis       aspirin 81 MG tablet     30 tablet    Take by mouth daily        atenolol 25 MG tablet    TENORMIN     180 tablet    Take 1 tablet (25 mg) by mouth 2 times daily    Hypertension goal BP (blood pressure) < 140/90       B-D U/F 31G X 5 MM   Generic drug:  insulin pen needle     180 each    USE TWICE DAILY (OR AS DIRECTED)    Type 2 diabetes mellitus without complication (H)       BENADRYL ALLERGY 25 MG tablet   Generic drug:  diphenhydrAMINE      as needed Reported on 4/12/2017        blood glucose monitoring lancets     3 Box    1 each 4 times daily Use to test blood sugar 4 times daily or as directed.    Type 2 diabetes mellitus without complication (H)       cholecalciferol 2000 UNITS tablet     30 tablet    Take 1 tablet by mouth daily        DRAMAMINE PO      as needed        eletriptan 20 MG tablet    RELPAX    12 tablet    take 1-2 tablets by mouth at onset of headache for migraine. may repeat dose in 2 hours. do not exceed 80mg in 24 hours.    Migraine, unspecified, without mention of intractable migraine without mention of status migrainosus       FLUoxetine 40 MG capsule    PROzac    90 capsule    TAKE 1 CAPSULE BY MOUTH DAILY    Moderate single current episode of major depressive disorder (H)       fluticasone 110 MCG/ACT Inhaler    FLOVENT HFA    36 Inhaler    Spray 2 puffs in nostril 2 times daily    Chronic rhinitis       fluticasone 50 MCG/ACT spray    FLONASE    16 g    Spray 2 sprays into both nostrils daily    Acute recurrent maxillary sinusitis       HUMIRA PEN 40 MG/0.8ML pen kit   Generic drug:  adalimumab      Inject as directed every 14 days        * LEVEMIR FLEXTOUCH 100 UNIT/ML injection   Generic drug:  insulin detemir     15 mL    INJECT 52 UNITS SUBCUTANEOUSLY BEFORE BED    Type 2 diabetes mellitus without complication (H)       * LEVEMIR FLEXTOUCH 100 UNIT/ML injection   Generic drug:  insulin detemir     16 mL    INJECT 52 UNITS SUBCUTANEOUSLY BEFORE BED    Type 2 diabetes mellitus without complication (H)       LIALDA 1.2 G EC tablet   Generic drug:  mesalamine      Take 1,200 mg by  mouth 2 tablets daily        losartan 25 MG tablet    COZAAR    15 tablet    TAKE 1/2 TABLET BY MOUTH DAILY    Diabetes type 2, uncontrolled (H), Essential hypertension with goal blood pressure less than 140/90       * order for DME     1 each    Glucometer, brand as covered by insurance.    Type 2 diabetes mellitus without complication (H)       * order for DME     400 each    Test strips for pt's glucometer, brand as covered by insurance. Test four times daily and prn.    Type 2 diabetes mellitus without complication (H)       * order for DME     1 Units    Equipment being ordered: CPAP 9 c,        oxyCODONE-acetaminophen 5-325 MG per tablet    PERCOCET          STATIN NOT PRESCRIBED (INTENTIONAL)     0 each    1 each continuous prn Statin not prescribed intentionally due to Refusal by patient and Other:LDL is below 100.    CARDIOVASCULAR SCREENING; LDL GOAL LESS THAN 100       TYLENOL CAPS 500 MG OR      1 CAPSULE EVERY 4 HOURS AS NEEDED        VICTOZA PEN 18 MG/3ML soln   Generic drug:  liraglutide     27 mL    INJECT 1.8 MG SUB-Q ONCE DAILY    Type 2 diabetes mellitus without complication, with long-term current use of insulin (H)       * Notice:  This list has 5 medication(s) that are the same as other medications prescribed for you. Read the directions carefully, and ask your doctor or other care provider to review them with you.

## 2018-02-07 NOTE — PROGRESS NOTES
SUBJECTIVE:   Zaira Villatoro is a 47 year old female who presents to clinic today for the following health issues:      ENT Symptoms             Symptoms: cc Present Absent Comment   Fever/Chills   no    Fatigue  x     Muscle Aches   x    Eye Irritation   x    Sneezing  x     Nasal Scout/Drg  x     Sinus Pressure/Pain  x     Loss of smell  x     Dental pain  x     Sore Throat  x  hoarseness   Swollen Glands  x     Ear Pain/Fullness   x    Cough  x  Non-productive, minimal coughing   Wheeze   x    Chest Pain   x    Shortness of breath   x    Rash   x    Other   x      Symptom duration:  one week   Symptom severity:  moderate   Treatments tried:  tylenol, listerine   Contacts:   and daughter     Patient has ulcerative colitis.  Symptoms are at baseline for her.  She has not noticed a flare with antibiotic use in the past.    Problem list and histories reviewed & adjusted, as indicated.  Additional history: as documented    Patient Active Problem List   Diagnosis     IBS (irritable bowel syndrome)     Migraine     Ulcerative colitis (H)     Fatty liver     CARDIOVASCULAR SCREENING; LDL GOAL LESS THAN 100     Hypertension goal BP (blood pressure) < 140/90     Type 2 diabetes mellitus without complication (H)     Morbid obesity due to excess calories (H)     Moderate single current episode of major depressive disorder (H)     Incisional hernia, without obstruction or gangrene     Low back pain     MAHAD (obstructive sleep apnea)- severe (AHI 80)     Type 2 diabetes mellitus without retinopathy (H)     Past Surgical History:   Procedure Laterality Date     APPENDECTOMY  04/2014     ARTHROSCOPY KNEE RT/LT  2004    RT      BIOPSY  2011 many 2011 and on     COLONOSCOPY  02/2012    lt sided colitis     COLONOSCOPY  1/9/2014     CRYOTHERAPY, CERVICAL  1988     EXPLORATORY LAPAROTOMY, PARTIAL LEFT ROLANDA COLLECTOMY WITH HARTMANS PROCEDURE, COLOSTOMY  8/2013    lt rolanda colectomy, bowel perforation, colostomy,  Karen's pouche     GI SURGERY  2013    abrupted  bowl     HC TOOTH EXTRACTION W/FORCEP  6/2003    Abscess Tooth / Hospitalized     HERNIA REPAIR  04/2014    found at time of takedown     SINUS SURGERY  2/11/03    LT sinus cyst removal      TAKEDOWN COLOSTOMY  04/2014       Social History   Substance Use Topics     Smoking status: Former Smoker     Packs/day: 1.00     Years: 15.00     Types: Cigarettes     Start date: 1/1/1985     Quit date: 7/1/1998     Smokeless tobacco: Never Used      Comment: soke free household.     Alcohol use 0.0 oz/week      Comment: occ     Family History   Problem Relation Age of Onset     DIABETES Mother      Allergies Mother      Asthma Mother      Arthritis Mother      HEART DISEASE Mother      heart murmur     Depression Mother      Obesity Mother      Eye Disorder Father      DIABETES Father      CEREBROVASCULAR DISEASE Father      HEART DISEASE Father      Hypertension Father      DIABETES Maternal Grandmother      CEREBROVASCULAR DISEASE Maternal Grandfather      Unknown/Adopted Paternal Grandmother      HEART DISEASE Paternal Grandfather      left ventrical failure     CEREBROVASCULAR DISEASE Paternal Grandfather      Gynecology Sister      endometriosis     Depression Sister      Asthma Daughter      Breast Cancer Maternal Aunt      Thyroid Disease Maternal Aunt      Breast Cancer Other      fathers sister     Anesthesia Reaction Other      Thyroid Disease Other      OSTEOPOROSIS Other      Asthma Daughter      Breast Cancer Other      mothers sister     Glaucoma No family hx of      Macular Degeneration No family hx of          Current Outpatient Prescriptions   Medication Sig Dispense Refill     amoxicillin-clavulanate (AUGMENTIN) 875-125 MG per tablet Take 1 tablet by mouth 2 times daily for 10 days 20 tablet 0     fluticasone (FLONASE) 50 MCG/ACT spray Spray 2 sprays into both nostrils daily 16 g 3     LEVEMIR FLEXTOUCH 100 UNIT/ML injection INJECT 52 UNITS SUBCUTANEOUSLY  BEFORE BED 16 mL 4     B-D U/F insulin pen needle USE TWICE DAILY (OR AS DIRECTED) 180 each 1     VICTOZA PEN 18 MG/3ML soln INJECT 1.8 MG SUB-Q ONCE DAILY 27 mL 0     losartan (COZAAR) 25 MG tablet TAKE 1/2 TABLET BY MOUTH DAILY 15 tablet 5     FLUoxetine (PROZAC) 40 MG capsule TAKE 1 CAPSULE BY MOUTH DAILY 90 capsule 1     atenolol (TENORMIN) 25 MG tablet Take 1 tablet (25 mg) by mouth 2 times daily 180 tablet 3     order for DME Equipment being ordered: CPAP 9 c, 1 Units 0     LEVEMIR FLEXTOUCH 100 UNIT/ML injection INJECT 52 UNITS SUBCUTANEOUSLY BEFORE BED 15 mL 3     ACCU-CHEK SHARMILA PLUS test strip TEST 4 TIMES DAILY AND AS NEEDED 400 strip 1     blood glucose monitoring (ACCU-CHEK FASTCLIX) lancets 1 each 4 times daily Use to test blood sugar 4 times daily or as directed. 3 Box 1     HUMIRA PEN 40 MG/0.8ML pen kit Inject as directed every 14 days  2     diphenhydrAMINE (BENADRYL ALLERGY) 25 MG tablet as needed Reported on 4/12/2017       order for DME Glucometer, brand as covered by insurance. 1 each 0     order for DME Test strips for pt's glucometer, brand as covered by insurance. Test four times daily and prn. 400 each 4     oxyCODONE-acetaminophen (PERCOCET) 5-325 MG per tablet   0     fluticasone (FLOVENT HFA) 110 MCG/ACT inhaler Spray 2 puffs in nostril 2 times daily 36 Inhaler 1     eletriptan (RELPAX) 20 MG tablet take 1-2 tablets by mouth at onset of headache for migraine. may repeat dose in 2 hours. do not exceed 80mg in 24 hours. 12 tablet 1     cholecalciferol 2000 UNITS tablet Take 1 tablet by mouth daily  30 tablet      mesalamine (LIALDA) 1.2 G EC tablet Take 1,200 mg by mouth 2 tablets daily       aspirin 81 MG tablet Take by mouth daily 30 tablet 3     STATIN NOT PRESCRIBED, INTENTIONAL, 1 each continuous prn Statin not prescribed intentionally due to Refusal by patient and Other:LDL is below 100. 0 each 0     TYLENOL CAPS 500 MG OR 1 CAPSULE EVERY 4 HOURS AS NEEDED       DRAMAMINE OR as  needed       Allergies   Allergen Reactions     Demerol      Very low blood pressure     Metformin GI Disturbance     Nubain  [Nalbuphine]      Topamax [Topiramate] Other (See Comments)     Sleepy and confused.     Tylenol Sinus [Tylenol Sinus Max]      Feet swelling     BP Readings from Last 3 Encounters:   02/07/18 106/74   02/05/18 121/83   12/22/17 104/72    Wt Readings from Last 3 Encounters:   02/07/18 238 lb 9.6 oz (108.2 kg)   02/05/18 233 lb (105.7 kg)   12/22/17 233 lb 8 oz (105.9 kg)                  Labs reviewed in EPIC    Reviewed and updated as needed this visit by clinical staff       Reviewed and updated as needed this visit by Provider         ROS:  Constitutional, HEENT, cardiovascular, pulmonary, gi and gu systems are negative, except as otherwise noted.    OBJECTIVE:     /74  Pulse 82  Temp 99  F (37.2  C) (Oral)  Wt 238 lb 9.6 oz (108.2 kg)  SpO2 96%  BMI 40.96 kg/m2  Body mass index is 40.96 kg/(m^2).  GENERAL: healthy, alert and no distress  EYES: Eyes grossly normal to inspection, PERRL and conjunctivae and sclerae normal  HENT: normal cephalic/atraumatic, both ears: clear effusion, nose and mouth without ulcers or lesions, nasal mucosa edematous , oropharynx clear and oral mucous membranes moist  NECK: no adenopathy, no asymmetry, masses, or scars and thyroid normal to palpation  RESP: lungs clear to auscultation - no rales, rhonchi or wheezes  CV: regular rate and rhythm, normal S1 S2, no S3 or S4, no murmur, click or rub, no peripheral edema and peripheral pulses strong  MS: no gross musculoskeletal defects noted, no edema    Diagnostic Test Results:  Results for orders placed or performed in visit on 02/07/18 (from the past 24 hour(s))   Strep, Rapid Screen   Result Value Ref Range    Specimen Description Throat     Rapid Strep A Screen       NEGATIVE: No Group A streptococcal antigen detected by immunoassay, await culture report.       ASSESSMENT/PLAN:     1. Acute recurrent  maxillary sinusitis    - amoxicillin-clavulanate (AUGMENTIN) 875-125 MG per tablet; Take 1 tablet by mouth 2 times daily for 10 days  Dispense: 20 tablet; Refill: 0  - fluticasone (FLONASE) 50 MCG/ACT spray; Spray 2 sprays into both nostrils daily  Dispense: 16 g; Refill: 3    2. Throat pain    - Strep, Rapid Screen  - Beta strep group A culture    FUTURE APPOINTMENTS:       - Follow-up for annual visit or as needed    LINUS Ch[facility]

## 2018-02-08 ENCOUNTER — TELEPHONE (OUTPATIENT)
Dept: INTERNAL MEDICINE | Facility: CLINIC | Age: 48
End: 2018-02-08

## 2018-02-08 LAB
BACTERIA SPEC CULT: NORMAL
SPECIMEN SOURCE: NORMAL

## 2018-02-08 NOTE — TELEPHONE ENCOUNTER
Reason for Call:  Other call back    Detailed comments: Patient calling taking antibiotic and nasal spray given for sinus infection, now having hard time breathing. Transferring call to nurse    Phone Number Patient can be reached at: Home number on file [phone] (home)    Best Time: N/A    Can we leave a detailed message on this number? Not Applicable    Call taken on 2/8/2018 at 5:35 PM by Melissa Kebede

## 2018-02-08 NOTE — TELEPHONE ENCOUNTER
Zaira Villatoro is a 47 year old female who calls with pain in throat when breathing and swallowing feels like its raw.  Coughing and this causes headache.        NURSING ASSESSMENT:   Description:  Worse throat pain since visit yesterday   Onset/duration:  1 day  Precip. factors:  Sinus infection   Associated symptoms:  Throat pain  Improves/worsens symptoms:  none  Pain scale (0-10)   8/10  Last exam/Treatment:  2/7/18  Allergies:   Allergies   Allergen Reactions     Demerol      Very low blood pressure     Metformin GI Disturbance     Nubain  [Nalbuphine]      Topamax [Topiramate] Other (See Comments)     Sleepy and confused.     Tylenol Sinus [Tylenol Sinus Max]      Feet swelling       MEDICATIONS:   Taking medication(s) as prescribed? Yes  Taking over the counter medication(s?) Yes  Any medication side effects? No significant side effects    Any barriers to taking medication(s) as prescribed?  No  Medication(s) improving/managing symptoms?  No  Medication reconciliation completed: Yes    Huddled with Dr. Ramos:   Nursing advice to patient pt can take Tylenol for pain, reccomened OTC Chloraseptic for throat pain.  advsied pt to go to ED for worsening symptoms or SOB/ Chest pain    RECOMMENDED DISPOSITION:  Home care advice - see above   Will comply with recommendation: Yes  If further questions/concerns or if symptoms do not improve, worsen or new symptoms develop, call your PCP or [facility] as soon as possible.      Guideline used:  Telephone Triage Protocols for Nurses, Fourth Edition, Karen Malave RN

## 2018-02-09 ENCOUNTER — TELEPHONE (OUTPATIENT)
Dept: INTERNAL MEDICINE | Facility: CLINIC | Age: 48
End: 2018-02-09

## 2018-02-09 NOTE — TELEPHONE ENCOUNTER
Patient notified of providers message as written.  Patient scheduled for OV on 2/12/18.  Pati Malave RN

## 2018-02-09 NOTE — TELEPHONE ENCOUNTER
Spoke with patient she went to [facility] ED last night and was told she has bronchitis, negative flu.  Patient was given prescription for Ventolin inhaler and Afrin. And was given neb at hospital visit- this helped the wheezing some.    Patient was advised to f/u with PCP in 3 days and would like to know if she can be fit in for visit.   There are 15 min and same day spots open- please advise   Pati Malave RN

## 2018-02-09 NOTE — TELEPHONE ENCOUNTER
Reason for Call:  Other call back    Detailed comments: Patient was seen at [facility] ER 02/09/18 and was told to follow up with her doctor. The first appointment with Dr. Ramos is not until 02/26/18. I did offer another provider but patient wanted message sent to let Dr. Ramos know there were no appointments available with her and to find out what she should do. Please call    Phone Number Patient can be reached at: Home number on file [phone] (home)    Best Time: ASAP    Can we leave a detailed message on this number? YES    Call taken on 2/9/2018 at 9:45 AM by Melissa Kebede

## 2018-02-12 ENCOUNTER — OFFICE VISIT (OUTPATIENT)
Dept: FAMILY MEDICINE | Facility: CLINIC | Age: 48
End: 2018-02-12
Payer: COMMERCIAL

## 2018-02-12 VITALS
DIASTOLIC BLOOD PRESSURE: 82 MMHG | SYSTOLIC BLOOD PRESSURE: 118 MMHG | RESPIRATION RATE: 16 BRPM | HEART RATE: 82 BPM | BODY MASS INDEX: 39.48 KG/M2 | TEMPERATURE: 98.5 F | OXYGEN SATURATION: 100 % | WEIGHT: 230 LBS

## 2018-02-12 DIAGNOSIS — E11.9 TYPE 2 DIABETES MELLITUS WITHOUT COMPLICATION, WITH LONG-TERM CURRENT USE OF INSULIN (H): Chronic | ICD-10-CM

## 2018-02-12 DIAGNOSIS — K51.911 ULCERATIVE COLITIS WITH RECTAL BLEEDING, UNSPECIFIED LOCATION (H): Chronic | ICD-10-CM

## 2018-02-12 DIAGNOSIS — R53.1 WEAKNESS: ICD-10-CM

## 2018-02-12 DIAGNOSIS — Z79.4 TYPE 2 DIABETES MELLITUS WITHOUT COMPLICATION, WITH LONG-TERM CURRENT USE OF INSULIN (H): Chronic | ICD-10-CM

## 2018-02-12 DIAGNOSIS — R55 NEAR SYNCOPE: Primary | ICD-10-CM

## 2018-02-12 LAB — GLUCOSE BLD-MCNC: 140 MG/DL (ref 70–99)

## 2018-02-12 PROCEDURE — 36415 COLL VENOUS BLD VENIPUNCTURE: CPT | Performed by: FAMILY MEDICINE

## 2018-02-12 PROCEDURE — 99215 OFFICE O/P EST HI 40 MIN: CPT | Performed by: FAMILY MEDICINE

## 2018-02-12 PROCEDURE — 93000 ELECTROCARDIOGRAM COMPLETE: CPT | Performed by: FAMILY MEDICINE

## 2018-02-12 PROCEDURE — 82947 ASSAY GLUCOSE BLOOD QUANT: CPT | Performed by: FAMILY MEDICINE

## 2018-02-12 RX ORDER — ALBUTEROL SULFATE 90 UG/1
2 AEROSOL, METERED RESPIRATORY (INHALATION)
COMMUNITY
Start: 2018-02-09 | End: 2018-05-24

## 2018-02-12 NOTE — PROGRESS NOTES
"  SUBJECTIVE:   Zaira Villatoro is a 47 year old female who presents to clinic today for the following health issues:      ED/UC Followup:    Facility:  [facility]  Date of visit: 02/08/18  Reason for visit: Acute Bronchitis  Current Status: Can breath now without pain.      Bronchitis  \"Zaira Villatoro is a 47 y.o. female with history of diabetes, hypertension, and ulcerative colitis (status post partial colectomy, who presents to the emergency department for evaluation of a breathing problem. The patient presents today with shortness of breath and cough that began this morning and has progressed throughout the day. She states that it hurts to breathe and to cough and her symptoms are worse when she sits up. The patient was seen yesterday and was diagnosed with sinus infection, but she did not have a cough or shortness of breath at that time. The patient denies any history of wheezing or asthma. She denies any associated fever. She also denies chance of pregnancy and does not smoke or use alcohol. No further concerns or complaints are voiced.   Discharge Medication List as of 2/9/2018 1:20 AM   START taking these medications   Details   albuterol HFA (PROVENTIL;VENTOLIN HFA) 90 mcg/actuation Inhl inhaler Inhale 2 puffs every 4 (four) hours as needed for Shortness of breath or Wheezing., Print, Disp-1 Inhaler, R-0   EXAM: XR CHEST AP PORT   2/8/2018 11:13 PM    CLINICAL DATA: Shortness of Breath.    COMPARISON STUDY: PA and lateral chest from 9/15/2016 .    VIEWS: An AP view of the chest was obtained.    FINDINGS:    Lungs are mildly hypoinflated compared to prior study. There is no focal airspace disease or pleural effusion. The cardiac silhouette and mediastinal structures are unremarkable. There is no pneumothorax. No acute osseous normality.\"    I enter the exam room and find the patient lying on the floor, her  in a chair next to the desk. She states she feels \"hot\" and like she may pass " out.  The team is alerted.  She denies sob, chest pain, palpitations, ab pain.  She has a history of diabets mellitus on insulin-- did not check her sugar this am.  She has a history of UC with rectal bleeding, on immunosuppressant medication. Denies current rectal bleeding, ab pain, diarrhea.    After going to the ER, spent the weekend in bed. States she feels hot and woozy every time she tries to sit up for more than a few minutes. Has had fluids by her bedside, but not drinking much. Only urinating 2-3 times in a 24 hour period.     Problem list and histories reviewed & adjusted, as indicated.  Additional history: as documented    Patient Active Problem List   Diagnosis     IBS (irritable bowel syndrome)     Migraine     Ulcerative colitis (H)     Fatty liver     CARDIOVASCULAR SCREENING; LDL GOAL LESS THAN 100     Hypertension goal BP (blood pressure) < 140/90     Type 2 diabetes mellitus without complication (H)     Morbid obesity due to excess calories (H)     Moderate single current episode of major depressive disorder (H)     Incisional hernia, without obstruction or gangrene     Low back pain     MAHAD (obstructive sleep apnea)- severe (AHI 80)     Type 2 diabetes mellitus without retinopathy (H)     Past Surgical History:   Procedure Laterality Date     APPENDECTOMY  04/2014     ARTHROSCOPY KNEE RT/LT  2004    RT      BIOPSY  2011 many 2011 and on     COLONOSCOPY  02/2012    lt sided colitis     COLONOSCOPY  1/9/2014     CRYOTHERAPY, CERVICAL  1988     EXPLORATORY LAPAROTOMY, PARTIAL LEFT ROLANDA COLLECTOMY WITH HARTMANS PROCEDURE, COLOSTOMY  8/2013    lt rolanda colectomy, bowel perforation, colostomy, Karen's pouche     GI SURGERY  2013    abrupted  bowl     HC TOOTH EXTRACTION W/FORCEP  6/2003    Abscess Tooth / Hospitalized     HERNIA REPAIR  04/2014    found at time of takedown     SINUS SURGERY  2/11/03    LT sinus cyst removal      TAKEDOWN COLOSTOMY  04/2014       Social History   Substance Use  Topics     Smoking status: Former Smoker     Packs/day: 1.00     Years: 15.00     Types: Cigarettes     Start date: 1/1/1985     Quit date: 7/1/1998     Smokeless tobacco: Never Used      Comment: soke free household.     Alcohol use 0.0 oz/week      Comment: occ     Family History   Problem Relation Age of Onset     DIABETES Mother      Allergies Mother      Asthma Mother      Arthritis Mother      HEART DISEASE Mother      heart murmur     Depression Mother      Obesity Mother      Eye Disorder Father      DIABETES Father      CEREBROVASCULAR DISEASE Father      HEART DISEASE Father      Hypertension Father      DIABETES Maternal Grandmother      CEREBROVASCULAR DISEASE Maternal Grandfather      Unknown/Adopted Paternal Grandmother      HEART DISEASE Paternal Grandfather      left ventrical failure     CEREBROVASCULAR DISEASE Paternal Grandfather      Gynecology Sister      endometriosis     Depression Sister      Asthma Daughter      Breast Cancer Maternal Aunt      Thyroid Disease Maternal Aunt      Breast Cancer Other      fathers sister     Anesthesia Reaction Other      Thyroid Disease Other      OSTEOPOROSIS Other      Asthma Daughter      Breast Cancer Other      mothers sister     Glaucoma No family hx of      Macular Degeneration No family hx of          Current Outpatient Prescriptions   Medication Sig Dispense Refill     albuterol (PROAIR HFA/PROVENTIL HFA/VENTOLIN HFA) 108 (90 BASE) MCG/ACT Inhaler Inhale 2 puffs into the lungs       amoxicillin-clavulanate (AUGMENTIN) 875-125 MG per tablet Take 1 tablet by mouth 2 times daily for 10 days 20 tablet 0     fluticasone (FLONASE) 50 MCG/ACT spray Spray 2 sprays into both nostrils daily 16 g 3     B-D U/F insulin pen needle USE TWICE DAILY (OR AS DIRECTED) 180 each 1     VICTOZA PEN 18 MG/3ML soln INJECT 1.8 MG SUB-Q ONCE DAILY 27 mL 0     losartan (COZAAR) 25 MG tablet TAKE 1/2 TABLET BY MOUTH DAILY 15 tablet 5     FLUoxetine (PROZAC) 40 MG capsule TAKE 1  CAPSULE BY MOUTH DAILY 90 capsule 1     atenolol (TENORMIN) 25 MG tablet Take 1 tablet (25 mg) by mouth 2 times daily 180 tablet 3     order for DME Equipment being ordered: CPAP 9 c, 1 Units 0     LEVEMIR FLEXTOUCH 100 UNIT/ML injection INJECT 52 UNITS SUBCUTANEOUSLY BEFORE BED 15 mL 3     ACCU-CHEK SHARMILA PLUS test strip TEST 4 TIMES DAILY AND AS NEEDED 400 strip 1     blood glucose monitoring (ACCU-CHEK FASTCLIX) lancets 1 each 4 times daily Use to test blood sugar 4 times daily or as directed. 3 Box 1     HUMIRA PEN 40 MG/0.8ML pen kit Inject as directed every 14 days  2     diphenhydrAMINE (BENADRYL ALLERGY) 25 MG tablet as needed Reported on 4/12/2017       order for DME Glucometer, brand as covered by insurance. 1 each 0     order for DME Test strips for pt's glucometer, brand as covered by insurance. Test four times daily and prn. 400 each 4     oxyCODONE-acetaminophen (PERCOCET) 5-325 MG per tablet   0     fluticasone (FLOVENT HFA) 110 MCG/ACT inhaler Spray 2 puffs in nostril 2 times daily 36 Inhaler 1     eletriptan (RELPAX) 20 MG tablet take 1-2 tablets by mouth at onset of headache for migraine. may repeat dose in 2 hours. do not exceed 80mg in 24 hours. 12 tablet 1     cholecalciferol 2000 UNITS tablet Take 1 tablet by mouth daily  30 tablet      mesalamine (LIALDA) 1.2 G EC tablet Take 1,200 mg by mouth 2 tablets daily       aspirin 81 MG tablet Take by mouth daily 30 tablet 3     STATIN NOT PRESCRIBED, INTENTIONAL, 1 each continuous prn Statin not prescribed intentionally due to Refusal by patient and Other:LDL is below 100. 0 each 0     TYLENOL CAPS 500 MG OR 1 CAPSULE EVERY 4 HOURS AS NEEDED       DRAMAMINE OR as needed       LEVEMIR FLEXTOUCH 100 UNIT/ML injection INJECT 52 UNITS SUBCUTANEOUSLY BEFORE BED (Patient not taking: Reported on 2/12/2018) 16 mL 4     BP Readings from Last 3 Encounters:   02/12/18 118/82   02/07/18 106/74   02/05/18 121/83    Wt Readings from Last 3 Encounters:   02/12/18  230 lb (104.3 kg)   02/07/18 238 lb 9.6 oz (108.2 kg)   02/05/18 233 lb (105.7 kg)                    Reviewed and updated as needed this visit by clinical staff  Tobacco  Allergies  Meds       Reviewed and updated as needed this visit by Provider         ROS:  Constitutional, HEENT, cardiovascular, pulmonary, gi and gu systems are negative, except as otherwise noted.    OBJECTIVE:     /82 (BP Location: Right arm, Patient Position: Supine, Cuff Size: Adult Large)  Pulse 82  Temp 98.5  F (36.9  C) (Oral)  Resp 16  Wt 230 lb (104.3 kg)  SpO2 100%  BMI 39.48 kg/m2  Body mass index is 39.48 kg/(m^2).  GENERAL: alert and lying on the floor, appears fatigued. Does not loose consciousness at any time.   NECK: no adenopathy, no asymmetry, masses, or scars and thyroid normal to palpation  RESP: lungs clear to auscultation - no rales, rhonchi or wheezes  CV: regular rate and rhythm, normal S1 S2, no S3 or S4, no murmur, click or rub, no peripheral edema and peripheral pulses strong  ABDOMEN: soft, nontender, no hepatosplenomegaly, no masses and bowel sounds normal    Diagnostic Test Results:  EKG with left axis deviation, no st/t wave changes/q waves concerning for cardiac event  Blood glucose: 140     ASSESSMENT/PLAN:               ICD-10-CM    1. Near syncope R55 EKG 12-lead complete w/read - Clinics     Glucose, whole blood   2. Weakness R53.1 EKG 12-lead complete w/read - Clinics   3. Type 2 diabetes mellitus without complication, with long-term current use of insulin (H) E11.9     Z79.4    4. Ulcerative colitis with rectal bleeding, unspecified location (H) K51.911        Pt placed on her back with pillow under head and legs  Oxygen 2 L per NC administered-- sats remained 100% throughout  Blood pressure taken x 2-- no abnormal values  History of atrial fib during hospitalization for perforated viscus in past, but not observed today.   After 10 minutes, pt able to sit without difficulty and got herself to  standing to get into wheelchair  Advised to go to ER for further evaluation. Called report to [place] [facility]  Suspect dehydration.   See Patient Instructions    Indu Ramos MD  [facility]

## 2018-02-12 NOTE — MR AVS SNAPSHOT
After Visit Summary   2/12/2018    Zaira Villatoro    MRN: [id number]           Patient Information     Date Of Birth          1970        Visit Information        Provider Department      2/12/2018 11:00 AM Indu Ramos MD [facility]        Today's Diagnoses     Near syncope    -  1    Weakness        Type 2 diabetes mellitus without complication, with long-term current use of insulin (H)        Ulcerative colitis with rectal bleeding, unspecified location (H)           Follow-ups after your visit        Your next 10 appointments already scheduled     Mar 22, 2018 10:00 AM CDT   LAB with [facility]   [facility] ([facility])    [address] [place] [id number]   [phone]           Please do not eat 10-12 hours before your appointment if you are coming in fasting for labs on lipids, cholesterol, or glucose (sugar). This does not apply to pregnant women. Water, hot tea and black coffee (with nothing added) are okay. Do not drink other fluids, diet soda or chew gum.            Aug 06, 2018 10:00 AM CDT   Return Sleep Patient with Ramana Palencia MD   [facility] ([facility])    [address] [place] [id number] [phone]              Who to contact     If you have questions or need follow up information about today's clinic visit or your schedule please contact [facility] directly at [phone].  Normal or non-critical lab and imaging results will be communicated to you by MyChart, letter or phone within 4 business days after the clinic has received the results. If you do not hear from us within 7 days, please contact the clinic through MyChart or phone. If you have a critical or abnormal lab result, we will notify you by phone as soon as possible.  Submit refill requests through [employer] or call your pharmacy and they will forward the refill request to  us. Please allow 3 business days for your refill to be completed.          Additional Information About Your Visit        MyChart Information     [employer] gives you secure access to your electronic health record. If you see a primary care provider, you can also send messages to your care team and make appointments. If you have questions, please call your primary care clinic.  If you do not have a primary care provider, please call [phone] and they will assist you.        Care EveryWhere ID     This is your Care EveryWhere ID. This could be used by other organizations to access your [facility] medical records  [id number]        Your Vitals Were     Pulse Temperature Respirations Pulse Oximetry BMI (Body Mass Index)       82 98.5  F (36.9  C) (Oral) 16 100% 39.48 kg/m2        Blood Pressure from Last 3 Encounters:   02/12/18 118/82   02/07/18 106/74   02/05/18 121/83    Weight from Last 3 Encounters:   02/12/18 230 lb (104.3 kg)   02/07/18 238 lb 9.6 oz (108.2 kg)   02/05/18 233 lb (105.7 kg)              We Performed the Following     EKG 12-lead complete w/read - Clinics     Glucose, whole blood        Primary Care Provider Office Phone # Fax #    Indu Ramos [phone] [address] [facility] [address]        Equal Access to Services     [facility]: Hadii aad ku hadasho Soomaali, waaxda luqadaha, qaybta kaalmada adeegyada, maira anaya . So [facility] [phone].    ATENCIÓN: Si habla español, tiene a matt disposición servicios gratuitos de asistencia lingüística. Llame al [phone].    We comply with applicable federal civil rights laws and [place] laws. We do not discriminate on the basis of race, color, national origin, age, disability, sex, sexual orientation, or gender identity.            Thank you!     Thank you for choosing [facility]  for your care. Our goal is always to provide you with excellent care. Hearing back from  our patients is one way we can continue to improve our services. Please take a few minutes to complete the written survey that you may receive in the mail after your visit with us. Thank you!             Your Updated Medication List - Protect others around you: Learn how to safely use, store and throw away your medicines at www.disposemymeds.org.          This list is accurate as of 2/12/18  1:48 PM.  Always use your most recent med list.                   Brand Name Dispense Instructions for use Diagnosis    ACCU-CHEK SHARMILA PLUS test strip   Generic drug:  blood glucose monitoring     400 strip    TEST 4 TIMES DAILY AND AS NEEDED    Type 2 diabetes mellitus without complication, with long-term current use of insulin (H)       albuterol 108 (90 BASE) MCG/ACT Inhaler    PROAIR HFA/PROVENTIL HFA/VENTOLIN HFA     Inhale 2 puffs into the lungs        amoxicillin-clavulanate 875-125 MG per tablet    AUGMENTIN    20 tablet    Take 1 tablet by mouth 2 times daily for 10 days    Acute recurrent maxillary sinusitis       aspirin 81 MG tablet     30 tablet    Take by mouth daily        atenolol 25 MG tablet    TENORMIN    180 tablet    Take 1 tablet (25 mg) by mouth 2 times daily    Hypertension goal BP (blood pressure) < 140/90       B-D U/F 31G X 5 MM   Generic drug:  insulin pen needle     180 each    USE TWICE DAILY (OR AS DIRECTED)    Type 2 diabetes mellitus without complication (H)       BENADRYL ALLERGY 25 MG tablet   Generic drug:  diphenhydrAMINE      as needed Reported on 4/12/2017        blood glucose monitoring lancets     3 Box    1 each 4 times daily Use to test blood sugar 4 times daily or as directed.    Type 2 diabetes mellitus without complication (H)       cholecalciferol 2000 UNITS tablet     30 tablet    Take 1 tablet by mouth daily        DRAMAMINE PO      as needed        eletriptan 20 MG tablet    RELPAX    12 tablet    take 1-2 tablets by mouth at onset of headache for migraine. may repeat dose in 2  hours. do not exceed 80mg in 24 hours.    Migraine, unspecified, without mention of intractable migraine without mention of status migrainosus       FLUoxetine 40 MG capsule    PROzac    90 capsule    TAKE 1 CAPSULE BY MOUTH DAILY    Moderate single current episode of major depressive disorder (H)       fluticasone 110 MCG/ACT Inhaler    FLOVENT HFA    36 Inhaler    Spray 2 puffs in nostril 2 times daily    Chronic rhinitis       fluticasone 50 MCG/ACT spray    FLONASE    16 g    Spray 2 sprays into both nostrils daily    Acute recurrent maxillary sinusitis       HUMIRA PEN 40 MG/0.8ML pen kit   Generic drug:  adalimumab      Inject as directed every 14 days        * LEVEMIR FLEXTOUCH 100 UNIT/ML injection   Generic drug:  insulin detemir     15 mL    INJECT 52 UNITS SUBCUTANEOUSLY BEFORE BED    Type 2 diabetes mellitus without complication (H)       * LEVEMIR FLEXTOUCH 100 UNIT/ML injection   Generic drug:  insulin detemir     16 mL    INJECT 52 UNITS SUBCUTANEOUSLY BEFORE BED    Type 2 diabetes mellitus without complication (H)       LIALDA 1.2 G EC tablet   Generic drug:  mesalamine      Take 1,200 mg by mouth 2 tablets daily        losartan 25 MG tablet    COZAAR    15 tablet    TAKE 1/2 TABLET BY MOUTH DAILY    Diabetes type 2, uncontrolled (H), Essential hypertension with goal blood pressure less than 140/90       * order for DME     1 each    Glucometer, brand as covered by insurance.    Type 2 diabetes mellitus without complication (H)       * order for DME     400 each    Test strips for pt's glucometer, brand as covered by insurance. Test four times daily and prn.    Type 2 diabetes mellitus without complication (H)       * order for DME     1 Units    Equipment being ordered: CPAP 9 c,        oxyCODONE-acetaminophen 5-325 MG per tablet    PERCOCET          STATIN NOT PRESCRIBED (INTENTIONAL)     0 each    1 each continuous prn Statin not prescribed intentionally due to Refusal by patient and Other:LDL is  below 100.    CARDIOVASCULAR SCREENING; LDL GOAL LESS THAN 100       TYLENOL CAPS 500 MG OR      1 CAPSULE EVERY 4 HOURS AS NEEDED        VICTOZA PEN 18 MG/3ML soln   Generic drug:  liraglutide     27 mL    INJECT 1.8 MG SUB-Q ONCE DAILY    Type 2 diabetes mellitus without complication, with long-term current use of insulin (H)       * Notice:  This list has 5 medication(s) that are the same as other medications prescribed for you. Read the directions carefully, and ask your doctor or other care provider to review them with you.

## 2018-02-12 NOTE — NURSING NOTE
Vital signs obtained, see flowsheet. Pt brought down to 1st floor via w/c and pt was transferred to her vehicle safely. Pt will be transported via vehicle to [facility]. Pt is not driving.     Indu Mcbride RN  [facility]

## 2018-03-22 DIAGNOSIS — E11.9 TYPE 2 DIABETES MELLITUS WITHOUT COMPLICATION, WITH LONG-TERM CURRENT USE OF INSULIN (H): Chronic | ICD-10-CM

## 2018-03-22 DIAGNOSIS — Z79.4 TYPE 2 DIABETES MELLITUS WITHOUT COMPLICATION, WITH LONG-TERM CURRENT USE OF INSULIN (H): Chronic | ICD-10-CM

## 2018-03-22 LAB — HBA1C MFR BLD: 6.7 % (ref 4.3–6)

## 2018-03-22 PROCEDURE — 36415 COLL VENOUS BLD VENIPUNCTURE: CPT | Performed by: FAMILY MEDICINE

## 2018-03-22 PROCEDURE — 83036 HEMOGLOBIN GLYCOSYLATED A1C: CPT | Performed by: FAMILY MEDICINE

## 2018-04-05 ENCOUNTER — TELEPHONE (OUTPATIENT)
Dept: INTERNAL MEDICINE | Facility: CLINIC | Age: 48
End: 2018-04-05

## 2018-04-05 NOTE — TELEPHONE ENCOUNTER
Panel Management Review      Patient has the following on her problem list:     Diabetes    ASA: Passed    Last A1C  Lab Results   Component Value Date    A1C 6.7 03/22/2018    A1C 6.8 12/22/2017    A1C 6.6 08/21/2017    A1C 7.0 05/26/2017    A1C 8.1 02/13/2017     A1C tested: FAILED    Last LDL:    Lab Results   Component Value Date    CHOL 122 12/22/2017     Lab Results   Component Value Date    HDL 35 12/22/2017     Lab Results   Component Value Date    LDL 72 12/22/2017     Lab Results   Component Value Date    TRIG 73 12/22/2017     Lab Results   Component Value Date    CHOLHDLRATIO 3.2 11/04/2015     Lab Results   Component Value Date    NHDL 87 12/22/2017       Is the patient on a Statin? YES             Is the patient on Aspirin? YES    Medications     HMG CoA Reductase Inhibitors    STATIN NOT PRESCRIBED, INTENTIONAL,    Salicylates    aspirin 81 MG tablet          Last three blood pressure readings:  BP Readings from Last 3 Encounters:   02/12/18 118/82   02/07/18 106/74   02/05/18 121/83       Date of last diabetes office visit: 12/22/17     Tobacco History:     History   Smoking Status     Former Smoker     Packs/day: 1.00     Years: 15.00     Types: Cigarettes     Start date: 1/1/1985     Quit date: 7/1/1998   Smokeless Tobacco     Never Used     Comment: [employer] free household.           Composite cancer screening  Chart review shows that this patient is due/due soon for the following Microalbumin, Wellness visit  Summary:    Patient is due/failing the following:   A1C    Action needed:   Patient needs office visit for Wellness exam and Patient needs to do PHQ9.    Type of outreach:    Sent [employer] message.    Questions for provider review:    None                                                                                                                                    Elda Patiño [facility]        Chart routed to none .

## 2018-04-20 ENCOUNTER — TRANSFERRED RECORDS (OUTPATIENT)
Dept: HEALTH INFORMATION MANAGEMENT | Facility: CLINIC | Age: 48
End: 2018-04-20

## 2018-05-11 ENCOUNTER — TELEPHONE (OUTPATIENT)
Dept: INTERNAL MEDICINE | Facility: CLINIC | Age: 48
End: 2018-05-11

## 2018-05-11 DIAGNOSIS — E11.9 TYPE 2 DIABETES MELLITUS WITHOUT COMPLICATION (H): ICD-10-CM

## 2018-05-11 NOTE — TELEPHONE ENCOUNTER
Have her increase her levemir to 55 units daily and call with blood sugars next week.  This may need to be reduced as she comes off prednisone.    Thanks,  Maria C Garibay, CNP

## 2018-05-11 NOTE — TELEPHONE ENCOUNTER
Reason for Call:  Other call back    Detailed comments: Patient states she is on Prednisone by her GI doctor. This raised her blood pressure levels and advised to ask Dr. Bergman what she should do. Patient is going out of town tonight.     Please call to discuss. GI doctor will send this out.     Phone Number Patient can be reached at: Cell number on file:    Telephone Information:   Mobile [phone]       Best Time: any    Can we leave a detailed message on this number? YES    Call taken on 5/11/2018 at 9:16 AM by Catrachito Wallace

## 2018-05-11 NOTE — TELEPHONE ENCOUNTER
Patient notified of Provider's message as written.  Patient verbalized understanding.    She has an appointment on 5/21/18 with Dr. Bergman.  If she sees concerning numbers before then, she will call clinic back for further dose adjustments, otherwise, she will discuss BG at the scheduled appointment on 5/21/18    Marianne Keith RN

## 2018-05-11 NOTE — TELEPHONE ENCOUNTER
Per patient, Gi provider put her on Prednisone for Ulcerative colitis  She has been on it for 1 week and has about few weeks left. She was unsure of exact end date    FBG and BG throughout the day has been from 170-200   Has thirst and frequent urination    Please advise if insulin needs to be adjusted.  Currently taking Levemir 52 units at bedtime and Victoza 1.8mg daily    Marianne Keith RN

## 2018-05-17 ENCOUNTER — OFFICE VISIT (OUTPATIENT)
Dept: OPTOMETRY | Facility: CLINIC | Age: 48
End: 2018-05-17
Payer: COMMERCIAL

## 2018-05-17 DIAGNOSIS — H52.203 MYOPIA OF BOTH EYES WITH ASTIGMATISM AND PRESBYOPIA: Primary | ICD-10-CM

## 2018-05-17 DIAGNOSIS — H52.4 MYOPIA OF BOTH EYES WITH ASTIGMATISM AND PRESBYOPIA: Primary | ICD-10-CM

## 2018-05-17 DIAGNOSIS — H52.13 MYOPIA OF BOTH EYES WITH ASTIGMATISM AND PRESBYOPIA: Primary | ICD-10-CM

## 2018-05-17 PROCEDURE — 99207 ZZC NO BILLABLE SERVICE THIS VISIT: CPT | Performed by: OPTOMETRIST

## 2018-05-17 ASSESSMENT — REFRACTION_MANIFEST
OS_SPHERE: -4.75
OD_AXIS: 100
OD_ADD: +2.00
OS_AXIS: 091
OS_ADD: +2.00
OD_CYLINDER: +1.00
OS_CYLINDER: +0.75
OD_SPHERE: -5.75

## 2018-05-17 ASSESSMENT — VISUAL ACUITY
OS_CC: 20/80
OD_CC: 20/80
CORRECTION_TYPE: GLASSES
METHOD: SNELLEN - LINEAR
OD_CC: 20/30 BLURRY
OS_CC: 20/50 BLURRY
OS_CC+: -1

## 2018-05-17 NOTE — PROGRESS NOTES
RX CHECK    Problems with glasses: patient is having a hard time with the distance vision, near vision seems to be ok. Patient has been on predisone for 3 weeks, blood sugar do fluctuate.    Vanessa Bonds    Optometry Tech     NO CHARGE VISIT      Objective: See Ophthalmology exam    Assessment:    ICD-10-CM    1. Myopia of both eyes with astigmatism and presbyopia H52.13     H52.203     H52.4        Plan:  Rechecked the prescription and got a slight change.  Take the glasses back to where you had them made and have them remake the lenses.  Return to clinic as needed      Christine Simmons O.D.  [address]  [address]    [phone]

## 2018-05-17 NOTE — LETTER
5/17/2018         RE: Zaira Villatoro  [address] [facility] [address]        Dear Colleague,    Thank you for referring your patient, Zaira iVllatoro, to the [facility]. Please see a copy of my visit note below.    RX CHECK    Problems with glasses: patient is having a hard time with the distance vision, near vision seems to be ok. Patient has been on predisone for 3 weeks, blood sugar do fluctuate.    Vanessa Bonds    Optometry Tech     NO CHARGE VISIT      Objective: See Ophthalmology exam    Assessment:    ICD-10-CM    1. Myopia of both eyes with astigmatism and presbyopia H52.13     H52.203     H52.4        Plan:  Rechecked the prescription and got a slight change.  Take the glasses back to where you had them made and have them remake the lenses.  Return to clinic as needed      Christine Simmons O.D.  [facility]  [address] [place] [address], [place]  [address]    [phone]                  Again, thank you for allowing me to participate in the care of your patient.        Sincerely,        Christine Simmons, SONAM

## 2018-05-17 NOTE — PATIENT INSTRUCTIONS
Rechecked the prescription and got a slight change.  Take the glasses back to where you had them made and have them remake the lenses.  Return to clinic as needed      Christine Simmons O.D.  [address] [place]  [address]    [phone]

## 2018-05-17 NOTE — MR AVS SNAPSHOT
After Visit Summary   5/17/2018    Zaira Villatoro    MRN: [id number]           Patient Information     Date Of Birth          1970        Visit Information        Provider Department      5/17/2018 11:00 AM Christine Simmons OD [facility]        Today's Diagnoses     Myopia of both eyes with astigmatism and presbyopia    -  1      Care Instructions    Rechecked the prescription and got a slight change.  Take the glasses back to where you had them made and have them remake the lenses.  Return to clinic as needed      Christine Simmons O.D.  [facility]  [address] [facility]  [address]    [phone]            Follow-ups after your visit        Follow-up notes from your care team     Return in about 1 year (around 5/17/2019) for Eye Exam.      Your next 10 appointments already scheduled     May 21, 2018  5:45 PM CDT   PHYSICAL with Tayler Bergman MD   [facility] ([facility]    [address] [place] [id number]   [phone]            Aug 06, 2018 10:00 AM CDT   Return Sleep Patient with Ramana Palencia MD   [facility] ([facility])    [address] [place] [id number] [phone]              Who to contact     If you have questions or need follow up information about today's clinic visit or your schedule please contact [facility] directly at [phone].  Normal or non-critical lab and imaging results will be communicated to you by MyChart, letter or phone within 4 business days after the clinic has received the results. If you do not hear from us within 7 days, please contact the clinic through MyChart or phone. If you have a critical or abnormal lab result, we will notify you by phone as soon as possible.  Submit refill requests through [employer] or call your pharmacy and they will forward the refill request to us. Please allow  3 business days for your refill to be completed.          Additional Information About Your Visit        MyChart Information     [employer] gives you secure access to your electronic health record. If you see a primary care provider, you can also send messages to your care team and make appointments. If you have questions, please call your primary care clinic.  If you do not have a primary care provider, please call [phone] and they will assist you.        Care EveryWhere ID     This is your Care EveryWhere ID. This could be used by other organizations to access your [place] medical records  [id number]         Blood Pressure from Last 3 Encounters:   02/12/18 118/82   02/07/18 106/74   02/05/18 121/83    Weight from Last 3 Encounters:   02/12/18 104.3 kg (230 lb)   02/07/18 108.2 kg (238 lb 9.6 oz)   02/05/18 105.7 kg (233 lb)              Today, you had the following     No orders found for display       Primary Care Provider Office Phone # Fax #    Tayler Chely Bergman [phone]       [address] [facility] [address]        Equal Access to Services     [facility]: Hadii aad ku hadasho Soomaali, waaxda luqadaha, qaybta kaalmada adeegyada, maira anaya . So [facility] [phone].    ATENCIÓN: Si habla español, tiene a matt disposición servicios gratuitos de asistencia lingüística. Llame al [phone].    We comply with applicable federal civil rights laws and [place] laws. We do not discriminate on the basis of race, color, national origin, age, disability, sex, sexual orientation, or gender identity.            Thank you!     Thank you for choosing [facility]  for your care. Our goal is always to provide you with excellent care. Hearing back from our patients is one way we can continue to improve our services. Please take a few minutes to complete the written survey that you may receive in the mail after your visit with us. Thank you!              Your Updated Medication List - Protect others around you: Learn how to safely use, store and throw away your medicines at www.disposemymeds.org.          This list is accurate as of 5/17/18 12:04 PM.  Always use your most recent med list.                   Brand Name Dispense Instructions for use Diagnosis    ACCU-CHEK SHARMILA PLUS test strip   Generic drug:  blood glucose monitoring     400 strip    TEST 4 TIMES DAILY AND AS NEEDED    Type 2 diabetes mellitus without complication, with long-term current use of insulin (H)       albuterol 108 (90 Base) MCG/ACT Inhaler    PROAIR HFA/PROVENTIL HFA/VENTOLIN HFA     Inhale 2 puffs into the lungs        aspirin 81 MG tablet     30 tablet    Take by mouth daily        atenolol 25 MG tablet    TENORMIN    180 tablet    Take 1 tablet (25 mg) by mouth 2 times daily    Hypertension goal BP (blood pressure) < 140/90       B-D U/F 31G X 5 MM   Generic drug:  insulin pen needle     180 each    USE TWICE DAILY (OR AS DIRECTED)    Type 2 diabetes mellitus without complication (H)       BENADRYL ALLERGY 25 MG tablet   Generic drug:  diphenhydrAMINE      as needed Reported on 4/12/2017        blood glucose monitoring lancets     3 Box    1 each 4 times daily Use to test blood sugar 4 times daily or as directed.    Type 2 diabetes mellitus without complication (H)       cholecalciferol 2000 units tablet     30 tablet    Take 1 tablet by mouth daily        DRAMAMINE PO      as needed        eletriptan 20 MG tablet    RELPAX    12 tablet    take 1-2 tablets by mouth at onset of headache for migraine. may repeat dose in 2 hours. do not exceed 80mg in 24 hours.    Migraine, unspecified, without mention of intractable migraine without mention of status migrainosus       FLUoxetine 40 MG capsule    PROzac    90 capsule    TAKE 1 CAPSULE BY MOUTH DAILY    Moderate single current episode of major depressive disorder (H)       fluticasone 110 MCG/ACT Inhaler    FLOVENT HFA    36 Inhaler     Spray 2 puffs in nostril 2 times daily    Chronic rhinitis       fluticasone 50 MCG/ACT spray    FLONASE    16 g    Spray 2 sprays into both nostrils daily    Acute recurrent maxillary sinusitis       HUMIRA PEN 40 MG/0.8ML pen kit   Generic drug:  adalimumab      Inject as directed every 14 days        LEVEMIR FLEXTOUCH 100 UNIT/ML injection   Generic drug:  insulin detemir      Inject 55 Units Subcutaneous At Bedtime    Type 2 diabetes mellitus without complication (H)       LIALDA 1.2 g EC tablet   Generic drug:  mesalamine      Take 1,200 mg by mouth 2 tablets daily        liraglutide 18 MG/3ML soln    VICTOZA PEN    27 mL    INJECT 1.8 MG SUB-Q ONCE DAILY    Type 2 diabetes mellitus without complication, with long-term current use of insulin (H)       losartan 25 MG tablet    COZAAR    15 tablet    TAKE 1/2 TABLET BY MOUTH DAILY    Diabetes type 2, uncontrolled (H), Essential hypertension with goal blood pressure less than 140/90       * order for DME     1 each    Glucometer, brand as covered by insurance.    Type 2 diabetes mellitus without complication (H)       * order for DME     400 each    Test strips for pt's glucometer, brand as covered by insurance. Test four times daily and prn.    Type 2 diabetes mellitus without complication (H)       * order for DME     1 Units    Equipment being ordered: CPAP 9 c,        oxyCODONE-acetaminophen 5-325 MG per tablet    PERCOCET          STATIN NOT PRESCRIBED (INTENTIONAL)     0 each    1 each continuous prn Statin not prescribed intentionally due to Refusal by patient and Other:LDL is below 100.    CARDIOVASCULAR SCREENING; LDL GOAL LESS THAN 100       TYLENOL CAPS 500 MG OR      1 CAPSULE EVERY 4 HOURS AS NEEDED        * Notice:  This list has 3 medication(s) that are the same as other medications prescribed for you. Read the directions carefully, and ask your doctor or other care provider to review them with you.

## 2018-05-21 ENCOUNTER — TELEPHONE (OUTPATIENT)
Dept: INTERNAL MEDICINE | Facility: CLINIC | Age: 48
End: 2018-05-21

## 2018-05-21 NOTE — TELEPHONE ENCOUNTER
Contacted patient looks like she was put on prednisone and also is a diabetic with that her numbers where kind of all over the place and the reason she had the appointment today was to talk about that and see where her numbers are. Patient returned call looks like she does not need refills on the medication I scheduled her an appointment with DR. Bergman for 5/24/2018 at 10:30AM.   Thank you   Amanda

## 2018-05-21 NOTE — TELEPHONE ENCOUNTER
Called patient and left VM to call clinic. [facility] team number left.  Indu DORADO CMA ([facility])

## 2018-05-21 NOTE — TELEPHONE ENCOUNTER
Reason for Call:  Other appointment    Detailed comments: Patient was called due to DR. Bergman being out of the clinic today, Patient had an appointment scheduled that needs to be rescheduled. Patient stated she has been put on prednisone by her GI Doctor and is needing refills on that. Please call patient to discuss next option. Thank you    Phone Number Patient can be reached at: Home number on file [phone] (home)    Best Time: any    Can we leave a detailed message on this number? YES    Call taken on 5/21/2018 at 8:33 AM by NORAH MEEHAN

## 2018-05-21 NOTE — TELEPHONE ENCOUNTER
Please call patient.     Refills should be given by GI doctor that prescribed prednisone.   Would she like to be scheduled with another provider in clinic?    Sonia Diaz RN, BSN, PHN

## 2018-05-24 ENCOUNTER — OFFICE VISIT (OUTPATIENT)
Dept: INTERNAL MEDICINE | Facility: CLINIC | Age: 48
End: 2018-05-24
Payer: COMMERCIAL

## 2018-05-24 VITALS
RESPIRATION RATE: 13 BRPM | HEIGHT: 64 IN | OXYGEN SATURATION: 97 % | TEMPERATURE: 97.2 F | WEIGHT: 242.4 LBS | HEART RATE: 75 BPM | BODY MASS INDEX: 41.38 KG/M2 | DIASTOLIC BLOOD PRESSURE: 74 MMHG | SYSTOLIC BLOOD PRESSURE: 110 MMHG

## 2018-05-24 DIAGNOSIS — M54.50 CHRONIC BILATERAL LOW BACK PAIN WITHOUT SCIATICA: Chronic | ICD-10-CM

## 2018-05-24 DIAGNOSIS — Z79.4 TYPE 2 DIABETES MELLITUS WITHOUT COMPLICATION, WITH LONG-TERM CURRENT USE OF INSULIN (H): ICD-10-CM

## 2018-05-24 DIAGNOSIS — Z13.89 SCREENING FOR DIABETIC PERIPHERAL NEUROPATHY: Primary | ICD-10-CM

## 2018-05-24 DIAGNOSIS — J31.0 OTHER CHRONIC RHINITIS: ICD-10-CM

## 2018-05-24 DIAGNOSIS — N28.9 RENAL INSUFFICIENCY: ICD-10-CM

## 2018-05-24 DIAGNOSIS — G89.29 CHRONIC BILATERAL LOW BACK PAIN WITHOUT SCIATICA: Chronic | ICD-10-CM

## 2018-05-24 DIAGNOSIS — I10 ESSENTIAL HYPERTENSION WITH GOAL BLOOD PRESSURE LESS THAN 140/90: ICD-10-CM

## 2018-05-24 DIAGNOSIS — F32.1 MODERATE SINGLE CURRENT EPISODE OF MAJOR DEPRESSIVE DISORDER (H): ICD-10-CM

## 2018-05-24 DIAGNOSIS — E11.9 TYPE 2 DIABETES MELLITUS WITHOUT COMPLICATION, WITH LONG-TERM CURRENT USE OF INSULIN (H): ICD-10-CM

## 2018-05-24 LAB
ANION GAP SERPL CALCULATED.3IONS-SCNC: 5 MMOL/L (ref 3–14)
BUN SERPL-MCNC: 17 MG/DL (ref 7–30)
CALCIUM SERPL-MCNC: 9.3 MG/DL (ref 8.5–10.1)
CHLORIDE SERPL-SCNC: 101 MMOL/L (ref 94–109)
CO2 SERPL-SCNC: 31 MMOL/L (ref 20–32)
CREAT SERPL-MCNC: 0.88 MG/DL (ref 0.52–1.04)
CREAT UR-MCNC: 135 MG/DL
GFR SERPL CREATININE-BSD FRML MDRD: 69 ML/MIN/1.7M2
GLUCOSE SERPL-MCNC: 277 MG/DL (ref 70–99)
HBA1C MFR BLD: 8.8 % (ref 0–5.6)
MICROALBUMIN UR-MCNC: 6 MG/L
MICROALBUMIN/CREAT UR: 4.67 MG/G CR (ref 0–25)
POTASSIUM SERPL-SCNC: 4.4 MMOL/L (ref 3.4–5.3)
SODIUM SERPL-SCNC: 137 MMOL/L (ref 133–144)

## 2018-05-24 PROCEDURE — 80048 BASIC METABOLIC PNL TOTAL CA: CPT | Performed by: INTERNAL MEDICINE

## 2018-05-24 PROCEDURE — 36415 COLL VENOUS BLD VENIPUNCTURE: CPT | Performed by: INTERNAL MEDICINE

## 2018-05-24 PROCEDURE — 99214 OFFICE O/P EST MOD 30 MIN: CPT | Performed by: INTERNAL MEDICINE

## 2018-05-24 PROCEDURE — 82043 UR ALBUMIN QUANTITATIVE: CPT | Performed by: INTERNAL MEDICINE

## 2018-05-24 PROCEDURE — 83036 HEMOGLOBIN GLYCOSYLATED A1C: CPT | Performed by: INTERNAL MEDICINE

## 2018-05-24 RX ORDER — PREDNISONE 10 MG/1
10 TABLET ORAL
Refills: 0 | COMMUNITY
Start: 2018-05-03 | End: 2018-08-24

## 2018-05-24 RX ORDER — LIRAGLUTIDE 6 MG/ML
INJECTION SUBCUTANEOUS
Qty: 27 ML | Refills: 3 | Status: SHIPPED | OUTPATIENT
Start: 2018-05-24 | End: 2019-02-15 | Stop reason: ALTCHOICE

## 2018-05-24 RX ORDER — LANCETS
1 EACH MISCELLANEOUS 4 TIMES DAILY
Qty: 300 EACH | Refills: 11 | Status: SHIPPED | OUTPATIENT
Start: 2018-05-24 | End: 2020-03-17

## 2018-05-24 RX ORDER — OXYCODONE AND ACETAMINOPHEN 5; 325 MG/1; MG/1
TABLET ORAL
Qty: 12 TABLET | Refills: 0 | Status: CANCELLED | OUTPATIENT
Start: 2018-05-24

## 2018-05-24 RX ORDER — LOSARTAN POTASSIUM 25 MG/1
TABLET ORAL
Qty: 15 TABLET | Refills: 5 | Status: SHIPPED | OUTPATIENT
Start: 2018-05-24 | End: 2018-12-16

## 2018-05-24 RX ORDER — FLUOXETINE 40 MG/1
CAPSULE ORAL
Qty: 90 CAPSULE | Refills: 3 | Status: SHIPPED | OUTPATIENT
Start: 2018-05-24 | End: 2018-08-24

## 2018-05-24 RX ORDER — GLIMEPIRIDE 1 MG/1
1 TABLET ORAL
Qty: 30 TABLET | Refills: 1 | Status: SHIPPED | OUTPATIENT
Start: 2018-05-24 | End: 2018-07-17

## 2018-05-24 RX ORDER — FLUTICASONE PROPIONATE 110 UG/1
2 AEROSOL, METERED RESPIRATORY (INHALATION) 2 TIMES DAILY
Qty: 36 INHALER | Refills: 1 | Status: SHIPPED | OUTPATIENT
Start: 2018-05-24 | End: 2019-06-05

## 2018-05-24 NOTE — NURSING NOTE
"Chief Complaint   Patient presents with     Recheck Medication     Diabetes     Discuss blood sugar readings, NOT due for A1C     Health Maintenance     Due for HIV, physical, PHQ9, microalbumin, Foot exam, BMP, DAP, A1C?     Initial /74  Pulse 75  Temp 97.2  F (36.2  C) (Oral)  Resp 13  Ht 5' 4\" (1.626 m)  Wt 242 lb 6.4 oz (110 kg)  SpO2 97%  Breastfeeding? No  BMI 41.61 kg/m2 Estimated body mass index is 41.61 kg/(m^2) as calculated from the following:    Height as of this encounter: 5' 4\" (1.626 m).    Weight as of this encounter: 242 lb 6.4 oz (110 kg).  BP completed using cuff size: paula Wray  "

## 2018-05-24 NOTE — PROGRESS NOTES
SUBJECTIVE:   Zaira Villatoor is a 48 year old female who presents to clinic today for the following health issues:  Was placed on prednisone for her IBD and that increased her blood sugars dramatically  Diabetes Follow-up  Patient is checking blood sugars: twice daily.    Blood sugar testing frequency justification: Uncontrolled diabetes  Results are as follows:         Am: 109-244         Bedtime: 277-381    Diabetic concerns: blood sugar frequently over 200     Symptoms of hypoglycemia (low blood sugar): Lethargic     Paresthesias (numbness or burning in feet) or sores: NO     Date of last diabetic eye exam: within the last year    Wasn't checking before she was on prednisone.     BP Readings from Last 2 Encounters:   02/12/18 118/82   02/07/18 106/74     Hemoglobin A1C (%)   Date Value   03/22/2018 6.7 (H)   12/22/2017 6.8 (H)     LDL Cholesterol Calculated (mg/dL)   Date Value   12/22/2017 72   11/07/2016 95     Hypertension Follow-up    Outpatient blood pressures are not being checked.    Low Salt Diet: low salt    Depression and Anxiety Follow-Up    Status since last visit: No change    Other associated symptoms:None    Complicating factors:     Significant life event: medical concerns are changing which is increasing her worrring     Current substance abuse: None    PHQ-9 8/21/2017 10/4/2017 10/30/2017   Total Score 12 9 -   Q9: Suicide Ideation Not at all Not at all Not at all     ALEXIA-7 SCORE 5/26/2017 8/21/2017 10/30/2017   Total Score 6 7 7   PHQ-9  English  PHQ-9   Any Language  ALEXIA-7  Suicide Assessment Five-step Evaluation and Treatment (SAFE-T)      Amount of exercise or physical activity: None    Problems taking medications regularly: No    Medication side effects: Fatigue, Myalgia, Joint pains, headaches, nausea, walking problems cause of pains. All started after slowly being taken off prednisone.    Diet: regular (no restrictions), low salt and low fat/cholesterol    Bowel movements are slower  with the victoza but she was having blood in her stools.  Prednisone has helped this.    She is now on cpap and doesn't like it.  She feels better.    Has left back pain for 1 day.  That resolved fully on its own.  She does get periodic times where she has low back pain.Oxycodone is used with a back flare.  She has had cortisone injection in the past and this provided relief.    Labs from GI with renal insufficiency    Takes premiere protein.  She tolerates this well.      Problem list and histories reviewed & adjusted, as indicated.  Additional history: as documented    Patient Active Problem List   Diagnosis     IBS (irritable bowel syndrome)     Migraine     Ulcerative colitis (H)     Fatty liver     Other chronic rhinitis     CARDIOVASCULAR SCREENING; LDL GOAL LESS THAN 100     Hypertension goal BP (blood pressure) < 140/90     Type 2 diabetes mellitus without complication (H)     Morbid obesity due to excess calories (H)     Moderate single current episode of major depressive disorder (H)     Incisional hernia, without obstruction or gangrene     Low back pain     MAHAD (obstructive sleep apnea)- severe (AHI 80)     Type 2 diabetes mellitus without retinopathy (H)     Past Surgical History:   Procedure Laterality Date     APPENDECTOMY  04/2014     ARTHROSCOPY KNEE RT/LT  2004    RT      BIOPSY  2011 many 2011 and on     COLONOSCOPY  02/2012    lt sided colitis     COLONOSCOPY  1/9/2014     CRYOTHERAPY, CERVICAL  1988     EXPLORATORY LAPAROTOMY, PARTIAL LEFT ROLANDA COLLECTOMY WITH HARTMANS PROCEDURE, COLOSTOMY  8/2013    lt rolanda colectomy, bowel perforation, colostomy, Karen's pouche     GI SURGERY  2013    abrupted  bowl     HC TOOTH EXTRACTION W/FORCEP  6/2003    Abscess Tooth / Hospitalized     HERNIA REPAIR  04/2014    found at time of takedown     SINUS SURGERY  2/11/03    LT sinus cyst removal      TAKEDOWN COLOSTOMY  04/2014       Social History   Substance Use Topics     Smoking status: Former Smoker      Packs/day: 1.00     Years: 15.00     Types: Cigarettes     Start date: 1/1/1985     Quit date: 7/1/1998     Smokeless tobacco: Never Used      Comment: soke free household.     Alcohol use 0.0 oz/week      Comment: occ     Family History   Problem Relation Age of Onset     DIABETES Mother      Allergies Mother      Asthma Mother      Arthritis Mother      HEART DISEASE Mother      heart murmur     Depression Mother      Obesity Mother      Eye Disorder Father      DIABETES Father      CEREBROVASCULAR DISEASE Father      HEART DISEASE Father      Hypertension Father      DIABETES Maternal Grandmother      CEREBROVASCULAR DISEASE Maternal Grandfather      Unknown/Adopted Paternal Grandmother      HEART DISEASE Paternal Grandfather      left ventrical failure     CEREBROVASCULAR DISEASE Paternal Grandfather      Gynecology Sister      endometriosis     Depression Sister      Asthma Daughter      Breast Cancer Maternal Aunt      Thyroid Disease Maternal Aunt      Breast Cancer Other      fathers sister     Anesthesia Reaction Other      Thyroid Disease Other      OSTEOPOROSIS Other      Asthma Daughter      Breast Cancer Other      mothers sister     Glaucoma No family hx of      Macular Degeneration No family hx of          Current Outpatient Prescriptions   Medication Sig Dispense Refill     aspirin 81 MG tablet Take by mouth daily 30 tablet 3     atenolol (TENORMIN) 25 MG tablet Take 1 tablet (25 mg) by mouth 2 times daily 180 tablet 3     blood glucose monitoring (ACCU-CHEK SHARMILA PLUS) test strip TEST 4 TIMES DAILY AND AS NEEDED 400 strip 3     blood glucose monitoring (ACCU-CHEK FASTCLIX) lancets 1 each 4 times daily Use to test blood sugar 4 times daily or as directed. 300 each 11     cholecalciferol 2000 UNITS tablet Take 1 tablet by mouth daily  30 tablet      diphenhydrAMINE (BENADRYL ALLERGY) 25 MG tablet as needed Reported on 4/12/2017       DRAMAMINE OR as needed       eletriptan (RELPAX) 20 MG tablet  take 1-2 tablets by mouth at onset of headache for migraine. may repeat dose in 2 hours. do not exceed 80mg in 24 hours. 12 tablet 1     FLUoxetine (PROZAC) 40 MG capsule TAKE 1 CAPSULE BY MOUTH DAILY 90 capsule 3     fluticasone (FLONASE) 50 MCG/ACT spray Spray 2 sprays into both nostrils daily 16 g 3     fluticasone (FLOVENT HFA) 110 MCG/ACT Inhaler Spray 2 puffs in nostril 2 times daily 36 Inhaler 1     glimepiride (AMARYL) 1 MG tablet Take 1 tablet (1 mg) by mouth every morning (before breakfast) While on prednisone 30 tablet 1     HUMIRA PEN 40 MG/0.8ML pen kit Inject as directed every 7 days   2     insulin detemir (LEVEMIR FLEXTOUCH) 100 UNIT/ML injection Inject 55 Units Subcutaneous At Bedtime 3 month supply with 11 refill. 12 mL 3     insulin pen needle (B-D U/F) 31G X 5 MM USE TWICE DAILY (OR AS DIRECTED) 180 each 3     liraglutide (VICTOZA PEN) 18 MG/3ML soln INJECT 1.8 MG SUB-Q ONCE DAILY 27 mL 3     losartan (COZAAR) 25 MG tablet TAKE 1/2 TABLET BY MOUTH DAILY 15 tablet 5     order for DME Equipment being ordered: CPAP 9 c, 1 Units 0     order for DME Glucometer, brand as covered by insurance. 1 each 0     order for DME Test strips for pt's glucometer, brand as covered by insurance. Test four times daily and prn. 400 each 4     oxyCODONE-acetaminophen (PERCOCET) 5-325 MG per tablet   0     predniSONE (DELTASONE) 10 MG tablet 10 mg  0     STATIN NOT PRESCRIBED, INTENTIONAL, 1 each continuous prn Statin not prescribed intentionally due to Refusal by patient and Other:LDL is below 100. 0 each 0     TYLENOL CAPS 500 MG OR 1 CAPSULE EVERY 4 HOURS AS NEEDED       mesalamine (LIALDA) 1.2 G EC tablet Take 1,200 mg by mouth 2 tablets daily       [DISCONTINUED] insulin detemir (LEVEMIR FLEXTOUCH) 100 UNIT/ML injection Inject 55 Units Subcutaneous At Bedtime         Reviewed and updated as needed this visit by clinical staff  Tobacco  Allergies  Meds  Med Hx  Surg Hx  Fam Hx  Soc Hx      Reviewed and updated  "as needed this visit by Provider       ROS:   ROS: 10 point ROS neg other than the symptoms noted above in the HPI.     OBJECTIVE:     /74  Pulse 75  Temp 97.2  F (36.2  C) (Oral)  Resp 13  Ht 5' 4\" (1.626 m)  Wt 242 lb 6.4 oz (110 kg)  SpO2 97%  Breastfeeding? No  BMI 41.61 kg/m2  Body mass index is 41.61 kg/(m^2).  GENERAL APPEARANCE: healthy, alert and no distress  NECK: no adenopathy, no asymmetry, masses, or scars and thyroid normal to palpation  RESP: lungs clear to auscultation - no rales, rhonchi or wheezes  CV: regular rates and rhythm and normal S1 S2, no S3 or S4  ABDOMEN:  soft, nontender, no HSM or masses and bowel sounds normal  SKIN: no suspicious lesions or rashes  PSYCH: mentation appears normal. and affect normal/bright  No edema     Diagnostic Test Results:  Results for orders placed or performed in visit on 05/24/18   HEMOGLOBIN A1C   Result Value Ref Range    Hemoglobin A1C 8.8 (H) 0 - 5.6 %   Albumin Random Urine Quantitative with Creat Ratio   Result Value Ref Range    Creatinine Urine 135 mg/dL    Albumin Urine mg/L 6 mg/L    Albumin Urine mg/g Cr 4.67 0 - 25 mg/g Cr   Basic metabolic panel   Result Value Ref Range    Sodium 137 133 - 144 mmol/L    Potassium 4.4 3.4 - 5.3 mmol/L    Chloride 101 94 - 109 mmol/L    Carbon Dioxide 31 20 - 32 mmol/L    Anion Gap 5 3 - 14 mmol/L    Glucose 277 (H) 70 - 99 mg/dL    Urea Nitrogen 17 7 - 30 mg/dL    Creatinine 0.88 0.52 - 1.04 mg/dL    GFR Estimate 69 >60 mL/min/1.7m2    GFR Estimate If Black 83 >60 mL/min/1.7m2    Calcium 9.3 8.5 - 10.1 mg/dL       ASSESSMENT/PLAN:             1. Type 2 diabetes mellitus without complication, with long-term current use of insulin (H)  She does have inadequate control of her blood sugars currently.  This is because of her prednisone use.  I will go ahead and add Amaryl just during the time that she is on prednisone.  She has been on glipizide in the past as tolerated that well so I think she will do " fine with Amaryl.  - blood glucose monitoring (ACCU-CHEK SHARMILA PLUS) test strip; TEST 4 TIMES DAILY AND AS NEEDED  Dispense: 400 strip; Refill: 3  - insulin pen needle (B-D U/F) 31G X 5 MM; USE TWICE DAILY (OR AS DIRECTED)  Dispense: 180 each; Refill: 3  - blood glucose monitoring (ACCU-CHEK FASTCLIX) lancets; 1 each 4 times daily Use to test blood sugar 4 times daily or as directed.  Dispense: 300 each; Refill: 11  - liraglutide (VICTOZA PEN) 18 MG/3ML soln; INJECT 1.8 MG SUB-Q ONCE DAILY  Dispense: 27 mL; Refill: 3  - HEMOGLOBIN A1C  - Albumin Random Urine Quantitative with Creat Ratio  - insulin detemir (LEVEMIR FLEXTOUCH) 100 UNIT/ML injection; Inject 55 Units Subcutaneous At Bedtime 3 month supply with 11 refill.  Dispense: 12 mL; Refill: 3  - glimepiride (AMARYL) 1 MG tablet; Take 1 tablet (1 mg) by mouth every morning (before breakfast) While on prednisone  Dispense: 30 tablet; Refill: 1    2. Moderate single current episode of major depressive disorder (H)    - FLUoxetine (PROZAC) 40 MG capsule; TAKE 1 CAPSULE BY MOUTH DAILY  Dispense: 90 capsule; Refill: 3    3. Other chronic rhinitis    - fluticasone (FLOVENT HFA) 110 MCG/ACT Inhaler; Spray 2 puffs in nostril 2 times daily  Dispense: 36 Inhaler; Refill: 1    4. Essential hypertension with goal blood pressure less than 140/90    - losartan (COZAAR) 25 MG tablet; TAKE 1/2 TABLET BY MOUTH DAILY  Dispense: 15 tablet; Refill: 5  - Basic metabolic panel    5. Screening for diabetic peripheral neuropathy      6. Renal insufficiency  Noted on previous labs done by gastroenterology.  Etiology is not clear.  She does not take NSAIDs.    7. Chronic bilateral low back pain without sciatica  She does feel much better when she is on prednisone.  Occasionally she will need to use Percocet.  She does not want any today would only like to call and when she needs it.  She rarely needs these prescriptions.  A controlled substance agreement was signed.      Patient  Instructions     Take Amaryl 1 mg with breakfast while on prednisone.  We can always increase this if needed.  Increase your fluids.  Let me know if your blood sugars don't start to come down.    [facility]    If you have any questions regarding to your visit please contact your care team:     Team Pink:   Clinic Hours Telephone Number   Internal Medicine:  Dr. Tayler Garibay NP       7am-7pm  Monday - Thursday   7am-5pm  Fridays  [phone]  (Appointment scheduling available 24/7)    Questions about your visit?  Team Line  [phone]   Urgent Care - [place] - 11am-9pm Monday-Friday Saturday-Sunday- 9am-5pm   [place] - 5pm-9pm Monday-Friday Saturday-Sunday- 9am-5pm  [phone] - [place]   [phone] - [place]       What options do I have for visits at the clinic other than the traditional office visit?  To expand how we care for you, many of our providers are utilizing electronic visits (e-visits) and telephone visits, when medically appropriate, for interactions with their patients rather than a visit in the clinic.   We also offer nurse visits for many medical concerns. Just like any other service, we will bill your insurance company for this type of visit based on time spent on the phone with your provider. Not all insurance companies cover these visits. Please check with your medical insurance if this type of visit is covered. You will be responsible for any charges that are not paid by your insurance.      E-visits via Optrace:  generally incur a $35.00 fee.  Telephone visits:  Time spent on the phone: *charged based on time that is spent on the phone in increments of 10 minutes. Estimated cost:   5-10 mins $30.00   11-20 mins. $59.00   21-30 mins. $85.00   Use Optrace (secure email communication and access to your chart) to send your primary care provider a message or make an appointment. Ask someone on your Team  how to sign up for [place].    For a Price Quote for your services, please call our Consumer Price Line at [phone].    As always, Thank you for trusting us with your health care needs    Manny Bergman MD  [facility]

## 2018-05-24 NOTE — LETTER
[facility]    05/24/18    Patient: Zaira Villatoro  YOB: 1970  Medical Record Number: [id number]                                                                  Controlled Substance Agreement - Percocet  I understand that my care provider has prescribed controlled substances (narcotics, tranquilizers, and/or stimulants) to help manage my condition(s).  I am taking this medicine to help me function or work.  I know that this is strong medicine.  It could have serious side effects and even cause a dependency on the drug.  If I stop these medicines suddenly, I could have severe withdrawal symptoms.    The risks, benefits, and side effects of these medication(s) were explained to me.  I agree that:  1. I will take part in other treatments as advised by my provider.  This may be psychiatry or counseling, physical therapy, behavioral therapy, group treatment, or a referral to a pain clinic.  I will reduce or stop my medicine when my provider tells me to do so.   2. I will take my medicines as prescribed.  I will not change the dose or schedule unless my provider tells me to.  There will be no refills if I  run out early.   I may be contacted at any time without warning and asked to complete a drug test or pill count.   3. I will keep all my appointments at the clinic.  If I miss appointments or fail to follow instructions, my provider may stop my medicine.  4. I will not ask other providers to prescribe controlled substances. And I will not accept controlled substances from other people. If I need another prescribed controlled substance for a new reason, I will notify my provider within one business day.  5. If I enroll in the [place] Medical Marijuana program, I will tell my provider.  I will also sign an agreement to share my medical records with my provider.  6. I will use one pharmacy to fill all of my controlled substance prescriptions.  If my prescription is mailed to my pharmacy, it  may take 5 to 7 days for my medicine to be ready.  7. I understand that my provider, clinic care team, and pharmacy can track controlled substance prescriptions from other providers through a central database (prescription monitoring program).  8. I will bring in my list of medications (or my medicine bottles) each time I come to the clinic.  REV- 04/2016                                                                                                                                            Page 1 of 2      [facility]    05/24/18    Patient: Zaira Villatoro  YOB: 1970  Medical Record Number: [id number]    9. Refills of controlled substances will be made only during office hours.  It is up to me to make sure that I do not run out of my medicines on weekends or holidays.    10. I am responsible for my prescriptions.  If the medicine is lost or stolen, it will not be replaced.   I also agree not to share these medicines with anyone.  11. I agree to not use ANY illegal or recreational drugs.  This includes marijuana, cocaine, bath salts or other drugs.  I agree not to use alcohol unless my provider says I may.  I agree to give urine samples whenever asked.  If I fail to give a urine sample, the provider may stop my medicine.     12. I will tell my nurse or provider right away if I become pregnant or have a new medical problem treated outside of [facility].  13. I understand that this medicine can affect my thinking and judgment.  It may be unsafe for me to drive, use machinery and do dangerous tasks.  I will not do any of these things until I know how the medicine affects me.  If my dose changes, I will wait to see how it affects me.  I will contact my provider if I have concerns about medicine side effects.  I understand that if I do not follow any of the conditions above, my prescriptions or treatment may be stopped.    I agree that my provider, clinic care team, and pharmacy may  work with any city, state or federal law enforcement agency that investigates the misuse, sale, or other diversion of my controlled medicine. I will allow my provider to discuss my care with or share a copy of this agreement with any other treating provider, pharmacy or emergency room where I receive care.  I agree to give up (waive) any right of privacy or confidentiality with respect to these authorizations.   I have read this agreement and have asked questions about anything I did not understand.   ___________________________________    ___________________________  Patient Signature                                                           Date and Time  ___________________________________     ____________________________  Witness                                                                            Date and Time  ___________________________________  Tayler Bergman MD  REV-  04/2016                                                                                                                                                                 Page 2 of 2  Opioid Pain Medicines (also known as Narcotics)  What You Need to Know      What are opioids?   Opioids are pain medicines that must be prescribed by a doctor. Examples are:     morphine (MS Contin, Olimpia)    oxycodone (Oxycontin)    oxycodone and acetaminophen (Percocet)    hydrocodone and acetaminophen (Vicodin, Norco)     fentanyl patch (Duragesic)     hydromorphone (Dilaudid)     methadone     What do opioids do well?   Opioids are best for short-term pain after a surgery or injury. They also work well for cancer pain. Unlike other pain medicines, they do not cause liver or kidney failure or ulcers. They may help some people with long-lasting (chronic) pain.     What do opioids NOT do well?   Opioids never get rid of pain entirely, and they do not work well for most patients with chronic pain. Opioids do not reduce swelling, one of the causes of pain.  They also don t work well for nerve pain.     Side effects  Talk to your doctor before you start or decide to keep taking one of these medicines. Side effects include:    Lowers your breathing rate enough that it could cause death    Death due to taking more than the prescribed dose    Serious lifelong opioid use      Dependence is not the same as addiction. Addiction is when people keep using a substance that harms their body, their mind or their relations with others. If you have a history of drug or alcohol abuse, taking opioids can cause a relapse.  Over time, opioids don t work as well. Most people will need higher and higher doses. The higher the dose, the more serious the side effects. We don t know the long-term effects of opioids.   People who have used opioids for a long time have a lower quality of life, worse depression, higher levels of pain and more visits to doctors.  Overdose from prescription drugs is the second leading cause of death in the [place] The risk of overdose rises when opioids are taken with other drugs such as:    Medicines used for anxiety and panic attacks (such as lorazepam, alprazolam, clonazepam    Other sedatives    Alcohol    Illegal drugs such as heroin  Never share your opioids with others. Be sure to store opioids in a secure place, locked if possible.Young children can easily swallow them and overdose.     Are there other ways to manage pain?  Ways to help reduce pain:    Exercise every day.    Treat health problems that may be causing pain.    Treat mental health problems like depression and anxiety.     Worse depression symptoms; Less pleasure in things you usually enjoy    Feeling tired or sluggish    Slower thoughts or cloudy thinking    Being more sensitive to pain over time; Pain is harder to control.    Trouble sleeping or restless sleep    Changes in hormone levels (for example, less testosterone).     Changes in sex drive or ability to have sex    Long lasting nausea  and constipation    Trouble breathing while asleep; This is worse with lung problems like COPD or sleep apnea.    Unsafe driving    Getting sick more often    Itching    Feeling dizzy    Dry mouth    Sweating    Trouble emptying the bladder (peeing). This is worse if you have an enlarged prostate or get urinary tract infections (UTIs).    What else should I know about opioids?  When someone takes opioids for too long or too often, they become dependent. This means that if you stop or reduce the medicine too quickly, you will have withdrawal symptoms.          Practice good sleep habits.  Try to go to bed and get up at the same time every day.    Stop smoking.  Tobacco use can make pain worse.    Do things that you enjoy.    Find a way to work through pain without drugs.  Try deep breathing, meditation, visual imagery and aromatherapy.    Ask your doctor to help you create a plan to manage your pain.

## 2018-05-24 NOTE — MR AVS SNAPSHOT
After Visit Summary   5/24/2018    Zaira Villatoro    MRN: [id number]           Patient Information     Date Of Birth          1970        Visit Information        Provider Department      5/24/2018 11:30 AM Tayler Bergman MD [facility]        Today's Diagnoses     Screening for diabetic peripheral neuropathy    -  1    Type 2 diabetes mellitus without complication, with long-term current use of insulin (H)        Moderate single current episode of major depressive disorder (H)        Other chronic rhinitis        Essential hypertension with goal blood pressure less than 140/90        Renal insufficiency        Chronic bilateral low back pain without sciatica          Care Instructions    Take Amaryl 1 mg with breakfast while on prednisone.  We can always increase this if needed.  Increase your fluids.  Let me know if your blood sugars don't start to come down.    [facility]    If you have any questions regarding to your visit please contact your care team:     Team Pink:   Clinic Hours Telephone Number   Internal Medicine:  Dr. Tayler Garibay NP       7am-7pm  Monday - Thursday   7am-5pm  Fridays  [phone]  (Appointment scheduling available 24/7)    Questions about your visit?  Team Line  [phone]   Urgent Care - [place] - 11am-9pm Monday-Friday Saturday-Sunday- 9am-5pm   [place] - 5pm-9pm Monday-Friday Saturday-Sunday- 9am-5pm  [phone] - [place]   [phone] - [place]       What options do I have for visits at the clinic other than the traditional office visit?  To expand how we care for you, many of our providers are utilizing electronic visits (e-visits) and telephone visits, when medically appropriate, for interactions with their patients rather than a visit in the clinic.   We also offer nurse visits for many medical concerns. Just like any other service, we will bill  your insurance company for this type of visit based on time spent on the phone with your provider. Not all insurance companies cover these visits. Please check with your medical insurance if this type of visit is covered. You will be responsible for any charges that are not paid by your insurance.      E-visits via Bitmenuhart:  generally incur a $35.00 fee.  Telephone visits:  Time spent on the phone: *charged based on time that is spent on the phone in increments of 10 minutes. Estimated cost:   5-10 mins $30.00   11-20 mins. $59.00   21-30 mins. $85.00   Use CallGrader (secure email communication and access to your chart) to send your primary care provider a message or make an appointment. Ask someone on your Team how to sign up for CallGrader.    For a Price Quote for your services, please call our [employer] Line at [phone].    As always, Thank you for trusting us with your health care needs    Manny Wray            Follow-ups after your visit        Follow-up notes from your care team     Return in about 3 months (around 8/24/2018).      Your next 10 appointments already scheduled     Aug 06, 2018 10:00 AM CDT   Return Sleep Patient with Ramana Palencia MD   [facility] ([facility])    [address] [place] [id number] [phone]              Who to contact     If you have questions or need follow up information about today's clinic visit or your schedule please contact [facility] directly at [phone].  Normal or non-critical lab and imaging results will be communicated to you by MyChart, letter or phone within 4 business days after the clinic has received the results. If you do not hear from us within 7 days, please contact the clinic through MyChart or phone. If you have a critical or abnormal lab result, we will notify you by phone as soon as possible.  Submit refill requests through Bitmenuhart or call your  "pharmacy and they will forward the refill request to us. Please allow 3 business days for your refill to be completed.          Additional Information About Your Visit        6Roomshart Information     Sharelook gives you secure access to your electronic health record. If you see a primary care provider, you can also send messages to your care team and make appointments. If you have questions, please call your primary care clinic.  If you do not have a primary care provider, please call [phone] and they will assist you.        Care EveryWhere ID     This is your Care EveryWhere ID. This could be used by other organizations to access your [facility] medical records  [id number]        Your Vitals Were     Pulse Temperature Respirations Height Pulse Oximetry Breastfeeding?    75 97.2  F (36.2  C) (Oral) 13 5' 4\" (1.626 m) 97% No    BMI (Body Mass Index)                   41.61 kg/m2            Blood Pressure from Last 3 Encounters:   05/24/18 110/74   02/12/18 118/82   02/07/18 106/74    Weight from Last 3 Encounters:   05/24/18 242 lb 6.4 oz (110 kg)   02/12/18 230 lb (104.3 kg)   02/07/18 238 lb 9.6 oz (108.2 kg)              We Performed the Following     Albumin Random Urine Quantitative with Creat Ratio     Basic metabolic panel     HEMOGLOBIN A1C          Today's Medication Changes          These changes are accurate as of 5/24/18 12:21 PM.  If you have any questions, ask your nurse or doctor.               Start taking these medicines.        Dose/Directions    glimepiride 1 MG tablet   Commonly known as:  AMARYL   Used for:  Type 2 diabetes mellitus without complication, with long-term current use of insulin (H)   Started by:  Tayler Bergman MD        Dose:  1 mg   Take 1 tablet (1 mg) by mouth every morning (before breakfast) While on prednisone   Quantity:  30 tablet   Refills:  1         These medicines have changed or have updated prescriptions.        Dose/Directions    insulin detemir 100 UNIT/ML " injection   Commonly known as:  LEVEMIR FLEXTOUCH   This may have changed:  additional instructions   Used for:  Type 2 diabetes mellitus without complication, with long-term current use of insulin (H)   Changed by:  Tayler Bergman MD        Dose:  55 Units   Inject 55 Units Subcutaneous At Bedtime 3 month supply with 11 refill.   Quantity:  12 mL   Refills:  3            Where to get your medicines      These medications were sent to [place] [address] [facility] [place] - [address]  [address] [place] [address]     Phone:  [phone]     blood glucose monitoring lancets    blood glucose monitoring test strip    FLUoxetine 40 MG capsule    fluticasone 110 MCG/ACT Inhaler    glimepiride 1 MG tablet    insulin detemir 100 UNIT/ML injection    insulin pen needle 31G X 5 MM    liraglutide 18 MG/3ML soln    losartan 25 MG tablet                Primary Care Provider Office Phone # Fax #    Tayler Bergman [phone] [address]  [place] [address]        Equal Access to Services     [facility] AH: Hadii aad ku hadasho Soomaali, waaxda luqadaha, qaybta kaalmada adeegyada, waxay idiin hayaan adeadrian kharash héctor . So [facility] [phone].    ATENCIÓN: Si habla español, tiene a matt disposición servicios gratuitos de asistencia lingüística. Llame al [phone].    We comply with applicable federal civil rights laws and [place] laws. We do not discriminate on the basis of race, color, national origin, age, disability, sex, sexual orientation, or gender identity.            Thank you!     Thank you for choosing [facility]  for your care. Our goal is always to provide you with excellent care. Hearing back from our patients is one way we can continue to improve our services. Please take a few minutes to complete the written survey that you may receive in the mail after your visit with us. Thank you!             Your Updated Medication List - Protect others around you:  Learn how to safely use, store and throw away your medicines at www.disposemymeds.org.          This list is accurate as of 5/24/18 12:21 PM.  Always use your most recent med list.                   Brand Name Dispense Instructions for use Diagnosis    aspirin 81 MG tablet     30 tablet    Take by mouth daily        atenolol 25 MG tablet    TENORMIN    180 tablet    Take 1 tablet (25 mg) by mouth 2 times daily    Hypertension goal BP (blood pressure) < 140/90       BENADRYL ALLERGY 25 MG tablet   Generic drug:  diphenhydrAMINE      as needed Reported on 4/12/2017        blood glucose monitoring lancets     300 each    1 each 4 times daily Use to test blood sugar 4 times daily or as directed.    Type 2 diabetes mellitus without complication, with long-term current use of insulin (H)       blood glucose monitoring test strip    ACCU-CHEK SHARMILA PLUS    400 strip    TEST 4 TIMES DAILY AND AS NEEDED    Type 2 diabetes mellitus without complication, with long-term current use of insulin (H)       cholecalciferol 2000 units tablet     30 tablet    Take 1 tablet by mouth daily        DRAMAMINE PO      as needed        eletriptan 20 MG tablet    RELPAX    12 tablet    take 1-2 tablets by mouth at onset of headache for migraine. may repeat dose in 2 hours. do not exceed 80mg in 24 hours.    Migraine, unspecified, without mention of intractable migraine without mention of status migrainosus       FLUoxetine 40 MG capsule    PROzac    90 capsule    TAKE 1 CAPSULE BY MOUTH DAILY    Moderate single current episode of major depressive disorder (H)       fluticasone 110 MCG/ACT Inhaler    FLOVENT HFA    36 Inhaler    Spray 2 puffs in nostril 2 times daily    Other chronic rhinitis       fluticasone 50 MCG/ACT spray    FLONASE    16 g    Spray 2 sprays into both nostrils daily    Acute recurrent maxillary sinusitis       glimepiride 1 MG tablet    AMARYL    30 tablet    Take 1 tablet (1 mg) by mouth every morning (before breakfast)  While on prednisone    Type 2 diabetes mellitus without complication, with long-term current use of insulin (H)       HUMIRA PEN 40 MG/0.8ML pen kit   Generic drug:  adalimumab      Inject as directed every 7 days        insulin detemir 100 UNIT/ML injection    LEVEMIR FLEXTOUCH    12 mL    Inject 55 Units Subcutaneous At Bedtime 3 month supply with 11 refill.    Type 2 diabetes mellitus without complication, with long-term current use of insulin (H)       insulin pen needle 31G X 5 MM    B-D U/F    180 each    USE TWICE DAILY (OR AS DIRECTED)    Type 2 diabetes mellitus without complication, with long-term current use of insulin (H)       LIALDA 1.2 g EC tablet   Generic drug:  mesalamine      Take 1,200 mg by mouth 2 tablets daily        liraglutide 18 MG/3ML soln    VICTOZA PEN    27 mL    INJECT 1.8 MG SUB-Q ONCE DAILY    Type 2 diabetes mellitus without complication, with long-term current use of insulin (H)       losartan 25 MG tablet    COZAAR    15 tablet    TAKE 1/2 TABLET BY MOUTH DAILY    Essential hypertension with goal blood pressure less than 140/90       * order for DME     1 each    Glucometer, brand as covered by insurance.    Type 2 diabetes mellitus without complication (H)       * order for DME     400 each    Test strips for pt's glucometer, brand as covered by insurance. Test four times daily and prn.    Type 2 diabetes mellitus without complication (H)       * order for DME     1 Units    Equipment being ordered: CPAP 9 c,        oxyCODONE-acetaminophen 5-325 MG per tablet    PERCOCET          predniSONE 10 MG tablet    DELTASONE     10 mg        STATIN NOT PRESCRIBED (INTENTIONAL)     0 each    1 each continuous prn Statin not prescribed intentionally due to Refusal by patient and Other:LDL is below 100.    CARDIOVASCULAR SCREENING; LDL GOAL LESS THAN 100       TYLENOL CAPS 500 MG OR      1 CAPSULE EVERY 4 HOURS AS NEEDED        * Notice:  This list has 3 medication(s) that are the same as  other medications prescribed for you. Read the directions carefully, and ask your doctor or other care provider to review them with you.

## 2018-05-24 NOTE — PATIENT INSTRUCTIONS
Take Amaryl 1 mg with breakfast while on prednisone.  We can always increase this if needed.  Increase your fluids.  Let me know if your blood sugars don't start to come down.    [facility]    If you have any questions regarding to your visit please contact your care team:     Team Pink:   Clinic Hours Telephone Number   Internal Medicine:  Dr. Tayler Garibay NP       7am-7pm  Monday - Thursday   7am-5pm  Fridays  [phone]  (Appointment scheduling available 24/7)    Questions about your visit?  Team Line  [phone]   [facility] [place] [facility] 11am-9pm Monday-Friday Saturday-Sunday- 9am-5pm   [place] - 5pm-9pm Monday-Friday Saturday-Sunday- 9am-5pm  [phone] - [place]   [phone] - [place]       What options do I have for visits at the clinic other than the traditional office visit?  To expand how we care for you, many of our providers are utilizing electronic visits (e-visits) and telephone visits, when medically appropriate, for interactions with their patients rather than a visit in the clinic.   We also offer nurse visits for many medical concerns. Just like any other service, we will bill your insurance company for this type of visit based on time spent on the phone with your provider. Not all insurance companies cover these visits. Please check with your medical insurance if this type of visit is covered. You will be responsible for any charges that are not paid by your insurance.      E-visits via DoCircuits:  generally incur a $35.00 fee.  Telephone visits:  Time spent on the phone: *charged based on time that is spent on the phone in increments of 10 minutes. Estimated cost:   5-10 mins $30.00   11-20 mins. $59.00   21-30 mins. $85.00   Use DoCircuits (secure email communication and access to your chart) to send your primary care provider a message or make an appointment. Ask someone on your Team how to sign up for  Janeth.    For a Price Quote for your services, please call our Consumer Price Line at [phone].    As always, Thank you for trusting us with your health care needs    Manny Wray

## 2018-05-31 ENCOUNTER — TELEPHONE (OUTPATIENT)
Dept: OBGYN | Facility: CLINIC | Age: 48
End: 2018-05-31

## 2018-05-31 ENCOUNTER — NURSE TRIAGE (OUTPATIENT)
Dept: NURSING | Facility: CLINIC | Age: 48
End: 2018-05-31

## 2018-05-31 DIAGNOSIS — E11.9 TYPE 2 DIABETES MELLITUS WITHOUT COMPLICATION, WITH LONG-TERM CURRENT USE OF INSULIN (H): ICD-10-CM

## 2018-05-31 DIAGNOSIS — Z79.4 TYPE 2 DIABETES MELLITUS WITHOUT COMPLICATION, WITH LONG-TERM CURRENT USE OF INSULIN (H): ICD-10-CM

## 2018-06-01 NOTE — TELEPHONE ENCOUNTER
Pharmacist at [facility] in [facility] [place] needs clarification on the dispense/refill amount of Levemir insulin. Please call at [phone].  BT Team Tammie.  Christine Faustin RN  [facility]

## 2018-06-01 NOTE — TELEPHONE ENCOUNTER
Message routed to provider.  Christine Faustin RN  [facility]      Reason for Disposition    Caller has NON-URGENT medication question about med that PCP prescribed and triager unable to answer question    Protocols used: MEDICATION QUESTION CALL-ADULT-AH

## 2018-06-01 NOTE — TELEPHONE ENCOUNTER
Medication Detail         Disp Refills Start End        insulin detemir (LEVEMIR FLEXTOUCH) 100 UNIT/ML injection 12 mL 3 5/24/2018       Sig - Route: Inject 55 Units Subcutaneous At Bedtime 3 month supply with 11 refill. - Subcutaneous      Class: E-Prescribe      Order: [id number]      E-Prescribing Status: Receipt confirmed by pharmacy (5/24/2018 12:10 PM CDT)      Please advise    Marianne Keith RN

## 2018-06-13 ENCOUNTER — DOCUMENTATION ONLY (OUTPATIENT)
Dept: SLEEP MEDICINE | Facility: CLINIC | Age: 48
End: 2018-06-13

## 2018-06-13 DIAGNOSIS — G47.33 OSA (OBSTRUCTIVE SLEEP APNEA): ICD-10-CM

## 2018-06-13 DIAGNOSIS — E66.01 MORBID OBESITY DUE TO EXCESS CALORIES (H): ICD-10-CM

## 2018-06-13 NOTE — PROGRESS NOTES
6 month [facility] Recheck Visit     Diagnostic AHI: 8.9 80.0    Data only recheck     Assessment: Pt meeting objective benchmarks.     Action plan: pt to follow up per provider request (1-2 yrs)    Device type: Auto-CPAP  PAP settings: CPAP min 9 cm  H20     CPAP max 9 cm  H20    CPAP fixed 9 cm  H20  Objective measures: 14 day rolling measures      Compliance  100 %      Leak  8.24 lpm  last  upload      AHI 2.2   last  upload      Average number of minutes 588      Objective measure goal  Compliance   Goal >70%  Leak   Goal < 24 lpm  AHI  Goal < 5  Usage  Goal >240

## 2018-07-17 DIAGNOSIS — E11.9 TYPE 2 DIABETES MELLITUS WITHOUT COMPLICATION, WITH LONG-TERM CURRENT USE OF INSULIN (H): ICD-10-CM

## 2018-07-17 DIAGNOSIS — Z79.4 TYPE 2 DIABETES MELLITUS WITHOUT COMPLICATION, WITH LONG-TERM CURRENT USE OF INSULIN (H): ICD-10-CM

## 2018-07-18 RX ORDER — GLIMEPIRIDE 1 MG/1
TABLET ORAL
Qty: 30 TABLET | Refills: 1 | Status: SHIPPED | OUTPATIENT
Start: 2018-07-18 | End: 2018-08-24

## 2018-07-18 NOTE — TELEPHONE ENCOUNTER
Routing refill request to provider for review/approval because:  Was prescribed while on Prednisone?  Phyllis Solomon RN

## 2018-08-01 ENCOUNTER — OFFICE VISIT (OUTPATIENT)
Dept: SLEEP MEDICINE | Facility: CLINIC | Age: 48
End: 2018-08-01
Payer: COMMERCIAL

## 2018-08-01 VITALS
BODY MASS INDEX: 42.68 KG/M2 | HEART RATE: 73 BPM | SYSTOLIC BLOOD PRESSURE: 112 MMHG | OXYGEN SATURATION: 97 % | WEIGHT: 250 LBS | DIASTOLIC BLOOD PRESSURE: 80 MMHG | HEIGHT: 64 IN

## 2018-08-01 DIAGNOSIS — E66.01 MORBID OBESITY DUE TO EXCESS CALORIES (H): ICD-10-CM

## 2018-08-01 DIAGNOSIS — G47.33 OSA (OBSTRUCTIVE SLEEP APNEA): ICD-10-CM

## 2018-08-01 PROCEDURE — 99214 OFFICE O/P EST MOD 30 MIN: CPT | Performed by: INTERNAL MEDICINE
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
## 2018-08-01 NOTE — NURSING NOTE
"Chief Complaint   Patient presents with     CPAP Follow Up       Initial /80  Pulse 73  Ht 1.626 m (5' 4\")  Wt 113.4 kg (250 lb)  SpO2 97%  BMI 42.91 kg/m2 Estimated body mass index is 42.91 kg/(m^2) as calculated from the following:    Height as of this encounter: 1.626 m (5' 4\").    Weight as of this encounter: 113.4 kg (250 lb).    Medication Reconciliation: complete      "

## 2018-08-01 NOTE — MR AVS SNAPSHOT
After Visit Summary   8/1/2018    Zaira Villatoro    MRN: [id number]           Patient Information     Date Of Birth          1970        Visit Information        Provider Department      8/1/2018 9:00 AM Ramana Palencia MD [facility]        Today's Diagnoses     MAHAD (obstructive sleep apnea)        Morbid obesity due to excess calories (H)          Care Instructions      Your BMI is Body mass index is 42.91 kg/(m^2).  Weight management is a personal decision.  If you are interested in exploring weight loss strategies, the following discussion covers the approaches that may be successful. Body mass index (BMI) is one way to tell whether you are at a healthy weight, overweight, or obese. It measures your weight in relation to your height.  A BMI of 18.5 to 24.9 is in the healthy range. A person with a BMI of 25 to 29.9 is considered overweight, and someone with a BMI of 30 or greater is considered obese. More than two-thirds of American adults are considered overweight or obese.  Being overweight or obese increases the risk for further weight gain. Excess weight may lead to heart disease and diabetes.  Creating and following plans for healthy eating and physical activity may help you improve your health.  Weight control is part of healthy lifestyle and includes exercise, emotional health, and healthy eating habits. Careful eating habits lifelong are the mainstay of weight control. Though there are significant health benefits from weight loss, long-term weight loss with diet alone may be very difficult to achieve- studies show long-term success with dietary management in less than 10% of people. Attaining a healthy weight may be especially difficult to achieve in those with severe obesity. In some cases, medications, devices and surgical management might be considered.  What can you do?  If you are overweight or obese and are interested in methods for weight loss, you should discuss  this with your provider.     Consider reducing daily calorie intake by 500 calories.     Keep a food journal.     Avoiding skipping meals, consider cutting portions instead.    Diet combined with exercise helps maintain muscle while optimizing fat loss. Strength training is particularly important for building and maintaining muscle mass. Exercise helps reduce stress, increase energy, and improves fitness. Increasing exercise without diet control, however, may not burn enough calories to loose weight.       Start walking three days a week 10-20 minutes at a time    Work towards walking thirty minutes five days a week     Eventually, increase the speed of your walking for 1-2 minutes at time    In addition, we recommend that you review healthy lifestyles and methods for weight loss available through the National Institutes of Health patient information sites:  http://win.niddk.nih.gov/publications/index.htm    And look into health and wellness programs that may be available through your health insurance provider, employer, local community center, or kareem club.    Weight management plan: Patient was referred to their PCP to discuss a diet and exercise plan.              Follow-ups after your visit        Follow-up notes from your care team     Return in about 1 year (around 8/1/2019) for Routine Visit.      Your next 10 appointments already scheduled     Aug 24, 2018  9:15 AM CDT   Office Visit with Tayler Bergman MD   [facility]    [address] [address] [phone]           Bring a current list of meds and any records pertaining to this visit. For Physicals, please bring immunization records and any forms needing to be filled out. Please arrive 10 minutes early to complete paperwork.              Who to contact     If you have questions or need follow up information about today's clinic visit or your schedule please contact [facility] directly at [phone].  Normal or non-critical lab and imaging results will be communicated to you by MyChart, letter or phone within 4 business days after the clinic has received the results. If you do not hear from us within 7 days, please contact the clinic through Netlihart or phone. If you have a critical or abnormal lab result, we will notify you by phone as soon as possible.  Submit refill requests through Zazoom or call your pharmacy and they will forward the refill request to us. Please allow 3 business days for your refill to be completed.          Additional Information About Your Visit        [employer] [place] Information     Zazoom gives you secure access to your electronic health record. If you see a primary care provider, you can also send messages to your care team and make appointments. If you have questions, please call your primary care clinic.  If you do not have a primary care provider, please call [phone] and they will assist you.        Care EveryWhere ID     This is your Care EveryWhere ID. This could be used by other organizations to access your [facility] medical records  [id number]        Your Vitals Were     Pulse Height Pulse Oximetry BMI (Body Mass Index)          73 1.626 m (5' 4\") 97% 42.91 kg/m2         Blood Pressure from Last 3 Encounters:   08/01/18 112/80   05/24/18 110/74   02/12/18 118/82    Weight from Last 3 Encounters:   08/01/18 113.4 kg (250 lb)   05/24/18 110 kg (242 lb 6.4 oz)   02/12/18 104.3 kg (230 lb)              Today, you had the following     No orders found for display       Primary Care Provider Office Phone # Fax #    Tayler Bergman [phone]       [address] [place] [address]        Equal Access to Services     VICENTE RIBERA : Joe Tomlin, torres soliman, maira irene. So [facility] [phone].    ATENCIÓN: Si habla español, tiene a matt disposición " servicios gratuitos de asistencia lingüística. Lali zavala [phone].    We comply with applicable federal civil rights laws and [place] laws. We do not discriminate on the basis of race, color, national origin, age, disability, sex, sexual orientation, or gender identity.            Thank you!     Thank you for choosing [facility]  for your care. Our goal is always to provide you with excellent care. Hearing back from our patients is one way we can continue to improve our services. Please take a few minutes to complete the written survey that you may receive in the mail after your visit with us. Thank you!             Your Updated Medication List - Protect others around you: Learn how to safely use, store and throw away your medicines at www.disposemymeds.org.          This list is accurate as of 8/1/18  9:33 AM.  Always use your most recent med list.                   Brand Name Dispense Instructions for use Diagnosis    aspirin 81 MG tablet     30 tablet    Take by mouth daily        atenolol 25 MG tablet    TENORMIN    180 tablet    Take 1 tablet (25 mg) by mouth 2 times daily    Hypertension goal BP (blood pressure) < 140/90       BENADRYL ALLERGY 25 MG tablet   Generic drug:  diphenhydrAMINE      as needed Reported on 4/12/2017        blood glucose monitoring lancets     300 each    1 each 4 times daily Use to test blood sugar 4 times daily or as directed.    Type 2 diabetes mellitus without complication, with long-term current use of insulin (H)       blood glucose monitoring test strip    ACCU-CHEK SHARMILA PLUS    400 strip    TEST 4 TIMES DAILY AND AS NEEDED    Type 2 diabetes mellitus without complication, with long-term current use of insulin (H)       cholecalciferol 2000 units tablet     30 tablet    Take 1 tablet by mouth daily        DRAMAMINE PO      as needed        eletriptan 20 MG tablet    RELPAX    12 tablet    take 1-2 tablets by mouth at onset of headache for migraine. may  repeat dose in 2 hours. do not exceed 80mg in 24 hours.    Migraine, unspecified, without mention of intractable migraine without mention of status migrainosus       FLUoxetine 40 MG capsule    PROzac    90 capsule    TAKE 1 CAPSULE BY MOUTH DAILY    Moderate single current episode of major depressive disorder (H)       fluticasone 110 MCG/ACT Inhaler    FLOVENT HFA    36 Inhaler    Spray 2 puffs in nostril 2 times daily    Other chronic rhinitis       glimepiride 1 MG tablet    AMARYL    30 tablet    TAKE 1 TABLET (1 MG) BY MOUTH EVERY MORNING (BEFORE BREAKFAST) WHILE ON PREDNISONE    Type 2 diabetes mellitus without complication, with long-term current use of insulin (H)       HUMIRA PEN 40 MG/0.8ML pen kit   Generic drug:  adalimumab      Inject as directed every 7 days        insulin detemir 100 UNIT/ML injection    LEVEMIR FLEXTOUCH    45 mL    Inject 55 Units Subcutaneous At Bedtime    Type 2 diabetes mellitus without complication, with long-term current use of insulin (H)       insulin pen needle 31G X 5 MM    B-D U/F    180 each    USE TWICE DAILY (OR AS DIRECTED)    Type 2 diabetes mellitus without complication, with long-term current use of insulin (H)       * liraglutide 18 MG/3ML soln    VICTOZA PEN    27 mL    INJECT 1.8 MG SUB-Q ONCE DAILY    Type 2 diabetes mellitus without complication, with long-term current use of insulin (H)       * VICTOZA PEN 18 MG/3ML soln   Generic drug:  liraglutide     9 mL    INJECT 1.8 MG SUB-Q ONCE DAILY    Type 2 diabetes mellitus without complication, with long-term current use of insulin (H)       losartan 25 MG tablet    COZAAR    15 tablet    TAKE 1/2 TABLET BY MOUTH DAILY    Essential hypertension with goal blood pressure less than 140/90       * order for DME     1 each    Glucometer, brand as covered by insurance.    Type 2 diabetes mellitus without complication (H)       * order for DME     400 each    Test strips for pt's glucometer, brand as covered by  insurance. Test four times daily and prn.    Type 2 diabetes mellitus without complication (H)       * order for DME     1 Units    Equipment being ordered: CPAP 9 c,        oxyCODONE-acetaminophen 5-325 MG per tablet    PERCOCET          predniSONE 10 MG tablet    DELTASONE     10 mg        STATIN NOT PRESCRIBED (INTENTIONAL)     0 each    1 each continuous prn Statin not prescribed intentionally due to Refusal by patient and Other:LDL is below 100.    CARDIOVASCULAR SCREENING; LDL GOAL LESS THAN 100       TYLENOL CAPS 500 MG OR      1 CAPSULE EVERY 4 HOURS AS NEEDED        * Notice:  This list has 5 medication(s) that are the same as other medications prescribed for you. Read the directions carefully, and ask your doctor or other care provider to review them with you.

## 2018-08-01 NOTE — PATIENT INSTRUCTIONS

## 2018-08-02 DIAGNOSIS — G47.33 OSA (OBSTRUCTIVE SLEEP APNEA): Primary | ICD-10-CM

## 2018-08-03 ENCOUNTER — DOCUMENTATION ONLY (OUTPATIENT)
Dept: SLEEP MEDICINE | Facility: CLINIC | Age: 48
End: 2018-08-03
Payer: COMMERCIAL

## 2018-08-03 DIAGNOSIS — G47.33 OSA (OBSTRUCTIVE SLEEP APNEA): ICD-10-CM

## 2018-08-03 DIAGNOSIS — E66.01 MORBID OBESITY DUE TO EXCESS CALORIES (H): ICD-10-CM

## 2018-08-03 NOTE — PROGRESS NOTES
Patient came to [facility] mask fitting appointment on August 3, 2018. Patient requested to switch masks because general discomfort .  Patient tried on the followings masks:NUANCE PRO   Patient selected  Onel Respironics, type NUANCE PROPillow maskMedium

## 2018-08-07 ENCOUNTER — TELEPHONE (OUTPATIENT)
Dept: INTERNAL MEDICINE | Facility: CLINIC | Age: 48
End: 2018-08-07

## 2018-08-07 DIAGNOSIS — M54.50 CHRONIC BILATERAL LOW BACK PAIN WITHOUT SCIATICA: Primary | ICD-10-CM

## 2018-08-07 DIAGNOSIS — G89.29 CHRONIC BILATERAL LOW BACK PAIN WITHOUT SCIATICA: Primary | ICD-10-CM

## 2018-08-07 RX ORDER — OXYCODONE AND ACETAMINOPHEN 5; 325 MG/1; MG/1
1 TABLET ORAL EVERY 6 HOURS PRN
Qty: 18 TABLET | Refills: 0 | Status: SHIPPED | OUTPATIENT
Start: 2018-08-07 | End: 2019-06-05

## 2018-08-07 NOTE — TELEPHONE ENCOUNTER
See office note from 5-24-18 (below):    7. Chronic bilateral low back pain without sciatica  She does feel much better when she is on prednisone.  Occasionally she will need to use Percocet.  She does not want any today would only like to call and when she needs it.  She rarely needs these prescriptions.  A controlled substance agreement was signed.    oxyCODONE-acetaminophen (PERCOCET) 5-325 MG per tablet  Last Written Prescription Date:  4-4-16  Last Fill Quantity: ?,   # refills: ?  Last Office Visit: 5-24-18  Future Office visit:    Next 5 appointments (look out 90 days)     Aug 24, 2018  9:15 AM CDT   Office Visit with Tayler Bergman MD   [facility] [place] ([facility])    [address]  [place] [address] [phone]                   Routing refill request to provider for review/approval because:  Medication is reported/historical

## 2018-08-07 NOTE — TELEPHONE ENCOUNTER
I want to be helpful, Things look good ! Is currently out of the office. This is nevertheless a difficult prescription to provide without history or context.    The best I can do is provide a total of 18 pills. Further follow up with primary care physician for additional refills / follow up.    First run the [place] [facility] Monitoring Program report     Joshua Schmidt MD

## 2018-08-07 NOTE — TELEPHONE ENCOUNTER
[place] ([place] reviewed:  reviewed- no concerns- placed in providers folder for review.     [place]:  [email or web address]  Pati Malave RN

## 2018-08-07 NOTE — TELEPHONE ENCOUNTER
Reason for Call:  Other prescription    Detailed comments: Patient states she is requesting a script for her pain for her back. She states Dr. Bergman had prescribed strong pain medication for her back awhile ago but patient did not fill this script and was told she can call in anytime to get this. Patient can pick this up.    She is going to [place] tomorrow and would like this asap if possible.     Pharmacy: [facility]    Phone Number Patient can be reached at: Cell number on file:    Telephone Information:   Mobile [phone]       Best Time: any    Can we leave a detailed message on this number? YES    Call taken on 8/7/2018 at 10:27 AM by Catrachito Wallace

## 2018-08-24 ENCOUNTER — RADIANT APPOINTMENT (OUTPATIENT)
Dept: GENERAL RADIOLOGY | Facility: CLINIC | Age: 48
End: 2018-08-24
Attending: INTERNAL MEDICINE
Payer: COMMERCIAL

## 2018-08-24 ENCOUNTER — OFFICE VISIT (OUTPATIENT)
Dept: INTERNAL MEDICINE | Facility: CLINIC | Age: 48
End: 2018-08-24
Payer: COMMERCIAL

## 2018-08-24 VITALS
HEART RATE: 70 BPM | WEIGHT: 250.5 LBS | OXYGEN SATURATION: 97 % | BODY MASS INDEX: 42.76 KG/M2 | TEMPERATURE: 97.3 F | DIASTOLIC BLOOD PRESSURE: 82 MMHG | SYSTOLIC BLOOD PRESSURE: 112 MMHG | RESPIRATION RATE: 16 BRPM | HEIGHT: 64 IN

## 2018-08-24 DIAGNOSIS — Z79.4 TYPE 2 DIABETES MELLITUS WITH HYPERGLYCEMIA, WITH LONG-TERM CURRENT USE OF INSULIN (H): Primary | ICD-10-CM

## 2018-08-24 DIAGNOSIS — R53.83 FATIGUE, UNSPECIFIED TYPE: ICD-10-CM

## 2018-08-24 DIAGNOSIS — I48.91 ATRIAL FIBRILLATION, UNSPECIFIED TYPE (H): ICD-10-CM

## 2018-08-24 DIAGNOSIS — E11.65 TYPE 2 DIABETES MELLITUS WITH HYPERGLYCEMIA, WITH LONG-TERM CURRENT USE OF INSULIN (H): Primary | ICD-10-CM

## 2018-08-24 DIAGNOSIS — F32.1 MODERATE SINGLE CURRENT EPISODE OF MAJOR DEPRESSIVE DISORDER (H): ICD-10-CM

## 2018-08-24 DIAGNOSIS — K51.911 ULCERATIVE COLITIS WITH RECTAL BLEEDING, UNSPECIFIED LOCATION (H): Chronic | ICD-10-CM

## 2018-08-24 DIAGNOSIS — Z11.4 SCREENING FOR HIV (HUMAN IMMUNODEFICIENCY VIRUS): ICD-10-CM

## 2018-08-24 DIAGNOSIS — M25.50 PAIN IN JOINT, MULTIPLE SITES: ICD-10-CM

## 2018-08-24 DIAGNOSIS — D84.9 IMMUNOSUPPRESSED STATUS (H): ICD-10-CM

## 2018-08-24 LAB
CK SERPL-CCNC: 256 U/L (ref 30–225)
ERYTHROCYTE [SEDIMENTATION RATE] IN BLOOD BY WESTERGREN METHOD: 10 MM/H (ref 0–20)
HBA1C MFR BLD: 8.1 % (ref 0–5.6)
TSH SERPL DL<=0.005 MIU/L-ACNC: 3.1 MU/L (ref 0.4–4)

## 2018-08-24 PROCEDURE — 85652 RBC SED RATE AUTOMATED: CPT | Performed by: INTERNAL MEDICINE

## 2018-08-24 PROCEDURE — 86039 ANTINUCLEAR ANTIBODIES (ANA): CPT | Performed by: INTERNAL MEDICINE

## 2018-08-24 PROCEDURE — 36415 COLL VENOUS BLD VENIPUNCTURE: CPT | Performed by: INTERNAL MEDICINE

## 2018-08-24 PROCEDURE — 99207 C FOOT EXAM  NO CHARGE: CPT | Performed by: INTERNAL MEDICINE

## 2018-08-24 PROCEDURE — 73130 X-RAY EXAM OF HAND: CPT | Mod: LT

## 2018-08-24 PROCEDURE — 87389 HIV-1 AG W/HIV-1&-2 AB AG IA: CPT | Performed by: INTERNAL MEDICINE

## 2018-08-24 PROCEDURE — 86200 CCP ANTIBODY: CPT | Performed by: INTERNAL MEDICINE

## 2018-08-24 PROCEDURE — 86431 RHEUMATOID FACTOR QUANT: CPT | Performed by: INTERNAL MEDICINE

## 2018-08-24 PROCEDURE — 99214 OFFICE O/P EST MOD 30 MIN: CPT | Performed by: INTERNAL MEDICINE

## 2018-08-24 PROCEDURE — 84443 ASSAY THYROID STIM HORMONE: CPT | Performed by: INTERNAL MEDICINE

## 2018-08-24 PROCEDURE — 83036 HEMOGLOBIN GLYCOSYLATED A1C: CPT | Performed by: INTERNAL MEDICINE

## 2018-08-24 PROCEDURE — 86038 ANTINUCLEAR ANTIBODIES: CPT | Performed by: INTERNAL MEDICINE

## 2018-08-24 PROCEDURE — 82550 ASSAY OF CK (CPK): CPT | Performed by: INTERNAL MEDICINE

## 2018-08-24 RX ORDER — BUDESONIDE 28 MG/1
AEROSOL, FOAM RECTAL
COMMUNITY
End: 2019-03-05

## 2018-08-24 RX ORDER — GLIMEPIRIDE 2 MG/1
2 TABLET ORAL
Qty: 90 TABLET | Refills: 3 | Status: SHIPPED | OUTPATIENT
Start: 2018-08-24 | End: 2019-01-08

## 2018-08-24 ASSESSMENT — PAIN SCALES - GENERAL: PAINLEVEL: MODERATE PAIN (4)

## 2018-08-24 NOTE — PROGRESS NOTES
SUBJECTIVE:   Zaira Villatoro is a 48 year old female who presents to clinic today for the following health issues:  Improved A1c.  Is very fatigued.  Saw sleep physician who didn't think it was a sleep issue.  She feels it may be related to her medications.    Stopped Amaryl the first week of august.  She did this because she is off of prednisone.  She finds that this was helping her blood sugars and she was not have any lows.  She is more hungry when she takes humira.  Her joints in her hands and feet are painful.  Her muscles hurt as well.  She doesn't do much because of this.  Has to sit down every 10-15 minutes due to pain.  This is definitely worsened since she started the Humira.  The Humira is helping with her ulcerative colitis.    Diabetes Follow-up    Patient is checking blood sugars: three times daily.   Blood sugar testing frequency justification: Uncontrolled diabetes  Results are as follows:         am - 110-250         lunchtime - 110-250         suppertime - 110-250    Diabetic concerns: None     Symptoms of hypoglycemia (low blood sugar): none     Paresthesias (numbness or burning in feet) or sores: No     Date of last diabetic eye exam: 6/2018    BP Readings from Last 2 Encounters:   08/01/18 112/80   05/24/18 110/74     Hemoglobin A1C (%)   Date Value   05/24/2018 8.8 (H)   03/22/2018 6.7 (H)     LDL Cholesterol Calculated (mg/dL)   Date Value   12/22/2017 72   11/07/2016 95       Diabetes Management Resources    Amount of exercise or physical activity: None    Problems taking medications regularly: No    Medication side effects: muscle aches    Diet: diabetic      Joint Pain    Onset: 2 years    Description:   Location: left shoulder, right shoulder, left elbow, right elbow, left wrist, right wrist, left knee, right knee, left hip, right hip, left ankle and right ankle  Character: Dull ache    Intensity: moderate    Progression of Symptoms: worse    Accompanying Signs & Symptoms:  Other  symptoms: tingling and weakness of arms and legs    History:   Previous similar pain: YES      Precipitating factors:   Trauma or overuse: no     Alleviating factors:  Improved by: acetaminophen    Therapies Tried and outcome: none            Problem list and histories reviewed & adjusted, as indicated.  Additional history: as documented    Patient Active Problem List   Diagnosis     IBS (irritable bowel syndrome)     Migraine     Ulcerative colitis (H)     Fatty liver     Other chronic rhinitis     CARDIOVASCULAR SCREENING; LDL GOAL LESS THAN 100     Essential hypertension with goal blood pressure less than 140/90     Uncontrolled type 2 diabetes mellitus without complication, with long-term current use of insulin (H)     Morbid obesity due to excess calories (H)     Moderate single current episode of major depressive disorder (H)     Incisional hernia, without obstruction or gangrene     Atrial fibrillation, unspecified type (H)     Low back pain     MAHAD (obstructive sleep apnea)- severe (AHI 80)     Immunosuppressed status (H)     Past Surgical History:   Procedure Laterality Date     APPENDECTOMY  04/2014     ARTHROSCOPY KNEE RT/LT  2004    RT      BIOPSY  2011 many 2011 and on     COLONOSCOPY  02/2012    lt sided colitis     COLONOSCOPY  1/9/2014     CRYOTHERAPY, CERVICAL  1988     EXPLORATORY LAPAROTOMY, PARTIAL LEFT ROLANDA COLLECTOMY WITH HARTMANS PROCEDURE, COLOSTOMY  8/2013    lt rolanda colectomy, bowel perforation, colostomy, Karen's pouche     GI SURGERY  2013    abrupted  bowl     HC TOOTH EXTRACTION W/FORCEP  6/2003    Abscess Tooth / Hospitalized     HERNIA REPAIR  04/2014    found at time of takedown     SINUS SURGERY  2/11/03    LT sinus cyst removal      TAKEDOWN COLOSTOMY  04/2014       Social History   Substance Use Topics     Smoking status: Former Smoker     Packs/day: 1.00     Years: 15.00     Types: Cigarettes     Start date: 1/1/1985     Quit date: 7/1/1998     Smokeless tobacco: Never Used       Comment: soke free household.     Alcohol use 0.0 oz/week      Comment: occ     Family History   Problem Relation Age of Onset     Diabetes Mother      Allergies Mother      Asthma Mother      Arthritis Mother      HEART DISEASE Mother      heart murmur     Depression Mother      Obesity Mother      Eye Disorder Father      Diabetes Father      Cerebrovascular Disease Father      HEART DISEASE Father      Hypertension Father      Diabetes Maternal Grandmother      Cerebrovascular Disease Maternal Grandfather      Unknown/Adopted Paternal Grandmother      HEART DISEASE Paternal Grandfather      left ventrical failure     Cerebrovascular Disease Paternal Grandfather      Gynecology Sister      endometriosis     Depression Sister      Asthma Daughter      Breast Cancer Maternal Aunt      Thyroid Disease Maternal Aunt      Breast Cancer Other      fathers sister     Anesthesia Reaction Other      Thyroid Disease Other      Osteoperosis Other      Asthma Daughter      Breast Cancer Other      mothers sister     Glaucoma No family hx of      Macular Degeneration No family hx of          Current Outpatient Prescriptions   Medication Sig Dispense Refill     aspirin 81 MG tablet Take by mouth daily 30 tablet 3     atenolol (TENORMIN) 25 MG tablet Take 1 tablet (25 mg) by mouth 2 times daily 180 tablet 3     blood glucose monitoring (ACCU-CHEK SHARMILA PLUS) test strip TEST 4 TIMES DAILY AND AS NEEDED 400 strip 3     blood glucose monitoring (ACCU-CHEK FASTCLIX) lancets 1 each 4 times daily Use to test blood sugar 4 times daily or as directed. 300 each 11     Budesonide (UCERIS) 2 MG/ACT FOAM        diphenhydrAMINE (BENADRYL ALLERGY) 25 MG tablet as needed Reported on 4/12/2017       DRAMAMINE OR as needed       exenatide ER (BYDUREON BCISE) 2 MG/0.85ML SQ autoinjector for weekly inj Inject 2 mg Subcutaneous every 7 days To replace Victoza 10.2 mL 3     FLUoxetine (PROZAC) 20 MG capsule TAKE 1 CAPSULE BY MOUTH DAILY 30  capsule 3     glimepiride (AMARYL) 2 MG tablet Take 1 tablet (2 mg) by mouth every morning (before breakfast) 90 tablet 3     HUMIRA PEN 40 MG/0.8ML pen kit Inject as directed every 7 days   2     insulin detemir (LEVEMIR FLEXTOUCH) 100 UNIT/ML injection Inject 55 Units Subcutaneous At Bedtime 45 mL 4     insulin pen needle (B-D U/F) 31G X 5 MM USE TWICE DAILY (OR AS DIRECTED) 180 each 3     liraglutide (VICTOZA PEN) 18 MG/3ML soln INJECT 1.8 MG SUB-Q ONCE DAILY 27 mL 3     losartan (COZAAR) 25 MG tablet TAKE 1/2 TABLET BY MOUTH DAILY 15 tablet 5     order for DME Equipment being ordered: CPAP 9 c, 1 Units 0     order for DME Glucometer, brand as covered by insurance. 1 each 0     order for DME Test strips for pt's glucometer, brand as covered by insurance. Test four times daily and prn. 400 each 4     oxyCODONE-acetaminophen (PERCOCET) 5-325 MG per tablet Take 1 tablet by mouth every 6 hours as needed for pain 18 tablet 0     STATIN NOT PRESCRIBED, INTENTIONAL, 1 each continuous prn Statin not prescribed intentionally due to Refusal by patient and Other:LDL is below 100. 0 each 0     TYLENOL CAPS 500 MG OR 1 CAPSULE EVERY 4 HOURS AS NEEDED       cholecalciferol 2000 UNITS tablet Take 1 tablet by mouth daily  30 tablet      eletriptan (RELPAX) 20 MG tablet take 1-2 tablets by mouth at onset of headache for migraine. may repeat dose in 2 hours. do not exceed 80mg in 24 hours. (Patient not taking: Reported on 8/1/2018) 12 tablet 1     fluticasone (FLOVENT HFA) 110 MCG/ACT Inhaler Spray 2 puffs in nostril 2 times daily (Patient not taking: Reported on 8/1/2018) 36 Inhaler 1     [DISCONTINUED] FLUoxetine (PROZAC) 40 MG capsule TAKE 1 CAPSULE BY MOUTH DAILY 90 capsule 3     [DISCONTINUED] glimepiride (AMARYL) 1 MG tablet TAKE 1 TABLET (1 MG) BY MOUTH EVERY MORNING (BEFORE BREAKFAST) WHILE ON PREDNISONE (Patient not taking: Reported on 8/1/2018) 30 tablet 1     [DISCONTINUED] VICTOZA PEN 18 MG/3ML soln INJECT 1.8 MG SUB-Q  "ONCE DAILY 9 mL 2     Allergies   Allergen Reactions     Demerol      Very low blood pressure     Metformin GI Disturbance     Naproxen      No Clinical Screening - See Comments      Pt reports allergy to all fish     Nubain  [Nalbuphine]      Topamax [Topiramate] Other (See Comments)     Sleepy and confused.     Tylenol Sinus [Tylenol Sinus Max]      Feet swelling       Reviewed and updated as needed this visit by clinical staff       Reviewed and updated as needed this visit by Provider         ROS:   ROS: 10 point ROS neg other than the symptoms noted above in the HPI.     OBJECTIVE:     /82  Pulse 70  Temp 97.3  F (36.3  C) (Oral)  Resp 16  Ht 5' 4\" (1.626 m)  Wt 250 lb 8 oz (113.6 kg)  SpO2 97%  BMI 43 kg/m2  Body mass index is 43 kg/(m^2).  GENERAL APPEARANCE: healthy, alert and no distress  NECK: no adenopathy, no asymmetry, masses, or scars and thyroid normal to palpation  RESP: lungs clear to auscultation - no rales, rhonchi or wheezes  CV: regular rates and rhythm and normal S1 S2, no S3 or S4  ABDOMEN:  soft, nontender, no HSM or masses and bowel sounds normal  SKIN: no suspicious lesions or rashes  PSYCH: mentation appears normal. and affect normal/bright  No edema     Diagnostic Test Results:  Results for orders placed or performed in visit on 08/24/18   XR Hand Bilateral G/E 3 Views    Narrative    XR HAND BILATERAL G/E 3 VW 8/24/2018 10:34 AM    HISTORY: Joint pain.    COMPARISON: None.    FINDINGS: No significant osseous degenerative change. No cortical  erosion. No malalignment. Osseous structures appear normal  bilaterally.      Impression    IMPRESSION: Osseous structures appear normal.    CHIDI IRVIN MD   HEMOGLOBIN A1C   Result Value Ref Range    Hemoglobin A1C 8.1 (H) 0 - 5.6 %   Erythrocyte sedimentation rate auto   Result Value Ref Range    Sed Rate 10 0 - 20 mm/h       ASSESSMENT/PLAN:             1. Type 2 diabetes mellitus with hyperglycemia, with long-term current use of " insulin (H)  Unfortunately she does not have good control.  See patient instructions  - HEMOGLOBIN A1C  - glimepiride (AMARYL) 2 MG tablet; Take 1 tablet (2 mg) by mouth every morning (before breakfast)  Dispense: 90 tablet; Refill: 3  - exenatide ER (BYDUREON BCISE) 2 MG/0.85ML SQ autoinjector for weekly inj; Inject 2 mg Subcutaneous every 7 days To replace Victoza  Dispense: 10.2 mL; Refill: 3  - TSH with free T4 reflex    2. Immunosuppressed status (H)  No evidence of infection today.    3. Atrial fibrillation, unspecified type (H)  Rate controlled in normal sinus rhythm today    4. Moderate single current episode of major depressive disorder (H)  She is wondering if any of her medications could be causing her fatigue.  Definitely fluoxetine can do this but is actually more likely to cause agitation.  I will drop the dose and see if that helps any.  - FLUoxetine (PROZAC) 20 MG capsule; TAKE 1 CAPSULE BY MOUTH DAILY  Dispense: 30 capsule; Refill: 3    5. Fatigue, unspecified type  This is likely multifactorial.  She is definitely on a lot of medications and has significant medical problems that would cause fatigue.  She is Gerardo seen her sleep doctor and that has been ruled out as a cause of her fatigue. - TSH with free T4 reflex    6. Pain in joint, multiple sites  She is a high risk for inflammatory arthritis. I do not see any active synovitis on exam.  She definitely has a lot of small joint pain.  Humira can cause joint pain in the patient does correlate the initiation of Humira to her joint pain.    - Erythrocyte sedimentation rate auto  - TSH with free T4 reflex  - Rheumatoid factor  - Cyclic Citrullinated Peptide Antibody IgG  - Anti Nuclear Rox IgG by IFA with Reflex  - CK total  - XR Hand Bilateral G/E 3 Views  - RHEUMATOLOGY REFERRAL    7. Screening for HIV (human immunodeficiency virus)    - HIV Screening    8. Ulcerative colitis with rectal bleeding, unspecified location (H)  The current medical  regimen is effective;  continue present plan and medications.       Patient Instructions     Decrease Fluoxetine to 20 mg daily.  After 1 month, if you feel less fatigued, we can stop it.  If you feel more depressed, we can go back up on the dose.  When your current supply of Victoza is gone, start Bydureon 1 injection weekly.  Start Amaryl (glimiperide) 2 mg daily, starting tomorrow morning with breakfast.  Make an appointment with Dr. Gill.    [facility]    If you have any questions regarding to your visit please contact your care team:     Team Pink:   Clinic Hours Telephone Number   Internal Medicine:  Dr. Tayler Garibay, NP       7am-7pm  Monday - Thursday   7am-5pm  Fridays  [phone]  (Appointment scheduling available 24/7)    Questions about your recent visit?  Team Line  [phone]   Urgent Care - [place] and [facility] - 11am-9pm Monday-Friday Saturday-Sunday- 9am-5pm   [place] - 5pm-9pm Monday-Friday Saturday-Sunday- 9am-5pm  [phone] - [place]  [phone] - [place]       What options do I have for a visit other than an office visit? We offer electronic visits (e-visits) and telephone visits, when medically appropriate.  Please check with your medical insurance to see if these types of visits are covered, as you will be responsible for any charges that are not paid by your insurance.      You can use [employer] (secure electronic communication) to access to your chart, send your primary care provider a message, or make an appointment. Ask a team member how to get started.     For a price quote for your services, please call our Consumer Price Line at [phone] or our Imaging Cost estimation line at [phone] (for imaging tests).        Tayler Bergman MD  [facility]

## 2018-08-24 NOTE — MR AVS SNAPSHOT
After Visit Summary   8/24/2018    Zaira Villatoro    MRN: [id number]           Patient Information     Date Of Birth          1970        Visit Information        Provider Department      8/24/2018 9:15 AM Tayler Bergman MD [facility]        Today's Diagnoses     Type 2 diabetes mellitus without complication, with long-term current use of insulin (H)    -  1    Screening for HIV (human immunodeficiency virus)        Pain in joint, multiple sites        Fatigue, unspecified type        Uncontrolled type 2 diabetes mellitus without complication, with long-term current use of insulin (H)        Immunosuppressed status (H)        Atrial fibrillation, unspecified type (H)        Moderate single current episode of major depressive disorder (H)          Care Instructions    Decrease Fluoxetine to 20 mg daily.  After 1 month, if you feel less fatigued, we can stop it.  If you feel more depressed, we can go back up on the dose.  When your current supply of Victoza is gone, start Bydureon 1 injection weekly.  Start Amaryl (glimiperide) 2 mg daily, starting tomorrow morning with breakfast.  Make an appointment with Dr. Gill.    [facility] (formerly [facility])[3]    If you have any questions regarding to your visit please contact your care team:     Team Pink:   Clinic Hours Telephone Number   Internal Medicine:  Dr. Tayler Garibay NP       7am-7pm  Monday - Thursday   7am-5pm  Fridays  [phone]  (Appointment scheduling available 24/7)    Questions about your recent visit?  Team Line  [phone]   Urgent Care - [place] - 11am-9pm Monday-Friday Saturday-Sunday- 9am-5pm   [place] - 5pm-9pm Monday-Friday Saturday-Sunday- 9am-5pm  [phone] - Flori Valdez  [phone] - [place]       What options do I have for a visit other than an office visit? We offer electronic visits (e-visits) and telephone visits, when medically  appropriate.  Please check with your medical insurance to see if these types of visits are covered, as you will be responsible for any charges that are not paid by your insurance.      You can use [employer] (secure electronic communication) to access to your chart, send your primary care provider a message, or make an appointment. Ask a team member how to get started.     For a price quote for your services, please call our Consumer Price Line at [phone] or our Imaging Cost estimation line at [phone] (for imaging tests).            Follow-ups after your visit        Additional Services     RHEUMATOLOGY REFERRAL       Your provider has referred you to: [facility]: [facility] [phone]   [email or web address]    Please be aware that coverage of these services is subject to the terms and limitations of your health insurance plan.  Call member services at your health plan with any benefit or coverage questions.      Please bring the following with you to your appointment:    (1) Any X-Rays, CTs or MRIs which have been performed.  Contact the facility where they were done to arrange for  prior to your scheduled appointment.    (2) List of current medications   (3) This referral request   (4) Any documents/labs given to you for this referral                  Who to contact     If you have questions or need follow up information about today's clinic visit or your schedule please contact [facility] directly at [phone].  Normal or non-critical lab and imaging results will be communicated to you by [employer], letter or phone within 4 business days after the clinic has received the results. If you do not hear from us within 7 days, please contact the clinic through [employer] or phone. If you have a critical or abnormal lab result, we will notify you by phone as soon as possible.  Submit refill requests through [employer] or call your pharmacy and they will  "forward the refill request to us. Please allow 3 business days for your refill to be completed.          Additional Information About Your Visit        [employer] Information     [employer] gives you secure access to your electronic health record. If you see a primary care provider, you can also send messages to your care team and make appointments. If you have questions, please call your primary care clinic.  If you do not have a primary care provider, please call [phone] and they will assist you.        Care EveryWhere ID     This is your Care EveryWhere ID. This could be used by other organizations to access your [facility] medical records  [id number]        Your Vitals Were     Pulse Temperature Respirations Height Pulse Oximetry BMI (Body Mass Index)    70 97.3  F (36.3  C) (Oral) 16 5' 4\" (1.626 m) 97% 43 kg/m2       Blood Pressure from Last 3 Encounters:   08/24/18 112/82   08/01/18 112/80   05/24/18 110/74    Weight from Last 3 Encounters:   08/24/18 250 lb 8 oz (113.6 kg)   08/01/18 250 lb (113.4 kg)   05/24/18 242 lb 6.4 oz (110 kg)              We Performed the Following     Anti Nuclear Rox IgG by IFA with Reflex     CK total     Cyclic Citrullinated Peptide Antibody IgG     Erythrocyte sedimentation rate auto     FOOT EXAM  NO CHARGE [47416.114]     HEMOGLOBIN A1C     HIV Screening     Rheumatoid factor     RHEUMATOLOGY REFERRAL     TSH with free T4 reflex     XR Hand Bilateral G/E 3 Views          Today's Medication Changes          These changes are accurate as of 8/24/18 10:20 AM.  If you have any questions, ask your nurse or doctor.               Start taking these medicines.        Dose/Directions    exenatide ER 2 MG/0.85ML SQ autoinjector for weekly inj   Commonly known as:  BYDUREON BCise   Used for:  Type 2 diabetes mellitus without complication, with long-term current use of insulin (H)   Started by:  Tayler Bergman MD        Dose:  2 mg   Inject 2 mg Subcutaneous every 7 days To replace " Victoza   Quantity:  10.2 mL   Refills:  3         These medicines have changed or have updated prescriptions.        Dose/Directions    FLUoxetine 20 MG capsule   Commonly known as:  PROzac   This may have changed:  medication strength   Used for:  Moderate single current episode of major depressive disorder (H)   Changed by:  Tayler Bergman MD        TAKE 1 CAPSULE BY MOUTH DAILY   Quantity:  30 capsule   Refills:  3       glimepiride 2 MG tablet   Commonly known as:  AMARYL   This may have changed:    - medication strength  - See the new instructions.   Used for:  Type 2 diabetes mellitus without complication, with long-term current use of insulin (H)   Changed by:  Tayler Bergman MD        Dose:  2 mg   Take 1 tablet (2 mg) by mouth every morning (before breakfast)   Quantity:  90 tablet   Refills:  3       liraglutide 18 MG/3ML soln   Commonly known as:  VICTOZA PEN   This may have changed:  Another medication with the same name was removed. Continue taking this medication, and follow the directions you see here.   Used for:  Type 2 diabetes mellitus without complication, with long-term current use of insulin (H)   Changed by:  Tayler Bergman MD        INJECT 1.8 MG SUB-Q ONCE DAILY   Quantity:  27 mL   Refills:  3         Stop taking these medicines if you haven't already. Please contact your care team if you have questions.     predniSONE 10 MG tablet   Commonly known as:  DELTASONE   Stopped by:  Tayler Bergman MD                Where to get your medicines      These medications were sent to [place] [id number] [facility] [place] - [address]  [address] [place] [address]     Phone:  [phone]     exenatide ER 2 MG/0.85ML SQ autoinjector for weekly inj    FLUoxetine 20 MG capsule    glimepiride 2 MG tablet                Primary Care Provider Office Phone # Fax #    Tayler Bergman [phone] [address] [facility]  [place] [address]        Equal Access to Services      VICENTE RIBERA : Hadii aad damian esa [place]lin, waaxda luqadaha, qaybta kaalmada adeeddie, maira jack hayzuri jerrynicolecharlotte anaya . So [facility] [phone].    ATENCIÓN: Si habla español, tiene a matt disposición servicios gratuitos de asistencia lingüística. Llame al [phone].    We comply with applicable federal civil rights laws and [place] laws. We do not discriminate on the basis of race, color, national origin, age, disability, sex, sexual orientation, or gender identity.            Thank you!     Thank you for choosing [facility]  for your care. Our goal is always to provide you with excellent care. Hearing back from our patients is one way we can continue to improve our services. Please take a few minutes to complete the written survey that you may receive in the mail after your visit with us. Thank you!             Your Updated Medication List - Protect others around you: Learn how to safely use, store and throw away your medicines at www.disposemymeds.org.          This list is accurate as of 8/24/18 10:20 AM.  Always use your most recent med list.                   Brand Name Dispense Instructions for use Diagnosis    aspirin 81 MG tablet     30 tablet    Take by mouth daily        atenolol 25 MG tablet    TENORMIN    180 tablet    Take 1 tablet (25 mg) by mouth 2 times daily    Hypertension goal BP (blood pressure) < 140/90       BENADRYL ALLERGY 25 MG tablet   Generic drug:  diphenhydrAMINE      as needed Reported on 4/12/2017        blood glucose monitoring lancets     300 each    1 each 4 times daily Use to test blood sugar 4 times daily or as directed.    Type 2 diabetes mellitus without complication, with long-term current use of insulin (H)       blood glucose monitoring test strip    ACCU-CHEK SHARMILA PLUS    400 strip    TEST 4 TIMES DAILY AND AS NEEDED    Type 2 diabetes mellitus without complication, with long-term current use of insulin (H)       cholecalciferol 2000 units  tablet     30 tablet    Take 1 tablet by mouth daily        DRAMAMINE PO      as needed        eletriptan 20 MG tablet    RELPAX    12 tablet    take 1-2 tablets by mouth at onset of headache for migraine. may repeat dose in 2 hours. do not exceed 80mg in 24 hours.    Migraine, unspecified, without mention of intractable migraine without mention of status migrainosus       exenatide ER 2 MG/0.85ML SQ autoinjector for weekly inj    BYDUREON BCise    10.2 mL    Inject 2 mg Subcutaneous every 7 days To replace Victoza    Type 2 diabetes mellitus without complication, with long-term current use of insulin (H)       FLUoxetine 20 MG capsule    PROzac    30 capsule    TAKE 1 CAPSULE BY MOUTH DAILY    Moderate single current episode of major depressive disorder (H)       fluticasone 110 MCG/ACT Inhaler    FLOVENT HFA    36 Inhaler    Spray 2 puffs in nostril 2 times daily    Other chronic rhinitis       glimepiride 2 MG tablet    AMARYL    90 tablet    Take 1 tablet (2 mg) by mouth every morning (before breakfast)    Type 2 diabetes mellitus without complication, with long-term current use of insulin (H)       HUMIRA PEN 40 MG/0.8ML pen kit   Generic drug:  adalimumab      Inject as directed every 7 days        insulin detemir 100 UNIT/ML injection    LEVEMIR FLEXTOUCH    45 mL    Inject 55 Units Subcutaneous At Bedtime    Type 2 diabetes mellitus without complication, with long-term current use of insulin (H)       insulin pen needle 31G X 5 MM    B-D U/F    180 each    USE TWICE DAILY (OR AS DIRECTED)    Type 2 diabetes mellitus without complication, with long-term current use of insulin (H)       liraglutide 18 MG/3ML soln    VICTOZA PEN    27 mL    INJECT 1.8 MG SUB-Q ONCE DAILY    Type 2 diabetes mellitus without complication, with long-term current use of insulin (H)       losartan 25 MG tablet    COZAAR    15 tablet    TAKE 1/2 TABLET BY MOUTH DAILY    Essential hypertension with goal blood pressure less than 140/90        * order for DME     1 each    Glucometer, brand as covered by insurance.    Type 2 diabetes mellitus without complication (H)       * order for DME     400 each    Test strips for pt's glucometer, brand as covered by insurance. Test four times daily and prn.    Type 2 diabetes mellitus without complication (H)       * order for DME     1 Units    Equipment being ordered: CPAP 9 c,        oxyCODONE-acetaminophen 5-325 MG per tablet    PERCOCET    18 tablet    Take 1 tablet by mouth every 6 hours as needed for pain    Chronic bilateral low back pain without sciatica       STATIN NOT PRESCRIBED (INTENTIONAL)     0 each    1 each continuous prn Statin not prescribed intentionally due to Refusal by patient and Other:LDL is below 100.    CARDIOVASCULAR SCREENING; LDL GOAL LESS THAN 100       TYLENOL CAPS 500 MG OR      1 CAPSULE EVERY 4 HOURS AS NEEDED        UCERIS 2 MG/ACT Foam   Generic drug:  Budesonide           * Notice:  This list has 3 medication(s) that are the same as other medications prescribed for you. Read the directions carefully, and ask your doctor or other care provider to review them with you.

## 2018-08-27 LAB
ANA PAT SER IF-IMP: ABNORMAL
ANA SER QL IF: POSITIVE
ANA TITR SER IF: ABNORMAL {TITER}
CCP AB SER IA-ACNC: 1 U/ML
HIV 1+2 AB+HIV1 P24 AG SERPL QL IA: NONREACTIVE
RHEUMATOID FACT SER NEPH-ACNC: <20 IU/ML (ref 0–20)

## 2018-08-27 NOTE — PROGRESS NOTES
MILY test is abnormal which is a test for autoimmune problems.  Humira can make this test abnormal as well.  I would recommend discussing this further with rheumatology.  Dr. Bergman

## 2018-08-27 NOTE — PROGRESS NOTES
Normal thyroid. Minor increase in the muscle test CK but not to a significant level.  The rest of your labs are still pending.

## 2018-10-30 DIAGNOSIS — I10 HYPERTENSION GOAL BP (BLOOD PRESSURE) < 140/90: Chronic | ICD-10-CM

## 2018-10-31 RX ORDER — ATENOLOL 50 MG/1
TABLET ORAL
Qty: 90 TABLET | Refills: 2 | Status: SHIPPED | OUTPATIENT
Start: 2018-10-31 | End: 2019-07-05

## 2018-11-14 ENCOUNTER — TELEPHONE (OUTPATIENT)
Dept: INTERNAL MEDICINE | Facility: CLINIC | Age: 48
End: 2018-11-14

## 2018-11-14 NOTE — TELEPHONE ENCOUNTER
Panel Management Review      Patient has the following on her problem list:     Diabetes    ASA: Passed    Last A1C  Lab Results   Component Value Date    A1C 8.1 08/24/2018    A1C 8.8 05/24/2018    A1C 6.7 03/22/2018    A1C 6.8 12/22/2017    A1C 6.6 08/21/2017     A1C tested: FAILED    Last LDL:    Lab Results   Component Value Date    CHOL 122 12/22/2017     Lab Results   Component Value Date    HDL 35 12/22/2017     Lab Results   Component Value Date    LDL 72 12/22/2017     Lab Results   Component Value Date    TRIG 73 12/22/2017     Lab Results   Component Value Date    CHOLHDLRATIO 3.2 11/04/2015     Lab Results   Component Value Date    NHDL 87 12/22/2017       Is the patient on a Statin? YES             Is the patient on Aspirin? YES    Medications     HMG CoA Reductase Inhibitors    STATIN NOT PRESCRIBED, INTENTIONAL,    Salicylates    aspirin 81 MG tablet          Last three blood pressure readings:  BP Readings from Last 3 Encounters:   08/24/18 112/82   08/01/18 112/80   05/24/18 110/74       Date of last diabetes office visit: 08/24/2018     Tobacco History:     History   Smoking Status     Former Smoker     Packs/day: 1.00     Years: 15.00     Types: Cigarettes     Start date: 1/1/1985     Quit date: 7/1/1998   Smokeless Tobacco     Never Used     Comment: [employer] free household.           Composite cancer screening  Chart review shows that this patient is due/due soon for the following None  Summary:    Patient is due/failing the following:   A1C, DAP and PHQ9    Action needed:   Patient needs office visit for Diabetes check.    Type of outreach:    Sent exoro system message.    Questions for provider review:    None                                                                                                                                    BRITTNEY Quevedo       Chart routed to BRITTNEY Quevedo

## 2018-12-16 DIAGNOSIS — I10 ESSENTIAL HYPERTENSION WITH GOAL BLOOD PRESSURE LESS THAN 140/90: ICD-10-CM

## 2018-12-16 DIAGNOSIS — F32.1 MODERATE SINGLE CURRENT EPISODE OF MAJOR DEPRESSIVE DISORDER (H): ICD-10-CM

## 2018-12-18 RX ORDER — LOSARTAN POTASSIUM 25 MG/1
TABLET ORAL
Qty: 15 TABLET | Refills: 5 | Status: SHIPPED | OUTPATIENT
Start: 2018-12-18 | End: 2019-07-05

## 2018-12-18 NOTE — TELEPHONE ENCOUNTER
Losartan: Prescription approved per [facility] Refill Protocol.      Prozac: Routing refill request to provider for review/approval because:  Patient over due for phq9, last done on 10/4/17  PHQ-9 SCORE 5/26/2017 8/21/2017 10/4/2017   PHQ-9 Total Score 9 12 9     Pati Malave RN

## 2019-01-08 DIAGNOSIS — Z79.4 TYPE 2 DIABETES MELLITUS WITH HYPERGLYCEMIA, WITH LONG-TERM CURRENT USE OF INSULIN (H): ICD-10-CM

## 2019-01-08 DIAGNOSIS — E11.65 TYPE 2 DIABETES MELLITUS WITH HYPERGLYCEMIA, WITH LONG-TERM CURRENT USE OF INSULIN (H): ICD-10-CM

## 2019-01-08 RX ORDER — GLIMEPIRIDE 2 MG/1
2 TABLET ORAL
Qty: 90 TABLET | Refills: 3 | Status: SHIPPED | OUTPATIENT
Start: 2019-01-08 | End: 2019-03-05

## 2019-01-08 NOTE — TELEPHONE ENCOUNTER
"Routing refill request to provider for review/approval because:  Labs not current:  LDL, A1C    Requested Prescriptions   Pending Prescriptions Disp Refills     glimepiride (AMARYL) 2 MG tablet 90 tablet 3     Sig: Take 1 tablet (2 mg) by mouth every morning (before breakfast)    Sulfonylurea Agents Failed - 1/8/2019  9:27 AM       Failed - Patient has documented LDL within the past 12 mos.    Recent Labs   Lab Test 12/22/17  1102   LDL 72            Failed - Patient has documented A1c within the specified period of time.    If HgbA1C is 8 or greater, it needs to be on file within the past 3 months.  If less than 8, must be on file within the past 6 months.     Recent Labs   Lab Test 08/24/18  0934   A1C 8.1*            Passed - Blood pressure less than 140/90 in past 6 months    BP Readings from Last 3 Encounters:   08/24/18 112/82   08/01/18 112/80   05/24/18 110/74                Passed - Patient has had a Microalbumin in the past 12 mos.    Recent Labs   Lab Test 05/24/18  1230   MICROL 6   UMALCR 4.67            Passed - Medication is active on med list       Passed - Patient is age 18 or older       Passed - No active pregnancy on record       Passed - Patient has a recent creatinine (normal) within the past 12 mos.    Recent Labs   Lab Test 05/24/18  1143   CR 0.88            Passed - Patient has not had a positive pregnancy test within the past 12 mos.       Passed - Recent (6 mo) or future (30 days) visit within the authorizing provider's specialty    Patient had office visit in the last 6 months or has a visit in the next 30 days with authorizing provider or within the authorizing provider's specialty.  See \"Patient Info\" tab in inbasket, or \"Choose Columns\" in Meds & Orders section of the refill encounter.            Yasmin Hutchison RN  "

## 2019-01-08 NOTE — TELEPHONE ENCOUNTER
90 day  Requested Prescriptions   Pending Prescriptions Disp Refills     glimepiride (AMARYL) 2 MG tablet 90 tablet 3     Sig: Take 1 tablet (2 mg) by mouth every morning (before breakfast)    There is no refill protocol information for this order        Last Written Prescription Date:  8/24/18  Last Fill Quantity: 90,  # refills: 3   Last office visit: 2/12/2018 with prescribing provider:  8/24/18   Future Office Visit:

## 2019-01-23 DIAGNOSIS — E11.9 TYPE 2 DIABETES MELLITUS WITHOUT COMPLICATION, WITH LONG-TERM CURRENT USE OF INSULIN (H): ICD-10-CM

## 2019-01-23 DIAGNOSIS — Z79.4 TYPE 2 DIABETES MELLITUS WITHOUT COMPLICATION, WITH LONG-TERM CURRENT USE OF INSULIN (H): ICD-10-CM

## 2019-01-23 RX ORDER — GLIMEPIRIDE 1 MG/1
1 TABLET ORAL EVERY MORNING
Refills: 1 | COMMUNITY
Start: 2018-12-12 | End: 2019-02-15

## 2019-01-24 NOTE — TELEPHONE ENCOUNTER
"Routing refill request to provider for review/approval because:  Medication is reported/historical      Requested Prescriptions   Pending Prescriptions Disp Refills     glimepiride (AMARYL) 1 MG tablet [Pharmacy Med Name: GLIMEPIRIDE 1 MG TABLET] 30 tablet 1     Sig: TAKE 1 TABLET (1 MG) BY MOUTH EVERY MORNING (BEFORE BREAKFAST) WHILE ON PREDNISONE    Sulfonylurea Agents Failed - 1/23/2019  4:08 PM       Failed - Patient has documented LDL within the past 12 mos.    Recent Labs   Lab Test 12/22/17  1102   LDL 72            Failed - Patient has documented A1c within the specified period of time.    If HgbA1C is 8 or greater, it needs to be on file within the past 3 months.  If less than 8, must be on file within the past 6 months.     Recent Labs   Lab Test 08/24/18  0934   A1C 8.1*            Passed - Blood pressure less than 140/90 in past 6 months    BP Readings from Last 3 Encounters:   08/24/18 112/82   08/01/18 112/80   05/24/18 110/74                Passed - Patient has had a Microalbumin in the past 12 mos.    Recent Labs   Lab Test 05/24/18  1230   MICROL 6   UMALCR 4.67            Passed - Medication is active on med list       Passed - Patient is age 18 or older       Passed - No active pregnancy on record       Passed - Patient has a recent creatinine (normal) within the past 12 mos.    Recent Labs   Lab Test 05/24/18  1143   CR 0.88            Passed - Patient has not had a positive pregnancy test within the past 12 mos.       Passed - Recent (6 mo) or future (30 days) visit within the authorizing provider's specialty    Patient had office visit in the last 6 months or has a visit in the next 30 days with authorizing provider or within the authorizing provider's specialty.  See \"Patient Info\" tab in inbasket, or \"Choose Columns\" in Meds & Orders section of the refill encounter.          Signed Prescriptions Disp Refills     glimepiride (AMARYL) 1 MG tablet  1     Sig: Take 1 mg by mouth every morning    " There is no refill protocol information for this order        Jennifer Landon, JEY - BC

## 2019-01-25 NOTE — TELEPHONE ENCOUNTER
Called patient and male answered. MA gave him scheduling number to call to schedule appointment. Please help schedule appointment if patient returns call.  Indu DORADO CMA ([facility])

## 2019-01-28 RX ORDER — GLIMEPIRIDE 1 MG/1
TABLET ORAL
Qty: 30 TABLET | Refills: 1 | Status: SHIPPED | OUTPATIENT
Start: 2019-01-28 | End: 2019-02-15

## 2019-01-28 NOTE — TELEPHONE ENCOUNTER
Called patient and she is scheduled on 3/5/2019 at 11/30am with PCP for med check and diabetes check.  BRITTNEY Quevedo

## 2019-02-12 ENCOUNTER — TELEPHONE (OUTPATIENT)
Dept: FAMILY MEDICINE | Facility: CLINIC | Age: 49
End: 2019-02-12

## 2019-02-12 NOTE — TELEPHONE ENCOUNTER
Reason for call:  Other   Patient called regarding (reason for call): call back  Additional comments: Patient states RT arm feels like it fell asleep but the feeling is not coming back. She would like a call back.    Phone number to reach patient:  Home number on file [phone] (home) or Cell number on file:    Telephone Information:   Mobile [phone]       Best Time:  ASAP    Can we leave a detailed message on this number?  YES

## 2019-02-12 NOTE — TELEPHONE ENCOUNTER
Patients reports that her right arm feels numb like it fell asleep but the feeling isnt coming back.  She noticed it today and feels the numbness from thumb up right arm into neck and shoulder.  She also has burning feeling in shoulder and this is painful.  Denies headache today but she does have frequent migraines- last one was a few days ago.  Denies confusion, speaking in clear full sentences, and denies a difference in temp from left arm (daughter checked).  She also feels like arm is swollen but it does not visually look swollen.  If she makes a fist with her right hand she feels pain in hand/arm.  Patient not sure what to do and is concerned.  Please advise   Pati Malave RN

## 2019-02-13 ENCOUNTER — TRANSFERRED RECORDS (OUTPATIENT)
Dept: HEALTH INFORMATION MANAGEMENT | Facility: CLINIC | Age: 49
End: 2019-02-13

## 2019-02-13 NOTE — TELEPHONE ENCOUNTER
Detailed message left on mobile and home number advising that patient go to the ED immediately for further evaluation    Marianne Keith RN

## 2019-02-13 NOTE — TELEPHONE ENCOUNTER
2nd detailed message left on patient's VM advising her to go to the ED for further eval immediately  RN hotline number [phone] given    Marianne Keith RN

## 2019-02-15 ENCOUNTER — TELEPHONE (OUTPATIENT)
Dept: FAMILY MEDICINE | Facility: CLINIC | Age: 49
End: 2019-02-15

## 2019-02-15 NOTE — TELEPHONE ENCOUNTER
Please have patient take an extra 1/2 tablet (1 mg) of glimeperide for the next 3 days and then stop.  Her blood sugars should come down once she is off medrol dose charles.    Maria C Garibay, CNP

## 2019-02-15 NOTE — TELEPHONE ENCOUNTER
Patient notified of Provider's message as written.  Patient verbalized understanding.    Marianne Keith RN

## 2019-02-15 NOTE — TELEPHONE ENCOUNTER
Noted in care everywhere that patient was seen at [facility] ED on 19 and was put on Medrol Dosepak. Has herniated cervical disc    Review DM meds with patient  She is on the followin. Glimepiride 2mg every morning  2. Bydureon 2mg every 7 days  3. Levemir 55 units at HS    Please advise on elevated BG and DM meds while on medrol dosepak  Asymptomatic    Marianne Keith RN

## 2019-02-15 NOTE — TELEPHONE ENCOUNTER
Reason for call:  Symptom   Symptom or request: blood sugar this am 294    Duration (how long have symptoms been present): this am  Have you been treated for this before? Yes    Additional comments: Patient was sent to the ER on 2-13-19 and was put on steroid and now blood sugars are up. Feels fine otherwise. Please call.    Phone number to reach patient:  Home number on file [phone]  Best Time:  any    Can we leave a detailed message on this number?  YES

## 2019-02-26 ENCOUNTER — OFFICE VISIT (OUTPATIENT)
Dept: NEUROSURGERY | Facility: CLINIC | Age: 49
End: 2019-02-26
Payer: COMMERCIAL

## 2019-02-26 VITALS
DIASTOLIC BLOOD PRESSURE: 72 MMHG | TEMPERATURE: 98.3 F | SYSTOLIC BLOOD PRESSURE: 125 MMHG | OXYGEN SATURATION: 97 % | HEIGHT: 65 IN | HEART RATE: 68 BPM | WEIGHT: 257.8 LBS | BODY MASS INDEX: 42.95 KG/M2

## 2019-02-26 DIAGNOSIS — M54.12 CERVICAL RADICULOPATHY: Primary | ICD-10-CM

## 2019-02-26 PROCEDURE — 99203 OFFICE O/P NEW LOW 30 MIN: CPT | Performed by: NURSE PRACTITIONER

## 2019-02-26 ASSESSMENT — PAIN SCALES - GENERAL: PAINLEVEL: NO PAIN (1)

## 2019-02-26 ASSESSMENT — MIFFLIN-ST. JEOR: SCORE: 1805.25

## 2019-02-26 NOTE — LETTER
"    2/26/2019         RE: Zaira Villatoro  [address] [facility]dle[facility] [address]        Dear Colleague,    Thank you for referring your patient, Zaira Villatoro, to the [facility]. Please see a copy of my visit note below.      Dr. Miguel Sifuentes  [facility]  Neurosurgery Clinic Visit      CC:  Bilateral arm paresthesias    Primary care Provider: Tayler Bergman      Reason For Visit:   The patient presents for evaluation of neck pain.      HPI: Zaira Villatoro is a 48 year old female with N/T in the BLE, Rt>Lt, for the past couple of months.  She states she doesn't really experience neck pain, \"Just sometimes.\"  She describes numbness along the right lateral arm into the forearm and hand, noting mostly the thumb and/or 4th and 5th fingers.  She has similar symptoms on the left, but more intermittent. She was seen at [facility] ED a couple of weeks ago and had a cervical MRI showing foraminal and canal narrowing at C5-6, C6-7.  She denies feeling weakness to BUE or dropping objects.  She states her symptoms increase with using her keyboard, driving with her hands on the wheel for extended amounts of time.  She recently started an oral steroid taper which she states has relieved her symptoms. She denies difficulty with fine motor skills. She denies issues with balance or falls.  She denies changes to bowel or bladder.      Pain right now:  1    Past Medical History:   Diagnosis Date     Acute diverticulitis 7/31/2013     Gestational diabetes 2000 2000/2001     History of seizures as a child 1981    One grand mal in 5th grade.  Didn't tolerate phenobarb.     Hypertension 2000     Menorrhagia      Mild cervical dysplasia 1988     Moderate single current episode of major depressive disorder (H)      Pelvic pain      Perforation of sigmoid colon (H) 8/3/2013     PONV (postoperative nausea and vomiting)      S/P left hemicolectomy 8/16/2013 8/02/13      Sepsis (H) " 8/1/2013     Transient atrial fibrillation or flutter 08/03/2013    one documented episode of perioperative atrial fibrillation that occurred in association with a sigmoid colon perforation, ulcerative colitis and colostomy       Past Medical History reviewed with patient during visit.    Past Surgical History:   Procedure Laterality Date     APPENDECTOMY  04/2014     ARTHROSCOPY KNEE RT/LT  2004    RT      BIOPSY  2011 many 2011 and on     COLONOSCOPY  02/2012    lt sided colitis     COLONOSCOPY  1/9/2014     CRYOTHERAPY, CERVICAL  1988     EXPLORATORY LAPAROTOMY, PARTIAL LEFT ROLANDA COLLECTOMY WITH HARTMANS PROCEDURE, COLOSTOMY  8/2013    lt rolanda colectomy, bowel perforation, colostomy, Karen's pouche     GI SURGERY  2013    abrupted  bowl     HC TOOTH EXTRACTION W/FORCEP  6/2003    Abscess Tooth / Hospitalized     HERNIA REPAIR  04/2014    found at time of takedown     SINUS SURGERY  2/11/03    LT sinus cyst removal      TAKEDOWN COLOSTOMY  04/2014     Past Surgical History reviewed with patient during visit.    Current Outpatient Medications   Medication     aspirin 81 MG tablet     atenolol (TENORMIN) 50 MG tablet     blood glucose monitoring (ACCU-CHEK SHARMILA PLUS) test strip     blood glucose monitoring (ACCU-CHEK FASTCLIX) lancets     Budesonide (UCERIS) 2 MG/ACT FOAM     cholecalciferol 2000 UNITS tablet     DRAMAMINE OR     exenatide ER (BYDUREON BCISE) 2 MG/0.85ML SQ autoinjector for weekly inj     FLUoxetine (PROZAC) 20 MG capsule     fluticasone (FLOVENT HFA) 110 MCG/ACT Inhaler     glimepiride (AMARYL) 2 MG tablet     HUMIRA PEN 40 MG/0.8ML pen kit     insulin detemir (LEVEMIR FLEXTOUCH) 100 UNIT/ML injection     insulin pen needle (B-D U/F) 31G X 5 MM     losartan (COZAAR) 25 MG tablet     order for DME     order for DME     order for DME     oxyCODONE-acetaminophen (PERCOCET) 5-325 MG per tablet     STATIN NOT PRESCRIBED, INTENTIONAL,     TYLENOL CAPS 500 MG OR     atenolol (TENORMIN) 25 MG tablet      diphenhydrAMINE (BENADRYL ALLERGY) 25 MG tablet     eletriptan (RELPAX) 20 MG tablet     No current facility-administered medications for this visit.        Allergies   Allergen Reactions     Demerol      Very low blood pressure     Metformin GI Disturbance     Naproxen      No Clinical Screening - See Comments      Pt reports allergy to all fish     Nubain  [Nalbuphine]      Topamax [Topiramate] Other (See Comments)     Sleepy and confused.     Tylenol Sinus [Tylenol Sinus Max]      Feet swelling       Social History     Socioeconomic History     Marital status:      Spouse name: Bill     Number of children: 2     Years of education: 15     Highest education level: None   Occupational History     Occupation: Substitute clerical/cook/health assistant/Para     Employer: [employer]   Social Needs     Financial resource strain: None     Food insecurity:     Worry: None     Inability: None     Transportation needs:     Medical: None     Non-medical: None   Tobacco Use     Smoking status: Former Smoker     Packs/day: 1.00     Years: 15.00     Pack years: 15.00     Types: Cigarettes     Start date: 1985     Last attempt to quit: 1998     Years since quittin.6     Smokeless tobacco: Never Used     Tobacco comment: soke free household.   Substance and Sexual Activity     Alcohol use: Yes     Alcohol/week: 0.0 oz     Comment: occ     Drug use: No     Sexual activity: Yes     Partners: Male     Birth control/protection: Other     Comment:  has vasectomy   Lifestyle     Physical activity:     Days per week: None     Minutes per session: None     Stress: None   Relationships     Social connections:     Talks on phone: None     Gets together: None     Attends Restoration service: None     Active member of club or organization: None     Attends meetings of clubs or organizations: None     Relationship status: None     Intimate partner violence:     Fear of current or ex partner: None      "Emotionally abused: None     Physically abused: None     Forced sexual activity: None   Other Topics Concern     Parent/sibling w/ CABG, MI or angioplasty before 65F 55M? No      Service No     Blood Transfusions No     Comment: doesn't want one     Caffeine Concern No     Occupational Exposure No     Hobby Hazards No     Sleep Concern No     Stress Concern No     Weight Concern Yes     Special Diet Yes     Comment: weight loss, colitis     Back Care Yes     Exercise Yes     Comment: does     Bike Helmet No     Comment: only stationary bike     Seat Belt Yes     Self-Exams Yes   Social History Narrative     None       Family History   Problem Relation Age of Onset     Diabetes Mother      Allergies Mother      Asthma Mother      Arthritis Mother      Heart Disease Mother         heart murmur     Depression Mother      Obesity Mother      Eye Disorder Father      Diabetes Father      Cerebrovascular Disease Father      Heart Disease Father      Hypertension Father      Diabetes Maternal Grandmother      Cerebrovascular Disease Maternal Grandfather      Unknown/Adopted Paternal Grandmother      Heart Disease Paternal Grandfather         left ventrical failure     Cerebrovascular Disease Paternal Grandfather      Gynecology Sister         endometriosis     Depression Sister      Asthma Daughter      Breast Cancer Maternal Aunt      Thyroid Disease Maternal Aunt      Breast Cancer Other         fathers sister     Anesthesia Reaction Other      Thyroid Disease Other      Osteoporosis Other      Asthma Daughter      Breast Cancer Other         mothers sister     Glaucoma No family hx of      Macular Degeneration No family hx of          ROS: 10 point ROS neg other than the symptoms noted above in the HPI.    Vital Signs: /72 (BP Location: Right arm, Patient Position: Sitting, Cuff Size: Adult Large)   Pulse 68   Temp 98.3  F (36.8  C) (Oral)   Ht 1.651 m (5' 5\")   Wt 116.9 kg (257 lb 12.8 oz)   SpO2 97% " "  BMI 42.90 kg/m           Examination:  Constitutional:  Alert, well nourished, NAD.  HEENT: Normocephalic, atraumatic.   Pulm:  Without shortness of breath   CV:  No pitting edema of BLE.    Neurological:  Awake  Alert  Oriented x 3  Speech clear  Cranial nerves II - XII intact    Motor exam   Shoulder Abduction:  Right:  5/5   Left:  5/5  Biceps:                      Right:  5/5   Left:  5/5  Triceps:                     Right:  5/5   Left:  5/5  Wrist Extensors:       Right:  5/5   Left:  5/5  Wrist Flexors:           Right:  5/5   Left:  5/5  Intrinsics:                   Right:  5/5   Left:  5/5  Hip Flexor:                Right: 5/5  Left:  5/5  Hip Adductor:             Right:  5/5  Left:  5/5  Hip Abductor:             Right:  5/5  Left:  5/5  Gastroc Soleus:        Right:  5/5  Left:  5/5  Tib/Ant:                      Right:  5/5  Left:  5/5  EHL:                          Right:  5/5  Left:  5/5   Sensation normal to bilateral upper and lower extremities  DTRs 1+ symmetric  Haney's negative  Clonus negative  Steady gait, non-antalgic, non-myelopathic gait.  Able to heel/toe walk without loss of balance  Cervical examination reveals good range of motion.  No tenderness to palpation of the cervical spine or paraspinous muscles bilaterally.    Lumbar examination reveals tenderness of the spine or paraspinous muscles.  Hip height is symmetrical. Negative SI joint, sciatic notch or greater trochanteric tenderness to palpation bilaterally.  Straight leg raise is negative bilaterally.      Imaging: MRI:  -Moderate to severe bilateral foraminal narrowing at C6-7, left>right with mild central stenosis  -Moderate to severe neural foraminal narrowing at C5-6, right>left with mild central stenosis.    Assessment/Plan:   Cervical Radiculopathy      Zaira Villatoro is a 48 year old female with N/T in the BLE, Rt>Lt, for the past couple of months.  She states she doesn't really experience neck pain, \"Just " "sometimes.\"  She describes numbness along the right lateral arm into the forearm and hand, noting mostly the thumb and/or 4th and 5th fingers.  She has similar symptoms on the left, but more intermittent. She was seen at [facility] ED a couple of weeks ago and had a cervical MRI showing foraminal and canal narrowing at C5-6, C6-7.  She denies feeling weakness to BUE or dropping objects.  She states her symptoms increase with using her keyboard, driving with her hands on the wheel for extended amounts of time.  She recently started an oral steroid taper which she states has relieved her symptoms. She denies difficulty with fine motor skills. She denies issues with balance or falls.  She denies changes to bowel or bladder.  We reviewed MRI results and discussed options of PT and/or injections. The patient is interested in conservative care before any consideration of surgery.  She feels with the use of recent oral steroids relieved her symptoms and she is going to hold off on an injection at this time.  If she has any weakness she will contact us and follow-up with Dr. Sifuentes.    Patient Instructions   -Schedule physical therapy (someone will contact you)  -Please contact the clinic if pain persists at [phone].          Phyllis Newton [facility]  [facility]  [address] [address]    Tel [phone]  Pager [phone]    Again, thank you for allowing me to participate in the care of your patient.        Sincerely,        Phyllis Newton, NP    "

## 2019-02-26 NOTE — PATIENT INSTRUCTIONS
-Schedule physical therapy (someone will contact you)  -Please contact the clinic if pain persists at [phone].

## 2019-02-26 NOTE — NURSING NOTE
"Zaira Villatoro is a 48 year old female who presents for:  Chief Complaint   Patient presents with     Follow Up     ED Follow up for cervical disc disorder. Numbness in both arms but more consistent in right arm.         Initial Vitals:  /72 (BP Location: Right arm, Patient Position: Sitting, Cuff Size: Adult Large)   Pulse 68   Temp 98.3  F (36.8  C) (Oral)   Ht 5' 5\" (1.651 m)   Wt 257 lb 12.8 oz (116.9 kg)   SpO2 97%   BMI 42.90 kg/m   Estimated body mass index is 42.9 kg/m  as calculated from the following:    Height as of this encounter: 5' 5\" (1.651 m).    Weight as of this encounter: 257 lb 12.8 oz (116.9 kg).. Body surface area is 2.32 meters squared. BP completed using cuff size: large  No Pain (1)        Nursing Comments: Neck pain and numbness in both arms but more consistently in right arm.         Carolina Brown    "

## 2019-03-05 ENCOUNTER — OFFICE VISIT (OUTPATIENT)
Dept: INTERNAL MEDICINE | Facility: CLINIC | Age: 49
End: 2019-03-05
Payer: COMMERCIAL

## 2019-03-05 VITALS
HEART RATE: 84 BPM | OXYGEN SATURATION: 94 % | DIASTOLIC BLOOD PRESSURE: 78 MMHG | RESPIRATION RATE: 18 BRPM | BODY MASS INDEX: 43.23 KG/M2 | WEIGHT: 259.8 LBS | SYSTOLIC BLOOD PRESSURE: 122 MMHG | TEMPERATURE: 98.3 F

## 2019-03-05 DIAGNOSIS — Z79.4 TYPE 2 DIABETES MELLITUS WITH HYPERGLYCEMIA, WITH LONG-TERM CURRENT USE OF INSULIN (H): Primary | ICD-10-CM

## 2019-03-05 DIAGNOSIS — I10 ESSENTIAL HYPERTENSION WITH GOAL BLOOD PRESSURE LESS THAN 140/90: Chronic | ICD-10-CM

## 2019-03-05 DIAGNOSIS — F32.1 MODERATE SINGLE CURRENT EPISODE OF MAJOR DEPRESSIVE DISORDER (H): ICD-10-CM

## 2019-03-05 DIAGNOSIS — K51.911 ULCERATIVE COLITIS WITH RECTAL BLEEDING, UNSPECIFIED LOCATION (H): Chronic | ICD-10-CM

## 2019-03-05 DIAGNOSIS — E66.01 MORBID OBESITY DUE TO EXCESS CALORIES (H): ICD-10-CM

## 2019-03-05 DIAGNOSIS — Z51.81 ENCOUNTER FOR THERAPEUTIC DRUG MONITORING: ICD-10-CM

## 2019-03-05 DIAGNOSIS — I48.91 ATRIAL FIBRILLATION, UNSPECIFIED TYPE (H): ICD-10-CM

## 2019-03-05 DIAGNOSIS — H91.92 HEARING LOSS OF LEFT EAR, UNSPECIFIED HEARING LOSS TYPE: ICD-10-CM

## 2019-03-05 DIAGNOSIS — E11.65 TYPE 2 DIABETES MELLITUS WITH HYPERGLYCEMIA, WITH LONG-TERM CURRENT USE OF INSULIN (H): Primary | ICD-10-CM

## 2019-03-05 DIAGNOSIS — D84.9 IMMUNOSUPPRESSED STATUS (H): ICD-10-CM

## 2019-03-05 LAB
ALBUMIN SERPL-MCNC: 3.4 G/DL (ref 3.4–5)
ALP SERPL-CCNC: 50 U/L (ref 40–150)
ALT SERPL W P-5'-P-CCNC: 33 U/L (ref 0–50)
ANION GAP SERPL CALCULATED.3IONS-SCNC: 8 MMOL/L (ref 3–14)
AST SERPL W P-5'-P-CCNC: 24 U/L (ref 0–45)
BILIRUB SERPL-MCNC: 0.8 MG/DL (ref 0.2–1.3)
BUN SERPL-MCNC: 9 MG/DL (ref 7–30)
CALCIUM SERPL-MCNC: 8.7 MG/DL (ref 8.5–10.1)
CHLORIDE SERPL-SCNC: 106 MMOL/L (ref 94–109)
CO2 SERPL-SCNC: 24 MMOL/L (ref 20–32)
CREAT SERPL-MCNC: 0.76 MG/DL (ref 0.52–1.04)
CRP SERPL-MCNC: 6.5 MG/L (ref 0–8)
ERYTHROCYTE [DISTWIDTH] IN BLOOD BY AUTOMATED COUNT: 13.2 % (ref 10–15)
GFR SERPL CREATININE-BSD FRML MDRD: >90 ML/MIN/{1.73_M2}
GLUCOSE SERPL-MCNC: 182 MG/DL (ref 70–99)
HBA1C MFR BLD: 8.2 % (ref 0–5.6)
HCT VFR BLD AUTO: 42.5 % (ref 35–47)
HGB BLD-MCNC: 14 G/DL (ref 11.7–15.7)
MCH RBC QN AUTO: 30.8 PG (ref 26.5–33)
MCHC RBC AUTO-ENTMCNC: 32.9 G/DL (ref 31.5–36.5)
MCV RBC AUTO: 93 FL (ref 78–100)
PLATELET # BLD AUTO: 281 10E9/L (ref 150–450)
POTASSIUM SERPL-SCNC: 4.3 MMOL/L (ref 3.4–5.3)
PROT SERPL-MCNC: 6.6 G/DL (ref 6.8–8.8)
RBC # BLD AUTO: 4.55 10E12/L (ref 3.8–5.2)
SODIUM SERPL-SCNC: 138 MMOL/L (ref 133–144)
WBC # BLD AUTO: 7 10E9/L (ref 4–11)

## 2019-03-05 PROCEDURE — 85027 COMPLETE CBC AUTOMATED: CPT | Performed by: INTERNAL MEDICINE

## 2019-03-05 PROCEDURE — 36415 COLL VENOUS BLD VENIPUNCTURE: CPT | Performed by: INTERNAL MEDICINE

## 2019-03-05 PROCEDURE — 80053 COMPREHEN METABOLIC PANEL: CPT | Performed by: INTERNAL MEDICINE

## 2019-03-05 PROCEDURE — 86140 C-REACTIVE PROTEIN: CPT | Performed by: INTERNAL MEDICINE

## 2019-03-05 PROCEDURE — 83036 HEMOGLOBIN GLYCOSYLATED A1C: CPT | Performed by: INTERNAL MEDICINE

## 2019-03-05 PROCEDURE — 99214 OFFICE O/P EST MOD 30 MIN: CPT | Performed by: INTERNAL MEDICINE

## 2019-03-05 RX ORDER — GLIMEPIRIDE 4 MG/1
4 TABLET ORAL
Qty: 90 TABLET | Refills: 1 | Status: SHIPPED | OUTPATIENT
Start: 2019-03-05 | End: 2019-08-18

## 2019-03-05 RX ORDER — FLUOXETINE 10 MG/1
CAPSULE ORAL
Qty: 14 CAPSULE | Refills: 0 | Status: SHIPPED | OUTPATIENT
Start: 2019-03-05 | End: 2019-04-12

## 2019-03-05 ASSESSMENT — PATIENT HEALTH QUESTIONNAIRE - PHQ9: SUM OF ALL RESPONSES TO PHQ QUESTIONS 1-9: 7

## 2019-03-05 ASSESSMENT — PAIN SCALES - GENERAL: PAINLEVEL: NO PAIN (0)

## 2019-03-05 NOTE — PROGRESS NOTES
SUBJECTIVE:   Zaira Villatoro is a 49 year old female who presents to clinic today for the following health issues:    Blood sugar were high due to prednisone use.  Took amaryl extra until the blood sugar dropped.    Bydureon hasn't caused constipation.  She doesn't feel it has helps her blood sugars either.  She is not interested in an insulin pump.      Bowel movements are improved due to treatment from GI.  She remains on Humira.   She was seen by neurosurg for cervical radiculopathy.     She hasn't been able to hear out of her left ear for 2 months.  No trauma.     Diabetes Follow-up      Patient is checking blood sugars: rarely.  Results are in 150's, never under 100, and infrequently over 200    Diabetic concerns: None     Symptoms of hypoglycemia (low blood sugar): none     Paresthesias (numbness or burning in feet) or sores: No     Date of last diabetic eye exam: 1/17/18    BP Readings from Last 2 Encounters:   03/05/19 122/78   02/26/19 125/72     Hemoglobin A1C (%)   Date Value   03/05/2019 8.2 (H)   08/24/2018 8.1 (H)     LDL Cholesterol Calculated (mg/dL)   Date Value   12/22/2017 72   11/07/2016 95       Diabetes Management Resources    Amount of exercise or physical activity: 2-3 days/week for an average of 45-60 minutes    Problems taking medications regularly: No    Medication side effects: Humira- joint and muscle pains, fatigue    Diet: carbohydrate counting and hi protein    Had a positive MILY but didn't follow up with rheumatology yet. She continues to have pain in her hands and knees.     Fluoxetine dropped at last visit to see if it would help with fatigue. It didn't help with fatigue but her depression is no different so she would like to go off.      See phq9 for her symptoms.      PROBLEMS TO ADD ON...  Medication Followup of all    Taking Medication as prescribed: yes    Side Effects:  Humira- joint and muscle pains, fatigue    Medication Helping Symptoms:  yes       Problem list and  histories reviewed & adjusted, as indicated.  Additional history: as documented    Patient Active Problem List   Diagnosis     IBS (irritable bowel syndrome)     Migraine     Ulcerative colitis (H)     Fatty liver     Other chronic rhinitis     CARDIOVASCULAR SCREENING; LDL GOAL LESS THAN 100     Essential hypertension with goal blood pressure less than 140/90     Uncontrolled type 2 diabetes mellitus without complication, with long-term current use of insulin (H)     Morbid obesity due to excess calories (H)     Moderate single current episode of major depressive disorder (H)     Incisional hernia, without obstruction or gangrene     Atrial fibrillation, unspecified type (H)     Low back pain     MAHAD (obstructive sleep apnea)- severe (AHI 80)     Immunosuppressed status (H)     Past Surgical History:   Procedure Laterality Date     APPENDECTOMY  2014     ARTHROSCOPY KNEE RT/LT  2004    RT      BIOPSY  2011 and on     COLONOSCOPY  2012    lt sided colitis     COLONOSCOPY  2014     CRYOTHERAPY, CERVICAL  1988     EXPLORATORY LAPAROTOMY, PARTIAL LEFT ROLANDA COLLECTOMY WITH HARTMANS PROCEDURE, COLOSTOMY  2013    lt rolanda colectomy, bowel perforation, colostomy, Karen's pouche     GI SURGERY      abrupted  bowl     HC TOOTH EXTRACTION W/FORCEP  2003    Abscess Tooth / Hospitalized     HERNIA REPAIR  2014    found at time of takedown     SINUS SURGERY  03    LT sinus cyst removal      TAKEDOWN COLOSTOMY  2014       Social History     Tobacco Use     Smoking status: Former Smoker     Packs/day: 1.00     Years: 15.00     Pack years: 15.00     Types: Cigarettes     Start date: 1985     Last attempt to quit: 1998     Years since quittin.6     Smokeless tobacco: Never Used     Tobacco comment: soke free household.   Substance Use Topics     Alcohol use: Yes     Alcohol/week: 0.0 oz     Comment: occ     Family History   Problem Relation Age of Onset     Diabetes Mother       Allergies Mother      Asthma Mother      Arthritis Mother      Heart Disease Mother         heart murmur     Depression Mother      Obesity Mother      Eye Disorder Father      Diabetes Father      Cerebrovascular Disease Father      Heart Disease Father      Hypertension Father      Diabetes Maternal Grandmother      Cerebrovascular Disease Maternal Grandfather      Unknown/Adopted Paternal Grandmother      Heart Disease Paternal Grandfather         left ventrical failure     Cerebrovascular Disease Paternal Grandfather      Gynecology Sister         endometriosis     Depression Sister      Asthma Daughter      Breast Cancer Maternal Aunt      Thyroid Disease Maternal Aunt      Breast Cancer Other         fathers sister     Anesthesia Reaction Other      Thyroid Disease Other      Osteoporosis Other      Asthma Daughter      Breast Cancer Other         mothers sister     Glaucoma No family hx of      Macular Degeneration No family hx of          Current Outpatient Medications   Medication Sig Dispense Refill     aspirin 81 MG tablet Take by mouth daily 30 tablet 3     atenolol (TENORMIN) 50 MG tablet TAKE ONE-HALF TABLET BY MOUTH 2 TIMES DAILY 90 tablet 2     blood glucose monitoring (ACCU-CHEK SHARMILA PLUS) test strip TEST 4 TIMES DAILY AND AS NEEDED 400 strip 3     blood glucose monitoring (ACCU-CHEK FASTCLIX) lancets 1 each 4 times daily Use to test blood sugar 4 times daily or as directed. 300 each 11     cholecalciferol 2000 UNITS tablet Take 1 tablet by mouth daily  30 tablet      diphenhydrAMINE (BENADRYL ALLERGY) 25 MG tablet as needed Reported on 4/12/2017       DRAMAMINE OR as needed       eletriptan (RELPAX) 20 MG tablet take 1-2 tablets by mouth at onset of headache for migraine. may repeat dose in 2 hours. do not exceed 80mg in 24 hours. 12 tablet 1     exenatide ER (BYDUREON BCISE) 2 MG/0.85ML SQ autoinjector for weekly inj Inject 2 mg Subcutaneous every 7 days To replace Victoza 10.2 mL 3      FLUoxetine (PROZAC) 10 MG capsule Take 1 tab daily for 2 weeks and then stop. 14 capsule 0     fluticasone (FLOVENT HFA) 110 MCG/ACT Inhaler Spray 2 puffs in nostril 2 times daily 36 Inhaler 1     glimepiride (AMARYL) 4 MG tablet Take 1 tablet (4 mg) by mouth every morning (before breakfast) 90 tablet 1     HUMIRA PEN 40 MG/0.8ML pen kit Inject as directed every 7 days   2     insulin detemir (LEVEMIR FLEXTOUCH) 100 UNIT/ML injection Inject 55 Units Subcutaneous At Bedtime 45 mL 4     insulin pen needle (B-D U/F) 31G X 5 MM USE TWICE DAILY (OR AS DIRECTED) 180 each 3     losartan (COZAAR) 25 MG tablet TAKE 1/2 TABLET BY MOUTH DAILY 15 tablet 5     order for DME Equipment being ordered: CPAP 9 c, 1 Units 0     order for DME Glucometer, brand as covered by insurance. 1 each 0     order for DME Test strips for pt's glucometer, brand as covered by insurance. Test four times daily and prn. 400 each 4     oxyCODONE-acetaminophen (PERCOCET) 5-325 MG per tablet Take 1 tablet by mouth every 6 hours as needed for pain 18 tablet 0     STATIN NOT PRESCRIBED, INTENTIONAL, 1 each continuous prn Statin not prescribed intentionally due to Refusal by patient and Other:LDL is below 100. 0 each 0     TYLENOL CAPS 500 MG OR 1 CAPSULE EVERY 4 HOURS AS NEEDED       Allergies   Allergen Reactions     Demerol      Very low blood pressure     Fish      Pt reports allergy to all fish     Meperidine Other (See Comments)     Other reaction(s): Hypotension  Drops blood pressure       Metformin GI Disturbance and Diarrhea     GI Disturbance       Naproxen      No Clinical Screening - See Comments      Pt reports allergy to all fish     Nubain  [Nalbuphine]      Seafood      Topiramate Other (See Comments) and Hives     Sleepy and confused.  Shaky, disoriented       Tylenol Sinus Max Other (See Comments)     Feet swelling  Feet swelling       Latex Rash     Recent Labs   Lab Test 03/05/19  1124 08/24/18  0937 08/24/18  0934 05/24/18  1143  05/24/18  1142  12/22/17  1102  05/26/17  0922 02/13/17  1020 11/07/16  1124  03/17/16  0947  11/04/15  1138  08/04/13   A1C 8.2*  --  8.1*  --  8.8*   < > 6.8*   < > 7.0* 8.1*  --    < >  --    < > 6.6*   < > 8.3*   LDL  --   --   --   --   --   --  72  --   --   --  95  --   --   --  82   < >  --    HDL  --   --   --   --   --   --  35*  --   --   --  44*  --   --   --  42*   < >  --    TRIG  --   --   --   --   --   --  73  --   --   --  92  --   --   --  61   < >  --    ALT  --   --   --   --   --   --   --   --  25  --   --   --  22  --   --   --  8  8   CR  --   --   --  0.88  --   --   --   --  0.76  --   --   --  0.86   < >  --    < > 0.73   GFRESTIMATED  --   --   --  69  --   --   --   --  82  --   --   --  72   < >  --    < >  --    GFRESTBLACK  --   --   --  83  --   --   --   --  >90  African American GFR Calc    --   --   --  87   < >  --    < >  --    POTASSIUM  --   --   --  4.4  --   --   --   --  4.2  --   --   --  4.3   < >  --    < > 4.2   TSH  --  3.10  --   --   --   --   --   --   --  2.05  --   --   --   --   --    < >  --     < > = values in this interval not displayed.      BP Readings from Last 3 Encounters:   03/05/19 122/78   02/26/19 125/72   08/24/18 112/82    Wt Readings from Last 3 Encounters:   03/05/19 117.8 kg (259 lb 12.8 oz)   02/26/19 116.9 kg (257 lb 12.8 oz)   08/24/18 113.6 kg (250 lb 8 oz)                  Labs reviewed in EPIC    Reviewed and updated as needed this visit by clinical staff  Tobacco  Allergies  Meds  Problems  Med Hx  Surg Hx  Fam Hx  Soc Hx        Reviewed and updated as needed this visit by Provider  Tobacco  Allergies  Meds  Problems  Med Hx  Surg Hx  Fam Hx         ROS:   ROS: 10 point ROS neg other than the symptoms noted above in the HPI.     OBJECTIVE:     /78   Pulse 84   Temp 98.3  F (36.8  C) (Oral)   Resp 18   Wt 117.8 kg (259 lb 12.8 oz)   SpO2 94%   Breastfeeding? No   BMI 43.23 kg/m    Body mass index is 43.23  kg/m .  GENERAL APPEARANCE: healthy, alert and no distress  EARS: canals clear, TM retracted not injected .    NECK: no adenopathy, no asymmetry, masses, or scars and thyroid normal to palpation  RESP: lungs clear to auscultation - no rales, rhonchi or wheezes  CV: regular rates and rhythm and normal S1 S2, no S3 or S4  ABDOMEN:  soft, nontender, no HSM or masses and bowel sounds normal  SKIN: no suspicious lesions or rashes  PSYCH: mentation appears normal. and affect normal/bright  Trace  edema     Diagnostic Test Results:  Results for orders placed or performed in visit on 03/05/19   Hemoglobin A1c   Result Value Ref Range    Hemoglobin A1C 8.2 (H) 0 - 5.6 %   CBC with platelets   Result Value Ref Range    WBC 7.0 4.0 - 11.0 10e9/L    RBC Count 4.55 3.8 - 5.2 10e12/L    Hemoglobin 14.0 11.7 - 15.7 g/dL    Hematocrit 42.5 35.0 - 47.0 %    MCV 93 78 - 100 fl    MCH 30.8 26.5 - 33.0 pg    MCHC 32.9 31.5 - 36.5 g/dL    RDW 13.2 10.0 - 15.0 %    Platelet Count 281 150 - 450 10e9/L       ASSESSMENT/PLAN:             1. Essential hypertension with goal blood pressure less than 140/90  Well controlled with medications without side effects.   - Hemoglobin A1c    2. Moderate single current episode of major depressive disorder (H)  Patient doesn't feel she needs this anymore.  Per patient instructions.   - FLUoxetine (PROZAC) 10 MG capsule; Take 1 tab daily for 2 weeks and then stop.  Dispense: 14 capsule; Refill: 0    3. Type 2 diabetes mellitus with hyperglycemia, with long-term current use of insulin (H)  Still has poor control.  Per patient instructions. She admits to eating a very high carb diet   - glimepiride (AMARYL) 4 MG tablet; Take 1 tablet (4 mg) by mouth every morning (before breakfast)  Dispense: 90 tablet; Refill: 1  - DIABETES EDUCATOR REFERRAL  - CBC with platelets  - Comprehensive metabolic panel  - CRP inflammation    4. Ulcerative colitis with rectal bleeding, unspecified location (H)  Needs to change  gastroenterologists due to her previous provider moving  - GASTROENTEROLOGY ADULT REF CONSULT ONLY  - CBC with platelets  - Comprehensive metabolic panel  - CRP inflammation    5. Morbid obesity due to excess calories (H)  Weight is not responding to the glp1 agonist.      6. Atrial fibrillation, unspecified type (H)  Well controlled with medications without side effects.     7. Immunosuppressed status (H)  No evidence of infection.  uptodate on immunizations     8. Encounter for therapeutic drug monitoring    - CBC with platelets  - Comprehensive metabolic panel  - CRP inflammation    9. Hearing loss of left ear, unspecified hearing loss type  Exam is normal   - AUDIOLOGY ADULT REFERRAL  - OTOLARYNGOLOGY REFERRAL    Patient Instructions     [email or web address]    Increase Amaryl to 4 mg daily.    Make an appointment with diabetes educator for 72 hour diagnostic glucose monitor and to discuss carb reduction.    Make an appointment for your audiology appointment and then visit with ENT.    [facility]    If you have any questions regarding to your visit please contact your care team:     Team Pink:   Clinic Hours Telephone Number   Internal Medicine:  Dr. Tayler Garibay NP 7am-7pm  Monday - Thursday   7am-5pm  Fridays  [phone]  (Appointment scheduling available 24/7)   Urgent Care - [place] [facility] - 11am-9pm Monday-Friday Saturday-Sunday- 9am-5pm   [place] - 5pm-9pm Monday-Friday Saturday-Sunday- 9am-5pm  [phone] - Flori Valdez  [phone] - [place]       What options do I have for a visit other than an office visit? We offer electronic visits (e-visits) and telephone visits, when medically appropriate.  Please check with your medical insurance to see if these types of visits are covered, as you will be responsible for any charges that are not paid by your insurance.       You can use [employer] (secure electronic communication) to access to your chart, send your primary care provider a message, or make an appointment. Ask a team member how to get started.     For a price quote for your services, please call our Consumer Price Line at [phone] or our Imaging Cost estimation line at [phone] (for imaging tests).    AYDE Lucas MD  [facility]

## 2019-03-05 NOTE — PATIENT INSTRUCTIONS
[email or web address]    Increase Amaryl to 4 mg daily.    Make an appointment with diabetes educator for 72 hour diagnostic glucose monitor and to discuss carb reduction.    Make an appointment for your audiology appointment and then visit with ENT.    [facility]    If you have any questions regarding to your visit please contact your care team:     Team Pink:   Clinic Hours Telephone Number   Internal Medicine:  Dr. Tayler Garibay NP 7am-7pm  Monday - Thursday   7am-5pm  Fridays  [phone]  (Appointment scheduling available 24/7)   Urgent Care - [place] and [facility] - 11am-9pm Monday-Friday Saturday-Sunday- 9am-5pm   [place] - 5pm-9pm Monday-Friday Saturday-Sunday- 9am-5pm  [phone] - [place]  [phone] - [place]       What options do I have for a visit other than an office visit? We offer electronic visits (e-visits) and telephone visits, when medically appropriate.  Please check with your medical insurance to see if these types of visits are covered, as you will be responsible for any charges that are not paid by your insurance.      You can use [employer] (secure electronic communication) to access to your chart, send your primary care provider a message, or make an appointment. Ask a team member how to get started.     For a price quote for your services, please call our Consumer Price Line at [phone] or our Imaging Cost estimation line at [phone] (for imaging tests).    Kalina Lester, CMA

## 2019-03-13 ENCOUNTER — OFFICE VISIT (OUTPATIENT)
Dept: AUDIOLOGY | Facility: CLINIC | Age: 49
End: 2019-03-13
Payer: COMMERCIAL

## 2019-03-13 ENCOUNTER — THERAPY VISIT (OUTPATIENT)
Dept: PHYSICAL THERAPY | Facility: CLINIC | Age: 49
End: 2019-03-13
Payer: COMMERCIAL

## 2019-03-13 ENCOUNTER — OFFICE VISIT (OUTPATIENT)
Dept: OTOLARYNGOLOGY | Facility: CLINIC | Age: 49
End: 2019-03-13
Payer: COMMERCIAL

## 2019-03-13 VITALS
SYSTOLIC BLOOD PRESSURE: 129 MMHG | TEMPERATURE: 98 F | HEART RATE: 64 BPM | DIASTOLIC BLOOD PRESSURE: 82 MMHG | BODY MASS INDEX: 43.9 KG/M2 | OXYGEN SATURATION: 97 % | WEIGHT: 263.8 LBS

## 2019-03-13 DIAGNOSIS — H90.3 SENSORINEURAL HEARING LOSS (SNHL) OF BOTH EARS: ICD-10-CM

## 2019-03-13 DIAGNOSIS — H69.93 EUSTACHIAN TUBE DYSFUNCTION, BILATERAL: Primary | ICD-10-CM

## 2019-03-13 DIAGNOSIS — M54.12 CERVICAL RADICULOPATHY: ICD-10-CM

## 2019-03-13 DIAGNOSIS — H69.92 DYSFUNCTION OF LEFT EUSTACHIAN TUBE: Primary | ICD-10-CM

## 2019-03-13 PROCEDURE — 92567 TYMPANOMETRY: CPT | Performed by: AUDIOLOGIST

## 2019-03-13 PROCEDURE — 97012 MECHANICAL TRACTION THERAPY: CPT | Mod: GP | Performed by: PHYSICAL THERAPIST

## 2019-03-13 PROCEDURE — 92557 COMPREHENSIVE HEARING TEST: CPT | Performed by: AUDIOLOGIST

## 2019-03-13 PROCEDURE — 97140 MANUAL THERAPY 1/> REGIONS: CPT | Mod: GP | Performed by: PHYSICAL THERAPIST

## 2019-03-13 PROCEDURE — 97110 THERAPEUTIC EXERCISES: CPT | Mod: GP | Performed by: PHYSICAL THERAPIST

## 2019-03-13 PROCEDURE — 97161 PT EVAL LOW COMPLEX 20 MIN: CPT | Mod: GP | Performed by: PHYSICAL THERAPIST

## 2019-03-13 PROCEDURE — 99207 ZZC NO CHARGE LOS: CPT | Performed by: AUDIOLOGIST

## 2019-03-13 PROCEDURE — 99243 OFF/OP CNSLTJ NEW/EST LOW 30: CPT | Performed by: OTOLARYNGOLOGY

## 2019-03-13 NOTE — PROGRESS NOTES
"[facility] Initial Evaluation  Subjective:    Zaira Villatoro is a 49 year old female with a cervical spine condition.  Condition occurred with:  Insidious onset.  Condition occurred: at home.  This is a chronic condition  Patient reports onset of neck pain and bilateral upper extremity numbness in February 2019 after waking up from a night sleep with bilateral total arm numbness, but she did have motor function..    Patient reports pain:  Cervical right side.  Radiates to: stocking glove distribution throughout bilateral UE.  Pain is described as aching and is constant and reported as 5/10.  Associated symptoms:  Loss of strength and loss of motion/stiffness. Pain is worse during the day.  Symptoms are exacerbated by certain positions and lifting Relieved by: position changes.  Since onset symptoms are unchanged.  Special tests:  MRI (see epic).      General health as reported by patient is fair and good.                                              Objective:  Standing Alignment:    Cervical/Thoracic:  Forward head  Shoulder/UE:  Rounded shoulders                                  Cervical/Thoracic Evaluation    AROM:  AROM Cervical:    Flexion:            Chin to chest  Extension:       30 deg, mild pain  Rotation:         Left: 45 deg     Right: 45 deg  Side Bend:      Left: 35 deg, \"tight\"     Right:  35 deg, \"tight\"      Headaches: cervical and migraine (no more frequent since onset of neck pain)  Cervical Myotomes:  Cervical myotomes: grossly 4+/5 bilaterally.                        Cervical Palpation:    Tenderness present at Left:    Upper Trap; Levator; Erector Spinae; Facet and Suboccipitals  Tenderness present at Right:    Upper Trap; Levator; Erector Spinae; Facet (C4/5/6/7) and Suboccipitals    Cervical Stability/Joint Clearing:  normal      Spinal Segmental Conclusions:    Level:  Hypo at C7, C6, C5, C1 and C2                                                General "     ROS    Assessment/Plan:    Patient is a 49 year old female with cervical complaints.    Patient has the following significant findings with corresponding treatment plan.                Diagnosis 1:  Cervical radiculopathy  Pain -  hot/cold therapy, mechanical traction, manual therapy, self management, education and home program  Decreased ROM/flexibility - manual therapy, therapeutic exercise, therapeutic activity and home program  Decreased joint mobility - manual therapy, therapeutic exercise, therapeutic activity and home program  Decreased strength - therapeutic exercise, therapeutic activities and home program  Impaired muscle performance - neuro re-education and home program  Decreased function - therapeutic activities and home program  Impaired posture - neuro re-education, therapeutic activities and home program    Therapy Evaluation Codes:   1) History comprised of:   Personal factors that impact the plan of care:      None.    Comorbidity factors that impact the plan of care are:      Overweight.     Medications impacting care: None.  2) Examination of Body Systems comprised of:   Body structures and functions that impact the plan of care:      Cervical spine.   Activity limitations that impact the plan of care are:      Bathing, Bending, Dressing, Lifting, Reading/Computer work, Sleeping and Laying down.  3) Clinical presentation characteristics are:   Stable/Uncomplicated.  4) Decision-Making    Low complexity using standardized patient assessment instrument and/or measureable assessment of functional outcome.  Cumulative Therapy Evaluation is: Low complexity.    Previous and current functional limitations:  (See Goal Flow Sheet for this information)    Short term and Long term goals: (See Goal Flow Sheet for this information)     Communication ability:  Patient appears to be able to clearly communicate and understand verbal and written communication and follow directions correctly.  Treatment  Explanation - The following has been discussed with the patient:   RX ordered/plan of care  Anticipated outcomes  Possible risks and side effects  This patient would benefit from PT intervention to resume normal activities.   Rehab potential is good.    Frequency:  1 X week, once daily  Duration:  for 6 weeks  Discharge Plan:  Achieve all LTG.  Independent in home treatment program.  Reach maximal therapeutic benefit.    Please refer to the daily flowsheet for treatment today, total treatment time and time spent performing 1:1 timed codes.

## 2019-03-13 NOTE — PROGRESS NOTES
I am seeing this patient in consultation for hearing loss at the request of the provider Dr. Tayler Bergman.    Chief Complaint - pressure in left ear    History of Present Illness - Zaira Villatoro is a 49 year old female who presents to me today with pressure in the left ear.  It has been present and noticeable for approximately a couple months.  Right side intermittent, but better, left not. She hears okay. There is no history of chronic ear disease or ear surgery. No family history of hearing loss at a young age. The patient denies vertigo, otorrhea, otalgia. No pain with chewing. Not worse at night. Has tried popping ears, not helping.     Past Medical History -   Patient Active Problem List   Diagnosis     IBS (irritable bowel syndrome)     Migraine     Ulcerative colitis (H)     Fatty liver     Other chronic rhinitis     CARDIOVASCULAR SCREENING; LDL GOAL LESS THAN 100     Essential hypertension with goal blood pressure less than 140/90     Uncontrolled type 2 diabetes mellitus without complication, with long-term current use of insulin (H)     Morbid obesity due to excess calories (H)     Moderate single current episode of major depressive disorder (H)     Incisional hernia, without obstruction or gangrene     Atrial fibrillation, unspecified type (H)     Low back pain     MAHAD (obstructive sleep apnea)- severe (AHI 80)     Immunosuppressed status (H)       Current Medications -   Current Outpatient Medications:      aspirin 81 MG tablet, Take by mouth daily, Disp: 30 tablet, Rfl: 3     atenolol (TENORMIN) 50 MG tablet, TAKE ONE-HALF TABLET BY MOUTH 2 TIMES DAILY, Disp: 90 tablet, Rfl: 2     blood glucose monitoring (ACCU-CHEK SHARMILA PLUS) test strip, TEST 4 TIMES DAILY AND AS NEEDED, Disp: 400 strip, Rfl: 3     blood glucose monitoring (ACCU-CHEK FASTCLIX) lancets, 1 each 4 times daily Use to test blood sugar 4 times daily or as directed., Disp: 300 each, Rfl: 11     cholecalciferol 2000 UNITS tablet, Take 1  tablet by mouth daily , Disp: 30 tablet, Rfl:      diphenhydrAMINE (BENADRYL ALLERGY) 25 MG tablet, as needed Reported on 4/12/2017, Disp: , Rfl:      DRAMAMINE OR, as needed, Disp: , Rfl:      eletriptan (RELPAX) 20 MG tablet, take 1-2 tablets by mouth at onset of headache for migraine. may repeat dose in 2 hours. do not exceed 80mg in 24 hours., Disp: 12 tablet, Rfl: 1     exenatide ER (BYDUREON BCISE) 2 MG/0.85ML SQ autoinjector for weekly inj, Inject 2 mg Subcutaneous every 7 days To replace Victoza, Disp: 10.2 mL, Rfl: 3     FLUoxetine (PROZAC) 10 MG capsule, Take 1 tab daily for 2 weeks and then stop., Disp: 14 capsule, Rfl: 0     fluticasone (FLOVENT HFA) 110 MCG/ACT Inhaler, Spray 2 puffs in nostril 2 times daily, Disp: 36 Inhaler, Rfl: 1     glimepiride (AMARYL) 4 MG tablet, Take 1 tablet (4 mg) by mouth every morning (before breakfast), Disp: 90 tablet, Rfl: 1     HUMIRA PEN 40 MG/0.8ML pen kit, Inject as directed every 7 days , Disp: , Rfl: 2     insulin detemir (LEVEMIR FLEXTOUCH) 100 UNIT/ML injection, Inject 55 Units Subcutaneous At Bedtime, Disp: 45 mL, Rfl: 4     insulin pen needle (B-D U/F) 31G X 5 MM, USE TWICE DAILY (OR AS DIRECTED), Disp: 180 each, Rfl: 3     losartan (COZAAR) 25 MG tablet, TAKE 1/2 TABLET BY MOUTH DAILY, Disp: 15 tablet, Rfl: 5     order for DME, Equipment being ordered: CPAP 9 c,, Disp: 1 Units, Rfl: 0     order for DME, Glucometer, brand as covered by insurance., Disp: 1 each, Rfl: 0     order for DME, Test strips for pt's glucometer, brand as covered by insurance. Test four times daily and prn., Disp: 400 each, Rfl: 4     oxyCODONE-acetaminophen (PERCOCET) 5-325 MG per tablet, Take 1 tablet by mouth every 6 hours as needed for pain, Disp: 18 tablet, Rfl: 0     STATIN NOT PRESCRIBED, INTENTIONAL,, 1 each continuous prn Statin not prescribed intentionally due to Refusal by patient and Other:LDL is below 100., Disp: 0 each, Rfl: 0     TYLENOL CAPS 500 MG OR, 1 CAPSULE EVERY 4  HOURS AS NEEDED, Disp: , Rfl:     Allergies -   Allergies   Allergen Reactions     Demerol      Very low blood pressure     Fish      Pt reports allergy to all fish     Meperidine Other (See Comments)     Other reaction(s): Hypotension  Drops blood pressure       Metformin GI Disturbance and Diarrhea     GI Disturbance       Naproxen      No Clinical Screening - See Comments      Pt reports allergy to all fish     Nubain  [Nalbuphine]      Seafood      Topiramate Other (See Comments) and Hives     Sleepy and confused.  Shaky, disoriented       Tylenol Sinus Max Other (See Comments)     Feet swelling  Feet swelling       Latex Rash       Social History -   Social History     Socioeconomic History     Marital status:      Spouse name: Bill     Number of children: 2     Years of education: 15     Highest education level: Not on file   Occupational History     Occupation: Substitute clerical/cook/health assistant/Para     Employer: [employer]   Social Needs     Financial resource strain: Not on file     Food insecurity:     Worry: Not on file     Inability: Not on file     Transportation needs:     Medical: Not on file     Non-medical: Not on file   Tobacco Use     Smoking status: Former Smoker     Packs/day: 1.00     Years: 15.00     Pack years: 15.00     Types: Cigarettes     Start date: 1985     Last attempt to quit: 1998     Years since quittin.7     Smokeless tobacco: Never Used     Tobacco comment: soke free household.   Substance and Sexual Activity     Alcohol use: Yes     Alcohol/week: 0.0 oz     Comment: occ     Drug use: No     Sexual activity: Yes     Partners: Male     Birth control/protection: Other     Comment:  has vasectomy   Lifestyle     Physical activity:     Days per week: Not on file     Minutes per session: Not on file     Stress: Not on file   Relationships     Social connections:     Talks on phone: Not on file     Gets together: Not on file     Attends  Confucianism service: Not on file     Active member of club or organization: Not on file     Attends meetings of clubs or organizations: Not on file     Relationship status: Not on file     Intimate partner violence:     Fear of current or ex partner: Not on file     Emotionally abused: Not on file     Physically abused: Not on file     Forced sexual activity: Not on file   Other Topics Concern     Parent/sibling w/ CABG, MI or angioplasty before 65F 55M? No      Service No     Blood Transfusions No     Comment: doesn't want one     Caffeine Concern No     Occupational Exposure No     Hobby Hazards No     Sleep Concern No     Stress Concern No     Weight Concern Yes     Special Diet Yes     Comment: weight loss, colitis     Back Care Yes     Exercise Yes     Comment: does     Bike Helmet No     Comment: only stationary bike     Seat Belt Yes     Self-Exams Yes   Social History Narrative     Not on file       Family History -   Family History   Problem Relation Age of Onset     Diabetes Mother      Allergies Mother      Asthma Mother      Arthritis Mother      Heart Disease Mother         heart murmur     Depression Mother      Obesity Mother      Eye Disorder Father      Diabetes Father      Cerebrovascular Disease Father      Heart Disease Father      Hypertension Father      Diabetes Maternal Grandmother      Cerebrovascular Disease Maternal Grandfather      Unknown/Adopted Paternal Grandmother      Heart Disease Paternal Grandfather         left ventrical failure     Cerebrovascular Disease Paternal Grandfather      Gynecology Sister         endometriosis     Depression Sister      Asthma Daughter      Breast Cancer Maternal Aunt      Thyroid Disease Maternal Aunt      Breast Cancer Other         fathers sister     Anesthesia Reaction Other      Thyroid Disease Other      Osteoporosis Other      Asthma Daughter      Breast Cancer Other         mothers sister     Glaucoma No family hx of      Macular  Degeneration No family hx of        Review of Systems - As per HPI and PMHx, otherwise 7 system review of the head and neck negative.    Physical Exam  /82 (BP Location: Right arm, Patient Position: Sitting, Cuff Size: Adult Large)   Pulse 64   Temp 98  F (36.7  C) (Oral)   Wt 119.7 kg (263 lb 12.8 oz)   SpO2 97%   BMI 43.90 kg/m    General - The patient is in no distress.  Alert and oriented to person and place, answers questions and cooperates with examination appropriately.   Voice and Breathing - The patient was breathing comfortably without the use of accessory muscles. There was no wheezing, stridor, or stertor.  The patients voice was clear and strong.  Ears - The auricles are normal. The tympanic membranes are normal in appearance, bony landmarks are intact.  No retraction, perforation, or masses.  No fluid or purulence was seen in the external canal or the middle ear. No evidence of infection of the middle ear or external canal, cerumen was normal in appearance.  She cannot insufflate her left ear.  Eyes - Extraocular movements intact.  Sclera were not icteric or injected.  Mouth - Examination of the oral cavity showed pink, healthy mucosa. No lesions or ulcerations noted.  The tongue was mobile and midline.  Throat - The walls of the oropharynx were smooth, symmetric, and had no lesions or ulcerations. The uvula was midline on elevation.    Nose -septum is deviated to the left somewhat.  Normal turbinates, no polyps or pus.  Neck - Palpation of the occipital, submental, submandibular, internal jugular chain, and supraclavicular nodes did not demonstrate any abnormal lymph nodes or masses. Parotid glands had no masses. Palpation of the thyroid was soft and smooth, with no nodules or goiter appreciated.  The trachea was mobile and midline.  Neurological - Cranial nerves 2 through 12 were grossly intact. House-Brackmann grade 1 out of 6 bilaterally.       Audiologic Studies - An audiogram and  tympanogram were performed today as part of the evaluation and personally reviewed. The tympanogram shows a normal Type A curve, with normal canal volume and middle ear pressure. The audiogram showed a mild high-frequency sensorineural hearing loss but mostly normal hearing.  She had a mild air-bone gap on both sides in the low tones.    Assessment and Plan - Zaira Villatoro is a 49 year old female who presents to me today with left ear plugging.  She had a little bit of this on the right side as well.  It seems most consistent with eustachian tube dysfunction but her middle ear pressure was normal today.  She wears a CPAP and I do wonder about possible positive pressure causing the feeling she is experiencing.  Her hearing is mostly normal.  Therefore I reassured her I do not think this is anything too significant nor is it a threat to her hearing.  She can try some basic things like continue to Valsalva, allergy medication and/or decongestants.  However she has to be careful given her hypertension.  She understands.  I would not recommend steroids as she has diabetes and did recently try this for her neck and it did not help her ear.  We discussed her CPAP as a possible cause as well as TMJ with a possible cause.  If things worsen certainly she can return.    Alan Martínez MD  Otolaryngology  [facility]

## 2019-03-13 NOTE — LETTER
3/13/2019         RE: Zaira Villatoro  [address] [facility] [facility] [address] [facility] [id number]        Dear Colleague,    Thank you for referring your patient, Zaira Villatoro, to the [facility]. Please see a copy of my visit note below.    I am seeing this patient in consultation for hearing loss at the request of the provider Dr. Tayler Bergman.    Chief Complaint - pressure in left ear    History of Present Illness - Zaira Villatoro is a 49 year old female who presents to me today with pressure in the left ear.  It has been present and noticeable for approximately a couple months.  Right side intermittent, but better, left not. She hears okay. There is no history of chronic ear disease or ear surgery. No family history of hearing loss at a young age. The patient denies vertigo, otorrhea, otalgia. No pain with chewing. Not worse at night. Has tried popping ears, not helping.     Past Medical History -   Patient Active Problem List   Diagnosis     IBS (irritable bowel syndrome)     Migraine     Ulcerative colitis (H)     Fatty liver     Other chronic rhinitis     CARDIOVASCULAR SCREENING; LDL GOAL LESS THAN 100     Essential hypertension with goal blood pressure less than 140/90     Uncontrolled type 2 diabetes mellitus without complication, with long-term current use of insulin (H)     Morbid obesity due to excess calories (H)     Moderate single current episode of major depressive disorder (H)     Incisional hernia, without obstruction or gangrene     Atrial fibrillation, unspecified type (H)     Low back pain     MAHAD (obstructive sleep apnea)- severe (AHI 80)     Immunosuppressed status (H)       Current Medications -   Current Outpatient Medications:      aspirin 81 MG tablet, Take by mouth daily, Disp: 30 tablet, Rfl: 3     atenolol (TENORMIN) 50 MG tablet, TAKE ONE-HALF TABLET BY MOUTH 2 TIMES DAILY, Disp: 90 tablet, Rfl: 2     blood glucose monitoring (ACCU-CHEK SHARMILA PLUS) test strip, TEST 4 TIMES  DAILY AND AS NEEDED, Disp: 400 strip, Rfl: 3     blood glucose monitoring (ACCU-CHEK FASTCLIX) lancets, 1 each 4 times daily Use to test blood sugar 4 times daily or as directed., Disp: 300 each, Rfl: 11     cholecalciferol 2000 UNITS tablet, Take 1 tablet by mouth daily , Disp: 30 tablet, Rfl:      diphenhydrAMINE (BENADRYL ALLERGY) 25 MG tablet, as needed Reported on 4/12/2017, Disp: , Rfl:      DRAMAMINE OR, as needed, Disp: , Rfl:      eletriptan (RELPAX) 20 MG tablet, take 1-2 tablets by mouth at onset of headache for migraine. may repeat dose in 2 hours. do not exceed 80mg in 24 hours., Disp: 12 tablet, Rfl: 1     exenatide ER (BYDUREON BCISE) 2 MG/0.85ML SQ autoinjector for weekly inj, Inject 2 mg Subcutaneous every 7 days To replace Victoza, Disp: 10.2 mL, Rfl: 3     FLUoxetine (PROZAC) 10 MG capsule, Take 1 tab daily for 2 weeks and then stop., Disp: 14 capsule, Rfl: 0     fluticasone (FLOVENT HFA) 110 MCG/ACT Inhaler, Spray 2 puffs in nostril 2 times daily, Disp: 36 Inhaler, Rfl: 1     glimepiride (AMARYL) 4 MG tablet, Take 1 tablet (4 mg) by mouth every morning (before breakfast), Disp: 90 tablet, Rfl: 1     HUMIRA PEN 40 MG/0.8ML pen kit, Inject as directed every 7 days , Disp: , Rfl: 2     insulin detemir (LEVEMIR FLEXTOUCH) 100 UNIT/ML injection, Inject 55 Units Subcutaneous At Bedtime, Disp: 45 mL, Rfl: 4     insulin pen needle (B-D U/F) 31G X 5 MM, USE TWICE DAILY (OR AS DIRECTED), Disp: 180 each, Rfl: 3     losartan (COZAAR) 25 MG tablet, TAKE 1/2 TABLET BY MOUTH DAILY, Disp: 15 tablet, Rfl: 5     order for DME, Equipment being ordered: CPAP 9 c,, Disp: 1 Units, Rfl: 0     order for DME, Glucometer, brand as covered by insurance., Disp: 1 each, Rfl: 0     order for DME, Test strips for pt's glucometer, brand as covered by insurance. Test four times daily and prn., Disp: 400 each, Rfl: 4     oxyCODONE-acetaminophen (PERCOCET) 5-325 MG per tablet, Take 1 tablet by mouth every 6 hours as needed for  pain, Disp: 18 tablet, Rfl: 0     STATIN NOT PRESCRIBED, INTENTIONAL,, 1 each continuous prn Statin not prescribed intentionally due to Refusal by patient and Other:LDL is below 100., Disp: 0 each, Rfl: 0     TYLENOL CAPS 500 MG OR, 1 CAPSULE EVERY 4 HOURS AS NEEDED, Disp: , Rfl:     Allergies -   Allergies   Allergen Reactions     Demerol      Very low blood pressure     Fish      Pt reports allergy to all fish     Meperidine Other (See Comments)     Other reaction(s): Hypotension  Drops blood pressure       Metformin GI Disturbance and Diarrhea     GI Disturbance       Naproxen      No Clinical Screening - See Comments      Pt reports allergy to all fish     Nubain  [Nalbuphine]      Seafood      Topiramate Other (See Comments) and Hives     Sleepy and confused.  Shaky, disoriented       Tylenol Sinus Max Other (See Comments)     Feet swelling  Feet swelling       Latex Rash       Social History -   Social History     Socioeconomic History     Marital status:      Spouse name: Bill     Number of children: 2     Years of education: 15     Highest education level: Not on file   Occupational History     Occupation: Substitute clerical/cook/health assistant/Para     Employer: [employer]   Social Needs     Financial resource strain: Not on file     Food insecurity:     Worry: Not on file     Inability: Not on file     Transportation needs:     Medical: Not on file     Non-medical: Not on file   Tobacco Use     Smoking status: Former Smoker     Packs/day: 1.00     Years: 15.00     Pack years: 15.00     Types: Cigarettes     Start date: 1985     Last attempt to quit: 1998     Years since quittin.7     Smokeless tobacco: Never Used     Tobacco comment: soke free household.   Substance and Sexual Activity     Alcohol use: Yes     Alcohol/week: 0.0 oz     Comment: occ     Drug use: No     Sexual activity: Yes     Partners: Male     Birth control/protection: Other     Comment:  has  vasectomy   Lifestyle     Physical activity:     Days per week: Not on file     Minutes per session: Not on file     Stress: Not on file   Relationships     Social connections:     Talks on phone: Not on file     Gets together: Not on file     Attends Anabaptist service: Not on file     Active member of club or organization: Not on file     Attends meetings of clubs or organizations: Not on file     Relationship status: Not on file     Intimate partner violence:     Fear of current or ex partner: Not on file     Emotionally abused: Not on file     Physically abused: Not on file     Forced sexual activity: Not on file   Other Topics Concern     Parent/sibling w/ CABG, MI or angioplasty before 65F 55M? No      Service No     Blood Transfusions No     Comment: doesn't want one     Caffeine Concern No     Occupational Exposure No     Hobby Hazards No     Sleep Concern No     Stress Concern No     Weight Concern Yes     Special Diet Yes     Comment: weight loss, colitis     Back Care Yes     Exercise Yes     Comment: does     Bike Helmet No     Comment: only stationary bike     Seat Belt Yes     Self-Exams Yes   Social History Narrative     Not on file       Family History -   Family History   Problem Relation Age of Onset     Diabetes Mother      Allergies Mother      Asthma Mother      Arthritis Mother      Heart Disease Mother         heart murmur     Depression Mother      Obesity Mother      Eye Disorder Father      Diabetes Father      Cerebrovascular Disease Father      Heart Disease Father      Hypertension Father      Diabetes Maternal Grandmother      Cerebrovascular Disease Maternal Grandfather      Unknown/Adopted Paternal Grandmother      Heart Disease Paternal Grandfather         left ventrical failure     Cerebrovascular Disease Paternal Grandfather      Gynecology Sister         endometriosis     Depression Sister      Asthma Daughter      Breast Cancer Maternal Aunt      Thyroid Disease Maternal  Aunt      Breast Cancer Other         fathers sister     Anesthesia Reaction Other      Thyroid Disease Other      Osteoporosis Other      Asthma Daughter      Breast Cancer Other         mothers sister     Glaucoma No family hx of      Macular Degeneration No family hx of        Review of Systems - As per HPI and PMHx, otherwise 7 system review of the head and neck negative.    Physical Exam  /82 (BP Location: Right arm, Patient Position: Sitting, Cuff Size: Adult Large)   Pulse 64   Temp 98  F (36.7  C) (Oral)   Wt 119.7 kg (263 lb 12.8 oz)   SpO2 97%   BMI 43.90 kg/m     General - The patient is in no distress.  Alert and oriented to person and place, answers questions and cooperates with examination appropriately.   Voice and Breathing - The patient was breathing comfortably without the use of accessory muscles. There was no wheezing, stridor, or stertor.  The patients voice was clear and strong.  Ears - The auricles are normal. The tympanic membranes are normal in appearance, bony landmarks are intact.  No retraction, perforation, or masses.  No fluid or purulence was seen in the external canal or the middle ear. No evidence of infection of the middle ear or external canal, cerumen was normal in appearance.  She cannot insufflate her left ear.  Eyes - Extraocular movements intact.  Sclera were not icteric or injected.  Mouth - Examination of the oral cavity showed pink, healthy mucosa. No lesions or ulcerations noted.  The tongue was mobile and midline.  Throat - The walls of the oropharynx were smooth, symmetric, and had no lesions or ulcerations. The uvula was midline on elevation.    Nose -septum is deviated to the left somewhat.  Normal turbinates, no polyps or pus.  Neck - Palpation of the occipital, submental, submandibular, internal jugular chain, and supraclavicular nodes did not demonstrate any abnormal lymph nodes or masses. Parotid glands had no masses. Palpation of the thyroid was soft  and smooth, with no nodules or goiter appreciated.  The trachea was mobile and midline.  Neurological - Cranial nerves 2 through 12 were grossly intact. House-Brackmann grade 1 out of 6 bilaterally.       Audiologic Studies - An audiogram and tympanogram were performed today as part of the evaluation and personally reviewed. The tympanogram shows a normal Type A curve, with normal canal volume and middle ear pressure. The audiogram showed a mild high-frequency sensorineural hearing loss but mostly normal hearing.  She had a mild air-bone gap on both sides in the low tones.    Assessment and Plan - Zaira Villatoro is a 49 year old female who presents to me today with left ear plugging.  She had a little bit of this on the right side as well.  It seems most consistent with eustachian tube dysfunction but her middle ear pressure was normal today.  She wears a CPAP and I do wonder about possible positive pressure causing the feeling she is experiencing.  Her hearing is mostly normal.  Therefore I reassured her I do not think this is anything too significant nor is it a threat to her hearing.  She can try some basic things like continue to Valsalva, allergy medication and/or decongestants.  However she has to be careful given her hypertension.  She understands.  I would not recommend steroids as she has diabetes and did recently try this for her neck and it did not help her ear.  We discussed her CPAP as a possible cause as well as TMJ with a possible cause.  If things worsen certainly she can return.    Alan Martínez MD  Otolaryngology  [facility]        Again, thank you for allowing me to participate in the care of your patient.        Sincerely,        Alan Martínez MD

## 2019-03-13 NOTE — PROGRESS NOTES
AUDIOLOGY REPORT:    Patient was referred to Audiology from ENT by Alan Martínez MD for a hearing examination. Patient complains of ear pressure.    Testing:    Otoscopy:   Otoscopic exam indicates ears are clear of cerumen bilaterally     Tympanograms:    RIGHT: normal eardrum mobility     LEFT:   normal eardrum mobility    Thresholds:   Pure Tone Thresholds assessed using conventional audiometry with good  reliability from 250-8000 Hz bilaterally using insert earphones     RIGHT: within normal limits, but with significant air-bone gap from 250-1000 Hz    LEFT:  within normal limits, but with significant air-bone gap from 250-1000 Hz    Speech Reception Threshold:    RIGHT: 15 dB HL    LEFT:   15 dB HL    Word Recognition Score:     RIGHT: 96% at 55 dB HL using NU-6 recorded word list.    LEFT:   100% at 55 dB HL using NU-6 recorded word list.    Discussed results with the patient.     Patient was returned to ENT for follow up.     Vitor Singh MA, CCC-A  [place] Licensed Audiologist #[id number]  3/13/2019

## 2019-03-19 ENCOUNTER — MYC MEDICAL ADVICE (OUTPATIENT)
Dept: INTERNAL MEDICINE | Facility: CLINIC | Age: 49
End: 2019-03-19

## 2019-03-19 DIAGNOSIS — K51.911 ULCERATIVE COLITIS WITH RECTAL BLEEDING, UNSPECIFIED LOCATION (H): Primary | Chronic | ICD-10-CM

## 2019-03-19 DIAGNOSIS — Z79.4 TYPE 2 DIABETES MELLITUS WITH HYPERGLYCEMIA, WITH LONG-TERM CURRENT USE OF INSULIN (H): ICD-10-CM

## 2019-03-19 DIAGNOSIS — E11.65 TYPE 2 DIABETES MELLITUS WITH HYPERGLYCEMIA, WITH LONG-TERM CURRENT USE OF INSULIN (H): ICD-10-CM

## 2019-03-19 NOTE — TELEPHONE ENCOUNTER
Script printed and placed on Dr. Bergman's desk for signature. Please fax to [facility] once script is signed at [phone].    Indu Mcbride RN  [facility]

## 2019-03-19 NOTE — TELEPHONE ENCOUNTER
"Routing refill request to provider for review/approval because:  Labs not current:        Requested Prescriptions   Pending Prescriptions Disp Refills     exenatide ER (BYDUREON BCISE) 2 MG/0.85ML auto-injector  Last Written Prescription Date:  8/24/18  Last Fill Quantity: 10.2 mL,  # refills: 3   Last office visit: 2/12/2018 with prescribing provider:     Future Office Visit:   Next 5 appointments (look out 90 days)    Jun 05, 2019 12:30 PM CDT  Office Visit with Tayler Bergman MD  [facility] ([facility]) [address] [address] [id number]  [phone]          10.2 mL 3     Sig: Inject 2 mg Subcutaneous every 7 days To replace Victoza    GLP-1 Agonists Protocol Failed - 3/19/2019  8:56 AM       Failed - LDL on file in past 12 months    Recent Labs   Lab Test 12/22/17  1102   LDL 72            Passed - Blood pressure less than 140/90 in past 6 months    BP Readings from Last 3 Encounters:   03/13/19 129/82   03/05/19 122/78   02/26/19 125/72                Passed - Microalbumin on file in past 12 months    Recent Labs   Lab Test 05/24/18  1230   MICROL 6   UMALCR 4.67            Passed - HgbA1C in past 3 or 6 months    If HgbA1C is 8 or greater, it needs to be on file within the past 3 months.  If less than 8, must be on file within the past 6 months.     Recent Labs   Lab Test 03/05/19  1124   A1C 8.2*            Passed - Medication is active on med list       Passed - Patient is age 18 or older       Passed - No active pregnancy on record       Passed - Normal serum creatinine on file in past 12 months    Recent Labs   Lab Test 03/05/19  1124   CR 0.76            Passed - No positive pregnancy test in past 12 months       Passed - Recent (6 mo) or future (30 days) visit within the authorizing provider's specialty    Patient had office visit in the last 6 months or has a visit in the next 30 days with authorizing provider.  See \"Patient Info\" tab in inbasket, or \"Choose " "Columns\" in Meds & Orders section of the refill encounter.            Jennifer Landon, RN - BC      "

## 2019-03-19 NOTE — TELEPHONE ENCOUNTER
Huddled with Dr. Bergman ok to send 1 month with no refills of the current dose.    Mychart message sent to pt. Need pharmacy phone and or fax. Not listed under eprescribe.    Indu Mcbride RN  [facility]

## 2019-03-20 ENCOUNTER — THERAPY VISIT (OUTPATIENT)
Dept: PHYSICAL THERAPY | Facility: CLINIC | Age: 49
End: 2019-03-20
Payer: COMMERCIAL

## 2019-03-20 DIAGNOSIS — M54.12 CERVICAL RADICULOPATHY: Primary | ICD-10-CM

## 2019-03-20 PROCEDURE — 97140 MANUAL THERAPY 1/> REGIONS: CPT | Mod: GP | Performed by: PHYSICAL THERAPIST

## 2019-03-20 PROCEDURE — 97110 THERAPEUTIC EXERCISES: CPT | Mod: GP | Performed by: PHYSICAL THERAPIST

## 2019-03-20 PROCEDURE — 97012 MECHANICAL TRACTION THERAPY: CPT | Mod: GP | Performed by: PHYSICAL THERAPIST

## 2019-03-20 NOTE — TELEPHONE ENCOUNTER
Humira Pen prescription faxed to [facility] at [phone].  Yvette Carmona,

## 2019-03-21 ENCOUNTER — TELEPHONE (OUTPATIENT)
Dept: FAMILY MEDICINE | Facility: CLINIC | Age: 49
End: 2019-03-21

## 2019-03-21 DIAGNOSIS — K51.911 ULCERATIVE COLITIS WITH RECTAL BLEEDING, UNSPECIFIED LOCATION (H): Chronic | ICD-10-CM

## 2019-03-21 NOTE — TELEPHONE ENCOUNTER
Reason for Call:  Other prescription    Detailed comments: Pharmacy needs clarification on Humira prescription. Please call    Phone Number Patient can be reached at: Other phone number:    [facility] (Provider) [phone]         Best Time: ASAP    Can we leave a detailed message on this number? YES    Call taken on 3/21/2019 at 1:04 PM by Melissa Kebede

## 2019-03-21 NOTE — TELEPHONE ENCOUNTER
"Previous prescription was from a GI provider.  Dr. Bergman only approved a 1 month supply so patient has enough to get her in with a new GI provider    Called [facility] and spoke with Favian  She stated that they needed clarification on quantity.  Humira quantity is prescribed in \"kits\" not \"ml\"  Explained to her that Dr. Bergman just wants to prescribe a 1 month supply with 0 refills  Favian verbalized understanding and will Re-enter the prescription to reflect a quantity of 1 month supply with 0 refills    Patient updated      Marianne Keith RN      "

## 2019-03-22 ENCOUNTER — THERAPY VISIT (OUTPATIENT)
Dept: PHYSICAL THERAPY | Facility: CLINIC | Age: 49
End: 2019-03-22
Payer: COMMERCIAL

## 2019-03-22 DIAGNOSIS — M54.12 CERVICAL RADICULOPATHY: Primary | ICD-10-CM

## 2019-03-22 PROCEDURE — 97012 MECHANICAL TRACTION THERAPY: CPT | Mod: GP | Performed by: PHYSICAL THERAPIST

## 2019-03-27 ENCOUNTER — THERAPY VISIT (OUTPATIENT)
Dept: PHYSICAL THERAPY | Facility: CLINIC | Age: 49
End: 2019-03-27
Payer: COMMERCIAL

## 2019-03-27 DIAGNOSIS — Z79.4 TYPE 2 DIABETES MELLITUS WITHOUT COMPLICATION, WITH LONG-TERM CURRENT USE OF INSULIN (H): ICD-10-CM

## 2019-03-27 DIAGNOSIS — E11.9 TYPE 2 DIABETES MELLITUS WITHOUT COMPLICATION, WITH LONG-TERM CURRENT USE OF INSULIN (H): ICD-10-CM

## 2019-03-27 DIAGNOSIS — M54.12 CERVICAL RADICULOPATHY: Primary | ICD-10-CM

## 2019-03-27 PROCEDURE — 97012 MECHANICAL TRACTION THERAPY: CPT | Mod: GP

## 2019-03-27 PROCEDURE — 97110 THERAPEUTIC EXERCISES: CPT | Mod: GP

## 2019-03-27 RX ORDER — GLIMEPIRIDE 1 MG/1
TABLET ORAL
Qty: 90 TABLET | Refills: 1 | Status: SHIPPED | OUTPATIENT
Start: 2019-03-27 | End: 2019-06-05

## 2019-03-27 NOTE — TELEPHONE ENCOUNTER
Spoke to pharmacy, staff stated there is no refill on file and that they never received  the request that was sent on 3/5/2019.

## 2019-03-27 NOTE — TELEPHONE ENCOUNTER
Pharmacy closed. Please call after 9 to confirm duplicate.    glimepiride (AMARYL) 4 MG tablet 90 tablet 1 3/5/2019  No   Sig - Route: Take 1 tablet (4 mg) by mouth every morning (before breakfast) - Oral   Sent to pharmacy as: glimepiride (AMARYL) 4 MG tablet   Class: E-Prescribe   Order: [id number]   E-Prescribing Status: Receipt confirmed by pharmacy (3/5/2019 12:02 PM CST)       Indu DORADO CMA ([facility])

## 2019-03-29 ENCOUNTER — OFFICE VISIT (OUTPATIENT)
Dept: GASTROENTEROLOGY | Facility: CLINIC | Age: 49
End: 2019-03-29
Attending: INTERNAL MEDICINE
Payer: COMMERCIAL

## 2019-03-29 ENCOUNTER — TELEPHONE (OUTPATIENT)
Dept: GASTROENTEROLOGY | Facility: CLINIC | Age: 49
End: 2019-03-29

## 2019-03-29 ENCOUNTER — ANCILLARY PROCEDURE (OUTPATIENT)
Dept: GENERAL RADIOLOGY | Facility: CLINIC | Age: 49
End: 2019-03-29
Attending: PHYSICIAN ASSISTANT
Payer: COMMERCIAL

## 2019-03-29 VITALS
OXYGEN SATURATION: 95 % | DIASTOLIC BLOOD PRESSURE: 86 MMHG | SYSTOLIC BLOOD PRESSURE: 131 MMHG | HEIGHT: 65 IN | HEART RATE: 69 BPM | BODY MASS INDEX: 44.02 KG/M2 | WEIGHT: 264.2 LBS

## 2019-03-29 DIAGNOSIS — K51.919 ULCERATIVE COLITIS WITH COMPLICATION, UNSPECIFIED LOCATION (H): Primary | ICD-10-CM

## 2019-03-29 DIAGNOSIS — K58.8 OTHER IRRITABLE BOWEL SYNDROME: Chronic | ICD-10-CM

## 2019-03-29 DIAGNOSIS — R14.0 ABDOMINAL BLOATING: ICD-10-CM

## 2019-03-29 DIAGNOSIS — K51.911 ULCERATIVE COLITIS WITH RECTAL BLEEDING, UNSPECIFIED LOCATION (H): Chronic | ICD-10-CM

## 2019-03-29 LAB
FOLATE SERPL-MCNC: 20.3 NG/ML
MISCELLANEOUS TEST: NORMAL

## 2019-03-29 PROCEDURE — 99215 OFFICE O/P EST HI 40 MIN: CPT | Performed by: PHYSICIAN ASSISTANT

## 2019-03-29 PROCEDURE — 86706 HEP B SURFACE ANTIBODY: CPT | Performed by: PHYSICIAN ASSISTANT

## 2019-03-29 PROCEDURE — 82607 VITAMIN B-12: CPT | Performed by: PHYSICIAN ASSISTANT

## 2019-03-29 PROCEDURE — 86481 TB AG RESPONSE T-CELL SUSP: CPT | Performed by: PHYSICIAN ASSISTANT

## 2019-03-29 PROCEDURE — 82746 ASSAY OF FOLIC ACID SERUM: CPT | Performed by: PHYSICIAN ASSISTANT

## 2019-03-29 PROCEDURE — 36415 COLL VENOUS BLD VENIPUNCTURE: CPT | Performed by: PHYSICIAN ASSISTANT

## 2019-03-29 PROCEDURE — 86704 HEP B CORE ANTIBODY TOTAL: CPT | Performed by: PHYSICIAN ASSISTANT

## 2019-03-29 PROCEDURE — 82306 VITAMIN D 25 HYDROXY: CPT | Performed by: PHYSICIAN ASSISTANT

## 2019-03-29 PROCEDURE — 74019 RADEX ABDOMEN 2 VIEWS: CPT | Performed by: RADIOLOGY

## 2019-03-29 PROCEDURE — 87340 HEPATITIS B SURFACE AG IA: CPT | Performed by: PHYSICIAN ASSISTANT

## 2019-03-29 ASSESSMENT — ENCOUNTER SYMPTOMS
VOMITING: 0
ROS GI COMMENTS: SEE HPI

## 2019-03-29 ASSESSMENT — PAIN SCALES - GENERAL: PAINLEVEL: NO PAIN (0)

## 2019-03-29 ASSESSMENT — MIFFLIN-ST. JEOR: SCORE: 1829.28

## 2019-03-29 NOTE — TELEPHONE ENCOUNTER
Prior Authorization Not Needed per Insurance    Medication: Humira (No PA needed)  Insurance Company: [employer] - Phone [phone] Fax [phone]  Expected CoPay:    Unknown  [facility] [place] [facility] [address] [place] [address]  Pharmacy Notified: Yes - rx released to pharmacy  Patient Notified: Yes - pharmacy to contact patient

## 2019-03-29 NOTE — PROGRESS NOTES
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  - Dysplasia surveillance colonoscopy every 1-2 years in patients with Crohn's colitis or ulcerative colitis >8-10 years since diagnosis.  - (For men and women ages 9-26) Consideration for HPV vaccination, should be discussed with your PCP further if you feel you'd like to pursue this.  - (For women, men with risk factors) Annual Pap smears if immunosuppressed, mammogram when deemed appropriate by your PCP.    - Recommend follow-up with your PCP to ensure the following vaccinations have been updated: Hepatitis A/B, Tdap, Pneumococcal pneumonia, yearly flu SHOT, Meningococcal meningitis (if college-age), HPV (all aged 18-26), etc.  - Would also recommend VZV and MMR titers be checked to confirm immunity.  - Live/attenuated vaccines (such as the INTRANASAL flu vaccine, MMR, Yellow Fever, or Zostavax vaccine) should not be administered to immunosuppressed patients, except in very specific instances which you should discuss with a GI and Infectious Disease provider first.    - Family planning:  If you or your partner are planning to become pregnant, please schedule time with us to discuss issues surrounding IBD and  Fertility/Pregnancy.          RTC 6 weeks    Thank you for this consultation.  It was a pleasure to participate in the care of this patient; please contact us with any further questions.  A total of 60 minutes, face to face, was spent with this patient, >50% of which was counseling regarding the above delineated issues.    This note was created with voice recognition software, and while reviewed for accuracy, typos may remain.     Zohaib Lagunas PA-C  Gastroenterology  [facility]

## 2019-03-29 NOTE — NURSING NOTE
"Zaira Villatoro's goals for this visit include:   Chief Complaint   Patient presents with     Consult     Ulcerative Colitis with rectal bleeding       She requests these members of her care team be copied on today's visit information: Yes    PCP: Tayler Bergman    Referring Provider:  Tayler Bergman MD  [address] [place] [address]    /86 (BP Location: Left arm, Patient Position: Sitting, Cuff Size: Adult Large)   Pulse 69   Ht 1.651 m (5' 5\")   Wt 119.8 kg (264 lb 3.2 oz)   SpO2 95%   BMI 43.97 kg/m      Do you need any medication refills at today's visit? No    Jocelyn Shields LPN      "

## 2019-03-30 LAB
DEPRECATED CALCIDIOL+CALCIFEROL SERPL-MC: 50 UG/L (ref 20–75)
HBV CORE AB SERPL QL IA: NONREACTIVE
HBV SURFACE AB SERPL IA-ACNC: 0.54 M[IU]/ML
HBV SURFACE AG SERPL QL IA: NONREACTIVE
VIT B12 SERPL-MCNC: 389 PG/ML (ref 193–986)

## 2019-03-31 ASSESSMENT — ENCOUNTER SYMPTOMS
HEADACHES: 0
PHOTOPHOBIA: 0
UNEXPECTED WEIGHT CHANGE: 0
TROUBLE SWALLOWING: 0
SHORTNESS OF BREATH: 0
CHEST TIGHTNESS: 0
LIGHT-HEADEDNESS: 0
ADENOPATHY: 0
DYSURIA: 0
FEVER: 0
WEAKNESS: 0
DIFFICULTY URINATING: 0
COUGH: 0
HEMATURIA: 0
SORE THROAT: 0
NERVOUS/ANXIOUS: 0
FLANK PAIN: 0

## 2019-04-01 LAB
GAMMA INTERFERON BACKGROUND BLD IA-ACNC: 0.04 IU/ML
M TB IFN-G BLD-IMP: NEGATIVE
M TB IFN-G CD4+ BCKGRND COR BLD-ACNC: >10 IU/ML
MITOGEN IGNF BCKGRD COR BLD-ACNC: 0 IU/ML
MITOGEN IGNF BCKGRD COR BLD-ACNC: 0 IU/ML

## 2019-04-02 PROCEDURE — 83993 ASSAY FOR CALPROTECTIN FECAL: CPT | Performed by: PHYSICIAN ASSISTANT

## 2019-04-03 ENCOUNTER — THERAPY VISIT (OUTPATIENT)
Dept: PHYSICAL THERAPY | Facility: CLINIC | Age: 49
End: 2019-04-03
Payer: COMMERCIAL

## 2019-04-03 DIAGNOSIS — M54.12 CERVICAL RADICULOPATHY: ICD-10-CM

## 2019-04-03 DIAGNOSIS — K51.919 ULCERATIVE COLITIS WITH COMPLICATION, UNSPECIFIED LOCATION (H): ICD-10-CM

## 2019-04-05 LAB — CALPROTECTIN STL-MCNT: <27 MG/KG (ref 0–49.9)

## 2019-04-08 LAB — LAB SCANNED RESULT: NORMAL

## 2019-04-10 ENCOUNTER — TELEPHONE (OUTPATIENT)
Dept: INTERNAL MEDICINE | Facility: CLINIC | Age: 49
End: 2019-04-10

## 2019-04-10 DIAGNOSIS — I10 ESSENTIAL HYPERTENSION WITH GOAL BLOOD PRESSURE LESS THAN 140/90: ICD-10-CM

## 2019-04-10 DIAGNOSIS — F32.1 MODERATE SINGLE CURRENT EPISODE OF MAJOR DEPRESSIVE DISORDER (H): ICD-10-CM

## 2019-04-10 RX ORDER — LOSARTAN POTASSIUM 25 MG/1
TABLET ORAL
Qty: 15 TABLET | Refills: 4
Start: 2019-04-10

## 2019-04-10 NOTE — TELEPHONE ENCOUNTER
Pharmacy closed. Please call after 9 to confirm duplicate.    losartan (COZAAR) 25 MG tablet 15 tablet 5 12/18/2018  No   Sig: TAKE 1/2 TABLET BY MOUTH DAILY   Sent to pharmacy as: losartan (COZAAR) 25 MG tablet   Class: E-Prescribe   Order: [id number]   E-Prescribing Status: Receipt confirmed by pharmacy (12/18/2018  1:13 PM CST)       Indu DORADO CMA ([facility])

## 2019-04-10 NOTE — TELEPHONE ENCOUNTER
Spoke to pharmacy and confirmed duplicate request.  Indu DORADO CMA ([facility])

## 2019-04-12 ENCOUNTER — TELEPHONE (OUTPATIENT)
Dept: FAMILY MEDICINE | Facility: CLINIC | Age: 49
End: 2019-04-12

## 2019-04-12 DIAGNOSIS — Z79.4 TYPE 2 DIABETES MELLITUS WITH HYPERGLYCEMIA, WITH LONG-TERM CURRENT USE OF INSULIN (H): ICD-10-CM

## 2019-04-12 DIAGNOSIS — E11.65 TYPE 2 DIABETES MELLITUS WITH HYPERGLYCEMIA, WITH LONG-TERM CURRENT USE OF INSULIN (H): ICD-10-CM

## 2019-04-12 NOTE — TELEPHONE ENCOUNTER
Pt is not taking Prozac anymore so she asked to take it off the List. Also called pharmacy to let them know that pt is not on the med anymore.  Elda LAWLER CMA

## 2019-04-12 NOTE — TELEPHONE ENCOUNTER
Dr Bergman pt called regarding Exenatide Er (BYDUREON BCISE) 2 mg/0.85ml auto injector is not covered by her insurance. Pt is asking if you would change to something else.  Elda LAWLER CMA

## 2019-04-12 NOTE — TELEPHONE ENCOUNTER
Received refill request for fluoxetine (Prozac)  Patient did not want to stay on this med and was weaned off of this last month  She reportd on 3/29/19 that she was no longer on this med    Please verify this with patient that she is no longer on this and then ask pharmacy to discontinue this from their list         Marianne Keith RN

## 2019-04-12 NOTE — TELEPHONE ENCOUNTER
Called and spoke with patient. Patient stated that she will like for a PA to be start for exenatide ER (BYDUREON BCISE) 2 MG/0.85ML auto-injector. Patient would like to stay with this medication. Patient stated that pharmacy faxed PA to clinic.  Unable to find PA. Called pharmacy to have PA re-fax to clinic. Pharmacy stated that patient insurance plan will cover Victoza, Ozempic and Trulicity.    Prior Authorization Retail Medication Request    Medication/Dose: exenatide ER (BYDUREON BCISE) 2 MG/0.85ML auto-injector  ICD code (if different than what is on RX):    Previously Tried and Failed:    Rationale:  Plan does not cover medicaiton     Insurance Name:    Insurance ID:  [id number]  Phone #: [phone]      Pharmacy Information (if different than what is on RX)  [facility]  Phone:  [phone]  Kristie Villalobos MA

## 2019-04-18 NOTE — TELEPHONE ENCOUNTER
PA Initiation    Medication: exenatide ER (BYDUREON BCISE) 2 MG/0.85ML -   Insurance Company: [employer] - Phone [phone] Fax [phone]  [id number] [place] [facility] [address]  Filling Pharmacy Phone: [phone]  Filling Pharmacy Fax: [phone]  Start Date: 4/18/2019      [employer] Exception CMK STD  **FIDEL WEB** ePA Revi ...  Is this an urgent request?: YES  Is the requested product being used for an FDA-approved indication OR an indication supported in the  compendia of current literature (examples: AHFS, Micromedex, current accepted guidelines)?: YES  Does the prescribed dose and quantity fall within the FDA-approved labeling or within dosing guidelines found in the compendia of current literature?: YES  Is the request for a formulary product for more than the initial quantity limit?: NO  Is the request for a combination product for which individual components are available at similar doses on formulary?: NO  Has the patient had a trial and failure of the separate individual components due to an adverse event  (examples: rash, nausea, vomiting, anaphylaxis) that is thought to be due to an inactive ingredient?:  Is the request for a brand name product that has a generic available on formulary?: NO  Is the request for a drug with an available alternative dosage form for the same active ingredient on formulary?: NO  Is the patient unable to take the required number of formulary alternatives for the given diagnosis due to a trial and inadequate treatment response or intolerance or an expected adverse reaction or contraindication? (Requirement: 3 in a class with 3 or more alternatives, 2 in a class with 2 alternatives, or 1 in a class with only 1 alternative.) If yes, documentation is required for approval. Provide documentation including name of medication(s) tried and reason for treatment failure(s), intolerance and/or contraindication whichever are  applicable. If the requested drug is a combination product, then the separate individual components of the combination product taken concurrently must be unable to be taken PLUS the remaining required number of alternatives. If the requested drug is a brand product and has a formulary generic for the same active ingredient, then the formulary generic must be unable to be taken PLUS the remaining required number of alternatives. If the requested drug has an available alternative formulary dosage form of the same active ingredient, then an alternative formulary dosage form of the requested drug must be unable to be taken PLUS the remaining required number of formulary alternatives. Please note, requirement for alternative dosage forms apply only if clinically appropriate (e.g., same indication, age appropriateness.): NO  NO: Patient has tried/failed: Levemir, Victoza, Novolog, Glyburide, Glimiperide, Metformin, Metformin ER  Does the patient have a clinical condition or need a specific dosage form for which there is no formulary  alternative or the listed formulary alternatives are not recommended based on published guidelines or clinical literature OR the formulary alternatives will likely be ineffective or less effective for the patient OR the formulary alternatives will likely cause an adverse effect? If yes, documentation is required for approval. Provide documentation including clinical condition, reason for specific dosage form (if applicable), and reason that formulary alternatives cannot be used. [Note: This includes if the patient has visual impairment and the request is for insulin cartridges or diabetic supplies (kit, monitor, test strips) that provides audible test results.]: YES  YES: unable to take metformin. Has ulcerative coliitis and had severe diarrhea with metformin. Is also treated  with levemir 39 untis before bed, Victoza is no longer therapeutically effective. Patient is stable on Bydureon,  it  provides the safest delivery of care.

## 2019-04-22 NOTE — TELEPHONE ENCOUNTER
PRIOR AUTHORIZATION DENIED    Medication: exenatide ER (BYDUREON BCISE) 2 MG/0.85ML - DENIED    Denial Date: 4/18/2019    Denial Rational:     Your Non-Formulary Marketplace Exception (HMF) criteria covers this drug when your doctor provides documentation that you meet one of these conditions:  - You have a clinical condition for which there is no appropriate formulary alternative  - You need a form of the drug that is not on the formulary  - You tried the required number of preferred formulary alternatives on your formulary first. These drugs did not work for you or you cannot take them.  Your use of this drug does not meet the requirements. This is based on the information we have.  Examples of formulary alternative(s): Ozempic; Trulicity; Victoza.    Appeal Information:     Member Appeal Rights  If you are dissatisfied with this decision, you have the right to appeal. If you need assistance, we can help you write and file your appeal. You may choose to designate someone else to represent you during the appeal process. If your representative is someone other than your prescriber, we will ask you to complete an Appointment of Representative form so important information about your appeal can be shared with your representative.  You, or your representative, have the option to request that our denial of your nonformulary exception request be reviewed by an independent review organization. This external exception review is separate from the appeals process described in this letter and in the information included below. If you want to ask for this review, you must specifically state that you are requesting an  external exception review  when you contact us. You will receive a letter that responds to your request and explains the independent review organization s decision. The independent review organization will review any testimony, explanation, or other information they receive from you, providers, or others.  At any  time and at no cost to you, you may request a written copy of:    The rule or guideline used to make our decision    The clinical judgment used to apply the terms of your plan to your medical circumstances    Any other document or information related to this review  For a copy of the above information and/or to request diagnosis and/or treatment code information regarding the services referenced in this communication, please call the number included in this document or on the back of your ID card.  Your attending provider may request an expedited 72-hour appeal review if he/she believes it is warranted. You may also request an expedited review if:    Waiting the standard time might jeopardize your life, health or your ability to regain maximum function, or    You are experiencing severe pain that cannot be managed without the care or treatment you are requesting  In such cases, you may also have the right to request external review while your first level appeal review is being conducted.  First Level of Review  If you are dissatisfied with our decision, you may request a first level appeal review.    Call or write us at the contact information below. You must contact us within one year of the date of the initial decision letter    You may submit new information to us relating to your appeal. We will review testimony, explanation or other information we receive    We will complete the appeal within 30 calendar days of the date we receive your request. If we miss this timeframe, you have the right to request external review. We will notify you of our decision in writing    We will share with you the information relied on to make a determination, free of charge, if you request it. Contact us at the address or telephone numbers listed below    If your complaint involves a decision to reduce or terminate an ongoing course of treatment that we previously approved, the treatment will be covered pending the outcome of the  review process     For questions about your rights, this notice or for assistance, contact the Employee Benefits Security Administration at 9-607-823-ENAZ (0828). For employer group plans not governed by Employee skilled nursing Income Security Act (ERISA), you may contact the Health Insurance Assistance Team (HIAT) at the U.S. Department of Health and Human Services at [phone].    At any time, you have the right to file a complaint with the [place] [employer]. They can be reached at [phone] within the [facility] area or [phone] outside of the [facility] area.    Additional Levels of Review  If you remain dissatisfied with the decision following your first level appeal review, you may pursue the following additional levels of review:    Voluntary Second Level of Review: If you choose, you can present your case to a committee, during a hearing or in writing (written reconsideration).    Hearing: you present your case to a committee, either in person or over the telephone. We will notify you in writing of the decision within 45 calendar days of your request for a hearing     Written Reconsideration: you present your case to the committee in writing. We will notify you in writing of the decision within 45 calendar days of your request for written reconsideration    You must contact us within one year following the date of our first level review decision to request a hearing or written reconsideration. Your request can be oral or in writing.    You may submit new information to us relating to your appeal. We will review testimony, explanation or other information we receive.    External Review: You may choose to have your case reviewed by an external review organization. This process is coordinated through the [place] [employer], and they can be reached at [phone] or [address] [place] [address], [place] [address], [place] [address]. Your request must be received within six months of  the  date of the appeal decision letter. You may submit additional information to be reviewed by the external review organization. You will be notified of the review organization s decision within 45 calendar days of the date your request was received. If an expedited review is requested and approved, a decision will be provided within 72 hours. There is a $25 fee for an external review. This amount may be waived in cases of financial hardship. The fee will be refunded if our decision is completely overturned. The external review organization s decision is binding on [employer]. [employer] may seek judicial review on grounds that the decision was arbitrary and capricious or involved an abuse of discretion.    RIGHT TO CIVIL ACTION  If you are a member of an ERISA plan, and you remain dissatisfied with the determination after completing the required appeal process, you have the right to file a civil action suit under Section 502(a) of the Employee long-term Income Security Act.    CONTACT INFORMATION  To request a review, additional information or assistance, please contact us at the following address and telephone numbers:    Mail: Prescription Claim Appeals [address]  [address] [address]  [place] [address]    Fax: [phone]    Telephone: [facility] area: [phone]  Toll-free: [phone]  TDD/TTY users, call 557

## 2019-04-24 NOTE — TELEPHONE ENCOUNTER
Notify patient that bydureon is not covered.  I would like her changed over to Ozempic, orders are pended if patient agrees.

## 2019-04-24 NOTE — TELEPHONE ENCOUNTER
Patient notified of Provider's message as written.  Reviewed possible s/e listed per patient's request  Patient verbalized understanding and agreement  Prescription sent    Marianne Keith RN

## 2019-04-25 ENCOUNTER — TELEPHONE (OUTPATIENT)
Dept: FAMILY MEDICINE | Facility: CLINIC | Age: 49
End: 2019-04-25

## 2019-04-25 NOTE — TELEPHONE ENCOUNTER
Prior Authorization Retail Medication Request    Medication/Dose: Semaglutide 0.25 or 0.5 MG/DOSE SOPN  ICD code (if different than what is on RX):    Previously Tried and Failed:    Rationale:  Insurance does not cover plan. Would you like to start PA or change medication    Insurance Name:    Insurance ID:        Pharmacy Information (if different than what is on RX)  Name:    Phone:

## 2019-04-30 NOTE — TELEPHONE ENCOUNTER
Prior Authorization Not Needed per Insurance    Medication: Semaglutide 0.25 or 0.5 MG/DOSE SOPN- NOT NEEDED  Insurance Company: [employer] - Phone [phone] Fax [phone]  Expected CoPay:      [id number] [address]  Pharmacy Notified: Yes  Patient Notified: Yes    Pharmacy verified that medication went through insurance and was picked up on 4/25/19.

## 2019-05-01 ENCOUNTER — ANCILLARY PROCEDURE (OUTPATIENT)
Dept: GENERAL RADIOLOGY | Facility: CLINIC | Age: 49
End: 2019-05-01
Attending: INTERNAL MEDICINE
Payer: COMMERCIAL

## 2019-05-01 ENCOUNTER — OFFICE VISIT (OUTPATIENT)
Dept: RHEUMATOLOGY | Facility: CLINIC | Age: 49
End: 2019-05-01
Payer: COMMERCIAL

## 2019-05-01 VITALS
BODY MASS INDEX: 44.35 KG/M2 | WEIGHT: 266.2 LBS | HEIGHT: 65 IN | SYSTOLIC BLOOD PRESSURE: 128 MMHG | OXYGEN SATURATION: 95 % | HEART RATE: 72 BPM | DIASTOLIC BLOOD PRESSURE: 84 MMHG

## 2019-05-01 DIAGNOSIS — K51.919 ULCERATIVE COLITIS WITH COMPLICATION, UNSPECIFIED LOCATION (H): ICD-10-CM

## 2019-05-01 DIAGNOSIS — M54.50 CHRONIC MIDLINE LOW BACK PAIN WITHOUT SCIATICA: ICD-10-CM

## 2019-05-01 DIAGNOSIS — G89.29 CHRONIC MIDLINE LOW BACK PAIN WITHOUT SCIATICA: ICD-10-CM

## 2019-05-01 DIAGNOSIS — M54.50 CHRONIC MIDLINE LOW BACK PAIN WITHOUT SCIATICA: Primary | ICD-10-CM

## 2019-05-01 DIAGNOSIS — M25.50 MULTIPLE JOINT PAIN: ICD-10-CM

## 2019-05-01 DIAGNOSIS — G89.29 CHRONIC MIDLINE LOW BACK PAIN WITHOUT SCIATICA: Primary | ICD-10-CM

## 2019-05-01 PROCEDURE — 99244 OFF/OP CNSLTJ NEW/EST MOD 40: CPT | Performed by: INTERNAL MEDICINE

## 2019-05-01 PROCEDURE — 72200 X-RAY EXAM SI JOINTS: CPT

## 2019-05-01 ASSESSMENT — MIFFLIN-ST. JEOR: SCORE: 1838.36

## 2019-05-01 NOTE — PATIENT INSTRUCTIONS
Rheumatology    Dr. Mor Gill         [facility]   (Monday)  [address]  [place] [address]       [facility]   (Tuesday)  [address]  [place] [address]    [facility]   (Wed., Thurs., and Friday)  [address] [place] [address]    Phone number: [phone]  Thank you for choosing [facility].  Dionne Del Rio CMA

## 2019-05-01 NOTE — Clinical Note
Ms. Lagunas,Diffuse pain started immediately after starting Humira; seems unlikely related but do you think it is worth holding or changing?Mor

## 2019-05-01 NOTE — Clinical Note
I cannot explain her diffuse pain.  She correlates with Humira that seems unlikely but not unheard of - I am asking her gastroenterologist

## 2019-05-01 NOTE — NURSING NOTE
RAPID3 (0-30) Cumulative Score  19.8          RAPID3 Weighted Score (divide #4 by 3 and that is the weighted score)  6.6       Dionne Del Rio [facility]  5/1/2019 1:33 PM

## 2019-05-01 NOTE — PROGRESS NOTES
Rheumatology Clinic Visit      Zaira Villatoro MRN# [id number]   YOB: 1970 Age: 49 year old      Date of visit: 5/01/19   Referring provider: Dr. Tayler Bergman  PCP: Dr. Tayler Bergman  GI: Zohaib Lagunas    Chief Complaint   Patient presents with:  Consult: Patient has pain in multiple sites. Hands and legs are most bothersome.    Assessment and Plan     1.  Multiple joint pain, diffuse body pain, ulcerative colitis, positive MILY: No synovitis on exam today and her symptoms are not consistent with an inflammatory arthritis.  Lower back pain is mechanical, but cannot rule out an inflammatory etiology for her back pain so we will check an SI joint and if needed an MRI of the pelvis.  She reports having a rash all the time but no rash currently.  History of grand mal seizure when she was 11 years old with no clear etiology for that one seizure and no seizure since that time.  No other symptoms to suggest an MILY-associated rheumatologic disease.  No cytopenias or renal insufficiency.  It is possible that the positive antinuclear antibody is related to Humira use but at this point she does not have symptoms to suggest a drug-induced lupus.  Diffuse joint symptoms started within a couple days of Humira initiation and has not changed since that time.  Humira is helping with her ulcerative colitis, per patient, that is followed by gastroenterology but given the correlation of worsening diffuse joint pains with Humira use it would be reasonable to consider changing to Remicade or holding Humira to see if that makes a difference; she will need to discuss this with her gastroenterologist.  - SI joint xray done; MRI ordered and patient notified via [employer]    Ms. Villatoro verbalized agreement with and understanding of the rational for the diagnosis and treatment plan.  All questions were answered to best of my ability and the patient's satisfaction. Ms. Villatoro was advised to contact the clinic with any questions that may  "arise after the clinic visit.      Thank you for involving me in the care of the patient    Return to clinic: TBD based on studies above      HPI   Zaira Villatoro is a 49 year old female with a past medical history significant for hypertension, obstructive sleep apnea, low back pain, a fib, diabetes, obesity, fatty liver, migraines, IBS, and ulcerative colitis who is seen in consultation at the request of Dr. Tayler Bergman for evaluation of multiple joint pain.    Today, Ms. Villatoro reports being dx'd with ulcerative colitis at the age of [age]; found when she had a perforated bowel. UC tx has been MTX, mesalamine, prednisone, and Humira.  Has been on Humira for 2 years now.  Since starting Humira she has had more joint pain.  Within a couple days of taking Humira she has had bilateral leg pain. Now pain is in the knees, ankles, hands.  Joint pain is all day; not worse in the AM or PM; not worse with inactivity or activity; however if she walks for more than 15,000 steps then she has worse lower extremity swelling that then resolves with leg elevation and time.  Also pain in her forearms and she demonstrates by pushing in the middle of the foream.  Chronic lower back pain with degenerative changes; worse with activity; improves with lumbar spine injections.  Also said that she has degenerative changes in the C-spine not responding to PT so she plans to get an injection.  Arthroscopic surgery hx of the right knee that helped; left knee was an issue that responded to a steroid injection a year ago by orthopedics.  Denies fevers, chills, nausea, vomiting. Diarrhea; blood in stool 3-6 mo ago but not since then.  No chest pain/pressure, palpitations, or shortness of breath. No LE swelling. No neck pain. No oral or nasal sores.  Rashes \"all the time, anywhere and everywhere\" but none now.  When rashes are present they tend to itch; tend to last for 15 min each time. No sicca symptoms. No photosensitivity or photophobia. No " eye pain or redness. No history of inflammatory eye disease. No history of DVT, pulmonary embolism, or miscarriage.   No history of serositis.  No history of Raynaud's Phenomenon.  Grand mal seizure when 10yo with no clear etiology for that one seizure; none since then. No known renal disorder.      Tobacco: quit in 1998  EtOH: about 1-2 drinks per year  Drugs: none  Occupation: substitute for the [employer]    ROS   GEN: No fevers, chills, or night sweats.  No weight change.  SKIN: See HPI  HEENT: No epistaxis. No oral or nasal ulcers.  CV: No chest pain, pressure, palpitations, or dyspnea on exertion.  PULM: No SOB, wheeze, cough.  GI: See HPI  : No blood in urine.  MSK: See HPI.  NEURO: No numbness, tingling, or weakness.  ENDO: No heat/cold intolerance.  EXT: No LE swelling  PSYCH: Negative    Active Problem List     Patient Active Problem List   Diagnosis     IBS (irritable bowel syndrome)     Migraine     Ulcerative colitis (H)     Fatty liver     Other chronic rhinitis     CARDIOVASCULAR SCREENING; LDL GOAL LESS THAN 100     Essential hypertension with goal blood pressure less than 140/90     Uncontrolled type 2 diabetes mellitus without complication, with long-term current use of insulin (H)     Morbid obesity due to excess calories (H)     Moderate single current episode of major depressive disorder (H)     Incisional hernia, without obstruction or gangrene     Atrial fibrillation, unspecified type (H)     Low back pain     MAHAD (obstructive sleep apnea)- severe (AHI 80)     Immunosuppressed status (H)     Cervical radiculopathy     Past Medical History     Past Medical History:   Diagnosis Date     Acute diverticulitis 7/31/2013     Gestational diabetes 2000 2000/2001     History of seizures as a child 1981    One grand mal in 5th grade.  Didn't tolerate phenobarb.     Hypertension 2000     Menorrhagia      Mild cervical dysplasia 1988     Moderate single current episode of major  depressive disorder (H)      Pelvic pain      Perforation of sigmoid colon (H) 8/3/2013     PONV (postoperative nausea and vomiting)      S/P left hemicolectomy 8/16/2013 8/02/13      Sepsis (H) 8/1/2013     Transient atrial fibrillation or flutter 08/03/2013    one documented episode of perioperative atrial fibrillation that occurred in association with a sigmoid colon perforation, ulcerative colitis and colostomy     Past Surgical History     Past Surgical History:   Procedure Laterality Date     APPENDECTOMY  04/2014     ARTHROSCOPY KNEE RT/LT  2004    RT      BIOPSY  2011 many 2011 and on     COLONOSCOPY  02/2012    lt sided colitis     COLONOSCOPY  1/9/2014     CRYOTHERAPY, CERVICAL  1988     EXPLORATORY LAPAROTOMY, PARTIAL LEFT ROLANDA COLLECTOMY WITH HARTMANS PROCEDURE, COLOSTOMY  8/2013    lt rolanda colectomy, bowel perforation, colostomy, Karen's pouche     GI SURGERY  2013    abrupted  bowl     HC TOOTH EXTRACTION W/FORCEP  6/2003    Abscess Tooth / Hospitalized     HERNIA REPAIR  04/2014    found at time of takedown     SINUS SURGERY  2/11/03    LT sinus cyst removal      TAKEDOWN COLOSTOMY  04/2014     Allergy     Allergies   Allergen Reactions     Demerol      Very low blood pressure     Fish      Pt reports allergy to all fish     Meperidine Other (See Comments)     Other reaction(s): Hypotension  Drops blood pressure       Metformin GI Disturbance and Diarrhea     GI Disturbance       Naproxen      No Clinical Screening - See Comments      Pt reports allergy to all fish     Nubain  [Nalbuphine]      Seafood      Topiramate Other (See Comments) and Hives     Sleepy and confused.  Shaky, disoriented       Tylenol Sinus Max Other (See Comments)     Feet swelling  Feet swelling       Latex Rash     Current Medication List     Current Outpatient Medications   Medication Sig     adalimumab (HUMIRA PEN) 40 MG/0.8ML pen kit Inject 0.8 mLs (40 mg) Subcutaneous every 7 days     aspirin 81 MG tablet Take by  mouth daily     atenolol (TENORMIN) 50 MG tablet TAKE ONE-HALF TABLET BY MOUTH 2 TIMES DAILY     cholecalciferol 2000 UNITS tablet Take 2 tablets by mouth daily      glimepiride (AMARYL) 4 MG tablet Take 1 tablet (4 mg) by mouth every morning (before breakfast)     insulin detemir (LEVEMIR FLEXTOUCH) 100 UNIT/ML injection Inject 55 Units Subcutaneous At Bedtime     losartan (COZAAR) 25 MG tablet TAKE 1/2 TABLET BY MOUTH DAILY     TYLENOL CAPS 500 MG OR 1 CAPSULE EVERY 4 HOURS AS NEEDED     blood glucose monitoring (ACCU-CHEK SHARMILA PLUS) test strip TEST 4 TIMES DAILY AND AS NEEDED     blood glucose monitoring (ACCU-CHEK FASTCLIX) lancets 1 each 4 times daily Use to test blood sugar 4 times daily or as directed.     diphenhydrAMINE (BENADRYL ALLERGY) 25 MG tablet as needed Reported on 4/12/2017     DRAMAMINE OR as needed     eletriptan (RELPAX) 20 MG tablet take 1-2 tablets by mouth at onset of headache for migraine. may repeat dose in 2 hours. do not exceed 80mg in 24 hours. (Patient not taking: Reported on 5/1/2019)     fluticasone (FLOVENT HFA) 110 MCG/ACT Inhaler Spray 2 puffs in nostril 2 times daily     glimepiride (AMARYL) 1 MG tablet TAKE 1 TABLET (1 MG) BY MOUTH EVERY MORNING (BEFORE BREAKFAST) WHILE ON PREDNISONE (Patient not taking: Reported on 3/29/2019)     insulin pen needle (B-D U/F) 31G X 5 MM USE TWICE DAILY (OR AS DIRECTED)     order for DME Equipment being ordered: CPAP 9 c,     order for DME Glucometer, brand as covered by insurance.     order for DME Test strips for pt's glucometer, brand as covered by insurance. Test four times daily and prn.     oxyCODONE-acetaminophen (PERCOCET) 5-325 MG per tablet Take 1 tablet by mouth every 6 hours as needed for pain     Semaglutide 0.25 or 0.5 MG/DOSE SOPN Inject 0.25 mg Subcutaneous once a week for 28 days, THEN 0.5 mg once a week for 28 days, THEN 1 mg once a week. (Patient not taking: No sig reported)     STATIN NOT PRESCRIBED, INTENTIONAL, 1 each  "continuous prn Statin not prescribed intentionally due to Refusal by patient and Other:LDL is below 100. (Patient not taking: Reported on 3/29/2019)     No current facility-administered medications for this visit.          Social History   See HPI    Family History     Family History   Problem Relation Age of Onset     Diabetes Mother      Allergies Mother      Asthma Mother      Arthritis Mother      Heart Disease Mother         heart murmur     Depression Mother      Obesity Mother      Eye Disorder Father      Diabetes Father      Cerebrovascular Disease Father      Heart Disease Father      Hypertension Father      Diabetes Maternal Grandmother      Cerebrovascular Disease Maternal Grandfather      Unknown/Adopted Paternal Grandmother      Heart Disease Paternal Grandfather         left ventrical failure     Cerebrovascular Disease Paternal Grandfather      Gynecology Sister         endometriosis     Depression Sister      Asthma Daughter      Breast Cancer Maternal Aunt      Thyroid Disease Maternal Aunt      Breast Cancer Other         fathers sister     Anesthesia Reaction Other      Thyroid Disease Other      Osteoporosis Other      Asthma Daughter      Breast Cancer Other         mothers sister     Glaucoma No family hx of      Macular Degeneration No family hx of      Physical Exam     Temp Readings from Last 3 Encounters:   03/13/19 98  F (36.7  C) (Oral)   03/05/19 98.3  F (36.8  C) (Oral)   02/26/19 98.3  F (36.8  C) (Oral)     BP Readings from Last 5 Encounters:   05/01/19 128/84   03/29/19 131/86   03/13/19 129/82   03/05/19 122/78   02/26/19 125/72     Pulse Readings from Last 1 Encounters:   05/01/19 72     Resp Readings from Last 1 Encounters:   03/05/19 18     Estimated body mass index is 44.3 kg/m  as calculated from the following:    Height as of this encounter: 1.651 m (5' 5\").    Weight as of this encounter: 120.7 kg (266 lb 3.2 oz).    GEN: NAD; obese  HEENT: MMM. No oral lesions. Anicteric, " noninjected sclera  CV: S1, S2. RRR. No m/r/g.  PULM: CTA bilaterally. No w/c.  ABD: +BS.   MSK: MCPs, PIPs, DIPs, wrists, elbows, shoulders, knees, ankles, and MTPs were tender to palpation but there is no soft tissue swelling, increased warmth, or overlying erythema.  Hips mildly tender to palpation but not with range of motion.  Spine nontender to palpation.  For fibromyalgia tender points were positive.  No dactylitis.  Achilles tendons nontender to palpation.    NEURO: UE and LE strengths 5/5 and equal bilaterally.   SKIN: No rash.  No nail pitting.  EXT: No LE edema  PSYCH: Alert. Appropriate.    Labs / Imaging (select studies)     RF/CCP  Recent Labs   Lab Test 08/24/18  0937   CCPIGG 1   RHF <20     MILY  Recent Labs   Lab Test 08/24/18  0937   EMMA Positive*   ANAP1 HOMOGENEOUS   ANAT1 1:320     RNP/Sm/SSA/SSB  No results for input(s): RNPIGG, SMIGG, ENASM, SSAIGG, ENASSA, SSBIGG, ENASSB, ENARMP, SCLIGG, TREPAB, JOIGG in the last 12820 hours.    Invalid input(s): RNAP, ENARNP  dsDNA  No results for input(s): DNA in the last 98861 hours.  C3/C4  No results for input(s): D0KFFXL, A9ROZEV in the last 10111 hours.  RNA Polymerase III Antibody  No results for input(s): RNAPG in the last 75942 hours.  Histone Antibody  No results for input(s): HSTO in the last 30307 hours.  Send-out Labs  No results for input(s): SRESLT, STSTNM, STSTCD, SSPTYP in the last 68026 hours.  Antiphospholipid Antibodies  No results for input(s): B2IGG, B2IGM, B2GPA, B2GPG, B2GPM, CARDG, CARDM, CARG, AKIKO, LUPINT in the last 64982 hours.  ANCA  No results for input(s): ANCA, PR3IGG, MPOAB, MPOIGG in the last 05885 hours.    Invalid input(s): PR3  HLA-B27  No results for input(s): P40ETGFNVM, B1 in the last 69764 hours.  IgG  No results for input(s): IGG, IGG1, IGG2, IGG3, IGG4, IGA, IGM in the last 15013 hours.  Cryoglobulins  No results for input(s): CRYOG, CRYALB, CRYGG, CRYGA, CRYGM, CRYKAP, CRYLAM, CRYTOT in the last 24252  hours.  CBC  Recent Labs   Lab Test 03/05/19  1124 03/17/16  0947 11/04/15  1138  11/07/12 04/30/12  1105   WBC 7.0 8.8 7.5   < > 5.2   < > 6.0   RBC 4.55 5.15 4.69   < > 4.57   < > 4.68   HGB 14.0 15.1 13.9   < > 13.8   < > 13.1   HCT 42.5 45.5 41.9   < > 41.3   < > 39.6   MCV 93 88 89   < > 90   < > 85   RDW 13.2 13.9 13.6   < > 15.7   < > 13.1    345 275   < > 327   < > 285   MCH 30.8 29.3 29.6   < > 30.1   < > 28.0   MCHC 32.9 33.2 33.2   < > 33.3   < > 33.1   NEUTROPHIL  --  62.8 64.9  --  60   < > 64.0   LYMPH  --  24.3 21.2  --  27   < > 23.1   MONOCYTE  --  7.2 7.3  --  4   < > 7.5   EOSINOPHIL  --  5.5 6.3  --  7   < > 4.7   BASOPHIL  --  0.2 0.3  --  1   < > 0.7   ANEU  --  5.5 4.9  --   --   --  3.8   ALYM  --  2.1 1.6  --   --   --  1.4   EDWIN  --  0.6 0.6  --   --   --  0.5   AEOS  --  0.5 0.5  --   --   --  0.3   ABAS  --  0.0 0.0  --   --   --  0.0    < > = values in this interval not displayed.     CMP  Recent Labs   Lab Test 03/05/19  1124 05/24/18  1143 05/26/17  0922 03/17/16  0947 02/19/16  1316 08/11/15  1405  08/04/13    137 140 139 138 141   < > 137   POTASSIUM 4.3 4.4 4.2 4.3 4.3 4.0   < > 4.2   CHLORIDE 106 101 107 105 103 110*   < > 107   CO2 24 31 23 29 28 24   < >  --    ANIONGAP 8 5 10 5 7 7   < > 6   * 277* 100* 166* 134* 112*   < >  --    BUN 9 17 18 10 10 14   < > 5   CR 0.76 0.88 0.76 0.86 0.78 0.90   < > 0.73   GFRESTIMATED >90 69 82 72 79 68   < >  --    GFRESTBLACK >90 83 >90   GFR Calc   87 >90   GFR Calc   82   < >  --    SAMANTA 8.7 9.3 8.3* 8.8 9.3 8.8   < > 8.2   BILITOTAL 0.8  --  1.3 0.9  --   --   --  0.7  0.7   ALBUMIN 3.4  --  3.4 3.6  --   --   --  2.5  2.5*   PROTTOTAL 6.6*  --  6.6* 7.0  --   --   --  5.0  5.0*   ALKPHOS 50  --  46 63  --   --   --  56  56   AST 24  --  26 17  --   --   --  15  15   ALT 33  --  25 22  --   --   --  8  8    < > = values in this interval not displayed.     GGT  No results for  input(s): GGT in the last 78289 hours.  HgA1c  Recent Labs   Lab Test 03/05/19  1124 08/24/18  0934 05/24/18  1142   A1C 8.2* 8.1* 8.8*     Uric Acid  No results for input(s): URIC in the last 46497 hours.  Iron Studies  No results for input(s): ERNIE, IRON, FEB, IRONSAT in the last 56900 hours.  Calcium/VitaminD  Recent Labs   Lab Test 03/29/19  1136 03/05/19  1124 05/24/18  1143 05/26/17  0922  11/04/15  1138  06/17/15  1124  01/23/12  0946   SAMANTA  --  8.7 9.3 8.3*   < >  --    < >  --    < >  --    D3VIT  --   --   --   --   --   --   --   --   --  19   VITDT 50  --   --   --   --  50  --  62   < >  --     < > = values in this interval not displayed.     ESR/CRP  Recent Labs   Lab Test 03/05/19  1124 08/24/18  0937 03/17/16  0947 02/20/12  1440   SED  --  10 9 9   CRP 6.5  --  13.2* 6.3     CK/Aldolase  Recent Labs   Lab Test 08/24/18  0937   *     TSH/T4  Recent Labs   Lab Test 08/24/18  0937 02/13/17  1020 12/10/14  1034   TSH 3.10 2.05 1.51     Lipid Panel  Recent Labs   Lab Test 12/22/17  1102 11/07/16  1124 11/04/15  1138 12/10/14  1034 10/17/13  0829   CHOL 122 157 136 154 139   TRIG 73 92 61 68 67   HDL 35* 44* 42* 51 42*   LDL 72 95 82 89 83   VLDL  --   --  12 14 13   CHOLHDLRATIO  --   --  3.2 3.0 3.3   NHDL 87 113  --   --   --      Hepatitis B  Recent Labs   Lab Test 03/29/19  1136 05/29/12  1407   AUSAB 0.54  --    HBCAB Nonreactive Negative   HEPBANG Nonreactive Negative     Hepatitis C  No results for input(s): HCVAB, HCVRNA in the last 37217 hours.    Invalid input(s): HEPCAB  Lyme ab screening  No results for input(s): LYMEGM in the last 75568 hours.  Lyme confirmation testing by Western Blot  No results for input(s): LYWG, LYWM in the last 82274 hours.  Tuberculosis Screening  Recent Labs   Lab Test 03/29/19  1136 05/29/12  1407   TBRES Negative  --    TBRSLT  --  Negative   TBAGN  --  0.01     HIV Screening  Recent Labs   Lab Test 08/24/18  0937   HIAGAB Nonreactive     UA  Recent Labs    Lab Test 03/17/16  0945   COLOR Yellow   APPEARANCE Clear   URINEGLC Negative   URINEBILI Negative   SG 1.025   URINEPH 6.0   PROTEIN Negative   UROBILINOGEN 0.2   NITRITE Negative   UBLD Negative   LEUKEST Negative     Urine Microscopic  No results for input(s): WBCU, RBCU, SQUAMOUSEPI, BACTERIA, MUCOUS in the last 08706 hours.  Urine Protein  Recent Labs   Lab Test 05/24/18  1230 02/13/17  1104 02/03/16  1015   UCRR 135 130 325     Synovial Fluid Studies  No results for input(s): FCOL, FAPR, FRBC, FWBC, FNEU, FLYM, FMONO, CRYSTL in the last 97532 hours.    PATH  Recent Labs   Lab Test 11/04/15  0000 06/26/12  1400 06/26/12  0000 02/20/12  1331   PATH   Patient Name: EMILY PEREYRA  MR#: [id number]  Specimen #: [id number]  Collected: 11/4/2015  Received: 11/5/2015  Reported: 11/9/2015 09:48  Ordering Phy(s): ARMANDO MUHAMMAD    SPECIMEN/STAIN PROCESS:  Pap imaged thin layer prep screening (Surepath, FocalPoint with guided  screening)       Pap-Cyto x 1, Reflex HPV if NIL/ASCUS/LSIL x 1    SOURCE: Cervical, endocervical  ----------------------------------------------------------------   Pap imaged thin layer prep screening (Surepath, FocalPoint with guided  screening)  SPECIMEN ADEQUACY:  Satisfactory for evaluation.  -Transformation zone component absent.    CYTOLOGIC INTERPRETATION:    Negative for Intraepithelial Lesion or Malignancy    Electronically signed out by:  SU Edwards (ASCP)    Processed and screened at [facility],  [facility]    CLINICAL HISTORY:  LMP: 10/19/2015  Previous normal pap  Date of Last Pap: 6/26/2012,    Papanicolaou Test Limitations:  Cervical cytolog y is a screening test  with limited sensitivity; regular screening is critical for cancer  prevention; Pap tests are primarily effective for the  diagnosis/prevention of squamous cell carcinoma, not adenocarcinomas or  other cancers.    TESTING LAB LOCATION:  [facility]  [facility], [address]  [phone]    COLLECTION SITE:  Client:  [facility]  Location: [facility] (B)    Patient Name: EMILY PEREYRA MR#: [id number] Specimen #: [id number] Collected: 6/26/2012 Received: 6/27/2012 Reported: 6/28/2012 09:47 Ordering Phy(s): ADRIEN ARAUZ     SPECIMEN/STAIN PROCESS: Pap imaged thin layer prep screening (Surepath, FocalPoint with guided screening)      Pap-Cyto x 1, Reflex HPV if ASCUS/NIL (>31 yo) x 1  SOURCE: Cervical, endocervical ----------------------------------------------------------------  Pap imaged thin layer prep screening (Surepath, FocalPoint with guided screening) SPECIMEN ADEQUACY: Satisfactory for evaluation. -Transformation zone component absent.  CYTOLOGIC INTERPRETATION:  Negative for Intraepithelial Lesion or Malignancy       Electronically signed out by: SU Alvarado (ASCP)  Processed and screened at [facility]  CLINICAL HISTORY: LMP: 6-12-12    Papanicolaou Test Limitations:  Cervical cytology is a screening test with limited sensitivity; regular screening is critical for cancer prevention; Pap tests are primarily effective for the diagnosis/prevention of squamous cell carcinoma, not adenocarcinomas or other cancers.  TESTING LAB LOCATION: [facility], [address] [place] [address]  [id number] [phone]  COLLECTION SITE: Client:  [facility] Location: [facility] (B) Patient Name: EMILY PEREYRA MR#: [id number] Specimen #: [id number] Collected: 6/26/2012 00:00 Received: 6/29/2012 13:27 Reported: 7/3/2012 13:42 Ordering Phy(s): ADRIEN ARAUZ  _________________________________________     TEST(S) REQUESTED: Human Papillomavirus Screen Analysis  SPECIMEN DESCRIPTION: Cervical Cells   METHODOLOGY:  Total cellular DNA was extracted from the  above specimen and up to 1 ug subjected to DNA amplification with a series of oligonucleotide primers directed to the L1 region of the human papillomavirus genome.  The resulting PCR fragments were then  by capillary electrophoresis using a Qiagen Uplogix with [employer] software.  The PCR products from samples positive for HPV DNA were then digested with a series of restriction endonuclease enzymes.  The resulting pattern of bands corresponding to the digested DNA products were interpreted according to the corresponding HPV DNA controls. Amplification of a segment of the beta globin gene serves as an internal control of DNA amplification.  RESULTS (L1 region):     NEGATIVE FOR HPV DNA Final Diagnosis: This patient's sample is negative for HPV DNA.  Diagnosis Comment: These results do not rule out the presence of an occult HPV infection which is not detected due to either sampling error, or sample procurement.       This test was developed and its performance determined by the [facility]  [facility]. It has not been cleared or approved by the U.S. Food and Drug Administration.  The FDA has determined that such clearance or approval is not necessary.  Pursuant to the requirements of CLIA'88, this laboratory has established and verified the test's accuracy and precision.  This test is used for clinical purposes.    Electronically Signed Out By: CYNDY      TESTING LAB LOCATION: [address] [id number] [phone]  COLLECTION SITE: Client:  [facility] Location:  [facility] () Patient Name: EMILY PEREYRA MR#: [id number] Specimen #: [id number] Collected: 2/20/2012 Received: 2/21/2012 Reported: 2/24/2012 17:36 Ordering Phy(s): MEHDI DUPONT Additional Phy(s): OZZY WILLARD       SPECIMEN(S): A: Colon  "biopsy, right B: Colon biopsy, left C: Rectal biopsy  FINAL DIAGNOSIS: A.  Colon, right (biopsy):      - Within normal limits.  B.  Colon, left (biopsy):      - Active chronic inflammatory disease.      - Marked chronic inflammation, acute cryptitis, crypt abscesses and focal crypt distortion.  C.  Rectum (biopsy):      - Within normal limits.  I have personally reviewed all specimens and or slides, including the listed special stains, and used them with my medical judgement to determine the final diagnosis.  Electronically signed out by:  Ricky Chavez M.D., [facility]   CLINICAL HISTORY: 41-year-old female with blood in stools.   GROSS: Three specimens are received, each labeled with the patient's name and hospital identification number.  A.          Specimen A is designated \"right colon biopsy.\"  The specimen consists of four tan soft tissue fragments measuring in aggregate 1.2 x 0.3 x 0.2 cm.  Entirely submitted in one cassette.  B.          Specimen B is designated \"left colon biopsy.\"  The specimen consists of three tan soft tissue fragments measuring 0.4 x 0.2 x 0.2 cm, 0.4 x 0.3 x 0.2 cm and 0.2 x 0.2 x 0.2 cm.  Entirely submitted in one cassette.  C.          Specimen C is designated \"rectal biopsy.\"  The specimen consists of two tan soft tissue fragments measuring 0.4 x 0.3 x 0.2 cm and 0.3 x 0.2 x 0.2 cm.  Entirely submitted in one cassette.  HANNAH Lemus/  MICROSCOPIC: Microscopic evaluation is performed.  AVE Chavez MD/teetee 2/24/12   TESTING LAB LOCATION: [facility], [address]   [id number] [phone]  COLLECTION SITE: Client: [facility] [place]         Immunization History     Immunization History   Administered Date(s) Administered     Influenza (IIV3) PF 09/24/2009, 11/19/2010, 10/28/2011, 10/04/2012     Influenza Vaccine IM 3yrs+ 4 Valent IIV4 " 10/15/2013, 10/19/2015, 09/14/2016, 10/08/2017, 09/04/2018     Influenza Vaccine, 3 YRS +, IM (QUADRIVALENT W/PRESERVATIVES) 12/10/2014     MMR 06/22/1998     Pneumo Conj 13-V (2010&after) 11/07/2016     Pneumococcal 23 valent 05/29/2012     TD (ADULT, 7+) 02/10/1998     TDAP Vaccine (Adacel) 04/19/2010     Twinrix A/B 09/14/2016, 10/14/2016, 03/14/2017          Chart documentation done in part with Dragon Voice recognition Software. Although reviewed after completion, some word and grammatical error may remain.    Mor Gill MD

## 2019-05-13 ENCOUNTER — TELEPHONE (OUTPATIENT)
Dept: INTERNAL MEDICINE | Facility: CLINIC | Age: 49
End: 2019-05-13

## 2019-05-14 ENCOUNTER — ANCILLARY PROCEDURE (OUTPATIENT)
Dept: MRI IMAGING | Facility: CLINIC | Age: 49
End: 2019-05-14
Attending: INTERNAL MEDICINE
Payer: COMMERCIAL

## 2019-05-14 DIAGNOSIS — G89.29 CHRONIC MIDLINE LOW BACK PAIN WITHOUT SCIATICA: ICD-10-CM

## 2019-05-14 DIAGNOSIS — M54.50 CHRONIC MIDLINE LOW BACK PAIN WITHOUT SCIATICA: ICD-10-CM

## 2019-05-14 PROCEDURE — 72195 MRI PELVIS W/O DYE: CPT | Performed by: RADIOLOGY

## 2019-05-16 ENCOUNTER — OFFICE VISIT (OUTPATIENT)
Dept: GASTROENTEROLOGY | Facility: CLINIC | Age: 49
End: 2019-05-16
Payer: COMMERCIAL

## 2019-05-16 VITALS
OXYGEN SATURATION: 96 % | HEART RATE: 75 BPM | SYSTOLIC BLOOD PRESSURE: 109 MMHG | HEIGHT: 65 IN | WEIGHT: 264.7 LBS | BODY MASS INDEX: 44.1 KG/M2 | DIASTOLIC BLOOD PRESSURE: 69 MMHG

## 2019-05-16 DIAGNOSIS — K51.90 ULCERATIVE COLITIS WITHOUT COMPLICATIONS, UNSPECIFIED LOCATION (H): Primary | Chronic | ICD-10-CM

## 2019-05-16 PROCEDURE — 99214 OFFICE O/P EST MOD 30 MIN: CPT | Performed by: PHYSICIAN ASSISTANT

## 2019-05-16 ASSESSMENT — ENCOUNTER SYMPTOMS
SHORTNESS OF BREATH: 0
UNEXPECTED WEIGHT CHANGE: 0
VOMITING: 0
HEMATURIA: 0
HEADACHES: 0
SORE THROAT: 0
LIGHT-HEADEDNESS: 0
DIFFICULTY URINATING: 0
NERVOUS/ANXIOUS: 0
ROS GI COMMENTS: SEE HPI
FEVER: 0
PHOTOPHOBIA: 0
COUGH: 0
FLANK PAIN: 0
WEAKNESS: 0
CHEST TIGHTNESS: 0
ADENOPATHY: 0
DYSURIA: 0
TROUBLE SWALLOWING: 0

## 2019-05-16 ASSESSMENT — PAIN SCALES - GENERAL: PAINLEVEL: MILD PAIN (3)

## 2019-05-16 ASSESSMENT — MIFFLIN-ST. JEOR: SCORE: 1831.55

## 2019-05-16 NOTE — PATIENT INSTRUCTIONS
Please call [phone] to schedule a flex sigmoidoscopy at [place] with MAC (monitored anesthesia care).   Follow up after flex sigmoidoscopy

## 2019-05-16 NOTE — NURSING NOTE
"Zaira Villatoro's goals for this visit include:   Chief Complaint   Patient presents with     RECHECK     6 week follow up UC and IBS-D       She requests these members of her care team be copied on today's visit information: Yes    PCP: Tayler Bergman    Referring Provider:  No referring provider defined for this encounter.    /69 (BP Location: Left arm, Patient Position: Sitting, Cuff Size: Adult Large)   Pulse 75   Ht 1.651 m (5' 5\")   Wt 120.1 kg (264 lb 11.2 oz)   SpO2 96%   BMI 44.05 kg/m      Do you need any medication refills at today's visit? No    Jocelyn Shields LPN      "

## 2019-05-16 NOTE — PROGRESS NOTES
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 complications, unspecified location (H)  - GASTROENTEROLOGY ADULT REF PROCEDURE ONLY [facility] [phone]; (Dr. Cr with [facility])          PREVENTIVE CARE IN INFLAMMATORY BOWEL DISEASE    - Strongly recommend tobacco abstinence.    - Recommend considering daily calcium + Vitamin D supplementation.    - Recommend age-appropriate cancer surveillance:  - Yearly Dermatology visit for visual skin inspection if immunosuppressed, monthly skin self-check after bathing.  - Dysplasia surveillance colonoscopy every 1-2 years in patients with Crohn's colitis or ulcerative colitis >8-10 years since diagnosis.  - (For men and women ages 9-26) Consideration for HPV vaccination, should be discussed with your PCP further if you feel you'd like to pursue this.  - (For women, men with risk factors) Annual Pap smears if immunosuppressed, mammogram when deemed appropriate by your PCP.    - Recommend follow-up with your PCP to ensure the following vaccinations have been updated: Hepatitis A/B, Tdap, Pneumococcal pneumonia, yearly flu SHOT, Meningococcal meningitis (if college-age), HPV (all aged 18-26), etc.  - Would also recommend VZV and MMR titers be checked to confirm  immunity.  - Live/attenuated vaccines (such as the INTRANASAL flu vaccine, MMR, Yellow Fever, or Zostavax vaccine) should not be administered to immunosuppressed patients, except in very specific instances which you should discuss with a GI and Infectious Disease provider first.    - Family planning:  If you or your partner are planning to become pregnant, please schedule time with us to discuss issues surrounding IBD and Fertility/Pregnancy.          RTC 3-4 months    Thank you for this consultation.  It was a pleasure to participate in the care of this patient; please contact us with any further questions.  A total of 25 minutes, face to face, was spent with this patient, >50% of which was counseling regarding the above delineated issues.    This note was created with voice recognition software, and while reviewed for accuracy, typos may remain.     Zohaib Lagunas PA-C  Gastroenterology  [facility]

## 2019-05-30 PROBLEM — M54.12 CERVICAL RADICULOPATHY: Status: RESOLVED | Noted: 2019-04-03 | Resolved: 2019-05-30

## 2019-05-30 NOTE — PROGRESS NOTES
Discharge Note    Progress reporting period is from last progress note on   to Apr 3, 2019.    Zaira failed to follow up and current status is unknown.  Please see information below for last relevant information on current status.  Patient seen for   visits.    SUBJECTIVE  Subjective changes noted by patient:  Pt reports sx's have been worse since last visit and overall reports no significant changes since starting PT. States she doesn't want to do traction today and that she's been doing her ex's at home but they either make things worse or they don't do anything.   .  Current pain level is  unknown.     Previous pain level was  6/10.   Changes in function:  Yes (See Goal flowsheet attached for changes in current functional level)  Adverse reaction to treatment or activity: None    OBJECTIVE  Changes noted in objective findings: Advised pt to return to MD for further evaluation.     ASSESSMENT/PLAN  Diagnosis: cevical radic   Updated problem list and treatment plan:   Pain - HEP  STG/LTGs have been met or progress has been made towards goals:  Yes, please see goal flowsheet for most current information  Assessment of Progress: current status is unknown.    Last current status:     Self Management Plans:  HEP  I have re-evaluated this patient and find that the nature, scope, duration and intensity of the therapy is appropriate for the medical condition of the patient.  Zaira continues to require the following intervention to meet STG and LTG's:  HEP.    Recommendations:  Discharge with current home program.  Patient to follow up with MD as needed.    Please refer to the daily flowsheet for treatment today, total treatment time and time spent performing 1:1 timed codes.

## 2019-05-31 ENCOUNTER — OFFICE VISIT (OUTPATIENT)
Dept: PODIATRY | Facility: CLINIC | Age: 49
End: 2019-05-31
Payer: COMMERCIAL

## 2019-05-31 ENCOUNTER — ANCILLARY PROCEDURE (OUTPATIENT)
Dept: GENERAL RADIOLOGY | Facility: CLINIC | Age: 49
End: 2019-05-31
Attending: PODIATRIST
Payer: COMMERCIAL

## 2019-05-31 VITALS
BODY MASS INDEX: 43.93 KG/M2 | WEIGHT: 264 LBS | SYSTOLIC BLOOD PRESSURE: 126 MMHG | HEART RATE: 78 BPM | DIASTOLIC BLOOD PRESSURE: 70 MMHG

## 2019-05-31 DIAGNOSIS — M72.2 PLANTAR FASCIITIS: ICD-10-CM

## 2019-05-31 DIAGNOSIS — M72.2 PLANTAR FASCIITIS: Primary | ICD-10-CM

## 2019-05-31 PROCEDURE — 99203 OFFICE O/P NEW LOW 30 MIN: CPT | Performed by: PODIATRIST

## 2019-05-31 PROCEDURE — 73630 X-RAY EXAM OF FOOT: CPT | Mod: LT

## 2019-05-31 NOTE — PROGRESS NOTES
Subjective:    Patient is seen today as a new pt self referral with a 1 year(s) hx of left heel pain.  Points to the plantarmedial calcaneal tubercle.  Most painful upon rising in a.m. or after prolonged sitting.  Aggravated by activity and relieved by rest.  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  She has barefoot around the house all the time.  She states over the last month she is now getting pain on the lateral part of her foot and points to the styloid process.  Denies erythema, edema, ecchymosis, numbness, loss of strength.    ROS:  A 10-point review of systems was performed and is positive for that noted in the HPI and as seen below.  All other areas are negative.        Allergies   Allergen Reactions     Demerol      Very low blood pressure     Fish      Pt reports allergy to all fish     Meperidine Other (See Comments)     Other reaction(s): Hypotension  Drops blood pressure       Metformin GI Disturbance and Diarrhea     GI Disturbance       Naproxen      No Clinical Screening - See Comments      Pt reports allergy to all fish     Nubain  [Nalbuphine]      Seafood      Topiramate Other (See Comments) and Hives     Sleepy and confused.  Shaky, disoriented       Tylenol Sinus Max Other (See Comments)     Feet swelling  Feet swelling       Latex Rash       Current Outpatient Medications   Medication Sig Dispense Refill     adalimumab (HUMIRA PEN) 40 MG/0.8ML pen kit Inject 0.8 mLs (40 mg) Subcutaneous every 7 days 3.2 mL 3     aspirin 81 MG tablet Take by mouth daily 30 tablet 3     atenolol (TENORMIN) 50 MG tablet TAKE ONE-HALF TABLET BY MOUTH 2 TIMES DAILY 90 tablet 2     blood glucose monitoring (ACCU-CHEK SHARMILA PLUS) test strip TEST 4 TIMES DAILY AND AS NEEDED 400 strip 3     blood glucose monitoring (ACCU-CHEK FASTCLIX) lancets 1 each 4 times daily Use to test blood sugar 4 times daily or as directed. 300 each 11     cholecalciferol 2000 UNITS tablet Take 2 tablets by mouth 2 times  daily  30 tablet      diphenhydrAMINE (BENADRYL ALLERGY) 25 MG tablet as needed Reported on 4/12/2017       DRAMAMINE OR as needed       eletriptan (RELPAX) 20 MG tablet take 1-2 tablets by mouth at onset of headache for migraine. may repeat dose in 2 hours. do not exceed 80mg in 24 hours. (Patient not taking: Reported on 5/1/2019) 12 tablet 1     fluticasone (FLOVENT HFA) 110 MCG/ACT Inhaler Spray 2 puffs in nostril 2 times daily (Patient not taking: Reported on 5/16/2019) 36 Inhaler 1     glimepiride (AMARYL) 1 MG tablet TAKE 1 TABLET (1 MG) BY MOUTH EVERY MORNING (BEFORE BREAKFAST) WHILE ON PREDNISONE (Patient not taking: Reported on 3/29/2019) 90 tablet 1     glimepiride (AMARYL) 4 MG tablet Take 1 tablet (4 mg) by mouth every morning (before breakfast) 90 tablet 1     insulin detemir (LEVEMIR FLEXTOUCH) 100 UNIT/ML injection Inject 55 Units Subcutaneous At Bedtime 45 mL 4     insulin pen needle (B-D U/F) 31G X 5 MM USE TWICE DAILY (OR AS DIRECTED) 180 each 3     losartan (COZAAR) 25 MG tablet TAKE 1/2 TABLET BY MOUTH DAILY 15 tablet 5     order for DME Equipment being ordered: CPAP 9 c, 1 Units 0     order for DME Glucometer, brand as covered by insurance. 1 each 0     order for DME Test strips for pt's glucometer, brand as covered by insurance. Test four times daily and prn. 400 each 4     oxyCODONE-acetaminophen (PERCOCET) 5-325 MG per tablet Take 1 tablet by mouth every 6 hours as needed for pain (Patient not taking: Reported on 5/16/2019) 18 tablet 0     Semaglutide 0.25 or 0.5 MG/DOSE SOPN Inject 0.25 mg Subcutaneous once a week for 28 days, THEN 0.5 mg once a week for 28 days, THEN 1 mg once a week. (Patient not taking: No sig reported) 3 mL 3     STATIN NOT PRESCRIBED, INTENTIONAL, 1 each continuous prn Statin not prescribed intentionally due to Refusal by patient and Other:LDL is below 100. (Patient not taking: Reported on 3/29/2019) 0 each 0     TYLENOL CAPS 500 MG OR 1 CAPSULE EVERY 4 HOURS AS NEEDED          Patient Active Problem List   Diagnosis     IBS (irritable bowel syndrome)     Migraine     Ulcerative colitis (H)     Fatty liver     Other chronic rhinitis     CARDIOVASCULAR SCREENING; LDL GOAL LESS THAN 100     Essential hypertension with goal blood pressure less than 140/90     Uncontrolled type 2 diabetes mellitus without complication, with long-term current use of insulin (H)     Morbid obesity due to excess calories (H)     Moderate single current episode of major depressive disorder (H)     Incisional hernia, without obstruction or gangrene     Atrial fibrillation, unspecified type (H)     Low back pain     MAHAD (obstructive sleep apnea)- severe (AHI 80)     Immunosuppressed status (H)       Past Medical History:   Diagnosis Date     Acute diverticulitis 7/31/2013     Gestational diabetes 2000 2000/2001     History of seizures as a child 1981    One grand mal in 5th grade.  Didn't tolerate phenobarb.     Hypertension 2000     Menorrhagia      Mild cervical dysplasia 1988     Moderate single current episode of major depressive disorder (H)      Pelvic pain      Perforation of sigmoid colon (H) 8/3/2013     PONV (postoperative nausea and vomiting)      S/P left hemicolectomy 8/16/2013 8/02/13      Sepsis (H) 8/1/2013     Transient atrial fibrillation or flutter 08/03/2013    one documented episode of perioperative atrial fibrillation that occurred in association with a sigmoid colon perforation, ulcerative colitis and colostomy       Past Surgical History:   Procedure Laterality Date     APPENDECTOMY  04/2014     ARTHROSCOPY KNEE RT/LT  2004    RT      BIOPSY  2011 many 2011 and on     COLONOSCOPY  02/2012    lt sided colitis     COLONOSCOPY  1/9/2014     CRYOTHERAPY, CERVICAL  1988     EXPLORATORY LAPAROTOMY, PARTIAL LEFT ROLANDA COLLECTOMY WITH HARTMANS PROCEDURE, COLOSTOMY  8/2013    lt rolanda colectomy, bowel perforation, colostomy, Karen's pouche     GI SURGERY  2013    abrupted  bowl     HC  TOOTH EXTRACTION W/FORCEP  2003    Abscess Tooth / Hospitalized     HERNIA REPAIR  2014    found at time of takedown     SINUS SURGERY  03    LT sinus cyst removal      TAKEDOWN COLOSTOMY  2014       Family History   Problem Relation Age of Onset     Diabetes Mother      Allergies Mother      Asthma Mother      Arthritis Mother      Heart Disease Mother         heart murmur     Depression Mother      Obesity Mother      Eye Disorder Father      Diabetes Father      Cerebrovascular Disease Father      Heart Disease Father      Hypertension Father      Diabetes Maternal Grandmother      Cerebrovascular Disease Maternal Grandfather      Unknown/Adopted Paternal Grandmother      Heart Disease Paternal Grandfather         left ventrical failure     Cerebrovascular Disease Paternal Grandfather      Gynecology Sister         endometriosis     Depression Sister      Asthma Daughter      Breast Cancer Maternal Aunt      Thyroid Disease Maternal Aunt      Breast Cancer Other         fathers sister     Anesthesia Reaction Other      Thyroid Disease Other      Osteoporosis Other      Asthma Daughter      Breast Cancer Other         mothers sister     Glaucoma No family hx of      Macular Degeneration No family hx of        Social History     Tobacco Use     Smoking status: Former Smoker     Packs/day: 1.00     Years: 15.00     Pack years: 15.00     Types: Cigarettes     Start date: 1985     Last attempt to quit: 1998     Years since quittin.9     Smokeless tobacco: Never Used     Tobacco comment: soke free household.   Substance Use Topics     Alcohol use: Yes     Alcohol/week: 0.0 oz     Comment: occ         Objective:    Vitals: /70   Pulse 78   Wt 119.7 kg (264 lb)   BMI 43.93 kg/m    BMI: Body mass index is 43.93 kg/m .  Height: Data Unavailable    Constitutional/ general:  Pt is in no apparent distress, appears well-nourished.  Cooperative with history and physical exam.     Psych:  The  patient answered questions appropriately.  Normal affect.  Seems to have reasonable expectations, in terms of treatment.     Eyes:  Visual scanning/ tracking without deficit.     Ears:  Response to auditory stimuli is normal.  No hearing aid devices.  Auricles in proper alignment.     Lymphatic:  Popliteal lymph nodes not enlarged.     Lungs:  Non labored breathing, non labored speech. No cough.  No audible wheezing. Even, quiet breathing.       Vascular:  Pedal pulses are palpable bilaterally for both the DP and PT arteries.  CFT < 3 sec.  No edema.  Pedal hair growth noted.     Neuro:  Alert and oriented x 3. Coordinated gait.  Light touch sensation is intact to the L4, L5, S1 distributions. No obvious deficits.  No evidence of neurological-based weakness, spasticity, or contracture in the lower extremities.     Derm: Normal texture and turgor.  No erythema, ecchymosis, or cyanosis.  No open lesions.     Musculoskeletal:    Lower extremity muscle strength is normal.  Patient is ambulatory without an assistive device or brace.  No gross deformities.     Left greater than right somewhat pronated arch with weightbearing.   ROM is within normal limits.  Negative tinel's sign.  No side to side compression pain of the calcaneus.  Pain upon palpation to the left plantarmedial  of the calcaneus.  Some pain on left styloid process no erythema, edema, ecchymosis, or subcutaneous masses noted.  No pain on palpation or stressing any tendons.  Negative Tinel's sign      Radiographic Exam:  X-Ray Findings:  I personally reviewed the films.  Normal    Assessment:  Plantar Fasciitis left foot                           Left lateral pain from compensation    Plan:  X-rays taken today.  Discussed etiology and treatment options with the patient.  The potential causes and nature of plantar fasciitis were discussed with the patient.  We reviewed the natural history/prognosis of the condition and risks if left untreated.  These include  chronic pain, other sites of pain due to gait changes, and potential plantar fascial rupture.      We discussed possible causes of the condition as it relates to the patients specific situation.      Conservative treatment options were reviewed:  appropriate shoes, avoidance of barefoot walking, inserts/orthoses, stretching, ice, massage, immobilization and NSAIDs.     We also reviewed the options of injection therapy and surgery.  However, it was made clear that surgery is only considered when conservative therapy fails.  The risks and benefits of injection therapy, and surgery were discussed.  Dispensed handout.       After thorough discussion and answering all questions, the patient elected to modifying activities, supportive shoes, ice, stretching, and not going barefoot.  Good house shoes at all times and I made recommendations.  Good outdoor shoes and sandals and I made suggestions.  Also discussed good shoes will help support her lateral column regarding her styloid process pain.    RTC in 4 weeks if not better otherwise prn.     Davon Barrera DPM, FACFAS

## 2019-05-31 NOTE — PATIENT INSTRUCTIONS
We wish you continued good healing. If you have any questions or concerns, please do not hesitate to contact us at [phone]    Please remember to call and schedule a follow up appointment if one was recommended at your earliest convenience.   PODIATRY CLINIC HOURS  TELEPHONE NUMBER    Dr. Davon Barrera D.P.M [facility]    Clinics:  [facility]    [facility]   Tuesday 1PM-6PM  [place]  Wednesday 7AM-2PM  [facility]  Thursday 10AM-6PM  [place]  Friday 7AM-3PM  [place]  Specialty schedulers:   [phone] to make an appointment with any Specialty Provider.        Urgent Care locations:    [facility] Monday-Friday 5 pm - 9 pm. Saturday-Sunday 9 am -5pm    Monday-Friday 11 am - 9 pm Saturday 9 am - 5 pm     Monday-Sunday 12 noon-8PM [phone]     If you need a medication refill, please contact us you may need lab work and/or a follow up visit prior to your refill (i.e. Antifungal medications).    "[employer]t (secure e-mail communication and access to your chart) to send a message or to make an appointment.    If MRI needed please call [facility] at [phone]        Weight management plan: Patient was referred to their PCP to discuss a diet and exercise plan.

## 2019-05-31 NOTE — LETTER
5/31/2019         RE: Zaira Villatoro  [address] [place] [address]        Dear Colleague,    Thank you for referring your patient, Zaira Villatoro, to the [facility]. Please see a copy of my visit note below.    Subjective:    Patient is seen today as a new pt self referral with a 1 year(s) hx of left heel pain.  Points to the plantarmedial calcaneal tubercle.  Most painful upon rising in a.m. or after prolonged sitting.  Aggravated by activity and relieved by rest.  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  She has barefoot around the house all the time.  She states over the last month she is now getting pain on the lateral part of her foot and points to the styloid process.  Denies erythema, edema, ecchymosis, numbness, loss of strength.    ROS:  A 10-point review of systems was performed and is positive for that noted in the HPI and as seen below.  All other areas are negative.        Allergies   Allergen Reactions     Demerol      Very low blood pressure     Fish      Pt reports allergy to all fish     Meperidine Other (See Comments)     Other reaction(s): Hypotension  Drops blood pressure       Metformin GI Disturbance and Diarrhea     GI Disturbance       Naproxen      No Clinical Screening - See Comments      Pt reports allergy to all fish     Nubain  [Nalbuphine]      Seafood      Topiramate Other (See Comments) and Hives     Sleepy and confused.  Shaky, disoriented       Tylenol Sinus Max Other (See Comments)     Feet swelling  Feet swelling       Latex Rash       Current Outpatient Medications   Medication Sig Dispense Refill     adalimumab (HUMIRA PEN) 40 MG/0.8ML pen kit Inject 0.8 mLs (40 mg) Subcutaneous every 7 days 3.2 mL 3     aspirin 81 MG tablet Take by mouth daily 30 tablet 3     atenolol (TENORMIN) 50 MG tablet TAKE ONE-HALF TABLET BY MOUTH 2 TIMES DAILY 90 tablet 2     blood glucose monitoring (ACCU-CHEK SHARMILA PLUS) test strip TEST  4 TIMES DAILY AND AS NEEDED 400 strip 3     blood glucose monitoring (ACCU-CHEK FASTCLIX) lancets 1 each 4 times daily Use to test blood sugar 4 times daily or as directed. 300 each 11     cholecalciferol 2000 UNITS tablet Take 2 tablets by mouth 2 times daily  30 tablet      diphenhydrAMINE (BENADRYL ALLERGY) 25 MG tablet as needed Reported on 4/12/2017       DRAMAMINE OR as needed       eletriptan (RELPAX) 20 MG tablet take 1-2 tablets by mouth at onset of headache for migraine. may repeat dose in 2 hours. do not exceed 80mg in 24 hours. (Patient not taking: Reported on 5/1/2019) 12 tablet 1     fluticasone (FLOVENT HFA) 110 MCG/ACT Inhaler Spray 2 puffs in nostril 2 times daily (Patient not taking: Reported on 5/16/2019) 36 Inhaler 1     glimepiride (AMARYL) 1 MG tablet TAKE 1 TABLET (1 MG) BY MOUTH EVERY MORNING (BEFORE BREAKFAST) WHILE ON PREDNISONE (Patient not taking: Reported on 3/29/2019) 90 tablet 1     glimepiride (AMARYL) 4 MG tablet Take 1 tablet (4 mg) by mouth every morning (before breakfast) 90 tablet 1     insulin detemir (LEVEMIR FLEXTOUCH) 100 UNIT/ML injection Inject 55 Units Subcutaneous At Bedtime 45 mL 4     insulin pen needle (B-D U/F) 31G X 5 MM USE TWICE DAILY (OR AS DIRECTED) 180 each 3     losartan (COZAAR) 25 MG tablet TAKE 1/2 TABLET BY MOUTH DAILY 15 tablet 5     order for DME Equipment being ordered: CPAP 9 c, 1 Units 0     order for DME Glucometer, brand as covered by insurance. 1 each 0     order for DME Test strips for pt's glucometer, brand as covered by insurance. Test four times daily and prn. 400 each 4     oxyCODONE-acetaminophen (PERCOCET) 5-325 MG per tablet Take 1 tablet by mouth every 6 hours as needed for pain (Patient not taking: Reported on 5/16/2019) 18 tablet 0     Semaglutide 0.25 or 0.5 MG/DOSE SOPN Inject 0.25 mg Subcutaneous once a week for 28 days, THEN 0.5 mg once a week for 28 days, THEN 1 mg once a week. (Patient not taking: No sig reported) 3 mL 3     STATIN  NOT PRESCRIBED, INTENTIONAL, 1 each continuous prn Statin not prescribed intentionally due to Refusal by patient and Other:LDL is below 100. (Patient not taking: Reported on 3/29/2019) 0 each 0     TYLENOL CAPS 500 MG OR 1 CAPSULE EVERY 4 HOURS AS NEEDED         Patient Active Problem List   Diagnosis     IBS (irritable bowel syndrome)     Migraine     Ulcerative colitis (H)     Fatty liver     Other chronic rhinitis     CARDIOVASCULAR SCREENING; LDL GOAL LESS THAN 100     Essential hypertension with goal blood pressure less than 140/90     Uncontrolled type 2 diabetes mellitus without complication, with long-term current use of insulin (H)     Morbid obesity due to excess calories (H)     Moderate single current episode of major depressive disorder (H)     Incisional hernia, without obstruction or gangrene     Atrial fibrillation, unspecified type (H)     Low back pain     MAHAD (obstructive sleep apnea)- severe (AHI 80)     Immunosuppressed status (H)       Past Medical History:   Diagnosis Date     Acute diverticulitis 7/31/2013     Gestational diabetes 2000 2000/2001     History of seizures as a child 1981    One grand mal in 5th grade.  Didn't tolerate phenobarb.     Hypertension 2000     Menorrhagia      Mild cervical dysplasia 1988     Moderate single current episode of major depressive disorder (H)      Pelvic pain      Perforation of sigmoid colon (H) 8/3/2013     PONV (postoperative nausea and vomiting)      S/P left hemicolectomy 8/16/2013 8/02/13      Sepsis (H) 8/1/2013     Transient atrial fibrillation or flutter 08/03/2013    one documented episode of perioperative atrial fibrillation that occurred in association with a sigmoid colon perforation, ulcerative colitis and colostomy       Past Surgical History:   Procedure Laterality Date     APPENDECTOMY  04/2014     ARTHROSCOPY KNEE RT/LT  2004    RT      BIOPSY  2011 many 2011 and on     COLONOSCOPY  02/2012    lt sided colitis     COLONOSCOPY   2014     CRYOTHERAPY, CERVICAL  1988     EXPLORATORY LAPAROTOMY, PARTIAL LEFT ROLANDA COLLECTOMY WITH HARTMANS PROCEDURE, COLOSTOMY  2013    lt rolanda colectomy, bowel perforation, colostomy, Karen's pouche     GI SURGERY      abrupted  bowl     HC TOOTH EXTRACTION W/FORCEP  2003    Abscess Tooth / Hospitalized     HERNIA REPAIR  2014    found at time of takedown     SINUS SURGERY  03    LT sinus cyst removal      TAKEDOWN COLOSTOMY  2014       Family History   Problem Relation Age of Onset     Diabetes Mother      Allergies Mother      Asthma Mother      Arthritis Mother      Heart Disease Mother         heart murmur     Depression Mother      Obesity Mother      Eye Disorder Father      Diabetes Father      Cerebrovascular Disease Father      Heart Disease Father      Hypertension Father      Diabetes Maternal Grandmother      Cerebrovascular Disease Maternal Grandfather      Unknown/Adopted Paternal Grandmother      Heart Disease Paternal Grandfather         left ventrical failure     Cerebrovascular Disease Paternal Grandfather      Gynecology Sister         endometriosis     Depression Sister      Asthma Daughter      Breast Cancer Maternal Aunt      Thyroid Disease Maternal Aunt      Breast Cancer Other         fathers sister     Anesthesia Reaction Other      Thyroid Disease Other      Osteoporosis Other      Asthma Daughter      Breast Cancer Other         mothers sister     Glaucoma No family hx of      Macular Degeneration No family hx of        Social History     Tobacco Use     Smoking status: Former Smoker     Packs/day: 1.00     Years: 15.00     Pack years: 15.00     Types: Cigarettes     Start date: 1985     Last attempt to quit: 1998     Years since quittin.9     Smokeless tobacco: Never Used     Tobacco comment: soke free household.   Substance Use Topics     Alcohol use: Yes     Alcohol/week: 0.0 oz     Comment: occ         Objective:    Vitals: /70   Pulse  78   Wt 119.7 kg (264 lb)   BMI 43.93 kg/m     BMI: Body mass index is 43.93 kg/m .  Height: Data Unavailable    Constitutional/ general:  Pt is in no apparent distress, appears well-nourished.  Cooperative with history and physical exam.     Psych:  The patient answered questions appropriately.  Normal affect.  Seems to have reasonable expectations, in terms of treatment.     Eyes:  Visual scanning/ tracking without deficit.     Ears:  Response to auditory stimuli is normal.  No hearing aid devices.  Auricles in proper alignment.     Lymphatic:  Popliteal lymph nodes not enlarged.     Lungs:  Non labored breathing, non labored speech. No cough.  No audible wheezing. Even, quiet breathing.       Vascular:  Pedal pulses are palpable bilaterally for both the DP and PT arteries.  CFT < 3 sec.  No edema.  Pedal hair growth noted.     Neuro:  Alert and oriented x 3. Coordinated gait.  Light touch sensation is intact to the L4, L5, S1 distributions. No obvious deficits.  No evidence of neurological-based weakness, spasticity, or contracture in the lower extremities.     Derm: Normal texture and turgor.  No erythema, ecchymosis, or cyanosis.  No open lesions.     Musculoskeletal:    Lower extremity muscle strength is normal.  Patient is ambulatory without an assistive device or brace.  No gross deformities.     Left greater than right somewhat pronated arch with weightbearing.   ROM is within normal limits.  Negative tinel's sign.  No side to side compression pain of the calcaneus.  Pain upon palpation to the left plantarmedial  of the calcaneus.  Some pain on left styloid process no erythema, edema, ecchymosis, or subcutaneous masses noted.  No pain on palpation or stressing any tendons.  Negative Tinel's sign      Radiographic Exam:  X-Ray Findings:  I personally reviewed the films.  Normal    Assessment:  Plantar Fasciitis left foot                           Left lateral pain from compensation    Plan:  X-rays taken  today.  Discussed etiology and treatment options with the patient.  The potential causes and nature of plantar fasciitis were discussed with the patient.  We reviewed the natural history/prognosis of the condition and risks if left untreated.  These include chronic pain, other sites of pain due to gait changes, and potential plantar fascial rupture.      We discussed possible causes of the condition as it relates to the patients specific situation.      Conservative treatment options were reviewed:  appropriate shoes, avoidance of barefoot walking, inserts/orthoses, stretching, ice, massage, immobilization and NSAIDs.     We also reviewed the options of injection therapy and surgery.  However, it was made clear that surgery is only considered when conservative therapy fails.  The risks and benefits of injection therapy, and surgery were discussed.  Dispensed handout.       After thorough discussion and answering all questions, the patient elected to modifying activities, supportive shoes, ice, stretching, and not going barefoot.  Good house shoes at all times and I made recommendations.  Good outdoor shoes and sandals and I made suggestions.  Also discussed good shoes will help support her lateral column regarding her styloid process pain.    RTC in 4 weeks if not better otherwise prn.     Davon Barerra DPM, FACFAS      Again, thank you for allowing me to participate in the care of your patient.        Sincerely,        Davon Barrera DPM

## 2019-06-05 ENCOUNTER — OFFICE VISIT (OUTPATIENT)
Dept: INTERNAL MEDICINE | Facility: CLINIC | Age: 49
End: 2019-06-05
Payer: COMMERCIAL

## 2019-06-05 VITALS
OXYGEN SATURATION: 98 % | TEMPERATURE: 98.4 F | HEART RATE: 71 BPM | WEIGHT: 262.6 LBS | BODY MASS INDEX: 43.75 KG/M2 | DIASTOLIC BLOOD PRESSURE: 78 MMHG | HEIGHT: 65 IN | SYSTOLIC BLOOD PRESSURE: 122 MMHG | RESPIRATION RATE: 16 BRPM

## 2019-06-05 DIAGNOSIS — N83.202 LEFT OVARIAN CYST: ICD-10-CM

## 2019-06-05 DIAGNOSIS — R60.1 ANASARCA: ICD-10-CM

## 2019-06-05 LAB — HBA1C MFR BLD: 7.4 % (ref 0–5.6)

## 2019-06-05 PROCEDURE — 83036 HEMOGLOBIN GLYCOSYLATED A1C: CPT | Performed by: INTERNAL MEDICINE

## 2019-06-05 PROCEDURE — 99214 OFFICE O/P EST MOD 30 MIN: CPT | Performed by: INTERNAL MEDICINE

## 2019-06-05 PROCEDURE — 36415 COLL VENOUS BLD VENIPUNCTURE: CPT | Performed by: INTERNAL MEDICINE

## 2019-06-05 ASSESSMENT — MIFFLIN-ST. JEOR: SCORE: 1822.03

## 2019-06-05 ASSESSMENT — PAIN SCALES - GENERAL: PAINLEVEL: MODERATE PAIN (4)

## 2019-06-05 NOTE — PROGRESS NOTES
Subjective     Zaira Villatoro is a 49 year old female who presents to clinic today for the following health issues:    She increased her Amaryl to 4 mg daily.  She is following her food better.      She is having more swelling in her feets, legs, face and hands.  She had a pelvic mri with subcutaneous edema.  She is finding it hard to bendover as well.  No short of breath or chest pain.  Urine is not foamy but is cloudy.     morning blood sugar are in the 120's or less, bedtime is 160-180  History of Present Illness     Mental Health Follow-up:  Patient presents to follow-up on Depression.Patient's depression since last visit has been:  Good  The patient is not having other symptoms associated with depression.      Any significant life events: No  Patient is not feeling anxious or having panic attacks.  Patient has no concerns about alcohol or drug use.     Social History  Tobacco Use    Smoking status: Former Smoker      Packs/day: 1.00      Years: 15.00      Pack years: 15      Types: Cigarettes      Start date: 1985      Quit date: 1998      Years since quittin.9    Smokeless tobacco: Never Used    Tobacco comment: soke free household.  Alcohol use: Yes    Alcohol/week: 0.0 oz    Comment: occ  Drug use: No      Today's PHQ-9         PHQ-9 Total Score:         PHQ-9 Q9 Thoughts of better off dead/self-harm past 2 weeks :       Thoughts of suicide or self harm:      Self-harm Plan:        Self-harm Action:          Safety concerns for self or others:           Diabetes:   She presents for follow up of diabetes.  She is checking home blood glucose a few times a week. She checks blood glucose before meals.  Blood glucose is sometimes over 200 (Yes before but not recently ) and never under 70. When her blood glucose is low, the patient is asymptomatic for confusion, blurred vision, lethargy and reports not feeling dizzy, shaky, or weak.  She has no concerns regarding her diabetes at this time.  She is  having numbness in feet (Swelling in legs and feet ). The patient has not had a diabetic eye exam in the last 12 months.     Diabetes [facility]    Hypertension: She presents for follow up of hypertension.  She does not check blood pressure  regularly outside of the clinic. Outpatient blood pressures have not been over 140/90. She does not follow a low salt diet.     She eats 4 or more servings of fruits and vegetables daily.She consumes 2 (Patient states she only drinks diet pop) sweetened beverage(s) daily.  She is taking medications regularly.           Patient Active Problem List   Diagnosis     IBS (irritable bowel syndrome)     Migraine     Ulcerative colitis (H)     Fatty liver     Other chronic rhinitis     CARDIOVASCULAR SCREENING; LDL GOAL LESS THAN 100     Essential hypertension with goal blood pressure less than 140/90     Uncontrolled type 2 diabetes mellitus without complication, with long-term current use of insulin (H)     Morbid obesity due to excess calories (H)     Moderate single current episode of major depressive disorder (H)     Incisional hernia, without obstruction or gangrene     Atrial fibrillation, unspecified type (H)     Low back pain     MAHAD (obstructive sleep apnea)- severe (AHI 80)     Immunosuppressed status (H)     Past Surgical History:   Procedure Laterality Date     APPENDECTOMY  04/2014     ARTHROSCOPY KNEE RT/LT  2004    RT      BIOPSY  2011 many 2011 and on     COLONOSCOPY  02/2012    lt sided colitis     COLONOSCOPY  1/9/2014     CRYOTHERAPY, CERVICAL  1988     EXPLORATORY LAPAROTOMY, PARTIAL LEFT ROLANDA COLLECTOMY WITH HARTMANS PROCEDURE, COLOSTOMY  8/2013    lt rolanda colectomy, bowel perforation, colostomy, Karen's pouche     GI SURGERY  2013    abrupted  bowl     HC TOOTH EXTRACTION W/FORCEP  6/2003    Abscess Tooth / Hospitalized     HERNIA REPAIR  04/2014    found at time of takedown     SINUS SURGERY  2/11/03    LT sinus cyst removal      TAKEDOWN  COLOSTOMY  2014       Social History     Tobacco Use     Smoking status: Former Smoker     Packs/day: 1.00     Years: 15.00     Pack years: 15.00     Types: Cigarettes     Start date: 1985     Last attempt to quit: 1998     Years since quittin.9     Smokeless tobacco: Never Used     Tobacco comment: soke free household.   Substance Use Topics     Alcohol use: Yes     Alcohol/week: 0.0 oz     Comment: occ     Family History   Problem Relation Age of Onset     Diabetes Mother      Allergies Mother      Asthma Mother      Arthritis Mother      Heart Disease Mother         heart murmur     Depression Mother      Obesity Mother      Eye Disorder Father      Diabetes Father      Cerebrovascular Disease Father      Heart Disease Father      Hypertension Father      Diabetes Maternal Grandmother      Cerebrovascular Disease Maternal Grandfather      Unknown/Adopted Paternal Grandmother      Heart Disease Paternal Grandfather         left ventrical failure     Cerebrovascular Disease Paternal Grandfather      Gynecology Sister         endometriosis     Depression Sister      Asthma Daughter      Breast Cancer Maternal Aunt      Thyroid Disease Maternal Aunt      Breast Cancer Other         fathers sister     Anesthesia Reaction Other      Thyroid Disease Other      Osteoporosis Other      Asthma Daughter      Breast Cancer Other         mothers sister     Glaucoma No family hx of      Macular Degeneration No family hx of          Current Outpatient Medications   Medication Sig Dispense Refill     adalimumab (HUMIRA PEN) 40 MG/0.8ML pen kit Inject 0.8 mLs (40 mg) Subcutaneous every 7 days 3.2 mL 3     aspirin 81 MG tablet Take by mouth daily 30 tablet 3     atenolol (TENORMIN) 50 MG tablet TAKE ONE-HALF TABLET BY MOUTH 2 TIMES DAILY 90 tablet 2     blood glucose monitoring (ACCU-CHEK SHARMILA PLUS) test strip TEST 4 TIMES DAILY AND AS NEEDED 400 strip 3     blood glucose monitoring (ACCU-CHEK FASTCLIX) lancets 1  each 4 times daily Use to test blood sugar 4 times daily or as directed. 300 each 11     cholecalciferol 2000 UNITS tablet Take 2 tablets by mouth 2 times daily  30 tablet      diphenhydrAMINE (BENADRYL ALLERGY) 25 MG tablet as needed Reported on 4/12/2017       DRAMAMINE OR as needed       glimepiride (AMARYL) 4 MG tablet Take 1 tablet (4 mg) by mouth every morning (before breakfast) 90 tablet 1     insulin detemir (LEVEMIR FLEXTOUCH) 100 UNIT/ML injection Inject 55 Units Subcutaneous At Bedtime 45 mL 4     insulin pen needle (B-D U/F) 31G X 5 MM USE TWICE DAILY (OR AS DIRECTED) 180 each 3     losartan (COZAAR) 25 MG tablet TAKE 1/2 TABLET BY MOUTH DAILY 15 tablet 5     order for DME Equipment being ordered: CPAP 9 c, 1 Units 0     order for DME Glucometer, brand as covered by insurance. 1 each 0     order for DME Test strips for pt's glucometer, brand as covered by insurance. Test four times daily and prn. 400 each 4     Semaglutide 0.25 or 0.5 MG/DOSE SOPN Inject 0.25 mg Subcutaneous once a week for 28 days, THEN 0.5 mg once a week for 28 days, THEN 1 mg once a week. 3 mL 3     TYLENOL CAPS 500 MG OR 1 CAPSULE EVERY 4 HOURS AS NEEDED       STATIN NOT PRESCRIBED, INTENTIONAL, 1 each continuous prn Statin not prescribed intentionally due to Refusal by patient and Other:LDL is below 100. (Patient not taking: Reported on 3/29/2019) 0 each 0     Allergies   Allergen Reactions     Demerol      Very low blood pressure     Fish      Pt reports allergy to all fish     Meperidine Other (See Comments)     Other reaction(s): Hypotension  Drops blood pressure       Metformin GI Disturbance and Diarrhea     GI Disturbance       Naproxen      No Clinical Screening - See Comments      Pt reports allergy to all fish     Nubain  [Nalbuphine]      Seafood      Topiramate Other (See Comments) and Hives     Sleepy and confused.  Shaky, disoriented       Tylenol Sinus Max Other (See Comments)     Feet swelling  Feet swelling        Latex Rash       Reviewed and updated as needed this visit by Provider         Review of Systems    ROS: 10 point ROS neg other than the symptoms noted above in the HPI.       Objective    There were no vitals taken for this visit.  There is no height or weight on file to calculate BMI.  Physical Exam   GENERAL APPEARANCE: healthy, alert and no distress  NECK: no adenopathy, no asymmetry, masses, or scars and thyroid normal to palpation  RESP: lungs clear to auscultation - no rales, rhonchi or wheezes  CV: regular rates and rhythm and normal S1 S2, no S3 or S4  ABDOMEN:  soft, nontender, no HSM or masses and bowel sounds normal  SKIN: no suspicious lesions or rashes  PSYCH: mentation appears normal. and affect normal/bright  1+ edema to the mid calf bilateral     Diagnostic Test Results:  Labs reviewed in Epic  Results for orders placed or performed in visit on 06/05/19   HEMOGLOBIN A1C   Result Value Ref Range    Hemoglobin A1C 7.4 (H) 0 - 5.6 %           Assessment & Plan     1. Uncontrolled type 2 diabetes mellitus without complication, with long-term current use of insulin (H)  Per patient instructions. Improved but not at goal.  Will likely increase the amaryl pending labs.   - HEMOGLOBIN A1C  - Lipid panel reflex to direct LDL Fasting  - Albumin Random Urine Quantitative with Creat Ratio  - Comprehensive metabolic panel  - TSH with free T4 reflex  - CBC with platelets  - *UA reflex to Microscopic and Culture ([facility] (except [place] and [place])    2. Anasarca  Will need further workup - no clear liver, kidney or heart disease.  Per patient instructions.   - Lipid panel reflex to direct LDL Fasting  - Albumin Random Urine Quantitative with Creat Ratio  - Comprehensive metabolic panel  - TSH with free T4 reflex  - CBC with platelets  - *UA reflex to Microscopic and Culture ([facility] [place] [facility] [place])    3. Left ovarian cyst  Appears benign on imaging per  radiology report   - Lipid panel reflex to direct LDL Fasting  - Albumin Random Urine Quantitative with Creat Ratio  - Comprehensive metabolic panel  - TSH with free T4 reflex  - CBC with platelets  - *UA reflex to Microscopic and Culture ([facility] [facility] (except [place] and [place])          Patient Instructions   I will prescribe a water pill and further workup for your swelling after your labs return.     Return in about 6 weeks (around 7/17/2019) for Medication check.    Tayler Bergman MD  [facility]

## 2019-06-06 PROCEDURE — 84443 ASSAY THYROID STIM HORMONE: CPT | Performed by: INTERNAL MEDICINE

## 2019-06-06 PROCEDURE — 80053 COMPREHEN METABOLIC PANEL: CPT | Performed by: INTERNAL MEDICINE

## 2019-06-06 PROCEDURE — 80061 LIPID PANEL: CPT | Performed by: INTERNAL MEDICINE

## 2019-06-06 PROCEDURE — 36415 COLL VENOUS BLD VENIPUNCTURE: CPT | Performed by: INTERNAL MEDICINE

## 2019-06-07 LAB
ALBUMIN SERPL-MCNC: 3.6 G/DL (ref 3.4–5)
ALP SERPL-CCNC: 47 U/L (ref 40–150)
ALT SERPL W P-5'-P-CCNC: 38 U/L (ref 0–50)
ANION GAP SERPL CALCULATED.3IONS-SCNC: 8 MMOL/L (ref 3–14)
AST SERPL W P-5'-P-CCNC: 28 U/L (ref 0–45)
BILIRUB SERPL-MCNC: 1 MG/DL (ref 0.2–1.3)
BUN SERPL-MCNC: 9 MG/DL (ref 7–30)
CALCIUM SERPL-MCNC: 8.5 MG/DL (ref 8.5–10.1)
CHLORIDE SERPL-SCNC: 107 MMOL/L (ref 94–109)
CHOLEST SERPL-MCNC: 123 MG/DL
CO2 SERPL-SCNC: 24 MMOL/L (ref 20–32)
CREAT SERPL-MCNC: 0.8 MG/DL (ref 0.52–1.04)
GFR SERPL CREATININE-BSD FRML MDRD: 87 ML/MIN/{1.73_M2}
GLUCOSE SERPL-MCNC: 116 MG/DL (ref 70–99)
HDLC SERPL-MCNC: 42 MG/DL
LDLC SERPL CALC-MCNC: 68 MG/DL
NONHDLC SERPL-MCNC: 81 MG/DL
POTASSIUM SERPL-SCNC: 4.1 MMOL/L (ref 3.4–5.3)
PROT SERPL-MCNC: 6.6 G/DL (ref 6.8–8.8)
SODIUM SERPL-SCNC: 139 MMOL/L (ref 133–144)
TRIGL SERPL-MCNC: 64 MG/DL
TSH SERPL DL<=0.005 MIU/L-ACNC: 1.75 MU/L (ref 0.4–4)

## 2019-06-07 NOTE — RESULT ENCOUNTER NOTE
Normal thyroid. Normal electrolytes. Normal kidney function. Normal liver blood test. Good cholesterol.

## 2019-06-11 ENCOUNTER — HOSPITAL ENCOUNTER (OUTPATIENT)
Facility: AMBULATORY SURGERY CENTER | Age: 49
End: 2019-06-11
Attending: INTERNAL MEDICINE
Payer: COMMERCIAL

## 2019-06-14 ENCOUNTER — MYC MEDICAL ADVICE (OUTPATIENT)
Dept: INTERNAL MEDICINE | Facility: CLINIC | Age: 49
End: 2019-06-14

## 2019-06-14 DIAGNOSIS — R60.1 ANASARCA: Primary | ICD-10-CM

## 2019-06-14 RX ORDER — FUROSEMIDE 20 MG
20 TABLET ORAL DAILY
Qty: 30 TABLET | Refills: 0 | Status: SHIPPED | OUTPATIENT
Start: 2019-06-14 | End: 2019-06-21

## 2019-06-18 NOTE — TELEPHONE ENCOUNTER
SUBJECTIVE:   CC: Yeni Zelaya is an 29 year old woman who presents for preventive health visit.     Healthy Habits:     Getting at least 3 servings of Calcium per day:  Yes    Bi-annual eye exam:  Yes    Dental care twice a year:  Yes    Sleep apnea or symptoms of sleep apnea:  None    Diet:  Low salt    Frequency of exercise:  2-3 days/week    Duration of exercise:  Less than 15 minutes    Taking medications regularly:  Yes    Medication side effects:  None    PHQ-2 Total Score: 0    Additional concerns today:  Yes          -------------------------------------    Today's PHQ-2 Score:   PHQ-2 ( 1999 [employer]) 6/18/2019   Q1: Little interest or pleasure in doing things 0   Q2: Feeling down, depressed or hopeless 0   PHQ-2 Score 0   Q1: Little interest or pleasure in doing things Not at all   Q2: Feeling down, depressed or hopeless Not at all   PHQ-2 Score 0       Abuse: Current or Past(Physical, Sexual or Emotional)- NO  Do you feel safe in your environment? YES    Social History     Tobacco Use     Smoking status: Former Smoker     Smokeless tobacco: Never Used     Tobacco comment: used to smoke ~ once a month   Substance Use Topics     Alcohol use: No     Alcohol/week: 0.0 oz     Comment: occ         Alcohol Use 6/18/2019   Prescreen: >3 drinks/day or >7 drinks/week? No   Prescreen: >3 drinks/day or >7 drinks/week? -   No flowsheet data found.    Reviewed orders with patient.  Reviewed health maintenance and updated orders accordingly - Yes  Patient Active Problem List   [facility]     CARDIOVASCULAR SCREENING; LDL GOAL LESS THAN 160     Health Care Home     Past Surgical History:   Procedure Laterality Date     DILATION AND CURETTAGE  04/03/2019    Missed AB     HC TOOTH EXTRACTION W/FORCEP  2007    wisdom teeth extraction     PE TUBES  1992    b/l       Social History     Tobacco Use     Smoking status: Former Smoker     Smokeless tobacco: Never Used     Tobacco comment: used to smoke ~ once  Prescriptions for Levemir and Accuchek Fastclix lancets faxed to [facility] in [facility].  Shannon Kennedy RN  BSN CDE     a month   Substance Use Topics     Alcohol use: No     Alcohol/week: 0.0 oz     Comment: occ     Family History   Problem Relation Age of Onset     Asthma Brother      Breast Cancer Maternal Grandmother      Hypertension Maternal Grandmother      Alcohol/Drug Maternal Grandmother      Arthritis Maternal Grandmother      Depression Mother      Neurologic Disorder Mother      Thyroid Disease Father      Allergies Unknown         self     Allergies Mother      Allergies Brother      Depression Unknown         self     Depression Father      Depression Brother      Gastrointestinal Disease Father      Asthma Unknown         self           Mammogram not appropriate for this patient based on age.    Pertinent mammograms are reviewed under the imaging tab.  History of abnormal Pap smear: NO - age 21-29 PAP every 3 years recommended  PAP / HPV 2/7/2018 11/7/2014 9/4/2012   PAP NIL NIL NIL     Reviewed and updated as needed this visit by clinical staff  Tobacco  Allergies  Meds  Problems  Med Hx  Surg Hx  Fam Hx  Soc Hx          Reviewed and updated as needed this visit by Provider  Tobacco  Allergies  Meds  Problems  Med Hx  Surg Hx  Fam Hx            Review of Systems  CONSTITUTIONAL: NEGATIVE for fever, chills, change in weight  INTEGUMENTARU/SKIN: NEGATIVE for worrisome rashes, moles or lesions  EYES: NEGATIVE for vision changes or irritation  ENT: NEGATIVE for ear, mouth and throat problems  RESP: NEGATIVE for significant cough or SOB  BREAST: NEGATIVE for masses, tenderness or discharge  CV: NEGATIVE for chest pain, palpitations or peripheral edema  GI: NEGATIVE for nausea, abdominal pain, heartburn, or change in bowel habits  : NEGATIVE for unusual urinary or vaginal symptoms. Periods are regular.  MUSCULOSKELETAL: NEGATIVE for significant arthralgias or myalgia  NEURO: NEGATIVE for weakness, dizziness or paresthesias  PSYCHIATRIC: NEGATIVE for changes in mood or affect     OBJECTIVE:   /76    "Pulse 96   Temp 98.5  F (36.9  C) (Oral)   Ht 1.676 m (5' 6\")   Wt 63.7 kg (140 lb 6.4 oz)   LMP 05/25/2019   BMI 22.66 kg/m    Physical Exam  GENERAL: healthy, alert and no distress  EYES: Eyes grossly normal to inspection, PERRL and conjunctivae and sclerae normal  HENT: ear canals and TM's normal, nose and mouth without ulcers or lesions  NECK: no adenopathy, no asymmetry, masses, or scars and thyroid normal to palpation  RESP: lungs clear to auscultation - no rales, rhonchi or wheezes  BREAST: normal without masses, tenderness or nipple discharge and no palpable axillary masses or adenopathy  CV: regular rate and rhythm, normal S1 S2, no S3 or S4, no murmur, click or rub, no peripheral edema and peripheral pulses strong  ABDOMEN: soft, nontender, no hepatosplenomegaly, no masses and bowel sounds normal   (female): normal female external genitalia, normal urethral meatus, vaginal mucosa pink, moist, well rugated,  MS: no gross musculoskeletal defects noted, no edema  SKIN: no suspicious lesions or rashes  NEURO: Normal strength and tone, mentation intact and speech normal  PSYCH: mentation appears normal, affect normal/bright    Diagnostic Test Results:  Labs reviewed in Epic  Results for orders placed or performed in visit on 06/18/19 (from the past 24 hour(s))   Wet prep   Result Value Ref [facility]    Specimen Description Vagina     Wet Prep No Trichomonas seen     Wet Prep No clue cells seen     Wet Prep Moderate  Yeast seen   (A)     Wet Prep Moderate  WBC'S seen      *UA reflex to Microscopic and Culture ([place] [facility] (except [place] and [place])   Result Value Ref [facility]    Color Urine Yellow     Appearance Urine Clear     Glucose Urine Negative NEG^Negative mg/dL    Bilirubin Urine Negative NEG^Negative    Ketones Urine Negative NEG^Negative mg/dL    Specific Gravity Urine 1.015 1.003 - 1.035    Blood Urine Trace (A) NEG^Negative    pH Urine 6.5 5.0 - 7.0 pH    Protein Albumin Urine " "Negative NEG^Negative mg/dL    Urobilinogen Urine 0.2 0.2 - 1.0 EU/dL    Nitrite Urine Negative NEG^Negative    Leukocyte Esterase Urine Trace (A) NEG^Negative    Source Midstream Urine    Urine Microscopic   Result Value Ref [facility]    WBC Urine 0 - 5 OTO5^0 - 5 /HPF    RBC Urine O - 2 OTO2^O - 2 /HPF    Squamous Epithelial /LPF Urine Few FEW^Few /LPF    Mucous Urine Present (A) NEG^Negative /LPF       ASSESSMENT/PLAN:   1. Routine general medical examination at a health care facility  Routine screening  - Urine Microscopic    2. Female genital symptoms  Wet prep shows yeast   Will have her treat with miconazole OTC as she is trying to get pregnant  - Wet prep  - *UA reflex to Microscopic and Culture ([place] [facility] (except [place] and [place])    COUNSELING:  Reviewed preventive health counseling, as reflected in patient instructions    Estimated body mass index is 22.66 kg/m  as calculated from the following:    Height as of this encounter: 1.676 m (5' 6\").    Weight as of this encounter: 63.7 kg (140 lb 6.4 oz).         reports that she has quit smoking. She has never used smokeless tobacco.      Counseling Resources:  ATP IV Guidelines  Pooled Cohorts Equation Calculator  Breast Cancer Risk Calculator  FRAX Risk Assessment  ICSI Preventive Guidelines  Dietary Guidelines for Americans, 2010  USDA's MyPlate  ASA Prophylaxis  Lung CA Screening    Zakia Murphy, DO  [facility]  "

## 2019-06-21 ENCOUNTER — OFFICE VISIT (OUTPATIENT)
Dept: INTERNAL MEDICINE | Facility: CLINIC | Age: 49
End: 2019-06-21
Payer: COMMERCIAL

## 2019-06-21 VITALS
RESPIRATION RATE: 16 BRPM | DIASTOLIC BLOOD PRESSURE: 72 MMHG | SYSTOLIC BLOOD PRESSURE: 124 MMHG | HEART RATE: 69 BPM | TEMPERATURE: 96.9 F | HEIGHT: 65 IN | WEIGHT: 263.8 LBS | BODY MASS INDEX: 43.95 KG/M2 | OXYGEN SATURATION: 98 %

## 2019-06-21 DIAGNOSIS — R60.1 ANASARCA: ICD-10-CM

## 2019-06-21 DIAGNOSIS — R60.9 PITTING EDEMA: ICD-10-CM

## 2019-06-21 LAB
ALBUMIN UR-MCNC: NEGATIVE MG/DL
ANION GAP SERPL CALCULATED.3IONS-SCNC: 6 MMOL/L (ref 3–14)
APPEARANCE UR: CLEAR
BACTERIA #/AREA URNS HPF: ABNORMAL /HPF
BILIRUB UR QL STRIP: NEGATIVE
BUN SERPL-MCNC: 11 MG/DL (ref 7–30)
CALCIUM SERPL-MCNC: 9 MG/DL (ref 8.5–10.1)
CHLORIDE SERPL-SCNC: 106 MMOL/L (ref 94–109)
CO2 SERPL-SCNC: 26 MMOL/L (ref 20–32)
COLOR UR AUTO: YELLOW
CREAT SERPL-MCNC: 0.76 MG/DL (ref 0.52–1.04)
CREAT UR-MCNC: 113 MG/DL
ERYTHROCYTE [DISTWIDTH] IN BLOOD BY AUTOMATED COUNT: 13.1 % (ref 10–15)
GFR SERPL CREATININE-BSD FRML MDRD: >90 ML/MIN/{1.73_M2}
GLUCOSE SERPL-MCNC: 191 MG/DL (ref 70–99)
GLUCOSE UR STRIP-MCNC: NEGATIVE MG/DL
HCT VFR BLD AUTO: 44.3 % (ref 35–47)
HGB BLD-MCNC: 14.7 G/DL (ref 11.7–15.7)
HGB UR QL STRIP: ABNORMAL
KETONES UR STRIP-MCNC: NEGATIVE MG/DL
LEUKOCYTE ESTERASE UR QL STRIP: ABNORMAL
MCH RBC QN AUTO: 30.3 PG (ref 26.5–33)
MCHC RBC AUTO-ENTMCNC: 33.2 G/DL (ref 31.5–36.5)
MCV RBC AUTO: 91 FL (ref 78–100)
MICROALBUMIN UR-MCNC: 10 MG/L
MICROALBUMIN/CREAT UR: 8.62 MG/G CR (ref 0–25)
NITRATE UR QL: NEGATIVE
NON-SQ EPI CELLS #/AREA URNS LPF: ABNORMAL /LPF
PH UR STRIP: 6 PH (ref 5–7)
PLATELET # BLD AUTO: 255 10E9/L (ref 150–450)
POTASSIUM SERPL-SCNC: 4.2 MMOL/L (ref 3.4–5.3)
RBC # BLD AUTO: 4.85 10E12/L (ref 3.8–5.2)
RBC #/AREA URNS AUTO: ABNORMAL /HPF
RBC CASTS #/AREA URNS LPF: ABNORMAL /LPF
SODIUM SERPL-SCNC: 138 MMOL/L (ref 133–144)
SOURCE: ABNORMAL
SP GR UR STRIP: 1.01 (ref 1–1.03)
UROBILINOGEN UR STRIP-ACNC: 0.2 EU/DL (ref 0.2–1)
WBC # BLD AUTO: 6.9 10E9/L (ref 4–11)
WBC #/AREA URNS AUTO: ABNORMAL /HPF

## 2019-06-21 PROCEDURE — 80048 BASIC METABOLIC PNL TOTAL CA: CPT | Performed by: INTERNAL MEDICINE

## 2019-06-21 PROCEDURE — 99214 OFFICE O/P EST MOD 30 MIN: CPT | Performed by: INTERNAL MEDICINE

## 2019-06-21 PROCEDURE — 82043 UR ALBUMIN QUANTITATIVE: CPT | Performed by: INTERNAL MEDICINE

## 2019-06-21 PROCEDURE — 36415 COLL VENOUS BLD VENIPUNCTURE: CPT | Performed by: INTERNAL MEDICINE

## 2019-06-21 PROCEDURE — 81001 URINALYSIS AUTO W/SCOPE: CPT | Performed by: INTERNAL MEDICINE

## 2019-06-21 PROCEDURE — 87086 URINE CULTURE/COLONY COUNT: CPT | Performed by: INTERNAL MEDICINE

## 2019-06-21 PROCEDURE — 85027 COMPLETE CBC AUTOMATED: CPT | Performed by: INTERNAL MEDICINE

## 2019-06-21 RX ORDER — FUROSEMIDE 20 MG
TABLET ORAL
Qty: 33 TABLET | Refills: 1 | Status: SHIPPED | OUTPATIENT
Start: 2019-06-21 | End: 2019-07-24

## 2019-06-21 ASSESSMENT — MIFFLIN-ST. JEOR: SCORE: 1827.47

## 2019-06-21 ASSESSMENT — PAIN SCALES - GENERAL: PAINLEVEL: MODERATE PAIN (4)

## 2019-06-21 NOTE — RESULT ENCOUNTER NOTE
Normal urine protein Normal electrolytes. Normal kidney function. Normal blood count. The other urine sample has some white cells in it so I will await the culture.     Let's increase t he furosemide to 40 mg daily for 3 days and then drop back down to 20 mg daily.  Let's also do an abdomen ultrasound to look for fluid in the abdomen and any abnormalities of the kidneys or liver.    Imaging Scheduling number is [phone].    Dr. Bergman

## 2019-06-21 NOTE — PATIENT INSTRUCTIONS
Low sodium diet - 2000 mg daily.  If your lab is normal then I will increase your furosemide for a few days and then go down again.    Watch the sodium content in all of your foods, particularly meats.     Consideration for abdominal ultrasound.     Try looking up Fooducate, it will tell you how healthy a food is for you, including the salt level.

## 2019-06-21 NOTE — PROGRESS NOTES
"INTERNAL MEDICINE  SUBJECTIVE:   Zaira Villatoro is a 49 year old female who presents to clinic today for the following health issues:    Edema  She started 20 mg of furosemide a week ago. The swelling is still present but it is not painful anymore. She states that she can feel the swelling in her abdomen, and even in her face when she wakes up in the morning, it is puffy. She is not on a low sodium diet, but does not add salt to her food. She does not eat frozen meals.  Her aunt told her that she should have her parathyroid level checked, as her aunt had to have her parathyroid removed.     Diabetes  Her sugars have been looking better since starting the Semaglutide, but did get a little worse recently. She is due to go up to the 0.5 mg dose next Monday. She follows a higher protein diet.       Problem list and histories reviewed & adjusted, as indicated.  Additional history: as documented    Labs reviewed in EPIC    Reviewed and updated as needed this visit by clinical staff  Tobacco  Allergies  Meds  Med Hx  Surg Hx  Fam Hx  Soc Hx      Reviewed and updated as needed this visit by Provider       ROS:  Constitutional, HEENT, cardiovascular, pulmonary, gi and gu systems are negative, except as otherwise noted.    This document serves as a record of the services and decisions personally performed and made by Tayler Bergman MD. It was created on her behalf by Emely Leyva, a trained medical scribe. The creation of this document is based the provider's statements to the medical scribe.  Emely Leyva, June 21, 2019 10:21 AM     OBJECTIVE:     /72 (BP Location: Left arm, Cuff Size: Adult Large)   Pulse 69   Temp 96.9  F (36.1  C) (Oral)   Resp 16   Ht 1.651 m (5' 5\")   Wt 119.7 kg (263 lb 12.8 oz)   SpO2 98%   BMI 43.90 kg/m    Body mass index is 43.9 kg/m .  GENERAL: healthy, alert and no distress, morbidly obese  NECK: no adenopathy, no asymmetry, masses, or scars and thyroid normal to palpation  RESP: " lungs clear to auscultation - no rales, rhonchi or wheezes  CV: regular rate and rhythm, normal S1 S2, no S3 or S4, no murmur, click or rub, no peripheral edema and peripheral pulses strong  MS: no gross musculoskeletal defects noted, trace edema bilaterally to the knee  SKIN: no suspicious lesions or rashes to visible skin  NEURO: Normal strength and tone, mentation intact and speech normal  PSYCH: mentation appears normal, affect normal/bright    Diagnostic Test Results:  No results found for this or any previous visit (from the past 24 hour(s)).    ASSESSMENT/PLAN:     1. Uncontrolled type 2 diabetes mellitus without complication, with long-term current use of insulin (H)  Due for lab recheck today. She is going up on the Semaglutide next week. Will not increase Amaryl at this time.  - Albumin Random Urine Quantitative with Creat Ratio  - *UA reflex to Microscopic and Culture ([facility] (except [place] and [place])  - CBC with platelets    2. Left ovarian cyst  Consider abdominal ultrasound in the future if the abdominal swelling/bloating does not improve.   - Albumin Random Urine Quantitative with Creat Ratio  - *UA reflex to Microscopic and Culture ([facility] (except [place] and [place])  - CBC with platelets    3. Pitting edema  Will recheck kidney functions today to check that she is okay to increase the furosemide dosage.   - Basic metabolic panel    4. Type 2 diabetes mellitus with hyperglycemia, with long-term current use of insulin (H)  As above.     Patient Instructions   Low sodium diet - 2000 mg daily.  If your lab is normal then I will increase your furosemide for a few days and then go down again.    Watch the sodium content in all of your foods, particularly meats.     Consideration for abdominal ultrasound.     Try looking up Fooducate, it will tell you how healthy a food is for you, including the salt level.       IN: 10:21 AM  OUT: 10:37 AM    The  information in this document, created by the medical scribe, Emely Leyva, for me, accurately reflects the services I personally performed and the decisions made by me. I have reviewed and approved this document for accuracy.     Tayler Bergman MD  [facility]

## 2019-06-22 LAB
BACTERIA SPEC CULT: NORMAL
SPECIMEN SOURCE: NORMAL

## 2019-06-25 ENCOUNTER — ANCILLARY PROCEDURE (OUTPATIENT)
Dept: ULTRASOUND IMAGING | Facility: CLINIC | Age: 49
End: 2019-06-25
Attending: INTERNAL MEDICINE
Payer: COMMERCIAL

## 2019-06-25 ENCOUNTER — MYC MEDICAL ADVICE (OUTPATIENT)
Dept: INTERNAL MEDICINE | Facility: CLINIC | Age: 49
End: 2019-06-25

## 2019-06-25 DIAGNOSIS — Z79.4 TYPE 2 DIABETES MELLITUS WITHOUT COMPLICATION, WITH LONG-TERM CURRENT USE OF INSULIN (H): ICD-10-CM

## 2019-06-25 DIAGNOSIS — E11.9 TYPE 2 DIABETES MELLITUS WITHOUT COMPLICATION, WITH LONG-TERM CURRENT USE OF INSULIN (H): ICD-10-CM

## 2019-06-25 DIAGNOSIS — R60.1 ANASARCA: ICD-10-CM

## 2019-06-25 PROCEDURE — 76700 US EXAM ABDOM COMPLETE: CPT

## 2019-06-25 NOTE — RESULT ENCOUNTER NOTE
The liver is fatty and enlarged but there is no fluid in the abdomen or other concerns.  How is the swelling?  Dr. Bergman

## 2019-06-26 RX ORDER — INSULIN DETEMIR 100 [IU]/ML
INJECTION, SOLUTION SUBCUTANEOUS
Qty: 45 ML | Refills: 1 | Status: SHIPPED | OUTPATIENT
Start: 2019-06-26 | End: 2019-11-30

## 2019-06-30 ENCOUNTER — MYC MEDICAL ADVICE (OUTPATIENT)
Dept: INTERNAL MEDICINE | Facility: CLINIC | Age: 49
End: 2019-06-30

## 2019-07-05 DIAGNOSIS — I10 HYPERTENSION GOAL BP (BLOOD PRESSURE) < 140/90: Chronic | ICD-10-CM

## 2019-07-05 DIAGNOSIS — I10 ESSENTIAL HYPERTENSION WITH GOAL BLOOD PRESSURE LESS THAN 140/90: ICD-10-CM

## 2019-07-05 RX ORDER — ATENOLOL 50 MG/1
TABLET ORAL
Qty: 90 TABLET | Refills: 2 | Status: SHIPPED | OUTPATIENT
Start: 2019-07-05 | End: 2019-07-24

## 2019-07-05 RX ORDER — LOSARTAN POTASSIUM 25 MG/1
TABLET ORAL
Qty: 45 TABLET | Refills: 1 | Status: SHIPPED | OUTPATIENT
Start: 2019-07-05 | End: 2019-12-11

## 2019-07-06 DIAGNOSIS — R60.1 ANASARCA: ICD-10-CM

## 2019-07-08 RX ORDER — FUROSEMIDE 20 MG
TABLET ORAL
Qty: 30 TABLET | Refills: 0 | Status: SHIPPED | OUTPATIENT
Start: 2019-07-08 | End: 2019-07-24

## 2019-07-24 ENCOUNTER — OFFICE VISIT (OUTPATIENT)
Dept: INTERNAL MEDICINE | Facility: CLINIC | Age: 49
End: 2019-07-24
Payer: COMMERCIAL

## 2019-07-24 ENCOUNTER — TRANSFERRED RECORDS (OUTPATIENT)
Dept: HEALTH INFORMATION MANAGEMENT | Facility: CLINIC | Age: 49
End: 2019-07-24

## 2019-07-24 VITALS
RESPIRATION RATE: 13 BRPM | HEART RATE: 82 BPM | BODY MASS INDEX: 43.68 KG/M2 | TEMPERATURE: 98.9 F | SYSTOLIC BLOOD PRESSURE: 128 MMHG | DIASTOLIC BLOOD PRESSURE: 72 MMHG | OXYGEN SATURATION: 97 % | WEIGHT: 262.2 LBS | HEIGHT: 65 IN

## 2019-07-24 DIAGNOSIS — J01.90 ACUTE SINUSITIS WITH COEXISTING CONDITION, NEED PROPHYLACTIC TREATMENT: ICD-10-CM

## 2019-07-24 DIAGNOSIS — M72.2 PLANTAR FASCIITIS: ICD-10-CM

## 2019-07-24 DIAGNOSIS — Z86.79 PERSONAL HISTORY OF ATRIAL FIBRILLATION: ICD-10-CM

## 2019-07-24 DIAGNOSIS — E11.9 TYPE 2 DIABETES MELLITUS WITHOUT COMPLICATION, WITH LONG-TERM CURRENT USE OF INSULIN (H): ICD-10-CM

## 2019-07-24 DIAGNOSIS — R60.1 GENERALIZED EDEMA: Primary | ICD-10-CM

## 2019-07-24 DIAGNOSIS — Z79.4 TYPE 2 DIABETES MELLITUS WITHOUT COMPLICATION, WITH LONG-TERM CURRENT USE OF INSULIN (H): ICD-10-CM

## 2019-07-24 DIAGNOSIS — D84.9 IMMUNOSUPPRESSED STATUS (H): ICD-10-CM

## 2019-07-24 DIAGNOSIS — I10 HYPERTENSION GOAL BP (BLOOD PRESSURE) < 140/90: Chronic | ICD-10-CM

## 2019-07-24 PROBLEM — F32.1 MODERATE SINGLE CURRENT EPISODE OF MAJOR DEPRESSIVE DISORDER (H): Chronic | Status: RESOLVED | Noted: 2017-03-10 | Resolved: 2019-07-24

## 2019-07-24 PROCEDURE — 99214 OFFICE O/P EST MOD 30 MIN: CPT | Performed by: INTERNAL MEDICINE

## 2019-07-24 RX ORDER — ATENOLOL 50 MG/1
25 TABLET ORAL DAILY
Qty: 45 TABLET | Refills: 2 | Status: SHIPPED | OUTPATIENT
Start: 2019-07-24 | End: 2019-09-03

## 2019-07-24 RX ORDER — CEFDINIR 300 MG/1
300 CAPSULE ORAL 2 TIMES DAILY
Qty: 20 CAPSULE | Refills: 0 | Status: SHIPPED | OUTPATIENT
Start: 2019-07-24 | End: 2019-08-07

## 2019-07-24 ASSESSMENT — PAIN SCALES - GENERAL: PAINLEVEL: NO PAIN (0)

## 2019-07-24 ASSESSMENT — MIFFLIN-ST. JEOR: SCORE: 1820.21

## 2019-07-24 NOTE — PATIENT INSTRUCTIONS
Consult your specialist to discuss Humira use as labs have shown no problems with liver, kidneys, thyroid or heart that are causing the swelling.   Discontinue Lasix at this time.     Take Atenolol once daily.     Omnicef 1 tab twice daily for 7-10 days, if you don't improve.

## 2019-07-24 NOTE — PROGRESS NOTES
Subjective     Zaira Villatoro is a 49 year old female who presents to clinic today for the following health issues:    HPI   Swelling   The patient states that her swelling has not gotten better. She states that moving around hurts due to the swelling. She notes that she has facial and breast swelling as well.   The patient notes that she takes Lasix in the morning. She notes having a little more bathroom use, but it is not much. She notes consuming 4179-2361 MG sodium daily.    Blood sugars   The patient states that her blood sugars have been around 107. She notes that GI does not want her to take Humira.   She declines shortness of breath or coughing. She notes that she is able to lay down flat at night with no problems noted.     Plantar fascitis   The patient notes that she has plantar fascitis on her left foot.          Acute illness   She notes that she has a sinus problem. She states that she has a sore throat as well. This has been for 1 week.  She has some left facial and dental pain.  No fever/chills.  She doesn't feel like she is getting better despite over the counter treatments.     Patient Active Problem List   Diagnosis     Migraine     Ulcerative colitis (H)     Fatty liver     CARDIOVASCULAR SCREENING; LDL GOAL LESS THAN 100     Essential hypertension with goal blood pressure less than 140/90     History of seizures as a child     Uncontrolled type 2 diabetes mellitus without complication, with long-term current use of insulin (H)     Morbid obesity due to excess calories (H)     Incisional hernia, without obstruction or gangrene     Low back pain     MAHAD (obstructive sleep apnea)- severe (AHI 80)     Immunosuppressed status (H)     Plantar fasciitis     Pelvic pain     Personal history of atrial fibrillation     Past Surgical History:   Procedure Laterality Date     APPENDECTOMY  04/2014     ARTHROSCOPY KNEE RT/LT  2004    RT      BIOPSY  2011 many 2011 and on     COLONOSCOPY  02/2012    lt  sided colitis     COLONOSCOPY  2014     CRYOTHERAPY, CERVICAL  1988     EXPLORATORY LAPAROTOMY, PARTIAL LEFT ROLANDA COLLECTOMY WITH HARTMANS PROCEDURE, COLOSTOMY  2013    lt rolanda colectomy, bowel perforation, colostomy, Karen's pouche     GI SURGERY      abrupted  bowl     HC TOOTH EXTRACTION W/FORCEP  2003    Abscess Tooth / Hospitalized     HERNIA REPAIR  2014    found at time of takedown     SINUS SURGERY  03    LT sinus cyst removal      TAKEDOWN COLOSTOMY  2014       Social History     Tobacco Use     Smoking status: Former Smoker     Packs/day: 1.00     Years: 15.00     Pack years: 15.00     Types: Cigarettes     Start date: 1985     Last attempt to quit: 1998     Years since quittin.0     Smokeless tobacco: Never Used     Tobacco comment: soke free household.   Substance Use Topics     Alcohol use: Yes     Alcohol/week: 0.0 oz     Comment: occ     Family History   Problem Relation Age of Onset     Diabetes Mother      Allergies Mother      Asthma Mother      Arthritis Mother      Heart Disease Mother         heart murmur     Depression Mother      Obesity Mother      Eye Disorder Father      Diabetes Father      Cerebrovascular Disease Father      Heart Disease Father      Hypertension Father      Diabetes Maternal Grandmother      Cerebrovascular Disease Maternal Grandfather      Unknown/Adopted Paternal Grandmother      Heart Disease Paternal Grandfather         left ventrical failure     Cerebrovascular Disease Paternal Grandfather      Gynecology Sister         endometriosis     Depression Sister      Asthma Daughter      Breast Cancer Maternal Aunt      Thyroid Disease Maternal Aunt      Breast Cancer Other         fathers sister     Anesthesia Reaction Other      Thyroid Disease Other      Osteoporosis Other      Asthma Daughter      Breast Cancer Other         mothers sister     Glaucoma No family hx of      Macular Degeneration No family hx of          Current  Outpatient Medications   Medication Sig Dispense Refill     adalimumab (HUMIRA PEN) 40 MG/0.8ML pen kit Inject 0.8 mLs (40 mg) Subcutaneous every 7 days 3.2 mL 3     aspirin 81 MG tablet Take by mouth daily 30 tablet 3     atenolol (TENORMIN) 50 MG tablet Take 0.5 tablets (25 mg) by mouth daily 45 tablet 2     BD ULTRA FINE PEN NEEDLES Inject 1 Device Subcutaneous daily 3 mm needles for Levemir pen 3 Box 3     blood glucose monitoring (ACCU-CHEK SHARMILA PLUS) test strip TEST 4 TIMES DAILY AND AS NEEDED 400 strip 3     blood glucose monitoring (ACCU-CHEK FASTCLIX) lancets 1 each 4 times daily Use to test blood sugar 4 times daily or as directed. 300 each 11     cefdinir (OMNICEF) 300 MG capsule Take 1 capsule (300 mg) by mouth 2 times daily for 10 days 20 capsule 0     cholecalciferol 2000 UNITS tablet Take 2 tablets by mouth 2 times daily  30 tablet      diphenhydrAMINE (BENADRYL ALLERGY) 25 MG tablet as needed Reported on 4/12/2017       DRAMAMINE OR as needed       glimepiride (AMARYL) 4 MG tablet Take 1 tablet (4 mg) by mouth every morning (before breakfast) 90 tablet 1     LEVEMIR FLEXTOUCH 100 UNIT/ML pen INJECT 55 UNITS SUBCUTANEOUS AT BEDTIME 45 mL 1     losartan (COZAAR) 25 MG tablet TAKE 1/2 TABLET BY MOUTH DAILY 45 tablet 1     order for DME Equipment being ordered: CPAP 9 c, 1 Units 0     order for DME Glucometer, brand as covered by insurance. 1 each 0     order for DME Test strips for pt's glucometer, brand as covered by insurance. Test four times daily and prn. 400 each 4     semaglutide (OZEMPIC) 1 MG/DOSE pen Inject 1 mg Subcutaneous every 7 days 3 mL 3     STATIN NOT PRESCRIBED, INTENTIONAL, 1 each continuous prn Statin not prescribed intentionally due to Refusal by patient and Other:LDL is below 100. 0 each 0     TYLENOL CAPS 500 MG OR 1 CAPSULE EVERY 4 HOURS AS NEEDED       Allergies   Allergen Reactions     Demerol      Very low blood pressure     Fish      Pt reports allergy to all fish      "Meperidine Other (See Comments)     Other reaction(s): Hypotension  Drops blood pressure       Metformin GI Disturbance and Diarrhea     GI Disturbance       Naproxen      No Clinical Screening - See Comments      Pt reports allergy to all fish     Nubain  [Nalbuphine]      Seafood      Topiramate Other (See Comments) and Hives     Sleepy and confused.  Shaky, disoriented       Tylenol Sinus Max Other (See Comments)     Feet swelling     Latex Rash         Reviewed and updated as needed this visit by Provider  Tobacco  Allergies  Meds  Problems  Med Hx  Surg Hx  Fam Hx         Review of Systems   ROS COMP: Constitutional, HEENT, cardiovascular, pulmonary, GI, , musculoskeletal, neuro, skin, endocrine and psych systems are negative, except as otherwise noted.    This document serves as a record of the services and decisions personally performed and made by Tayler Bergman MD. It was created on her behalf by Karin Yao, a trained medical scribe. The creation of this document is based the provider's statements to the medical scribe.    Karin Yao July 24, 2019 12:04 PM        Objective    /72 (BP Location: Left arm, Cuff Size: Adult Large)   Pulse 82   Temp 98.9  F (37.2  C) (Oral)   Resp 13   Ht 1.651 m (5' 5\")   Wt 118.9 kg (262 lb 3.2 oz)   SpO2 97%   BMI 43.63 kg/m    Body mass index is 43.63 kg/m .  Physical Exam   GENERAL: healthy, alert and no distress  EYES: Eyes grossly normal to inspection, PERRL and conjunctivae and sclerae normal, periorbital edema   HENT: ear canals and TM's normal, mouth without ulcers or lesions  NECK: no adenopathy, no asymmetry, masses, or scars and thyroid normal to palpation, fullness on left side  RESP: lungs clear to auscultation - no rales, rhonchi or wheezes  CV: regular rate and rhythm, normal S1 S2, no S3 or S4, no murmur, click or rub, no peripheral edema and peripheral pulses strong  ABDOMEN: soft, nontender, no hepatosplenomegaly, no masses and " bowel sounds normal, umbilical hernia that is freely reducible   MS: no gross musculoskeletal defects noted, subcutaneous edema most prominent on feet up to mid calf, 1+ lower extremities   SKIN: no suspicious lesions or rashes  PSYCH: mentation appears normal, affect normal/bright    Diagnostic Test Results:  No results found for this or any previous visit (from the past 24 hour(s)).        Assessment & Plan   Generalized edema - no symptoms of heart failure.  Labs are all normal.  Normal ultrasound other than fatty liver.  Suspect due to Humira use.  Patient has appointment with GI today and will discuss with them.      1. Type 2 diabetes mellitus without complication, with long-term current use of insulin (H)  Patient reports blood sugars are around 107.   Doing well. Well controlled. Tolerating medication.  No change in plan.   Refills have been ordered.   - semaglutide (OZEMPIC) 1 MG/DOSE pen; Inject 1 mg Subcutaneous every 7 days  Dispense: 3 mL; Refill: 3  - BD ULTRA FINE PEN NEEDLES; Inject 1 Device Subcutaneous daily 3 mm needles for Levemir pen  Dispense: 3 Box; Refill: 3    2. Plantar fasciitis  Patient reports having prior diagnoses of this.   Will continue to monitor.     3. Personal history of atrial fibrillation  Patient has history of.  Will continue to monitor.    4. Immunosuppressed status (H)  No evidence of concern today.   Will continue to monitor.     5. Acute sinusitis with coexisting condition, need prophylactic treatment  Patient reports being ill recently- has sore throat and stuffy nose.  Exam showed neck has left sided fullness.    Omnicef has been prescribed given immunosuppression  If symptoms worsen or persist, patient will contact me.   - cefdinir (OMNICEF) 300 MG capsule; Take 1 capsule (300 mg) by mouth 2 times daily for 10 days  Dispense: 20 capsule; Refill: 0    6. Hypertension goal BP (blood pressure) < 140/90  Advised patient that she can lower dosage to one table daily. Will  gradually wean down and off as tolerated   Refills have been ordered.   - atenolol (TENORMIN) 50 MG tablet; Take 0.5 tablets (25 mg) by mouth daily  Dispense: 45 tablet; Refill: 2    Patient Instructions   Consult your specialist to discuss Humira use as labs have shown no problems with liver, kidneys, thyroid or heart that are causing the swelling.   Discontinue Lasix at this time.     Take Atenolol once daily.     Omnicef 1 tab twice daily for 7-10 days, if you don't improve.       The information in this document, created by the medical scribe for me, accurately reflects the services I personally performed and the decisions made by me. I have reviewed and approved this document for accuracy prior to leaving the patient care area.    Tayler Bergman MD  [facility]

## 2019-08-02 ENCOUNTER — TELEPHONE (OUTPATIENT)
Dept: INTERNAL MEDICINE | Facility: CLINIC | Age: 49
End: 2019-08-02

## 2019-08-02 DIAGNOSIS — R60.1 ANASARCA: ICD-10-CM

## 2019-08-02 RX ORDER — FUROSEMIDE 20 MG
TABLET ORAL
Qty: 33 TABLET | Refills: 1 | OUTPATIENT
Start: 2019-08-02 | End: 2019-09-04

## 2019-08-02 NOTE — TELEPHONE ENCOUNTER
MA - please call the patient and/or pharmacy to confirm this was truly requested or was this an automatic refill request.    Jennifer Landon RN - BC

## 2019-08-02 NOTE — TELEPHONE ENCOUNTER
FUROSEMIDE   TAB 20MG       Last Written Prescription Date:    Last Fill Quantity: ,   # refills:   Last Office Visit: 7/24/2019  Future Office visit:    Next 5 appointments (look out 90 days)    Oct 04, 2019 10:00 AM CDT  Office Visit with Tayler Bergman MD  [facility] ([facility]) [address]  [place] [address]  [phone]           Routing refill request to provider for review/approval because:  Drug not active on patient's medication list

## 2019-08-02 NOTE — TELEPHONE ENCOUNTER
GI  Stopped Humira and swelling has decreased so she will be changing therapies and going to IV infusion.    Roselyn Kamara, CMA

## 2019-08-06 PROBLEM — Z86.79 PERSONAL HISTORY OF ATRIAL FIBRILLATION: Chronic | Status: ACTIVE | Noted: 2019-07-24

## 2019-08-07 ENCOUNTER — OFFICE VISIT (OUTPATIENT)
Dept: SLEEP MEDICINE | Facility: CLINIC | Age: 49
End: 2019-08-07
Payer: COMMERCIAL

## 2019-08-07 VITALS
BODY MASS INDEX: 44.56 KG/M2 | OXYGEN SATURATION: 97 % | HEIGHT: 64 IN | WEIGHT: 261 LBS | DIASTOLIC BLOOD PRESSURE: 75 MMHG | SYSTOLIC BLOOD PRESSURE: 125 MMHG | HEART RATE: 77 BPM

## 2019-08-07 DIAGNOSIS — G47.33 OSA (OBSTRUCTIVE SLEEP APNEA): Primary | ICD-10-CM

## 2019-08-07 DIAGNOSIS — E66.01 MORBID OBESITY DUE TO EXCESS CALORIES (H): ICD-10-CM

## 2019-08-07 PROCEDURE — 99214 OFFICE O/P EST MOD 30 MIN: CPT | Performed by: INTERNAL MEDICINE

## 2019-08-07 RX ORDER — PREDNISONE 10 MG/1
TABLET ORAL
COMMUNITY
Start: 2019-08-02 | End: 2019-10-04

## 2019-08-07 ASSESSMENT — MIFFLIN-ST. JEOR: SCORE: 1798.89
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
## 2019-08-07 NOTE — PATIENT INSTRUCTIONS
Your BMI is Body mass index is 44.8 kg/m .  Weight management is a personal decision.  If you are interested in exploring weight loss strategies, the following discussion covers the approaches that may be successful. Body mass index (BMI) is one way to tell whether you are at a healthy weight, overweight, or obese. It measures your weight in relation to your height.  A BMI of 18.5 to 24.9 is in the healthy range. A person with a BMI of 25 to 29.9 is considered overweight, and someone with a BMI of 30 or greater is considered obese. More than two-thirds of American adults are considered overweight or obese.  Being overweight or obese increases the risk for further weight gain. Excess weight may lead to heart disease and diabetes.  Creating and following plans for healthy eating and physical activity may help you improve your health.  Weight control is part of healthy lifestyle and includes exercise, emotional health, and healthy eating habits. Careful eating habits lifelong are the mainstay of weight control. Though there are significant health benefits from weight loss, long-term weight loss with diet alone may be very difficult to achieve- studies show long-term success with dietary management in less than 10% of people. Attaining a healthy weight may be especially difficult to achieve in those with severe obesity. In some cases, medications, devices and surgical management might be considered.  What can you do?  If you are overweight or obese and are interested in methods for weight loss, you should discuss this with your provider.     Consider reducing daily calorie intake by 500 calories.     Keep a food journal.     Avoiding skipping meals, consider cutting portions instead.    Diet combined with exercise helps maintain muscle while optimizing fat loss. Strength training is particularly important for building and maintaining muscle mass. Exercise helps reduce stress, increase energy, and improves fitness.  Increasing exercise without diet control, however, may not burn enough calories to loose weight.       Start walking three days a week 10-20 minutes at a time    Work towards walking thirty minutes five days a week     Eventually, increase the speed of your walking for 1-2 minutes at time    In addition, we recommend that you review healthy lifestyles and methods for weight loss available through the National Institutes of Health patient information sites:  http://win.niddk.nih.gov/publications/index.htm    And look into health and wellness programs that may be available through your health insurance provider, employer, local community center, or kareem club.    Weight management plan: Patient was referred to their PCP to discuss a diet and exercise plan.    Read the book Say Good Night To Insomnia

## 2019-08-07 NOTE — NURSING NOTE
"Chief Complaint   Patient presents with     CPAP Follow Up       Initial /75   Pulse 77   Ht 1.626 m (5' 4\")   Wt 118.4 kg (261 lb)   SpO2 97%   BMI 44.80 kg/m   Estimated body mass index is 44.8 kg/m  as calculated from the following:    Height as of this encounter: 1.626 m (5' 4\").    Weight as of this encounter: 118.4 kg (261 lb).    Medication Reconciliation: complete      "

## 2019-08-18 DIAGNOSIS — Z79.4 TYPE 2 DIABETES MELLITUS WITH HYPERGLYCEMIA, WITH LONG-TERM CURRENT USE OF INSULIN (H): ICD-10-CM

## 2019-08-18 DIAGNOSIS — E11.9 TYPE 2 DIABETES MELLITUS WITHOUT COMPLICATION, WITH LONG-TERM CURRENT USE OF INSULIN (H): ICD-10-CM

## 2019-08-18 DIAGNOSIS — E11.65 TYPE 2 DIABETES MELLITUS WITH HYPERGLYCEMIA, WITH LONG-TERM CURRENT USE OF INSULIN (H): ICD-10-CM

## 2019-08-18 DIAGNOSIS — Z79.4 TYPE 2 DIABETES MELLITUS WITHOUT COMPLICATION, WITH LONG-TERM CURRENT USE OF INSULIN (H): ICD-10-CM

## 2019-08-19 ENCOUNTER — TELEPHONE (OUTPATIENT)
Dept: GASTROENTEROLOGY | Facility: CLINIC | Age: 49
End: 2019-08-19

## 2019-08-19 NOTE — TELEPHONE ENCOUNTER
Writer spoke with the patient, she has changed providers to [facility], and will do colonoscopy with Dr. Garcia. She does not want to receive any future calls to schedule an appointment with GI.    Nerissa Pedroza  [facility]  Adult Med Spec/Surg Spec   [phone]

## 2019-08-21 RX ORDER — GLIMEPIRIDE 4 MG/1
4 TABLET ORAL
Qty: 90 TABLET | Refills: 0 | Status: SHIPPED | OUTPATIENT
Start: 2019-08-21 | End: 2019-09-23

## 2019-08-21 NOTE — TELEPHONE ENCOUNTER
Prescription approved per [facility] Refill Protocol.  Phyllis Solomon RN

## 2019-08-26 ENCOUNTER — OFFICE VISIT (OUTPATIENT)
Dept: FAMILY MEDICINE | Facility: CLINIC | Age: 49
End: 2019-08-26
Payer: COMMERCIAL

## 2019-08-26 VITALS
WEIGHT: 255 LBS | HEART RATE: 74 BPM | OXYGEN SATURATION: 97 % | BODY MASS INDEX: 43.77 KG/M2 | RESPIRATION RATE: 18 BRPM | TEMPERATURE: 97.3 F | SYSTOLIC BLOOD PRESSURE: 118 MMHG | DIASTOLIC BLOOD PRESSURE: 79 MMHG

## 2019-08-26 DIAGNOSIS — Z79.52 ON PREDNISONE THERAPY: ICD-10-CM

## 2019-08-26 DIAGNOSIS — R73.9 HYPERGLYCEMIA: ICD-10-CM

## 2019-08-26 DIAGNOSIS — I48.91 ATRIAL FIBRILLATION, TRANSIENT (H): Primary | ICD-10-CM

## 2019-08-26 DIAGNOSIS — K51.90 ULCERATIVE COLITIS WITHOUT COMPLICATIONS, UNSPECIFIED LOCATION (H): Chronic | ICD-10-CM

## 2019-08-26 DIAGNOSIS — N30.00 ACUTE CYSTITIS WITHOUT HEMATURIA: ICD-10-CM

## 2019-08-26 LAB
ALBUMIN SERPL-MCNC: 3.6 G/DL (ref 3.4–5)
ALBUMIN UR-MCNC: ABNORMAL MG/DL
ALP SERPL-CCNC: 57 U/L (ref 40–150)
ALT SERPL W P-5'-P-CCNC: 77 U/L (ref 0–50)
ANION GAP SERPL CALCULATED.3IONS-SCNC: 9 MMOL/L (ref 3–14)
APPEARANCE UR: ABNORMAL
AST SERPL W P-5'-P-CCNC: 41 U/L (ref 0–45)
BACTERIA #/AREA URNS HPF: ABNORMAL /HPF
BASOPHILS # BLD AUTO: 0.1 10E9/L (ref 0–0.2)
BASOPHILS NFR BLD AUTO: 0.4 %
BILIRUB SERPL-MCNC: 1.3 MG/DL (ref 0.2–1.3)
BILIRUB UR QL STRIP: NEGATIVE
BUN SERPL-MCNC: 20 MG/DL (ref 7–30)
CALCIUM SERPL-MCNC: 9.5 MG/DL (ref 8.5–10.1)
CAOX CRY #/AREA URNS HPF: ABNORMAL /HPF
CHLORIDE SERPL-SCNC: 105 MMOL/L (ref 94–109)
CO2 SERPL-SCNC: 27 MMOL/L (ref 20–32)
COLOR UR AUTO: YELLOW
CREAT SERPL-MCNC: 0.97 MG/DL (ref 0.52–1.04)
DIFFERENTIAL METHOD BLD: ABNORMAL
EOSINOPHIL # BLD AUTO: 0.4 10E9/L (ref 0–0.7)
EOSINOPHIL NFR BLD AUTO: 3.5 %
ERYTHROCYTE [DISTWIDTH] IN BLOOD BY AUTOMATED COUNT: 13.8 % (ref 10–15)
GFR SERPL CREATININE-BSD FRML MDRD: 69 ML/MIN/{1.73_M2}
GLUCOSE SERPL-MCNC: 218 MG/DL (ref 70–99)
GLUCOSE UR STRIP-MCNC: NEGATIVE MG/DL
HCT VFR BLD AUTO: 45.1 % (ref 35–47)
HGB BLD-MCNC: 14.7 G/DL (ref 11.7–15.7)
HGB UR QL STRIP: ABNORMAL
KETONES UR STRIP-MCNC: NEGATIVE MG/DL
LEUKOCYTE ESTERASE UR QL STRIP: ABNORMAL
LIPASE SERPL-CCNC: 75 U/L (ref 73–393)
LYMPHOCYTES # BLD AUTO: 3.3 10E9/L (ref 0.8–5.3)
LYMPHOCYTES NFR BLD AUTO: 27.4 %
MCH RBC QN AUTO: 30.5 PG (ref 26.5–33)
MCHC RBC AUTO-ENTMCNC: 32.6 G/DL (ref 31.5–36.5)
MCV RBC AUTO: 94 FL (ref 78–100)
MONOCYTES # BLD AUTO: 1 10E9/L (ref 0–1.3)
MONOCYTES NFR BLD AUTO: 8 %
NEUTROPHILS # BLD AUTO: 7.2 10E9/L (ref 1.6–8.3)
NEUTROPHILS NFR BLD AUTO: 60.7 %
NITRATE UR QL: NEGATIVE
NON-SQ EPI CELLS #/AREA URNS LPF: ABNORMAL /LPF
PH UR STRIP: 5.5 PH (ref 5–7)
PLATELET # BLD AUTO: 283 10E9/L (ref 150–450)
POTASSIUM SERPL-SCNC: 4 MMOL/L (ref 3.4–5.3)
PROT SERPL-MCNC: 7.3 G/DL (ref 6.8–8.8)
RBC # BLD AUTO: 4.82 10E12/L (ref 3.8–5.2)
RBC #/AREA URNS AUTO: ABNORMAL /HPF
SODIUM SERPL-SCNC: 141 MMOL/L (ref 133–144)
SOURCE: ABNORMAL
SP GR UR STRIP: >1.03 (ref 1–1.03)
UROBILINOGEN UR STRIP-ACNC: 0.2 EU/DL (ref 0.2–1)
WBC # BLD AUTO: 12 10E9/L (ref 4–11)
WBC #/AREA URNS AUTO: ABNORMAL /HPF

## 2019-08-26 PROCEDURE — 80053 COMPREHEN METABOLIC PANEL: CPT | Performed by: FAMILY MEDICINE

## 2019-08-26 PROCEDURE — 87086 URINE CULTURE/COLONY COUNT: CPT | Performed by: FAMILY MEDICINE

## 2019-08-26 PROCEDURE — 83690 ASSAY OF LIPASE: CPT | Performed by: FAMILY MEDICINE

## 2019-08-26 PROCEDURE — 36415 COLL VENOUS BLD VENIPUNCTURE: CPT | Performed by: FAMILY MEDICINE

## 2019-08-26 PROCEDURE — 99214 OFFICE O/P EST MOD 30 MIN: CPT | Performed by: FAMILY MEDICINE

## 2019-08-26 PROCEDURE — 85025 COMPLETE CBC W/AUTO DIFF WBC: CPT | Performed by: FAMILY MEDICINE

## 2019-08-26 PROCEDURE — 93000 ELECTROCARDIOGRAM COMPLETE: CPT | Performed by: FAMILY MEDICINE

## 2019-08-26 PROCEDURE — 81001 URINALYSIS AUTO W/SCOPE: CPT | Performed by: FAMILY MEDICINE

## 2019-08-26 NOTE — PROGRESS NOTES
Subjective     Zaira Villatoro is a 49 year old female who presents to clinic today for the following health issues:    HPI   ED/UC Followup:    Facility:  [facility]  Date of visit: 08/25/2019  Reason for visit: shortness of breath  Current Status: Has gotten better since the ER visit still doesn't feel normal  Feels like heat radiating up throat and feels sore     Patient presents for ED follow-up visit.  Per chart review:    History of Present Illness:  HPI  Patient is a 49-year-old female with a past medical history of hypertension and type 2 diabetes who was here in town moving her daughter into college and she developed dizziness, shortness of breath, nausea. EMS was called and patient was found to be in A. fib with RVR with a rate of 150 bpm. She was given 20 mg of Cardizem in route-pulse 112 on admission to the ER. Blood sugar was in the 500s per EMS.    Emergency Department Course:  EKG-a fib with RVR at a rate of 112 bpm  Labs-WBCs mildly elevated at 12.9; VBG pH 7.33; glucose 503; LFTs elevated (patient is unsure if this is new-does not drink ETOH or take large amounts of tylenol) d dimer elevated-CT angio negative.  7 units reg insulin administered-repeat   Verapramil 2.5 mg IV administered  Pt maintaining HR in the 90's -no hypotension while in the ER  CXR-negative  Advised patient that she should be monitored here in the hospital. She is only here in town to move her daughter into the dorm. They have people caring for their dog who cannot stay. She opted to leave [place] and follow up at home. She was obsereved for several hours in the ER and was stable throughout stay. AMA paperwork signed.           Patient presents with ER follow-up visit  History as above per ED note  No new medications prescribed in ED  Started course of prednisone 3 weeks ago  Hyperglycemic  DANNY, with glucosuria and possible UTI  Patient takes atenolol, losartan, for HTN      UC Treatment - Taken off humira - had bad  bone pain and lower extremity edema.  Symptoms improved within a few days of stopping this.  Started antivio  Was put on prednisone 3 weeks ago to bridge the two medications  Started with 20mg     Patient is normally on glimepride 4mg and has been taking 5mg while on prednisone  Patient takes ozempic weekly as well  Insulin levemir 55U at bedtime  Glucose had been well controlled over the past 2 weeks while on prednisone    BP Readings from Last 3 Encounters:   08/26/19 118/79   08/07/19 125/75   07/24/19 128/72    Wt Readings from Last 3 Encounters:   08/26/19 115.7 kg (255 lb)   08/07/19 118.4 kg (261 lb)   07/24/19 118.9 kg (262 lb 3.2 oz)        Reviewed and updated as needed this visit by Provider  Tobacco  Allergies  Meds  Problems  Med Hx  Surg Hx  Fam Hx         Review of Systems    ROS: 10 point ROS neg other than the symptoms noted above in the HPI.      Objective    /79   Pulse 74   Temp 97.3  F (36.3  C) (Oral)   Resp 18   Wt 115.7 kg (255 lb)   SpO2 97%   BMI 43.77 kg/m     Physical Exam   GENERAL: healthy, alert and no distress  EYES: Eyes grossly normal to inspection, PERRL and conjunctivae and sclerae normal  NECK: no adenopathy, no asymmetry, masses, or scars and thyroid normal to palpation  RESP: lungs clear to auscultation - no rales, rhonchi or wheezes  CV: regular rate and rhythm, normal S1 S2, no S3 or S4, no murmur, click or rub, no peripheral edema and peripheral pulses strong  ABDOMEN: soft, nontender, no hepatosplenomegaly, no masses and bowel sounds normal  SKIN: no suspicious lesions or rashes  PSYCH: mentation appears normal, affect normal/bright          Assessment & Plan       ICD-10-CM    1. Atrial fibrillation, transient (H) I48.91 CBC with platelets and differential     Comprehensive metabolic panel     Lipase     EKG 12-lead complete w/read - Clinics   2. Uncontrolled type 2 diabetes mellitus without complication, with long-term current use of insulin (H) E11.65   "   Z79.4    3. Hyperglycemia R73.9 CBC with platelets and differential     Comprehensive metabolic panel     Lipase   4. On prednisone therapy Z79.52 CBC with platelets and differential     Comprehensive metabolic panel     Lipase   5. Acute cystitis without hematuria N30.00 UA with Microscopic     Urine Culture Aerobic Bacterial     Lipase   6. Ulcerative colitis without complications, unspecified location (H) K51.90      A Fib - EKG was normal today, likely has been controlled with atenolol up to most recent episode in which patient was hyperglycemic.  Continue atenolol, monitor for symptoms, follow-up with cardiology in 1 week.    Diabetes/hyperglycemia - Historically well controlled, glucose was 140's this morning.  Uncertain cause for hyperglycemia.  Possibly prednisone use (currenlty low dose at 1.5mg with 5 weeks of taper remaining.) vs acute cystitis or both in setting of stressful event of moving daughter in to college for the first time.  May have lead to paroxysmal a fib exacerbation as well.     BMI:   Estimated body mass index is 43.77 kg/m  as calculated from the following:    Height as of 8/7/19: 1.626 m (5' 4\").    Weight as of this encounter: 115.7 kg (255 lb).               Return in about 2 weeks (around 9/9/2019) for For Re-Assessment.    Sterling Byrd MD  [facility]      "

## 2019-08-26 NOTE — LETTER
[address] [place] [address]    August 27, 2019    Zaira Villatoro  [address] [address]      Dear Zaira,    Your white blood cell count is elevated and your UA shows signs of a urinary tract infection (UTI).  I'm going to send in an antibiotic for you to take.  I'll let you know what the culture sensitivities show when they result.     Enclosed is a copy of your results.   Results for orders placed or performed in visit on 08/26/19   UA with Microscopic   Result Value Ref Range    Color Urine Yellow     Appearance Urine Slightly Cloudy     Glucose Urine Negative NEG^Negative mg/dL    Bilirubin Urine Negative NEG^Negative    Ketones Urine Negative NEG^Negative mg/dL    Specific Gravity Urine >1.030 1.003 - 1.035    pH Urine 5.5 5.0 - 7.0 pH    Protein Albumin Urine Trace (A) NEG^Negative mg/dL    Urobilinogen Urine 0.2 0.2 - 1.0 EU/dL    Nitrite Urine Negative NEG^Negative    Blood Urine Small (A) NEG^Negative    Leukocyte Esterase Urine Small (A) NEG^Negative    Source Midstream Urine     WBC Urine 25-50 (A) OTO5^0 - 5 /HPF    RBC Urine 5-10 (A) OTO2^O - 2 /HPF    Squamous Epithelial /LPF Urine Few FEW^Few /LPF    Bacteria Urine Few (A) NEG^Negative /HPF    Calcium Oxalate Few (A) NEG^Negative /HPF   CBC with platelets and differential   Result Value Ref Range    WBC 12.0 (H) 4.0 - 11.0 10e9/L    RBC Count 4.82 3.8 - 5.2 10e12/L    Hemoglobin 14.7 11.7 - 15.7 g/dL    Hematocrit 45.1 35.0 - 47.0 %    MCV 94 78 - 100 fl    MCH 30.5 26.5 - 33.0 pg    MCHC 32.6 31.5 - 36.5 g/dL    RDW 13.8 10.0 - 15.0 %    Platelet Count 283 150 - 450 10e9/L    % Neutrophils 60.7 %    % Lymphocytes 27.4 %    % Monocytes 8.0 %    % Eosinophils 3.5 %    % Basophils 0.4 %    Absolute Neutrophil 7.2 1.6 - 8.3 10e9/L    Absolute Lymphocytes 3.3 0.8 - 5.3 10e9/L    Absolute Monocytes 1.0 0.0 - 1.3 10e9/L    Absolute  Eosinophils 0.4 0.0 - 0.7 10e9/L    Absolute Basophils 0.1 0.0 - 0.2 10e9/L    Diff Method Automated Method    Comprehensive metabolic panel   Result Value Ref Range    Sodium 141 133 - 144 mmol/L    Potassium 4.0 3.4 - 5.3 mmol/L    Chloride 105 94 - 109 mmol/L    Carbon Dioxide 27 20 - 32 mmol/L    Anion Gap 9 3 - 14 mmol/L    Glucose 218 (H) 70 - 99 mg/dL    Urea Nitrogen 20 7 - 30 mg/dL    Creatinine 0.97 0.52 - 1.04 mg/dL    GFR Estimate 69 >60 mL/min/[1.73_m2]    GFR Estimate If Black 80 >60 mL/min/[1.73_m2]    Calcium 9.5 8.5 - 10.1 mg/dL    Bilirubin Total 1.3 0.2 - 1.3 mg/dL    Albumin 3.6 3.4 - 5.0 g/dL    Protein Total 7.3 6.8 - 8.8 g/dL    Alkaline Phosphatase 57 40 - 150 U/L    ALT 77 (H) 0 - 50 U/L    AST 41 0 - 45 U/L   Lipase   Result Value Ref Range    Lipase 75 73 - 393 U/L   Urine Culture Aerobic Bacterial   Result Value Ref Range    Specimen Description Midstream Urine     Culture Micro       10,000 to 50,000 colonies/mL  mixed urogenital kyler  Susceptibility testing not routinely done         If you have any questions or concerns, please call myself or my nurse at [phone].    Sincerely,    Sterling Ponce MD/sandi

## 2019-08-27 DIAGNOSIS — N30.00 ACUTE CYSTITIS WITHOUT HEMATURIA: Primary | ICD-10-CM

## 2019-08-27 DIAGNOSIS — R60.1 ANASARCA: ICD-10-CM

## 2019-08-27 LAB
BACTERIA SPEC CULT: NORMAL
SPECIMEN SOURCE: NORMAL

## 2019-08-27 RX ORDER — CIPROFLOXACIN 250 MG/1
250 TABLET, FILM COATED ORAL 2 TIMES DAILY
Qty: 10 TABLET | Refills: 0 | Status: SHIPPED | OUTPATIENT
Start: 2019-08-27 | End: 2020-02-12

## 2019-08-27 RX ORDER — FUROSEMIDE 20 MG
TABLET ORAL
Qty: 30 TABLET | Refills: 0
Start: 2019-08-27

## 2019-08-27 NOTE — TELEPHONE ENCOUNTER
FUROSEMIDE 20 MG TABLET       Last Written Prescription Date:    Last Fill Quantity: ,   # refills:   Last Office Visit: 8-  Future Office visit:    Next 5 appointments (look out 90 days)    Sep 03, 2019 10:00 AM CDT  Return Visit with LINUS Lund CNP  [facility] [employer] ([facility]) [address] [place] [address] [address] [facility] [address]  [phone]   Oct 04, 2019 10:00 AM CDT  Office Visit with Tayler Bergman MD  [facility] [address] [place] [id number]  [phone]           Routing refill request to provider for review/approval because:  Drug not active on patient's medication list

## 2019-08-29 ENCOUNTER — MEDICAL CORRESPONDENCE (OUTPATIENT)
Dept: HEALTH INFORMATION MANAGEMENT | Facility: CLINIC | Age: 49
End: 2019-08-29

## 2019-09-02 NOTE — PROGRESS NOTES
"    Heart Care - Clinical Cardiac Electrophysiology       HPI: Zaira Villatoro is a 49 year old female who presents for follow up of AF with RVR.  The patient has a past medical history significant for PAF, HTN, DM, Ulcerative colitis s/p sigmoid colon perforation repair and colostomy(2013).  She had had one documented episode of postop AF in 2013 with no reoccurrence so had been on aspirin only for stroke prophylaxis and atenolol for rate and blood pressure control. She had an episode of dyspnea, dizziness, and nausea on 8/25/2019 while moving her daughter into college.  EMS was called and reported AF with RVR at a rate of 150 bpm and blood sugar was in the 500s.  [facility] notes states that they gave \"7 units reg insulin administered-repeat  and Verapramil 2.5 mg IV administered. Pt maintaining HR in the 90's -no hypotension while in the ER. CXR-negative. Advised patient that she should be monitored here in the hospital. She is only here in town to move her daughter into the dorm. They have people caring for their dog who cannot stay. She opted to leave A and follow up at home. She was obsereved for several hours in the ER and was stable throughout stay.\"  At her follow up with her PCP, she was found to have a UTI which was treated.    Reviewed current interval:  - Presenting EKG personally reviewed tracings: NSR, Vent rate 63, IA interval 164 ms, QRS duration 88 ms, QTc 431 ms.    Current Interval history:   Patient states that during the AF episode she was feeling \"a bubble in the throat and shivers\" and dyspnea. Patient states that she started to feel better about 2-3 days after her ED visit on 8/25/2019.  States that her blood sugars have been \"all over the place\" 144 this morning; finished her antibiotics yesterday, has follow up scheduled with PCP. States that she rarely misses doses of medication. Patient denies tobacco use.  Drinking alcohol very rarely, states that she did have large glass " of [place]in the evening before her AF episode.   Drinking caffeinated beverages, states that she drinks 4-6 diet Mt Dews/day. States that activity level is varies depending on how she feels with her UC, average around 5-6,000 steps/day.  Has MAHAD, wears her CPAP.  Sleeps with one pillows under head.  Denies chest pain or pressure, dizziness, syncope, palpitations, orthopnea, PND, abdominal edema, pedal edema, or claudication. Has UC so has hematochezia which is unchanged.  Denies easy bruising, hematuria, and epistaxis. Denies signs/symptoms of stroke such as visual disturbance, difficulty speaking, facial drooping, confusion, problems with gait, or any new numbness or weakness.    Current Outpatient Medications   Medication Sig Dispense Refill     aspirin 81 MG tablet Take by mouth daily 30 tablet 3     atenolol (TENORMIN) 50 MG tablet Take 0.5 tablets (25 mg) by mouth daily 45 tablet 2     BD ULTRA FINE PEN NEEDLES Inject 1 Device Subcutaneous daily 3 mm needles for Levemir pen 3 Box 3     blood glucose (ACCU-CHEK SHARMILA PLUS) test strip TEST 4 TIMES DAILY AND AS NEEDED 400 strip 3     blood glucose monitoring (ACCU-CHEK FASTCLIX) lancets 1 each 4 times daily Use to test blood sugar 4 times daily or as directed. 300 each 11     cholecalciferol 2000 UNITS tablet Take 2 tablets by mouth 2 times daily  30 tablet      diphenhydrAMINE (BENADRYL ALLERGY) 25 MG tablet as needed Reported on 4/12/2017       DRAMAMINE OR as needed       glimepiride (AMARYL) 4 MG tablet TAKE 1 TABLET (4 MG) BY MOUTH EVERY MORNING (BEFORE BREAKFAST) 90 tablet 0     LEVEMIR FLEXTOUCH 100 UNIT/ML pen INJECT 55 UNITS SUBCUTANEOUS AT BEDTIME 45 mL 1     losartan (COZAAR) 25 MG tablet TAKE 1/2 TABLET BY MOUTH DAILY 45 tablet 1     order for DME Equipment being ordered: CPAP 9 c, 1 Units 0     order for DME Glucometer, brand as covered by insurance. 1 each 0     order for DME Test strips for pt's glucometer, brand as covered by insurance. Test four  times daily and prn. 400 each 4     predniSONE (DELTASONE) 10 MG tablet        semaglutide (OZEMPIC) 1 MG/DOSE pen Inject 1 mg Subcutaneous every 7 days 3 mL 3     STATIN NOT PRESCRIBED, INTENTIONAL, 1 each continuous prn Statin not prescribed intentionally due to Refusal by patient and Other:LDL is below 100. 0 each 0     TYLENOL CAPS 500 MG OR 1 CAPSULE EVERY 4 HOURS AS NEEDED         Past Medical History:   Diagnosis Date     Acute diverticulitis 7/31/2013     History of seizures as a child 1981    One grand mal in 5th grade.  Didn't tolerate phenobarb.     Moderate single current episode of major depressive disorder (H)      Perforation of sigmoid colon (H) 8/3/2013     S/P left hemicolectomy 8/16/2013 8/02/13      Sepsis (H) 8/1/2013       Past Surgical History:   Procedure Laterality Date     APPENDECTOMY  04/2014     ARTHROSCOPY KNEE RT/LT  2004    RT      BIOPSY  2011 many 2011 and on     COLONOSCOPY  02/2012    lt sided colitis     COLONOSCOPY  1/9/2014     CRYOTHERAPY, CERVICAL  1988     EXPLORATORY LAPAROTOMY, PARTIAL LEFT ROLANDA COLLECTOMY WITH HARTMANS PROCEDURE, COLOSTOMY  8/2013    lt rolanda colectomy, bowel perforation, colostomy, Karen's pouche     GI SURGERY  2013    abrupted  bowl     HC TOOTH EXTRACTION W/FORCEP  6/2003    Abscess Tooth / Hospitalized     HERNIA REPAIR  04/2014    found at time of takedown     SINUS SURGERY  2/11/03    LT sinus cyst removal      TAKEDOWN COLOSTOMY  04/2014       Family History   Problem Relation Age of Onset     Diabetes Mother      Allergies Mother      Asthma Mother      Arthritis Mother      Heart Disease Mother         heart murmur     Depression Mother      Obesity Mother      Eye Disorder Father      Diabetes Father      Cerebrovascular Disease Father      Heart Disease Father      Hypertension Father      Diabetes Maternal Grandmother      Cerebrovascular Disease Maternal Grandfather      Unknown/Adopted Paternal Grandmother      Heart Disease  Paternal Grandfather         left ventrical failure     Cerebrovascular Disease Paternal Grandfather      Gynecology Sister         endometriosis     Depression Sister      Asthma Daughter      Breast Cancer Maternal Aunt      Thyroid Disease Maternal Aunt      Breast Cancer Other         fathers sister     Anesthesia Reaction Other      Thyroid Disease Other      Osteoporosis Other      Asthma Daughter      Breast Cancer Other         mothers sister     Glaucoma No family hx of      Macular Degeneration No family hx of        Social History     Tobacco Use     Smoking status: Former Smoker     Packs/day: 1.00     Years: 15.00     Pack years: 15.00     Types: Cigarettes     Start date: 1985     Last attempt to quit: 1998     Years since quittin.1     Smokeless tobacco: Never Used     Tobacco comment: soke free household.   Substance Use Topics     Alcohol use: Yes     Alcohol/week: 0.0 oz     Comment: occ       Allergies   Allergen Reactions     Demerol      Very low blood pressure     Fish      Pt reports allergy to all fish     Meperidine Other (See Comments)     Other reaction(s): Hypotension  Drops blood pressure       Metformin GI Disturbance and Diarrhea     GI Disturbance       Naproxen      No Clinical Screening - See Comments      Pt reports allergy to all fish     Nubain  [Nalbuphine]      Seafood      Topiramate Other (See Comments) and Hives     Sleepy and confused.  Shaky, disoriented       Tylenol Sinus Max Other (See Comments)     Feet swelling     Latex Rash         ROS:   A complete review of systems was performed and is negative except as noted in the HPI.     Physical Examination:  Vitals: /84 (BP Location: Left arm, Patient Position: Supine, Cuff Size: Adult Large)   Pulse 69   Wt 115.7 kg (255 lb)   SpO2 99%   BMI 43.77 kg/m    BMI= Body mass index is 43.77 kg/m .    GENERAL APPEARANCE: healthy, alert, and no acute distress  HEENT: no icterus, no xanthelasmas, normal pupil  size and reaction, no cyanosis.  NECK: no asymmetry, no cervical bruits, no JVD   RESPIRATORY: lungs clear to auscultation - no rales, rhonchi or wheezes, no use of accessory muscles, no retractions, respirations are unlabored, normal respiratory rate  CARDIOVASCULAR: regular rhythm, normal S1 with physiologic split S2, no S3 or S4 and no murmur, click or rub  GI:  no abdominal bruits, soft, non-tender  EXTREMITIES: no clubbing  NEURO: alert and oriented to person/place/time, normal speech, gait and affect  VASC: Radial and posterior tibialis pulses +2 and symmetric bilaterally. No cyanosis or edema.   SKIN: no ecchymoses, no rashes    Assessment and recommendations:    # Paroxysmal atrial fibrillation: She had had one documented episode of postop AF in  with no known reoccurrence so had been on aspirin only for stroke prophylaxis but has now had a recent recurrence of documented AF on 2019. Discussed in detail with the patient management/treatment options for AF includin. Stroke Prophylaxis:  CHADSVASC=female+. HTN+. DM+  3, corresponding to a 3.2% annual stroke / systemic emolism event rate. indicating need for long term oral anticoagulation.  She had had one documented episode of postop AF in  with no reoccurrence so had been on aspirin only for stroke prophylaxis but has now had a recent unprovoked recurrence. She has normal kidney function. She has UC and chronic hematochezia so would prefer Eliquis over rivaroxaban as it has less issues with GI bleeding. She would like us to contact her GI ([facility], Zaira Hull) to discuss. We would like to stop the aspirin and start Eliquis 5 mg twice daily. She preferrs a [employer] message sent to her.   2. Rate Control:  AF episode with RVR was while on atenolol 25 mg once daily. She had been switched a couple months prior from atenolol 50 mg once and after her episode she was switched back to this. Continue atenolol 25 mg in the morning and 25 mg in the  evening. 14 day ZIO to assess rate control and rhythm.   3. Rhythm Control: TSH.  Cardioversion or Antiarrhythmics are options for rhythm control.  Ablations may be considered in patients with highly symptomatic episodes that are not controlled by medication. An ablation can be considered for any recurrence.   4. Risk factor modifications: Avoid tobacco. Maintain a healthy weight. Limit alcohol. Eat at least 2-3 servings fruit and vegetables/day, limit saturated fats, and lean sources of protien. Include stress reduction activities in your day. These may include Yoga, Natanael chi, meditation, or deep breathing. Get at least 150 minutes/week moderate intensity aerobic physical activities.  Also, do muscle strengthening activities on 2 or more days/week.  6. MAHAD evaluation is not indicated, wears her CPAP.    # Hypertension: Counseled patient on risks of uncontrolled blood pressure and treatment options and risks   1. Life style modifications: Salt reduction to less than 2 grams daily   2. Medications:     FOLLOW UP: Follow up in one month or follow up sooner if needed for problems or symptoms.    Patient expresses understanding and agreement with the plan.    I appreciate the chance to help with Zaira Villatoro's care. Please let me know if you have any questions or concerns.    Alyx BARRIGA, CNP

## 2019-09-03 ENCOUNTER — ANCILLARY PROCEDURE (OUTPATIENT)
Dept: CARDIOLOGY | Facility: CLINIC | Age: 49
End: 2019-09-03
Attending: NURSE PRACTITIONER
Payer: COMMERCIAL

## 2019-09-03 ENCOUNTER — OFFICE VISIT (OUTPATIENT)
Dept: CARDIOLOGY | Facility: CLINIC | Age: 49
End: 2019-09-03
Payer: COMMERCIAL

## 2019-09-03 ENCOUNTER — TELEPHONE (OUTPATIENT)
Dept: CARDIOLOGY | Facility: CLINIC | Age: 49
End: 2019-09-03

## 2019-09-03 ENCOUNTER — TRANSFERRED RECORDS (OUTPATIENT)
Dept: HEALTH INFORMATION MANAGEMENT | Facility: CLINIC | Age: 49
End: 2019-09-03

## 2019-09-03 VITALS
BODY MASS INDEX: 43.77 KG/M2 | SYSTOLIC BLOOD PRESSURE: 132 MMHG | DIASTOLIC BLOOD PRESSURE: 84 MMHG | HEART RATE: 69 BPM | WEIGHT: 255 LBS | OXYGEN SATURATION: 99 %

## 2019-09-03 DIAGNOSIS — I48.0 PAROXYSMAL ATRIAL FIBRILLATION (H): Primary | ICD-10-CM

## 2019-09-03 DIAGNOSIS — I48.0 PAROXYSMAL ATRIAL FIBRILLATION (H): ICD-10-CM

## 2019-09-03 DIAGNOSIS — I10 HYPERTENSION GOAL BP (BLOOD PRESSURE) < 140/90: Chronic | ICD-10-CM

## 2019-09-03 LAB — TSH SERPL DL<=0.005 MIU/L-ACNC: 2.39 MU/L (ref 0.4–4)

## 2019-09-03 PROCEDURE — 36415 COLL VENOUS BLD VENIPUNCTURE: CPT | Performed by: NURSE PRACTITIONER

## 2019-09-03 PROCEDURE — 93000 ELECTROCARDIOGRAM COMPLETE: CPT | Performed by: NURSE PRACTITIONER

## 2019-09-03 PROCEDURE — 99214 OFFICE O/P EST MOD 30 MIN: CPT | Performed by: NURSE PRACTITIONER

## 2019-09-03 PROCEDURE — 0298T ZZC EXT ECG > 48HR TO 21 DAY REVIEW AND INTERPRETATN: CPT | Performed by: INTERNAL MEDICINE

## 2019-09-03 PROCEDURE — 0296T ZIO PATCH HOLTER ADULT PEDIATRIC GREATER THAN 48 HRS: CPT | Performed by: INTERNAL MEDICINE

## 2019-09-03 PROCEDURE — 84443 ASSAY THYROID STIM HORMONE: CPT | Performed by: NURSE PRACTITIONER

## 2019-09-03 RX ORDER — ATENOLOL 50 MG/1
TABLET ORAL
Qty: 45 TABLET | Refills: 3 | Status: SHIPPED | OUTPATIENT
Start: 2019-09-03 | End: 2019-10-01

## 2019-09-03 ASSESSMENT — PATIENT HEALTH QUESTIONNAIRE - PHQ9: SUM OF ALL RESPONSES TO PHQ QUESTIONS 1-9: 6

## 2019-09-03 NOTE — PATIENT INSTRUCTIONS
Thank you for coming to the [facility] @ [facility]; please note the following instructions:    1. Heart monitor   We have applied a holter (heart) monitor for you to wear for 14 days.  You may remove the heart monitor on 9/17/19 at 12:00pm.  Please see the enclosed instructions for further information, as well as instructions for removal of the heart monitor and return mailing directions.  If you should have questions regarding your monitor, please call [employer]. at [phone].  The Cardiology Nurse will contact you with your results (please see result notification details at bottom of summary).    *PLEASE DO NOT SHOWER OR INDUCE EXCESSIVE SWEATING IN THE FIRST 24 HOURS OF WEARING*    2. No medication changes today  3. I will contact your GI and discuss anticoagulation to prevent stroke. We would like to stop the aspirin and start Eliquis. I will send you a MyChart.   4. Follow up in a month    Risk factor modifications: Avoid tobacco. Maintain a healthy weight. Limit alcohol. Eat at least 2-3 servings fruit and vegetables/day, limit saturated fats, Salt reduction to less than 2 grams/ 2,000 mg daily, and lean sources of protien. Include stress reduction activities in your day. These may include Yoga, Natanael chi, meditation, or deep breathing. Get at least 150 minutes/week moderate intensity aerobic physical activities.  Also, do muscle strengthening activities on 2 or more days/week.    If you have any questions regarding your visit please contact your care team:     Cardiology  Telephone Number   Vanda MARC, RN  Nenita CONSTANTINO, RN   Jocelyn ESTRELLA, JONE PAK, JONE MACIEL, [facility]   [phone] (option 1)   For scheduling appts:     [phone] (select option 1)       For the Device Clinic (Pacemakers and ICD's)  RN's :  Karen Brennan   During business hours: [phone]    *After business hours:  [phone] (select option 4)      Normal test result notifications will be  released via Triptease or mailed within 7 business days.  All other test results, will be communicated via telephone once reviewed by your cardiologist.    If you need a medication refill please contact your pharmacy.  Please allow 3 business days for your refill to be completed.    As always, thank you for trusting us with your health care needs!    Patient Education     Understanding Atrial Fibrillation    An arrhythmia is any problem with the speed or pattern of the heartbeat. Atrial fibrillation (AFib) is a common type of arrhythmia. It causes fast, chaotic electrical signals in the atria. This leads to poor functioning of the heart. It also affects how much blood your heart can pump out to the body.  Afib may occur once in a while and go away on its own. Or it may continue for longer periods and need treatment.  AFib can lead to serious problems, such as stroke. Your healthcare provider will need to monitor and manage it.  What happens during atrial fibrillation?   The heart has an electrical system that sends signals to control the heartbeat. As signals move through the heart, they tell the heart s upper chambers (atria) and lower chambers (ventricles) when to squeeze (contract) and relax. This lets blood move through the heart and out to the body and lungs.  With AFib, the atria receive abnormal signals. This causes them to contract in a fast and irregular way, and out of sync with the ventricles. When this happens, the atria also have a harder time moving blood into the ventricles. Blood may then pool in the atria, which increases the risk for blood clots and stroke. The ventricles also may contract too quickly and irregularly. As a result, they may not pump blood to the body and lungs as well as they should. This can weaken the heart muscle over time and cause heart failure.  What causes atrial fibrillation?  AFib is more common in older adults. It has many possible causes including:    Coronary artery  disease    Heart valve disease    Heart attack    Heart surgery    High blood pressure    Thyroid disease    Diabetes    Lung disease    Sleep apnea    Heavy alcohol use  In some cases of AFib, doctors do not know the cause.  What are the symptoms of atrial fibrillation?  AFib may or may not cause symptoms. If symptoms do occur, they may include:    A fast, pounding, irregular heartbeat    Shortness of breath    Tiredness    Dizziness or fainting    Chest pain  How is atrial fibrillation treated?  Treatments for AFib can include any of the options below.    Medicines. You may be prescribed:  ? Heart rate medicines to help slow down the heartbeat  ? Heart rhythm medicines to help the heart beat more regularly  ? Anti-clotting medicines to help reduce the risk for blood clots and stroke    Electrical cardioversion. Your healthcare provider uses special pads or paddles to send one or more brief electrical shocks to the heart. This can help reset the heartbeat to normal.    Ablation. Long, thin tubes called catheters are threaded through a blood vessel to the heart. There, the catheters send out hot or cold energy to the areas causing the abnormal signals. This energy destroys the problem tissue or cells. This improves the chances that your heart will stay in normal rhythm without using medicines. If your heart rate and rhythm can t be controlled, you may need ablation and a pacemaker. These will help control the heart rate and regularity of the heartbeat.    Surgery. During surgery, your healthcare provider may use different methods to create scar tissue in the areas of the heart causing the abnormal signals. The scar tissue disrupts the abnormal signals and may stop AFib from occurring.  What are the complications of atrial fibrillation?  These can include:    Blood clots    Stroke    Heart failure. This problem occurs when the heart muscle weakens so much that it can no longer pump blood well.  When should I call my  healthcare provider?  Call your healthcare provider right away if you have any of these:    Symptoms that don t get better with treatment, or get worse    New symptoms   Date Last Reviewed: 5/1/2016 2000-2018 [employer]. [address] [place] [address], [place] [address]. All rights reserved. This information is not intended as a substitute for professional medical care. Always follow your healthcare professional's instructions.

## 2019-09-03 NOTE — PROGRESS NOTES
2 week ZioXT applied to patient today. Instructions on use and removal given and mail back instructions discussed. All questions answered. Consent form signed. Device registered. Form faxed to [employer].  Device number: [id number].    Laya Rossi MA

## 2019-09-03 NOTE — NURSING NOTE
"Chief Complaint   Patient presents with     RECHECK     OV with MUNIR Mendoza for ED 8/25/19 follow up for Afib, RVR. Pt reports SOB and dizziness with Afib episodes.       Initial /84 (BP Location: Left arm, Patient Position: Supine, Cuff Size: Adult Large)   Pulse 69   Wt 115.7 kg (255 lb)   SpO2 99%   BMI 43.77 kg/m   Estimated body mass index is 43.77 kg/m  as calculated from the following:    Height as of 8/7/19: 1.626 m (5' 4\").    Weight as of this encounter: 115.7 kg (255 lb)..  BP completed using cuff size: large    EKG was done.  Laya Rossi MA    "

## 2019-09-03 NOTE — TELEPHONE ENCOUNTER
She had had one documented episode of postop AF in 2013 with no reoccurrence so had been on aspirin only for stroke prophylaxis but has now had a recent unprovoked recurrence. Her CHADSVASC=female+. HTN+. DM+  3, corresponding to a 3.2% annual stroke / systemic emolism event rate. indicating need for long term oral anticoagulation for stroke prophylaxis.  She has normal kidney function. She has UC and chronic hematochezia so would prefer Eliquis over rivaroxaban as it has less issues with GI bleeding. She would like us to contact her GI ([facility], Zaira Hull) to discuss. We would like to stop the aspirin and start Eliquis 5 mg twice daily.     Please call Dr. Hull to discuss. She preferrs a [employer] message sent to her.     Dr. Alyx Lord, LINUS, NP-C

## 2019-09-03 NOTE — LETTER
"9/3/2019      RE: Zaira Villatoro  [address] [facility] [address]       Dear Colleague,    Thank you for the opportunity to participate in the care of your patient, Zaira Villatoro, at the [facility] HEART AT [facility] at [facility]. Please see a copy of my visit note below.    Heart Care - Clinical Cardiac Electrophysiology       HPI: Zaira Villatoro is a 49 year old female who presents for follow up of AF with RVR.  The patient has a past medical history significant for PAF, HTN, DM, Ulcerative colitis s/p sigmoid colon perforation repair and colostomy(2013).  She had had one documented episode of postop AF in 2013 with no reoccurrence so had been on aspirin only for stroke prophylaxis and atenolol for rate and blood pressure control. She had an episode of dyspnea, dizziness, and nausea on 8/25/2019 while moving her daughter into college.  EMS was called and reported AF with RVR at a rate of 150 bpm and blood sugar was in the 500s.  [facility] notes states that they gave \"7 units reg insulin administered-repeat  and Verapramil 2.5 mg IV administered. Pt maintaining HR in the 90's -no hypotension while in the ER. CXR-negative. Advised patient that she should be monitored here in the hospital. She is only here in town to move her daughter into the dorm. They have people caring for their dog who cannot stay. She opted to leave AMA and follow up at home. She was obsereved for several hours in the ER and was stable throughout stay.\"  At her follow up with her PCP, she was found to have a UTI which was treated.    Reviewed current interval:  - Presenting EKG personally reviewed tracings: NSR, Vent rate 63, SC interval 164 ms, QRS duration 88 ms, QTc 431 ms.    Current Interval history:   Patient states that during the AF episode she was feeling \"a bubble in the throat and shivers\" and dyspnea. Patient states that she started " "to feel better about 2-3 days after her ED visit on 8/25/2019.  States that her blood sugars have been \"all over the place\" 144 this morning; finished her antibiotics yesterday, has follow up scheduled with PCP. States that she rarely misses doses of medication. Patient denies tobacco use.  Drinking alcohol very rarely, states that she did have large glass of daiquiri the evening before her AF episode.   Drinking caffeinated beverages, states that she drinks 4-6 diet Mt Dews/day. States that activity level is varies depending on how she feels with her UC, average around 5-6,000 steps/day.  Has MAHAD, wears her CPAP.  Sleeps with one pillows under head.  Denies chest pain or pressure, dizziness, syncope, palpitations, orthopnea, PND, abdominal edema, pedal edema, or claudication. Has UC so has hematochezia which is unchanged.  Denies easy bruising, hematuria, and epistaxis. Denies signs/symptoms of stroke such as visual disturbance, difficulty speaking, facial drooping, confusion, problems with gait, or any new numbness or weakness.    Current Outpatient Medications   Medication Sig Dispense Refill     aspirin 81 MG tablet Take by mouth daily 30 tablet 3     atenolol (TENORMIN) 50 MG tablet Take 0.5 tablets (25 mg) by mouth daily 45 tablet 2     BD ULTRA FINE PEN NEEDLES Inject 1 Device Subcutaneous daily 3 mm needles for Levemir pen 3 Box 3     blood glucose (ACCU-CHEK SHARMILA PLUS) test strip TEST 4 TIMES DAILY AND AS NEEDED 400 strip 3     blood glucose monitoring (ACCU-CHEK FASTCLIX) lancets 1 each 4 times daily Use to test blood sugar 4 times daily or as directed. 300 each 11     cholecalciferol 2000 UNITS tablet Take 2 tablets by mouth 2 times daily  30 tablet      diphenhydrAMINE (BENADRYL ALLERGY) 25 MG tablet as needed Reported on 4/12/2017       DRAMAMINE OR as needed       glimepiride (AMARYL) 4 MG tablet TAKE 1 TABLET (4 MG) BY MOUTH EVERY MORNING (BEFORE BREAKFAST) 90 tablet 0     LEVEMIR FLEXTOUCH 100 " UNIT/ML pen INJECT 55 UNITS SUBCUTANEOUS AT BEDTIME 45 mL 1     losartan (COZAAR) 25 MG tablet TAKE 1/2 TABLET BY MOUTH DAILY 45 tablet 1     order for DME Equipment being ordered: CPAP 9 c, 1 Units 0     order for DME Glucometer, brand as covered by insurance. 1 each 0     order for DME Test strips for pt's glucometer, brand as covered by insurance. Test four times daily and prn. 400 each 4     predniSONE (DELTASONE) 10 MG tablet        semaglutide (OZEMPIC) 1 MG/DOSE pen Inject 1 mg Subcutaneous every 7 days 3 mL 3     STATIN NOT PRESCRIBED, INTENTIONAL, 1 each continuous prn Statin not prescribed intentionally due to Refusal by patient and Other:LDL is below 100. 0 each 0     TYLENOL CAPS 500 MG OR 1 CAPSULE EVERY 4 HOURS AS NEEDED         Past Medical History:   Diagnosis Date     Acute diverticulitis 7/31/2013     History of seizures as a child 1981    One grand mal in 5th grade.  Didn't tolerate phenobarb.     Moderate single current episode of major depressive disorder (H)      Perforation of sigmoid colon (H) 8/3/2013     S/P left hemicolectomy 8/16/2013 8/02/13      Sepsis (H) 8/1/2013       Past Surgical History:   Procedure Laterality Date     APPENDECTOMY  04/2014     ARTHROSCOPY KNEE RT/LT  2004    RT      BIOPSY  2011 many 2011 and on     COLONOSCOPY  02/2012    lt sided colitis     COLONOSCOPY  1/9/2014     CRYOTHERAPY, CERVICAL  1988     EXPLORATORY LAPAROTOMY, PARTIAL LEFT ROLANDA COLLECTOMY WITH HARTMANS PROCEDURE, COLOSTOMY  8/2013    lt rolanda colectomy, bowel perforation, colostomy, Karen's pouche     GI SURGERY  2013    abrupted  bowl     HC TOOTH EXTRACTION W/FORCEP  6/2003    Abscess Tooth / Hospitalized     HERNIA REPAIR  04/2014    found at time of takedown     SINUS SURGERY  2/11/03    LT sinus cyst removal      TAKEDOWN COLOSTOMY  04/2014       Family History   Problem Relation Age of Onset     Diabetes Mother      Allergies Mother      Asthma Mother      Arthritis Mother      Heart  Disease Mother         heart murmur     Depression Mother      Obesity Mother      Eye Disorder Father      Diabetes Father      Cerebrovascular Disease Father      Heart Disease Father      Hypertension Father      Diabetes Maternal Grandmother      Cerebrovascular Disease Maternal Grandfather      Unknown/Adopted Paternal Grandmother      Heart Disease Paternal Grandfather         left ventrical failure     Cerebrovascular Disease Paternal Grandfather      Gynecology Sister         endometriosis     Depression Sister      Asthma Daughter      Breast Cancer Maternal Aunt      Thyroid Disease Maternal Aunt      Breast Cancer Other         fathers sister     Anesthesia Reaction Other      Thyroid Disease Other      Osteoporosis Other      Asthma Daughter      Breast Cancer Other         mothers sister     Glaucoma No family hx of      Macular Degeneration No family hx of        Social History     Tobacco Use     Smoking status: Former Smoker     Packs/day: 1.00     Years: 15.00     Pack years: 15.00     Types: Cigarettes     Start date: 1985     Last attempt to quit: 1998     Years since quittin.1     Smokeless tobacco: Never Used     Tobacco comment: soke free household.   Substance Use Topics     Alcohol use: Yes     Alcohol/week: 0.0 oz     Comment: occ       Allergies   Allergen Reactions     Demerol      Very low blood pressure     Fish      Pt reports allergy to all fish     Meperidine Other (See Comments)     Other reaction(s): Hypotension  Drops blood pressure       Metformin GI Disturbance and Diarrhea     GI Disturbance       Naproxen      No Clinical Screening - See Comments      Pt reports allergy to all fish     Nubain  [Nalbuphine]      Seafood      Topiramate Other (See Comments) and Hives     Sleepy and confused.  Shaky, disoriented       Tylenol Sinus Max Other (See Comments)     Feet swelling     Latex Rash         ROS:   A complete review of systems was performed and is negative except  as noted in the HPI.     Physical Examination:  Vitals: /84 (BP Location: Left arm, Patient Position: Supine, Cuff Size: Adult Large)   Pulse 69   Wt 115.7 kg (255 lb)   SpO2 99%   BMI 43.77 kg/m     BMI= Body mass index is 43.77 kg/m .    GENERAL APPEARANCE: healthy, alert, and no acute distress  HEENT: no icterus, no xanthelasmas, normal pupil size and reaction, no cyanosis.  NECK: no asymmetry, no cervical bruits, no JVD   RESPIRATORY: lungs clear to auscultation - no rales, rhonchi or wheezes, no use of accessory muscles, no retractions, respirations are unlabored, normal respiratory rate  CARDIOVASCULAR: regular rhythm, normal S1 with physiologic split S2, no S3 or S4 and no murmur, click or rub  GI:  no abdominal bruits, soft, non-tender  EXTREMITIES: no clubbing  NEURO: alert and oriented to person/place/time, normal speech, gait and affect  VASC: Radial and posterior tibialis pulses +2 and symmetric bilaterally. No cyanosis or edema.   SKIN: no ecchymoses, no rashes    Assessment and recommendations:    # Paroxysmal atrial fibrillation: She had had one documented episode of postop AF in  with no known reoccurrence so had been on aspirin only for stroke prophylaxis but has now had a recent recurrence of documented AF on 2019. Discussed in detail with the patient management/treatment options for AF includin. Stroke Prophylaxis:  CHADSVASC=female+. HTN+. DM+  3, corresponding to a 3.2% annual stroke / systemic emolism event rate. indicating need for long term oral anticoagulation.  She had had one documented episode of postop AF in  with no reoccurrence so had been on aspirin only for stroke prophylaxis but has now had a recent unprovoked recurrence. She has normal kidney function. She has UC and chronic hematochezia so would prefer Eliquis over rivaroxaban as it has less issues with GI bleeding. She would like us to contact her GI (LUIS TATE, Zaira Hull) to discuss. We would like  to stop the aspirin and start Eliquis 5 mg twice daily. She preferrs a Event[employer]ist message sent to her.   2. Rate Control:  AF episode with RVR was while on atenolol 25 mg once daily. She had been switched a couple months prior from atenolol 50 mg once and after her episode she was switched back to this. Continue atenolol 25 mg in the morning and 25 mg in the evening. 14 day ZIO to assess rate control and rhythm.   3. Rhythm Control: TSH.  Cardioversion or Antiarrhythmics are options for rhythm control.  Ablations may be considered in patients with highly symptomatic episodes that are not controlled by medication. An ablation can be considered for any recurrence.   4. Risk factor modifications: Avoid tobacco. Maintain a healthy weight. Limit alcohol. Eat at least 2-3 servings fruit and vegetables/day, limit saturated fats, and lean sources of protien. Include stress reduction activities in your day. These may include Yoga, Natanael chi, meditation, or deep breathing. Get at least 150 minutes/week moderate intensity aerobic physical activities.  Also, do muscle strengthening activities on 2 or more days/week.  6. MAHAD evaluation is not indicated, wears her CPAP.    # Hypertension: Counseled patient on risks of uncontrolled blood pressure and treatment options and risks   1. Life style modifications: Salt reduction to less than 2 grams daily   2. Medications:     FOLLOW UP: Follow up in one month or follow up sooner if needed for problems or symptoms.    Patient expresses understanding and agreement with the plan.    I appreciate the chance to help with Zaira BUTCH DuarteIrving's care. Please let me know if you have any questions or concerns.    Alyx BARRIGA, CNP

## 2019-09-04 NOTE — TELEPHONE ENCOUNTER
[facility] Call Center    Phone Message    May a detailed message be left on voicemail: yes    Reason for Call: Other: Zayra is calling back to Vanda. Stated that the GI provider has given the okay to move forward on treating with eliquis. if you need to call back you can reach them at [phone] option [phone] extension [phone]     Action Taken: Message routed to:  [facility] ([facility]): rhea cardio

## 2019-09-05 ENCOUNTER — MYC MEDICAL ADVICE (OUTPATIENT)
Dept: CARDIOLOGY | Facility: CLINIC | Age: 49
End: 2019-09-05

## 2019-09-05 DIAGNOSIS — I48.0 PAF (PAROXYSMAL ATRIAL FIBRILLATION) (H): Primary | ICD-10-CM

## 2019-09-05 NOTE — TELEPHONE ENCOUNTER
Alyx Lord informed and verbally approved to prescribe Eliquis 5 mg twice daily and STOP aspirin.  Follow up in 1 mo with Alyx.  FilaExpresst message sent.    Vanda Pena RN

## 2019-09-21 DIAGNOSIS — Z79.4 TYPE 2 DIABETES MELLITUS WITH HYPERGLYCEMIA, WITH LONG-TERM CURRENT USE OF INSULIN (H): ICD-10-CM

## 2019-09-21 DIAGNOSIS — E11.65 TYPE 2 DIABETES MELLITUS WITH HYPERGLYCEMIA, WITH LONG-TERM CURRENT USE OF INSULIN (H): ICD-10-CM

## 2019-09-21 DIAGNOSIS — E11.9 TYPE 2 DIABETES MELLITUS WITHOUT COMPLICATION, WITH LONG-TERM CURRENT USE OF INSULIN (H): ICD-10-CM

## 2019-09-21 DIAGNOSIS — Z79.4 TYPE 2 DIABETES MELLITUS WITHOUT COMPLICATION, WITH LONG-TERM CURRENT USE OF INSULIN (H): ICD-10-CM

## 2019-09-23 RX ORDER — GLIMEPIRIDE 1 MG/1
TABLET ORAL
Qty: 90 TABLET | Refills: 1 | OUTPATIENT
Start: 2019-09-23

## 2019-09-23 RX ORDER — GLIMEPIRIDE 4 MG/1
4 TABLET ORAL
Qty: 90 TABLET | Refills: 0 | Status: SHIPPED | OUTPATIENT
Start: 2019-09-23 | End: 2019-10-04

## 2019-09-28 ENCOUNTER — HEALTH MAINTENANCE LETTER (OUTPATIENT)
Age: 49
End: 2019-09-28

## 2019-09-28 DIAGNOSIS — Z79.4 TYPE 2 DIABETES MELLITUS WITHOUT COMPLICATION, WITH LONG-TERM CURRENT USE OF INSULIN (H): ICD-10-CM

## 2019-09-28 DIAGNOSIS — E11.9 TYPE 2 DIABETES MELLITUS WITHOUT COMPLICATION, WITH LONG-TERM CURRENT USE OF INSULIN (H): ICD-10-CM

## 2019-09-30 NOTE — TELEPHONE ENCOUNTER
duplicate    glimepiride (AMARYL) 4 MG tablet 90 tablet 0 9/23/2019  No   Sig - Route: Take 1 tablet (4 mg) by mouth every morning (before breakfast) - Oral   Sent to pharmacy as: Glimepiride 4 MG Oral Tablet (AMARYL)   Class: E-Prescribe   Order: [id number]   E-Prescribing Status: Receipt confirmed by pharmacy (9/23/2019 12:33 PM CDT)

## 2019-10-01 ENCOUNTER — TRANSFERRED RECORDS (OUTPATIENT)
Dept: HEALTH INFORMATION MANAGEMENT | Facility: CLINIC | Age: 49
End: 2019-10-01

## 2019-10-01 ENCOUNTER — OFFICE VISIT (OUTPATIENT)
Dept: CARDIOLOGY | Facility: CLINIC | Age: 49
End: 2019-10-01
Payer: COMMERCIAL

## 2019-10-01 VITALS
HEART RATE: 68 BPM | OXYGEN SATURATION: 96 % | SYSTOLIC BLOOD PRESSURE: 149 MMHG | BODY MASS INDEX: 44.46 KG/M2 | DIASTOLIC BLOOD PRESSURE: 83 MMHG | WEIGHT: 259 LBS

## 2019-10-01 DIAGNOSIS — I48.0 PAROXYSMAL ATRIAL FIBRILLATION (H): ICD-10-CM

## 2019-10-01 DIAGNOSIS — I10 HYPERTENSION GOAL BP (BLOOD PRESSURE) < 140/90: Chronic | ICD-10-CM

## 2019-10-01 PROCEDURE — 93000 ELECTROCARDIOGRAM COMPLETE: CPT | Performed by: NURSE PRACTITIONER

## 2019-10-01 PROCEDURE — 99213 OFFICE O/P EST LOW 20 MIN: CPT | Performed by: NURSE PRACTITIONER

## 2019-10-01 RX ORDER — ATENOLOL 50 MG/1
50 TABLET ORAL 2 TIMES DAILY
Qty: 180 TABLET | Refills: 3 | Status: SHIPPED | OUTPATIENT
Start: 2019-10-01 | End: 2020-03-17

## 2019-10-01 RX ORDER — GLIMEPIRIDE 1 MG/1
TABLET ORAL
Qty: 90 TABLET | Refills: 1
Start: 2019-10-01

## 2019-10-01 NOTE — LETTER
"10/1/2019      RE: Ziara Villatoro  [address] [facility] [address]       Dear Colleague,    Thank you for the opportunity to participate in the care of your patient, Zaira Villatoro, at the [facility] HEART AT [facility]. Please see a copy of my visit note below.    Heart Care - Clinical Cardiac Electrophysiology       HPI: Zaira Villatoro is a 49 year old female who presents for follow up of AF with RVR.  The patient has a past medical history significant for PAF, HTN, DM, Ulcerative colitis s/p sigmoid colon perforation repair and colostomy(2013).  She had had one documented episode of postop AF in 2013 with no reoccurrence so had been on aspirin only for stroke prophylaxis and atenolol for rate and blood pressure control. She had an episode of dyspnea, dizziness, and nausea on 8/25/2019 while moving her daughter into college.  EMS was called and reported AF with RVR at a rate of 150 bpm and blood sugar was in the 500s.  [facility] notes states that they gave \"7 units reg insulin administered-repeat  and Verapramil 2.5 mg IV administered. Pt maintaining HR in the 90's -no hypotension while in the ER. CXR-negative.  At our clinic appointment one month ago apixaban was initiated for stroke prophylaxis.      Reviewed current interval:  - 9/3/2019 TSH 2.39  - 14 day ZIO 9/2019 shows 1% AF burden (longest episode nearly 3 hours with average rate 127 bpm), multiple episodes nonsustained SVT (likely AF), no symptoms reported  - Presenting EKG (personally reviewed tracings): NSR, vent rate 80 bpm, [place] interval 164 ms, QRS duration 84 ms, QTc 463 ms    Current Interval history:   Patient states that she has not had any side effects from the apixaban. Has some hematochezia but is unchanged from her \"normal\". Denies easy bruising or bleeding, hematuria, and epistaxis. misses doses of medication.  States that she has " been having shoulder blade pain on and off the past couple of weeks, occasionally will wake her up at night, states that stretching can be helpful. Is feeling the shoulder pain during our clinic visit.  Asks if she can exercise.  States that she was not aware of or had symptoms with the 3 hour AF episode on the ZIO. Denies chest pain or pressure, dizziness, syncope, dyspnea at rest or with exertion, palpitations, orthopnea, PND, abdominal edema, pedal edema, or claudication.   Denies signs/symptoms of stroke such as visual disturbance, difficulty speaking, facial drooping, confusion, problems with gait, or any new numbness or weakness.    Current Outpatient Medications   Medication Sig Dispense Refill     apixaban ANTICOAGULANT (ELIQUIS) 5 MG tablet Take 1 tablet (5 mg) by mouth 2 times daily 60 tablet 11     atenolol (TENORMIN) 50 MG tablet Take 25 mg (half tab) twice daily. 45 tablet 3     BD ULTRA FINE PEN NEEDLES Inject 1 Device Subcutaneous daily 3 mm needles for Levemir pen 3 Box 3     blood glucose (ACCU-CHEK SHARMILA PLUS) test strip TEST 4 TIMES DAILY AND AS NEEDED 400 strip 3     blood glucose monitoring (ACCU-CHEK FASTCLIX) lancets 1 each 4 times daily Use to test blood sugar 4 times daily or as directed. 300 each 11     cholecalciferol 2000 UNITS tablet Take 2 tablets by mouth 2 times daily  30 tablet      diphenhydrAMINE (BENADRYL ALLERGY) 25 MG tablet as needed Reported on 4/12/2017       DRAMAMINE OR as needed       glimepiride (AMARYL) 4 MG tablet Take 1 tablet (4 mg) by mouth every morning (before breakfast) 90 tablet 0     LEVEMIR FLEXTOUCH 100 UNIT/ML pen INJECT 55 UNITS SUBCUTANEOUS AT BEDTIME 45 mL 1     losartan (COZAAR) 25 MG tablet TAKE 1/2 TABLET BY MOUTH DAILY 45 tablet 1     order for DME Equipment being ordered: CPAP 9 c, 1 Units 0     order for DME Glucometer, brand as covered by insurance. 1 each 0     order for DME Test strips for pt's glucometer, brand as covered by insurance. Test four  times daily and prn. 400 each 4     predniSONE (DELTASONE) 10 MG tablet        semaglutide (OZEMPIC) 1 MG/DOSE pen Inject 1 mg Subcutaneous every 7 days 3 mL 3     STATIN NOT PRESCRIBED, INTENTIONAL, 1 each continuous prn Statin not prescribed intentionally due to Refusal by patient and Other:LDL is below 100. 0 each 0     TYLENOL CAPS 500 MG OR 1 CAPSULE EVERY 4 HOURS AS NEEDED       vedolizumab (ENTYVIO) 60 MG/ML injection Every six weeks         Past Medical History:   Diagnosis Date     Acute diverticulitis 7/31/2013     History of seizures as a child 1981    One grand mal in 5th grade.  Didn't tolerate phenobarb.     Moderate single current episode of major depressive disorder (H)      Perforation of sigmoid colon (H) 8/3/2013     S/P left hemicolectomy 8/16/2013 8/02/13      Sepsis (H) 8/1/2013       Past Surgical History:   Procedure Laterality Date     APPENDECTOMY  04/2014     ARTHROSCOPY KNEE RT/LT  2004    RT      BIOPSY  2011 many 2011 and on     COLONOSCOPY  02/2012    lt sided colitis     COLONOSCOPY  1/9/2014     CRYOTHERAPY, CERVICAL  1988     EXPLORATORY LAPAROTOMY, PARTIAL LEFT ROLANDA COLLECTOMY WITH HARTMANS PROCEDURE, COLOSTOMY  8/2013    lt rolanda colectomy, bowel perforation, colostomy, Karen's pouche     GI SURGERY  2013    abrupted  bowl     HC TOOTH EXTRACTION W/FORCEP  6/2003    Abscess Tooth / Hospitalized     HERNIA REPAIR  04/2014    found at time of takedown     SINUS SURGERY  2/11/03    LT sinus cyst removal      TAKEDOWN COLOSTOMY  04/2014       Family History   Problem Relation Age of Onset     Diabetes Mother      Allergies Mother      Asthma Mother      Arthritis Mother      Heart Disease Mother         heart murmur     Depression Mother      Obesity Mother      Eye Disorder Father      Diabetes Father      Cerebrovascular Disease Father      Heart Disease Father      Hypertension Father      Diabetes Maternal Grandmother      Cerebrovascular Disease Maternal Grandfather       Unknown/Adopted Paternal Grandmother      Heart Disease Paternal Grandfather         left ventrical failure     Cerebrovascular Disease Paternal Grandfather      Gynecology Sister         endometriosis     Depression Sister      Asthma Daughter      Breast Cancer Maternal Aunt      Thyroid Disease Maternal Aunt      Breast Cancer Other         fathers sister     Anesthesia Reaction Other      Thyroid Disease Other      Osteoporosis Other      Asthma Daughter      Breast Cancer Other         mothers sister     Glaucoma No family hx of      Macular Degeneration No family hx of        Social History     Tobacco Use     Smoking status: Former Smoker     Packs/day: 1.00     Years: 15.00     Pack years: 15.00     Types: Cigarettes     Start date: 1985     Last attempt to quit: 1998     Years since quittin.2     Smokeless tobacco: Never Used     Tobacco comment: soke free household.   Substance Use Topics     Alcohol use: Yes     Alcohol/week: 0.0 standard drinks     Comment: occ       Allergies   Allergen Reactions     Demerol      Very low blood pressure     Fish      Pt reports allergy to all fish     Meperidine Other (See Comments)     Other reaction(s): Hypotension  Drops blood pressure       Metformin GI Disturbance and Diarrhea     GI Disturbance       Naproxen      No Clinical Screening - See Comments      Pt reports allergy to all fish     Nubain  [Nalbuphine]      Seafood      Topiramate Other (See Comments) and Hives     Sleepy and confused.  Shaky, disoriented       Tylenol Sinus Max Other (See Comments)     Feet swelling     Latex Rash         ROS:   A complete review of systems was performed and is negative except as noted in the HPI.     Physical Examination:  Vitals: BP (!) 141/82 (BP Location: Left arm, Patient Position: Sitting, Cuff Size: Adult Regular)   Pulse 68   Wt 117.5 kg (259 lb)   SpO2 96%   BMI 44.46 kg/m     BMI= Body mass index is 44.46 kg/m .    GENERAL APPEARANCE: healthy,  alert, and no acute distress  HEENT: no icterus, no xanthelasmas, normal pupil size and reaction, no cyanosis.  NECK: no asymmetry, no cervical bruits, no JVD   RESPIRATORY: lungs clear to auscultation - no rales, rhonchi or wheezes, no use of accessory muscles, no retractions, respirations are unlabored, normal respiratory rate  CARDIOVASCULAR: regular rhythm, normal S1 with physiologic split S2, no S3 or S4 and no murmur, click or rub  GI:  no abdominal bruits, soft, non-tender  EXTREMITIES: no clubbing  NEURO: alert and oriented to person/place/time, normal speech, gait and affect  VASC: Radial and posterior tibialis pulses +2 and symmetric bilaterally. No cyanosis or edema.   SKIN: no ecchymoses, no rashes    Assessment and recommendations:    # Paroxysmal atrial fibrillation: Discussed in detail with the patient management/treatment options for AF includin. Stroke Prophylaxis:  CHADSVASC=female+. HTN+. DM+  3, corresponding to a 3.2% annual stroke / systemic emolism event rate. indicating need for long term oral anticoagulation. Has been taking apixaban without bleeding problems. Continue apixaban 5 mg BID  2. Rate Control:   ZIO shows asymptomatic AF RVR on atenolol 25 mg BID. Will increase to atenolol 50 mg BID.   3. Rhythm Control:  Will use rate control strategy at this time.  Cardioversion or Antiarrhythmics are options for rhythm control.  Ablations may be considered in patients with highly symptomatic episodes that are not controlled by medication.    4. Risk factor modifications: Avoid tobacco. Maintain a healthy weight. Limit alcohol. Eat at least 2-3 servings fruit and vegetables/day, limit saturated fats, and lean sources of protien. Include stress reduction activities in your day. These may include Yoga, Natanael chi, meditation, or deep breathing. Get at least 150 minutes/week moderate intensity aerobic physical activities.  Also, do muscle strengthening activities on 2 or more days/week.  5. MAHAD  evaluation is not indicated, wears her CPAP.  6. Echocardiogram as she has had a new known increase in PAF.     # Hypertension: Counseled patient on risks of uncontrolled blood pressure and treatment options and risks   1. Life style modifications: Salt reduction to less than 2 grams daily   2. Medications:  continue losartan, increasing atenolol as noted above     FOLLOW UP: Follow up in 3 months or follow up sooner if needed for problems or symptoms.    Patient expresses understanding and agreement with the plan.    I appreciate the chance to help with Zaira Villatoro's care. Please let me know if you have any questions or concerns.    Alyx BARRIGA, CNP

## 2019-10-01 NOTE — PROGRESS NOTES
"    Heart Care - Clinical Cardiac Electrophysiology       HPI: Zaira Villatoro is a 49 year old female who presents for follow up of AF with RVR.  The patient has a past medical history significant for PAF, HTN, DM, Ulcerative colitis s/p sigmoid colon perforation repair and colostomy(2013).  She had had one documented episode of postop AF in 2013 with no reoccurrence so had been on aspirin only for stroke prophylaxis and atenolol for rate and blood pressure control. She had an episode of dyspnea, dizziness, and nausea on 8/25/2019 while moving her daughter into college.  EMS was called and reported AF with RVR at a rate of 150 bpm and blood sugar was in the 500s.  [facility] notes states that they gave \"7 units reg insulin administered-repeat  and Verapramil 2.5 mg IV administered. Pt maintaining HR in the 90's -no hypotension while in the ER. CXR-negative. At our clinic appointment one month ago apixaban was initiated for stroke prophylaxis.      Reviewed current interval:  - 9/3/2019 TSH 2.39  - 14 day ZIO 9/2019 shows 1% AF burden (longest episode nearly 3 hours with average rate 127 bpm), multiple episodes nonsustained SVT (likely AF), no symptoms reported  - Presenting EKG (personally reviewed tracings): NSR, vent rate 80 bpm, AL interval 164 ms, QRS duration 84 ms, QTc 463 ms    Current Interval history:   Patient states that she has not had any side effects from the apixaban. Has some hematochezia but is unchanged from her \"normal\". Denies easy bruising or bleeding, hematuria, and epistaxis. misses doses of medication.  States that she has been having shoulder blade pain on and off the past couple of weeks, occasionally will wake her up at night, states that stretching can be helpful. Is feeling the shoulder pain during our clinic visit.  Asks if she can exercise.  States that she was not aware of or had symptoms with the 3 hour AF episode on the ZIO. Denies chest pain or pressure, dizziness, " syncope, dyspnea at rest or with exertion, palpitations, orthopnea, PND, abdominal edema, pedal edema, or claudication.   Denies signs/symptoms of stroke such as visual disturbance, difficulty speaking, facial drooping, confusion, problems with gait, or any new numbness or weakness.    Current Outpatient Medications   Medication Sig Dispense Refill     apixaban ANTICOAGULANT (ELIQUIS) 5 MG tablet Take 1 tablet (5 mg) by mouth 2 times daily 60 tablet 11     atenolol (TENORMIN) 50 MG tablet Take 25 mg (half tab) twice daily. 45 tablet 3     BD ULTRA FINE PEN NEEDLES Inject 1 Device Subcutaneous daily 3 mm needles for Levemir pen 3 Box 3     blood glucose (ACCU-CHEK SHARMILA PLUS) test strip TEST 4 TIMES DAILY AND AS NEEDED 400 strip 3     blood glucose monitoring (ACCU-CHEK FASTCLIX) lancets 1 each 4 times daily Use to test blood sugar 4 times daily or as directed. 300 each 11     cholecalciferol 2000 UNITS tablet Take 2 tablets by mouth 2 times daily  30 tablet      diphenhydrAMINE (BENADRYL ALLERGY) 25 MG tablet as needed Reported on 4/12/2017       DRAMAMINE OR as needed       glimepiride (AMARYL) 4 MG tablet Take 1 tablet (4 mg) by mouth every morning (before breakfast) 90 tablet 0     LEVEMIR FLEXTOUCH 100 UNIT/ML pen INJECT 55 UNITS SUBCUTANEOUS AT BEDTIME 45 mL 1     losartan (COZAAR) 25 MG tablet TAKE 1/2 TABLET BY MOUTH DAILY 45 tablet 1     order for DME Equipment being ordered: CPAP 9 c, 1 Units 0     order for DME Glucometer, brand as covered by insurance. 1 each 0     order for DME Test strips for pt's glucometer, brand as covered by insurance. Test four times daily and prn. 400 each 4     predniSONE (DELTASONE) 10 MG tablet        semaglutide (OZEMPIC) 1 MG/DOSE pen Inject 1 mg Subcutaneous every 7 days 3 mL 3     STATIN NOT PRESCRIBED, INTENTIONAL, 1 each continuous prn Statin not prescribed intentionally due to Refusal by patient and Other:LDL is below 100. 0 each 0     TYLENOL CAPS 500 MG OR 1 CAPSULE  EVERY 4 HOURS AS NEEDED       vedolizumab (ENTYVIO) 60 MG/ML injection Every six weeks         Past Medical History:   Diagnosis Date     Acute diverticulitis 7/31/2013     History of seizures as a child 1981    One grand mal in 5th grade.  Didn't tolerate phenobarb.     Moderate single current episode of major depressive disorder (H)      Perforation of sigmoid colon (H) 8/3/2013     S/P left hemicolectomy 8/16/2013 8/02/13      Sepsis (H) 8/1/2013       Past Surgical History:   Procedure Laterality Date     APPENDECTOMY  04/2014     ARTHROSCOPY KNEE RT/LT  2004    RT      BIOPSY  2011 many 2011 and on     COLONOSCOPY  02/2012    lt sided colitis     COLONOSCOPY  1/9/2014     CRYOTHERAPY, CERVICAL  1988     EXPLORATORY LAPAROTOMY, PARTIAL LEFT ROLANDA COLLECTOMY WITH HARTMANS PROCEDURE, COLOSTOMY  8/2013    lt rolanda colectomy, bowel perforation, colostomy, Karen's pouche     GI SURGERY  2013    abrupted  bowl     HC TOOTH EXTRACTION W/FORCEP  6/2003    Abscess Tooth / Hospitalized     HERNIA REPAIR  04/2014    found at time of takedown     SINUS SURGERY  2/11/03    LT sinus cyst removal      TAKEDOWN COLOSTOMY  04/2014       Family History   Problem Relation Age of Onset     Diabetes Mother      Allergies Mother      Asthma Mother      Arthritis Mother      Heart Disease Mother         heart murmur     Depression Mother      Obesity Mother      Eye Disorder Father      Diabetes Father      Cerebrovascular Disease Father      Heart Disease Father      Hypertension Father      Diabetes Maternal Grandmother      Cerebrovascular Disease Maternal Grandfather      Unknown/Adopted Paternal Grandmother      Heart Disease Paternal Grandfather         left ventrical failure     Cerebrovascular Disease Paternal Grandfather      Gynecology Sister         endometriosis     Depression Sister      Asthma Daughter      Breast Cancer Maternal Aunt      Thyroid Disease Maternal Aunt      Breast Cancer Other         fathers  sister     Anesthesia Reaction Other      Thyroid Disease Other      Osteoporosis Other      Asthma Daughter      Breast Cancer Other         mothers sister     Glaucoma No family hx of      Macular Degeneration No family hx of        Social History     Tobacco Use     Smoking status: Former Smoker     Packs/day: 1.00     Years: 15.00     Pack years: 15.00     Types: Cigarettes     Start date: 1985     Last attempt to quit: 1998     Years since quittin.2     Smokeless tobacco: Never Used     Tobacco comment: soke free household.   Substance Use Topics     Alcohol use: Yes     Alcohol/week: 0.0 standard drinks     Comment: occ       Allergies   Allergen Reactions     Demerol      Very low blood pressure     Fish      Pt reports allergy to all fish     Meperidine Other (See Comments)     Other reaction(s): Hypotension  Drops blood pressure       Metformin GI Disturbance and Diarrhea     GI Disturbance       Naproxen      No Clinical Screening - See Comments      Pt reports allergy to all fish     Nubain  [Nalbuphine]      Seafood      Topiramate Other (See Comments) and Hives     Sleepy and confused.  Shaky, disoriented       Tylenol Sinus Max Other (See Comments)     Feet swelling     Latex Rash         ROS:   A complete review of systems was performed and is negative except as noted in the HPI.     Physical Examination:  Vitals: BP (!) 141/82 (BP Location: Left arm, Patient Position: Sitting, Cuff Size: Adult Regular)   Pulse 68   Wt 117.5 kg (259 lb)   SpO2 96%   BMI 44.46 kg/m    BMI= Body mass index is 44.46 kg/m .    GENERAL APPEARANCE: healthy, alert, and no acute distress  HEENT: no icterus, no xanthelasmas, normal pupil size and reaction, no cyanosis.  NECK: no asymmetry, no cervical bruits, no JVD   RESPIRATORY: lungs clear to auscultation - no rales, rhonchi or wheezes, no use of accessory muscles, no retractions, respirations are unlabored, normal respiratory rate  CARDIOVASCULAR: regular  rhythm, normal S1 with physiologic split S2, no S3 or S4 and no murmur, click or rub  GI:  no abdominal bruits, soft, non-tender  EXTREMITIES: no clubbing  NEURO: alert and oriented to person/place/time, normal speech, gait and affect  VASC: Radial and posterior tibialis pulses +2 and symmetric bilaterally. No cyanosis or edema.   SKIN: no ecchymoses, no rashes    Assessment and recommendations:    # Paroxysmal atrial fibrillation: Discussed in detail with the patient management/treatment options for AF includin. Stroke Prophylaxis:  CHADSVASC=female+. HTN+. DM+  3, corresponding to a 3.2% annual stroke / systemic emolism event rate. indicating need for long term oral anticoagulation. Has been taking apixaban without bleeding problems. Continue apixaban 5 mg BID  2. Rate Control:  ZIO shows asymptomatic AF RVR on atenolol 25 mg BID. Will increase to atenolol 50 mg BID.   3. Rhythm Control: Will use rate control strategy at this time.  Cardioversion or Antiarrhythmics are options for rhythm control.  Ablations may be considered in patients with highly symptomatic episodes that are not controlled by medication.    4. Risk factor modifications: Avoid tobacco. Maintain a healthy weight. Limit alcohol. Eat at least 2-3 servings fruit and vegetables/day, limit saturated fats, and lean sources of protien. Include stress reduction activities in your day. These may include Yoga, Natanael chi, meditation, or deep breathing. Get at least 150 minutes/week moderate intensity aerobic physical activities.  Also, do muscle strengthening activities on 2 or more days/week.  5. MAHAD evaluation is not indicated, wears her CPAP.  6. Echocardiogram as she has had a new known increase in PAF.     # Hypertension: Counseled patient on risks of uncontrolled blood pressure and treatment options and risks   1. Life style modifications: Salt reduction to less than 2 grams daily   2. Medications: continue losartan, increasing atenolol as noted  above     FOLLOW UP: Follow up in 3 months or follow up sooner if needed for problems or symptoms.    Patient expresses understanding and agreement with the plan.    I appreciate the chance to help with Zaira Villatoro's care. Please let me know if you have any questions or concerns.    Alyx BARRIGA, CNP

## 2019-10-01 NOTE — PATIENT INSTRUCTIONS
Thank you for coming to the [facility]; please note the following instructions:    1. increase to atenolol 50 mg twice daily  2. Risk factor modifications: Avoid tobacco. Maintain a healthy weight. Limit alcohol. Salt reduction to less than 2 grams daily  Eat at least 3 servings fruit and vegetables/day, limit saturated fats, and lean sources of protien. Include stress reduction activities in your day. These may include Yoga, Natanael chi, meditation, or deep breathing. Get at least 150 minutes/week moderate intensity aerobic physical activities.  Also, do muscle strengthening activities on 2 or more days/week.  3. Follow up  in 3 months or follow up sooner if needed for problems or symptoms.    If you have any questions regarding your visit please contact your care team:     Cardiology  Telephone Number   Vanda MARC, RN  Nenita CONSTANTINO, RN   Jocelyn ESTRELLA, JONE PAK, JONE MACIEL, [facility]   [phone] (option 1)   For scheduling appts:     [phone] (select option 1)       For the Device Clinic (Pacemakers and ICD's)  RN's :  Karen Brennan   During business hours: [phone]    *After business hours:  [phone] (select option 4)      Normal test result notifications will be released via Witel or mailed within 7 business days.  All other test results, will be communicated via telephone once reviewed by your cardiologist.    If you need a medication refill please contact your pharmacy.  Please allow 3 business days for your refill to be completed.    As always, thank you for trusting us with your health care needs!    Patient Education     Understanding Atrial Fibrillation    An arrhythmia is any problem with the speed or pattern of the heartbeat. Atrial fibrillation (AFib) is a common type of arrhythmia. It causes fast, chaotic electrical signals in the atria. This leads to poor functioning of the heart. It also affects how much blood your heart can pump out to the body.  Afib  may occur once in a while and go away on its own. Or it may continue for longer periods and need treatment.  AFib can lead to serious problems, such as stroke. Your healthcare provider will need to monitor and manage it.  What happens during atrial fibrillation?   The heart has an electrical system that sends signals to control the heartbeat. As signals move through the heart, they tell the heart s upper chambers (atria) and lower chambers (ventricles) when to squeeze (contract) and relax. This lets blood move through the heart and out to the body and lungs.  With AFib, the atria receive abnormal signals. This causes them to contract in a fast and irregular way, and out of sync with the ventricles. When this happens, the atria also have a harder time moving blood into the ventricles. Blood may then pool in the atria, which increases the risk for blood clots and stroke. The ventricles also may contract too quickly and irregularly. As a result, they may not pump blood to the body and lungs as well as they should. This can weaken the heart muscle over time and cause heart failure.  What causes atrial fibrillation?  AFib is more common in older adults. It has many possible causes including:    Coronary artery disease    Heart valve disease    Heart attack    Heart surgery    High blood pressure    Thyroid disease    Diabetes    Lung disease    Sleep apnea    Heavy alcohol use  In some cases of AFib, doctors do not know the cause.  What are the symptoms of atrial fibrillation?  AFib may or may not cause symptoms. If symptoms do occur, they may include:    A fast, pounding, irregular heartbeat    Shortness of breath    Tiredness    Dizziness or fainting    Chest pain  How is atrial fibrillation treated?  Treatments for AFib can include any of the options below.    Medicines. You may be prescribed:  ? Heart rate medicines to help slow down the heartbeat  ? Heart rhythm medicines to help the heart beat more  regularly  ? Anti-clotting medicines to help reduce the risk for blood clots and stroke    Electrical cardioversion. Your healthcare provider uses special pads or paddles to send one or more brief electrical shocks to the heart. This can help reset the heartbeat to normal.    Ablation. Long, thin tubes called catheters are threaded through a blood vessel to the heart. There, the catheters send out hot or cold energy to the areas causing the abnormal signals. This energy destroys the problem tissue or cells. This improves the chances that your heart will stay in normal rhythm without using medicines. If your heart rate and rhythm can t be controlled, you may need ablation and a pacemaker. These will help control the heart rate and regularity of the heartbeat.    Surgery. During surgery, your healthcare provider may use different methods to create scar tissue in the areas of the heart causing the abnormal signals. The scar tissue disrupts the abnormal signals and may stop AFib from occurring.  What are the complications of atrial fibrillation?  These can include:    Blood clots    Stroke    Heart failure. This problem occurs when the heart muscle weakens so much that it can no longer pump blood well.  When should I call my healthcare provider?  Call your healthcare provider right away if you have any of these:    Symptoms that don t get better with treatment, or get worse    New symptoms   Date Last Reviewed: 5/1/2016 2000-2018 [employer] [address] [place] [id number]. All rights reserved. This information is not intended as a substitute for professional medical care. Always follow your healthcare professional's instructions.           Patient Education     Living with Atrial Fibrillation: Preventing Stroke  Atrial fibrillation (AFib) is the most common abnormal heart rhythm in the world. The heart has 2 upper chambers called atria and 2 lower chambers called ventricles. AFib causes the  atria to quiver (fibrillate) instead of pumping normally. Blood can then pool in the heart instead of moving in and out as usual. This can cause blood clots to form inside the heart. A clot can break free, travel to the brain and cause a stroke. A stroke can cause brain damage very quickly.    Taking medicine to prevent stroke  Your healthcare provider may prescribe a medicine to help prevent blood clots. This type of medicine is called a blood thinner. Blood thinners include:    Antiplatelet medicines, such as aspirin or clopidrogrel    Anticoagulation medicines, such as warfarin, dabigatran, rivaroxaban, apixaban, or edoxaban  Risks of blood thinner medicine  Blood thinners increase your risk of bleeding. If you take certain blood thinners, you may need to take extra steps to stay healthy. You may need regular blood tests to check the levels of medicine in your blood. You ll need to be careful not to injure yourself. And you may need to watch your diet for foods that affect blood clotting.  If your blood is too thin, you may have symptoms of excess bleeding, such as:    Unusual bruising    Bleeding from the gums    Blood in the urine or stool    Black stools    Vomiting blood    Nosebleeds    An unusual or severe headache  Taking the right dose  You ll need to make sure to take the medicine exactly as directed by your healthcare provider. Take it at the same time each day. If you miss a dose, call your provider right away to find out how much to take. Never take a double dose. If you take too much, it can cause too much bleeding. It can cause bleeding you can see, on the outside of your body. And it can cause bleeding on the inside of your body that you may not be aware of.  Getting your blood tested  Depending on which blood thinner you take, you may need to have your blood tested on a regular schedule. This is to make sure you don t have too much or too little of the medicine in your blood. Too much can cause  excess bleeding. Too little may not prevent blood clots from harming you.  You may need to visit a hospital or clinic every week to have your blood tested. Or a nurse may come to your home and test your blood. In some cases, you may be able to test your blood at home with a small machine. Talk with your healthcare provider to find out what s best for you. After the blood test, your healthcare provider may tell you to change your dose of medicine.  Watching your diet  Some foods can affect how certain blood thinners work. In particular, warfarin levels are sensitive to your diet. For example, many foods contain vitamin K. Vitamin K is a substance that helps your blood clot. You don t need to avoid foods that have vitamin K. But you do need to keep the amount of them you eat steady as possible from day to day. Examples of foods high in vitamin K are asparagus, avocado, broccoli, cabbage, kale, spinach, and some other leafy green vegetables. Oils, such as soybean, canola, and olive, are also high in vitamin K.  Other foods and drinks can affect the way blood thinners work in your body. These include:    Grapefruit and grapefruit juice    Cranberries and cranberry juice    Fish oil supplements    Garlic, daren, licorice, and turmeric    Herbs used in herbal teas or supplements    Alcohol  If any of these items are part of your regular diet, continue using them as you normally would. Don t make any major changes in your diet without first talking with your healthcare provider.  You may also need to limit fats in your diet to 2 to 4 tablespoons a day.  Preventing injury  Because blood thinners make you bleed more, you ll need to protect yourself from breaks in the skin. Follow these guidelines:    Don't go barefoot - always wear shoes.    Don't trim corns or calluses yourself.    Consider using an electric razor instead of a manual one.    Use a soft-bristled toothbrush and waxed dental floss.  You ll also need to avoid  any activities that may cause injury. If you fall or are injured, you could be bleeding inside your body and not know it. Make sure to get medical attention right away if you fall, hit your head, or have any other kind of injury.  Other safety tips  While on your medicine, be sure to:    Tell all of your healthcare providers that you take a blood thinner for AFib. This includes all of your doctors, dental care providers, and your pharmacist.    Ask your doctor before taking any new medicines, vitamins, or other supplements. Any of these can cause problems when you take a blood thinner.    Wear a medical alert bracelet or carry an ID card in your wallet if you will be taking blood thinners for months or longer.    Keep all appointments for your blood tests.  Procedures to prevent stroke  Most blood clots that form in the heart occur in a pouch of the left atrium called the appendage. This pouch can often be large and have multiple lobes which can permit blood pooling and clot formation. Left atrial appendage closure is a nonsurgical procedure in which a self-expanding plug is placed at the opening of the left atrial appendage to close off the appendage from the rest of the heart. Once the plug has fully sealed, no blood can enter or leave the appendage. This reduces blood clot formation and stroke risk. Ask your doctor if you qualify for this type of procedure.  Other ways to help prevent stroke  Your healthcare provider might give you other advice about how to lower your risk for stroke, such as:    Lowering your cholesterol with lifestyle changes or medicine    Not smoking    Getting physical activity    Losing weight if needed    Eating a heart-healthy diet    Not drinking too much alcohol     When to call your healthcare provider  Call your healthcare provider right away if you have any of these:    Unusual or severe headache    Confusion, weakness, or numbness    Loss of vision    Difficulty with  speech    Bleeding that won t stop    Coughing or vomiting blood    Bright red blood in the stool    Fall or injury to the head    Symptoms of atrial fibrillation that are new or getting worse   Date Last Reviewed: 5/1/2016 2000-2018 [employer]. [address] [place] [address], [place] [address]. All rights reserved. This information is not intended as a substitute for professional medical care. Always follow your healthcare professional's instructions.

## 2019-10-01 NOTE — NURSING NOTE
"Chief Complaint   Patient presents with     RECHECK     OV with MUNIR Mendoza for 1 month follow up for Afib, HTN. Patient reports moderate fatigue.       Initial BP (!) 141/82 (BP Location: Left arm, Patient Position: Sitting, Cuff Size: Adult Regular)   Pulse 68   Wt 117.5 kg (259 lb)   SpO2 96%   BMI 44.46 kg/m   Estimated body mass index is 44.46 kg/m  as calculated from the following:    Height as of 8/7/19: 1.626 m (5' 4\").    Weight as of this encounter: 117.5 kg (259 lb)..  BP completed using cuff size: Adult Regular    RAMAN Farley  "

## 2019-10-01 NOTE — PROGRESS NOTES
Subjective     Zaira Villatoro is a 49 year old female who presents to clinic today for the following health issues:      Patient Instructions on Last Visit 7/24/2019:  Patient Instructions   Consult your specialist to discuss Humira use as labs have shown no problems with liver, kidneys, thyroid or heart that are causing the swelling.   Discontinue Lasix at this time.   Take Atenolol once daily.   Omnicef 1 tab twice daily for 7-10 days, if you don't improve.      HPI   Diabetes  After she was on Humira, her swelling went down. She has a-fib and was started Eliquis. She recalls a 30 hours episode of a-fib when her heart monitor caught it. Her blood sugars were over 500 when she went to the ER for her a-fib. She relates that her blood sugar was 142 this morning been. She's been down to 92 in the morning and up at 220. She was prednisone for her GI and that may have elevated her numbers. She's been ohf Prednisone for a week now. Her first Entyvio injection started 15th of August.    Bowel movements - Patient notes that her diarrhea has improved and doesn't occur as often- once every month or two.      Patient Active Problem List   Diagnosis     Migraine     Ulcerative colitis (H)     Fatty liver     CARDIOVASCULAR SCREENING; LDL GOAL LESS THAN 100     Essential hypertension with goal blood pressure less than 140/90     History of seizures as a child     Type 2 diabetes mellitus with hyperglycemia, with long-term current use of insulin (H)     Morbid obesity due to excess calories (H)     Incisional hernia, without obstruction or gangrene     Paroxysmal atrial fibrillation (H)     Low back pain     MAHAD (obstructive sleep apnea)- severe (AHI 80)     Immunosuppressed status (H)     Plantar fasciitis     Pelvic pain     Past Surgical History:   Procedure Laterality Date     APPENDECTOMY  04/2014     ARTHROSCOPY KNEE RT/LT  2004    RT      BIOPSY  2011 many 2011 and on     COLONOSCOPY  02/2012    lt sided colitis      COLONOSCOPY  2014     CRYOTHERAPY, CERVICAL  1988     EXPLORATORY LAPAROTOMY, PARTIAL LEFT ROLANDA COLLECTOMY WITH HARTMANS PROCEDURE, COLOSTOMY  2013    lt rolanda colectomy, bowel perforation, colostomy, Karen's pouche     GI SURGERY      abrupted  bowl     HC TOOTH EXTRACTION W/FORCEP  2003    Abscess Tooth / Hospitalized     HERNIA REPAIR  2014    found at time of takedown     SINUS SURGERY  03    LT sinus cyst removal      TAKEDOWN COLOSTOMY  2014       Social History     Tobacco Use     Smoking status: Former Smoker     Packs/day: 1.00     Years: 15.00     Pack years: 15.00     Types: Cigarettes     Start date: 1985     Last attempt to quit: 1998     Years since quittin.2     Smokeless tobacco: Never Used     Tobacco comment: soke free household.   Substance Use Topics     Alcohol use: Yes     Alcohol/week: 0.0 standard drinks     Comment: occ     Family History   Problem Relation Age of Onset     Diabetes Mother      Allergies Mother      Asthma Mother      Arthritis Mother      Heart Disease Mother         heart murmur     Depression Mother      Obesity Mother      Eye Disorder Father      Diabetes Father      Cerebrovascular Disease Father      Heart Disease Father      Hypertension Father      Diabetes Maternal Grandmother      Cerebrovascular Disease Maternal Grandfather      Unknown/Adopted Paternal Grandmother      Heart Disease Paternal Grandfather         left ventrical failure     Cerebrovascular Disease Paternal Grandfather      Gynecology Sister         endometriosis     Depression Sister      Asthma Daughter      Breast Cancer Maternal Aunt      Thyroid Disease Maternal Aunt      Breast Cancer Other         fathers sister     Anesthesia Reaction Other      Thyroid Disease Other      Osteoporosis Other      Asthma Daughter      Breast Cancer Other         mothers sister     Glaucoma No family hx of      Macular Degeneration No family hx of          Current  Outpatient Medications   Medication Sig Dispense Refill     apixaban ANTICOAGULANT (ELIQUIS) 5 MG tablet Take 1 tablet (5 mg) by mouth 2 times daily 60 tablet 11     atenolol (TENORMIN) 50 MG tablet Take 1 tablet (50 mg) by mouth 2 times daily 180 tablet 3     BD ULTRA FINE PEN NEEDLES Inject 1 Device Subcutaneous daily 3 mm needles for Levemir pen 3 Box 3     blood glucose (ACCU-CHEK SHARMILA PLUS) test strip TEST 4 TIMES DAILY AND AS NEEDED 400 strip 3     blood glucose monitoring (ACCU-CHEK FASTCLIX) lancets 1 each 4 times daily Use to test blood sugar 4 times daily or as directed. 300 each 11     cholecalciferol 2000 UNITS tablet Take 2 tablets by mouth 2 times daily  30 tablet      diphenhydrAMINE (BENADRYL ALLERGY) 25 MG tablet as needed Reported on 4/12/2017       DRAMAMINE OR as needed       glimepiride (AMARYL) 4 MG tablet Take 1 and 1/2 tablets with breakfast.  If blood sugars are still elevated (>140 before meals) in 2 weeks, increase to 2 tabs in the morning. 180 tablet 1     LEVEMIR FLEXTOUCH 100 UNIT/ML pen INJECT 55 UNITS SUBCUTANEOUS AT BEDTIME 45 mL 1     losartan (COZAAR) 25 MG tablet TAKE 1/2 TABLET BY MOUTH DAILY 45 tablet 1     order for DME Equipment being ordered: CPAP 9 c, 1 Units 0     order for DME Glucometer, brand as covered by insurance. 1 each 0     order for DME Test strips for pt's glucometer, brand as covered by insurance. Test four times daily and prn. 400 each 4     semaglutide (OZEMPIC) 1 MG/DOSE pen Inject 1 mg Subcutaneous every 7 days 3 mL 3     STATIN NOT PRESCRIBED, INTENTIONAL, 1 each continuous prn Statin not prescribed intentionally due to Refusal by patient and Other:LDL is below 100. 0 each 0     TYLENOL CAPS 500 MG OR 1 CAPSULE EVERY 4 HOURS AS NEEDED       vedolizumab (ENTYVIO) 60 MG/ML injection Every six weeks       Allergies   Allergen Reactions     Demerol      Very low blood pressure     Fish      Pt reports allergy to all fish     Meperidine Other (See Comments)      "Other reaction(s): Hypotension  Drops blood pressure       Metformin GI Disturbance and Diarrhea     GI Disturbance       Naproxen      No Clinical Screening - See Comments      Pt reports allergy to all fish     Nubain  [Nalbuphine]      Seafood      Topiramate Other (See Comments) and Hives     Sleepy and confused.  Shaky, disoriented       Tylenol Sinus Max Other (See Comments)     Feet swelling     Latex Rash       Reviewed and updated as needed this visit by Provider  Tobacco  Allergies  Meds  Problems  Med Hx  Surg Hx  Fam Hx         Review of Systems   ROS COMP: Constitutional, HEENT, cardiovascular, pulmonary, GI, , musculoskeletal, neuro, skin, endocrine and psych systems are negative, except as otherwise noted.    This document serves as a record of the services and decisions personally performed and made by Tayler Bergman MD. It was created on her behalf by Dick Villalobos, a trained medical scribe. The creation of this document is based on the provider's statements to the medical scribe.  Dick Villalobos 10:22 AM October 4, 2019      Objective    /70 (BP Location: Right arm)   Pulse 75   Temp 98.3  F (36.8  C) (Oral)   Resp 19   Ht 1.626 m (5' 4\")   Wt 118.3 kg (260 lb 12.8 oz)   SpO2 98%   BMI 44.77 kg/m    Body mass index is 44.77 kg/m .  Physical Exam   GENERAL: healthy, alert and no distress  RESP: lungs clear to auscultation - no rales, rhonchi or wheezes  CV: regular rate and rhythm, normal S1 S2, no S3 or S4, no murmur, click or rub, no peripheral edema and peripheral pulses strong  MS: no gross musculoskeletal defects noted, trace pretibial edema  PSYCH: mentation appears normal, affect normal/bright  Diabetic foot exam: normal DP and PT pulses, no trophic changes or ulcerative lesions, normal sensory exam and normal monofilament exam      Diagnostic Test Results:  Labs reviewed in Epic  No results found for this or any previous visit (from the past 24 hour(s)).        Assessment & Plan " "  1. Type 2 diabetes mellitus with hyperglycemia, with long-term current use of insulin (H)  Uncontrolled  Discussed with patient about patient educator- patient declined.  Patient will let me know in 4 weeks with blood sugar numbers.  Foot exam was administered- normal.  Prescription placed today- per patient instructions.  Will continue to monitor.  - FOOT EXAM  - glimepiride (AMARYL) 4 MG tablet; Take 1 and 1/2 tablets with breakfast.  If blood sugars are still elevated (>140 before meals) in 2 weeks, increase to 2 tabs in the morning.  Dispense: 180 tablet; Refill: 1    2. Ulcerative colitis without complications, unspecified location (H)  Stable without complications.    3. Paroxysmal atrial fibrillation (H)  Patient has a-fib.  Patient recalls an episode where she was in a-fib for 30 hours.  Will continue to monitor.    4. Visit for screening mammogram  Mammogram ordered today.  Patient will schedule for appointment.  - *MA Screening Digital Bilateral; Future    5. Encounter for therapeutic drug monitoring  Labs checked today.  Will continue to monitor.  - CBC with platelets differential  - Comprehensive metabolic panel      Patient Instructions   Change Amaryl per bottle directions.  Notify me in 4 weeks with your blood sugars.         BMI:   Estimated body mass index is 43.77 kg/m  as calculated from the following:    Height as of 8/7/19: 1.626 m (5' 4\").    Weight as of 9/3/19: 115.7 kg (255 lb).   Weight management plan: Discussed healthy diet and exercise guidelines      The information in this document, created by the medical scribe for me, accurately reflects the services I personally performed and the decisions made by me. I have reviewed and approved this document for accuracy prior to leaving the patient care area.  October 4, 2019 10:22 AM    I spent 20 minutes of time with the patient and >50% of it was in education and counseling regarding health maintenance and medication recheck.  In: 10:17 " AM  Out: 10:37 AM    Tayler Bergman MD  [facility]

## 2019-10-04 ENCOUNTER — OFFICE VISIT (OUTPATIENT)
Dept: INTERNAL MEDICINE | Facility: CLINIC | Age: 49
End: 2019-10-04
Payer: COMMERCIAL

## 2019-10-04 VITALS
WEIGHT: 260.8 LBS | SYSTOLIC BLOOD PRESSURE: 114 MMHG | BODY MASS INDEX: 44.53 KG/M2 | RESPIRATION RATE: 19 BRPM | OXYGEN SATURATION: 98 % | TEMPERATURE: 98.3 F | HEIGHT: 64 IN | DIASTOLIC BLOOD PRESSURE: 70 MMHG | HEART RATE: 75 BPM

## 2019-10-04 DIAGNOSIS — E11.65 TYPE 2 DIABETES MELLITUS WITH HYPERGLYCEMIA, WITH LONG-TERM CURRENT USE OF INSULIN (H): Primary | ICD-10-CM

## 2019-10-04 DIAGNOSIS — K51.90 ULCERATIVE COLITIS WITHOUT COMPLICATIONS, UNSPECIFIED LOCATION (H): Chronic | ICD-10-CM

## 2019-10-04 DIAGNOSIS — Z23 NEED FOR PROPHYLACTIC VACCINATION AND INOCULATION AGAINST INFLUENZA: ICD-10-CM

## 2019-10-04 DIAGNOSIS — Z79.4 TYPE 2 DIABETES MELLITUS WITH HYPERGLYCEMIA, WITH LONG-TERM CURRENT USE OF INSULIN (H): Primary | ICD-10-CM

## 2019-10-04 DIAGNOSIS — Z12.31 VISIT FOR SCREENING MAMMOGRAM: ICD-10-CM

## 2019-10-04 DIAGNOSIS — I48.0 PAROXYSMAL ATRIAL FIBRILLATION (H): ICD-10-CM

## 2019-10-04 DIAGNOSIS — Z51.81 ENCOUNTER FOR THERAPEUTIC DRUG MONITORING: ICD-10-CM

## 2019-10-04 PROBLEM — Z86.79 PERSONAL HISTORY OF ATRIAL FIBRILLATION: Chronic | Status: RESOLVED | Noted: 2019-07-24 | Resolved: 2019-10-04

## 2019-10-04 LAB
ALBUMIN SERPL-MCNC: 3.4 G/DL (ref 3.4–5)
ALP SERPL-CCNC: 53 U/L (ref 40–150)
ALT SERPL W P-5'-P-CCNC: 45 U/L (ref 0–50)
ANION GAP SERPL CALCULATED.3IONS-SCNC: 7 MMOL/L (ref 3–14)
AST SERPL W P-5'-P-CCNC: 25 U/L (ref 0–45)
BASOPHILS # BLD AUTO: 0 10E9/L (ref 0–0.2)
BASOPHILS NFR BLD AUTO: 0.5 %
BILIRUB SERPL-MCNC: 1 MG/DL (ref 0.2–1.3)
BUN SERPL-MCNC: 10 MG/DL (ref 7–30)
CALCIUM SERPL-MCNC: 8.6 MG/DL (ref 8.5–10.1)
CHLORIDE SERPL-SCNC: 105 MMOL/L (ref 94–109)
CO2 SERPL-SCNC: 27 MMOL/L (ref 20–32)
CREAT SERPL-MCNC: 0.78 MG/DL (ref 0.52–1.04)
DIFFERENTIAL METHOD BLD: NORMAL
EOSINOPHIL # BLD AUTO: 0.4 10E9/L (ref 0–0.7)
EOSINOPHIL NFR BLD AUTO: 4.4 %
ERYTHROCYTE [DISTWIDTH] IN BLOOD BY AUTOMATED COUNT: 13.7 % (ref 10–15)
GFR SERPL CREATININE-BSD FRML MDRD: 90 ML/MIN/{1.73_M2}
GLUCOSE SERPL-MCNC: 171 MG/DL (ref 70–99)
HBA1C MFR BLD: 8 % (ref 0–5.6)
HCT VFR BLD AUTO: 41.9 % (ref 35–47)
HGB BLD-MCNC: 13.8 G/DL (ref 11.7–15.7)
LYMPHOCYTES # BLD AUTO: 2.3 10E9/L (ref 0.8–5.3)
LYMPHOCYTES NFR BLD AUTO: 28.9 %
MCH RBC QN AUTO: 30.7 PG (ref 26.5–33)
MCHC RBC AUTO-ENTMCNC: 32.9 G/DL (ref 31.5–36.5)
MCV RBC AUTO: 93 FL (ref 78–100)
MONOCYTES # BLD AUTO: 0.8 10E9/L (ref 0–1.3)
MONOCYTES NFR BLD AUTO: 10.4 %
NEUTROPHILS # BLD AUTO: 4.4 10E9/L (ref 1.6–8.3)
NEUTROPHILS NFR BLD AUTO: 55.8 %
PLATELET # BLD AUTO: 258 10E9/L (ref 150–450)
POTASSIUM SERPL-SCNC: 4 MMOL/L (ref 3.4–5.3)
PROT SERPL-MCNC: 6.6 G/DL (ref 6.8–8.8)
RBC # BLD AUTO: 4.5 10E12/L (ref 3.8–5.2)
SODIUM SERPL-SCNC: 139 MMOL/L (ref 133–144)
WBC # BLD AUTO: 7.9 10E9/L (ref 4–11)

## 2019-10-04 PROCEDURE — 90471 IMMUNIZATION ADMIN: CPT | Performed by: INTERNAL MEDICINE

## 2019-10-04 PROCEDURE — 99207 C FOOT EXAM  NO CHARGE: CPT | Performed by: INTERNAL MEDICINE

## 2019-10-04 PROCEDURE — 99214 OFFICE O/P EST MOD 30 MIN: CPT | Mod: 25 | Performed by: INTERNAL MEDICINE

## 2019-10-04 PROCEDURE — 80053 COMPREHEN METABOLIC PANEL: CPT | Performed by: INTERNAL MEDICINE

## 2019-10-04 PROCEDURE — 83036 HEMOGLOBIN GLYCOSYLATED A1C: CPT | Performed by: INTERNAL MEDICINE

## 2019-10-04 PROCEDURE — 85025 COMPLETE CBC W/AUTO DIFF WBC: CPT | Performed by: INTERNAL MEDICINE

## 2019-10-04 PROCEDURE — 36415 COLL VENOUS BLD VENIPUNCTURE: CPT | Performed by: INTERNAL MEDICINE

## 2019-10-04 PROCEDURE — 90686 IIV4 VACC NO PRSV 0.5 ML IM: CPT | Performed by: INTERNAL MEDICINE

## 2019-10-04 RX ORDER — GLIMEPIRIDE 4 MG/1
TABLET ORAL
Qty: 180 TABLET | Refills: 1 | Status: SHIPPED | OUTPATIENT
Start: 2019-10-04 | End: 2019-12-05

## 2019-10-04 ASSESSMENT — MIFFLIN-ST. JEOR: SCORE: 1797.98

## 2019-10-04 ASSESSMENT — PAIN SCALES - GENERAL: PAINLEVEL: MODERATE PAIN (4)

## 2019-10-08 ENCOUNTER — ANCILLARY PROCEDURE (OUTPATIENT)
Dept: MAMMOGRAPHY | Facility: CLINIC | Age: 49
End: 2019-10-08
Attending: INTERNAL MEDICINE
Payer: COMMERCIAL

## 2019-10-08 DIAGNOSIS — Z12.31 VISIT FOR SCREENING MAMMOGRAM: ICD-10-CM

## 2019-10-08 PROCEDURE — 77067 SCR MAMMO BI INCL CAD: CPT | Mod: TC

## 2019-10-10 ENCOUNTER — TRANSFERRED RECORDS (OUTPATIENT)
Dept: HEALTH INFORMATION MANAGEMENT | Facility: CLINIC | Age: 49
End: 2019-10-10

## 2019-10-14 ENCOUNTER — TELEPHONE (OUTPATIENT)
Dept: INTERNAL MEDICINE | Facility: CLINIC | Age: 49
End: 2019-10-14

## 2019-10-14 ENCOUNTER — ANCILLARY PROCEDURE (OUTPATIENT)
Dept: CARDIOLOGY | Facility: CLINIC | Age: 49
End: 2019-10-14
Attending: NURSE PRACTITIONER
Payer: COMMERCIAL

## 2019-10-14 DIAGNOSIS — I48.0 PAROXYSMAL ATRIAL FIBRILLATION (H): ICD-10-CM

## 2019-10-14 PROCEDURE — 93306 TTE W/DOPPLER COMPLETE: CPT | Performed by: INTERNAL MEDICINE

## 2019-10-14 NOTE — TELEPHONE ENCOUNTER
Panel Management Review      Patient has the following on her problem list:     Diabetes    ASA: Passed    Last A1C  Lab Results   Component Value Date    A1C 8.0 10/04/2019    A1C 7.4 06/05/2019    A1C 8.2 03/05/2019    A1C 8.1 08/24/2018    A1C 8.8 05/24/2018     A1C tested: MONITOR    Last LDL:    Lab Results   Component Value Date    CHOL 123 06/06/2019     Lab Results   Component Value Date    HDL 42 06/06/2019     Lab Results   Component Value Date    LDL 68 06/06/2019     Lab Results   Component Value Date    TRIG 64 06/06/2019     Lab Results   Component Value Date    CHOLHDLRATIO 3.2 11/04/2015     Lab Results   Component Value Date    NHDL 81 06/06/2019       Is the patient on a Statin? NO             Is the patient on Aspirin? NO    Medications     HMG CoA Reductase Inhibitors     STATIN NOT PRESCRIBED, INTENTIONAL,             Last three blood pressure readings:  BP Readings from Last 3 Encounters:   10/04/19 114/70   10/01/19 (!) 149/83   09/03/19 132/84       Date of last diabetes office visit: 10/04/2019     Tobacco History:     History   Smoking Status     Former Smoker     Packs/day: 1.00     Years: 15.00     Types: Cigarettes     Start date: 1/1/1985     Quit date: 7/1/1998   Smokeless Tobacco     Never Used     Comment: soke free household.         Hypertension   Last three blood pressure readings:  BP Readings from Last 3 Encounters:   10/04/19 114/70   10/01/19 (!) 149/83   09/03/19 132/84     Blood pressure: MONITOR    HTN Guidelines:  Less than 140/90      Composite cancer screening  Chart review shows that this patient is due/due soon for the following None  Summary:    Patient is due/failing the following:   PHYSICAL    Action needed:   Patient needs office visit for Physocal and diabetic eye exam.    Type of outreach:    mychart    Questions for provider review:    None                                                                                                                                     LS     Chart routed to none .

## 2019-11-13 ENCOUNTER — OFFICE VISIT (OUTPATIENT)
Dept: OPHTHALMOLOGY | Facility: CLINIC | Age: 49
End: 2019-11-13
Payer: COMMERCIAL

## 2019-11-13 DIAGNOSIS — H52.13 MYOPIA OF BOTH EYES: ICD-10-CM

## 2019-11-13 DIAGNOSIS — E11.65 TYPE 2 DIABETES MELLITUS WITH HYPERGLYCEMIA, WITH LONG-TERM CURRENT USE OF INSULIN (H): ICD-10-CM

## 2019-11-13 DIAGNOSIS — H52.223 REGULAR ASTIGMATISM OF BOTH EYES: ICD-10-CM

## 2019-11-13 DIAGNOSIS — Z79.4 TYPE 2 DIABETES MELLITUS WITH HYPERGLYCEMIA, WITH LONG-TERM CURRENT USE OF INSULIN (H): ICD-10-CM

## 2019-11-13 DIAGNOSIS — Z01.00 EXAMINATION OF EYES AND VISION: Primary | ICD-10-CM

## 2019-11-13 DIAGNOSIS — H02.831 DERMATOCHALASIS OF BOTH UPPER EYELIDS: ICD-10-CM

## 2019-11-13 DIAGNOSIS — H52.4 PRESBYOPIA: ICD-10-CM

## 2019-11-13 DIAGNOSIS — H02.834 DERMATOCHALASIS OF BOTH UPPER EYELIDS: ICD-10-CM

## 2019-11-13 PROCEDURE — 92015 DETERMINE REFRACTIVE STATE: CPT | Performed by: STUDENT IN AN ORGANIZED HEALTH CARE EDUCATION/TRAINING PROGRAM

## 2019-11-13 PROCEDURE — 92004 COMPRE OPH EXAM NEW PT 1/>: CPT | Performed by: STUDENT IN AN ORGANIZED HEALTH CARE EDUCATION/TRAINING PROGRAM

## 2019-11-13 ASSESSMENT — EXTERNAL EXAM - RIGHT EYE: OD_EXAM: PROLAPSED FAT PADS: LOWER

## 2019-11-13 ASSESSMENT — SLIT LAMP EXAM - LIDS
COMMENTS: 1+ DERMATOCHALASIS
COMMENTS: 1+ DERMATOCHALASIS

## 2019-11-13 ASSESSMENT — REFRACTION_MANIFEST
OS_AXIS: 092
OD_ADD: +2.25
OD_CYLINDER: +1.00
OD_AXIS: 094
OS_CYLINDER: +0.50
OS_ADD: +2.25
OS_SPHERE: -4.50
OD_SPHERE: -5.75

## 2019-11-13 ASSESSMENT — VISUAL ACUITY
METHOD: SNELLEN - LINEAR
OD_CC: 20/20
OS_CC+: -2
CORRECTION_TYPE: GLASSES
OS_CC: 20/20

## 2019-11-13 ASSESSMENT — TONOMETRY
IOP_METHOD: APPLANATION
OS_IOP_MMHG: 19
OD_IOP_MMHG: 21

## 2019-11-13 ASSESSMENT — CUP TO DISC RATIO
OD_RATIO: 0.2
OS_RATIO: 0.2

## 2019-11-13 ASSESSMENT — CONF VISUAL FIELD
METHOD: COUNTING FINGERS
OS_NORMAL: 1
OD_NORMAL: 1

## 2019-11-13 ASSESSMENT — REFRACTION_WEARINGRX
SPECS_TYPE: PAL
OD_AXIS: 101
OD_ADD: +1.75
OS_ADD: +1.75
OS_AXIS: 089
OS_SPHERE: -4.25
OD_CYLINDER: +1.00
OD_SPHERE: -5.75
OS_CYLINDER: +0.75

## 2019-11-13 ASSESSMENT — EXTERNAL EXAM - LEFT EYE: OS_EXAM: PROLAPSED FAT PADS: LOWER

## 2019-11-13 NOTE — LETTER
"    11/13/2019         RE: Zaira Villatoro  [address] [facility] [address]        Dear Colleague,    Thank you for referring your patient, Zaira Villatoro, to the [facility]. Please see a copy of my visit note below.     Current Eye Medications:  none     Subjective:  Complete Diabetic eye exam. Vision is not bad in distance, but could be better. Having trouble reading with glasses, takes glasses off to read. Patient states, old pair of glasses, broke most recent ones 6 months ago. Patient is a little worries about above eyelids being puffy for last year. No eye pain or in either eye.   Diabetic for last 5 years, blood sugar has been unstable.    Lab Results   Component Value Date    A1C 8.0 10/04/2019    A1C 7.4 06/05/2019    A1C 8.2 03/05/2019    A1C 8.1 08/24/2018    A1C 8.8 05/24/2018     She finds that manual lifting of the lids helps her see better.     Objective:  See Ophthalmology Exam.       Assessment:  Zaira Villatoro is a 49 year old female who presents with:   Encounter Diagnoses   Name Primary?     Examination of eyes and vision      Myopia of both eyes      Regular astigmatism of both eyes      Presbyopia          Type 2 diabetes mellitus with hyperglycemia, with long-term current use of insulin (H) Negative diabetic retinopathy      Dermatochalasis of both upper eyelids Referral to Dr. Knight at [facility] for lid evaluation.        Plan:  Glasses prescription given    Keep blood sugars and blood pressure under good control.    Use artificial tears up to four times a day (like Refresh Optive, Systane Balance, TheraTears, or generic artificial tears are ok. Avoid \"get the red out\" drops).    Isaias Mistry MD  [phone]        Again, thank you for allowing me to participate in the care of your patient.        Sincerely,        Isaias Mistry MD    "

## 2019-11-13 NOTE — PROGRESS NOTES
" Current Eye Medications:  none     Subjective:  Complete Diabetic eye exam. Vision is not bad in distance, but could be better. Having trouble reading with glasses, takes glasses off to read. Patient states, old pair of glasses, broke most recent ones 6 months ago. Patient is a little worries about above eyelids being puffy for last year. No eye pain or in either eye.   Diabetic for last 5 years, blood sugar has been unstable.    Lab Results   Component Value Date    A1C 8.0 10/04/2019    A1C 7.4 06/05/2019    A1C 8.2 03/05/2019    A1C 8.1 08/24/2018    A1C 8.8 05/24/2018     She finds that manual lifting of the lids helps her see better.     Objective:  See Ophthalmology Exam.       Assessment:  Zaira Villatoro is a 49 year old female who presents with:   Encounter Diagnoses   Name Primary?     Examination of eyes and vision      Myopia of both eyes      Regular astigmatism of both eyes      Presbyopia          Type 2 diabetes mellitus with hyperglycemia, with long-term current use of insulin (H) Negative diabetic retinopathy      Dermatochalasis of both upper eyelids Referral to Dr. Knight at [facility] for lid evaluation.        Plan:  Glasses prescription given    Keep blood sugars and blood pressure under good control.    Use artificial tears up to four times a day (like Refresh Optive, Systane Balance, TheraTears, or generic artificial tears are ok. Avoid \"get the red out\" drops).    Isaias Mistry MD  [phone]      "

## 2019-11-13 NOTE — PATIENT INSTRUCTIONS
"Glasses prescription given    Keep blood sugars and blood pressure under good control.    Use artificial tears up to four times a day (like Refresh Optive, Systane Balance, TheraTears, or generic artificial tears are ok. Avoid \"get the red out\" drops).    Isaias Mistry MD  [phone]    Patient Education   Diabetes weakens the blood vessels all over the body, including the eyes. Damage to the blood vessels in the eyes can cause swelling or bleeding into part of the eye (called the retina). This is called diabetic retinopathy (ALYSSA-tin--pu-thee). If not treated, this disease can cause vision loss or blindness.   Symptoms may include blurred or distorted vision, but many people have no symptoms. It's important to see your eye doctor regularly to check for problems.   Early treatment and good control can help protect your vision. Here are the things you can do to help prevent vision loss:      1. Keep your blood sugar levels under tight control.      2. Bring high blood pressure under control.      3. No smoking.      4. Have yearly dilated eye exams.       "

## 2019-11-15 DIAGNOSIS — E11.9 TYPE 2 DIABETES MELLITUS WITHOUT COMPLICATION, WITH LONG-TERM CURRENT USE OF INSULIN (H): ICD-10-CM

## 2019-11-15 DIAGNOSIS — Z79.4 TYPE 2 DIABETES MELLITUS WITHOUT COMPLICATION, WITH LONG-TERM CURRENT USE OF INSULIN (H): ICD-10-CM

## 2019-11-15 RX ORDER — GLIMEPIRIDE 1 MG/1
TABLET ORAL
Qty: 90 TABLET | Refills: 1 | OUTPATIENT
Start: 2019-11-15

## 2019-11-21 ENCOUNTER — TRANSFERRED RECORDS (OUTPATIENT)
Dept: HEALTH INFORMATION MANAGEMENT | Facility: CLINIC | Age: 49
End: 2019-11-21

## 2019-11-30 DIAGNOSIS — E11.9 TYPE 2 DIABETES MELLITUS WITHOUT COMPLICATION, WITH LONG-TERM CURRENT USE OF INSULIN (H): ICD-10-CM

## 2019-11-30 DIAGNOSIS — Z79.4 TYPE 2 DIABETES MELLITUS WITHOUT COMPLICATION, WITH LONG-TERM CURRENT USE OF INSULIN (H): ICD-10-CM

## 2019-12-01 ENCOUNTER — MYC MEDICAL ADVICE (OUTPATIENT)
Dept: NURSING | Facility: CLINIC | Age: 49
End: 2019-12-01

## 2019-12-01 DIAGNOSIS — E11.65 TYPE 2 DIABETES MELLITUS WITH HYPERGLYCEMIA, WITH LONG-TERM CURRENT USE OF INSULIN (H): ICD-10-CM

## 2019-12-01 DIAGNOSIS — Z79.4 TYPE 2 DIABETES MELLITUS WITH HYPERGLYCEMIA, WITH LONG-TERM CURRENT USE OF INSULIN (H): ICD-10-CM

## 2019-12-01 RX ORDER — INSULIN DETEMIR 100 [IU]/ML
INJECTION, SOLUTION SUBCUTANEOUS
Qty: 45 ML | Refills: 0 | Status: SHIPPED | OUTPATIENT
Start: 2019-12-01 | End: 2020-02-14

## 2019-12-02 NOTE — TELEPHONE ENCOUNTER
Prescription approved per [facility] [facility].  Phyllis Solomon RN     Pt is scheduled.  Return in about 3 months (around 1/4/2020) for Diabetic check.   Change Amaryl per bottle directions.  Notify me in 4 weeks with your blood sugars.           Will send my lizz to ask about bs readings.

## 2019-12-05 RX ORDER — GLIMEPIRIDE 4 MG/1
TABLET ORAL
Qty: 135 TABLET | Refills: 3 | Status: SHIPPED | OUTPATIENT
Start: 2019-12-05 | End: 2020-03-17

## 2019-12-05 RX ORDER — GLIMEPIRIDE 4 MG/1
TABLET ORAL
Qty: 135 TABLET | Refills: 3 | Status: SHIPPED | OUTPATIENT
Start: 2019-12-05 | End: 2019-12-05

## 2019-12-11 ENCOUNTER — OFFICE VISIT (OUTPATIENT)
Dept: FAMILY MEDICINE | Facility: CLINIC | Age: 49
End: 2019-12-11
Payer: COMMERCIAL

## 2019-12-11 ENCOUNTER — OFFICE VISIT (OUTPATIENT)
Dept: OPHTHALMOLOGY | Facility: CLINIC | Age: 49
End: 2019-12-11
Payer: COMMERCIAL

## 2019-12-11 VITALS
TEMPERATURE: 96.8 F | OXYGEN SATURATION: 100 % | SYSTOLIC BLOOD PRESSURE: 124 MMHG | WEIGHT: 256 LBS | HEART RATE: 78 BPM | BODY MASS INDEX: 43.94 KG/M2 | DIASTOLIC BLOOD PRESSURE: 68 MMHG

## 2019-12-11 DIAGNOSIS — H02.834 DERMATOCHALASIS OF BOTH UPPER EYELIDS: Primary | ICD-10-CM

## 2019-12-11 DIAGNOSIS — H02.831 DERMATOCHALASIS OF BOTH UPPER EYELIDS: Primary | ICD-10-CM

## 2019-12-11 DIAGNOSIS — H02.403 INVOLUTIONAL PTOSIS, ACQUIRED, BILATERAL: ICD-10-CM

## 2019-12-11 DIAGNOSIS — H57.813 BROW PTOSIS, BILATERAL: ICD-10-CM

## 2019-12-11 DIAGNOSIS — H60.391 INFECTIVE OTITIS EXTERNA, RIGHT: Primary | ICD-10-CM

## 2019-12-11 DIAGNOSIS — I10 ESSENTIAL HYPERTENSION WITH GOAL BLOOD PRESSURE LESS THAN 140/90: ICD-10-CM

## 2019-12-11 PROCEDURE — 92081 LIMITED VISUAL FIELD XM: CPT | Performed by: OPHTHALMOLOGY

## 2019-12-11 PROCEDURE — 99213 OFFICE O/P EST LOW 20 MIN: CPT | Performed by: NURSE PRACTITIONER

## 2019-12-11 PROCEDURE — 99243 OFF/OP CNSLTJ NEW/EST LOW 30: CPT | Performed by: OPHTHALMOLOGY

## 2019-12-11 PROCEDURE — 92285 EXTERNAL OCULAR PHOTOGRAPHY: CPT | Performed by: OPHTHALMOLOGY

## 2019-12-11 RX ORDER — OFLOXACIN 3 MG/ML
10 SOLUTION AURICULAR (OTIC) DAILY
Qty: 5 ML | Refills: 0 | Status: SHIPPED | OUTPATIENT
Start: 2019-12-11 | End: 2020-02-12

## 2019-12-11 RX ORDER — LOSARTAN POTASSIUM 25 MG/1
TABLET ORAL
Qty: 45 TABLET | Refills: 0 | Status: SHIPPED | OUTPATIENT
Start: 2019-12-11 | End: 2020-03-11

## 2019-12-11 ASSESSMENT — VISUAL ACUITY
OD_CC: 20/20
METHOD: SNELLEN - LINEAR
OS_CC: 20/20
CORRECTION_TYPE: GLASSES

## 2019-12-11 ASSESSMENT — EXTERNAL EXAM - RIGHT EYE: OD_EXAM: BROW PTOSIS, WITH FRONTALIS RELAXED, BROW IS BELOW SUPERIOR ORBITAL RIM AND LATERALLY INLINE WITH UPPER LASHES

## 2019-12-11 ASSESSMENT — SLIT LAMP EXAM - LIDS
COMMENTS: HEAVY DERMATOCHALASIS RESTING ON LASHES, TRUE PTOSIS
COMMENTS: HEAVY DERMATOCHALASIS RESTING ON LASHES, TRUE PTOSIS

## 2019-12-11 ASSESSMENT — EXTERNAL EXAM - LEFT EYE
OS_EXAM: BROW PTOSIS, WITH FRONTALIS RELAXED, BROW IS BELOW SUPERIOR ORBITAL RIM AND LATERALLY INLINE WITH UPPER LASHES WORSE ON THE LEFT

## 2019-12-11 NOTE — PROGRESS NOTES
"Oculoplastic Clinic New Patient    Patient: Zaira Villatoro MRN# [id number]   YOB: 1970 Age: 49 year old   Date of Visit: Dec 11, 2019    CC: Droopy eyelids obstructing vision.              HPI:     Chief Complaint(s) and History of Present Illness(es)     Referral               Comments     Blepharoptsosis evaluation referral by Dr. Mistry.  Patient feels her eyes are getting \"smaller and smaller\".  Upper eyelids feels puffier and go over her lashes sometimes.  Eye meds: none  Mariangel Conleycharlagarcia, [place] 12/11/2019 8:51 AM          Zaira Villatoro is a 49 year old female who has noted gradual onset of droopy eyelids over the past years. The droopy eyelid is interfering with activities of daily living including driving, and reading. The patient denies double vision, variability of the eyelid position, or dry eye symptoms.     Has ulcerative colitis, is on an infusion (unsure which), and is intermittently on steroids. Currently not on steroids.   EXAM:     MRD1: 1 both eyes   Dermatochalasis with excess skin touching eyelashes with significant fat prolapse  Brow ptosis with brow resting below superior orbital rim worse on the left  Aponeurotic ptosis    VISUAL FIELD:  Right eye untaped:17 degrees Right eye taped:50 degrees  Left eye untaped:25 degrees Left eye taped:55 degrees    Assessment & Plan     Zaira Villatoro is a 49 year old female with the following diagnoses:   1. Dermatochalasis of both upper eyelids    2. Involutional ptosis, acquired, bilateral    3. Brow ptosis, bilateral       Significant fat prolapse possibly steroid related    Both upper eyelid blepharoplasty , Bilateral ptosis repair ruiz muscle conjunctival resection 8.5 ou and Internal browpexy left [id number] both eyes [id number] left      ANTICOAGULATION:  Eliquis - for Afib.          PHOTOS DEMONSTRATE:    Significant dermatochalasis with lids resting on eyelashes and obstructing visual axis  Blepharoptosis  Brow ptosis with thicker " brow skin and hairs below the lateral superior orbital rim    Attending Physician Attestation:  Complete documentation of historical and exam elements from today's encounter can be found in the full encounter summary report (not reduplicated in this progress note).  I personally obtained the chief complaint(s) and history of present illness.  I confirmed and edited as necessary the review of systems, past medical/surgical history, family history, social history, and examination findings as documented by others; and I examined the patient myself.  I personally reviewed the relevant tests, images, and reports as documented above.  I formulated and edited as necessary the assessment and plan and discussed the findings and management plan with the patient and family. - Chelsie Knight MD        Today with Zaira Villatoro, I reviewed the indications, risks, benefits, and alternatives of the proposed surgical procedure including, but not limited to, failure obtain the desired result  and need for additional surgery, bleeding, infection, loss of vision, loss of the eye, and the remote possibility of permanent damage to any organ system or death with the use of anesthesia.  I provided multiple opportunities for the questions, answered all questions to the best of my ability, and confirmed that my answers and my discussion were understood.

## 2019-12-11 NOTE — NURSING NOTE
"Chief Complaints and History of Present Illnesses   Patient presents with     Referral      Chief Complaint(s) and History of Present Illness(es)     Referral               Comments     Blepharoptsosis evaluation referral by Dr. Mistry.  Patient feels her eyes are getting \"smaller and smaller\".  Upper eyelids feels puffier and go over her lashes sometimes.  Eye meds: none  EMMANUEL Jack 12/11/2019 8:51 AM                  "

## 2019-12-11 NOTE — LETTER
" 2019         RE:  :  MRN: Zaira Villatoro  1970  [id number]     Dear Dr. Mistry,    Thank you for asking me to see your patient, Zaira Villatoro, for an oculoplastic   consultation.  My assessment and plan are below.  For further details, please see my attached clinic note.           HPI:     Chief Complaint(s) and History of Present Illness(es)     Referral               Comments     Blepharoptsosis evaluation referral by Dr. Mistry.  Patient feels her eyes are getting \"smaller and smaller\".  Upper eyelids feels puffier and go over her lashes sometimes.  Eye meds: none  Cheera Harpreet, [place] 2019 8:51 AM          Zaira Villatoro is a 49 year old female who has noted gradual onset of droopy eyelids over the past years. The droopy eyelid is interfering with activities of daily living including driving, and reading. The patient denies double vision, variability of the eyelid position, or dry eye symptoms.     Has ulcerative colitis, is on an infusion (unsure which), and is intermittently on steroids. Currently not on steroids.   EXAM:     MRD1: 1 both eyes   Dermatochalasis with excess skin touching eyelashes with significant fat prolapse  Brow ptosis with brow resting below superior orbital rim worse on the left  Aponeurotic ptosis    VISUAL FIELD:  Right eye untaped:17 degrees Right eye taped:50 degrees  Left eye untaped:25 degrees Left eye taped:55 degrees    Assessment & Plan     Zaira Villatoro is a 49 year old female with the following diagnoses:   1. Dermatochalasis of both upper eyelids    2. Involutional ptosis, acquired, bilateral    3. Brow ptosis, bilateral       Significant fat prolapse possibly steroid related    Both upper eyelid blepharoplasty , Bilateral ptosis repair ruiz muscle conjunctival resection 8.5 ou and Internal browpexy left [id number] both eyes [id number] left      ANTICOAGULATION:  Eliquis - for Afib.         Again, thank you for allowing me to participate in the " care of your patient.      Sincerely,    Chelsie Kinght MD  Department of Ophthalmology and Visual Neurosciences  [facility]    CC: Isaias Mistry MD  [address] [address]  VIA In Basket

## 2019-12-11 NOTE — PROGRESS NOTES
SUBJECTIVE:  Zaira Villatoro is a 49 year old female who presents with right ear pain, discharge and pressure, itchy for 2 week(s).   Severity: moderate, waking her up at night.   Timing:gradual onset and worsening  Additional symptoms include congestion and cough.      History of recurrent otitis: no    Past Medical History:   Diagnosis Date     Acute diverticulitis 7/31/2013     History of seizures as a child 1981    One grand mal in 5th grade.  Didn't tolerate phenobarb.     Moderate single current episode of major depressive disorder (H)      Perforation of sigmoid colon (H) 8/3/2013     S/P left hemicolectomy 8/16/2013 8/02/13      Sepsis (H) 8/1/2013     Current Outpatient Medications   Medication Sig Dispense Refill     apixaban ANTICOAGULANT (ELIQUIS) 5 MG tablet Take 1 tablet (5 mg) by mouth 2 times daily 60 tablet 11     atenolol (TENORMIN) 50 MG tablet Take 1 tablet (50 mg) by mouth 2 times daily 180 tablet 3     BD ULTRA FINE PEN NEEDLES Inject 1 Device Subcutaneous daily 3 mm needles for Levemir pen 3 Box 3     blood glucose (ACCU-CHEK SHARMILA PLUS) test strip TEST 4 TIMES DAILY AND AS NEEDED 400 strip 3     blood glucose monitoring (ACCU-CHEK FASTCLIX) lancets 1 each 4 times daily Use to test blood sugar 4 times daily or as directed. 300 each 11     cholecalciferol 2000 UNITS tablet Take 2 tablets by mouth 2 times daily  30 tablet      diphenhydrAMINE (BENADRYL ALLERGY) 25 MG tablet as needed Reported on 4/12/2017       DRAMAMINE OR as needed       glimepiride (AMARYL) 4 MG tablet Take 1 and 1/2 tablets (6mg) with breakfast. 135 tablet 3     LEVEMIR FLEXTOUCH 100 UNIT/ML pen INJECT 55 UNITS SUBCUTANEOUSLY AT BEDTIME 45 mL 0     losartan (COZAAR) 25 MG tablet TAKE 1/2 TABLET BY MOUTH DAILY 45 tablet 0     order for DME Equipment being ordered: CPAP 9 c, 1 Units 0     order for DME Glucometer, brand as covered by insurance. 1 each 0     order for DME Test strips for pt's glucometer, brand as covered  by insurance. Test four times daily and prn. 400 each 4     semaglutide (OZEMPIC) 1 MG/DOSE pen Inject 1 mg Subcutaneous every 7 days 3 mL 3     STATIN NOT PRESCRIBED, INTENTIONAL, 1 each continuous prn Statin not prescribed intentionally due to Refusal by patient and Other:LDL is below 100. 0 each 0     TYLENOL CAPS 500 MG OR 1 CAPSULE EVERY 4 HOURS AS NEEDED       vedolizumab (ENTYVIO) 60 MG/ML injection Every six weeks       Social History     Tobacco Use     Smoking status: Former Smoker     Packs/day: 1.00     Years: 15.00     Pack years: 15.00     Types: Cigarettes     Start date: 1985     Last attempt to quit: 1998     Years since quittin.4     Smokeless tobacco: Never Used     Tobacco comment: soke free household.   Substance Use Topics     Alcohol use: Yes     Alcohol/week: 0.0 standard drinks     Comment: occ       ROS:   Review of systems negative except as stated above.    OBJECTIVE:  /68   Pulse 78   Temp 96.8  F (36  C) (Oral)   Wt 116.1 kg (256 lb)   SpO2 100%   BMI 43.94 kg/m     EXAM:   The right auditory canal is erythematous, swollen and tender  The left TM is normal: no effusions, no erythema, and normal landmarks  The left auditory canal is normal and without drainage, edema or erythema  Oropharynx exam is normal: no lesions, erythema, adenopathy or exudate.  GENERAL: no acute distress  EYES: EOMI,  PERRL, conjunctiva clear  NECK: supple, non-tender to palpation, no adenopathy noted  RESP: lungs clear to auscultation - no rales, rhonchi or wheezes  CV: regular rates and rhythm, normal S1 S2, no murmur noted  SKIN: no suspicious lesions or rashes     ASSESSMENT:  (H60.391) Infective otitis externa, right  (primary encounter diagnosis)    Plan: ofloxacin (FLOXIN) 0.3 % otic solution  Tylenol/ibuprofen, home care given. Monitor symptoms, call or rtc if not improving or worsening    Keshia Dolye, APRN CNP

## 2019-12-11 NOTE — PATIENT INSTRUCTIONS
Patient Education     External Ear Infection (Adult)    External otitis (also called  swimmer s ear ) is an infection in the ear canal. It is often caused by bacteria or fungus. It can occur a few days after water gets trapped in the ear canal (from swimming or bathing). It can also occur after cleaning too deeply in the ear canal with a cotton swab or other object. Sometimes, hair care products get into the ear canal and cause this problem.  Symptoms can include pain, fever, itching, redness, drainage, or swelling of the ear canal. Temporary hearing loss may also occur.  Home care    Do not try to clean the ear canal. This can push pus and bacteria deeper into the canal.    Use prescribed ear drops as directed. These help reduce swelling and fight the infection. If an ear wick was placed in the ear canal, apply drops right onto the end of the wick. The wick will draw the medicine into the ear canal even if it is swollen closed.    A cotton ball may be loosely placed in the outer ear to absorb any drainage.    You may use acetaminophen or ibuprofen to control pain, unless another medicine was prescribed. Note: If you have chronic liver or kidney disease or ever had a stomach ulcer or GI bleeding, talk to your healthcare provider before taking any of these medicines.    Do not allow water to get into your ear when bathing. Also, don't swim until the infection has cleared.  Prevention    Keep your ears dry. This helps lower the risk of infection. Dry your ears with a towel or hair dryer after getting wet. Also, use ear plugs when swimming.    Do not stick any objects in the ear to remove wax.    If you feel water trapped in your ear, use ear drops right away. You can get these drops over the counter at most drugstores. They work by removing water from the ear canal.  Follow-up care  Follow up with your healthcare provider in 1 week, or as advised.  When to seek medical advice  Call your healthcare provider right  away if any of these occur:    Ear pain becomes worse or doesn t improve after 3 days of treatment    Redness or swelling of the outer ear occurs or gets worse    Headache    Painful or stiff neck    Drowsiness or confusion    Fever of 100.4 F (38 C) or higher, or as directed by your healthcare provider    Seizure  Date Last Reviewed: 10/1/2017    [id number] [employer]. [address] [place] [address]. All rights reserved. This information is not intended as a substitute for professional medical care. Always follow your healthcare professional's instructions.

## 2019-12-17 ENCOUNTER — OFFICE VISIT (OUTPATIENT)
Dept: CARDIOLOGY | Facility: CLINIC | Age: 49
End: 2019-12-17
Payer: COMMERCIAL

## 2019-12-17 VITALS
HEART RATE: 68 BPM | SYSTOLIC BLOOD PRESSURE: 125 MMHG | BODY MASS INDEX: 44.29 KG/M2 | DIASTOLIC BLOOD PRESSURE: 81 MMHG | WEIGHT: 258 LBS | OXYGEN SATURATION: 94 %

## 2019-12-17 DIAGNOSIS — I48.0 PAROXYSMAL ATRIAL FIBRILLATION (H): ICD-10-CM

## 2019-12-17 DIAGNOSIS — I48.0 PAF (PAROXYSMAL ATRIAL FIBRILLATION) (H): ICD-10-CM

## 2019-12-17 PROCEDURE — 99214 OFFICE O/P EST MOD 30 MIN: CPT | Performed by: NURSE PRACTITIONER

## 2019-12-17 NOTE — LETTER
12/17/2019    RE: Zaira Villatoro  [address] [facility] [facility] [address]     Dear Colleague,    Thank you for the opportunity to participate in the care of your patient, Zaira Villatoro, at the [facility] AT [facility]. Please see a copy of my visit note below.    Heart Care - Clinical Cardiac Electrophysiology     HPI: Zaira Villatoro is a 49 year old female who presents for follow up of AF.  The patient has a past medical history significant for PAF, HTN, DM, Ulcerative colitis s/p sigmoid colon perforation repair and colostomy(2013).  She had had one documented episode of postop AF in 2013 until reoccurrence on 8/25/2019 while moving her daughter into college.  14 day ZIO 9/2019 shows 1% AF burden (longest episode nearly 3 hours with average rate 127 bpm), multiple episodes nonsustained SVT (likely AF), no symptoms reported.    Current Interval history:   States that she is planning to have a blephaplasty and lift on 1/27/2020. States that she has noted swelling in her bilateral LE and hands for the past 6 months, improved with stopping the humira but didn't resolve. Lasix did not help so it was discontinued. Has not tried compression stocking. Elevation of legs can be helpful. Denies weight gain. Denies dyspnea. Uses her CPAP when sleeping. Notes rare palpitations that last a few seconds to minutes.    Denies chest pain or pressure, syncope, dyspnea at rest or with exertion, orthopnea, or PND.  Denies easy bruising or bleeding, hematuria, hematochezia, and epistaxis. Denies signs/symptoms of stroke such as visual disturbance, difficulty speaking, facial drooping, confusion, problems with gait, or any new numbness or weakness.    Current Outpatient Medications   Medication Sig Dispense Refill     apixaban ANTICOAGULANT (ELIQUIS) 5 MG tablet Take 1 tablet (5 mg) by mouth 2 times daily 60 tablet 11     atenolol  (TENORMIN) 50 MG tablet Take 1 tablet (50 mg) by mouth 2 times daily 180 tablet 3     cholecalciferol 2000 UNITS tablet Take 2 tablets by mouth 2 times daily  30 tablet      diphenhydrAMINE (BENADRYL ALLERGY) 25 MG tablet as needed Reported on 4/12/2017       DRAMAMINE OR as needed       glimepiride (AMARYL) 4 MG tablet Take 1 and 1/2 tablets (6mg) with breakfast. 135 tablet 3     LEVEMIR FLEXTOUCH 100 UNIT/ML pen INJECT 55 UNITS SUBCUTANEOUSLY AT BEDTIME 45 mL 0     losartan (COZAAR) 25 MG tablet TAKE 1/2 TABLET BY MOUTH DAILY 45 tablet 0     ofloxacin (FLOXIN) 0.3 % otic solution Place 10 drops into the right ear daily for 7 days 5 mL 0     semaglutide (OZEMPIC) 1 MG/DOSE pen Inject 1 mg Subcutaneous every 7 days 3 mL 3     TYLENOL CAPS 500 MG OR 1 CAPSULE EVERY 4 HOURS AS NEEDED       vedolizumab (ENTYVIO) 60 MG/ML injection Every six weeks       BD ULTRA FINE PEN NEEDLES Inject 1 Device Subcutaneous daily 3 mm needles for Levemir pen 3 Box 3     blood glucose (ACCU-CHEK SHARMILA PLUS) test strip TEST 4 TIMES DAILY AND AS NEEDED 400 strip 3     blood glucose monitoring (ACCU-CHEK FASTCLIX) lancets 1 each 4 times daily Use to test blood sugar 4 times daily or as directed. 300 each 11     order for DME Equipment being ordered: CPAP 9 c, 1 Units 0     order for DME Glucometer, brand as covered by insurance. 1 each 0     order for DME Test strips for pt's glucometer, brand as covered by insurance. Test four times daily and prn. 400 each 4     STATIN NOT PRESCRIBED, INTENTIONAL, 1 each continuous prn Statin not prescribed intentionally due to Refusal by patient and Other:LDL is below 100. 0 each 0       Past Medical History:   Diagnosis Date     Acute diverticulitis 7/31/2013     History of seizures as a child 1981    One grand mal in 5th grade.  Didn't tolerate phenobarb.     Moderate single current episode of major depressive disorder (H)      Perforation of sigmoid colon (H) 8/3/2013     S/P left hemicolectomy  8/16/2013 8/02/13      Sepsis (H) 8/1/2013       Past Surgical History:   Procedure Laterality Date     APPENDECTOMY  04/2014     ARTHROSCOPY KNEE RT/LT  2004    RT      BIOPSY  2011 many 2011 and on     COLONOSCOPY  02/2012    lt sided colitis     COLONOSCOPY  1/9/2014     CRYOTHERAPY, CERVICAL  1988     EXPLORATORY LAPAROTOMY, PARTIAL LEFT ROLANDA COLLECTOMY WITH HARTMANS PROCEDURE, COLOSTOMY  8/2013    lt rolanda colectomy, bowel perforation, colostomy, Karen's pouche     GI SURGERY  2013    abrupted  bowl     HC TOOTH EXTRACTION W/FORCEP  6/2003    Abscess Tooth / Hospitalized     HERNIA REPAIR  04/2014    found at time of takedown     SINUS SURGERY  2/11/03    LT sinus cyst removal      TAKEDOWN COLOSTOMY  04/2014       Family History   Problem Relation Age of Onset     Diabetes Mother      Allergies Mother      Asthma Mother      Arthritis Mother      Heart Disease Mother         heart murmur     Depression Mother      Obesity Mother      Basal cell carcinoma Mother      Eye Disorder Father      Diabetes Father      Cerebrovascular Disease Father      Heart Disease Father      Hypertension Father      Diabetes Maternal Grandmother      Cerebrovascular Disease Maternal Grandfather      Unknown/Adopted Paternal Grandmother      Heart Disease Paternal Grandfather         left ventrical failure     Cerebrovascular Disease Paternal Grandfather      Gynecology Sister         endometriosis     Depression Sister      Asthma Daughter      Breast Cancer Maternal Aunt      Thyroid Disease Maternal Aunt      Breast Cancer Other         fathers sister     Anesthesia Reaction Other      Thyroid Disease Other      Osteoporosis Other      Asthma Daughter      Breast Cancer Other         mothers sister     Glaucoma No family hx of      Macular Degeneration No family hx of        Social History     Tobacco Use     Smoking status: Former Smoker     Packs/day: 1.00     Years: 15.00     Pack years: 15.00     Types: Cigarettes      Start date: 1985     Last attempt to quit: 1998     Years since quittin.4     Smokeless tobacco: Never Used     Tobacco comment: soke free household.   Substance Use Topics     Alcohol use: Yes     Alcohol/week: 0.0 standard drinks     Comment: occ       Allergies   Allergen Reactions     Demerol      Very low blood pressure     Fish      Pt reports allergy to all fish     Meperidine Other (See Comments)     Other reaction(s): Hypotension  Drops blood pressure       Metformin GI Disturbance and Diarrhea     GI Disturbance       Naproxen      No Clinical Screening - See Comments      Pt reports allergy to all fish     Nubain  [Nalbuphine]      Seafood      Topiramate Other (See Comments) and Hives     Sleepy and confused.  Shaky, disoriented       Tylenol Sinus Max Other (See Comments)     Feet swelling     Latex Rash         ROS:   A complete review of systems was performed and is negative except as noted in the HPI.       Physical Examination:  Vitals: /81 (BP Location: Left arm, Patient Position: Chair, Cuff Size: Adult Large)   Pulse 68   Wt 117 kg (258 lb)   SpO2 94%   BMI 44.29 kg/m     BMI= Body mass index is 44.29 kg/m .    GENERAL APPEARANCE: healthy, alert, and no acute distress  HEENT: no icterus, no xanthelasmas, normal pupil size and reaction, no cyanosis.  NECK: no asymmetry, no cervical bruits, no JVD   RESPIRATORY: lungs clear to auscultation - no rales, rhonchi or wheezes, no use of accessory muscles, no retractions, respirations are unlabored, normal respiratory rate  CARDIOVASCULAR: regular rhythm, normal S1 with physiologic split S2, no S3 or S4 and no murmur, click or rub  GI:  no abdominal bruits, soft, non-tender  EXTREMITIES: no clubbing  NEURO: alert and oriented to person/place/time, normal speech, gait and affect  VASC: Radial and posterior tibialis pulses +2 and symmetric bilaterally. No cyanosis. Trace pedal edema.   SKIN: no ecchymoses, no rashes    Assessment  and recommendations:    # Paroxysmal atrial fibrillation: Discussed in detail with the patient management/treatment options for AF includin. Stroke Prophylaxis:  CHADSVASC=HTN+, DM+, female+  3, corresponding to a 3.2% annual stroke / systemic emolism event rate. indicating need for long term oral anticoagulation.  Continue apixaban 5 mg BID. States that she is planning to have a blephaplasty and lift on 2020. Cleared for surgery from a cardiac standpoint as long as EKG within 30 days of procedure is free from signs of ischemia/infarct. She may hold her Eliquis 48 hours prior to procedure and restart as soon as possible after.    2. Rate Control: Continue atenolol 50 mg BID.    3. Rhythm Control: none at this time. If she becomes symptomatic, cardioversion or Antiarrhythmics are options for rhythm control.  Ablations may be considered in patients with highly symptomatic episodes that are not controlled by medication.   4. Risk factor modifications: Avoid tobacco. Maintain a healthy weight. Limit alcohol. Eat at least 2-3 servings fruit and vegetables/day, limit saturated fats, and consider following the Mediterranean or the DASH diet. To download free cookbooks and healthy recipes go to https://healthyeating.nhlbi.nih.gov/default.aspx. Include stress reduction activities in your day. These may include Yoga, Natanael chi, meditation, or deep breathing. Get at least 150 minutes/week moderate intensity aerobic physical activities.  Also, do muscle strengthening activities on 2 or more days/week.  5. MAHAD evaluation is not indicated. She has MAHAD and wears CPAP.     #LE edema: Venous statis likely  1. Compression stockings during the day  2. Elevation of legs when resting  3. Low salt diet    FOLLOW UP: Follow up in one year with Dr. Rooney or follow up sooner if needed for problems or symptoms.    Patient expresses understanding and agreement with the plan.    I appreciate the chance to help with Zaira Blackmans  care. Please let me know if you have any questions or concerns.  Alyx BARRIGA, CNP

## 2019-12-17 NOTE — PROGRESS NOTES
Heart Care - Clinical Cardiac Electrophysiology       HPI: Zaira Villatoro is a 49 year old female who presents for follow up of AF.  The patient has a past medical history significant for PAF, HTN, DM, Ulcerative colitis s/p sigmoid colon perforation repair and colostomy(2013).  She had had one documented episode of postop AF in 2013 until reoccurrence on 8/25/2019 while moving her daughter into college.  14 day ZIO 9/2019 shows 1% AF burden (longest episode nearly 3 hours with average rate 127 bpm), multiple episodes nonsustained SVT (likely AF), no symptoms reported.    Current Interval history:   States that she is planning to have a blephaplasty and lift on 1/27/2020. States that she has noted swelling in her bilateral LE and hands for the past 6 months, improved with stopping the humira but didn't resolve. Lasix did not help so it was discontinued. Has not tried compression stocking. Elevation of legs can be helpful. Denies weight gain. Denies dyspnea. Uses her CPAP when sleeping. Notes rare palpitations that last a few seconds to minutes.    Denies chest pain or pressure, syncope, dyspnea at rest or with exertion, orthopnea, or PND.  Denies easy bruising or bleeding, hematuria, hematochezia, and epistaxis. Denies signs/symptoms of stroke such as visual disturbance, difficulty speaking, facial drooping, confusion, problems with gait, or any new numbness or weakness.    Current Outpatient Medications   Medication Sig Dispense Refill     apixaban ANTICOAGULANT (ELIQUIS) 5 MG tablet Take 1 tablet (5 mg) by mouth 2 times daily 60 tablet 11     atenolol (TENORMIN) 50 MG tablet Take 1 tablet (50 mg) by mouth 2 times daily 180 tablet 3     cholecalciferol 2000 UNITS tablet Take 2 tablets by mouth 2 times daily  30 tablet      diphenhydrAMINE (BENADRYL ALLERGY) 25 MG tablet as needed Reported on 4/12/2017       DRAMAMINE OR as needed       glimepiride (AMARYL) 4 MG tablet Take 1 and 1/2 tablets (6mg) with  breakfast. 135 tablet 3     LEVEMIR FLEXTOUCH 100 UNIT/ML pen INJECT 55 UNITS SUBCUTANEOUSLY AT BEDTIME 45 mL 0     losartan (COZAAR) 25 MG tablet TAKE 1/2 TABLET BY MOUTH DAILY 45 tablet 0     ofloxacin (FLOXIN) 0.3 % otic solution Place 10 drops into the right ear daily for 7 days 5 mL 0     semaglutide (OZEMPIC) 1 MG/DOSE pen Inject 1 mg Subcutaneous every 7 days 3 mL 3     TYLENOL CAPS 500 MG OR 1 CAPSULE EVERY 4 HOURS AS NEEDED       vedolizumab (ENTYVIO) 60 MG/ML injection Every six weeks       BD ULTRA FINE PEN NEEDLES Inject 1 Device Subcutaneous daily 3 mm needles for Levemir pen 3 Box 3     blood glucose (ACCU-CHEK SHARMILA PLUS) test strip TEST 4 TIMES DAILY AND AS NEEDED 400 strip 3     blood glucose monitoring (ACCU-CHEK FASTCLIX) lancets 1 each 4 times daily Use to test blood sugar 4 times daily or as directed. 300 each 11     order for DME Equipment being ordered: CPAP 9 c, 1 Units 0     order for DME Glucometer, brand as covered by insurance. 1 each 0     order for DME Test strips for pt's glucometer, brand as covered by insurance. Test four times daily and prn. 400 each 4     STATIN NOT PRESCRIBED, INTENTIONAL, 1 each continuous prn Statin not prescribed intentionally due to Refusal by patient and Other:LDL is below 100. 0 each 0       Past Medical History:   Diagnosis Date     Acute diverticulitis 7/31/2013     History of seizures as a child 1981    One grand mal in 5th grade.  Didn't tolerate phenobarb.     Moderate single current episode of major depressive disorder (H)      Perforation of sigmoid colon (H) 8/3/2013     S/P left hemicolectomy 8/16/2013 8/02/13      Sepsis (H) 8/1/2013       Past Surgical History:   Procedure Laterality Date     APPENDECTOMY  04/2014     ARTHROSCOPY KNEE RT/LT  2004    RT      BIOPSY  2011 many 2011 and on     COLONOSCOPY  02/2012    lt sided colitis     COLONOSCOPY  1/9/2014     CRYOTHERAPY, CERVICAL  1988     EXPLORATORY LAPAROTOMY, PARTIAL LEFT ROLANDA  COLLECTOMY WITH HARTMANS PROCEDURE, COLOSTOMY  2013    lt vianey colectomy, bowel perforation, colostomy, Karen's pouche     GI SURGERY      abrupted  bowl     HC TOOTH EXTRACTION W/FORCEP  2003    Abscess Tooth / Hospitalized     HERNIA REPAIR  2014    found at time of takedown     SINUS SURGERY  03    LT sinus cyst removal      TAKEDOWN COLOSTOMY  2014       Family History   Problem Relation Age of Onset     Diabetes Mother      Allergies Mother      Asthma Mother      Arthritis Mother      Heart Disease Mother         heart murmur     Depression Mother      Obesity Mother      Basal cell carcinoma Mother      Eye Disorder Father      Diabetes Father      Cerebrovascular Disease Father      Heart Disease Father      Hypertension Father      Diabetes Maternal Grandmother      Cerebrovascular Disease Maternal Grandfather      Unknown/Adopted Paternal Grandmother      Heart Disease Paternal Grandfather         left ventrical failure     Cerebrovascular Disease Paternal Grandfather      Gynecology Sister         endometriosis     Depression Sister      Asthma Daughter      Breast Cancer Maternal Aunt      Thyroid Disease Maternal Aunt      Breast Cancer Other         fathers sister     Anesthesia Reaction Other      Thyroid Disease Other      Osteoporosis Other      Asthma Daughter      Breast Cancer Other         mothers sister     Glaucoma No family hx of      Macular Degeneration No family hx of        Social History     Tobacco Use     Smoking status: Former Smoker     Packs/day: 1.00     Years: 15.00     Pack years: 15.00     Types: Cigarettes     Start date: 1985     Last attempt to quit: 1998     Years since quittin.4     Smokeless tobacco: Never Used     Tobacco comment: soke free household.   Substance Use Topics     Alcohol use: Yes     Alcohol/week: 0.0 standard drinks     Comment: occ       Allergies   Allergen Reactions     Demerol      Very low blood pressure     Fish       Pt reports allergy to all fish     Meperidine Other (See Comments)     Other reaction(s): Hypotension  Drops blood pressure       Metformin GI Disturbance and Diarrhea     GI Disturbance       Naproxen      No Clinical Screening - See Comments      Pt reports allergy to all fish     Nubain  [Nalbuphine]      Seafood      Topiramate Other (See Comments) and Hives     Sleepy and confused.  Shaky, disoriented       Tylenol Sinus Max Other (See Comments)     Feet swelling     Latex Rash         ROS:   A complete review of systems was performed and is negative except as noted in the HPI.     Physical Examination:  Vitals: /81 (BP Location: Left arm, Patient Position: Chair, Cuff Size: Adult Large)   Pulse 68   Wt 117 kg (258 lb)   SpO2 94%   BMI 44.29 kg/m    BMI= Body mass index is 44.29 kg/m .    GENERAL APPEARANCE: healthy, alert, and no acute distress  HEENT: no icterus, no xanthelasmas, normal pupil size and reaction, no cyanosis.  NECK: no asymmetry, no cervical bruits, no JVD   RESPIRATORY: lungs clear to auscultation - no rales, rhonchi or wheezes, no use of accessory muscles, no retractions, respirations are unlabored, normal respiratory rate  CARDIOVASCULAR: regular rhythm, normal S1 with physiologic split S2, no S3 or S4 and no murmur, click or rub  GI:  no abdominal bruits, soft, non-tender  EXTREMITIES: no clubbing  NEURO: alert and oriented to person/place/time, normal speech, gait and affect  VASC: Radial and posterior tibialis pulses +2 and symmetric bilaterally. No cyanosis. Trace pedal edema.   SKIN: no ecchymoses, no rashes    Assessment and recommendations:    # Paroxysmal atrial fibrillation: Discussed in detail with the patient management/treatment options for AF includin. Stroke Prophylaxis:  CHADSVASC=HTN+, DM+, female+  3, corresponding to a 3.2% annual stroke / systemic emolism event rate. indicating need for long term oral anticoagulation.  Continue apixaban 5 mg BID. States  that she is planning to have a blephaplasty and lift on 1/27/2020. Cleared for surgery from a cardiac standpoint as long as EKG within 30 days of procedure is free from signs of ischemia/infarct. She may hold her Eliquis 48 hours prior to procedure and restart as soon as possible after.    2. Rate Control: Continue atenolol 50 mg BID.    3. Rhythm Control: none at this time. If she becomes symptomatic, cardioversion or Antiarrhythmics are options for rhythm control.  Ablations may be considered in patients with highly symptomatic episodes that are not controlled by medication.   4. Risk factor modifications: Avoid tobacco. Maintain a healthy weight. Limit alcohol. Eat at least 2-3 servings fruit and vegetables/day, limit saturated fats, and consider following the Mediterranean or the DASH diet. To download free cookbooks and healthy recipes go to https://healthyeating.nhlbi.nih.gov/default.aspx. Include stress reduction activities in your day. These may include Yoga, Natanael chi, meditation, or deep breathing. Get at least 150 minutes/week moderate intensity aerobic physical activities.  Also, do muscle strengthening activities on 2 or more days/week.  5. MAHAD evaluation is not indicated. She has MAHAD and wears CPAP.     #LE edema: Venous statis likely  1. Compression stockings during the day  2. Elevation of legs when resting  3. Low salt diet    FOLLOW UP: Follow up in one year with Dr. Rooney or follow up sooner if needed for problems or symptoms.      Patient expresses understanding and agreement with the plan.    I appreciate the chance to help with Zaira BUTCH Villatoro's care. Please let me know if you have any questions or concerns.    Alyx BARRIGA, CNP

## 2019-12-17 NOTE — PATIENT INSTRUCTIONS
Thank you for coming to the [facility] @ [facility]; please note the following instructions:    1. Compression stockings during the day    2. Elevation of legs when resting    3. Low salt diet    4. Cleared from a cardiac standpoint for eye surgery if EKG next month looks good.     5. You may hold your Eliquis 48 hours prior to procedure and restart as soon as possible after.    6. Follow up with Dr. Rooney in one year or follow up sooner as needed for symptoms or problems.      If you have any questions regarding your visit please contact your care team:     Cardiology  Telephone Number   Vanda MARC, RN  Nenita CONSTANTINO, RN   Jocelyn ESTRELLA, RMA  Laya PAK, RMA  Quinn MACIEL, LPN   [phone] (option [phone])   For scheduling appts:     [phone] (select option 1)       For the Device Clinic (Pacemakers and ICD's)  RN's :  Karen Brennan   During business hours: [phone]    *After business hours:  [phone] (select option 4)      Normal test result notifications will be released via [employer] or mailed within 7 business days.  All other test results, will be communicated via telephone once reviewed by your cardiologist.    If you need a medication refill please contact your pharmacy.  Please allow 3 business days for your refill to be completed.    As always, thank you for trusting us with your health care needs!    Patient Education     Low-Salt Diet  This diet removes foods that are high in salt. It also limits the amount of salt you use when cooking. It is most often used for people with high blood pressure, edema (fluid retention), and kidney, liver, or heart disease.  Table salt contains the mineral sodium. Your body needs sodium to work normally. But too much sodium can make your health problems worse. Your healthcare provider is recommending a low-salt (also called low-sodium) diet for you. Your total daily allowance of salt is 1,500 to 2,300 milligrams (mg). It is less than 1 teaspoon of table  salt. This means you can have only about 500 to 700 mg of sodium at each meal. People with certain health problems should limit salt intake to the lower end of the recommended range.    When you cook, don t add much salt. If you can cook without using salt, even better. Don t add salt to your food at the table.  When shopping, read food labels. Salt is often called sodium on the label. Choose foods that are salt-free, low salt, or very low salt. Note that foods with reduced salt may not lower your salt intake enough.    Beans, potatoes, and pasta  Ok: Dry beans, split peas, lentils, potatoes, rice, macaroni, pasta, spaghetti without added salt  Avoid: Potato chips, tortilla chips, and similar products  Breads and cereals  Ok: Low-sodium breads, rolls, cereals, and cakes; low-salt crackers, matzo crackers  Avoid: Salted crackers, pretzels, popcorn, [place] Islander toast, pancakes, muffins  Dairy  Ok: Milk, chocolate milk, hot chocolate mix, low-salt cheeses, and yogurt  Avoid: Processed cheese and cheese spreads; Roquefort, Camembert, and cottage cheese; buttermilk, instant breakfast drink  Desserts  Ok: Ice cream, frozen yogurt, juice bars, gelatin, cookies and pies, sugar, honey, jelly, hard candy  Avoid: Most pies, cakes and cookies prepared or processed with salt; instant pudding  Drinks  Ok: Tea, coffee, fizzy (carbonated) drinks, juices  Avoid: Flavored coffees, electrolyte replacement drinks, sports drinks  Meats  Ok: All fresh meat, fish, poultry, low-salt tuna, eggs, egg substitute  Avoid: Smoked, pickled, brine-cured, or salted meats and fish. This includes gutierrez, chipped beef, corned beef, hot dogs, deli meats, ham, kosher meats, salt pork, sausage, canned tuna, salted codfish, smoked salmon, herring, sardines, or anchovies.  Seasonings and spices  Ok: Most seasonings are okay. Good substitutes for salt include: fresh herb blends, hot sauce, lemon, garlic, ozuna, vinegar, dry mustard, parsley, cilantro,  horseradish, tomato paste, regular margarine, mayonnaise, unsalted butter, cream cheese, vegetable oil, cream, low-salt salad dressing and gravy.  Avoid: Regular ketchup, relishes, pickles, soy sauce, teriyaki sauce, Worcestershire sauce, BBQ sauce, tartar sauce, meat tenderizer, chili sauce, regular gravy, regular salad dressing, salted butter  Soups  Ok: Low-salt soups and broths made with allowed foods  Avoid: Bouillon cubes, soups with smoked or salted meats, regular soup and broth  Vegetables  Ok: Most vegetables are okay; also low-salt tomato and vegetable juices  Avoid: Sauerkraut and other brine-soaked vegetables; pickles and other pickled vegetables; tomato juice, olives  Date Last Reviewed: 8/1/2016 2000-2018 [employer]. [address] [place] [address]. All rights reserved. This information is not intended as a substitute for professional medical care. Always follow your healthcare professional's instructions.

## 2019-12-17 NOTE — NURSING NOTE
"Chief Complaint   Patient presents with     RECHECK      OV with MUNIR Mendoza for 2 month follow up for HTN, PAF. Pt reports having palpitations.       Initial /81 (BP Location: Left arm, Patient Position: Chair, Cuff Size: Adult Large)   Pulse 68   Wt 117 kg (258 lb)   SpO2 94%   BMI 44.29 kg/m   Estimated body mass index is 44.29 kg/m  as calculated from the following:    Height as of 10/4/19: 1.626 m (5' 4\").    Weight as of this encounter: 117 kg (258 lb)..  BP completed using cuff size: jagdeep Rossi MA  "

## 2019-12-31 DIAGNOSIS — E11.9 TYPE 2 DIABETES MELLITUS WITHOUT COMPLICATION, WITH LONG-TERM CURRENT USE OF INSULIN (H): ICD-10-CM

## 2019-12-31 DIAGNOSIS — Z79.4 TYPE 2 DIABETES MELLITUS WITHOUT COMPLICATION, WITH LONG-TERM CURRENT USE OF INSULIN (H): ICD-10-CM

## 2020-01-03 NOTE — TELEPHONE ENCOUNTER
Routing refill request to provider for review/approval because:  Drug not on the [facility] refill protocol     Requested Prescriptions   Pending Prescriptions Disp Refills     semaglutide (OZEMPIC) 2 MG/1.5ML pen [Pharmacy Med Name: OZEMPIC 1 MG DOSE PEN] 9 mL 1     Sig: INJECT 1 MG SUBCUTANEOUS EVERY 7 DAYS       There is no refill protocol information for this order        Yasmin Tony RN

## 2020-01-13 ENCOUNTER — OFFICE VISIT (OUTPATIENT)
Dept: INTERNAL MEDICINE | Facility: CLINIC | Age: 50
End: 2020-01-13
Payer: COMMERCIAL

## 2020-01-13 VITALS — DIASTOLIC BLOOD PRESSURE: 62 MMHG | SYSTOLIC BLOOD PRESSURE: 124 MMHG

## 2020-01-13 DIAGNOSIS — D84.9 IMMUNOSUPPRESSED STATUS (H): ICD-10-CM

## 2020-01-13 DIAGNOSIS — I48.0 PAROXYSMAL ATRIAL FIBRILLATION (H): ICD-10-CM

## 2020-01-13 DIAGNOSIS — E66.01 MORBID OBESITY DUE TO EXCESS CALORIES (H): ICD-10-CM

## 2020-01-13 DIAGNOSIS — K51.90 ULCERATIVE COLITIS WITHOUT COMPLICATIONS, UNSPECIFIED LOCATION (H): ICD-10-CM

## 2020-01-13 DIAGNOSIS — E11.65 TYPE 2 DIABETES MELLITUS WITH HYPERGLYCEMIA, WITH LONG-TERM CURRENT USE OF INSULIN (H): Primary | Chronic | ICD-10-CM

## 2020-01-13 DIAGNOSIS — Z79.4 TYPE 2 DIABETES MELLITUS WITH HYPERGLYCEMIA, WITH LONG-TERM CURRENT USE OF INSULIN (H): Primary | Chronic | ICD-10-CM

## 2020-01-13 LAB
ALBUMIN SERPL-MCNC: 3.4 G/DL (ref 3.4–5)
ALP SERPL-CCNC: 46 U/L (ref 40–150)
ALT SERPL W P-5'-P-CCNC: 33 U/L (ref 0–50)
ANION GAP SERPL CALCULATED.3IONS-SCNC: 1 MMOL/L (ref 3–14)
AST SERPL W P-5'-P-CCNC: 23 U/L (ref 0–45)
BASOPHILS # BLD AUTO: 0 10E9/L (ref 0–0.2)
BASOPHILS NFR BLD AUTO: 0.6 %
BILIRUB SERPL-MCNC: 0.9 MG/DL (ref 0.2–1.3)
BUN SERPL-MCNC: 13 MG/DL (ref 7–30)
CALCIUM SERPL-MCNC: 8.8 MG/DL (ref 8.5–10.1)
CHLORIDE SERPL-SCNC: 109 MMOL/L (ref 94–109)
CHOLEST SERPL-MCNC: 142 MG/DL
CO2 SERPL-SCNC: 29 MMOL/L (ref 20–32)
CREAT SERPL-MCNC: 0.82 MG/DL (ref 0.52–1.04)
CRP SERPL-MCNC: 7 MG/L (ref 0–8)
DIFFERENTIAL METHOD BLD: NORMAL
EOSINOPHIL # BLD AUTO: 0.4 10E9/L (ref 0–0.7)
EOSINOPHIL NFR BLD AUTO: 5 %
ERYTHROCYTE [DISTWIDTH] IN BLOOD BY AUTOMATED COUNT: 12.9 % (ref 10–15)
GFR SERPL CREATININE-BSD FRML MDRD: 85 ML/MIN/{1.73_M2}
GLUCOSE SERPL-MCNC: 142 MG/DL (ref 70–99)
HBA1C MFR BLD: 7.3 % (ref 0–5.6)
HCT VFR BLD AUTO: 41.9 % (ref 35–47)
HDLC SERPL-MCNC: 42 MG/DL
HGB BLD-MCNC: 13.7 G/DL (ref 11.7–15.7)
LDLC SERPL CALC-MCNC: 87 MG/DL
LYMPHOCYTES # BLD AUTO: 2 10E9/L (ref 0.8–5.3)
LYMPHOCYTES NFR BLD AUTO: 28.1 %
MCH RBC QN AUTO: 30 PG (ref 26.5–33)
MCHC RBC AUTO-ENTMCNC: 32.7 G/DL (ref 31.5–36.5)
MCV RBC AUTO: 92 FL (ref 78–100)
MONOCYTES # BLD AUTO: 0.6 10E9/L (ref 0–1.3)
MONOCYTES NFR BLD AUTO: 9 %
NEUTROPHILS # BLD AUTO: 4 10E9/L (ref 1.6–8.3)
NEUTROPHILS NFR BLD AUTO: 57.3 %
NONHDLC SERPL-MCNC: 100 MG/DL
PLATELET # BLD AUTO: 263 10E9/L (ref 150–450)
POTASSIUM SERPL-SCNC: 4.2 MMOL/L (ref 3.4–5.3)
PROT SERPL-MCNC: 6.8 G/DL (ref 6.8–8.8)
RBC # BLD AUTO: 4.57 10E12/L (ref 3.8–5.2)
SODIUM SERPL-SCNC: 139 MMOL/L (ref 133–144)
TRIGL SERPL-MCNC: 66 MG/DL
WBC # BLD AUTO: 7 10E9/L (ref 4–11)

## 2020-01-13 PROCEDURE — 86140 C-REACTIVE PROTEIN: CPT | Performed by: INTERNAL MEDICINE

## 2020-01-13 PROCEDURE — 80053 COMPREHEN METABOLIC PANEL: CPT | Performed by: INTERNAL MEDICINE

## 2020-01-13 PROCEDURE — 99214 OFFICE O/P EST MOD 30 MIN: CPT | Performed by: INTERNAL MEDICINE

## 2020-01-13 PROCEDURE — 80061 LIPID PANEL: CPT | Performed by: INTERNAL MEDICINE

## 2020-01-13 PROCEDURE — 36415 COLL VENOUS BLD VENIPUNCTURE: CPT | Performed by: INTERNAL MEDICINE

## 2020-01-13 PROCEDURE — 85025 COMPLETE CBC W/AUTO DIFF WBC: CPT | Performed by: INTERNAL MEDICINE

## 2020-01-13 PROCEDURE — 83036 HEMOGLOBIN GLYCOSYLATED A1C: CPT | Performed by: INTERNAL MEDICINE

## 2020-01-13 ASSESSMENT — PAIN SCALES - GENERAL: PAINLEVEL: MODERATE PAIN (4)

## 2020-01-13 ASSESSMENT — MIFFLIN-ST. JEOR: SCORE: 1801.95

## 2020-01-13 NOTE — PATIENT INSTRUCTIONS
- Talk to gastroenterology at next appointment- discuss if gastric sleeve sugery is an option.  - Call your insurance to see if gastric surgery is covered.  - Start Jardiance after you finish Ozempic. Jardiance Take 1 tablet (10 mg) by mouth daily.  - Look at pumps with diabetic educator. Jardiance, will see how you do with the lower dose and increase if needed at that appointment.  Nicole Maki in [facility] is very good with pumps.  - increase Amaryl to 8 mg in the morning on the day you get steroids.

## 2020-01-13 NOTE — PROGRESS NOTES
Subjective     Zaira Villatoro is a 49 year old female who presents to clinic today for the following health issues:      Patient Instructions on Last Visit 10/04/2019:  Patient Instructions   Change Amaryl per bottle directions.  Notify me in 4 weeks with your blood sugars.      HPI   Diabetic recheck  Patient notes that she could be a low as 90 in the morning or 179. She averages 130-140 in the morning. She relates that her numbers in the evening varies- can be in the 200s or in the 100s. Patient relates that she has been increasing her exercise regimen and notes no changes with weight loss. She's currently on glimepride and insulin levemir. She's on Ozempic but relates that it is not effective. She's tried bydureon, liraglutide, glyburide, and insulin aspart. She's tired Metformin and had diarrhea as a side effect. She is considering bariatric surgery as she doesn't think that the medications are helping her control weight loss and blood sugars. Denies having heart burn.    Wt Readings from Last 5 Encounters:   01/13/20 (P) 117.5 kg (259 lb)   12/17/19 117 kg (258 lb)   12/11/19 116.1 kg (256 lb)   10/04/19 118.3 kg (260 lb 12.8 oz)   10/01/19 117.5 kg (259 lb)     Lab Results   Component Value Date    A1C 7.3 01/13/2020    A1C 8.0 10/04/2019    A1C 7.4 06/05/2019    A1C 8.2 03/05/2019    A1C 8.1 08/24/2018       Patient Active Problem List   Diagnosis     Migraine     Ulcerative colitis (H)     Fatty liver     CARDIOVASCULAR SCREENING; LDL GOAL LESS THAN 100     Essential hypertension with goal blood pressure less than 140/90     History of seizures as a child     Type 2 diabetes mellitus with hyperglycemia, with long-term current use of insulin (H)     Morbid obesity due to excess calories (H)     Incisional hernia, without obstruction or gangrene     Paroxysmal atrial fibrillation (H)     Low back pain     MAHAD (obstructive sleep apnea)- severe (AHI 80)     Immunosuppressed status (H)     Plantar fasciitis      Pelvic pain     Dermatochalasis of both upper eyelids     Brow ptosis, bilateral     Involutional ptosis, acquired, bilateral     Past Surgical History:   Procedure Laterality Date     APPENDECTOMY  2014     ARTHROSCOPY KNEE RT/LT  2004    RT      BIOPSY  2011 and on     COLONOSCOPY  2012    lt sided colitis     COLONOSCOPY  2014     CRYOTHERAPY, CERVICAL  1988     EXPLORATORY LAPAROTOMY, PARTIAL LEFT ROLANDA COLLECTOMY WITH HARTMANS PROCEDURE, COLOSTOMY  2013    lt rolanda colectomy, bowel perforation, colostomy, Karen's pouche     GI SURGERY      abrupted  bowl     HC TOOTH EXTRACTION W/FORCEP  2003    Abscess Tooth / Hospitalized     HERNIA REPAIR  2014    found at time of takedown     SINUS SURGERY  03    LT sinus cyst removal      TAKEDOWN COLOSTOMY  2014       Social History     Tobacco Use     Smoking status: Former Smoker     Packs/day: 1.00     Years: 15.00     Pack years: 15.00     Types: Cigarettes     Start date: 1985     Last attempt to quit: 1998     Years since quittin.5     Smokeless tobacco: Never Used     Tobacco comment: soke free household.   Substance Use Topics     Alcohol use: Yes     Alcohol/week: 0.0 standard drinks     Comment: occ     Family History   Problem Relation Age of Onset     Diabetes Mother      Allergies Mother      Asthma Mother      Arthritis Mother      Heart Disease Mother         heart murmur     Depression Mother      Obesity Mother      Basal cell carcinoma Mother      Skin Cancer Mother      Eye Disorder Father      Diabetes Father      Cerebrovascular Disease Father      Heart Disease Father      Hypertension Father      Diabetes Maternal Grandmother      Cerebrovascular Disease Maternal Grandfather      Unknown/Adopted Paternal Grandmother      Heart Disease Paternal Grandfather         left ventrical failure     Cerebrovascular Disease Paternal Grandfather      Gynecology Sister         endometriosis      Depression Sister      Asthma Daughter      Breast Cancer Maternal Aunt      Thyroid Disease Maternal Aunt      Breast Cancer Other         fathers sister     Anesthesia Reaction Other      Thyroid Disease Other      Osteoporosis Other      Asthma Daughter      Breast Cancer Other         mothers sister     Glaucoma No family hx of      Macular Degeneration No family hx of          Current Outpatient Medications   Medication Sig Dispense Refill     apixaban ANTICOAGULANT (ELIQUIS ANTICOAGULANT) 5 MG tablet Take 1 tablet (5 mg) by mouth 2 times daily 60 tablet 11     atenolol (TENORMIN) 50 MG tablet Take 1 tablet (50 mg) by mouth 2 times daily 180 tablet 3     BD ULTRA FINE PEN NEEDLES Inject 1 Device Subcutaneous daily 3 mm needles for Levemir pen 3 Box 3     blood glucose (ACCU-CHEK SHARMILA PLUS) test strip TEST 4 TIMES DAILY AND AS NEEDED 400 strip 3     blood glucose monitoring (ACCU-CHEK FASTCLIX) lancets 1 each 4 times daily Use to test blood sugar 4 times daily or as directed. 300 each 11     cholecalciferol 2000 UNITS tablet Take 2 tablets by mouth 2 times daily  30 tablet      diphenhydrAMINE (BENADRYL ALLERGY) 25 MG tablet as needed Reported on 4/12/2017       DRAMAMINE OR as needed       empagliflozin (JARDIANCE) 10 MG TABS tablet Take 1 tablet (10 mg) by mouth daily 30 tablet 0     glimepiride (AMARYL) 4 MG tablet Take 1 and 1/2 tablets (6mg) with breakfast. 135 tablet 3     LEVEMIR FLEXTOUCH 100 UNIT/ML pen INJECT 55 UNITS SUBCUTANEOUSLY AT BEDTIME 45 mL 0     losartan (COZAAR) 25 MG tablet TAKE 1/2 TABLET BY MOUTH DAILY 45 tablet 0     order for DME Equipment being ordered: CPAP 9 c, 1 Units 0     order for DME Glucometer, brand as covered by insurance. 1 each 0     order for DME Test strips for pt's glucometer, brand as covered by insurance. Test four times daily and prn. 400 each 4     STATIN NOT PRESCRIBED, INTENTIONAL, 1 each continuous prn Statin not prescribed intentionally due to Refusal by patient  "and Other:LDL is below 100. 0 each 0     TYLENOL CAPS 500 MG OR 1 CAPSULE EVERY 4 HOURS AS NEEDED       vedolizumab (ENTYVIO) 60 MG/ML injection Every six weeks       Allergies   Allergen Reactions     Demerol      Very low blood pressure     Fish      Pt reports allergy to all fish     Meperidine Other (See Comments)     Other reaction(s): Hypotension  Drops blood pressure       Metformin GI Disturbance and Diarrhea     GI Disturbance       Naproxen      No Clinical Screening - See Comments      Pt reports allergy to all fish     Nubain  [Nalbuphine]      Seafood      Topiramate Other (See Comments) and Hives     Sleepy and confused.  Shaky, disoriented       Tylenol Sinus Max Other (See Comments)     Feet swelling     Latex Rash       Reviewed and updated as needed this visit by Provider  Tobacco  Allergies  Meds  Problems  Med Hx  Surg Hx  Fam Hx         Review of Systems   ROS COMP: Constitutional, HEENT, cardiovascular, pulmonary, GI, , musculoskeletal, neuro, skin, endocrine and psych systems are negative, except as otherwise noted.    This document serves as a record of the services and decisions personally performed and made by Tayler Bergman MD. It was created on her behalf by Dick Villalobos, a trained medical scribe. The creation of this document is based on the provider's statements to the medical scribe.  Dick Villalobos 11:45 AM January 13, 2020      Objective    /62 (BP Location: Left arm, Cuff Size: Adult Large)   Pulse (P) 78   Temp (P) 97.5  F (36.4  C) (Oral)   Resp (P) 17   Ht (P) 1.645 m (5' 4.76\")   Wt (P) 117.5 kg (259 lb)   SpO2 (P) 97%   BMI (P) 43.41 kg/m    Body mass index is 43.41 kg/m  (pended).  Physical Exam   GENERAL: healthy, alert and no distress  RESP: lungs clear to auscultation - no rales, rhonchi or wheezes  CV: regular rate and rhythm, normal S1 S2, no S3 or S4, no murmur, click or rub, no peripheral edema and peripheral pulses strong  ABDOMEN: soft, nontender, no " hepatosplenomegaly, no masses and bowel sounds normal  MS: no gross musculoskeletal defects noted, no edema  PSYCH: mentation appears normal, affect normal/bright      Diagnostic Test Results:  Labs reviewed in Epic  Results for orders placed or performed in visit on 01/13/20 (from the past 24 hour(s))   Hemoglobin A1c   Result Value Ref Range    Hemoglobin A1C 7.3 (H) 0 - 5.6 %           Assessment & Plan     1. Type 2 diabetes mellitus with hyperglycemia, with long-term current use of insulin (H)  Not well controlled. She's currently on glimepride and insulin levemir. She's on Ozempic but relates that it is not effective. She's tried bydureon, liraglutide, glyburide, and insulin aspart. She's tired Metformin and had diarrhea as a side effect. She is considering bariatric surgery as she doesn't think that the medications are helping her control weight loss and blood sugars.  Discussed with patient that I don't know that she would be a good candidate for a sleeve due to her immunosuppression and not a gastric bypass given her prior GI surgery.  However, she needs to talk this over with GI as it would definitely help her dm and weight los.s  Plan was discussed with patient, patient agreed. Will start patient on Jardiance- prescription placed today.  Advised patient to see diabetic educator to look for pumps and follow-up on Jardiance- will adjust plan accordingly.  Labs checked today. Referral placed today for diabetic educator.  Will continue to monitor.  - Hemoglobin A1c  - Lipid panel reflex to direct LDL Fasting  - empagliflozin (JARDIANCE) 10 MG TABS tablet; Take 1 tablet (10 mg) by mouth daily  Dispense: 30 tablet; Refill: 0  - CBC with platelets differential  - Comprehensive metabolic panel  - AMBULATORY ADULT DIABETES EDUCATOR REFERRAL    2. Ulcerative colitis without complications, unspecified location (H)  Labs completed today. Will continue to monitor.  - CBC with platelets differential  - Comprehensive  metabolic panel  - CRP inflammation    3. Immunosuppressed status (H)  Managed with current measures. Will continue to monitor.    4. Paroxysmal atrial fibrillation (H)  Stable. Will continue to monitor periodically.    5. Morbid obesity due to excess calories (H)  Discussed with patient about different gastric options.  Advised patient to discuss gastric sleeve surgery with gastroenterology.        Patient Instructions   - Talk to gastroenterology at next appointment- discuss if gastric sleeve sugery is an option.  - Call your insurance to see if gastric surgery is covered.  - Start Jardiance after you finish Ozempic. Jardiance Take 1 tablet (10 mg) by mouth daily.  - Look at pumps with diabetic educator. Jardiance, will see how you do with the lower dose and increase if needed at that appointment.  Nicole Maki in [facility] is very good with pumps.  - increase Amaryl to 8 mg in the morning on the day you get steroids.      The information in this document, created by the medical scribe for me, accurately reflects the services I personally performed and the decisions made by me. I have reviewed and approved this document for accuracy prior to leaving the patient care area.  January 13, 2020 11:46 AM    I spent 30 minutes of time with the patient and >50% of it was in education and counseling regarding health maintenance and medication recheck.  In: 11:34 AM  Out: 12:04 PM    Tayler Bergman MD  -[facility]

## 2020-01-14 NOTE — RESULT ENCOUNTER NOTE
Good cholesterol. Normal electrolytes. Normal kidney function. Normal liver blood test. Normal crp inflammation test.

## 2020-01-16 ENCOUNTER — TRANSFERRED RECORDS (OUTPATIENT)
Dept: HEALTH INFORMATION MANAGEMENT | Facility: CLINIC | Age: 50
End: 2020-01-16

## 2020-01-16 ENCOUNTER — TELEPHONE (OUTPATIENT)
Dept: NURSING | Facility: CLINIC | Age: 50
End: 2020-01-16

## 2020-01-16 NOTE — TELEPHONE ENCOUNTER
Pt. Called to make sure that Dr. Holguin knows that she is on a long standing steroid use for the rest of her life. She is wondering if this will have any affects on her surgery and healing or if the eye's will go back over time and need anther surgery. If you could reach out to her to address her concerns  before her surgery that would be wonderful. Thank you

## 2020-01-16 NOTE — TELEPHONE ENCOUNTER
Lakeshia, can you please let her know: yes we discussed the steroid use. I'm ok with it. Fortunately the eyelids have a very good blood supply and should heal well despite steroid use. Thank you

## 2020-01-17 NOTE — PROGRESS NOTES
INTERNAL MEDICINE  Medical Weight Loss - Return Visit      CHIEF COMPLAINT:  Recheck weight      Wt Readings from Last 5 Encounters:   01/13/20 (P) 117.5 kg (259 lb)   12/17/19 117 kg (258 lb)   12/11/19 116.1 kg (256 lb)   10/04/19 118.3 kg (260 lb 12.8 oz)   10/01/19 117.5 kg (259 lb)     Patient Instructions on Last Visit 1/13/2020:  Patient Instructions   - Talk to gastroenterology at next appointment- discuss if gastric sleeve sugery is an option.  - Call your insurance to see if gastric surgery is covered.  - Start Jardiance after you finish Ozempic. Jardiance Take 1 tablet (10 mg) by mouth daily.  - Look at pumps with diabetic educator. Jardiance, will see how you do with the lower dose and increase if needed at that appointment.  Nicole Maki in [facility] is very good with pumps.  - increase Amaryl to 8 mg in the morning on the day you get steroids.      HISTORY OF PRESENT ILLNESS (HPI):    Patient returns today for medical weight loss follow-up visit. Patient was last seen by me on 1/13/2020 and has lost *** pounds and *** % body fat.    ***    MEDICATIONS:   Current Outpatient Medications   Medication Sig Dispense Refill     apixaban ANTICOAGULANT (ELIQUIS ANTICOAGULANT) 5 MG tablet Take 1 tablet (5 mg) by mouth 2 times daily 60 tablet 11     atenolol (TENORMIN) 50 MG tablet Take 1 tablet (50 mg) by mouth 2 times daily 180 tablet 3     BD ULTRA FINE PEN NEEDLES Inject 1 Device Subcutaneous daily 3 mm needles for Levemir pen 3 Box 3     blood glucose (ACCU-CHEK SHARMILA PLUS) test strip TEST 4 TIMES DAILY AND AS NEEDED 400 strip 3     blood glucose monitoring (ACCU-CHEK FASTCLIX) lancets 1 each 4 times daily Use to test blood sugar 4 times daily or as directed. 300 each 11     cholecalciferol 2000 UNITS tablet Take 2 tablets by mouth 2 times daily  30 tablet      diphenhydrAMINE (BENADRYL ALLERGY) 25 MG tablet as needed Reported on 4/12/2017       DRAMAMINE OR as needed       empagliflozin  (JARDIANCE) 10 MG TABS tablet Take 1 tablet (10 mg) by mouth daily 30 tablet 0     glimepiride (AMARYL) 4 MG tablet Take 1 and 1/2 tablets (6mg) with breakfast. 135 tablet 3     LEVEMIR FLEXTOUCH 100 UNIT/ML pen INJECT 55 UNITS SUBCUTANEOUSLY AT BEDTIME 45 mL 0     losartan (COZAAR) 25 MG tablet TAKE 1/2 TABLET BY MOUTH DAILY 45 tablet 0     order for DME Equipment being ordered: CPAP 9 c, 1 Units 0     order for DME Glucometer, brand as covered by insurance. 1 each 0     order for DME Test strips for pt's glucometer, brand as covered by insurance. Test four times daily and prn. 400 each 4     STATIN NOT PRESCRIBED, INTENTIONAL, 1 each continuous prn Statin not prescribed intentionally due to Refusal by patient and Other:LDL is below 100. 0 each 0     TYLENOL CAPS 500 MG OR 1 CAPSULE EVERY 4 HOURS AS NEEDED       vedolizumab (ENTYVIO) 60 MG/ML injection Every six weeks         ALLERGIES:   Allergies   Allergen Reactions     Demerol      Very low blood pressure     Fish      Pt reports allergy to all fish     Meperidine Other (See Comments)     Other reaction(s): Hypotension  Drops blood pressure       Metformin GI Disturbance and Diarrhea     GI Disturbance       Naproxen      No Clinical Screening - See Comments      Pt reports allergy to all fish     Nubain  [Nalbuphine]      Seafood      Topiramate Other (See Comments) and Hives     Sleepy and confused.  Shaky, disoriented       Tylenol Sinus Max Other (See Comments)     Feet swelling     Latex Rash     ROS   ROS: 10 point ROS neg other than the symptoms noted above in the HPI.    PHYSICAL EXAMINATION:    VITALS: There were no vitals taken for this visit.  GENERAL: Patient is a 49 year old year old female  in no acute distress.  Patient is alert and orientated x 4, pleasant and cooperative with exam.     ***  PSYCH: mentation appears normal, affect normal and bright.    LAB RESULTS:   Reviewed in Epic    ASSESSMENT/PLAN:    ***       Patient is to call the clinic  if she experiences any adverse reactions.     {FOLLOW UP CLINIC OPTIONS:650443}    *** sign    I spent *** minutes of time with the patient and >50% of it was in education and counseling regarding weight management, health maintenance and medication recheck.    Start: ***  End ***    Tayler Bergman MD  Internal Medicine    [employer] Diplomate

## 2020-01-21 ENCOUNTER — OFFICE VISIT (OUTPATIENT)
Dept: INTERNAL MEDICINE | Facility: CLINIC | Age: 50
End: 2020-01-21
Payer: COMMERCIAL

## 2020-01-21 VITALS
OXYGEN SATURATION: 98 % | RESPIRATION RATE: 18 BRPM | TEMPERATURE: 97.2 F | BODY MASS INDEX: 44.59 KG/M2 | SYSTOLIC BLOOD PRESSURE: 128 MMHG | WEIGHT: 261.2 LBS | HEIGHT: 64 IN | DIASTOLIC BLOOD PRESSURE: 74 MMHG | HEART RATE: 75 BPM

## 2020-01-21 DIAGNOSIS — Z79.4 TYPE 2 DIABETES MELLITUS WITH HYPERGLYCEMIA, WITH LONG-TERM CURRENT USE OF INSULIN (H): Chronic | ICD-10-CM

## 2020-01-21 DIAGNOSIS — H57.813 BROW PTOSIS, BILATERAL: ICD-10-CM

## 2020-01-21 DIAGNOSIS — E66.01 MORBID OBESITY DUE TO EXCESS CALORIES (H): Chronic | ICD-10-CM

## 2020-01-21 DIAGNOSIS — I48.0 PAROXYSMAL ATRIAL FIBRILLATION (H): ICD-10-CM

## 2020-01-21 DIAGNOSIS — H02.403 INVOLUTIONAL PTOSIS, ACQUIRED, BILATERAL: ICD-10-CM

## 2020-01-21 DIAGNOSIS — E11.65 TYPE 2 DIABETES MELLITUS WITH HYPERGLYCEMIA, WITH LONG-TERM CURRENT USE OF INSULIN (H): Chronic | ICD-10-CM

## 2020-01-21 DIAGNOSIS — Z86.69 HISTORY OF SEIZURES AS A CHILD: Chronic | ICD-10-CM

## 2020-01-21 DIAGNOSIS — Z01.818 PREOP GENERAL PHYSICAL EXAM: Primary | ICD-10-CM

## 2020-01-21 DIAGNOSIS — G47.33 OSA (OBSTRUCTIVE SLEEP APNEA): Chronic | ICD-10-CM

## 2020-01-21 DIAGNOSIS — I10 ESSENTIAL HYPERTENSION WITH GOAL BLOOD PRESSURE LESS THAN 140/90: Chronic | ICD-10-CM

## 2020-01-21 PROCEDURE — 99215 OFFICE O/P EST HI 40 MIN: CPT | Performed by: INTERNAL MEDICINE

## 2020-01-21 PROCEDURE — 93000 ELECTROCARDIOGRAM COMPLETE: CPT | Performed by: INTERNAL MEDICINE

## 2020-01-21 ASSESSMENT — MIFFLIN-ST. JEOR: SCORE: 1799.8

## 2020-01-21 ASSESSMENT — PAIN SCALES - GENERAL: PAINLEVEL: SEVERE PAIN (7)
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
## 2020-01-21 NOTE — PATIENT INSTRUCTIONS
Before Your Surgery  1. Hold two doses of Eliquis on the day prior to surgery. Last dose you will take is on Saturday night 1/25. Resume medication 24 hrs after the procedure.  2. Take Atenolol in the morning of surgery.  3. Take Losartan the night before surgery.   4. You can take Levemir 44 units of evening dose the night before surgery.   5. Do not take Amryl or Jardiance the morning of sugery.    Call your surgeon if there is any change in your health. This includes signs of a cold or flu (such as a sore throat, runny nose, cough, rash or fever).    Do not smoke, drink alcohol or take over the counter medicine (unless your surgeon or primary care doctor tells you to) for the 24 hours before and after surgery.    If you take prescribed drugs: Follow your doctor s orders about which medicines to take and which to stop until after surgery.    Eating and drinking prior to surgery: follow the instructions from your surgeon    Take a shower or bath the night before surgery. Use the soap your surgeon gave you to gently clean your skin. If you do not have soap from your surgeon, use your regular soap. Do not shave or scrub the surgery site.  Wear clean pajamas and have clean sheets on your bed.

## 2020-01-22 NOTE — TELEPHONE ENCOUNTER
Patient was good with this info.She is wondering what time she needs to be here on Monday. I told her that surgery center would call her.

## 2020-01-24 ENCOUNTER — ANESTHESIA EVENT (OUTPATIENT)
Dept: SURGERY | Facility: AMBULATORY SURGERY CENTER | Age: 50
End: 2020-01-24

## 2020-01-26 ASSESSMENT — ENCOUNTER SYMPTOMS
SEIZURES: 1
DYSRHYTHMIAS: 1

## 2020-01-26 ASSESSMENT — LIFESTYLE VARIABLES: TOBACCO_USE: 1

## 2020-01-27 ENCOUNTER — HOSPITAL ENCOUNTER (OUTPATIENT)
Facility: AMBULATORY SURGERY CENTER | Age: 50
Discharge: HOME | End: 2020-01-27
Attending: OPHTHALMOLOGY | Admitting: OPHTHALMOLOGY
Payer: COMMERCIAL

## 2020-01-27 ENCOUNTER — ANESTHESIA (OUTPATIENT)
Dept: SURGERY | Facility: AMBULATORY SURGERY CENTER | Age: 50
End: 2020-01-27
Payer: COMMERCIAL

## 2020-01-27 VITALS
SYSTOLIC BLOOD PRESSURE: 128 MMHG | OXYGEN SATURATION: 95 % | TEMPERATURE: 97.2 F | RESPIRATION RATE: 16 BRPM | DIASTOLIC BLOOD PRESSURE: 75 MMHG | HEART RATE: 84 BPM

## 2020-01-27 DIAGNOSIS — H02.834 DERMATOCHALASIS OF BOTH UPPER EYELIDS: ICD-10-CM

## 2020-01-27 DIAGNOSIS — H57.813 BROW PTOSIS, BILATERAL: ICD-10-CM

## 2020-01-27 DIAGNOSIS — H02.403 INVOLUTIONAL PTOSIS, ACQUIRED, BILATERAL: ICD-10-CM

## 2020-01-27 DIAGNOSIS — H02.831 DERMATOCHALASIS OF BOTH UPPER EYELIDS: ICD-10-CM

## 2020-01-27 LAB
GLUCOSE BLDC GLUCOMTR-MCNC: 175 MG/DL (ref 70–99)
HCG UR QL: NEGATIVE

## 2020-01-27 PROCEDURE — 67900 REPAIR BROW DEFECT: CPT | Mod: LT

## 2020-01-27 PROCEDURE — G8918 PT W/O PREOP ORDER IV AB PRO: HCPCS

## 2020-01-27 PROCEDURE — G8907 PT DOC NO EVENTS ON DISCHARG: HCPCS

## 2020-01-27 PROCEDURE — 81025 URINE PREGNANCY TEST: CPT | Performed by: ANESTHESIOLOGY

## 2020-01-27 PROCEDURE — 67904 REPAIR EYELID DEFECT: CPT | Mod: E3

## 2020-01-27 RX ORDER — LIDOCAINE 40 MG/G
CREAM TOPICAL
Status: DISCONTINUED | OUTPATIENT
Start: 2020-01-27 | End: 2020-01-28 | Stop reason: HOSPADM

## 2020-01-27 RX ORDER — FENTANYL CITRATE 50 UG/ML
25-50 INJECTION, SOLUTION INTRAMUSCULAR; INTRAVENOUS
Status: DISCONTINUED | OUTPATIENT
Start: 2020-01-27 | End: 2020-01-28 | Stop reason: HOSPADM

## 2020-01-27 RX ORDER — ONDANSETRON 4 MG/1
4 TABLET, ORALLY DISINTEGRATING ORAL EVERY 30 MIN PRN
Status: DISCONTINUED | OUTPATIENT
Start: 2020-01-27 | End: 2020-01-28 | Stop reason: HOSPADM

## 2020-01-27 RX ORDER — FENTANYL CITRATE 50 UG/ML
INJECTION, SOLUTION INTRAMUSCULAR; INTRAVENOUS PRN
Status: DISCONTINUED | OUTPATIENT
Start: 2020-01-27 | End: 2020-01-27

## 2020-01-27 RX ORDER — PROPOFOL 10 MG/ML
INJECTION, EMULSION INTRAVENOUS CONTINUOUS PRN
Status: DISCONTINUED | OUTPATIENT
Start: 2020-01-27 | End: 2020-01-27

## 2020-01-27 RX ORDER — ONDANSETRON 2 MG/ML
4 INJECTION INTRAMUSCULAR; INTRAVENOUS EVERY 30 MIN PRN
Status: DISCONTINUED | OUTPATIENT
Start: 2020-01-27 | End: 2020-01-28 | Stop reason: HOSPADM

## 2020-01-27 RX ORDER — ERYTHROMYCIN 5 MG/G
OINTMENT OPHTHALMIC PRN
Status: DISCONTINUED | OUTPATIENT
Start: 2020-01-27 | End: 2020-01-27 | Stop reason: HOSPADM

## 2020-01-27 RX ORDER — SODIUM CHLORIDE, SODIUM LACTATE, POTASSIUM CHLORIDE, CALCIUM CHLORIDE 600; 310; 30; 20 MG/100ML; MG/100ML; MG/100ML; MG/100ML
INJECTION, SOLUTION INTRAVENOUS CONTINUOUS
Status: DISCONTINUED | OUTPATIENT
Start: 2020-01-27 | End: 2020-01-28 | Stop reason: HOSPADM

## 2020-01-27 RX ORDER — ALBUTEROL SULFATE 0.83 MG/ML
2.5 SOLUTION RESPIRATORY (INHALATION) EVERY 4 HOURS PRN
Status: DISCONTINUED | OUTPATIENT
Start: 2020-01-27 | End: 2020-01-28 | Stop reason: HOSPADM

## 2020-01-27 RX ORDER — HYDROMORPHONE HYDROCHLORIDE 1 MG/ML
.3-.5 INJECTION, SOLUTION INTRAMUSCULAR; INTRAVENOUS; SUBCUTANEOUS EVERY 10 MIN PRN
Status: DISCONTINUED | OUTPATIENT
Start: 2020-01-27 | End: 2020-01-28 | Stop reason: HOSPADM

## 2020-01-27 RX ORDER — NALOXONE HYDROCHLORIDE 0.4 MG/ML
.1-.4 INJECTION, SOLUTION INTRAMUSCULAR; INTRAVENOUS; SUBCUTANEOUS
Status: DISCONTINUED | OUTPATIENT
Start: 2020-01-27 | End: 2020-01-28 | Stop reason: HOSPADM

## 2020-01-27 RX ORDER — TETRACAINE HYDROCHLORIDE 5 MG/ML
SOLUTION OPHTHALMIC PRN
Status: DISCONTINUED | OUTPATIENT
Start: 2020-01-27 | End: 2020-01-27 | Stop reason: HOSPADM

## 2020-01-27 RX ORDER — OXYCODONE HYDROCHLORIDE 5 MG/1
5 TABLET ORAL EVERY 6 HOURS PRN
Qty: 12 TABLET | Refills: 0 | Status: SHIPPED | OUTPATIENT
Start: 2020-01-27 | End: 2020-02-19

## 2020-01-27 RX ORDER — ACETAMINOPHEN 325 MG/1
975 TABLET ORAL ONCE
Status: COMPLETED | OUTPATIENT
Start: 2020-01-27 | End: 2020-01-27

## 2020-01-27 RX ORDER — GABAPENTIN 300 MG/1
300 CAPSULE ORAL ONCE
Status: COMPLETED | OUTPATIENT
Start: 2020-01-27 | End: 2020-01-27

## 2020-01-27 RX ORDER — PROPOFOL 10 MG/ML
INJECTION, EMULSION INTRAVENOUS PRN
Status: DISCONTINUED | OUTPATIENT
Start: 2020-01-27 | End: 2020-01-27

## 2020-01-27 RX ORDER — DEXAMETHASONE SODIUM PHOSPHATE 4 MG/ML
4 INJECTION, SOLUTION INTRA-ARTICULAR; INTRALESIONAL; INTRAMUSCULAR; INTRAVENOUS; SOFT TISSUE EVERY 10 MIN PRN
Status: DISCONTINUED | OUTPATIENT
Start: 2020-01-27 | End: 2020-01-28 | Stop reason: HOSPADM

## 2020-01-27 RX ORDER — OXYCODONE HYDROCHLORIDE 5 MG/1
5-10 TABLET ORAL EVERY 4 HOURS PRN
Status: DISCONTINUED | OUTPATIENT
Start: 2020-01-27 | End: 2020-01-28 | Stop reason: HOSPADM

## 2020-01-27 RX ORDER — ERYTHROMYCIN 5 MG/G
OINTMENT OPHTHALMIC
Qty: 3.5 G | Refills: 0 | Status: SHIPPED | OUTPATIENT
Start: 2020-01-27 | End: 2021-02-12

## 2020-01-27 RX ADMIN — OXYCODONE HYDROCHLORIDE 5 MG: 5 TABLET ORAL at 09:18

## 2020-01-27 RX ADMIN — GABAPENTIN 300 MG: 300 CAPSULE ORAL at 06:42

## 2020-01-27 RX ADMIN — ONDANSETRON 4 MG: 2 INJECTION INTRAMUSCULAR; INTRAVENOUS at 08:31

## 2020-01-27 RX ADMIN — PROPOFOL 75 MCG/KG/MIN: 10 INJECTION, EMULSION INTRAVENOUS at 07:35

## 2020-01-27 RX ADMIN — PROPOFOL 30 MG: 10 INJECTION, EMULSION INTRAVENOUS at 07:36

## 2020-01-27 RX ADMIN — SODIUM CHLORIDE, SODIUM LACTATE, POTASSIUM CHLORIDE, CALCIUM CHLORIDE: 600; 310; 30; 20 INJECTION, SOLUTION INTRAVENOUS at 07:30

## 2020-01-27 RX ADMIN — ACETAMINOPHEN 975 MG: 325 TABLET ORAL at 08:30

## 2020-01-27 RX ADMIN — FENTANYL CITRATE 50 MCG: 50 INJECTION, SOLUTION INTRAMUSCULAR; INTRAVENOUS at 07:36

## 2020-01-27 NOTE — DISCHARGE INSTRUCTIONS
Post-operative Instructions  Ophthalmic Plastic and Reconstructive Surgery    Chelsie Knight M.D.     All instructions apply to the operated eye(s) or eyelid(s).    Wound care and personal care  ? If a patch or bandage has been placed, please leave this in place until seen by your physician. Ensure that the bandage does not get wet when you take a shower.  ? Apply ice compresses 15 minutes of every hour while awake for 2 days. As long as there is no further bleeding, after two days, switch to warm water compresses for five minutes, four times a day until seen by your physician.   ? You may shower or wash your hair the day after surgery. Do not go swimming for at least 2 weeks to prevent contamination of your wounds.  ? Do not apply make-up to the eyes or eyelids for 2 weeks after surgery.  ? Expect some swelling, bruising, black eye (even into the lower eyelids and cheeks). Also expect serum caking, crusting and discharge from the eye and/or incisions. A small amount of surface bleeding, and depending on the type of surgery, bleeding from the inside of the eyelid, is normal for the first 48 hours.  ? Avoid straining, bending at the waist, or lifting more than 15 pounds for 10 days. Activities that raise your blood pressure can worsen swelling, cause bleeding, and breaking of sutures. Like wise, sleeping with your head slightly elevated for the first several days can help swelling resolve more quickly.   ? Do continue to ambulate (walk) as you normally would - being sedentary after surgery can cause blood clots.   ? Your eye(s) and eyelid(s) may be painful and tender. This is normal after surgery.      Contact information and follow-up  ? Return to the Eye Clinic for a follow-up appointment with your physician as scheduled. If no appointment has been scheduled:   - [facility] eye clinic: [phone] for an appointment with Dr. Knight within 1 to 2 weeks from your date of surgery.   -  [facility] [facility]: [phone] for an appointment with Dr. Knight within 1 to 2 weeks from your date of surgery.     ? For severe pain, bleeding, or loss of vision, call the [facility] at [phone] or [facility] at [phone].     After hours or on weekends and holidays, call [phone] and ask to speak with the ophthalmologist on call.    An on call person can be reached after hours for concerns. The on call doctor should not call in medication refill requests after hours or on weekends, so please plan accordingly. An effort has been made to provide adequate pain medications following every surgery, and refills will not be provided in most instances. Narcotic pain medications cannot be called in.     Activity restrictions and driving  ? Avoid heavy lifting, bending, exercise or strenuous activity for 1 week after surgery.  You may resume other activities and return to work as tolerated.  ? You may not resume driving if you are using narcotic pain medications (such as Norco, Percocet, Tylenol #3).    Medications  ? Restart all regular home medications and eye drops. If you take Plavix or  Aspirin on a regular basis, wait for 72 hours after your surgery before restarting these in order to decrease the risk of bleeding complications.  ? Avoid aspirin and aspirin-like medications (Motrin, Aleve, Ibuprofen, Ana-Brodhead etc) for 72 hours to reduce the risk of bleeding. You may take Tylenol (acetaminophen) for pain.  ? In addition to your home medications, take the following post-operative medications as prescribed by your physician.    ? Apply antibiotic ointment to all sutures three times a day. If your eyes don't close well initially after surgery, apply a 1/2\" strip into the operated eye(s) at night.    [facility]  Same-Day Surgery   Adult Discharge Orders & Instructions   For 24 hours after surgery  1. Get plenty of rest.  A " responsible adult must stay with you for at least 24 hours after you leave the hospital.   2. Do not drive or use heavy equipment.  If you have weakness or tingling, don't drive or use heavy equipment until this feeling goes away.  3. Do not drink alcohol.  4. Avoid strenuous or risky activities.  Ask for help when climbing stairs.   5. You may feel lightheaded.  IF so, sit for a few minutes before standing.  Have someone help you get up.   6. If you have nausea (feel sick to your stomach): Drink only clear liquids such as apple juice, ginger ale, broth or 7-Up.  Rest may also help.  Be sure to drink enough fluids.  Move to a regular diet as you feel able.  7. You may have a slight fever. Call the doctor if your fever is over 100 F (37.7 C) (taken under the tongue) or lasts longer than 24 hours.  8. You may have a dry mouth, a sore throat, muscle aches or trouble sleeping.  These should go away after 24 hours.  9. Do not make important or legal decisions.   Call your doctor for any of the followin.  Signs of infection (fever, growing tenderness at the surgery site, a large amount of drainage or bleeding, severe pain, foul-smelling drainage, redness, swelling).    2. It has been over 8 to 10 hours since surgery and you are still not able to urinate (pass water).    3.  Headache for over 24 hours.    975 mg Tylenol given at 0830 AM

## 2020-01-27 NOTE — ANESTHESIA CARE TRANSFER NOTE
Patient: Zaira Villatoro    Procedure(s):  Both upper eyelid blepharoplasty and ptosis repair,  left internal browpexy    Diagnosis: Dermatochalasis of both upper eyelids [H02.831, H02.834]  Brow ptosis, bilateral [H57.813]  Involutional ptosis, acquired, bilateral [H02.403]  Diagnosis Additional Information: No value filed.    Anesthesia Type:   MAC     Note:  Airway :Room Air  Patient transferred to:Phase II  Handoff Report: Identifed the Patient, Identified the Reponsible Provider, Reviewed the pertinent medical history, Discussed the surgical course, Reviewed Intra-OP anesthesia mangement and issues during anesthesia, Set expectations for post-procedure period and Allowed opportunity for questions and acknowledgement of understanding      Vitals: (Last set prior to Anesthesia Care Transfer)    CRNA VITALS  1/27/2020 0744 - 1/27/2020 0844      1/27/2020             Pulse:  83    SpO2:  95 %                Electronically Signed By: LINUS Martinez CRNA  January 27, 2020  8:47 AM

## 2020-01-27 NOTE — OP NOTE
PREOPERATIVE DIAGNOSES:   1. Bilateral upper eyelid dermatochalasis.   2. Bilateral upper eyelid ptosis.   3. Left brow ptosis.  POSTOPERATIVE DIAGNOSES:   1. Bilateral upper eyelid dermatochalasis.   2. Bilateral upper eyelid ptosis.   3. Left brow ptosis.  PROCEDURES:  1. Bilateral upper eyelid ptosis repair by external levator resection.  2. Bilateral upper eyelid blepharoplasty.  SURGEON: Chelsie Knight MD   ASSISTANT: Carolina Maciel MD  ANESTHESIA: Monitored with local infiltration of 1% lidocaine with epinephrine [id number].   COMPLICATIONS: None.   ESTIMATED BLOOD LOSS: Less than 5 mL.   HISTORY: Zaira Villatoro presented with upper eyelid drooping secondary to dermatochalasis and ptosis of the upper eyelids leading to blockage of the superior visual field and interfering with activities of daily living. After the risks, benefits and alternatives to the proposed procedure were explained, informed consent was obtained.   PROCEDURE: The patient was brought to the operating room and placed supine on the operating table. IV sedation was given. Each upper lid crease and the excess upper eyelid skin  was marked in a blepharoplasty ellipse with a marking pen.  These areas were all infiltrated with local anesthetic and  was prepped and draped in the typical sterile fashion for oculoplastic surgery. Attention was directed to the left side. The skin was incised following the marked lines. The skin flap was excised with a high temperature cautery. Hemostasis was obtained with high temperature cautery. The orbicularis and orbital septum were opened horizontally and levator aponeurosis identified and dissected from the superior tarsal border. A strip of pretarsal orbicularis was removed. Central and nasal fat pockets were conservatively debulked.  A 5-0 Mersilene suture was placed in a horizontal mattress fashion taking a partial thickness bite of the superior tarsal border, bringing each end of the double armed suture  underneath the levator aponeurosis and tied in a temporary fashion. Attention was directed to the right side where the same procedure was performed. The sutures were adjusted for symmetry then tied in a permanent fashion. Attention was directed to the left side.  Dissection was carried in the suborbicularis plane over the orbital rim superolaterally.  A 5-0 Monocryl suture was passed through the marking at the inferior brow hairs.  This suture was then passed through the frontal bone periosteum 1 cm above the orbital rim.  This suture was then passed back through the orbicularis in line with the marking stitch which was pulled through and tied in a permanent fashion.  Each skin incision was closed with a running 6-0 plain gut suture. Ophthalmic antibiotic ointment was applied to the incisions and into both eyes. The patient tolerated the procedure well and left the operating room in stable condition.       Chelsie Knight MD

## 2020-01-27 NOTE — ANESTHESIA PREPROCEDURE EVALUATION
Anesthesia Pre-Procedure Evaluation    Patient: Zaira Villatoro   MRN:     [id number] Gender:   female   Age:    49 year old :      1970        Preoperative Diagnosis: Dermatochalasis of both upper eyelids [H02.831, H02.834]  Brow ptosis, bilateral [H57.813]  Involutional ptosis, acquired, bilateral [H02.403]   Procedure(s):  Both upper eyelid blepharoplasty and ptosis repair,  left internal browpexy     Past Medical History:   Diagnosis Date     Acute diverticulitis 2013     History of seizures as a child 1981    One grand mal in 5th grade.  Didn't tolerate phenobarb.     Moderate single current episode of major depressive disorder (H)      Perforation of sigmoid colon (H) 8/3/2013     S/P left hemicolectomy 2013      Sepsis (H) 2013      Past Surgical History:   Procedure Laterality Date     APPENDECTOMY  2014     ARTHROSCOPY KNEE RT/LT      RT      BIOPSY  2011 and on     COLONOSCOPY  2012    lt sided colitis     COLONOSCOPY  2014     CRYOTHERAPY, CERVICAL  1988     EXPLORATORY LAPAROTOMY, PARTIAL LEFT VIANEY COLLECTOMY WITH HARTMANS PROCEDURE, COLOSTOMY  2013    lt vianey colectomy, bowel perforation, colostomy, Karen's pouche     GI SURGERY      abrupted  bowl     HC TOOTH EXTRACTION W/FORCEP  2003    Abscess Tooth / Hospitalized     HERNIA REPAIR  2014    found at time of takedown     SINUS SURGERY  03    LT sinus cyst removal      TAKEDOWN COLOSTOMY  2014          Anesthesia Evaluation     . Pt has had prior anesthetic. Type: General and MAC           ROS/MED HX    ENT/Pulmonary:     (+)sleep apnea, tobacco use, Past use uses CPAP , . .    Neurologic: Comment: Seizures in the past    (+)seizures     Cardiovascular:     (+) hypertension----. Taking blood thinners Pt has received instructions: . . . :. dysrhythmias a-fib, . Previous cardiac testing date:results:date: results:ECG reviewed date: results:NSR date: results:           METS/Exercise Tolerance:     Hematologic:  - neg hematologic  ROS       Musculoskeletal:  - neg musculoskeletal ROS       GI/Hepatic: Comment: Fatty liver, Blood in stools    (+) liver disease,       Renal/Genitourinary:         Endo:     (+) type II DM Not using insulin not previously admitted for DM/DKA Obesity, .      Psychiatric:         Infectious Disease:         Malignancy:      - no malignancy   Other:    - neg other ROS                     PHYSICAL EXAM:   Mental Status/Neuro: A/A/O   Airway:   Mallampati: IV  Mouth/Opening: Full  TM distance: > 6 cm  Neck ROM: Full   Respiratory: Auscultation: CTAB     Resp. Rate: Normal     Resp. Effort: Normal      CV: Rhythm: Afib; Regular  Heart: Normal Sounds  Edema: None   Comments:                      LABS:  CBC:   Lab Results   Component Value Date    WBC 7.0 01/13/2020    WBC 7.9 10/04/2019    HGB 13.7 01/13/2020    HGB 13.8 10/04/2019    HCT 41.9 01/13/2020    HCT 41.9 10/04/2019     01/13/2020     10/04/2019     BMP:   Lab Results   Component Value Date     01/13/2020     10/04/2019    POTASSIUM 4.2 01/13/2020    POTASSIUM 4.0 10/04/2019    CHLORIDE 109 01/13/2020    CHLORIDE 105 10/04/2019    CO2 29 01/13/2020    CO2 27 10/04/2019    BUN 13 01/13/2020    BUN 10 10/04/2019    CR 0.82 01/13/2020    CR 0.78 10/04/2019     (H) 01/13/2020     (H) 10/04/2019     COAGS: No results found for: PTT, INR, FIBR  POC: No results found for: BGM, HCG, HCGS  OTHER:   Lab Results   Component Value Date    A1C 7.3 (H) 01/13/2020    SAMANTA 8.8 01/13/2020    MAG 1.7 08/04/2013    ALBUMIN 3.4 01/13/2020    PROTTOTAL 6.8 01/13/2020    ALT 33 01/13/2020    AST 23 01/13/2020    ALKPHOS 46 01/13/2020    BILITOTAL 0.9 01/13/2020    BILIDIRECT 0.4 08/04/2013    LIPASE 75 08/26/2019    AMYLASE 28 (L) 03/17/2016    TSH 2.39 09/03/2019    CRP 7.0 01/13/2020    SED 10 08/24/2018        Preop Vitals    BP Readings from Last 3 Encounters:   01/21/20  "128/74   01/13/20 124/62   12/17/19 125/81    Pulse Readings from Last 3 Encounters:   01/21/20 75   01/13/20 (P) 78   12/17/19 68      Resp Readings from Last 3 Encounters:   01/21/20 18   01/13/20 (P) 17   10/04/19 19    SpO2 Readings from Last 3 Encounters:   01/21/20 98%   01/13/20 (P) 97%   12/17/19 94%      Temp Readings from Last 1 Encounters:   01/21/20 97.2  F (36.2  C) (Oral)    Ht Readings from Last 1 Encounters:   01/21/20 1.626 m (5' 4\")      Wt Readings from Last 1 Encounters:   01/21/20 118.5 kg (261 lb 3.2 oz)    Estimated body mass index is 44.83 kg/m  as calculated from the following:    Height as of 1/21/20: 1.626 m (5' 4\").    Weight as of 1/21/20: 118.5 kg (261 lb 3.2 oz).     LDA:  Peripheral IV 03/17/16 Right;Medial (Active)   Number of days: 1410        Assessment:   ASA SCORE: 3    H&P: History and physical reviewed and following examination; no interval change.   Smoking Status:  Non-Smoker/Unknown   NPO Status: NPO Appropriate     Plan:   Anes. Type:  MAC   Pre-Medication: None   Induction:  IV (Standard)   Airway: Native Airway   Access/Monitoring: PIV   Maintenance: Propofol Sedation     Postop Plan:   Postop Pain: None  Postop Sedation/Airway: Not planned  Disposition: Outpatient     PONV Management:   Adult Risk Factors: Female, Non-Smoker   Prevention: Ondansetron, Propofol     CONSENT: Direct conversation   Plan and risks discussed with: Patient   Blood Products: Consent Deferred (Minimal Blood Loss)                   Sunday Cotton MD  "

## 2020-01-27 NOTE — ANESTHESIA POSTPROCEDURE EVALUATION
Anesthesia POST Procedure Evaluation    Patient: Zaira Villatoro   MRN:     [id number] Gender:   female   Age:    49 year old :      1970        Preoperative Diagnosis: Dermatochalasis of both upper eyelids [H02.831, H02.834]  Brow ptosis, bilateral [H57.813]  Involutional ptosis, acquired, bilateral [H02.403]   Procedure(s):  Both upper eyelid blepharoplasty and ptosis repair,  left internal browpexy   Postop Comments: No value filed.       Anesthesia Type:  Not documented  MAC    Reportable Event: NO     PAIN: Uncomplicated   Sign Out status: Comfortable, Well controlled pain     PONV: No PONV   Sign Out status:  No Nausea or Vomiting     Neuro/Psych: Uneventful perioperative course   Sign Out Status: Preoperative baseline; Age appropriate mentation     Airway/Resp.: Uneventful perioperative course   Sign Out Status: Non labored breathing, age appropriate RR; Resp. Status within EXPECTED Parameters     CV: Uneventful perioperative course   Sign Out status: Appropriate BP and perfusion indices; Appropriate HR/Rhythm     Disposition:   Sign Out in:  Phase II  Disposition:  Home  Recovery Course: Uneventful  Follow-Up: Not required           Last Anesthesia Record Vitals:  CRNA VITALS  2020 0744 - 2020 0844      2020             Pulse:  83    SpO2:  95 %          Last PACU Vitals:  Vitals Value Taken Time   BP     Temp     Pulse     Resp     SpO2     Temp src     NIBP 112/69 2020  8:10 AM   Pulse 83 2020  8:15 AM   SpO2 95 % 2020  8:15 AM   Resp     Temp 97  F (36.1  C) 2020  8:03 AM   Ht Rate     Temp 2           Electronically Signed By: Sunday Cotton MD, 2020, 9:30 AM

## 2020-02-05 ENCOUNTER — OFFICE VISIT (OUTPATIENT)
Dept: OPHTHALMOLOGY | Facility: CLINIC | Age: 50
End: 2020-02-05
Payer: COMMERCIAL

## 2020-02-05 DIAGNOSIS — H02.834 DERMATOCHALASIS OF BOTH UPPER EYELIDS: Primary | ICD-10-CM

## 2020-02-05 DIAGNOSIS — H02.831 DERMATOCHALASIS OF BOTH UPPER EYELIDS: Primary | ICD-10-CM

## 2020-02-05 DIAGNOSIS — H02.403 INVOLUTIONAL PTOSIS, ACQUIRED, BILATERAL: ICD-10-CM

## 2020-02-05 PROCEDURE — 99024 POSTOP FOLLOW-UP VISIT: CPT | Performed by: OPHTHALMOLOGY

## 2020-02-05 ASSESSMENT — VISUAL ACUITY
OD_CC: 20/30
CORRECTION_TYPE: GLASSES
OD_CC+: -1
OS_CC: 20/30
METHOD: SNELLEN - LINEAR

## 2020-02-05 NOTE — PROGRESS NOTES
"Chief Complaint(s) and History of Present Illness(es)     Post Op (Ophthalmology) Both Eyes     Laterality: both eyes              Comments     S/p bilteral upper eyelid ptosis repair and bilateral upper eyelid   blepharoplasty 1/27/2020. Pt states she has 2 \"bubbles\", one on nasal   corner, each eye. Can push on them and they will deflate and then fill   back up over time. Does not feel anything drain from them when they   deflate. Also mentions this happens with the bags under her eyes as well.            Zaira Villatoro is status post bilateral upper eyelid blepharoplasty  and bilateral levator advancement ptosis repair.  Incision(s) healing well.  The lid(s)  are  in excellent position.   Concerned about edema by medial canthus, reassured. Also left browpexy is a bit tender.     I have recommended:  * Continue antibiotic ointment or bland lubricating ointment (eg vaseline or aquaphor) to the incision site at bedtime.   * Can massage along the incision 2-3 x daily.   * Warm soaks 3-4x daily until all edema and ecchymoses resolve  * Return to clinic in 2 months     Attending Physician Attestation:  Complete documentation of historical and exam elements from today's encounter can be found in the full encounter summary report (not reduplicated in this progress note).  I personally obtained the chief complaint(s) and history of present illness.  I confirmed and edited as necessary the review of systems, past medical/surgical history, family history, social history, and examination findings as documented by others; and I examined the patient myself.  I personally reviewed the relevant tests, images, and reports as documented above.  I formulated and edited as necessary the assessment and plan and discussed the findings and management plan with the patient and family. - Chelsie Knight MD    "

## 2020-02-05 NOTE — NURSING NOTE
"Chief Complaints and History of Present Illnesses   Patient presents with     Post Op (Ophthalmology) Both Eyes       Chief Complaint(s) and History of Present Illness(es)     Post Op (Ophthalmology) Both Eyes     Laterality: both eyes              Comments     S/p bilteral upper eyelid ptosis repair and bilateral upper eyelid blepharoplasty 1/27/2020. Pt states she has 2 \"bubbles\", one on nasal corner, each eye. Can push on them and they will deflate and then fill back up over time. Does not feel anything drain from them when they deflate. Also mentions this happens with the bags under her eyes as well.                 Karin Nava, COA    "

## 2020-02-07 NOTE — PROGRESS NOTES
SUBJECTIVE:   Zaira Villatoro is a 49 year old female who is seen as self referral for evaluation of right knee pain that has been present approximately 6 months. No known injury.  Had swelling and difficulty flexing.    Present symptoms: started as sharp pains.  10 days ago knee gave out.  Couldn't find a good position after that.  Has gotten a bit better last few days.  Constant pain  Worse when first up.  +feeling of giving-way   No catching, locking    pain flexing.  Lateral and posterior pain    Treatments tried to this point: tylenol (ulcerative colitis, cant take nsaids).  Ice, heat.    Orthopedic PMH: RIGHT knee arthroscopy 10+ years ago     left knee patellofemoral joint osteoarthritis.  Seen in 2017.   NSAIDS  Strengthening  PT ordered  Tylenol  Corticosteroid injection on 12/20/17 which helped, and is not a problem any more    Review of Systems:  Constitutional:  NEGATIVE for fever, chills, change in weight  Integumentary/Skin:  NEGATIVE for worrisome rashes, moles or lesions  Eyes:  NEGATIVE for vision changes or irritation  ENT/Mouth:  NEGATIVE for ear, mouth and throat problems  Resp:  NEGATIVE for significant cough or SOB  Breast:  NEGATIVE for masses, tenderness or discharge  CV:  NEGATIVE for chest pain, palpitations or peripheral edema  GI:  NEGATIVE for nausea, abdominal pain, heartburn, or change in bowel habits  :  Negative   Musculoskeletal:  See HPI above  Neuro:  NEGATIVE for weakness, dizziness or paresthesias  Endocrine:  NEGATIVE for temperature intolerance, skin/hair changes  Heme/allergy/immune:  NEGATIVE for bleeding problems  Psychiatric:  NEGATIVE for changes in mood or affect    Past Medical History:   Past Medical History:   Diagnosis Date     Acute diverticulitis 7/31/2013     History of seizures as a child 1981    One grand mal in 5th grade.  Didn't tolerate phenobarb.     Moderate single current episode of major depressive disorder (H)      Perforation of sigmoid colon (H)  8/3/2013     S/P left hemicolectomy 8/16/2013 8/02/13      Sepsis (H) 8/1/2013     Past Surgical History:   Past Surgical History:   Procedure Laterality Date     APPENDECTOMY  04/2014     ARTHROSCOPY KNEE RT/LT  2004    RT      BIOPSY  2011    many 2011 and on     BLEPHAROPLASTY Bilateral 1/27/2020    Procedure: Both upper eyelid blepharoplasty and ptosis repair,;  Surgeon: Chelsie Knight MD;  Location: [facility] OR     COLONOSCOPY  02/2012    lt sided colitis     COLONOSCOPY  1/9/2014     COSMETIC BROWPEXY Left 1/27/2020    Procedure: left internal browpexy;  Surgeon: Chelsie Knight MD;  Location: [facility] OR     CRYOTHERAPY, CERVICAL  1988     EXPLORATORY LAPAROTOMY, PARTIAL LEFT ROLANDA COLLECTOMY WITH HARTMANS PROCEDURE, COLOSTOMY  8/2013    lt rolanda colectomy, bowel perforation, colostomy, Karen's pouche     GI SURGERY  2013    abrupted  bowl     HC TOOTH EXTRACTION W/FORCEP  6/2003    Abscess Tooth / Hospitalized     HERNIA REPAIR  04/2014    found at time of takedown     SINUS SURGERY  2/11/03    LT sinus cyst removal      TAKEDOWN COLOSTOMY  04/2014     Family History:   Family History   Problem Relation Age of Onset     Diabetes Mother      Allergies Mother      Asthma Mother      Arthritis Mother      Heart Disease Mother         heart murmur     Depression Mother      Obesity Mother      Basal cell carcinoma Mother      Skin Cancer Mother      Eye Disorder Father      Diabetes Father      Cerebrovascular Disease Father      Heart Disease Father      Hypertension Father      Diabetes Maternal Grandmother      Cerebrovascular Disease Maternal Grandfather      Unknown/Adopted Paternal Grandmother      Heart Disease Paternal Grandfather         left ventrical failure     Cerebrovascular Disease Paternal Grandfather      Gynecology Sister         endometriosis     Depression Sister      Asthma Daughter      Breast Cancer Maternal Aunt      Thyroid Disease Maternal Aunt      Breast Cancer Other         fathers  "sister     Anesthesia Reaction Other      Thyroid Disease Other      Osteoporosis Other      Asthma Daughter      Breast Cancer Other         mothers sister     Glaucoma No family hx of      Macular Degeneration No family hx of      Social History:   Social History     Tobacco Use     Smoking status: Former Smoker     Packs/day: 1.00     Years: 15.00     Pack years: 15.00     Types: Cigarettes     Start date: 1985     Last attempt to quit: 1998     Years since quittin.6     Smokeless tobacco: Never Used     Tobacco comment: soke free household.   Substance Use Topics     Alcohol use: Yes     Alcohol/week: 0.0 standard drinks     Comment: occ     OBJECTIVE:  Physical Exam:  /80 (BP Location: Left arm, Patient Position: Sitting, Cuff Size: Adult Regular)   Pulse 79   Ht 1.626 m (5' 4\")   Wt 118.4 kg (261 lb)   SpO2 99%   BMI 44.80 kg/m    General Appearance: healthy, alert and no distress   Skin: no suspicious lesions or rashes  Neuro: Normal strength and tone, mentation intact and speech normal  Vascular: good pulses, and cappillary refill   Lymph: no lymphadenopathy   Psych:  mentation appears normal and affect normal/bright  Resp: no increased work of breathing     Right Knee Exam:  Gait: walks with antalgic gait favoring right side  Alignment: normal   Squat: 80 % .    Patellofemoral joint: mild crepitations in the patellofemoral joint.  Effusion: mild  ROM: 10* extension to 95* flexion  Tender: lateral joint line  Masses: none  Ligaments:   Lachman's: stable   Anterior/Posterior drawer: stable,   Varus/Valgus stress: stable to varus and valgus stress  McMurrays: lateral and posterior pain with any flexion past 90    X-rays:  Obtained today of the right knee: 3-views, reviewed in the office with the patient show: moderate hypertrophic osteoarthritis changes patellofemoral joint and mild narrowing medial joint space narrowing. Small osteophyte laterally.       ASSESSMENT:   Osteoarthritis " right patellofemoral joint   Possible mechanically significantly, lateral meniscus tear.    PLAN:   I suggested an MRI of the right knee.  This was ordered  She may wait 10 days or so to see if problem resolves.    Return to clinic or my chart for results.    ROBBI Palm MD  Dept. Orthopedic Surgery  [facility]

## 2020-02-12 ENCOUNTER — ANCILLARY PROCEDURE (OUTPATIENT)
Dept: GENERAL RADIOLOGY | Facility: CLINIC | Age: 50
End: 2020-02-12
Attending: ORTHOPAEDIC SURGERY
Payer: COMMERCIAL

## 2020-02-12 ENCOUNTER — OFFICE VISIT (OUTPATIENT)
Dept: ORTHOPEDICS | Facility: CLINIC | Age: 50
End: 2020-02-12
Payer: COMMERCIAL

## 2020-02-12 VITALS
DIASTOLIC BLOOD PRESSURE: 80 MMHG | WEIGHT: 261 LBS | HEART RATE: 79 BPM | BODY MASS INDEX: 44.56 KG/M2 | SYSTOLIC BLOOD PRESSURE: 130 MMHG | OXYGEN SATURATION: 99 % | HEIGHT: 64 IN

## 2020-02-12 DIAGNOSIS — S83.281A TEAR OF LATERAL MENISCUS OF RIGHT KNEE, CURRENT, UNSPECIFIED TEAR TYPE, INITIAL ENCOUNTER: Primary | ICD-10-CM

## 2020-02-12 DIAGNOSIS — M17.11 PRIMARY OSTEOARTHRITIS OF RIGHT KNEE: ICD-10-CM

## 2020-02-12 PROCEDURE — 73562 X-RAY EXAM OF KNEE 3: CPT | Mod: RT

## 2020-02-12 PROCEDURE — 99214 OFFICE O/P EST MOD 30 MIN: CPT | Performed by: ORTHOPAEDIC SURGERY

## 2020-02-12 ASSESSMENT — MIFFLIN-ST. JEOR: SCORE: 1798.89

## 2020-02-12 NOTE — LETTER
2/12/2020         RE: Zaira Villatoro  [address] [facility] [id number]        Dear Colleague,    Thank you for referring your patient, Zaira Villatoro, to the [facility] ER. Please see a copy of my visit note below.    SUBJECTIVE:   Zaira Villatoro is a 49 year old female who is seen as self referral for evaluation of right knee pain that has been present approximately 6 months. No known injury.  Had swelling and difficulty flexing.    Present symptoms: started as sharp pains.  10 days ago knee gave out.  Couldn't find a good position after that.  Has gotten a bit better last few days.  Constant pain  Worse when first up.  +feeling of giving-way   No catching, locking    pain flexing.  Lateral and posterior pain    Treatments tried to this point: tylenol (ulcerative colitis, cant take nsaids).  Ice, heat.    Orthopedic PMH: RIGHT knee arthroscopy 10+ years ago     left knee patellofemoral joint osteoarthritis.  Seen in 2017.   NSAIDS  Strengthening  PT ordered  Tylenol  Corticosteroid injection on 12/20/17 which helped, and is not a problem any more    Review of Systems:  Constitutional:  NEGATIVE for fever, chills, change in weight  Integumentary/Skin:  NEGATIVE for worrisome rashes, moles or lesions  Eyes:  NEGATIVE for vision changes or irritation  ENT/Mouth:  NEGATIVE for ear, mouth and throat problems  Resp:  NEGATIVE for significant cough or SOB  Breast:  NEGATIVE for masses, tenderness or discharge  CV:  NEGATIVE for chest pain, palpitations or peripheral edema  GI:  NEGATIVE for nausea, abdominal pain, heartburn, or change in bowel habits  :  Negative   Musculoskeletal:  See HPI above  Neuro:  NEGATIVE for weakness, dizziness or paresthesias  Endocrine:  NEGATIVE for temperature intolerance, skin/hair changes  Heme/allergy/immune:  NEGATIVE for bleeding problems  Psychiatric:  NEGATIVE for changes in mood or affect    Past Medical History:   Past Medical History:   Diagnosis  Date     Acute diverticulitis 7/31/2013     History of seizures as a child 1981    One grand mal in 5th grade.  Didn't tolerate phenobarb.     Moderate single current episode of major depressive disorder (H)      Perforation of sigmoid colon (H) 8/3/2013     S/P left hemicolectomy 8/16/2013 8/02/13      Sepsis (H) 8/1/2013     Past Surgical History:   Past Surgical History:   Procedure Laterality Date     APPENDECTOMY  04/2014     ARTHROSCOPY KNEE RT/LT  2004    RT      BIOPSY  2011    many 2011 and on     BLEPHAROPLASTY Bilateral 1/27/2020    Procedure: Both upper eyelid blepharoplasty and ptosis repair,;  Surgeon: Chelsie Knight MD;  Location: [facility] OR     COLONOSCOPY  02/2012    lt sided colitis     COLONOSCOPY  1/9/2014     COSMETIC BROWPEXY Left 1/27/2020    Procedure: left internal browpexy;  Surgeon: Chelsie Knight MD;  Location: [facility] OR     CRYOTHERAPY, CERVICAL  1988     EXPLORATORY LAPAROTOMY, PARTIAL LEFT ROLANDA COLLECTOMY WITH HARTMANS PROCEDURE, COLOSTOMY  8/2013    lt rolanda colectomy, bowel perforation, colostomy, Karen's pouche     GI SURGERY  2013    abrupted  bowl     HC TOOTH EXTRACTION W/FORCEP  6/2003    Abscess Tooth / Hospitalized     HERNIA REPAIR  04/2014    found at time of takedown     SINUS SURGERY  2/11/03    LT sinus cyst removal      TAKEDOWN COLOSTOMY  04/2014     Family History:   Family History   Problem Relation Age of Onset     Diabetes Mother      Allergies Mother      Asthma Mother      Arthritis Mother      Heart Disease Mother         heart murmur     Depression Mother      Obesity Mother      Basal cell carcinoma Mother      Skin Cancer Mother      Eye Disorder Father      Diabetes Father      Cerebrovascular Disease Father      Heart Disease Father      Hypertension Father      Diabetes Maternal Grandmother      Cerebrovascular Disease Maternal Grandfather      Unknown/Adopted Paternal Grandmother      Heart Disease Paternal Grandfather         left ventrical failure  "    Cerebrovascular Disease Paternal Grandfather      Gynecology Sister         endometriosis     Depression Sister      Asthma Daughter      Breast Cancer Maternal Aunt      Thyroid Disease Maternal Aunt      Breast Cancer Other         fathers sister     Anesthesia Reaction Other      Thyroid Disease Other      Osteoporosis Other      Asthma Daughter      Breast Cancer Other         mothers sister     Glaucoma No family hx of      Macular Degeneration No family hx of      Social History:   Social History     Tobacco Use     Smoking status: Former Smoker     Packs/day: 1.00     Years: 15.00     Pack years: 15.00     Types: Cigarettes     Start date: 1985     Last attempt to quit: 1998     Years since quittin.6     Smokeless tobacco: Never Used     Tobacco comment: soke free household.   Substance Use Topics     Alcohol use: Yes     Alcohol/week: 0.0 standard drinks     Comment: occ     OBJECTIVE:  Physical Exam:  /80 (BP Location: Left arm, Patient Position: Sitting, Cuff Size: Adult Regular)   Pulse 79   Ht 1.626 m (5' 4\")   Wt 118.4 kg (261 lb)   SpO2 99%   BMI 44.80 kg/m     General Appearance: healthy, alert and no distress   Skin: no suspicious lesions or rashes  Neuro: Normal strength and tone, mentation intact and speech normal  Vascular: good pulses, and cappillary refill   Lymph: no lymphadenopathy   Psych:  mentation appears normal and affect normal/bright  Resp: no increased work of breathing     Right Knee Exam:  Gait: walks with antalgic gait favoring right side  Alignment: normal   Squat: 80 % .    Patellofemoral joint: mild crepitations in the patellofemoral joint.  Effusion: mild  ROM: 10* extension to 95* flexion  Tender: lateral joint line  Masses: none  Ligaments:   Lachman's: stable   Anterior/Posterior drawer: stable,   Varus/Valgus stress: stable to varus and valgus stress  McMurrays: lateral and posterior pain with any flexion past 90    X-rays:  Obtained today of the " right knee: 3-views, reviewed in the office with the patient show: moderate hypertrophic osteoarthritis changes patellofemoral joint and mild narrowing medial joint space narrowing. Small osteophyte laterally.       ASSESSMENT:   Osteoarthritis right patellofemoral joint   Possible mechanically significantly, lateral meniscus tear.    PLAN:   I suggested an MRI of the right knee.  This was ordered  She may wait 10 days or so to see if problem resolves.    Return to clinic or my chart for results.    ROBBI Palm MD  Dept. Orthopedic Surgery  [facility]         Again, thank you for allowing me to participate in the care of your patient.        Sincerely,        Jhon Palm MD

## 2020-02-14 DIAGNOSIS — Z79.4 TYPE 2 DIABETES MELLITUS WITHOUT COMPLICATION, WITH LONG-TERM CURRENT USE OF INSULIN (H): ICD-10-CM

## 2020-02-14 DIAGNOSIS — E11.9 TYPE 2 DIABETES MELLITUS WITHOUT COMPLICATION, WITH LONG-TERM CURRENT USE OF INSULIN (H): ICD-10-CM

## 2020-02-17 NOTE — PROGRESS NOTES
INTERNAL MEDICINE  Medical Weight Loss - Initial Evaluation      Primary Care Physician: Tayler Bergman    Chief Complaint:   Chief Complaint   Patient presents with     Health Maintenance     Physical      fluid in the hip     Derm Problem     Urinary Problem     cloudy in the morning but gets better      Chief Complaint   Patient presents with     Health Maintenance     Physical      fluid in the hip     Derm Problem     Urinary Problem     cloudy in the morning but gets better          Wt Readings from Last 5 Encounters:   02/19/20 116.6 kg (257 lb)   02/12/20 118.4 kg (261 lb)   01/21/20 118.5 kg (261 lb 3.2 oz)   01/13/20 (P) 117.5 kg (259 lb)   12/17/19 117 kg (258 lb)        Diabetes-  Am is   PM is 123-178  She is struggling with her insurance- hasn't been taking jardiance or ozempic therefore.    Questions about MRI of her hips  She has pain when she rolls over on her sides, primarily her right side.  No pain otherwise if she is not laying on it.  At first it is a sharp pain and then turns to a warm pain.     Swelling is better in her legs but still there.  exercise makes no difference.  It will be better or worse on some days over the others.  Sometimes she is better in the morning and sometimes she doesn't.  She wears compression stockings at night only and this doesn't help.  They are not comfortable.  She didn't have them fitted. MRI with edema in the low back.  She is on a high protein diet and this has not helped.  She is actively try ing to lose weight too.    Certain movements bring the pain in the left lateral shoulder.  She notices this when she reaches her arm behind her head.  No weakness.  Just pain. It feels like electric shock type pain. She thinks it stays in the upper arm but doesn't know for sure.  No trauma.      Spot in her bikini area that has a boil filled area that fills with blood.  Will drain and then refill.  It is black and the size of a dime and will get sore.      She  "wakes in the morning and there are 2 levels of urine - dark and light but they are cloudy.  It is mostly only in the morning \"Girl part has a cold\" discharge in the morning.  No fishy smell.  No itchy or pain.  Started after the a fib and she was on uti.  There seems to be a cut in the clitoral area and there is a bump there too.    She is wondering what to do about her hernia.  She has to lose 30 pounds in order to have surgery for her hernia.      She has an itchy spot on her back and right shoulder and nose.  They are rough and then fall off.  Itchy.  Mother has skin cancer.  First noticed these a couple years ago.     She continues to work on weight loss as well  PAST SURGICAL HISTORY:   Past Surgical History:   Procedure Laterality Date     APPENDECTOMY  04/2014     ARTHROSCOPY KNEE RT/LT  2004    RT      BIOPSY  2011    many 2011 and on     BLEPHAROPLASTY Bilateral 1/27/2020    Procedure: Both upper eyelid blepharoplasty and ptosis repair,;  Surgeon: Chelsie Knight MD;  Location: [facility] OR     COLONOSCOPY  02/2012    lt sided colitis     COLONOSCOPY  1/9/2014     COSMETIC BROWPEXY Left 1/27/2020    Procedure: left internal browpexy;  Surgeon: Chelsie Knight MD;  Location: [facility] OR     CRYOTHERAPY, CERVICAL  1988     EXPLORATORY LAPAROTOMY, PARTIAL LEFT ROLANDA COLLECTOMY WITH HARTMANS PROCEDURE, COLOSTOMY  8/2013    lt rolanda colectomy, bowel perforation, colostomy, Karen's pouche     GI SURGERY  2013    abrupted  bowl     HC TOOTH EXTRACTION W/FORCEP  6/2003    Abscess Tooth / Hospitalized     HERNIA REPAIR  04/2014    found at time of takedown     SINUS SURGERY  2/11/03    LT sinus cyst removal      TAKEDOWN COLOSTOMY  04/2014       PAST MEDICAL HISTORY:  Past Medical History:   Diagnosis Date     Acute diverticulitis 7/31/2013     History of seizures as a child 1981    One grand mal in 5th grade.  Didn't tolerate phenobarb.     Moderate single current episode of major depressive disorder (H)      Perforation " of sigmoid colon (H) 8/3/2013     S/P left hemicolectomy 8/16/2013 8/02/13      Sepsis (H) 8/1/2013       MEDICATIONS:   Current Outpatient Medications   Medication Sig Dispense Refill     apixaban ANTICOAGULANT (ELIQUIS ANTICOAGULANT) 5 MG tablet Take 1 tablet (5 mg) by mouth 2 times daily 60 tablet 11     atenolol (TENORMIN) 50 MG tablet Take 1 tablet (50 mg) by mouth 2 times daily 180 tablet 3     BD ULTRA FINE PEN NEEDLES Inject 1 Device Subcutaneous daily 3 mm needles for Levemir pen 3 Box 3     blood glucose (ACCU-CHEK SHARMILA PLUS) test strip TEST 4 TIMES DAILY AND AS NEEDED 400 strip 3     blood glucose monitoring (ACCU-CHEK FASTCLIX) lancets 1 each 4 times daily Use to test blood sugar 4 times daily or as directed. 300 each 11     cholecalciferol 2000 UNITS tablet Take 2 tablets by mouth 2 times daily  30 tablet      clobetasol 0.05 % EX external cream Apply topically At Bedtime for 7 days 30 g 0     diphenhydrAMINE (BENADRYL ALLERGY) 25 MG tablet as needed Reported on 4/12/2017       DRAMAMINE OR as needed       erythromycin (ROMYCIN) 5 MG/GM ophthalmic ointment Apply small amount to incision site three times a day 3.5 g 0     glimepiride (AMARYL) 4 MG tablet Take 1 and 1/2 tablets (6mg) with breakfast. 135 tablet 3     insulin detemir (LEVEMIR FLEXTOUCH) 100 UNIT/ML pen INJECT 55 UNITS SUBCUTANEOUSLY AT BEDTIME 45 mL 1     losartan (COZAAR) 25 MG tablet TAKE 1/2 TABLET BY MOUTH DAILY 45 tablet 0     order for DME Equipment being ordered: CPAP 9 c, 1 Units 0     order for DME Glucometer, brand as covered by insurance. 1 each 0     order for DME Test strips for pt's glucometer, brand as covered by insurance. Test four times daily and prn. 400 each 4     oxyCODONE (ROXICODONE) 5 MG tablet Take 1 tablet (5 mg) by mouth every 6 hours as needed for pain 12 tablet 0     sulfamethoxazole-trimethoprim 800-160 MG PO tablet Take 1 tablet by mouth 2 times daily for 3 days 6 tablet 0     TYLENOL CAPS 500 MG OR 1  CAPSULE EVERY 4 HOURS AS NEEDED       vedolizumab (ENTYVIO) 60 MG/ML injection Every six weeks       empagliflozin (JARDIANCE) 10 MG TABS tablet Take 1 tablet (10 mg) by mouth daily (Patient not taking: Reported on 2020) 30 tablet 0     STATIN NOT PRESCRIBED, INTENTIONAL, 1 each continuous prn Statin not prescribed intentionally due to Refusal by patient and Other:LDL is below 100. 0 each 0       ALLERGIES:   Allergies   Allergen Reactions     Demerol      Very low blood pressure     Fish      Pt reports allergy to all fish     Meperidine Other (See Comments)     Other reaction(s): Hypotension  Drops blood pressure       Metformin GI Disturbance and Diarrhea     GI Disturbance       Naproxen      No Clinical Screening - See Comments      Pt reports allergy to all fish     Nubain  [Nalbuphine]      Seafood Swelling     Throat swells     Topiramate Other (See Comments) and Hives     Sleepy and confused.  Shaky, disoriented       Tylenol Sinus Max Other (See Comments)     Feet swelling     Latex Rash       SOCIAL HISTORY:   Social History     Socioeconomic History     Marital status:      Spouse name: Bill     Number of children: 2     Years of education: 15     Highest education level: Not on file   Occupational History     Occupation: Substitute clerical/cook/health assistant/Para     Employer: [employer]   Social Needs     Financial resource strain: Not on file     Food insecurity:     Worry: Not on file     Inability: Not on file     Transportation needs:     Medical: Not on file     Non-medical: Not on file   Tobacco Use     Smoking status: Former Smoker     Packs/day: 1.00     Years: 15.00     Pack years: 15.00     Types: Cigarettes     Start date: 1985     Last attempt to quit: 1998     Years since quittin.6     Smokeless tobacco: Never Used     Tobacco comment: soke free household.   Substance and Sexual Activity     Alcohol use: Yes     Alcohol/week: 0.0 standard drinks      Comment: occ     Drug use: No     Sexual activity: Yes     Partners: Male     Birth control/protection: Other     Comment:  has vasectomy   Lifestyle     Physical activity:     Days per week: Not on file     Minutes per session: Not on file     Stress: Not on file   Relationships     Social connections:     Talks on phone: Not on file     Gets together: Not on file     Attends Church service: Not on file     Active member of club or organization: Not on file     Attends meetings of clubs or organizations: Not on file     Relationship status: Not on file     Intimate partner violence:     Fear of current or ex partner: Not on file     Emotionally abused: Not on file     Physically abused: Not on file     Forced sexual activity: Not on file   Other Topics Concern     Parent/sibling w/ CABG, MI or angioplasty before 65F 55M? No      Service No     Blood Transfusions No     Comment: doesn't want one     Caffeine Concern No     Occupational Exposure No     Hobby Hazards No     Sleep Concern No     Stress Concern No     Weight Concern Yes     Special Diet Yes     Comment: weight loss, colitis     Back Care Yes     Exercise Yes     Comment: does     Bike Helmet No     Comment: only stationary bike     Seat Belt Yes     Self-Exams Yes   Social History Narrative     Not on file       FAMILY HISTORY:   Family History   Problem Relation Age of Onset     Diabetes Mother      Allergies Mother      Asthma Mother      Arthritis Mother      Heart Disease Mother         heart murmur     Depression Mother      Obesity Mother      Basal cell carcinoma Mother      Skin Cancer Mother      Eye Disorder Father      Diabetes Father      Cerebrovascular Disease Father      Heart Disease Father      Hypertension Father      Diabetes Maternal Grandmother      Cerebrovascular Disease Maternal Grandfather      Unknown/Adopted Paternal Grandmother      Heart Disease Paternal Grandfather         left ventrical failure      "Cerebrovascular Disease Paternal Grandfather      Gynecology Sister         endometriosis     Depression Sister      Asthma Daughter      Breast Cancer Maternal Aunt      Thyroid Disease Maternal Aunt      Breast Cancer Other         fathers sister     Anesthesia Reaction Other      Thyroid Disease Other      Osteoporosis Other      Asthma Daughter      Breast Cancer Other         mothers sister     Glaucoma No family hx of      Macular Degeneration No family hx of        REVIEW OF SYSTEMS:   ROS: 10 point ROS neg other than the symptoms noted above in the HPI.   PSYCH: NEGATIVE for  anxiety or depression,  panic attacks or suicide attempts, emotional eating, binge eating, or h/o eating disorder or eating disorder treatment. Denies abuse.     PHYSICAL EXAMINATION:    VITALS: /82   Pulse 77   Temp 98.1  F (36.7  C) (Oral)   Resp 18   Ht 1.626 m (5' 4\")   Wt 116.6 kg (257 lb)   SpO2 97%   BMI 44.11 kg/m    GENERAL: Patient is a 49 year old year old female is in no acute distress.  Patient is alert and orientated x 4, pleasant and cooperative with exam. Mood and affect are appropriate.  Left shoulder with full range of motion and strength but pain with elevation of the left arm.   CARDIOVASCULAR:  Regular rate and rhythm without murmurs, rubs, or gallops.  RESPIRATORY:  Lungs are clear to auscultation bilaterally, respiratory effort is normal.   GASTROINTESTINAL:  Abdomen is obese, soft, nontender, without obvious organomegaly or masses.  Positive bowel sounds throughout. No bruits.  LOWER EXTREMITIES:  No edema, cyanosis, ulceration, or chronic venous stasis noted bilaterally.  MUSCULOSKELETAL:  Moves all 4 extremities equal and strong    NEUROLOGIC:  Cranial nerves II-XII grossly intact. Gait appears normal.   SKIN: several 2 mm crusty flesh colored lesions on the arms bilateral and the back, hemangioma in the mid back, left groin with flat dark macule - difficult to tell if ecchymosis or not    PSYCH:  " Mentation appears normal, affect normal/ bright  Vulva- lichenified skin at the superior aspect of the vulva with a crack in the skin noted.  Thick whitish discharge noted.         LAB RESULTS:   Reviewed in Epic    ASSESSMENT/PLAN:    1. Lymphedema in adult patient  Noted on exam and on imaging on a couple scans.   - LYMPHEDEMA THERAPY REFERRAL; Future  - Urine Microscopic    2. Type 2 diabetes mellitus with hyperglycemia, with long-term current use of insulin (H)  Well controlled with medications without side effects.     3. Essential hypertension with goal blood pressure less than 140/90  Well controlled with medications without side effects.     4. Immunosuppressed status (H)  Higher risk for infection and skin cancer     5. Ulcerative colitis without complications, unspecified location (H)  Poor control.  Has follow up with GI    6. Cloudy urine/UTI  UTI is present.  Bactrim ordered  - *UA reflex to Microscopic and Culture ([place] [facility] (except [place] and [place])    7. Vaginal discharge    - Wet prep    8. Chronic left shoulder pain  Suspect osteoarthritis or impingement.  May need physical therapy   - XR Shoulder Left 2 Views    9. Skin lesion  Cannot rule out skin cancer given immunosuppression.  Could also be related to her UC  - DERMATOLOGY REFERRAL    10. Groin fluid collection  Now is just a flat macule. Per patient instructions.   - DERMATOLOGY REFERRAL    11. Lichen sclerosus et atrophicus of the vulva  per orders   - clobetasol 0.05 % EX external cream; Apply topically At Bedtime for 7 days  Dispense: 30 g; Refill: 0    12. Nonspecific finding on examination of urine    - Urine Culture Aerobic Bacterial    13. Acute cystitis without hematuria    - sulfamethoxazole-trimethoprim 800-160 MG PO tablet; Take 1 tablet by mouth 2 times daily for 3 days  Dispense: 6 tablet; Refill: 0     Patient Instructions   [facility]  Call your insurance again to see what the hold up is on Ozempic  and [place]  Make an appointment with Dermatology.  Make an appointment with the lymphedema therapist.  Clobetasol cream every night on the vulva crack/sore  Update me in 1 week with how the sore is healing and any photos of the bloody cyst of the left groin.          I spent 41 minutes of time with the patient and >50% of it was in education and counseling regarding weight management.

## 2020-02-19 ENCOUNTER — ANCILLARY PROCEDURE (OUTPATIENT)
Dept: GENERAL RADIOLOGY | Facility: CLINIC | Age: 50
End: 2020-02-19
Attending: INTERNAL MEDICINE
Payer: COMMERCIAL

## 2020-02-19 ENCOUNTER — OFFICE VISIT (OUTPATIENT)
Dept: INTERNAL MEDICINE | Facility: CLINIC | Age: 50
End: 2020-02-19
Payer: COMMERCIAL

## 2020-02-19 VITALS
HEIGHT: 64 IN | RESPIRATION RATE: 18 BRPM | WEIGHT: 257 LBS | BODY MASS INDEX: 43.87 KG/M2 | HEART RATE: 77 BPM | DIASTOLIC BLOOD PRESSURE: 82 MMHG | OXYGEN SATURATION: 97 % | SYSTOLIC BLOOD PRESSURE: 128 MMHG | TEMPERATURE: 98.1 F

## 2020-02-19 DIAGNOSIS — K51.90 ULCERATIVE COLITIS WITHOUT COMPLICATIONS, UNSPECIFIED LOCATION (H): ICD-10-CM

## 2020-02-19 DIAGNOSIS — Z79.4 TYPE 2 DIABETES MELLITUS WITH HYPERGLYCEMIA, WITH LONG-TERM CURRENT USE OF INSULIN (H): ICD-10-CM

## 2020-02-19 DIAGNOSIS — D84.9 IMMUNOSUPPRESSED STATUS (H): ICD-10-CM

## 2020-02-19 DIAGNOSIS — R18.8 GROIN FLUID COLLECTION: ICD-10-CM

## 2020-02-19 DIAGNOSIS — M25.512 LEFT SHOULDER PAIN: Primary | ICD-10-CM

## 2020-02-19 DIAGNOSIS — E11.65 TYPE 2 DIABETES MELLITUS WITH HYPERGLYCEMIA, WITH LONG-TERM CURRENT USE OF INSULIN (H): ICD-10-CM

## 2020-02-19 DIAGNOSIS — I89.0 LYMPHEDEMA IN ADULT PATIENT: Primary | ICD-10-CM

## 2020-02-19 DIAGNOSIS — N89.8 VAGINAL DISCHARGE: ICD-10-CM

## 2020-02-19 DIAGNOSIS — L98.9 SKIN LESION: ICD-10-CM

## 2020-02-19 DIAGNOSIS — M25.512 CHRONIC LEFT SHOULDER PAIN: ICD-10-CM

## 2020-02-19 DIAGNOSIS — N90.4 LICHEN SCLEROSUS ET ATROPHICUS OF THE VULVA: ICD-10-CM

## 2020-02-19 DIAGNOSIS — G89.29 CHRONIC LEFT SHOULDER PAIN: ICD-10-CM

## 2020-02-19 DIAGNOSIS — I10 ESSENTIAL HYPERTENSION WITH GOAL BLOOD PRESSURE LESS THAN 140/90: ICD-10-CM

## 2020-02-19 DIAGNOSIS — N30.00 ACUTE CYSTITIS WITHOUT HEMATURIA: ICD-10-CM

## 2020-02-19 LAB
ALBUMIN UR-MCNC: NEGATIVE MG/DL
APPEARANCE UR: CLEAR
BACTERIA #/AREA URNS HPF: ABNORMAL /HPF
BILIRUB UR QL STRIP: NEGATIVE
COLOR UR AUTO: YELLOW
GLUCOSE UR STRIP-MCNC: NEGATIVE MG/DL
HGB UR QL STRIP: ABNORMAL
KETONES UR STRIP-MCNC: NEGATIVE MG/DL
LEUKOCYTE ESTERASE UR QL STRIP: ABNORMAL
NITRATE UR QL: NEGATIVE
NON-SQ EPI CELLS #/AREA URNS LPF: ABNORMAL /LPF
PH UR STRIP: 6 PH (ref 5–7)
RBC #/AREA URNS AUTO: ABNORMAL /HPF
SOURCE: ABNORMAL
SP GR UR STRIP: 1.02 (ref 1–1.03)
SPECIMEN SOURCE: NORMAL
UROBILINOGEN UR STRIP-ACNC: 0.2 EU/DL (ref 0.2–1)
WBC #/AREA URNS AUTO: ABNORMAL /HPF
WET PREP SPEC: NORMAL

## 2020-02-19 PROCEDURE — 87210 SMEAR WET MOUNT SALINE/INK: CPT | Performed by: INTERNAL MEDICINE

## 2020-02-19 PROCEDURE — 99215 OFFICE O/P EST HI 40 MIN: CPT | Performed by: INTERNAL MEDICINE

## 2020-02-19 PROCEDURE — 81001 URINALYSIS AUTO W/SCOPE: CPT | Performed by: INTERNAL MEDICINE

## 2020-02-19 PROCEDURE — 73030 X-RAY EXAM OF SHOULDER: CPT | Mod: LT

## 2020-02-19 PROCEDURE — 87086 URINE CULTURE/COLONY COUNT: CPT | Performed by: INTERNAL MEDICINE

## 2020-02-19 RX ORDER — SULFAMETHOXAZOLE/TRIMETHOPRIM 800-160 MG
1 TABLET ORAL 2 TIMES DAILY
Qty: 6 TABLET | Refills: 0 | Status: SHIPPED | OUTPATIENT
Start: 2020-02-19 | End: 2020-02-22

## 2020-02-19 RX ORDER — CLOBETASOL PROPIONATE 0.5 MG/G
CREAM TOPICAL AT BEDTIME
Qty: 30 G | Refills: 0 | Status: SHIPPED | OUTPATIENT
Start: 2020-02-19 | End: 2020-02-26

## 2020-02-19 ASSESSMENT — ANXIETY QUESTIONNAIRES
GAD7 TOTAL SCORE: 1
1. FEELING NERVOUS, ANXIOUS, OR ON EDGE: NOT AT ALL
2. NOT BEING ABLE TO STOP OR CONTROL WORRYING: NOT AT ALL
IF YOU CHECKED OFF ANY PROBLEMS ON THIS QUESTIONNAIRE, HOW DIFFICULT HAVE THESE PROBLEMS MADE IT FOR YOU TO DO YOUR WORK, TAKE CARE OF THINGS AT HOME, OR GET ALONG WITH OTHER PEOPLE: SOMEWHAT DIFFICULT
5. BEING SO RESTLESS THAT IT IS HARD TO SIT STILL: NOT AT ALL
7. FEELING AFRAID AS IF SOMETHING AWFUL MIGHT HAPPEN: NOT AT ALL
6. BECOMING EASILY ANNOYED OR IRRITABLE: NOT AT ALL
3. WORRYING TOO MUCH ABOUT DIFFERENT THINGS: NOT AT ALL

## 2020-02-19 ASSESSMENT — PATIENT HEALTH QUESTIONNAIRE - PHQ9
SUM OF ALL RESPONSES TO PHQ QUESTIONS 1-9: 8
5. POOR APPETITE OR OVEREATING: SEVERAL DAYS

## 2020-02-19 ASSESSMENT — MIFFLIN-ST. JEOR: SCORE: 1780.74

## 2020-02-19 NOTE — RESULT ENCOUNTER NOTE
Your urine sample does look like there is an infection.  I have sent Bactrim DS 1 tab twice daily for 3 days to your pharmacy.  Please do not start the Jardiance until you have completed the antibiotics. Normal vaginal swab.   Dr. Bergman

## 2020-02-19 NOTE — RESULT ENCOUNTER NOTE
Xray of the shoulder is normal.  I would advise the next step to be an evaluation and treatment with physical therapy.     Dr. Bergman    [place]- left shoulder pain

## 2020-02-19 NOTE — PATIENT INSTRUCTIONS
[employer]  Call your insurance again to see what the hold up is on Ozempic and Jardiance  Make an appointment with Dermatology.  Make an appointment with the lymphedema therapist.  Clobetasol cream every night on the vulva crack/sore  Update me in 1 week with how the sore is healing and any photos of the bloody cyst of the left groin.

## 2020-02-20 LAB
BACTERIA SPEC CULT: NO GROWTH
SPECIMEN SOURCE: NORMAL

## 2020-02-20 ASSESSMENT — ANXIETY QUESTIONNAIRES: GAD7 TOTAL SCORE: 1

## 2020-02-20 NOTE — RESULT ENCOUNTER NOTE
Urine culture came back normal which is more accurate than the urinalysis.  So the white cells seen on the urinalysis are likely from the skin of the vagina and vulva which are normal. No need to take the Bactrim.

## 2020-02-21 ENCOUNTER — ALLIED HEALTH/NURSE VISIT (OUTPATIENT)
Dept: EDUCATION SERVICES | Facility: CLINIC | Age: 50
End: 2020-02-21
Payer: COMMERCIAL

## 2020-02-21 DIAGNOSIS — E11.65 TYPE 2 DIABETES MELLITUS WITH HYPERGLYCEMIA, WITH LONG-TERM CURRENT USE OF INSULIN (H): Primary | Chronic | ICD-10-CM

## 2020-02-21 DIAGNOSIS — Z79.4 TYPE 2 DIABETES MELLITUS WITH HYPERGLYCEMIA, WITH LONG-TERM CURRENT USE OF INSULIN (H): Primary | Chronic | ICD-10-CM

## 2020-02-21 PROCEDURE — G0108 DIAB MANAGE TRN  PER INDIV: HCPCS

## 2020-02-21 NOTE — PATIENT INSTRUCTIONS
1. Could try a fruit with your shake or a yogurt.     2. Try switching lunch and dinner (or smaller plate dinner).    3. Keeping carbohydrates to 2-3 choices at a meal is reasonable.    4. If struggling, pick a few weekday and weekend day.    FOLLOW UP: Friday, April 24th at 9am at [facility]    Nicole Maki RD, REBECA, CDE   [phone]

## 2020-02-21 NOTE — PROGRESS NOTES
Diabetes Self-Management Education & Support    Presents for: Individual review    SUBJECTIVE/OBJECTIVE:  Presents for: Individual review  Accompanied by: Self  Diabetes education in the past 24mo: Yes  Diabetes type: Type 2  Disease course: Other  How confident are you filling out medical forms by yourself:: Extremely  Diabetes management related comments/concerns: Dr. Bergman wanted her to discuss pump options.     Says has sensitive stomach due to UC and A-fib and not really want to wear it on her arm - not want others to know about diabetes.     Not start Jardiance yet due to battling an infection.  Says will take extra tablet glimepiride when she has an injection.  Will take 2.5 tablets when she has her infusion since she gets a steroid with it.    No blood glucose >200 with steroid and the extra Glimepiride.  Denies any issues with low blood glucose.  Says blood glucose usually  mg/dL.     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  Says notices blood glucose rise overnight.     Says knows about how many carbs but not tightly count carbs.    Says snacks 1 carbohydrate, 1 carbohydrate morning and lunch 2-3 carbohydrates.    Says trying to get low sodium but hard to balance low carbohydrate and higher protein.     Transportation concerns: No  Cultural Influences/Ethnic Background:  American    Diabetes Symptoms & Complications:  Fatigue: Yes  Polydipsia: No  Polyphagia: No  Polyuria: No  Visual change: No  Slow healing wounds: No  Complications assessed today?: Yes  CVA: No  Heart disease: Other  Nephropathy: No  Peripheral neuropathy: No  Peripheral Vascular Disease: No  Retinopathy: No    Patient Problem List and Family Medical History reviewed for relevant medical history, current medical status, and diabetes risk factors.    Vitals:  There were no vitals taken for this visit.  Estimated body mass index is 44.11 kg/m  as calculated from the following:    Height as  "of 2/19/20: 1.626 m (5' 4\").    Weight as of 2/19/20: 116.6 kg (257 lb).   Last 3 BP:   BP Readings from Last 3 Encounters:   02/19/20 128/82   02/12/20 130/80   01/27/20 128/75       History   Smoking Status     Former Smoker     Packs/day: 1.00     Years: 15.00     Types: Cigarettes     Start date: 1/1/1985     Quit date: 7/1/1998   Smokeless Tobacco     Never Used     Comment: soke free household.       Labs:  Lab Results   Component Value Date    A1C 7.3 01/13/2020     Lab Results   Component Value Date     01/13/2020     Lab Results   Component Value Date    LDL 87 01/13/2020     HDL Cholesterol   Date Value Ref Range Status   01/13/2020 42 (L) >49 mg/dL Final   ]  GFR Estimate   Date Value Ref Range Status   01/13/2020 85 >60 mL/min/[1.73_m2] Final     Comment:     Non  GFR Calc  Starting 12/18/2018, serum creatinine based estimated GFR (eGFR) will be   calculated using the Chronic Kidney Disease Epidemiology Collaboration   (CKD-EPI) equation.       GFR Estimate If Black   Date Value Ref Range Status   01/13/2020 >90 >60 mL/min/[1.73_m2] Final     Comment:      GFR Calc  Starting 12/18/2018, serum creatinine based estimated GFR (eGFR) will be   calculated using the Chronic Kidney Disease Epidemiology Collaboration   (CKD-EPI) equation.       Lab Results   Component Value Date    CR 0.82 01/13/2020     No results found for: MICROALBUMIN    Healthy Eating:  Healthy Eating Assessed Today: Yes  Cultural/Judaism diet restrictions?: No  Meal planning/habits: Low carb, Low salt, Other  How many times a week on average do you eat food made away from home (restaurant/take-out)?: 2  Meals include: Breakfast, Lunch, Dinner, Evening Snack  Breakfast: premier protein (5 gm carb)(9-10am)  Lunch: chicken salad on celery OR grilled ham and cheese with celery OR premier protein and celery wiht cheese(1-2pm)  Dinner: gutierrez cauliflower chowder with Cheez-its OR 8oz sirloin stead, garlic " green beans and 2 mozz cheese stick (6-8pm:)  Snacks: PM: cheese and nuts (Balanced break) OR beef stick  OR yogurt  Other: HS: shake OR sometimes chips either before or after dinner(Bed: 1-2 am)  Beverages: Water, Other(Sparkling Ice 2-4x/day, Diet pop 0-1x/day)  Has patient met with a dietitian in the past?: Yes    Being Active:  Being Active Assessed Today: Yes  Exercise:: Yes  Days per week of moderate to strenuous exercise (like a brisk walk): 2(2-3 days a week)  On average, minutes per day of exercise at this level: 30(biked/walked more but hurt knee and foot so has slowed down.)  How intense was your typical exercise? : Moderate (like brisk walking)  Exercise Minutes per Week: 60  Barrier to exercise: Physical limitation    Monitoring:  Monitoring Assessed Today: Yes  Did patient bring glucose meter to appointment? : Yes  Blood Glucose Meter: Accu-chek(Lashay)  Times checking blood sugar at home (number): 3  Times checking blood sugar at home (per): Day  Blood glucose trend: Fluctuating                Taking Medications:  Diabetes Medication(s)     Insulin       insulin detemir (LEVEMIR FLEXTOUCH) 100 UNIT/ML pen    INJECT 55 UNITS SUBCUTANEOUSLY AT BEDTIME    Sodium-Glucose Co-Transporter 2 (SGLT2) Inhibitors       empagliflozin (JARDIANCE) 10 MG TABS tablet    Take 1 tablet (10 mg) by mouth daily     Patient not taking:  Reported on 2/19/2020    Sulfonylureas       glimepiride (AMARYL) 4 MG tablet    Take 1 and 1/2 tablets (6mg) with breakfast.          Taking Medication Assessed Today: Yes  Current Treatments: Diet, Insulin Injections, Non-insulin Injectables, Oral Medication (taken by mouth)  Problems taking diabetes medications regularly?: No  Diabetes medication side effects?: No    Problem Solving:  Problem Solving Assessed Today: No              Reducing Risks:  Reducing Risks Assessed Today: No  CAD Risks: Diabetes Mellitus, Family history, Hypertension, Obesity  Has dilated eye exam at least once a  year?: Yes  Sees dentist every 6 months?: Yes  Feet checked by healthcare provider in the last year?: Yes    Healthy Coping:  Healthy Coping Assessed Today: No  Informal Support system:: Friends, Other  Patient Activation Measure Survey Score:  MICHAEL Score (Last Two) 11/7/2016 1/30/2017   MICHAEL Raw Score 33 29   Activation Score 65.8 52.9   MICHAEL Level 3 2       Diabetes knowledge and skills assessment:   Patient is knowledgeable in diabetes management concepts related to: Healthy Eating, Being Active, Monitoring, Taking Medication, Problem Solving, Reducing Risks and Healthy Coping    Patient needs further education on the following diabetes management concepts: Healthy Eating, Being Active and Problem Solving    Based on learning assessment above, most appropriate setting for further diabetes education would be: Group class or Individual setting.      INTERVENTIONS:    Education provided today on:  AADE Self-Care Behaviors:  Diabetes Pathophysiology  Healthy Eating: carbohydrate counting, consistency in amount, composition, and timing of food intake, weight reduction, heart healthy diet and portion control  Being Active: relationship to blood glucose and describe appropriate activity program  Monitoring: purpose, log and interpret results, individual blood glucose targets and frequency of monitoring  Taking Medication: action of prescribed medication, side effects of prescribed medications and when to take medications  Pros/Cons insulin pump    Opportunities for ongoing education and support in diabetes-self management were discussed.    Pt verbalized understanding of concepts discussed and recommendations provided today.       Education Materials Provided:  No new materials provided today      ASSESSMENT:  Patient is seeing fluctuating blood glucose but plans to first start Jardiance tomorrow so no changes to her medications recommended at this time.  She denies hypoglycemia.    Indicates she is not interested in a  pump at this time due to stomach sensitivity.  Says she does not wear tight pants and worries the pump would be bothersome.  Not interested in wearing at other sites because not want people to know she has diabetes.  Reviewed pros/cons with the pump but she is not on MDI so may meet barrier with insurance coverage anyway since only on basal insulin.  Reviewed that if Jardiance not enough to help with blood glucose control, meal-time insulin may be the next step and then pump with a sensor could help with control since better able to target times blood glucose running high with more insulin.     Much of the time spent discussing ways to try to control blood glucose so lower in the morning and help with weight loss.  Discussed adding activity at night after eating, making that dinner smaller and/or spreading out carbohydrate intake more evenly throughout the day since lower in carbohydrate in the morning. Did caution her from adding more protein to the diet since last eGFR between 60-90 and she is getting protein powder (30 gm) with breakfast, sometimes lunch, and eating 8oz protein with dinner.  Suggested plate method for simplicity.  For weight loss, suggested she try recording calories for feedback on 2 weekdays and a weekend.  Per diet record, estimate one day was around 1400 calories and then next day, 1600 calories.  Reviewed how quickly fat calories add up and may slow weight los progress and possibly if high at dinner, be contributing to higher blood glucose longer at night.  Pt verbalized understanding of concepts discussed and recommendations provided.   She sees PCP in March so will have her follow up in April.     Patient's most recent   Lab Results   Component Value Date    A1C 7.3 01/13/2020    is not meeting goal of <7.0    PLAN  See Patient Instructions for co-developed, patient-stated behavior change goals.  AVS printed and provided to patient today. See Follow-Up section for recommended  follow-up.  Follow up: 4/24/20    Nicole Maki RD, REBECA, CDE   Time Spent: 70 minutes  Encounter Type: Individual    Any diabetes medication dose changes were made via the CDE Protocol and Collaborative Practice Agreement with the patient's referring provider. A copy of this encounter was shared with the provider.

## 2020-02-26 ENCOUNTER — ANCILLARY PROCEDURE (OUTPATIENT)
Dept: MRI IMAGING | Facility: CLINIC | Age: 50
End: 2020-02-26
Attending: ORTHOPAEDIC SURGERY
Payer: COMMERCIAL

## 2020-02-26 DIAGNOSIS — M17.11 PRIMARY OSTEOARTHRITIS OF RIGHT KNEE: ICD-10-CM

## 2020-02-26 PROCEDURE — 73721 MRI JNT OF LWR EXTRE W/O DYE: CPT | Mod: RT | Performed by: RADIOLOGY

## 2020-03-09 ENCOUNTER — THERAPY VISIT (OUTPATIENT)
Dept: PHYSICAL THERAPY | Facility: CLINIC | Age: 50
End: 2020-03-09
Attending: INTERNAL MEDICINE
Payer: COMMERCIAL

## 2020-03-09 DIAGNOSIS — M25.512 LEFT SHOULDER PAIN: ICD-10-CM

## 2020-03-09 PROCEDURE — 97161 PT EVAL LOW COMPLEX 20 MIN: CPT | Mod: GP | Performed by: PHYSICAL THERAPIST

## 2020-03-09 PROCEDURE — 97140 MANUAL THERAPY 1/> REGIONS: CPT | Mod: GP | Performed by: PHYSICAL THERAPIST

## 2020-03-09 PROCEDURE — 97110 THERAPEUTIC EXERCISES: CPT | Mod: GP | Performed by: PHYSICAL THERAPIST

## 2020-03-09 NOTE — PROGRESS NOTES
[facility] Initial Evaluation  Subjective:  The history is provided by the patient.   Patient Health History  Zaira Villatoro being seen for Left arm/shoulder.       Problem occurred: Not sure   Pain is reported as 0/10 and 6/10 on pain scale.  General health as reported by patient is poor.  Pertinent medical history includes: calf pain-swelling-warmth, diabetes, high blood pressure, migraines/headaches, numbness/tingling, overweight, osteoarthritis, sleep disorder/apnea, weakness and other. Other medical history details: UC afib IBSD.     Medical allergies: other. Other medical allergies details: Latex or adhesives not sure which one.   Surgeries include:  Orthopedic surgery and other. Other surgery history details: GI sinus.    Current medications:  High blood pressure medication, pain medication, steroids and other. Other medications details: See chart.    Current occupation is Clerical and homemaker.   Primary job tasks include:  Computer work, driving, prolonged sitting and repetitive tasks.                  Therapist Generated HPI Evaluation         Type of problem:  Left shoulder.    This is a new condition.  Condition occurred with:  Repetition/overuse.  Where condition occurred: at home.  Patient reports pain:  In the joint.  Pain is described as aching and burning and is intermittent.  Pain radiates to:  Upper arm.   Since onset symptoms are gradually worsening.  Associated symptoms:  Tingling. Symptoms are exacerbated by using arm at shoulder level and using arm behind back  and relieved by NSAID's.  Special tests included:  X-ray.    Restrictions due to condition include:  Working in normal job without restrictions.  Barriers include:  None as reported by patient.                        Objective:  System                   Shoulder Evaluation:  ROM:  AROM:    Flexion:  Left:  +160        Abduction:  Left: +160       Internal Rotation:  Left:  F      External Rotation:  Left:  80                     PROM:    Flexion:  Left:  165          Abduction:  Left:  165        Internal Rotation:  Left:  F      External Rotation:  Left:  82                        Strength:    Flexion: Left:5-/5   Pain:      Extension:  Left: 5/5    Pain:      Abduction:  Left: 4/5  Pain:      Adduction:  Left: 5/5    Pain:      Internal Rotation:  Left:4/5   Strong/painful    Pain:      External Rotation:   Left:4+/5     Pain:               Special Tests:    Left shoulder positive for the following special tests:  Impingement and Bursal  Left shoulder negative for the following special tests:  Rotator cuff tear    Palpation:    Left shoulder tenderness present at:  Supraspinatus; Subscapularis; Levator and Upper Trap                                       General     ROS    Assessment/Plan:    Patient is a 50 year old female with left side shoulder complaints.    Patient has the following significant findings with corresponding treatment plan.                Diagnosis 1:  Left shoulder pain   Pain -  hot/cold therapy, manual therapy, self management and home program  Decreased ROM/flexibility - manual therapy, therapeutic exercise and home program  Decreased joint mobility - manual therapy, therapeutic exercise and home program  Decreased strength - therapeutic exercise, therapeutic activities and home program  Decreased proprioception - neuro re-education and therapeutic activities  Inflammation - cold therapy  Impaired muscle performance - neuro re-education and home program  Decreased function - therapeutic activities    Therapy Evaluation Codes:   1) History comprised of:   Personal factors that impact the plan of care:      Coping style, Overall behavior pattern and Past/current experiences.    Comorbidity factors that impact the plan of care are:      Diabetes, High blood pressure, Osteoarthritis, Overweight, Sleep disorder/apnea and Weakness.     Medications impacting care: Cardiac, High blood pressure and  Pain.  2) Examination of Body Systems comprised of:   Body structures and functions that impact the plan of care:      Shoulder.   Activity limitations that impact the plan of care are:      Bathing, Cooking, Driving, Dressing, Lifting, Reading/Computer work and Sleeping.  3) Clinical presentation characteristics are:   Stable/Uncomplicated.  4) Decision-Making    Low complexity using standardized patient assessment instrument and/or measureable assessment of functional outcome.  Cumulative Therapy Evaluation is: Low complexity.    Previous and current functional limitations:  (See Goal Flow Sheet for this information)    Short term and Long term goals: (See Goal Flow Sheet for this information)     Communication ability:  Patient appears to be able to clearly communicate and understand verbal and written communication and follow directions correctly.  Treatment Explanation - The following has been discussed with the patient:   RX ordered/plan of care  Anticipated outcomes  Possible risks and side effects  This patient would benefit from PT intervention to resume normal activities.   Rehab potential is good.    Frequency:  1 X week, once daily  Duration:  for 8 weeks  Discharge Plan:  Achieve all LTG.  Independent in home treatment program.  Reach maximal therapeutic benefit.    Please refer to the daily flowsheet for treatment today, total treatment time and time spent performing 1:1 timed codes.

## 2020-03-09 NOTE — PROGRESS NOTES
"SUBJECTIVE:  Zaira Villatoro returns for MRI results from 2/26/20 of the right knee, which are reviewed with the patient by myself and showed:                                                                     Patellofemoral compartment: Full-thickness cartilage loss over the  median ridge of the patella minimal subchondral cystic change.  Full-thickness cartilage loss over the trochlea with mild subchondral  cystic change.     Medial compartment: Full-thickness fissuring of cartilage over the  medial femoral condyle with minimal subchondral cystic change. Medial  tibial plateau chondral fissuring.     Lateral compartment: Full-thickness or near full-thickness cartilage  loss over the lateral tibial plateau without subchondral cystic change  or edema.  1. High-grade multidirectional tearing of the posterior horn of the  medial meniscus.     2. Mild sprain of the proximal lateral collateral ligament.     3. 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    Right knee feeling of giving-way, pain worst at the end of the day.  History of right knee arthroscopy and corticosteroid injection years ago      Review of Systems:  Constitutional/General: Negative for fever, chills, change in weight  Integumentary/Skin: Negative for worrisome rashes, moles, or lesions  Neuro: Negative for weakness, dizziness, or paresthesias   Psychiatric: negative for changes in mood or affect    OBJECTIVE:  Physical Exam:  BP (!) 164/84 (BP Location: Left arm, Patient Position: Sitting, Cuff Size: Adult Large)   Pulse 74   Ht 1.626 m (5' 4\")   Wt 116.6 kg (257 lb)   SpO2 95%   BMI 44.11 kg/m    General Appearance: healthy, alert and no distress   Skin: no suspicious lesions or rashes  Neuro: Normal strength and tone, mentation intact and speech normal  Vascular: good pulses, and cappillary refill   Lymph: no lymphadenopathy   Psych:  mentation appears normal and affect normal/bright  Resp: no " increased work of breathing      ASSESSMENT:   Encounter Diagnoses   Name Primary?     Other tear of medial meniscus of right knee as current injury, subsequent encounter Yes     Primary osteoarthritis of right knee         PLAN:  Injection therapy: Discussed findings and diagnosis with patient.  We talked about treatment options.  We decided that corticosteroid injection would be the best option.  But she has DM, and is having IV steroid with an infusion for Ulc. Colitis today, and wants to wait.  physical therapy: ordered  Return to clinic: as needed     Total time spent was 20 minutes; with 20 minutes spent face-to-face with patient dedicated to education, counseling and a development of a treatment plan.    ROBBI Palm MD  Dept. Orthopedic Surgery  [facility]

## 2020-03-09 NOTE — LETTER
GENNA VALDES PT  [address] [facility] [address] [facility] [address] [facility]  [address]  [place] [address]  [phone]    March 10, 2020    Re: Zaira Villatoro   :   1970  MRN:  [id number]   REFERRING PHYSICIAN:   MD GENNA Lizama PT  Date of Initial Evaluation:  3/09/2020  Visits:  Rxs Used: 1  Reason for Referral:  Left shoulder pain    EVALUATION SUMMARY    [facility] Initial Evaluation  Subjective:  The history is provided by the patient.   Patient Health History  Zaira Villatoro being seen for Left arm/shoulder.   Problem occurred: Not sure   Pain is reported as 0/10 and 6/10 on pain scale.  General health as reported by patient is poor.  Pertinent medical history includes: calf pain-swelling-warmth, diabetes, high blood pressure, migraines/headaches, numbness/tingling, overweight, osteoarthritis, sleep disorder/apnea, weakness and other. Other medical history details: UC afib IBSD.     Medical allergies: other. Other medical allergies details: Latex or adhesives not sure which one.   Surgeries include:  Orthopedic surgery and other. Other surgery history details: GI sinus.    Current medications:  High blood pressure medication, pain medication, steroids and other. Other medications details: See chart.    Current occupation is Clerical and homemaker.   Primary job tasks include:  Computer work, driving, prolonged sitting and repetitive tasks.          Therapist Generated HPI Evaluation  Type of problem:  Left shoulder.  This is a new condition.  Condition occurred with:  Repetition/overuse.  Where condition occurred: at home.  Patient reports pain:  In the joint.  Pain is described as aching and burning and is intermittent.  Pain radiates to:  Upper arm.   Since onset symptoms are gradually worsening.  Associated symptoms:  Tingling. Symptoms are exacerbated by using arm at shoulder level and using arm behind back  and relieved by NSAID's.  Special tests included:  X-ray.  Re: aZira RAE  Irving   :   1970    Restrictions due to condition include:  Working in normal job without restrictions.  Barriers include:  None as reported by patient.              Objective:  Shoulder Evaluation:  ROM:  AROM:    Flexion:  Left:  +160      Abduction:  Left: +160     Internal Rotation:  Left:  F      External Rotation:  Left:  80          PROM:    Flexion:  Left:  165        Abduction:  Left:  165      Internal Rotation:  Left:  F      External Rotation:  Left:  82        Strength:    Flexion: Left:5-/5   Pain:      Extension:  Left: 5/5    Pain:      Abduction:  Left: 4/5  Pain:      Adduction:  Left: 5/5    Pain:      Internal Rotation:  Left:4/5   Strong/painful    Pain:      External Rotation:   Left:4+/5     Pain:       Special Tests:    Left shoulder positive for the following special tests:  Impingement and Bursal  Left shoulder negative for the following special tests:  Rotator cuff tear    Palpation:    Left shoulder tenderness present at:  Supraspinatus; Subscapularis; Levator and Upper Trap    Assessment/Plan:    Patient is a 50 year old female with left side shoulder complaints.    Patient has the following significant findings with corresponding treatment plan.                Diagnosis 1:  Left shoulder pain   Pain -  hot/cold therapy, manual therapy, self management and home program  Decreased ROM/flexibility - manual therapy, therapeutic exercise and home program  Decreased joint mobility - manual therapy, therapeutic exercise and home program  Decreased strength - therapeutic exercise, therapeutic activities and home program  Decreased proprioception - neuro re-education and therapeutic activities  Inflammation - cold therapy  Impaired muscle performance - neuro re-education and home program  Decreased function - therapeutic activities    Therapy Evaluation Codes:   1) History comprised of:   Personal factors that impact the plan of care:      Coping style, Overall behavior pattern and  Past/current experiences.   Re: Zaira Villatoro   :   1970     Comorbidity factors that impact the plan of care are:      Diabetes, High blood pressure, Osteoarthritis, Overweight, Sleep disorder/apnea and Weakness.     Medications impacting care: Cardiac, High blood pressure and Pain.  2) Examination of Body Systems comprised of:   Body structures and functions that impact the plan of care:      Shoulder.   Activity limitations that impact the plan of care are:      Bathing, Cooking, Driving, Dressing, Lifting, Reading/Computer work and Sleeping.  3) Clinical presentation characteristics are:   Stable/Uncomplicated.  4) Decision-Making    Low complexity using standardized patient assessment instrument and/or measureable assessment of functional outcome.  Cumulative Therapy Evaluation is: Low complexity.    Previous and current functional limitations:  (See Goal Flow Sheet for this information)    Short term and Long term goals: (See Goal Flow Sheet for this information)     Communication ability:  Patient appears to be able to clearly communicate and understand verbal and written communication and follow directions correctly.  Treatment Explanation - The following has been discussed with the patient:   RX ordered/plan of care  Anticipated outcomes  Possible risks and side effects  This patient would benefit from PT intervention to resume normal activities.   Rehab potential is good.    Frequency:  1 X week, once daily  Duration:  for 8 weeks  Discharge Plan:  Achieve all LTG.  Independent in home treatment program.  Reach maximal therapeutic benefit.    Please refer to the daily flowsheet for treatment today, total treatment time and time spent performing 1:1 timed codes.         Thank you for your referral.    INQUIRIES  Therapist: David Valles PT  GENNA VALDES PT  [address]  [place] [address]  Phone: [phone]  Fax: [phone]

## 2020-03-10 DIAGNOSIS — I10 ESSENTIAL HYPERTENSION WITH GOAL BLOOD PRESSURE LESS THAN 140/90: ICD-10-CM

## 2020-03-11 RX ORDER — LOSARTAN POTASSIUM 25 MG/1
TABLET ORAL
Qty: 45 TABLET | Refills: 0 | Status: SHIPPED | OUTPATIENT
Start: 2020-03-11 | End: 2020-03-17

## 2020-03-11 NOTE — TELEPHONE ENCOUNTER
"Prescription approved per [facility] Refill [facility].      Requested Prescriptions   Pending Prescriptions Disp Refills     losartan (COZAAR) 25 MG tablet [Pharmacy Med Name: LOSARTAN POTASSIUM 25 MG TAB] 45 tablet 0     Sig: TAKE 1/2 TABLET BY MOUTH DAILY       Angiotensin-II Receptors Passed - 3/10/2020  7:58 AM        Passed - Last blood pressure under 140/90 in past 12 months     BP Readings from Last 3 Encounters:   02/19/20 128/82   02/12/20 130/80   01/27/20 128/75                 Passed - Recent (12 mo) or future (30 days) visit within the authorizing provider's specialty     Patient has had an office visit with the authorizing provider or a provider within the authorizing providers department within the previous 12 mos or has a future within next 30 days. See \"Patient Info\" tab in inbasket, or \"Choose Columns\" in Meds & Orders section of the refill encounter.              Passed - Medication is active on med list        Passed - Patient is age 18 or older        Passed - No active pregnancy on record        Passed - Normal serum creatinine on file in past 12 months     Recent Labs   Lab Test 01/13/20  1106   CR 0.82             Passed - Normal serum potassium on file in past 12 months     Recent Labs   Lab Test 01/13/20  1106   POTASSIUM 4.2                    Passed - No positive pregnancy test in past 12 months           "

## 2020-03-12 ENCOUNTER — OFFICE VISIT (OUTPATIENT)
Dept: ORTHOPEDICS | Facility: CLINIC | Age: 50
End: 2020-03-12
Payer: COMMERCIAL

## 2020-03-12 ENCOUNTER — TELEPHONE (OUTPATIENT)
Dept: FAMILY MEDICINE | Facility: CLINIC | Age: 50
End: 2020-03-12

## 2020-03-12 VITALS
OXYGEN SATURATION: 95 % | BODY MASS INDEX: 43.87 KG/M2 | WEIGHT: 257 LBS | HEART RATE: 74 BPM | SYSTOLIC BLOOD PRESSURE: 164 MMHG | DIASTOLIC BLOOD PRESSURE: 84 MMHG | HEIGHT: 64 IN

## 2020-03-12 DIAGNOSIS — M17.11 PRIMARY OSTEOARTHRITIS OF RIGHT KNEE: ICD-10-CM

## 2020-03-12 DIAGNOSIS — S83.241D OTHER TEAR OF MEDIAL MENISCUS OF RIGHT KNEE AS CURRENT INJURY, SUBSEQUENT ENCOUNTER: Primary | ICD-10-CM

## 2020-03-12 PROCEDURE — 99213 OFFICE O/P EST LOW 20 MIN: CPT | Performed by: ORTHOPAEDIC SURGERY

## 2020-03-12 ASSESSMENT — MIFFLIN-ST. JEOR: SCORE: 1775.74

## 2020-03-12 NOTE — LETTER
"    3/12/2020         RE: Zaira Villatoro  [address] [place] [address]        Dear Colleague,    Thank you for referring your patient, Zaira Villatoro, to the [facility]. Please see a copy of my visit note below.    SUBJECTIVE:  Zaira Villatoro returns for MRI results from 2/26/20 of the right knee, which are reviewed with the patient by myself and showed:                                                                     Patellofemoral compartment: Full-thickness cartilage loss over the  median ridge of the patella minimal subchondral cystic change.  Full-thickness cartilage loss over the trochlea with mild subchondral  cystic change.     Medial compartment: Full-thickness fissuring of cartilage over the  medial femoral condyle with minimal subchondral cystic change. Medial  tibial plateau chondral fissuring.     Lateral compartment: Full-thickness or near full-thickness cartilage  loss over the lateral tibial plateau without subchondral cystic change  or edema.  1. High-grade multidirectional tearing of the posterior horn of the  medial meniscus.     2. Mild sprain of the proximal lateral collateral ligament.     3. 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    Right knee feeling of giving-way, pain worst at the end of the day.  History of right knee arthroscopy and corticosteroid injection years ago      Review of Systems:  Constitutional/General: Negative for fever, chills, change in weight  Integumentary/Skin: Negative for worrisome rashes, moles, or lesions  Neuro: Negative for weakness, dizziness, or paresthesias   Psychiatric: negative for changes in mood or affect    OBJECTIVE:  Physical Exam:  BP (!) 164/84 (BP Location: Left arm, Patient Position: Sitting, Cuff Size: Adult Large)   Pulse 74   Ht 1.626 m (5' 4\")   Wt 116.6 kg (257 lb)   SpO2 95%   BMI 44.11 kg/m    General Appearance: healthy, alert and no distress   Skin: " no suspicious lesions or rashes  Neuro: Normal strength and tone, mentation intact and speech normal  Vascular: good pulses, and cappillary refill   Lymph: no lymphadenopathy   Psych:  mentation appears normal and affect normal/bright  Resp: no increased work of breathing      ASSESSMENT:   Encounter Diagnoses   Name Primary?     Other tear of medial meniscus of right knee as current injury, subsequent encounter Yes     Primary osteoarthritis of right knee         PLAN:  Injection therapy: Discussed findings and diagnosis with patient.  We talked about treatment options.  We decided that corticosteroid injection would be the best option.  But she has DM, and is having IV steroid with an infusion for Ulc. Colitis today, and wants to wait.  physical therapy: ordered  Return to clinic: as needed     Total time spent was 20 minutes; with 20 minutes spent face-to-face with patient dedicated to education, counseling and a development of a treatment plan.    ROBBI Palm MD  Dept. Orthopedic Surgery  [facility]        Again, thank you for allowing me to participate in the care of your patient.        Sincerely,        Jhon Palm MD

## 2020-03-12 NOTE — TELEPHONE ENCOUNTER
Reason for Call:  Form, our goal is to have forms completed with 72 hours, however, some forms may require a visit or additional information.    Type of letter, form or note:  medical    Who is the form from?: Patient    Where did the form come from: Patient or family brought in       What clinic location was the form placed at?: [facility]    Where the form was placed: [facility] Box/Folder    What number is listed as a contact on the form?: [phone]       Additional comments: NONE    Call taken on 3/12/2020 at 2:34 PM by Minerva Chin

## 2020-03-13 DIAGNOSIS — Z79.4 TYPE 2 DIABETES MELLITUS WITH HYPERGLYCEMIA, WITH LONG-TERM CURRENT USE OF INSULIN (H): Chronic | ICD-10-CM

## 2020-03-13 DIAGNOSIS — E11.65 TYPE 2 DIABETES MELLITUS WITH HYPERGLYCEMIA, WITH LONG-TERM CURRENT USE OF INSULIN (H): Chronic | ICD-10-CM

## 2020-03-15 ENCOUNTER — MYC MEDICAL ADVICE (OUTPATIENT)
Dept: INTERNAL MEDICINE | Facility: CLINIC | Age: 50
End: 2020-03-15

## 2020-03-16 RX ORDER — EMPAGLIFLOZIN 10 MG/1
TABLET, FILM COATED ORAL
Qty: 90 TABLET | Refills: 0 | Status: SHIPPED | OUTPATIENT
Start: 2020-03-16 | End: 2020-03-17

## 2020-03-16 NOTE — TELEPHONE ENCOUNTER
Called patient to screen for COVID-19.  Pt declines the following:  -[place] outside of country/international travel.  -Exposure to anyone who has tested positive for COVID-19 (at least to patient's knowledge).  -Symptoms of fever, cough, SOB. (or other less common symptoms of diarrhea, headache, body aches, sore throat).    Pt states that her skin issue has not changed so there is nothing to see, so she does not feel that she needs to do an e-visit for this.  Pt states that she thinks that she has allergies. She does not have a fever. She has questions about her risk for COVID-19 due to her conditions. Writer gave pt recommendations from [facility]/CDC. Pt wondering if she needs to wear a mask when she goes out and if it is okay for her kids to come home from school. Writer advised pt to discuss with provider tomorrow at telephone visit.

## 2020-03-16 NOTE — TELEPHONE ENCOUNTER
If she is feeling very ill (cough, fever) and feels like she needs seen, I would need to see her in the isolation room.  Otherwise let's have her do a phone visit tomorrow and I can see how she is doing then.  ER if worsens.  Covid testing if she qualifies per protocol

## 2020-03-16 NOTE — TELEPHONE ENCOUNTER
Routing to provider to advise. Would appt scheduled for 03/20 be appropriate to change to a telephone visit, or should keep as a face to face appointment?

## 2020-03-16 NOTE — TELEPHONE ENCOUNTER
"Routing refill request to provider for review/approval because:  Labs out of range:  BP    Requested Prescriptions   Pending Prescriptions Disp Refills     JARDIANCE 10 MG TABS tablet [Pharmacy Med Name: JARDIANCE 10 MG TABLET] 30 tablet 0     Sig: TAKE 1 TABLET BY MOUTH EVERY DAY       Sodium Glucose Co-Transport Inhibitor Agents Failed - 3/13/2020  7:22 AM        Failed - Blood pressure less than 140/90 in past 6 months     BP Readings from Last 3 Encounters:   03/12/20 (!) 164/84   02/19/20 128/82   02/12/20 130/80                 Passed - Patient has documented LDL within the past 12 mos.     Recent Labs   Lab Test 01/13/20  1106   LDL 87             Passed - Patient has had a Microalbumin in the past 15 mos.     Recent Labs   Lab Test 06/21/19  1045   MICROL 10   UMALCR 8.62             Passed - Patient has documented A1c within the specified period of time.     If HgbA1C is 8 or greater, it needs to be on file within the past 3 months.  If less than 8, must be on file within the past 6 months.     Recent Labs   Lab Test 01/13/20  1106   A1C 7.3*             Passed - No creatinine >1.4 or GFR <45 within the past 12 mos     Recent Labs   Lab Test 01/13/20  1106   GFRESTIMATED 85   GFRESTBLACK >90       Recent Labs   Lab Test 01/13/20  1106   CR 0.82             Passed - Medication is active on med list        Passed - Patient is age 18 or older        Passed - Patient is not pregnant        Passed - Patient has documented normal Potassium within the last 12 mos.     Recent Labs   Lab Test 01/13/20  1106   POTASSIUM 4.2             Passed - Patient has no positive pregnancy test within the past 12 mos.        Passed - Recent (6 mo) or future (30 days) visit within the authorizing provider's specialty     Patient had office visit in the last 6 months or has a visit in the next 30 days with authorizing provider or within the authorizing provider's specialty.  See \"Patient Info\" tab in inbasket, or \"Choose Columns\" " in Meds & Orders section of the refill encounter.               Yasmin Tony RN

## 2020-03-17 ENCOUNTER — VIRTUAL VISIT (OUTPATIENT)
Dept: INTERNAL MEDICINE | Facility: CLINIC | Age: 50
End: 2020-03-17
Payer: COMMERCIAL

## 2020-03-17 ENCOUNTER — THERAPY VISIT (OUTPATIENT)
Dept: PHYSICAL THERAPY | Facility: CLINIC | Age: 50
End: 2020-03-17
Payer: COMMERCIAL

## 2020-03-17 DIAGNOSIS — Z79.4 TYPE 2 DIABETES MELLITUS WITH HYPERGLYCEMIA, WITH LONG-TERM CURRENT USE OF INSULIN (H): Primary | Chronic | ICD-10-CM

## 2020-03-17 DIAGNOSIS — M17.11 PRIMARY OSTEOARTHRITIS OF RIGHT KNEE: ICD-10-CM

## 2020-03-17 DIAGNOSIS — D84.9 IMMUNOSUPPRESSION (H): ICD-10-CM

## 2020-03-17 DIAGNOSIS — M23.209 OLD TEAR OF MENISCUS OF KNEE, UNSPECIFIED LATERALITY, UNSPECIFIED MENISCUS, UNSPECIFIED TEAR TYPE: ICD-10-CM

## 2020-03-17 DIAGNOSIS — S83.241D OTHER TEAR OF MEDIAL MENISCUS OF RIGHT KNEE AS CURRENT INJURY, SUBSEQUENT ENCOUNTER: ICD-10-CM

## 2020-03-17 DIAGNOSIS — I48.0 PAF (PAROXYSMAL ATRIAL FIBRILLATION) (H): ICD-10-CM

## 2020-03-17 DIAGNOSIS — E11.65 TYPE 2 DIABETES MELLITUS WITH HYPERGLYCEMIA, WITH LONG-TERM CURRENT USE OF INSULIN (H): Primary | Chronic | ICD-10-CM

## 2020-03-17 DIAGNOSIS — I10 ESSENTIAL HYPERTENSION WITH GOAL BLOOD PRESSURE LESS THAN 140/90: ICD-10-CM

## 2020-03-17 PROCEDURE — 97140 MANUAL THERAPY 1/> REGIONS: CPT | Mod: GP | Performed by: PHYSICAL THERAPIST

## 2020-03-17 PROCEDURE — 97110 THERAPEUTIC EXERCISES: CPT | Mod: GP | Performed by: PHYSICAL THERAPIST

## 2020-03-17 PROCEDURE — 99442 ZZC PHYSICIAN TELEPHONE EVALUATION 11-20 MIN: CPT | Performed by: INTERNAL MEDICINE

## 2020-03-17 PROCEDURE — 97161 PT EVAL LOW COMPLEX 20 MIN: CPT | Mod: GP | Performed by: PHYSICAL THERAPIST

## 2020-03-17 RX ORDER — LOSARTAN POTASSIUM 25 MG/1
TABLET ORAL
Qty: 45 TABLET | Refills: 4 | Status: SHIPPED | OUTPATIENT
Start: 2020-03-17 | End: 2021-05-12

## 2020-03-17 RX ORDER — FLUTICASONE PROPIONATE 110 UG/1
AEROSOL, METERED RESPIRATORY (INHALATION)
COMMUNITY
End: 2021-11-16

## 2020-03-17 RX ORDER — ATENOLOL 50 MG/1
50 TABLET ORAL 2 TIMES DAILY
Qty: 180 TABLET | Refills: 3 | Status: SHIPPED | OUTPATIENT
Start: 2020-03-17 | End: 2021-04-29

## 2020-03-17 RX ORDER — GLIMEPIRIDE 4 MG/1
TABLET ORAL
Qty: 135 TABLET | Refills: 3 | Status: SHIPPED | OUTPATIENT
Start: 2020-03-17 | End: 2020-09-28

## 2020-03-17 RX ORDER — LANCETS
1 EACH MISCELLANEOUS 4 TIMES DAILY
Qty: 300 EACH | Refills: 11 | Status: SHIPPED | OUTPATIENT
Start: 2020-03-17 | End: 2022-10-13

## 2020-03-17 RX ORDER — BLOOD SUGAR DIAGNOSTIC
STRIP MISCELLANEOUS
Qty: 400 STRIP | Refills: 3 | Status: SHIPPED | OUTPATIENT
Start: 2020-03-17 | End: 2020-08-31

## 2020-03-17 NOTE — TELEPHONE ENCOUNTER
Insulin-Treated Diabetes Mellitus Report form completed and signed by Dr. Bergman and given to patient while she was here for a physical therapy appointment. Yvette Carmona,

## 2020-03-17 NOTE — PROGRESS NOTES
"Zaira Villatoro is a 50 year old female who is being evaluated via a billable telephone visit.    The patient has been notified of following:   \"This telephone visit will be conducted via a call between you and your physician/provider. We have found that certain health care needs can be provided without the need for a physical exam.  This service lets us provide the care you need with a short phone conversation.  If a prescription is necessary we can send it directly to your pharmacy.  If lab work is needed we can place an order for that and you can then stop by our lab to have the test done at a later time.    If during the course of the call the physician/provider feels a telephone visit is not appropriate, you will not be charged for this service.\"     Zaira Villatoro complains of    Chief Complaint   Patient presents with     Sneezing/Allergies, Discuss Labs, Discuss health due to COVI     I have reviewed and updated the patient's Past Medical History, Social History, Family History and Medication List.    ALLERGIES  Demerol; Fish; Meperidine; Metformin; Naproxen; No clinical screening - see comments; Nubain  [nalbuphine]; Seafood; Topiramate; Tylenol sinus max; and Latex    LS (MA signature)    She is stress eating.      Additional provider notes:     Diabetes Follow-up  How often are you checking your blood sugar? Three times daily  Blood sugar testing frequency justification:  Uncontrolled diabetes  What time of day are you checking your blood sugars (select all that apply)?  Morning and Evening  Have you had any blood sugars above 200?  Yes 164 in the morning.  130-220, 369 after her infusion  Have you had any blood sugars below 70?  No, Average blood sugar is about 140-150    What symptoms do you notice when your blood sugar is low?  None    What concerns do you have today about your diabetes? None     Do you have any of these symptoms? (Select all that apply)  No numbness or tingling in feet.  No " redness, sores or blisters on feet.  No complaints of excessive thirst.  No reports of blurry vision.  No significant changes to weight.  No symptoms of uti.  The patient denies dysuria, frequency or hematuria.  She does have a crack on on the vulva  BP Readings from Last 2 Encounters:   03/12/20 (!) 164/84   02/19/20 128/82     Hemoglobin A1C (%)   Date Value   01/13/2020 7.3 (H)   10/04/2019 8.0 (H)     LDL Cholesterol Calculated (mg/dL)   Date Value   01/13/2020 87   06/06/2019 68               Hypertension Follow-up    Do you check your blood pressure regularly outside of the clinic? No     Are you following a low salt diet? Yes    Are your blood pressures ever more than 140 on the top number (systolic) OR more   than 90 on the bottom number (diastolic), for example 140/90? No  Patient is wondering if she can have all refills until September as that is when she will probably be seen again by PCP     She does have a torn meniscus.  She was given exercises with physical therapy.  She is wondering if she should come in.     She has a derm appointment in April and an appointment with me in April.  She has an appointment with the eye surgeon and dm ed too.    Everything has stayed the same.      Assessment/Plan:  (E11.65,  Z79.4) Type 2 diabetes mellitus with hyperglycemia, with long-term current use of insulin (H)  (primary encounter diagnosis)  Comment: would benefit from increase in jardiance but due to the need for more monitoring with the jardiance during covid this is too dangerous.  Will keep blood sugar with suboptimal control for now and increase to 25 mg daily if blood sugar continue to rise.  Patient will increase exercise.    Plan: empagliflozin (JARDIANCE) 10 MG TABS tablet,         glimepiride (AMARYL) 4 MG tablet            (D89.9) Immunosuppression (H)  Comment: discussed with patient immunosuppressed status in the face of covid pandemic.  Stressed isolation and social distancing as much as possible.   Avoid any appointments that are not urgent.  Plan:     (I48.0) PAF (paroxysmal atrial fibrillation) (H)  Comment: Well controlled with medications without side effects.   Plan: apixaban ANTICOAGULANT (ELIQUIS ANTICOAGULANT)         5 MG tablet            (M23.209) Old tear of meniscus of knee, unspecified laterality, unspecified meniscus, unspecified tear type  Comment: will contact physical therapy and see if she can get exercises to do at home.    Plan:     (I10) Essential hypertension with goal blood pressure less than 140/90  Comment: normal at home.   Plan: losartan (COZAAR) 25 MG tablet              I have reviewed the note as documented above.  This accurately captures the substance of my conversation with the patient.      Phone call contact time  Call Started at 1:50 PM   Call Ended at 2:05 PM     Tayler Bergman MD

## 2020-03-17 NOTE — LETTER
GENNA VALDES PT  [address] [facility]  [address]  [place] [address]  [phone]    2020    Re: Zaira Villatoro   :   1970  MRN:  [id number]   REFERRING PHYSICIAN:   MD GENNA Lizama PT  Date of Initial Evaluation:  3/17/2020  Visits:     Reason for Referral:     Other tear of medial meniscus of right knee as current injury, subsequent encounter  Primary osteoarthritis of right knee    EVALUATION SUMMARY    [facility] Initial Evaluation  Subjective:  Patient Health History  Zaira Villatoro being seen for Shoulder/arm  left and knee right.   Problem occurred: Unknown   Pain is reported as 7/10 on pain scale.  General health as reported by patient is poor.  Pertinent medical history includes: diabetes, high blood pressure, migraines/headaches, numbness/tingling, overweight, osteoarthritis, pain at night/rest, sleep disorder/apnea, weakness and other. Other medical history details: UC  AFIB.     Medical allergies: other. Other medical allergies details: See chart.   Surgeries include:  Orthopedic surgery and other. Other surgery history details: Sinus knee GI.    Current medications:  High blood pressure medication, pain medication, steroids and other. Other medications details: See chart.       Primary job tasks include:  Computer work, driving, prolonged sitting and repetitive tasks.                Therapist Generated HPI Evaluation  Problem details: Pt reports worsening Rt knee pain due to DJD, chondralmalacia, meniscal tear-posterior.  She indicates most of her pain is anterior and in the medial calf.  .         Type of problem:  Right knee.    This is a chronic condition.  Condition occurred with:  Degenerative joint disease.  Where condition occurred: at home.  Patient reports pain:  Anterior and in the joint.  Pain is described as aching and stabbing and is intermittent.  Pain is worse in the P.M..  Since onset symptoms are gradually  worsening.  Associated symptoms:  Loss of motion/stiffness, edema and loss of strength. Symptoms are exacerbated by ascending stairs, descending stairs, walking, standing, sitting and transfers  and relieved by nothing.  Special tests included:  MRI.    Barriers include:  None as reported by patient.                Objective:     Knee Evaluation:  ROM:  Strength wnl knee: -4/5 hip abd, knee ext, SLR   AROM  Extension: Left:    Right:  -17  Flexion: Left:   Right: 107  PROM  Extension: Left:   Right:  -12  Flexion: Left:   Right:  110    Ligament Testing:  Not Assessed    Palpation:    Right knee tenderness present at:  Medial Joint Line; Lateral Joint Line; Patellar Tendon; IT Band; Semitendinosus; Patellar Medial; Patellar Lateral; Patellar Superior and Patellar Inferior    Edema:  Edema of the knee: moderate,     Assessment/Plan:    Patient is a 50 year old female with right side knee complaints.    Patient has the following significant findings with corresponding treatment plan.                Diagnosis 1:  Rt Knee pain   Pain -  hot/cold therapy, US, manual therapy, splint/taping/bracing/orthotics, self management and home program  Decreased ROM/flexibility - manual therapy and therapeutic exercise  Decreased joint mobility - manual therapy, therapeutic exercise and home program  Decreased proprioception - neuro re-education and therapeutic activities  Inflammation - cold therapy and US  Impaired muscle performance - neuro re-education and home program  Decreased function - therapeutic activities    Therapy Evaluation Codes:   1) History comprised of:   Personal factors that impact the plan of care:      Coping style, Overall behavior pattern and Past/current experiences.    Comorbidity factors that impact the plan of care are:      Diabetes, High blood pressure, Osteoarthritis, Overweight and Sleep disorder/apnea.     Medications impacting care: High blood pressure, Pain and Steroids.  2) Examination of Body  Systems comprised of:   Body structures and functions that impact the plan of care:      Knee.   Activity limitations that impact the plan of care are:      Cooking, Dressing, Sitting, Squatting/kneeling, Stairs, Standing and Walking.    Re: Zaira Villatoro   :   1970    3) Clinical presentation characteristics are:   Stable/Uncomplicated.  4) Decision-Making    Low complexity using standardized patient assessment instrument and/or measureable assessment of functional outcome.  Cumulative Therapy Evaluation is: Low complexity.    Previous and current functional limitations:  (See Goal Flow Sheet for this information)    Short term and Long term goals: (See Goal Flow Sheet for this information)     Communication ability:  Patient appears to be able to clearly communicate and understand verbal and written communication and follow directions correctly.  Treatment Explanation - The following has been discussed with the patient:   RX ordered/plan of care  Anticipated outcomes  Possible risks and side effects  This patient would benefit from PT intervention to resume normal activities.   Rehab potential is good.    Frequency:  1 X week, once daily  Duration:  for 8 weeks  Discharge Plan:  Achieve all LTG.  Independent in home treatment program.  Reach maximal therapeutic benefit.    Please refer to the daily flowsheet for treatment today, total treatment time and time spent performing 1:1 timed codes.         Thank you for your referral.    INQUIRIES  Therapist: David Valles PT  GENNA VALDES PT  [address] [address]  [place] [address]  Phone: [phone]  Fax: [phone]

## 2020-03-17 NOTE — PROGRESS NOTES
[facility] Initial Evaluation  Subjective:    Patient Health History  Zaira Villatoro being seen for Shoulder/arm  left and knee right.       Problem occurred: Unknown   Pain is reported as 7/10 on pain scale.  General health as reported by patient is poor.  Pertinent medical history includes: diabetes, high blood pressure, migraines/headaches, numbness/tingling, overweight, osteoarthritis, pain at night/rest, sleep disorder/apnea, weakness and other. Other medical history details: UC  AFIB.     Medical allergies: other. Other medical allergies details: See chart.   Surgeries include:  Orthopedic surgery and other. Other surgery history details: Sinus knee GI.    Current medications:  High blood pressure medication, pain medication, steroids and other. Other medications details: See chart.       Primary job tasks include:  Computer work, driving, prolonged sitting and repetitive tasks.                  Therapist Generated HPI Evaluation  Problem details: Pt reports worsening Rt knee pain due to DJD, chondralmalacia, meniscal tear-posterior.  She indicates most of her pain is anterior and in the medial calf.  .         Type of problem:  Right knee.    This is a chronic condition.  Condition occurred with:  Degenerative joint disease.  Where condition occurred: at home.  Patient reports pain:  Anterior and in the joint.  Pain is described as aching and stabbing and is intermittent.  Pain is worse in the P.M..  Since onset symptoms are gradually worsening.  Associated symptoms:  Loss of motion/stiffness, edema and loss of strength. Symptoms are exacerbated by ascending stairs, descending stairs, walking, standing, sitting and transfers  and relieved by nothing.  Special tests included:  MRI.    Barriers include:  None as reported by patient.                        Objective:  System                                                Knee Evaluation:  ROM:  Strength wnl knee: -4/5 hip abd, knee ext,  SLR   AROM      Extension: Left:    Right:  -17  Flexion: Left:   Right: 107  PROM      Extension: Left:   Right:  -12  Flexion: Left:   Right:  110        Ligament Testing:  Not Assessed                  Palpation:      Right knee tenderness present at:  Medial Joint Line; Lateral Joint Line; Patellar Tendon; IT Band; Semitendinosus; Patellar Medial; Patellar Lateral; Patellar Superior and Patellar Inferior  Edema:  Edema of the knee: moderate,             General     ROS    Assessment/Plan:    Patient is a 50 year old female with right side knee complaints.    Patient has the following significant findings with corresponding treatment plan.                Diagnosis 1:  Rt Knee pain   Pain -  hot/cold therapy, US, manual therapy, splint/taping/bracing/orthotics, self management and home program  Decreased ROM/flexibility - manual therapy and therapeutic exercise  Decreased joint mobility - manual therapy, therapeutic exercise and home program  Decreased proprioception - neuro re-education and therapeutic activities  Inflammation - cold therapy and US  Impaired muscle performance - neuro re-education and home program  Decreased function - therapeutic activities                      Therapy Evaluation Codes:   1) History comprised of:   Personal factors that impact the plan of care:      Coping style, Overall behavior pattern and Past/current experiences.    Comorbidity factors that impact the plan of care are:      Diabetes, High blood pressure, Osteoarthritis, Overweight and Sleep disorder/apnea.     Medications impacting care: High blood pressure, Pain and Steroids.  2) Examination of Body Systems comprised of:   Body structures and functions that impact the plan of care:      Knee.   Activity limitations that impact the plan of care are:      Cooking, Dressing, Sitting, Squatting/kneeling, Stairs, Standing and Walking.  3) Clinical presentation characteristics  are:   Stable/Uncomplicated.  4) Decision-Making    Low complexity using standardized patient assessment instrument and/or measureable assessment of functional outcome.  Cumulative Therapy Evaluation is: Low complexity.    Previous and current functional limitations:  (See Goal Flow Sheet for this information)    Short term and Long term goals: (See Goal Flow Sheet for this information)     Communication ability:  Patient appears to be able to clearly communicate and understand verbal and written communication and follow directions correctly.  Treatment Explanation - The following has been discussed with the patient:   RX ordered/plan of care  Anticipated outcomes  Possible risks and side effects  This patient would benefit from PT intervention to resume normal activities.   Rehab potential is good.    Frequency:  1 X week, once daily  Duration:  for 8 weeks  Discharge Plan:  Achieve all LTG.  Independent in home treatment program.  Reach maximal therapeutic benefit.    Please refer to the daily flowsheet for treatment today, total treatment time and time spent performing 1:1 timed codes.

## 2020-03-30 ENCOUNTER — TELEPHONE (OUTPATIENT)
Dept: DERMATOLOGY | Facility: CLINIC | Age: 50
End: 2020-03-30

## 2020-03-30 NOTE — TELEPHONE ENCOUNTER
I left a message for patient to call [facility].  Pt on wait list.  Offer sooner appt via video or telephone visit.    Taylor Martinez RN

## 2020-03-31 NOTE — TELEPHONE ENCOUNTER
Patient returned call and declined a telemedicine visit.  She states she doesn't have any urgent areas of concern.  Appt rescheduled to June 2020.  Taylor Martinez RN

## 2020-04-02 ENCOUNTER — VIRTUAL VISIT (OUTPATIENT)
Dept: PHYSICAL THERAPY | Facility: CLINIC | Age: 50
End: 2020-04-02
Payer: COMMERCIAL

## 2020-04-02 DIAGNOSIS — M25.512 LEFT SHOULDER PAIN: ICD-10-CM

## 2020-04-02 DIAGNOSIS — M17.11 PRIMARY OSTEOARTHRITIS OF RIGHT KNEE: Primary | ICD-10-CM

## 2020-04-02 PROCEDURE — 97110 THERAPEUTIC EXERCISES: CPT | Mod: 95 | Performed by: PHYSICAL THERAPIST

## 2020-04-02 NOTE — PROGRESS NOTES
"Physical Therapy Virtual Follow Up Visit      The patient has been notified of following:     \"This virtual visit will be conducted between you and your provider. We have found that certain health care needs can be provided without the need for physical presence.  This service lets us provide the care you need with a virtual visit.\"    Due to external, as well as internal [facility] management of the COVID-19 Virus, Zaira Villatoro was not seen in our clinic.  As a substitution, we implemented a virtual visit to manage this patient's condition utilizing the PTRx virtual visit platform via the patient s existing code.  The provider, Mynor Edwards, reviewed the patient's chart, PTRx prescription, and spoke with the patient to determine the following telemedicine visit is appropriate and effective for the patient's care.    The following type of visit was completed:   Telephone Visit:  A telephone visit was used in conjunction with the online PTRx exercise platform.        S: Zaira Villatoro is a 50 year old female. Connected virtually on the PTRx platform to discuss their condition/progress. They noted improvements in no gross change, not getting worse.  They noted ongoing pain or limitations with arm overhead or backwards creates pain and a burn, brushing hair and doing a braid, mostly fatigue and lack of strength.     Current pain level: 8/10 in mornings with R knee, requires seated rest periodically, rides stationary bike without problems but just fatigue. Rides bike 2 of every 3 days.  Mostly the L shoulder is 2/10 pain but up to 9/10 pain with certain positions/movements.    O: Patient reports continued intermittent limitation in end-range shoulder flexion, abduction and behind back L shoulder movements and limited R knee extension.   PTRx Content from today's visit:  Exercise Name: Nerve Gliding Proximal Median, Sets: 1set - Reps: 5-10reps - Sessions: once daily  Exercise Name: Levator Scapulae " Stretch  Exercise Name: Supine Active Shoulder Flexion  Exercise Name: Bench Press  Exercise Name: Knee Extension in Sitting with Patient Overpressure, Sets: 1set - Reps: 10reps with 5sec holds - Sessions: once daily  Exercise Name: Prone Knee Hang, Notes: hold 3mins once daily  Exercise Name: Bridging #1, Sets: 1set - Reps: 10reps with 3sec holds - Sessions: once daily  Exercise Name: Osteoporosis Level 1 Lower Extremity Knee Extension With Dorsiflexion  Exercise Name: Hip Flexion Straight Leg Raise  Exercise Name: Hip Abduction Straight Leg Raise      A:   No change of symptoms has been noted.  Response to therapy has shown lack of progress in  pain level, ROM  and strength      P: Patient will continue with the exercise program assigned on their PTRx code and will add the following measures to manage their pain/condition: Added exercises for nerve gliding to assist management of tingling/numbness symptoms in L arm and knee exercises for strength and extension ROM.    Continue current treatment plan until patient demonstrates readiness to progress to higher level exercises.      Virtual visit contact time    Time of service began: 11:17 AM  Time of service ended: 11:43 AM  Total Time for set up, visit, and documentation: 30 minutes    Payor: MEDICA / Plan: MEDICA CHOICE / Product Type: Indemnity /   .  Procedure Code/s   Therapeutic Exercise (40486): 25 minutes    I have reviewed the note as documented above.  This accurately captures the substance of my conversation with the patient.  Provider location: [place] ([facility])  Patient location: [facility]

## 2020-04-27 ENCOUNTER — TRANSFERRED RECORDS (OUTPATIENT)
Dept: HEALTH INFORMATION MANAGEMENT | Facility: CLINIC | Age: 50
End: 2020-04-27

## 2020-05-07 ENCOUNTER — TRANSFERRED RECORDS (OUTPATIENT)
Dept: HEALTH INFORMATION MANAGEMENT | Facility: CLINIC | Age: 50
End: 2020-05-07

## 2020-05-13 ENCOUNTER — TRANSFERRED RECORDS (OUTPATIENT)
Dept: HEALTH INFORMATION MANAGEMENT | Facility: CLINIC | Age: 50
End: 2020-05-13

## 2020-05-19 NOTE — PROGRESS NOTES
"Zaira Villatoro is a 50 year old female who is being evaluated via a billable telephone visit.    The patient has been notified of following:   \"This telephone visit will be conducted via a call between you and your physician/provider. We have found that certain health care needs can be provided without the need for a physical exam. This service lets us provide the care you need with a short phone conversation. If a prescription is necessary we can send it directly to your pharmacy. If lab work is needed we can place an order for that and you can then stop by our lab to have the test done at a later time.  Telephone visits are billed at different rates depending on your insurance coverage. During this emergency period, for some insurers they may be billed the same as an in-person visit. Please reach out to your insurance provider with any questions.  If during the course of the call the physician/provider feels a telephone visit is not appropriate, you will not be charged for this service.\"    Patient has given verbal consent for Telephone visit?  Yes    What phone number would yoU like to be contacted at? [phone]    How would you like to obtain your AVS? Janeth    Subjective   Zaira Villatoro is a 50 year old female who presents via phone visit today for the following health issues:  HPI     \"I've been better.\"  She is having problems with her colitis.  She is doing the infusions.      Blood sugars are all over the place.  This morning it was 156.  Sunday was 158 in the morning.  162 and up to 321.    She has allergies and dry cough.  No symptoms of covid.    She has poor access to food.                   Reviewed and updated as needed this visit by Provider  Tobacco  Allergies  Meds  Problems  Med Hx  Surg Hx  Fam Hx         Review of Systems    ROS: 10 point ROS neg other than the symptoms noted above in the HPI.        Objective   Reported vitals:  There were no vitals taken for this visit. "   healthy, alert and no distress  PSYCH: Alert and oriented times 3; coherent speech, normal   rate and volume, able to articulate logical thoughts, able   to abstract reason, no tangential thoughts, no hallucinations   or delusions  Her affect is normal  RESP: No cough, no audible wheezing, able to talk in full sentences  Remainder of exam unable to be completed due to telephone visits    Diagnostic Test Results:  Labs reviewed in Epic        Assessment/Plan:  1. Type 2 diabetes mellitus with hyperglycemia, with long-term current use of insulin (H)  Poor control.  Will try the higher dose of jardiance.  Patient is aware that this is unlikely to control her blood sugar but will also try some dietary changes too.  Suspect she will need mealtime insulin vs insulin pump   - empagliflozin (JARDIANCE) 25 MG TABS tablet; Take 1 tablet (25 mg) by mouth daily  Dispense: 30 tablet; Refill: 3  - AMBULATORY ADULT DIABETES EDUCATOR REFERRAL; Future    No follow-ups on file.      Phone call duration:  20 minutes    Tayler Bergman MD     Start 12:44 PM   Stop 1:04 PM

## 2020-05-20 ENCOUNTER — VIRTUAL VISIT (OUTPATIENT)
Dept: INTERNAL MEDICINE | Facility: CLINIC | Age: 50
End: 2020-05-20
Payer: COMMERCIAL

## 2020-05-20 DIAGNOSIS — E11.65 TYPE 2 DIABETES MELLITUS WITH HYPERGLYCEMIA, WITH LONG-TERM CURRENT USE OF INSULIN (H): Chronic | ICD-10-CM

## 2020-05-20 DIAGNOSIS — Z79.4 TYPE 2 DIABETES MELLITUS WITH HYPERGLYCEMIA, WITH LONG-TERM CURRENT USE OF INSULIN (H): Chronic | ICD-10-CM

## 2020-05-20 PROCEDURE — 99214 OFFICE O/P EST MOD 30 MIN: CPT | Mod: TEL | Performed by: INTERNAL MEDICINE

## 2020-05-27 ENCOUNTER — TELEPHONE (OUTPATIENT)
Dept: FAMILY MEDICINE | Facility: CLINIC | Age: 50
End: 2020-05-27

## 2020-05-27 NOTE — TELEPHONE ENCOUNTER
Diabetes Education Scheduling Outreach #1:    Call to patient to schedule. Left message with phone number to call to schedule.    Plan for 2nd outreach attempt within 1 week.    Erlinda Turner  [facility]  Diabetes and Nutrition Scheduling

## 2020-07-30 ENCOUNTER — TRANSFERRED RECORDS (OUTPATIENT)
Dept: HEALTH INFORMATION MANAGEMENT | Facility: CLINIC | Age: 50
End: 2020-07-30

## 2020-07-30 LAB
ALT SERPL-CCNC: 31 U/L (ref 0–78)
AST SERPL-CCNC: 19 U/L (ref 0–37)

## 2020-08-31 DIAGNOSIS — E11.65 TYPE 2 DIABETES MELLITUS WITH HYPERGLYCEMIA, WITH LONG-TERM CURRENT USE OF INSULIN (H): Primary | Chronic | ICD-10-CM

## 2020-08-31 DIAGNOSIS — Z79.4 TYPE 2 DIABETES MELLITUS WITH HYPERGLYCEMIA, WITH LONG-TERM CURRENT USE OF INSULIN (H): Primary | Chronic | ICD-10-CM

## 2020-08-31 RX ORDER — BLOOD SUGAR DIAGNOSTIC
STRIP MISCELLANEOUS
Qty: 500 STRIP | Refills: 2 | Status: SHIPPED | OUTPATIENT
Start: 2020-08-31 | End: 2021-05-19

## 2020-09-01 ENCOUNTER — TELEPHONE (OUTPATIENT)
Dept: INTERNAL MEDICINE | Facility: CLINIC | Age: 50
End: 2020-09-01

## 2020-09-01 NOTE — TELEPHONE ENCOUNTER
Panel Management Review    Patient has the following on her problem list:     Hypertension   Last three blood pressure readings:  BP Readings from Last 3 Encounters:   03/12/20 (!) 164/84   02/19/20 128/82   02/12/20 130/80     Blood pressure: MONITOR    HTN Guidelines:  Less than 140/90      Composite cancer screening  Chart review shows that this patient is due/due soon for the following Pap Smear  Summary:    Patient is due/failing the following:   A1C, PAP and PHYSICAL    Action needed:   Patient needs office visit for physical with pap and diabetic check.    Type of outreach:    [facility] message.    Questions for provider review:    None                                                                                                                                    LS     Chart routed to none .

## 2020-09-06 DIAGNOSIS — Z79.4 TYPE 2 DIABETES MELLITUS WITH HYPERGLYCEMIA, WITH LONG-TERM CURRENT USE OF INSULIN (H): Chronic | ICD-10-CM

## 2020-09-06 DIAGNOSIS — E11.65 TYPE 2 DIABETES MELLITUS WITH HYPERGLYCEMIA, WITH LONG-TERM CURRENT USE OF INSULIN (H): Chronic | ICD-10-CM

## 2020-09-09 RX ORDER — EMPAGLIFLOZIN 25 MG/1
TABLET, FILM COATED ORAL
Qty: 30 TABLET | Refills: 0 | Status: SHIPPED | OUTPATIENT
Start: 2020-09-09 | End: 2020-09-28

## 2020-09-09 NOTE — TELEPHONE ENCOUNTER
Pt has upcoming appt scheduled for 09/28 with [facility].   Medication is being filled for 1 time refill only due to:  Patient needs to be seen because due for appt, has appt scheduled.

## 2020-09-10 ENCOUNTER — TRANSFERRED RECORDS (OUTPATIENT)
Dept: HEALTH INFORMATION MANAGEMENT | Facility: CLINIC | Age: 50
End: 2020-09-10

## 2020-09-15 DIAGNOSIS — E11.9 TYPE 2 DIABETES MELLITUS WITHOUT COMPLICATIONS (H): ICD-10-CM

## 2020-09-16 RX ORDER — INSULIN DETEMIR 100 [IU]/ML
INJECTION, SOLUTION SUBCUTANEOUS
Qty: 45 ML | Refills: 0 | Status: SHIPPED | OUTPATIENT
Start: 2020-09-16 | End: 2020-09-28

## 2020-09-21 ENCOUNTER — TRANSFERRED RECORDS (OUTPATIENT)
Dept: HEALTH INFORMATION MANAGEMENT | Facility: CLINIC | Age: 50
End: 2020-09-21

## 2020-09-24 ENCOUNTER — TELEPHONE (OUTPATIENT)
Dept: FAMILY MEDICINE | Facility: CLINIC | Age: 50
End: 2020-09-24

## 2020-09-24 NOTE — TELEPHONE ENCOUNTER
Reason for call:  Other   Patient called regarding (reason for call): call back  Additional comments: Maria C from [facility] is calling because the patient is having a colonoscopy on Oct 20th and she needs a 3 day hold prior of her blood thinners. Please call back     Phone number to reach patient:  Other phone number:  [phone]    Best Time:  any    Can we leave a detailed message on this number?  YES    Travel screening: Not Applicable

## 2020-09-25 NOTE — TELEPHONE ENCOUNTER
Per Dr. Bergman: ok to hold blood thinners x 3 days. No bridging needed    Maria C with [facility] updated with hold orders    Marianne Khan RN

## 2020-09-27 ASSESSMENT — ENCOUNTER SYMPTOMS
MYALGIAS: 1
HEADACHES: 1
HEMATURIA: 0
BREAST MASS: 0
EYE PAIN: 0
PARESTHESIAS: 1
DYSURIA: 1
PALPITATIONS: 0
NERVOUS/ANXIOUS: 0
CONSTIPATION: 0
JOINT SWELLING: 1
ABDOMINAL PAIN: 1
FREQUENCY: 1
DIZZINESS: 0
NAUSEA: 0
HEMATOCHEZIA: 0
ARTHRALGIAS: 1
SORE THROAT: 0
HEARTBURN: 0
SHORTNESS OF BREATH: 0
DIARRHEA: 1
COUGH: 0
CHILLS: 0
WEAKNESS: 1
FEVER: 0

## 2020-09-27 ASSESSMENT — PATIENT HEALTH QUESTIONNAIRE - PHQ9
10. IF YOU CHECKED OFF ANY PROBLEMS, HOW DIFFICULT HAVE THESE PROBLEMS MADE IT FOR YOU TO DO YOUR WORK, TAKE CARE OF THINGS AT HOME, OR GET ALONG WITH OTHER PEOPLE: NOT DIFFICULT AT ALL
SUM OF ALL RESPONSES TO PHQ QUESTIONS 1-9: 7
SUM OF ALL RESPONSES TO PHQ QUESTIONS 1-9: 7

## 2020-09-28 ENCOUNTER — OFFICE VISIT (OUTPATIENT)
Dept: INTERNAL MEDICINE | Facility: CLINIC | Age: 50
End: 2020-09-28
Payer: COMMERCIAL

## 2020-09-28 VITALS
HEART RATE: 72 BPM | WEIGHT: 262 LBS | TEMPERATURE: 97.9 F | SYSTOLIC BLOOD PRESSURE: 124 MMHG | OXYGEN SATURATION: 96 % | BODY MASS INDEX: 44.97 KG/M2 | DIASTOLIC BLOOD PRESSURE: 72 MMHG

## 2020-09-28 DIAGNOSIS — M25.512 CHRONIC LEFT SHOULDER PAIN: ICD-10-CM

## 2020-09-28 DIAGNOSIS — I48.0 PAF (PAROXYSMAL ATRIAL FIBRILLATION) (H): ICD-10-CM

## 2020-09-28 DIAGNOSIS — Z79.4 TYPE 2 DIABETES MELLITUS WITH HYPERGLYCEMIA, WITH LONG-TERM CURRENT USE OF INSULIN (H): Chronic | ICD-10-CM

## 2020-09-28 DIAGNOSIS — Z51.81 ENCOUNTER FOR THERAPEUTIC DRUG MONITORING: ICD-10-CM

## 2020-09-28 DIAGNOSIS — N92.6 ABNORMAL MENSTRUATION: ICD-10-CM

## 2020-09-28 DIAGNOSIS — E11.65 TYPE 2 DIABETES MELLITUS WITH HYPERGLYCEMIA, WITH LONG-TERM CURRENT USE OF INSULIN (H): Chronic | ICD-10-CM

## 2020-09-28 DIAGNOSIS — K51.90 ULCERATIVE COLITIS WITHOUT COMPLICATIONS, UNSPECIFIED LOCATION (H): ICD-10-CM

## 2020-09-28 DIAGNOSIS — Z00.00 ROUTINE GENERAL MEDICAL EXAMINATION AT A HEALTH CARE FACILITY: Primary | ICD-10-CM

## 2020-09-28 DIAGNOSIS — Z12.4 CERVICAL CANCER SCREENING: ICD-10-CM

## 2020-09-28 DIAGNOSIS — G89.29 CHRONIC LEFT SHOULDER PAIN: ICD-10-CM

## 2020-09-28 LAB
ALBUMIN SERPL-MCNC: 3.7 G/DL (ref 3.4–5)
ALP SERPL-CCNC: 56 U/L (ref 40–150)
ALT SERPL W P-5'-P-CCNC: 28 U/L (ref 0–50)
ANION GAP SERPL CALCULATED.3IONS-SCNC: 10 MMOL/L (ref 3–14)
AST SERPL W P-5'-P-CCNC: 19 U/L (ref 0–45)
BASOPHILS # BLD AUTO: 0 10E9/L (ref 0–0.2)
BASOPHILS NFR BLD AUTO: 0.4 %
BILIRUB SERPL-MCNC: 1 MG/DL (ref 0.2–1.3)
BUN SERPL-MCNC: 16 MG/DL (ref 7–30)
CALCIUM SERPL-MCNC: 8.8 MG/DL (ref 8.5–10.1)
CHLORIDE SERPL-SCNC: 110 MMOL/L (ref 94–109)
CO2 SERPL-SCNC: 20 MMOL/L (ref 20–32)
CREAT SERPL-MCNC: 0.86 MG/DL (ref 0.52–1.04)
CREAT UR-MCNC: 102 MG/DL
DIFFERENTIAL METHOD BLD: ABNORMAL
EOSINOPHIL # BLD AUTO: 0.4 10E9/L (ref 0–0.7)
EOSINOPHIL NFR BLD AUTO: 4.7 %
ERYTHROCYTE [DISTWIDTH] IN BLOOD BY AUTOMATED COUNT: 14.3 % (ref 10–15)
GFR SERPL CREATININE-BSD FRML MDRD: 79 ML/MIN/{1.73_M2}
GLUCOSE SERPL-MCNC: 137 MG/DL (ref 70–99)
HBA1C MFR BLD: 7.8 % (ref 0–5.6)
HCT VFR BLD AUTO: 47.3 % (ref 35–47)
HGB BLD-MCNC: 15.6 G/DL (ref 11.7–15.7)
LYMPHOCYTES # BLD AUTO: 1.9 10E9/L (ref 0.8–5.3)
LYMPHOCYTES NFR BLD AUTO: 23.3 %
MCH RBC QN AUTO: 29.8 PG (ref 26.5–33)
MCHC RBC AUTO-ENTMCNC: 33 G/DL (ref 31.5–36.5)
MCV RBC AUTO: 90 FL (ref 78–100)
MICROALBUMIN UR-MCNC: 13 MG/L
MICROALBUMIN/CREAT UR: 13.04 MG/G CR (ref 0–25)
MONOCYTES # BLD AUTO: 0.7 10E9/L (ref 0–1.3)
MONOCYTES NFR BLD AUTO: 9.1 %
NEUTROPHILS # BLD AUTO: 5 10E9/L (ref 1.6–8.3)
NEUTROPHILS NFR BLD AUTO: 62.5 %
PLATELET # BLD AUTO: 246 10E9/L (ref 150–450)
POTASSIUM SERPL-SCNC: 3.9 MMOL/L (ref 3.4–5.3)
PROT SERPL-MCNC: 7.3 G/DL (ref 6.8–8.8)
RBC # BLD AUTO: 5.24 10E12/L (ref 3.8–5.2)
SODIUM SERPL-SCNC: 140 MMOL/L (ref 133–144)
WBC # BLD AUTO: 7.9 10E9/L (ref 4–11)

## 2020-09-28 PROCEDURE — 99214 OFFICE O/P EST MOD 30 MIN: CPT | Mod: 25 | Performed by: INTERNAL MEDICINE

## 2020-09-28 PROCEDURE — 99396 PREV VISIT EST AGE 40-64: CPT | Performed by: INTERNAL MEDICINE

## 2020-09-28 PROCEDURE — 87624 HPV HI-RISK TYP POOLED RSLT: CPT | Performed by: INTERNAL MEDICINE

## 2020-09-28 PROCEDURE — 83036 HEMOGLOBIN GLYCOSYLATED A1C: CPT | Performed by: INTERNAL MEDICINE

## 2020-09-28 PROCEDURE — 85025 COMPLETE CBC W/AUTO DIFF WBC: CPT | Performed by: INTERNAL MEDICINE

## 2020-09-28 PROCEDURE — 36415 COLL VENOUS BLD VENIPUNCTURE: CPT | Performed by: INTERNAL MEDICINE

## 2020-09-28 PROCEDURE — G0145 SCR C/V CYTO,THINLAYER,RESCR: HCPCS | Performed by: INTERNAL MEDICINE

## 2020-09-28 PROCEDURE — 82043 UR ALBUMIN QUANTITATIVE: CPT | Performed by: INTERNAL MEDICINE

## 2020-09-28 PROCEDURE — 99207 C FOOT EXAM  NO CHARGE: CPT | Mod: 25 | Performed by: INTERNAL MEDICINE

## 2020-09-28 PROCEDURE — 80053 COMPREHEN METABOLIC PANEL: CPT | Performed by: INTERNAL MEDICINE

## 2020-09-28 RX ORDER — GLIMEPIRIDE 1 MG/1
1 TABLET ORAL
Qty: 30 TABLET | Refills: 3 | Status: SHIPPED | OUTPATIENT
Start: 2020-09-28 | End: 2021-01-14

## 2020-09-28 RX ORDER — GLIMEPIRIDE 4 MG/1
TABLET ORAL
Qty: 135 TABLET | Refills: 3 | Status: SHIPPED | OUTPATIENT
Start: 2020-09-28 | End: 2021-11-22

## 2020-09-28 ASSESSMENT — ENCOUNTER SYMPTOMS
ABDOMINAL PAIN: 1
HEMATURIA: 0
HEARTBURN: 0
NERVOUS/ANXIOUS: 0
SHORTNESS OF BREATH: 0
NAUSEA: 0
FEVER: 0
EYE PAIN: 0
PARESTHESIAS: 1
FREQUENCY: 1
JOINT SWELLING: 1
DIZZINESS: 0
PALPITATIONS: 0
BREAST MASS: 0
MYALGIAS: 1
CONSTIPATION: 0
HEMATOCHEZIA: 0
CHILLS: 0
SORE THROAT: 0
ARTHRALGIAS: 1
DYSURIA: 1
WEAKNESS: 1
DIARRHEA: 1
HEADACHES: 1
COUGH: 0

## 2020-09-28 ASSESSMENT — PATIENT HEALTH QUESTIONNAIRE - PHQ9: SUM OF ALL RESPONSES TO PHQ QUESTIONS 1-9: 7

## 2020-09-28 NOTE — PROGRESS NOTES
Patient Instructions 2/19/20  Code42  Call your insurance again to see what the hold up is on Ozempic and Jardiance  Make an appointment with Dermatology.  Make an appointment with the lymphedema therapist.  Clobetasol cream every night on the vulva crack/sore  Update me in 1 week with how the sore is healing and any photos of the bloody cyst of the left groin.      Answers for HPI/ROS submitted by the patient on 9/27/2020   Annual Exam:  Frequency of exercise:: 2-3 days/week  Getting at least 3 servings of Calcium per day:: NO  Diet:: Diabetic, Other  Taking medications regularly:: Yes  Medication side effects:: Muscle aches, Other  Bi-annual eye exam:: Yes  Dental care twice a year:: NO  Sleep apnea or symptoms of sleep apnea:: Daytime drowsiness, Sleep apnea  abdominal pain: Yes  Blood in stool: No  Blood in urine: No  chest pain: No  chills: No  congestion: No  constipation: No  cough: No  diarrhea: Yes  dizziness: No  ear pain: No  eye pain: No  nervous/anxious: No  fever: No  frequency: Yes  genital sores: No  headaches: Yes  hearing loss: No  heartburn: No  arthralgias: Yes  joint swelling: Yes  peripheral edema: Yes  mood changes: No  myalgias: Yes  nausea: No  dysuria: Yes  palpitations: No  Skin sensation changes: Yes  sore throat: No  urgency: Yes  rash: Yes  shortness of breath: No  visual disturbance: No  weakness: Yes  pelvic pain: No  vaginal bleeding: No  vaginal discharge: Yes  tenderness: Yes  breast mass: No  breast discharge: No  Additional concerns today:: Yes  Duration of exercise:: 15-30 minutes  If you checked off any problems, how difficult have these problems made it for you to do your work, take care of things at home, or get along with other people?: Not difficult at all  PHQ9 TOTAL SCORE: 7

## 2020-09-28 NOTE — PROGRESS NOTES
SUBJECTIVE:   CC: Zaira Villatoro is an 50 year old woman who presents for preventive health visit.     Patient has been advised of split billing requirements and indicates understanding: Yes  Healthy Habits:     Getting at least 3 servings of Calcium per day:  NO    Bi-annual eye exam:  Yes    Dental care twice a year:  NO    Sleep apnea or symptoms of sleep apnea:  Daytime drowsiness and Sleep apnea    Diet:  Diabetic and Other    Frequency of exercise:  2-3 days/week    Duration of exercise:  15-30 minutes    Taking medications regularly:  Yes    Medication side effects:  Muscle aches and Other    PHQ-2 Total Score: 3    Additional concerns today:  Yes    Saw ortho for her knee.  Was given cortisone shot  Did virtual Physical therapy for the shoulder.   Due for her Pap.    Blood sugar are in the 150-116 range and the pm are the 148-348 ranges.        Shoulder and knee are still bothering her.     Her infusions for her UC are not improving.    She is very nervous about covid             Today's PHQ-2 Score:   PHQ-2 (  [employer]) 2020   Q1: Little interest or pleasure in doing things 3   Q2: Feeling down, depressed or hopeless 0   PHQ-2 Score 3   Q1: Little interest or pleasure in doing things Nearly every day   Q2: Feeling down, depressed or hopeless Not at all   PHQ-2 Score 3       Abuse: Current or Past (Physical, Sexual or Emotional) - No  Do you feel safe in your environment? Yes        Social History     Tobacco Use     Smoking status: Former Smoker     Packs/day: 1.00     Years: 15.00     Pack years: 15.00     Types: Cigarettes     Start date: 1985     Last attempt to quit: 1998     Years since quittin.2     Smokeless tobacco: Never Used     Tobacco comment: soke free household.   Substance Use Topics     Alcohol use: Yes     Alcohol/week: 0.0 standard drinks     Comment: occ         Alcohol Use 2020   Prescreen: >3 drinks/day or >7 drinks/week? No   Prescreen: >3 drinks/day or  >7 drinks/week? -       Reviewed orders with patient.  Reviewed health maintenance and updated orders accordingly - Yes  Lab work is in process    Mammogram Screening: Patient under age 50, mutual decision reflected in health maintenance.      Pertinent mammograms are reviewed under the imaging tab.  History of abnormal Pap smear: NO - age 30-65 PAP every 5 years with negative HPV co-testing recommended  PAP / HPV Latest Ref Rng & Units 11/4/2015 6/26/2012   PAP - NIL NIL   HPV 16 DNA NEG Negative -   HPV 18 DNA NEG Negative -   OTHER HR HPV NEG Negative -     Reviewed and updated as needed this visit by clinical staff  Tobacco  Allergies  Meds  Problems  Med Hx  Surg Hx  Fam Hx         Reviewed and updated as needed this visit by Provider  Tobacco  Allergies  Meds  Problems  Med Hx  Surg Hx  Fam Hx            Review of Systems   Constitutional: Negative for chills and fever.   HENT: Negative for congestion, ear pain, hearing loss and sore throat.    Eyes: Negative for pain and visual disturbance.   Respiratory: Negative for cough and shortness of breath.    Cardiovascular: Positive for peripheral edema. Negative for chest pain and palpitations.   Gastrointestinal: Positive for abdominal pain and diarrhea. Negative for constipation, heartburn, hematochezia and nausea.   Breasts:  Positive for tenderness. Negative for breast mass and discharge.   Genitourinary: Positive for dysuria, frequency, urgency and vaginal discharge. Negative for genital sores, hematuria, pelvic pain and vaginal bleeding.   Musculoskeletal: Positive for arthralgias, joint swelling and myalgias.   Skin: Positive for rash.   Neurological: Positive for weakness, headaches and paresthesias. Negative for dizziness.   Psychiatric/Behavioral: Negative for mood changes. The patient is not nervous/anxious.      She has vaginal bleeding every time she has a bowel movement     OBJECTIVE:   /72   Pulse 72   Temp 97.9  F (36.6  C)  (Oral)   Wt 118.8 kg (262 lb)   SpO2 96%   BMI 44.97 kg/m    Physical Exam  GENERAL APPEARANCE: healthy, alert and no distress  EYES: Eyes grossly normal to inspection, PERRL and conjunctivae and sclerae normal  HENT: ear canals and TM's normal and nose and mouth without ulcers or lesions  NECK: no adenopathy, no asymmetry, masses, or scars and thyroid normal to palpation  RESP: lungs clear to auscultation - no rales, rhonchi or wheezes  BREAST: normal without masses, tenderness or nipple discharge and no palpable axillary masses or adenopathy  CV: regular rates and rhythm and normal S1 S2, no S3 or S4  LYMPHATICS: normal ant/post cervical and supraclavicular nodes  ABDOMEN: soft, nontender, without hepatosplenomegaly or masses and bowel sounds normal   (female): normal cervix, adnexae, and uterus without masses or discharge, no atrophic changes noted.  Pap was difficult due to abdomen obesity and cervix could not be fully visualized  MS: extremities normal- no gross deformities noted  SKIN: no suspicious lesions or rashes  NEURO: Normal strength and tone, mentation intact and speech normal  PSYCH: mentation appears normal and affect normal/bright  No edema   1+ posterior tibial pulses bilateral          Diagnostic Test Results:  Labs reviewed in Epic    ASSESSMENT/PLAN:   1. Routine general medical examination at a health care facility      2. Type 2 diabetes mellitus with hyperglycemia, with long-term current use of insulin (H)  Poor control. Per patient instructions.   - Albumin Random Urine Quantitative with Creat Ratio  - FOOT EXAM  - glimepiride (AMARYL) 4 MG tablet; Take 1 and 1/2 tablets (6mg) with breakfast.  Dispense: 135 tablet; Refill: 3  - empagliflozin (JARDIANCE) 25 MG TABS tablet; Take 1 tablet (25 mg) by mouth daily  Dispense: 90 tablet; Refill: 4  - insulin detemir (LEVEMIR FLEXTOUCH) 100 UNIT/ML pen; INJECT 30 UNITS SUBCUTANEOUSLY twice daily  Dispense: 45 mL; Refill: 4  - glimepiride  "(AMARYL) 1 MG tablet; Take 1 tablet (1 mg) by mouth every morning (before breakfast) As an extra dose if the blood sugar is elevated after infusion.  Dispense: 30 tablet; Refill: 3    3. PAF (paroxysmal atrial fibrillation) (H)  Well controlled with medications without side effects.     4. Chronic left shoulder pain  Follow up with ortho.  She likelyneeds advanced imaging but doesn't want to do that at this time.          6. Encounter for therapeutic drug monitoring  See labs     7. Ulcerative colitis without complications, unspecified location (H)  Per GI.  Consider home infusions.    - Comprehensive metabolic panel  - CBC with platelets and differential        9. Cervical cancer screening  Very difficult pap due to patient's obesity.    - Pap imaged thin layer screen with HPV - recommended age 30 - 65 years (select HPV order below)  - HPV High Risk Types DNA Cervical    10. Abnormal menstruation  Discussed with patient that vaginal bleeding with a bowel movements is not normal.    - US Pelvic Complete with Transvaginal; Future    Patient has been advised of split billing requirements and indicates understanding: Yes  COUNSELING:  Reviewed preventive health counseling, as reflected in patient instructions    Estimated body mass index is 44.97 kg/m  as calculated from the following:    Height as of 3/12/20: 1.626 m (5' 4\").    Weight as of this encounter: 118.8 kg (262 lb).      She reports that she quit smoking about 22 years ago. Her smoking use included cigarettes. She started smoking about 35 years ago. She has a 15.00 pack-year smoking history. She has never used smokeless tobacco.      Counseling Resources:  ATP IV Guidelines  Pooled Cohorts Equation Calculator  Breast Cancer Risk Calculator  BRCA-Related Cancer Risk Assessment: FHS-7 Tool  FRAX Risk Assessment  ICSI Preventive Guidelines  Dietary Guidelines for Americans, 2010  USDA's MyPlate  ASA Prophylaxis  Lung CA Screening    Tayler Bergman MD  [facility]    Patient Instructions   Schedule a pelvic ultrasound.    Increase your insulin to 30 units twice daily and mychart me next week with your blood sugar.   Mammogram next year.

## 2020-09-28 NOTE — PATIENT INSTRUCTIONS
Schedule a pelvic ultrasound.    Increase your insulin to 30 units twice daily and mychart me next week with your blood sugar.   Mammogram next year.

## 2020-09-30 LAB
COPATH REPORT: NORMAL
PAP: NORMAL

## 2020-10-02 LAB
FINAL DIAGNOSIS: NORMAL
HPV HR 12 DNA CVX QL NAA+PROBE: NEGATIVE
HPV16 DNA SPEC QL NAA+PROBE: NEGATIVE
HPV18 DNA SPEC QL NAA+PROBE: NEGATIVE
SPECIMEN DESCRIPTION: NORMAL
SPECIMEN SOURCE CVX/VAG CYTO: NORMAL

## 2020-10-05 DIAGNOSIS — G47.33 OBSTRUCTIVE SLEEP APNEA: Primary | ICD-10-CM

## 2020-10-20 ENCOUNTER — TRANSFERRED RECORDS (OUTPATIENT)
Dept: HEALTH INFORMATION MANAGEMENT | Facility: CLINIC | Age: 50
End: 2020-10-20

## 2020-10-22 ENCOUNTER — TRANSFERRED RECORDS (OUTPATIENT)
Dept: HEALTH INFORMATION MANAGEMENT | Facility: CLINIC | Age: 50
End: 2020-10-22

## 2020-10-23 ENCOUNTER — OFFICE VISIT (OUTPATIENT)
Dept: INTERNAL MEDICINE | Facility: CLINIC | Age: 50
End: 2020-10-23
Payer: COMMERCIAL

## 2020-10-23 ENCOUNTER — MYC MEDICAL ADVICE (OUTPATIENT)
Dept: INTERNAL MEDICINE | Facility: CLINIC | Age: 50
End: 2020-10-23

## 2020-10-23 VITALS
RESPIRATION RATE: 17 BRPM | TEMPERATURE: 98.2 F | DIASTOLIC BLOOD PRESSURE: 78 MMHG | OXYGEN SATURATION: 97 % | WEIGHT: 265.6 LBS | BODY MASS INDEX: 45.59 KG/M2 | HEART RATE: 67 BPM | SYSTOLIC BLOOD PRESSURE: 118 MMHG

## 2020-10-23 DIAGNOSIS — H60.502 ACUTE OTITIS EXTERNA OF LEFT EAR, UNSPECIFIED TYPE: Primary | ICD-10-CM

## 2020-10-23 DIAGNOSIS — Z79.4 TYPE 2 DIABETES MELLITUS WITH HYPERGLYCEMIA, WITH LONG-TERM CURRENT USE OF INSULIN (H): Chronic | ICD-10-CM

## 2020-10-23 DIAGNOSIS — R59.0 CERVICAL ADENOPATHY: ICD-10-CM

## 2020-10-23 DIAGNOSIS — E11.65 TYPE 2 DIABETES MELLITUS WITH HYPERGLYCEMIA, WITH LONG-TERM CURRENT USE OF INSULIN (H): Chronic | ICD-10-CM

## 2020-10-23 DIAGNOSIS — Z23 NEED FOR PROPHYLACTIC VACCINATION AND INOCULATION AGAINST INFLUENZA: ICD-10-CM

## 2020-10-23 PROCEDURE — 90471 IMMUNIZATION ADMIN: CPT | Performed by: INTERNAL MEDICINE

## 2020-10-23 PROCEDURE — 90686 IIV4 VACC NO PRSV 0.5 ML IM: CPT | Performed by: INTERNAL MEDICINE

## 2020-10-23 PROCEDURE — 99214 OFFICE O/P EST MOD 30 MIN: CPT | Mod: 25 | Performed by: INTERNAL MEDICINE

## 2020-10-23 RX ORDER — AMOXICILLIN 875 MG
875 TABLET ORAL 2 TIMES DAILY
Qty: 20 TABLET | Refills: 0 | Status: SHIPPED | OUTPATIENT
Start: 2020-10-23 | End: 2020-11-02

## 2020-10-23 RX ORDER — NEOMYCIN SULFATE, POLYMYXIN B SULFATE, HYDROCORTISONE 3.5; 10000; 1 MG/ML; [USP'U]/ML; MG/ML
3 SOLUTION/ DROPS AURICULAR (OTIC) 4 TIMES DAILY
Qty: 10 ML | Refills: 1 | Status: SHIPPED | OUTPATIENT
Start: 2020-10-23 | End: 2020-10-30

## 2020-10-23 ASSESSMENT — PAIN SCALES - GENERAL: PAINLEVEL: MODERATE PAIN (5)

## 2020-10-23 NOTE — PATIENT INSTRUCTIONS
Amoxicillin 1 tab twice daily for 10 days.  Ear drops per bottle directions.  Increase insulin to 32 units twice daily.  Keep increasing by 1 unit twice daily every 2-3 days until the fasting blood sugar is under 130.    Up date me next week with how you are feeling.

## 2020-10-23 NOTE — PROGRESS NOTES
"Subjective   Zaira Villatoro is a 50 year old female who presents to clinic today for the following health issues:  HPI         \"I am having a lot of ear pain again. It is in my left ear but not in the inner ear. It feels the same as the last time that I had an issue. I believe it was called an outer ear infection. It hurts to touch my earlobe and the tissue that is where the ear connects to the face. Do I need to come in or can I get the antibiotic that she gave me last time. It is Friday am I am sure I won t make it through the weekend. It itches and the pain has kept me up all night. Please help\"  The right ear itches but doesn't hurt.  She has had this in the past and has been given ear drop s and antibiotics. She has seen ent in the past too but she doesn't remember the pain being this bad.     The patient denies sore throat, nasal or sinus congestion or cough.    Blood sugar - Morning is 130-210, before dinner is 110-331  Before bedtime is 138-328    She just had her infusion for UC.  Side effects do include oropharyngeal pain.          Review of Systems    ROS: 10 point ROS neg other than the symptoms noted above in the HPI.       Objective    /78   Pulse 67   Temp 98.2  F (36.8  C) (Oral)   Resp 17   Wt 120.5 kg (265 lb 9.6 oz)   SpO2 97%   BMI 45.59 kg/m    Body mass index is 45.59 kg/m .  Physical Exam   Mild distress.    EARS: the left canal is clear but the right canal is erythematous and swollen, TM retracted not injected .  Throat exam normal. Oral cavity, tongue, pharynx and palate have no inflammation or suspicious lesions.   The neck has left anterior cervical adenopathy that is tender,  RESP: lungs clear to auscultation - no rales, rhonchi or wheezes  CV: regular rates and rhythm and normal S1 S2, no S3 or S4  PSYCH: mentation appears normal. and affect flat  No edema       No results found for this or any previous visit (from the past 24 hour(s)).        Assessment & Plan     Type 2 " diabetes mellitus with hyperglycemia, with long-term current use of insulin (H)  Per patient instructions. Poor control but patient is very compliant  - insulin detemir (LEVEMIR FLEXTOUCH) 100 UNIT/ML pen; INJECT 32 UNITS SUBCUTANEOUSLY twice daily    Acute otitis externa of left ear, unspecified type  Per patient instructions. Unclear if her infusion is contributing to this recurring event.   - neomycin-polymyxin-hydrocortisone (CORTISPORIN) 3.5-03500-1 otic solution; Place 3 drops Into the left ear 4 times daily for 7 days    Cervical adenopathy    - amoxicillin (AMOXIL) 875 MG tablet; Take 1 tablet (875 mg) by mouth 2 times daily for 10 days    Need for prophylactic vaccination and inoculation against influenza    - INFLUENZA VACCINE IM > 6 MONTHS VALENT IIV4 [38803]  - Vaccine Administration, Initial [70079]       Patient Instructions   Amoxicillin 1 tab twice daily for 10 days.  Ear drops per bottle directions.  Increase insulin to 32 units twice daily.  Keep increasing by 1 unit twice daily every 2-3 days until the fasting blood sugar is under 130.    Up date me next week with how you are feeling.      No follow-ups on file.    Tayler Bergman MD  [facility]

## 2020-10-27 ENCOUNTER — TRANSFERRED RECORDS (OUTPATIENT)
Dept: HEALTH INFORMATION MANAGEMENT | Facility: CLINIC | Age: 50
End: 2020-10-27

## 2020-10-28 ENCOUNTER — MYC MEDICAL ADVICE (OUTPATIENT)
Dept: INTERNAL MEDICINE | Facility: CLINIC | Age: 50
End: 2020-10-28

## 2020-10-28 ENCOUNTER — ANCILLARY PROCEDURE (OUTPATIENT)
Dept: ULTRASOUND IMAGING | Facility: CLINIC | Age: 50
End: 2020-10-28
Attending: INTERNAL MEDICINE
Payer: COMMERCIAL

## 2020-10-28 ENCOUNTER — TELEPHONE (OUTPATIENT)
Dept: FAMILY MEDICINE | Facility: CLINIC | Age: 50
End: 2020-10-28

## 2020-10-28 ENCOUNTER — ANCILLARY PROCEDURE (OUTPATIENT)
Dept: MAMMOGRAPHY | Facility: CLINIC | Age: 50
End: 2020-10-28
Attending: INTERNAL MEDICINE
Payer: COMMERCIAL

## 2020-10-28 DIAGNOSIS — Z12.31 SCREENING MAMMOGRAM FOR HIGH-RISK PATIENT: ICD-10-CM

## 2020-10-28 DIAGNOSIS — D25.9 UTERINE FIBROID: Primary | ICD-10-CM

## 2020-10-28 DIAGNOSIS — N92.6 ABNORMAL MENSTRUATION: ICD-10-CM

## 2020-10-28 DIAGNOSIS — N88.9 CERVICAL LESION: ICD-10-CM

## 2020-10-28 PROCEDURE — 77067 SCR MAMMO BI INCL CAD: CPT | Performed by: RADIOLOGY

## 2020-10-28 NOTE — RESULT ENCOUNTER NOTE
I called and left a message for Zaira to return my call or we can dialogue over Foldees.    The ultrasound is abnormal.  There is a large fibroid that might be causing some abnormal bleeding.  However, the lining of the uterus is too thick and there is a growth on the cervix that could also be causing the bleeding.  Follow up with gynecology is recommended for all 3 of these issues.  Any [facility] location is fine for this appointment.      Dr. Bergman    Gyn referral - indication uterine fibroid, abnormal menstruation, cervical lesion

## 2020-10-28 NOTE — TELEPHONE ENCOUNTER
Reason for call:  Other   Patient called regarding (reason for call): returning call  Additional comments:  Patient returning a call. Please call her.     Phone number to reach patient:  Home number on file [phone] (home)    Best Time:   Any     Can we leave a detailed message on this number?  YES    Travel screening: Not Applicable

## 2020-10-29 NOTE — TELEPHONE ENCOUNTER
Patient left message on RN hotline asking for a call back from Dr. Bergman to discuss the ultrasound results. She requested only Dr. Bergman call her back.   Pati Malave RN

## 2020-11-10 RX ORDER — PEN NEEDLE, DIABETIC 32GX 5/32"
NEEDLE, DISPOSABLE MISCELLANEOUS
Refills: 11 | OUTPATIENT
Start: 2020-11-10

## 2020-11-10 NOTE — TELEPHONE ENCOUNTER
Denial sent, receives different type of needle per Electronic Health Record.  Nicolasa Stone RN

## 2020-11-11 DIAGNOSIS — E11.65 TYPE 2 DIABETES MELLITUS WITH HYPERGLYCEMIA, WITH LONG-TERM CURRENT USE OF INSULIN (H): Primary | ICD-10-CM

## 2020-11-11 DIAGNOSIS — Z79.4 TYPE 2 DIABETES MELLITUS WITH HYPERGLYCEMIA, WITH LONG-TERM CURRENT USE OF INSULIN (H): Primary | ICD-10-CM

## 2020-11-11 RX ORDER — PEN NEEDLE, DIABETIC 32GX 5/32"
NEEDLE, DISPOSABLE MISCELLANEOUS
Qty: 100 EACH | Refills: 11 | Status: SHIPPED | OUTPATIENT
Start: 2020-11-11 | End: 2021-05-13

## 2020-11-16 ENCOUNTER — NURSE TRIAGE (OUTPATIENT)
Dept: NURSING | Facility: CLINIC | Age: 50
End: 2020-11-16

## 2020-11-16 ENCOUNTER — VIRTUAL VISIT (OUTPATIENT)
Dept: FAMILY MEDICINE | Facility: CLINIC | Age: 50
End: 2020-11-16
Payer: COMMERCIAL

## 2020-11-16 DIAGNOSIS — Z20.822 EXPOSURE TO COVID-19 VIRUS: Primary | ICD-10-CM

## 2020-11-16 DIAGNOSIS — K51.90 ULCERATIVE COLITIS WITHOUT COMPLICATIONS, UNSPECIFIED LOCATION (H): Chronic | ICD-10-CM

## 2020-11-16 DIAGNOSIS — Z79.4 TYPE 2 DIABETES MELLITUS WITH HYPERGLYCEMIA, WITH LONG-TERM CURRENT USE OF INSULIN (H): Chronic | ICD-10-CM

## 2020-11-16 DIAGNOSIS — Z11.52 ENCOUNTER FOR SCREENING LABORATORY TESTING FOR COVID-19 VIRUS: Primary | ICD-10-CM

## 2020-11-16 DIAGNOSIS — E11.65 TYPE 2 DIABETES MELLITUS WITH HYPERGLYCEMIA, WITH LONG-TERM CURRENT USE OF INSULIN (H): Chronic | ICD-10-CM

## 2020-11-16 DIAGNOSIS — D84.9 IMMUNOSUPPRESSED STATUS (H): ICD-10-CM

## 2020-11-16 PROCEDURE — 99214 OFFICE O/P EST MOD 30 MIN: CPT | Mod: TEL | Performed by: INTERNAL MEDICINE

## 2020-11-16 NOTE — PROGRESS NOTES
"Zaira Villatoro is a 50 year old female who is being evaluated via a billable telephone visit.      The patient has been notified of following:     \"This telephone visit will be conducted via a call between you and your physician/provider. We have found that certain health care needs can be provided without the need for a physical exam.  This service lets us provide the care you need with a short phone conversation.  If a prescription is necessary we can send it directly to your pharmacy.  If lab work is needed we can place an order for that and you can then stop by our lab to have the test done at a later time.    Telephone visits are billed at different rates depending on your insurance coverage. During this emergency period, for some insurers they may be billed the same as an in-person visit.  Please reach out to your insurance provider with any questions.    If during the course of the call the physician/provider feels a telephone visit is not appropriate, you will not be charged for this service.\"    Patient has given verbal consent for Telephone visit?  Yes    What phone number would you like to be contacted at? [phone]     How would you like to obtain your AVS? Janeth Sierra     Zaira Villatoro is a 50 year old female who presents via phone visit today for the following health issues:    HPI       Concern for COVID-19  About how many days ago did these symptoms start? No- Possible exposure 11/11/20  Is this your first visit for this illness? Yes  In the 14 days before your symptoms started, have you had close contact with someone with COVID-19 (Coronavirus)? Yes, I have been in contact with someone who has symptoms of COVID-19/Coronavirus (no lab test done or waiting on results). Maikel dorsey  Do you have a fever or chills? No   Are you having new or worsening difficulty breathing? No  Do you have new or worsening cough? No  Have you had any new or unexplained body aches? No    Have " you experienced any of the following NEW symptoms?    Headache: YES    Sore throat: No    Loss of taste or smell: No    Chest pain: No    Diarrhea: No    Rash: No  What treatments have you tried? Ibuprofen and tylenol  Who do you live with?   Are you, or a household member, a healthcare worker or a ? No  Do you live in a nursing home, group home, or shelter? No  Do you have a way to get food/medications if quarantined? Yes, I have a friend or family member who can help me.               Allergies   Allergen Reactions     Demerol      Very low blood pressure     Fish      Pt reports allergy to all fish     Meperidine Other (See Comments)     Other reaction(s): Hypotension  Drops blood pressure       Metformin GI Disturbance and Diarrhea     GI Disturbance       Naproxen      No Clinical Screening - See Comments      Pt reports allergy to all fish     Nubain  [Nalbuphine]      Seafood Swelling     Throat swells     Topiramate Other (See Comments) and Hives     Sleepy and confused.  Shaky, disoriented       Latex Rash        Past Medical History:   Diagnosis Date     Acute diverticulitis 7/31/2013     History of seizures as a child 1981    One grand mal in 5th grade.  Didn't tolerate phenobarb.     Moderate single current episode of major depressive disorder (H)      Perforation of sigmoid colon (H) 8/3/2013     S/P left hemicolectomy 8/16/2013 8/02/13      Sepsis (H) 8/1/2013       Past Surgical History:   Procedure Laterality Date     APPENDECTOMY  04/2014     ARTHROSCOPY KNEE RT/LT  2004    RT      BIOPSY  2011    many 2011 and on     BLEPHAROPLASTY Bilateral 1/27/2020    Procedure: Both upper eyelid blepharoplasty and ptosis repair,;  Surgeon: Chelsie Knight MD;  Location: [facility] OR     COLONOSCOPY  02/2012    lt sided colitis     COLONOSCOPY  1/9/2014     COSMETIC BROWPEXY Left 1/27/2020    Procedure: left internal browpexy;  Surgeon: Chelsie Knight MD;  Location:  OR      CRYOTHERAPY, CERVICAL       EXPLORATORY LAPAROTOMY, PARTIAL LEFT ROLANDA COLLECTOMY WITH HARTMANS PROCEDURE, COLOSTOMY  2013    lt rolanda colectomy, bowel perforation, colostomy, Karen's pouche     GI SURGERY      abrupted  bowl     HC TOOTH EXTRACTION W/FORCEP  2003    Abscess Tooth / Hospitalized     HERNIA REPAIR  2014    found at time of takedown     SINUS SURGERY  03    LT sinus cyst removal      TAKEDOWN COLOSTOMY  2014       Family History   Problem Relation Age of Onset     Diabetes Mother      Allergies Mother      Asthma Mother      Arthritis Mother      Heart Disease Mother         heart murmur     Depression Mother      Obesity Mother      Basal cell carcinoma Mother      Skin Cancer Mother      Eye Disorder Father      Diabetes Father      Cerebrovascular Disease Father      Heart Disease Father      Hypertension Father      Diabetes Maternal Grandmother      Cerebrovascular Disease Maternal Grandfather      Unknown/Adopted Paternal Grandmother      Heart Disease Paternal Grandfather         left ventrical failure     Cerebrovascular Disease Paternal Grandfather      Gynecology Sister         endometriosis     Depression Sister      Asthma Daughter      Breast Cancer Maternal Aunt      Thyroid Disease Maternal Aunt      Breast Cancer Other         fathers sister     Anesthesia Reaction Other      Thyroid Disease Other      Osteoporosis Other      Asthma Daughter      Breast Cancer Other         mothers sister     Glaucoma No family hx of      Macular Degeneration No family hx of        Social History     Tobacco Use     Smoking status: Former Smoker     Packs/day: 1.00     Years: 15.00     Pack years: 15.00     Types: Cigarettes     Start date: 1985     Quit date: 1998     Years since quittin.3     Smokeless tobacco: Never Used     Tobacco comment: [employer] free household.   Substance Use Topics     Alcohol use: Yes     Alcohol/week: 0.0 standard drinks     Comment: occ         Current Outpatient Medications   Medication     ACCU-CHEK SHARMILA PLUS test strip     apixaban ANTICOAGULANT (ELIQUIS ANTICOAGULANT) 5 MG tablet     atenolol (TENORMIN) 50 MG tablet     BD SHAHNAZ U/F 32G X 4 MM insulin pen needle     BD ULTRA FINE PEN NEEDLES     blood glucose monitoring (ACCU-CHEK FASTCLIX) lancets     cholecalciferol 2000 UNITS tablet     diphenhydrAMINE (BENADRYL ALLERGY) 25 MG tablet     DRAMAMINE OR     empagliflozin (JARDIANCE) 25 MG TABS tablet     erythromycin (ROMYCIN) 5 MG/GM ophthalmic ointment     fluticasone (FLOVENT HFA) 110 MCG/ACT inhaler     glimepiride (AMARYL) 1 MG tablet     glimepiride (AMARYL) 4 MG tablet     insulin detemir (LEVEMIR FLEXTOUCH) 100 UNIT/ML pen     losartan (COZAAR) 25 MG tablet     order for DME     order for DME     order for DME     STATIN NOT PRESCRIBED, INTENTIONAL,     TYLENOL CAPS 500 MG OR     vedolizumab (ENTYVIO) 60 MG/ML injection     No current facility-administered medications for this visit.         Review of Systems   Constitutional, HEENT, cardiovascular, pulmonary, GI, , musculoskeletal, neuro, skin, endocrine and psych systems are negative, except as otherwise noted.       Objective          Vitals:  No vitals were obtained today due to virtual visit.    healthy, alert and no distress  PSYCH: Alert and oriented times 3; coherent speech, normal   rate and volume, able to articulate logical thoughts, able   to abstract reason, no tangential thoughts, no hallucinations   or delusions  Her affect is normal  RESP: No cough, no audible wheezing, able to talk in full sentences  Remainder of exam unable to be completed due to telephone visits    Labs and imaging reviewed and discussed with the patient.        Assessment/Plan:    Assessment and Plan  1. Exposure to COVID-19 virus  - Asymptomatic COVID-19 Virus (Coronavirus) by PCR; Future    2. Immunosuppressed status (H)  3. Ulcerative colitis without complications, unspecified location (H)  On  Immunomodulator therapy as managed by GI    4. Type 2 diabetes mellitus with hyperglycemia, with long-term current use of insulin (H)  Improving, last A1C in 9/2020 . Continue current medications as shown in medication list above.     Patient Instructions   Please do the COVID test ordered. Given the risk factors mentioned, you will need to protect yourself really well with face mask / PPE to prevent COVID.     ====================       Page 1 of 2  (more on back)  For informational purposes only. Not to replace the advice of your health care provider. Clinically reviewed by Infection Prevention and the [facility] COVID-19 Clinical Team. Copyright   2020 [employer]. All rights reserved. [employer] 131901 - Rev 05/20.  For Patients Who Have Been Tested for COVID-19 (Coronavirus)  You have been tested for COVID-19 (coronavirus). Results are usually available in 1 to 3 days. Our testing sites do not have access to your test results.    If your test result is positive, you'll get a phone call letting you know. (A positive test means that you have the virus.)   If your test result is negative, you'll get a letter in the mail. (A negative test means you do not have the virus.)    If you have not gotten your results in 7 days:   If you're being tested before surgery: Call your surgeon's office. You also can call [phone] ([phone]) and ask to speak with our COVID-19 results team.    If you're being treated at an infusion center: Call your infusion center directly.   All others: Call [phone] ([phone]) and ask to speak with our COVID-19 results team.    What should I do if I have COVID-19 symptoms?  Please stay home and away from others (self-isolate) until:   You've had no fever--and no medicine that reduces fever--for 3 full days (72 hours), AND   Other symptoms have gotten better. For example, your cough or breathing has improved, AND   At least 10 days have passed since  "your symptoms first started.    During this time:   Stay in your own room (and use your own bathroom), if you can.   Stay away from others in your home. No hugging, kissing or shaking hands.   Don't let anyone visit.   Don't go to work, school or anywhere else.    Clean \"high touch\" surfaces often (doorknobs, counters, handles, etc.). Use a household cleaning spray or wipes.   Cover your mouth and nose with a mask, tissue or washcloth to avoid spreading germs.   Wash your hands and face often. Use soap and water.    When should I call 911?  If you have any of these emergency warning signs for COVID-19, call for medical help right away:   Trouble breathing   Pain or pressure in the chest all the time   Blue color to your lips or face    These are not all the symptoms you can have with COVID-19. Call your family clinic for any other symptoms that are severe or concerning to you.  When you call 911, tell the  that you have -- or think you might have--COVID-19.       Return in about 3 months (around 2/16/2021), or if symptoms worsen or fail to improve, for Follow up of last visit with your PCP.    Marie Okeefe MD  [facility]    Phone call duration:  9 minutes                "

## 2020-11-16 NOTE — TELEPHONE ENCOUNTER
Triage Call:    Pt states she is at high risk for COVID.    that delivered items last Wednesday tested positive for COVID 19.   Pt states they drop the items at the doorstep, ring the doorbell and they step back.   Pt is unsure when they tested positive.     Pt states the company that delivered told her she needs to be tested due to the  having tested positive for COVID.      Pt does not meet close contact guidelines per protocol. Patient would like to discuss with a provider.     Pt was advised of protocol recommendation/disposition of home care but patient requested to speak to provider. Phone visit set up with a provider today to discuss as video visit was not an option.     Patient is requesting a note be sent to Dr. Bergman to notify her of the possible exposure. RN will route to Dr. Bergman as BAY.     Kimberlee Bañuelos RN 11/16/20 3:09 PM  [facility] Nurse Advisor    COVID 19 Nurse Triage Plan/Patient Instructions    Please be aware that novel coronavirus (COVID-19) may be circulating in the community. If you develop symptoms such as fever, cough, or SOB or if you have concerns about the presence of another infection including coronavirus (COVID-19), please contact your health care provider or visit [email or web address].     Disposition/Instructions    Home care recommended. Follow home care protocol based instructions.    Thank you for taking steps to prevent the spread of this virus.  o Limit your contact with others.  o Wear a simple mask to cover your cough.  o Wash your hands well and often.    Resources    [facility]: About COVID-19: [email or web address]    CDC: What to Do If You're Sick: www.cdc.gov/coronavirus/2019-ncov/about/steps-when-sick.html    CDC: Ending Home Isolation: www.cdc.gov/coronavirus/2019-ncov/hcp/disposition-in-home-patients.html     CDC: Caring for Someone: www.cdc.gov/coronavirus/2019-ncov/if-you-are-sick/care-for-someone.html     ALFA:  Interim Guidance for Hospital Discharge to Home: www.health.[facility].[place]/diseases/coronavirus/hcp/hospdischarge.pdf    [facility] clinical trials (COVID-19 research studies): clinicalaffairs.[facility]/umn-clinical-trials     Below are the COVID-19 hotlines at the [place] Department of Health ([facility]). Interpreters are available.   o For health questions: Call 337-373-4965 or 1-440.771.9505 (7 a.m. to 7 p.m.)  o For questions about schools and childcare: Call 281-245-1924 or [phone] (7 a.m. to 7 p.m.)       Reason for Disposition    [1] Caller concerned that exposure to COVID-19 occurred BUT [2] does not meet COVID-19 EXPOSURE criteria from CDC    Additional Information    Negative: COVID-19 has been diagnosed by a healthcare provider (HCP)    Negative: COVID-19 lab test positive    Negative: [1] Symptoms of COVID-19 (e.g., cough, fever, SOB, or others) AND [2] lives in an area with community spread    Negative: [1] Symptoms of COVID-19 (e.g., cough, fever, SOB, or others) AND [2] within 14 days of EXPOSURE (close contact) with diagnosed or suspected COVID-19 patient    Negative: [1] Symptoms of COVID-19 (e.g., cough, fever, SOB, or others) AND [2] within 14 days of travel from high-risk area for COVID-19 community spread (identified by CDC)    Negative: [1] Difficulty breathing (shortness of breath) occurs AND [2] onset > 14 days after COVID-19 EXPOSURE (Close Contact) AND [3] no community spread where patient lives    Negative: [1] Dry cough occurs AND [2] onset > 14 days after COVID-19 EXPOSURE AND [3] no community spread where patient lives    Negative: [1] Wet cough (i.e., white-yellow, yellow, green, or paul colored sputum) AND [2] onset > 14 days after COVID-19 EXPOSURE AND [3] no community spread where patient lives    Negative: [1] Common cold symptoms AND [2] onset > 14 days after COVID-19 EXPOSURE AND [3] no community spread where patient lives    Negative: [1] COVID-19 EXPOSURE (Close  Contact) within last 14 days AND [2] needs COVID-19 lab test to return to work AND [3] NO symptoms    Negative: [1] COVID-19 EXPOSURE (Close Contact) within last 14 days AND [2] exposed person is a healthcare worker who was NOT using all recommended personal protective equipment (i.e., a respirator-N95 mask, eye protection, gloves, and gown) AND [3] NO symptoms    Negative: [1] No COVID-19 EXPOSURE BUT [2] living with someone who was exposed and who has no symptoms of COVID-19    Negative: [1] Living in area with community spread (identified by local PHD) BUT [2] NO symptoms    Negative: [1] Travel from area with community spread (identified by [facility]) AND [2] within last 14 days BUT [3] NO symptoms    Negative: [1] COVID-19 EXPOSURE AND [2] 15 or more days ago AND [3] NO symptoms    Negative: [1] COVID-19 EXPOSURE (Close Contact) AND [2] within last 14 days BUT [3] NO symptoms    Protocols used: CORONAVIRUS (COVID-19) [facility]URE[facility] 8.4.20

## 2020-11-16 NOTE — PATIENT INSTRUCTIONS
Please do the COVID test ordered. Given the risk factors mentioned, you will need to protect yourself really well with face mask / PPE to prevent COVID.     ====================       Page 1 of 2  (more on back)  For informational purposes only. Not to replace the advice of your health care provider. Clinically reviewed by Infection Prevention and the [facility] COVID-19 Clinical Team. Copyright   2020 [facility]. All rights reserved. [employer] 444227 - Rev 05/20.  For Patients Who Have Been Tested for COVID-19 (Coronavirus)  You have been tested for COVID-19 (coronavirus). Results are usually available in 1 to 3 days. Our testing sites do not have access to your test results.    If your test result is positive, you'll get a phone call letting you know. (A positive test means that you have the virus.)   If your test result is negative, you'll get a letter in the mail. (A negative test means you do not have the virus.)    If you have not gotten your results in 7 days:   If you're being tested before surgery: Call your surgeon's office. You also can call [phone] ([phone]) and ask to speak with our COVID-19 results team.    If you're being treated at an infusion center: Call your infusion center directly.   All others: Call [phone] ([phone]) and ask to speak with our COVID-19 results team.    What should I do if I have COVID-19 symptoms?  Please stay home and away from others (self-isolate) until:   You've had no fever--and no medicine that reduces fever--for 3 full days (72 hours), AND   Other symptoms have gotten better. For example, your cough or breathing has improved, AND   At least 10 days have passed since your symptoms first started.    During this time:   Stay in your own room (and use your own bathroom), if you can.   Stay away from others in your home. No hugging, kissing or shaking hands.   Don't let anyone visit.   Don't go to work, school or anywhere else.  "   Clean \"high touch\" surfaces often (doorknobs, counters, handles, etc.). Use a household cleaning spray or wipes.   Cover your mouth and nose with a mask, tissue or washcloth to avoid spreading germs.   Wash your hands and face often. Use soap and water.    When should I call 911?  If you have any of these emergency warning signs for COVID-19, call for medical help right away:   Trouble breathing   Pain or pressure in the chest all the time   Blue color to your lips or face    These are not all the symptoms you can have with COVID-19. Call your family clinic for any other symptoms that are severe or concerning to you.  When you call 911, tell the  that you have -- or think you might have--COVID-19.     "

## 2020-11-17 ENCOUNTER — OFFICE VISIT (OUTPATIENT)
Dept: LAB | Facility: CLINIC | Age: 50
End: 2020-11-17
Attending: INTERNAL MEDICINE
Payer: COMMERCIAL

## 2020-11-17 DIAGNOSIS — Z20.822 EXPOSURE TO COVID-19 VIRUS: ICD-10-CM

## 2020-11-17 DIAGNOSIS — E11.65 TYPE 2 DIABETES MELLITUS WITH HYPERGLYCEMIA, WITH LONG-TERM CURRENT USE OF INSULIN (H): Chronic | ICD-10-CM

## 2020-11-17 DIAGNOSIS — Z79.4 TYPE 2 DIABETES MELLITUS WITH HYPERGLYCEMIA, WITH LONG-TERM CURRENT USE OF INSULIN (H): Chronic | ICD-10-CM

## 2020-11-17 PROCEDURE — U0003 INFECTIOUS AGENT DETECTION BY NUCLEIC ACID (DNA OR RNA); SEVERE ACUTE RESPIRATORY SYNDROME CORONAVIRUS 2 (SARS-COV-2) (CORONAVIRUS DISEASE [COVID-19]), AMPLIFIED PROBE TECHNIQUE, MAKING USE OF HIGH THROUGHPUT TECHNOLOGIES AS DESCRIBED BY CMS-2020-01-R: HCPCS | Performed by: INTERNAL MEDICINE

## 2020-11-17 RX ORDER — BLOOD SUGAR DIAGNOSTIC
STRIP MISCELLANEOUS
Qty: 300 STRIP | Refills: 2 | Status: CANCELLED | OUTPATIENT
Start: 2020-11-17

## 2020-11-17 NOTE — TELEPHONE ENCOUNTER
Accucheck guide test strips. Alternative request per insurance.       Ramona ESTRELLA CMA ([facility])

## 2020-11-18 LAB
SARS-COV-2 RNA SPEC QL NAA+PROBE: NOT DETECTED
SPECIMEN SOURCE: NORMAL

## 2020-11-18 NOTE — TELEPHONE ENCOUNTER
Pt notified. States she just had the covid test done yesterday and is awaiting results. She appreciated the phone call.    Indu Mcbride RN  [facility]

## 2020-11-18 NOTE — TELEPHONE ENCOUNTER
I have entered an order for her to be tested.  The [facility] website has a link for a free saliva mail in kit if she prefers.

## 2020-11-19 ENCOUNTER — OFFICE VISIT (OUTPATIENT)
Dept: DERMATOLOGY | Facility: CLINIC | Age: 50
End: 2020-11-19
Attending: INTERNAL MEDICINE
Payer: COMMERCIAL

## 2020-11-19 DIAGNOSIS — D18.01 CHERRY ANGIOMA: ICD-10-CM

## 2020-11-19 DIAGNOSIS — L82.1 SEBORRHEIC KERATOSIS: ICD-10-CM

## 2020-11-19 DIAGNOSIS — L57.8 SUN-DAMAGED SKIN: ICD-10-CM

## 2020-11-19 DIAGNOSIS — L57.0 ACTINIC KERATOSIS: ICD-10-CM

## 2020-11-19 DIAGNOSIS — D22.9 MULTIPLE BENIGN NEVI: ICD-10-CM

## 2020-11-19 DIAGNOSIS — L81.4 SOLAR LENTIGO: ICD-10-CM

## 2020-11-19 DIAGNOSIS — D84.9 IMMUNOSUPPRESSION (H): Primary | ICD-10-CM

## 2020-11-19 PROCEDURE — 99203 OFFICE O/P NEW LOW 30 MIN: CPT | Mod: 25 | Performed by: DERMATOLOGY

## 2020-11-19 PROCEDURE — 17000 DESTRUCT PREMALG LESION: CPT | Performed by: DERMATOLOGY

## 2020-11-19 ASSESSMENT — PAIN SCALES - GENERAL: PAINLEVEL: NO PAIN (0)

## 2020-11-19 NOTE — PROGRESS NOTES
[facility] Dermatology Note    Dermatology Problem List:  1. Relevant medical history: Ulcerative colitis on entyvio currently, humira previously  2. AK: cryo    Encounter Date: Nov 19, 2020    CC:  Chief Complaint   Patient presents with     Skin Check     FBSE, no personal hx of NMSC, mother hx of BCC, areas of concern: spots on shoulder, immunosuppressed-on medication for UC       History of Present Illness:  Ms. Zaira Villatoro is a 50 year old female with ulcerative colitis who presents for skin check.    She is new to our department. She has not seen a dermatologist prior. She has no history of skin cancer, atypical moles, or precancerous lesions to her knowledge.    Today, she notes concerns about spots no her back, upper arms that her PCP was concerned about. These spots are rough in texture and often peel off a layer of skin. She also notes a rough spot on her nose that keeps peeling. No other symptoms.    She does have ulcerative colitis. She is currently on entyvio. She has been on humira in the past, no other treatments.    No other concerns addressed today.    Past Medical History:   Patient Active Problem List   Diagnosis     Migraine     Ulcerative colitis (H)     Fatty liver     CARDIOVASCULAR SCREENING; LDL GOAL LESS THAN 100     Essential hypertension with goal blood pressure less than 140/90     History of seizures as a child     Type 2 diabetes mellitus with hyperglycemia, with long-term current use of insulin (H)     Morbid obesity due to excess calories (H)     Incisional hernia, without obstruction or gangrene     Paroxysmal atrial fibrillation (H)     Low back pain     MAHAD (obstructive sleep apnea)- severe (AHI 80)     Immunosuppressed status (H)     Plantar fasciitis     Pelvic pain     Dermatochalasis of both upper eyelids     Brow ptosis, bilateral     Involutional ptosis, acquired, bilateral     Lichen sclerosus et atrophicus of the vulva     Past Medical History:    Diagnosis Date     Acute diverticulitis 7/31/2013     History of seizures as a child 1981    One grand mal in 5th grade.  Didn't tolerate phenobarb.     Moderate single current episode of major depressive disorder (H)      Perforation of sigmoid colon (H) 8/3/2013     S/P left hemicolectomy 8/16/2013 8/02/13      Sepsis (H) 8/1/2013     Past Surgical History:   Procedure Laterality Date     APPENDECTOMY  04/2014     ARTHROSCOPY KNEE RT/LT  2004    RT      BIOPSY  2011    many 2011 and on     BLEPHAROPLASTY Bilateral 1/27/2020    Procedure: Both upper eyelid blepharoplasty and ptosis repair,;  Surgeon: Chelsie Knight MD;  Location: [facility] OR     COLONOSCOPY  02/2012    lt sided colitis     COLONOSCOPY  1/9/2014     COSMETIC BROWPEXY Left 1/27/2020    Procedure: left internal browpexy;  Surgeon: Chelsie Knight MD;  Location: [facility] OR     CRYOTHERAPY, CERVICAL  1988     EXPLORATORY LAPAROTOMY, PARTIAL LEFT ROLANDA COLLECTOMY WITH HARTMANS PROCEDURE, COLOSTOMY  8/2013    lt rolanda colectomy, bowel perforation, colostomy, Karen's pouche     GI SURGERY  2013    abrupted  bowl     HC TOOTH EXTRACTION W/FORCEP  6/2003    Abscess Tooth / Hospitalized     HERNIA REPAIR  04/2014    found at time of takedown     SINUS SURGERY  2/11/03    LT sinus cyst removal      TAKEDOWN COLOSTOMY  04/2014       Social History:  Patient reports that she quit smoking about 22 years ago. Her smoking use included cigarettes. She started smoking about 35 years ago. She has a 15.00 pack-year smoking history. She has never used smokeless tobacco. She reports current alcohol use. She reports that she does not use drugs.    Family History:  Mom had BCC.    Medications:  Current Outpatient Medications   Medication Sig Dispense Refill     ACCU-CHEK SHARMILA PLUS test strip TEST 4 TIMES DAILY AND AS NEEDED 500 strip 2     apixaban ANTICOAGULANT (ELIQUIS ANTICOAGULANT) 5 MG tablet Take 1 tablet (5 mg) by mouth 2 times daily 60 tablet 11     atenolol  (TENORMIN) 50 MG tablet Take 1 tablet (50 mg) by mouth 2 times daily 180 tablet 3     BD SHAHNAZ U/F 32G X 4 MM insulin pen needle INJECT 1 DEVICE SUBCUTANEOUS DAILY 3 MM NEEDLES FOR LEVEMIR  each 11     BD ULTRA FINE PEN NEEDLES Inject 1 Device Subcutaneous daily 3 mm needles for Levemir pen 3 Box 3     blood glucose (NO BRAND SPECIFIED) test strip Use to test blood sugar 3 times daily or as directed. 300 each 1     blood glucose monitoring (ACCU-CHEK FASTCLIX) lancets 1 each 4 times daily Use to test blood sugar 4 times daily or as directed. 300 each 11     cholecalciferol 2000 UNITS tablet Take 2 tablets by mouth 2 times daily  30 tablet      diphenhydrAMINE (BENADRYL ALLERGY) 25 MG tablet as needed Reported on 4/12/2017       DRAMAMINE OR as needed       empagliflozin (JARDIANCE) 25 MG TABS tablet Take 1 tablet (25 mg) by mouth daily 90 tablet 4     erythromycin (ROMYCIN) 5 MG/GM ophthalmic ointment Apply small amount to incision site three times a day 3.5 g 0     fluticasone (FLOVENT HFA) 110 MCG/ACT inhaler        glimepiride (AMARYL) 1 MG tablet Take 1 tablet (1 mg) by mouth every morning (before breakfast) As an extra dose if the blood sugar is elevated after infusion. 30 tablet 3     glimepiride (AMARYL) 4 MG tablet Take 1 and 1/2 tablets (6mg) with breakfast. 135 tablet 3     insulin detemir (LEVEMIR FLEXTOUCH) 100 UNIT/ML pen INJECT 32 UNITS SUBCUTANEOUSLY twice daily 45 mL 4     losartan (COZAAR) 25 MG tablet TAKE 1/2 TABLET BY MOUTH DAILY 45 tablet 4     order for DME Equipment being ordered: CPAP 9 c, 1 Units 0     order for DME Glucometer, brand as covered by insurance. 1 each 0     order for DME Test strips for pt's glucometer, brand as covered by insurance. Test four times daily and prn. 400 each 4     STATIN NOT PRESCRIBED, INTENTIONAL, 1 each continuous prn Statin not prescribed intentionally due to Refusal by patient and Other:LDL is below 100. 0 each 0     TYLENOL CAPS 500 MG OR 1 CAPSULE  EVERY 4 HOURS AS NEEDED       vedolizumab (ENTYVIO) 60 MG/ML injection Every six weeks         Allergies   Allergen Reactions     Demerol      Very low blood pressure     Fish      Pt reports allergy to all fish     Meperidine Other (See Comments)     Other reaction(s): Hypotension  Drops blood pressure       Metformin GI Disturbance and Diarrhea     GI Disturbance       Naproxen      No Clinical Screening - See Comments      Pt reports allergy to all fish     Nubain  [Nalbuphine]      Seafood Swelling     Throat swells     Topiramate Other (See Comments) and Hives     Sleepy and confused.  Shaky, disoriented       Latex Rash       Review of Systems:  -Constitutional: Patient is otherwise feeling well, in usual state of health.   -Skin: As above in HPI. No additional skin concerns.    Physical exam:  Vitals: There were no vitals taken for this visit.  GEN: This is a well developed, well-nourished female in no acute distress, in a pleasant mood.    SKIN: Total skin excluding the undergarment areas was performed. The exam included the head/face, neck, both arms, chest, back, abdomen, both legs, digits and/or nails. Buttocks also examined.  - Mcintyre Type I-II  - Scattered brown macules on sun exposed areas.  - There are dome shaped bright red papules on the trunk.   - Multiple regular brown pigmented macules and papules are identified on the trunk and extremities. About 30 nevi in all. None are atypical.  - There are waxy stuck on tan to brown papules on the trunk, extremities.  - There is an erythematous macule with overyling adherent scale on the nasal supratip.  - No other lesions of concern on areas examined.     Impression/Plan:    1. Ulcerative colitis on current immunosuppression with entyvio: Discussed possible increased risk of skin cancer due to diagnosis of UC and immunosuppressants used to treat UC. Recommended yearly skin checks.    2. Sun damaged skin with solar lentigines  - Recommend sunscreens SPF  #30 or greater, protective clothing and avoidance of tanning beds.    3. Benign findings including: seborrheic keratoses, cherry angioma  - No further intervention required. Patient to report changes.   - Patient reassured of the benign nature of these lesions.    4. Multiple clinically benign nevi  - No further intervention required. Patient to report changes.   - Patient reassured of the benign nature of these lesions.    5. Actinic keratosis, nasal supratip  - Cryotherapy procedure note: After verbal consent and discussion of risks and benefits including but no limited to dyspigmentation/scar, blister, and pain, 1 was(were) treated with 1-2mm freeze border for 2 cycles with liquid nitrogen. Post cryotherapy instructions were provided.    CC Tayler Bergman MD  [address] [facility] [facility]  [place] [address] on close of this encounter.    Follow-up in 1 year for skin check, earlier for new or changing lesions.     Staff Involved:  Staff and scribe     Scribe Disclosure  I, Jennifer Bolton LPN, am serving as a scribe to document services personally performed by Dr. Lynda Combs MD, based on data collection and the provider's statements to me.     Provider Disclosure:   The documentation recorded by the scribe accurately reflects the services I personally performed and the decisions made by me.    Lynda Combs MD    Department of Dermatology  [facility]: Phone: [phone], Fax:[phone]  [facility]: Phone: [phone], Fax: [phone]

## 2020-11-19 NOTE — PATIENT INSTRUCTIONS
Cryotherapy    What is it?    Use of a very cold liquid, such as liquid nitrogen, to freeze and destroy abnormal skin cells that need to be removed    What should I expect?    Tenderness and redness    A small blister that might grow and fill with dark purple blood. There may be crusting.    More than one treatment may be needed if the lesions do not go away.    How do I care for the treated area?    Gently wash the area with your hands when bathing.    Use a thin layer of Vaseline to help with healing. You may use a Band-Aid.     The area should heal within 7-10 days and may leave behind a pink or lighter color.     Do not use an antibiotic or Neosporin ointment.     You may take acetaminophen (Tylenol) for pain.     Call your Doctor if you have:    Severe pain    Signs of infection (warmth, redness, cloudy yellow drainage, and or a bad smell)    Questions or concerns    Who should I call with questions?       [facility]: [phone]       [facility]: [phone]       For urgent needs outside of business hours call the [facility] [[email or web address]] at [phone] and ask for the dermatology resident on call

## 2020-11-19 NOTE — LETTER
11/19/2020         RE: Zaira Villatoro  [address] [facility] [address]        Dear Colleague,    Thank you for referring your patient, Zaira Villatoro, to the [facility]. Please see a copy of my visit note below.    [facility] Dermatology Note    Dermatology Problem List:  1. Relevant medical history: Ulcerative colitis on entyvio currently, humira previously  2. AK: cryo    Encounter Date: Nov 19, 2020    CC:  Chief Complaint   Patient presents with     Skin Check     FBSE, no personal hx of NMSC, mother hx of BCC, areas of concern: spots on shoulder, immunosuppressed-on medication for UC       History of Present Illness:  Ms. Zaira Villatoro is a 50 year old female with ulcerative colitis who presents for skin check.    She is new to our department. She has not seen a dermatologist prior. She has no history of skin cancer, atypical moles, or precancerous lesions to her knowledge.    Today, she notes concerns about spots no her back, upper arms that her PCP was concerned about. These spots are rough in texture and often peel off a layer of skin. She also notes a rough spot on her nose that keeps peeling. No other symptoms.    She does have ulcerative colitis. She is currently on entyvio. She has been on humira in the past, no other treatments.    No other concerns addressed today.    Past Medical History:   Patient Active Problem List   Diagnosis     Migraine     Ulcerative colitis (H)     Fatty liver     CARDIOVASCULAR SCREENING; LDL GOAL LESS THAN 100     Essential hypertension with goal blood pressure less than 140/90     History of seizures as a child     Type 2 diabetes mellitus with hyperglycemia, with long-term current use of insulin (H)     Morbid obesity due to excess calories (H)     Incisional hernia, without obstruction or gangrene     Paroxysmal atrial fibrillation (H)     Low back pain     MAHAD (obstructive sleep apnea)- severe (AHI  80)     Immunosuppressed status (H)     Plantar fasciitis     Pelvic pain     Dermatochalasis of both upper eyelids     Brow ptosis, bilateral     Involutional ptosis, acquired, bilateral     Lichen sclerosus et atrophicus of the vulva     Past Medical History:   Diagnosis Date     Acute diverticulitis 7/31/2013     History of seizures as a child 1981    One grand mal in 5th grade.  Didn't tolerate phenobarb.     Moderate single current episode of major depressive disorder (H)      Perforation of sigmoid colon (H) 8/3/2013     S/P left hemicolectomy 8/16/2013 8/02/13      Sepsis (H) 8/1/2013     Past Surgical History:   Procedure Laterality Date     APPENDECTOMY  04/2014     ARTHROSCOPY KNEE RT/LT  2004    RT      BIOPSY  2011    many 2011 and on     BLEPHAROPLASTY Bilateral 1/27/2020    Procedure: Both upper eyelid blepharoplasty and ptosis repair,;  Surgeon: Chelsie Knight MD;  Location: [facility] OR     COLONOSCOPY  02/2012    lt sided colitis     COLONOSCOPY  1/9/2014     COSMETIC BROWPEXY Left 1/27/2020    Procedure: left internal browpexy;  Surgeon: Chelsie Knight MD;  Location: [facility] OR     CRYOTHERAPY, CERVICAL  1988     EXPLORATORY LAPAROTOMY, PARTIAL LEFT ROLANDA COLLECTOMY WITH HARTMANS PROCEDURE, COLOSTOMY  8/2013    lt rolanda colectomy, bowel perforation, colostomy, Karen's pouche     GI SURGERY  2013    abrupted  bowl     HC TOOTH EXTRACTION W/FORCEP  6/2003    Abscess Tooth / Hospitalized     HERNIA REPAIR  04/2014    found at time of takedown     SINUS SURGERY  2/11/03    LT sinus cyst removal      TAKEDOWN COLOSTOMY  04/2014       Social History:  Patient reports that she quit smoking about 22 years ago. Her smoking use included cigarettes. She started smoking about 35 years ago. She has a 15.00 pack-year smoking history. She has never used smokeless tobacco. She reports current alcohol use. She reports that she does not use drugs.    Family History:  Mom had BCC.    Medications:  Current Outpatient  Medications   Medication Sig Dispense Refill     ACCU-CHEK SHARMILA PLUS test strip TEST 4 TIMES DAILY AND AS NEEDED 500 strip 2     apixaban ANTICOAGULANT (ELIQUIS ANTICOAGULANT) 5 MG tablet Take 1 tablet (5 mg) by mouth 2 times daily 60 tablet 11     atenolol (TENORMIN) 50 MG tablet Take 1 tablet (50 mg) by mouth 2 times daily 180 tablet 3     BD SHAHNAZ U/F 32G X 4 MM insulin pen needle INJECT 1 DEVICE SUBCUTANEOUS DAILY 3 MM NEEDLES FOR LEVEMIR  each 11     BD ULTRA FINE PEN NEEDLES Inject 1 Device Subcutaneous daily 3 mm needles for Levemir pen 3 Box 3     blood glucose (NO BRAND SPECIFIED) test strip Use to test blood sugar 3 times daily or as directed. 300 each 1     blood glucose monitoring (ACCU-CHEK FASTCLIX) lancets 1 each 4 times daily Use to test blood sugar 4 times daily or as directed. 300 each 11     cholecalciferol 2000 UNITS tablet Take 2 tablets by mouth 2 times daily  30 tablet      diphenhydrAMINE (BENADRYL ALLERGY) 25 MG tablet as needed Reported on 4/12/2017       DRAMAMINE OR as needed       empagliflozin (JARDIANCE) 25 MG TABS tablet Take 1 tablet (25 mg) by mouth daily 90 tablet 4     erythromycin (ROMYCIN) 5 MG/GM ophthalmic ointment Apply small amount to incision site three times a day 3.5 g 0     fluticasone (FLOVENT HFA) 110 MCG/ACT inhaler        glimepiride (AMARYL) 1 MG tablet Take 1 tablet (1 mg) by mouth every morning (before breakfast) As an extra dose if the blood sugar is elevated after infusion. 30 tablet 3     glimepiride (AMARYL) 4 MG tablet Take 1 and 1/2 tablets (6mg) with breakfast. 135 tablet 3     insulin detemir (LEVEMIR FLEXTOUCH) 100 UNIT/ML pen INJECT 32 UNITS SUBCUTANEOUSLY twice daily 45 mL 4     losartan (COZAAR) 25 MG tablet TAKE 1/2 TABLET BY MOUTH DAILY 45 tablet 4     order for DME Equipment being ordered: CPAP 9 c, 1 Units 0     order for DME Glucometer, brand as covered by insurance. 1 each 0     order for DME Test strips for pt's glucometer, brand as  covered by insurance. Test four times daily and prn. 400 each 4     STATIN NOT PRESCRIBED, INTENTIONAL, 1 each continuous prn Statin not prescribed intentionally due to Refusal by patient and Other:LDL is below 100. 0 each 0     TYLENOL CAPS 500 MG OR 1 CAPSULE EVERY 4 HOURS AS NEEDED       vedolizumab (ENTYVIO) 60 MG/ML injection Every six weeks         Allergies   Allergen Reactions     Demerol      Very low blood pressure     Fish      Pt reports allergy to all fish     Meperidine Other (See Comments)     Other reaction(s): Hypotension  Drops blood pressure       Metformin GI Disturbance and Diarrhea     GI Disturbance       Naproxen      No Clinical Screening - See Comments      Pt reports allergy to all fish     Nubain  [Nalbuphine]      Seafood Swelling     Throat swells     Topiramate Other (See Comments) and Hives     Sleepy and confused.  Shaky, disoriented       Latex Rash       Review of Systems:  -Constitutional: Patient is otherwise feeling well, in usual state of health.   -Skin: As above in HPI. No additional skin concerns.    Physical exam:  Vitals: There were no vitals taken for this visit.  GEN: This is a well developed, well-nourished female in no acute distress, in a pleasant mood.    SKIN: Total skin excluding the undergarment areas was performed. The exam included the head/face, neck, both arms, chest, back, abdomen, both legs, digits and/or nails. Buttocks also examined.  - Mcintyre Type I-II  - Scattered brown macules on sun exposed areas.  - There are dome shaped bright red papules on the trunk.   - Multiple regular brown pigmented macules and papules are identified on the trunk and extremities. About 30 nevi in all. None are atypical.  - There are waxy stuck on tan to brown papules on the trunk, extremities.  - There is an erythematous macule with overyling adherent scale on the nasal supratip.  - No other lesions of concern on areas examined.     Impression/Plan:    1. Ulcerative  colitis on current immunosuppression with entyvio: Discussed possible increased risk of skin cancer due to diagnosis of UC and immunosuppressants used to treat UC. Recommended yearly skin checks.    2. Sun damaged skin with solar lentigines  - Recommend sunscreens SPF #30 or greater, protective clothing and avoidance of tanning beds.    3. Benign findings including: seborrheic keratoses, cherry angioma  - No further intervention required. Patient to report changes.   - Patient reassured of the benign nature of these lesions.    4. Multiple clinically benign nevi  - No further intervention required. Patient to report changes.   - Patient reassured of the benign nature of these lesions.    5. Actinic keratosis, nasal supratip  - Cryotherapy procedure note: After verbal consent and discussion of risks and benefits including but no limited to dyspigmentation/scar, blister, and pain, 1 was(were) treated with 1-2mm freeze border for 2 cycles with liquid nitrogen. Post cryotherapy instructions were provided.    CC Tayler Bergman MD  [address] [place] [address] on close of this encounter.    Follow-up in 1 year for skin check, earlier for new or changing lesions.     Staff Involved:  Staff and scribe     Scribe Disclosure  I, Jennifer Bolton LPN, am serving as a scribe to document services personally performed by Dr. Lynda Combs MD, based on data collection and the provider's statements to me.     Provider Disclosure:   The documentation recorded by the scribe accurately reflects the services I personally performed and the decisions made by me.    Lynda Combs MD    Department of Dermatology  [facility]: Phone: [phone], Fax:[phone]  [facility]: Phone: [phone], Fax: [phone]            Again, thank you for allowing me to participate in the care of your  patient.        Sincerely,        Lynda Combs MD

## 2020-11-19 NOTE — NURSING NOTE
Zaira Villatoro's goals for this visit include:   Chief Complaint   Patient presents with     Skin Check     FBSE, no personal hx of NMSC, mother hx of BCC, areas of concern: spots on shoulder, immunosuppressed-on medication for UC     She requests these members of her care team be copied on today's visit information: Yes    PCP: Tayler Bergman    Referring Provider:  Tayler Bergman MD  [address]  [place] [address]    There were no vitals taken for this visit.    Do you need any medication refills at today's visit? No    Jennifer Bolton LPN

## 2020-12-03 ENCOUNTER — TRANSFERRED RECORDS (OUTPATIENT)
Dept: HEALTH INFORMATION MANAGEMENT | Facility: CLINIC | Age: 50
End: 2020-12-03

## 2020-12-21 ENCOUNTER — VIRTUAL VISIT (OUTPATIENT)
Dept: CARDIOLOGY | Facility: CLINIC | Age: 50
End: 2020-12-21
Payer: COMMERCIAL

## 2020-12-21 DIAGNOSIS — I48.0 PAROXYSMAL ATRIAL FIBRILLATION (H): Primary | ICD-10-CM

## 2020-12-21 PROCEDURE — 99213 OFFICE O/P EST LOW 20 MIN: CPT | Mod: 95 | Performed by: INTERNAL MEDICINE

## 2020-12-21 NOTE — PROGRESS NOTES
"Zaira Villatoro is a 50 year old female who is being evaluated via a billable telephone visit.      The patient has been notified of following:     \"This telephone visit will be conducted via a call between you and your physician/provider. We have found that certain health care needs can be provided without the need for a physical exam.  This service lets us provide the care you need with a short phone conversation.  If a prescription is necessary we can send it directly to your pharmacy.  If lab work is needed we can place an order for that and you can then stop by our lab to have the test done at a later time.    Telephone visits are billed at different rates depending on your insurance coverage. During this emergency period, for some insurers they may be billed the same as an in-person visit.  Please reach out to your insurance provider with any questions.    If during the course of the call the physician/provider feels a telephone visit is not appropriate, you will not be charged for this service.\"    Patient has given verbal consent for Telephone visit?  Yes    What phone number would you like to be contacted at? [phone]    How would you like to [facility]    Phone call duration: 8 minutes    Electrophysiology Clinic Telephone Visit    Zaira Villatoro is a 50 year old female who is being evaluated via a billable telephone visit.      The patient has been notified of following:     \"This telephone visit will be conducted via a call between you and your physician/provider. We have found that certain health care needs can be provided without the need for a physical exam.  This service lets us provide the care you need with a short phone conversation.  If a prescription is necessary we can send it directly to your pharmacy.  If lab work is needed we can place an order for that and you can then stop by our lab to have the test done at a later time.    If during the course of the call the " "physician/provider feels a telephone visit is not appropriate, you will not be charged for this service.\"   Patient has given verbal consent for Telephone visit?  Yes    HPI: Purpose of visit: Follow-up of paroxysmal atrial fibrillation      Zaira Villatoro is a 50 year old female who presents for follow up of AF.  The patient has a past medical history significant for PAF, HTN, DM, Ulcerative colitis s/p sigmoid colon perforation repair and colostomy(2013).  She had had one documented episode of postop AF in 2013 until reoccurrence on 8/25/2019 while moving her daughter into college.  14 day ZIO 9/2019 shows 1% AF burden (longest episode nearly 3 hours with average rate 127 bpm), multiple episodes nonsustained SVT (likely AF), no symptoms reported.    Patient's last visit was with Alyx Lord in December 2019.  In the last 1 year, patient has not experienced any symptoms related to atrial fibrillation.  She denies any symptoms of irregular heartbeat sensation, prolonged palpitations, exertional dyspnea, exertional angina, frequent lightheadedness, presyncope or syncope.    She is currently taking apixaban 5 mg twice daily for stroke prophylaxis.  She is also taking atenolol 50 mg twice daily.    PAST MEDICAL HISTORY:  Past Medical History:   Diagnosis Date     Acute diverticulitis 7/31/2013     History of seizures as a child 1981    One grand mal in 5th grade.  Didn't tolerate phenobarb.     Moderate single current episode of major depressive disorder (H)      Perforation of sigmoid colon (H) 8/3/2013     S/P left hemicolectomy 8/16/2013 8/02/13      Sepsis (H) 8/1/2013       CURRENT MEDICATIONS:  Current Outpatient Medications   Medication Sig Dispense Refill     apixaban ANTICOAGULANT (ELIQUIS ANTICOAGULANT) 5 MG tablet Take 1 tablet (5 mg) by mouth 2 times daily 60 tablet 11     atenolol (TENORMIN) 50 MG tablet Take 1 tablet (50 mg) by mouth 2 times daily 180 tablet 3     cholecalciferol 2000 UNITS " tablet Take 2 tablets by mouth 2 times daily  30 tablet      diphenhydrAMINE (BENADRYL ALLERGY) 25 MG tablet as needed Reported on 4/12/2017       DRAMAMINE OR as needed       empagliflozin (JARDIANCE) 25 MG TABS tablet Take 1 tablet (25 mg) by mouth daily 90 tablet 4     fluticasone (FLOVENT HFA) 110 MCG/ACT inhaler        glimepiride (AMARYL) 1 MG tablet Take 1 tablet (1 mg) by mouth every morning (before breakfast) As an extra dose if the blood sugar is elevated after infusion. 30 tablet 3     glimepiride (AMARYL) 4 MG tablet Take 1 and 1/2 tablets (6mg) with breakfast. 135 tablet 3     insulin detemir (LEVEMIR FLEXTOUCH) 100 UNIT/ML pen INJECT 32 UNITS SUBCUTANEOUSLY twice daily 45 mL 4     losartan (COZAAR) 25 MG tablet TAKE 1/2 TABLET BY MOUTH DAILY 45 tablet 4     TYLENOL CAPS 500 MG OR 1 CAPSULE EVERY 4 HOURS AS NEEDED       vedolizumab (ENTYVIO) 60 MG/ML injection Every six weeks       ACCU-CHEK SHARMILA PLUS test strip TEST 4 TIMES DAILY AND AS NEEDED 500 strip 2     BD SHAHNAZ U/F 32G X 4 MM insulin pen needle INJECT 1 DEVICE SUBCUTANEOUS DAILY 3 MM NEEDLES FOR LEVEMIR  each 11     BD ULTRA FINE PEN NEEDLES Inject 1 Device Subcutaneous daily 3 mm needles for Levemir pen 3 Box 3     blood glucose (NO BRAND SPECIFIED) test strip Use to test blood sugar 3 times daily or as directed. 300 each 1     blood glucose monitoring (ACCU-CHEK FASTCLIX) lancets 1 each 4 times daily Use to test blood sugar 4 times daily or as directed. 300 each 11     erythromycin (ROMYCIN) 5 MG/GM ophthalmic ointment Apply small amount to incision site three times a day 3.5 g 0     order for DME Equipment being ordered: CPAP 9 c, 1 Units 0     order for DME Glucometer, brand as covered by insurance. 1 each 0     order for DME Test strips for pt's glucometer, brand as covered by insurance. Test four times daily and prn. 400 each 4     STATIN NOT PRESCRIBED, INTENTIONAL, 1 each continuous prn Statin not prescribed intentionally due to  Refusal by patient and Other:LDL is below 100. 0 each 0       PAST SURGICAL HISTORY:  Past Surgical History:   Procedure Laterality Date     APPENDECTOMY  04/2014     ARTHROSCOPY KNEE RT/LT  2004    RT      BIOPSY  2011    many 2011 and on     BLEPHAROPLASTY Bilateral 1/27/2020    Procedure: Both upper eyelid blepharoplasty and ptosis repair,;  Surgeon: Chelsie Knight MD;  Location: [facility] OR     COLONOSCOPY  02/2012    lt sided colitis     COLONOSCOPY  1/9/2014     COSMETIC BROWPEXY Left 1/27/2020    Procedure: left internal browpexy;  Surgeon: Chelsie Knight MD;  Location: [facility] OR     CRYOTHERAPY, CERVICAL  1988     EXPLORATORY LAPAROTOMY, PARTIAL LEFT ROLANDA COLLECTOMY WITH HARTMANS PROCEDURE, COLOSTOMY  8/2013    lt rolanda colectomy, bowel perforation, colostomy, Karen's pouche     GI SURGERY  2013    abrupted  bowl     HC TOOTH EXTRACTION W/FORCEP  6/2003    Abscess Tooth / Hospitalized     HERNIA REPAIR  04/2014    found at time of takedown     SINUS SURGERY  2/11/03    LT sinus cyst removal      TAKEDOWN COLOSTOMY  04/2014       ALLERGIES:     Allergies   Allergen Reactions     Demerol      Very low blood pressure     Fish      Pt reports allergy to all fish     Meperidine Other (See Comments)     Other reaction(s): Hypotension  Drops blood pressure       Metformin GI Disturbance and Diarrhea     GI Disturbance       Naproxen      No Clinical Screening - See Comments      Pt reports allergy to all fish     Nubain  [Nalbuphine]      Seafood Swelling     Throat swells     Topiramate Other (See Comments) and Hives     Sleepy and confused.  Shaky, disoriented       Latex Rash       FAMILY HISTORY:  Family History   Problem Relation Age of Onset     Diabetes Mother      Allergies Mother      Asthma Mother      Arthritis Mother      Heart Disease Mother         heart murmur     Depression Mother      Obesity Mother      Basal cell carcinoma Mother      Skin Cancer Mother      Eye Disorder Father      Diabetes  Father      Cerebrovascular Disease Father      Heart Disease Father      Hypertension Father      Diabetes Maternal Grandmother      Cerebrovascular Disease Maternal Grandfather      Unknown/Adopted Paternal Grandmother      Heart Disease Paternal Grandfather         left ventrical failure     Cerebrovascular Disease Paternal Grandfather      Gynecology Sister         endometriosis     Depression Sister      Asthma Daughter      Breast Cancer Maternal Aunt      Thyroid Disease Maternal Aunt      Breast Cancer Other         fathers sister     Anesthesia Reaction Other      Thyroid Disease Other      Osteoporosis Other      Asthma Daughter      Breast Cancer Other         mothers sister     Glaucoma No family hx of      Macular Degeneration No family hx of        SOCIAL HISTORY:  Social History     Tobacco Use     Smoking status: Former Smoker     Packs/day: 1.00     Years: 15.00     Pack years: 15.00     Types: Cigarettes     Start date: 1985     Quit date: 1998     Years since quittin.4     Smokeless tobacco: Never Used     Tobacco comment: soke free household.   Substance Use Topics     Alcohol use: Yes     Alcohol/week: 0.0 standard drinks     Comment: occ     Drug use: No       ROS:  10 point ROS neg other than the symptoms noted above in the HPI.    Labs:  Reviewed.         Assessment and Plan:   Paroxysmal atrial fibrillation-minimally symptomatic    It is encouraging that in the last 1 year, patient has not been significantly bothered by atrial fibrillation.  We will see patient again in approximately 1 year and prior to that visit patient will undergo a transthoracic echocardiogram to define the structure and function of the heart and a 2-week Zio patch monitor to define the burden of atrial fibrillation.    All questions and concerns were addressed and patient is happy with plan.            Telephone Visit Duration: 8 mins.

## 2020-12-21 NOTE — LETTER
"12/21/2020      RE: Zaira Villatoro  [address] [facility] [address]       Dear Colleague,    Thank you for the opportunity to participate in the care of your patient, Zaira Villatoro, at the [facility] at [facility]. Please see a copy of my visit note below.    Zaira Villatoro is a 50 year old female who is being evaluated via a billable telephone visit.      The patient has been notified of following:     \"This telephone visit will be conducted via a call between you and your physician/provider. We have found that certain health care needs can be provided without the need for a physical exam.  This service lets us provide the care you need with a short phone conversation.  If a prescription is necessary we can send it directly to your pharmacy.  If lab work is needed we can place an order for that and you can then stop by our lab to have the test done at a later time.    Telephone visits are billed at different rates depending on your insurance coverage. During this emergency period, for some insurers they may be billed the same as an in-person visit.  Please reach out to your insurance provider with any questions.    If during the course of the call the physician/provider feels a telephone visit is not appropriate, you will not be charged for this service.\"    Patient has given verbal consent for Telephone visit?  Yes    What phone number would you like to be contacted at? [phone]    [facility]    Phone call duration: 8 minutes    Electrophysiology Clinic Telephone Visit    Zaira Villatoro is a 50 year old female who is being evaluated via a billable telephone visit.      The patient has been notified of following:     \"This telephone visit will be conducted via a call between you and your physician/provider. We have found that certain health care needs can be provided without the need for " "a physical exam.  This service lets us provide the care you need with a short phone conversation.  If a prescription is necessary we can send it directly to your pharmacy.  If lab work is needed we can place an order for that and you can then stop by our lab to have the test done at a later time.    If during the course of the call the physician/provider feels a telephone visit is not appropriate, you will not be charged for this service.\"   Patient has given verbal consent for Telephone visit?  Yes    HPI: Purpose of visit: Follow-up of paroxysmal atrial fibrillation      Zaira Villatoro is a 50 year old female who presents for follow up of AF.  The patient has a past medical history significant for PAF, HTN, DM, Ulcerative colitis s/p sigmoid colon perforation repair and colostomy(2013).  She had had one documented episode of postop AF in 2013 until reoccurrence on 8/25/2019 while moving her daughter into college.  14 day ZIO 9/2019 shows 1% AF burden (longest episode nearly 3 hours with average rate 127 bpm), multiple episodes nonsustained SVT (likely AF), no symptoms reported.    Patient's last visit was with Alyx Lord in December 2019.  In the last 1 year, patient has not experienced any symptoms related to atrial fibrillation.  She denies any symptoms of irregular heartbeat sensation, prolonged palpitations, exertional dyspnea, exertional angina, frequent lightheadedness, presyncope or syncope.    She is currently taking apixaban 5 mg twice daily for stroke prophylaxis.  She is also taking atenolol 50 mg twice daily.    PAST MEDICAL HISTORY:  Past Medical History:   Diagnosis Date     Acute diverticulitis 7/31/2013     History of seizures as a child 1981    One grand mal in 5th grade.  Didn't tolerate phenobarb.     Moderate single current episode of major depressive disorder (H)      Perforation of sigmoid colon (H) 8/3/2013     S/P left hemicolectomy 8/16/2013 8/02/13      Sepsis (H) 8/1/2013 "       CURRENT MEDICATIONS:  Current Outpatient Medications   Medication Sig Dispense Refill     apixaban ANTICOAGULANT (ELIQUIS ANTICOAGULANT) 5 MG tablet Take 1 tablet (5 mg) by mouth 2 times daily 60 tablet 11     atenolol (TENORMIN) 50 MG tablet Take 1 tablet (50 mg) by mouth 2 times daily 180 tablet 3     cholecalciferol 2000 UNITS tablet Take 2 tablets by mouth 2 times daily  30 tablet      diphenhydrAMINE (BENADRYL ALLERGY) 25 MG tablet as needed Reported on 4/12/2017       DRAMAMINE OR as needed       empagliflozin (JARDIANCE) 25 MG TABS tablet Take 1 tablet (25 mg) by mouth daily 90 tablet 4     fluticasone (FLOVENT HFA) 110 MCG/ACT inhaler        glimepiride (AMARYL) 1 MG tablet Take 1 tablet (1 mg) by mouth every morning (before breakfast) As an extra dose if the blood sugar is elevated after infusion. 30 tablet 3     glimepiride (AMARYL) 4 MG tablet Take 1 and 1/2 tablets (6mg) with breakfast. 135 tablet 3     insulin detemir (LEVEMIR FLEXTOUCH) 100 UNIT/ML pen INJECT 32 UNITS SUBCUTANEOUSLY twice daily 45 mL 4     losartan (COZAAR) 25 MG tablet TAKE 1/2 TABLET BY MOUTH DAILY 45 tablet 4     TYLENOL CAPS 500 MG OR 1 CAPSULE EVERY 4 HOURS AS NEEDED       vedolizumab (ENTYVIO) 60 MG/ML injection Every six weeks       ACCU-CHEK SHARMILA PLUS test strip TEST 4 TIMES DAILY AND AS NEEDED 500 strip 2     BD SHAHNAZ U/F 32G X 4 MM insulin pen needle INJECT 1 DEVICE SUBCUTANEOUS DAILY 3 MM NEEDLES FOR LEVEMIR  each 11     BD ULTRA FINE PEN NEEDLES Inject 1 Device Subcutaneous daily 3 mm needles for Levemir pen 3 Box 3     blood glucose (NO BRAND SPECIFIED) test strip Use to test blood sugar 3 times daily or as directed. 300 each 1     blood glucose monitoring (ACCU-CHEK FASTCLIX) lancets 1 each 4 times daily Use to test blood sugar 4 times daily or as directed. 300 each 11     erythromycin (ROMYCIN) 5 MG/GM ophthalmic ointment Apply small amount to incision site three times a day 3.5 g 0     order for DME  Equipment being ordered: CPAP 9 c, 1 Units 0     order for DME Glucometer, brand as covered by insurance. 1 each 0     order for DME Test strips for pt's glucometer, brand as covered by insurance. Test four times daily and prn. 400 each 4     STATIN NOT PRESCRIBED, INTENTIONAL, 1 each continuous prn Statin not prescribed intentionally due to Refusal by patient and Other:LDL is below 100. 0 each 0       PAST SURGICAL HISTORY:  Past Surgical History:   Procedure Laterality Date     APPENDECTOMY  04/2014     ARTHROSCOPY KNEE RT/LT  2004    RT      BIOPSY  2011    many 2011 and on     BLEPHAROPLASTY Bilateral 1/27/2020    Procedure: Both upper eyelid blepharoplasty and ptosis repair,;  Surgeon: Chelsie Knight MD;  Location: [facility] OR     COLONOSCOPY  02/2012    lt sided colitis     COLONOSCOPY  1/9/2014     COSMETIC BROWPEXY Left 1/27/2020    Procedure: left internal browpexy;  Surgeon: Chelsie Knight MD;  Location: [facility] OR     CRYOTHERAPY, CERVICAL  1988     EXPLORATORY LAPAROTOMY, PARTIAL LEFT ROLANDA COLLECTOMY WITH HARTMANS PROCEDURE, COLOSTOMY  8/2013    lt rolanda colectomy, bowel perforation, colostomy, Karen's pouche     GI SURGERY  2013    abrupted  bowl     HC TOOTH EXTRACTION W/FORCEP  6/2003    Abscess Tooth / Hospitalized     HERNIA REPAIR  04/2014    found at time of takedown     SINUS SURGERY  2/11/03    LT sinus cyst removal      TAKEDOWN COLOSTOMY  04/2014       ALLERGIES:     Allergies   Allergen Reactions     Demerol      Very low blood pressure     Fish      Pt reports allergy to all fish     Meperidine Other (See Comments)     Other reaction(s): Hypotension  Drops blood pressure       Metformin GI Disturbance and Diarrhea     GI Disturbance       Naproxen      No Clinical Screening - See Comments      Pt reports allergy to all fish     Nubain  [Nalbuphine]      Seafood Swelling     Throat swells     Topiramate Other (See Comments) and Hives     Sleepy and confused.  Shaky, disoriented       Latex  Rash       FAMILY HISTORY:  Family History   Problem Relation Age of Onset     Diabetes Mother      Allergies Mother      Asthma Mother      Arthritis Mother      Heart Disease Mother         heart murmur     Depression Mother      Obesity Mother      Basal cell carcinoma Mother      Skin Cancer Mother      Eye Disorder Father      Diabetes Father      Cerebrovascular Disease Father      Heart Disease Father      Hypertension Father      Diabetes Maternal Grandmother      Cerebrovascular Disease Maternal Grandfather      Unknown/Adopted Paternal Grandmother      Heart Disease Paternal Grandfather         left ventrical failure     Cerebrovascular Disease Paternal Grandfather      Gynecology Sister         endometriosis     Depression Sister      Asthma Daughter      Breast Cancer Maternal Aunt      Thyroid Disease Maternal Aunt      Breast Cancer Other         fathers sister     Anesthesia Reaction Other      Thyroid Disease Other      Osteoporosis Other      Asthma Daughter      Breast Cancer Other         mothers sister     Glaucoma No family hx of      Macular Degeneration No family hx of        SOCIAL HISTORY:  Social History     Tobacco Use     Smoking status: Former Smoker     Packs/day: 1.00     Years: 15.00     Pack years: 15.00     Types: Cigarettes     Start date: 1985     Quit date: 1998     Years since quittin.4     Smokeless tobacco: Never Used     Tobacco comment: soke free household.   Substance Use Topics     Alcohol use: Yes     Alcohol/week: 0.0 standard drinks     Comment: occ     Drug use: No       ROS:  10 point ROS neg other than the symptoms noted above in the HPI.    Labs:  Reviewed.         Assessment and Plan:   Paroxysmal atrial fibrillation-minimally symptomatic    It is encouraging that in the last 1 year, patient has not been significantly bothered by atrial fibrillation.  We will see patient again in approximately 1 year and prior to that visit patient will undergo a  transthoracic echocardiogram to define the structure and function of the heart and a 2-week Zio patch monitor to define the burden of atrial fibrillation.    All questions and concerns were addressed and patient is happy with plan.        Telephone Visit Duration: 8 mins.          Please do not hesitate to contact me if you have any questions/concerns.     Sincerely,     Leila Miller MD

## 2020-12-21 NOTE — PATIENT INSTRUCTIONS
Thank you for coming to the [facility]; please note the following instructions:    1. Dr. Miller recommends to follow up in 1 year with an echo and zio 14 day heart monitor prior.  We will contact you when the time gets closer        If you have any questions regarding your visit please contact your care team:     Cardiology  Telephone Number   Vanda MARC, RN  Nenita CONSTANTINO, RN   Jocelyn ESTRELLA, RMA  Laya PAK, RMA  Quinn MACIEL, LPN   [phone] (option 1)   For scheduling appts:     [phone] (select option 1)       For the Device Clinic (Pacemakers and ICD's)  RN's :  Karen Brennan   During business hours: [phone]    *After business hours:  [phone] (select option 4)      Normal test result notifications will be released via "[employer] or mailed within 7 business days.  All other test results, will be communicated via telephone once reviewed by your cardiologist.    If you need a medication refill please contact your pharmacy.  Please allow 3 business days for your refill to be completed.    As always, thank you for trusting us with your health care needs!  \

## 2020-12-29 NOTE — TELEPHONE ENCOUNTER
Writer consulted with  Amanda Gomes RD re recommended CHO intake for this patient.  Currently pt is eating very low CHO at meals.  Shannon Kennedy RN  BSN CDE      From: Shannon Kennedy   Sent: Wednesday, April 12, 2017 7:28 PM  To: Amanda Gomes  Subject: PHI pt meal plan    Milton Patel,    Need to ask you about one of my pts. Zaira Villatoro. Seen Wed.  She is working to eat less than 30 grams of CHO at meals for BG control and not to gain any more weight.    Should I recommend for her to eat at  least 30 grams per meal?  Or other suggestions?    She has several medical concerns so if you could check these first that would be great.    Thanks!  Dena      Response from Amanda Gomes  RD:    Hey! So I took a look at her chart.  It looks like she is consuming 1-3 of the premier protein shakes daily? I feel like 1 or 2 maximum would be my recommendation just because they are rather high in protein (30 grams each). Her kidneys are fine so the protein isn t necessarily bad but I just feel like 90 grams of protein (if consuming 3 shakes) coming from just protein shakes daily is too much.     Using her height/weight I would say she could consume around 9452-0355 calories/day when working toward weight loss. On the very lowest end I would recommend 35% of daily calories from carbohydrates. Ideally maybe more toward 40-45% of daily calories from carbohydrates. If she could aim for around 120-130 grams of carbohydrate per day this would likely help promote weight loss and blood glucose control. Less than this can raise blood glucose from the liver plus will leave her brain wanting more, so cravings increase.     If she had 30 grams at meals and 15 grams at snacks, that would be fine. If she is not snacking then I would encourage 30-45 grams at meals. Less than this is likely not sustainable beyond a couple of months.    I hope that helps! Let me know if you have further questions J   stretcher

## 2021-01-10 ENCOUNTER — HEALTH MAINTENANCE LETTER (OUTPATIENT)
Age: 51
End: 2021-01-10

## 2021-01-14 ENCOUNTER — TRANSFERRED RECORDS (OUTPATIENT)
Dept: HEALTH INFORMATION MANAGEMENT | Facility: CLINIC | Age: 51
End: 2021-01-14

## 2021-01-14 DIAGNOSIS — E11.65 TYPE 2 DIABETES MELLITUS WITH HYPERGLYCEMIA, WITH LONG-TERM CURRENT USE OF INSULIN (H): Chronic | ICD-10-CM

## 2021-01-14 DIAGNOSIS — Z79.4 TYPE 2 DIABETES MELLITUS WITH HYPERGLYCEMIA, WITH LONG-TERM CURRENT USE OF INSULIN (H): Chronic | ICD-10-CM

## 2021-01-14 LAB
ALT SERPL-CCNC: 28 U/L (ref 0–78)
AST SERPL-CCNC: 17 U/L (ref 0–37)

## 2021-01-14 RX ORDER — GLIMEPIRIDE 1 MG/1
TABLET ORAL
Qty: 90 TABLET | Refills: 1 | Status: SHIPPED | OUTPATIENT
Start: 2021-01-14 | End: 2021-07-05

## 2021-02-09 ENCOUNTER — MYC MEDICAL ADVICE (OUTPATIENT)
Dept: INTERNAL MEDICINE | Facility: CLINIC | Age: 51
End: 2021-02-09

## 2021-02-11 ENCOUNTER — MYC MEDICAL ADVICE (OUTPATIENT)
Dept: INTERNAL MEDICINE | Facility: CLINIC | Age: 51
End: 2021-02-11

## 2021-02-11 DIAGNOSIS — I48.0 PAF (PAROXYSMAL ATRIAL FIBRILLATION) (H): ICD-10-CM

## 2021-02-15 ENCOUNTER — TELEPHONE (OUTPATIENT)
Dept: FAMILY MEDICINE | Facility: CLINIC | Age: 51
End: 2021-02-15

## 2021-02-15 NOTE — TELEPHONE ENCOUNTER
Prior Authorization Retail Medication Request    Medication/Dose: PA - empagliflozin (JARDIANCE) 25 MG TABS tablet  ICD code (if different than what is on RX):  E11.65, Z79.4  Previously Tried and Failed:    Rationale:      Insurance Name:  [employer]   Insurance ID: [id number]       Pharmacy Information (if different than what is on RX)  Name:  [facility]  Phone:  [phone]

## 2021-02-17 NOTE — TELEPHONE ENCOUNTER
Central Prior Authorization Team   Phone: [phone]    PA Initiation    Medication: PA - empagliflozin (JARDIANCE) 25 MG TABS tablet  Insurance Company: [employer] - Phone [phone] Fax [phone]  Pharm[facility] Filling the Rx[id number] [place] - [address]  Filling Pharmacy Phone: [phone]  Filling Pharmacy Fax: [phone]  Start Date: 2/17/2021

## 2021-02-26 ENCOUNTER — TRANSFERRED RECORDS (OUTPATIENT)
Dept: HEALTH INFORMATION MANAGEMENT | Facility: CLINIC | Age: 51
End: 2021-02-26

## 2021-03-25 ENCOUNTER — MYC MEDICAL ADVICE (OUTPATIENT)
Dept: INTERNAL MEDICINE | Facility: CLINIC | Age: 51
End: 2021-03-25

## 2021-03-25 DIAGNOSIS — E11.65 TYPE 2 DIABETES MELLITUS WITH HYPERGLYCEMIA, WITH LONG-TERM CURRENT USE OF INSULIN (H): Chronic | ICD-10-CM

## 2021-03-25 DIAGNOSIS — Z79.4 TYPE 2 DIABETES MELLITUS WITH HYPERGLYCEMIA, WITH LONG-TERM CURRENT USE OF INSULIN (H): Chronic | ICD-10-CM

## 2021-03-25 NOTE — TELEPHONE ENCOUNTER
10/23/20 office visit:    Patient Instructions   Amoxicillin 1 tab twice daily for 10 days.  Ear drops per bottle directions.  Increase insulin to 32 units twice daily.  Keep increasing by 1 unit twice daily every 2-3 days until the fasting blood sugar is under 130.

## 2021-04-01 ENCOUNTER — TRANSFERRED RECORDS (OUTPATIENT)
Dept: HEALTH INFORMATION MANAGEMENT | Facility: CLINIC | Age: 51
End: 2021-04-01

## 2021-04-26 ENCOUNTER — VIRTUAL VISIT (OUTPATIENT)
Dept: SLEEP MEDICINE | Facility: CLINIC | Age: 51
End: 2021-04-26
Payer: COMMERCIAL

## 2021-04-26 VITALS — HEIGHT: 64 IN | BODY MASS INDEX: 45.24 KG/M2 | WEIGHT: 265 LBS

## 2021-04-26 DIAGNOSIS — H02.834 DERMATOCHALASIS OF BOTH UPPER EYELIDS: ICD-10-CM

## 2021-04-26 DIAGNOSIS — H02.831 DERMATOCHALASIS OF BOTH UPPER EYELIDS: ICD-10-CM

## 2021-04-26 DIAGNOSIS — H02.403 INVOLUTIONAL PTOSIS, ACQUIRED, BILATERAL: ICD-10-CM

## 2021-04-26 DIAGNOSIS — G47.33 OSA (OBSTRUCTIVE SLEEP APNEA): Primary | ICD-10-CM

## 2021-04-26 DIAGNOSIS — E66.01 MORBID OBESITY DUE TO EXCESS CALORIES (H): ICD-10-CM

## 2021-04-26 DIAGNOSIS — H57.813 BROW PTOSIS, BILATERAL: ICD-10-CM

## 2021-04-26 PROBLEM — R10.2 FEMALE PELVIC PAIN: Status: ACTIVE | Noted: 2019-07-24

## 2021-04-26 PROCEDURE — 99215 OFFICE O/P EST HI 40 MIN: CPT | Mod: GT | Performed by: INTERNAL MEDICINE

## 2021-04-26 RX ORDER — PRAMIPEXOLE DIHYDROCHLORIDE 0.12 MG/1
0.12 TABLET ORAL AT BEDTIME
Qty: 60 TABLET | Refills: 1 | Status: SHIPPED | OUTPATIENT
Start: 2021-04-26 | End: 2021-05-24 | Stop reason: SINTOL

## 2021-04-26 ASSESSMENT — MIFFLIN-ST. JEOR: SCORE: 1807.03

## 2021-04-26 NOTE — PATIENT INSTRUCTIONS
Your BMI is Body mass index is 45.49 kg/m .  Weight management is a personal decision.  If you are interested in exploring weight loss strategies, the following discussion covers the approaches that may be successful. Body mass index (BMI) is one way to tell whether you are at a healthy weight, overweight, or obese. It measures your weight in relation to your height.  A BMI of 18.5 to 24.9 is in the healthy range. A person with a BMI of 25 to 29.9 is considered overweight, and someone with a BMI of 30 or greater is considered obese. More than two-thirds of American adults are considered overweight or obese.  Being overweight or obese increases the risk for further weight gain. Excess weight may lead to heart disease and diabetes.  Creating and following plans for healthy eating and physical activity may help you improve your health.  Weight control is part of healthy lifestyle and includes exercise, emotional health, and healthy eating habits. Careful eating habits lifelong are the mainstay of weight control. Though there are significant health benefits from weight loss, long-term weight loss with diet alone may be very difficult to achieve- studies show long-term success with dietary management in less than 10% of people. Attaining a healthy weight may be especially difficult to achieve in those with severe obesity. In some cases, medications, devices and surgical management might be considered.  What can you do?  If you are overweight or obese and are interested in methods for weight loss, you should discuss this with your provider.     Consider reducing daily calorie intake by 500 calories.     Keep a food journal.     Avoiding skipping meals, consider cutting portions instead.    Diet combined with exercise helps maintain muscle while optimizing fat loss. Strength training is particularly important for building and maintaining muscle mass. Exercise helps reduce stress, increase energy, and improves fitness.  Increasing exercise without diet control, however, may not burn enough calories to loose weight.       Start walking three days a week 10-20 minutes at a time    Work towards walking thirty minutes five days a week     Eventually, increase the speed of your walking for 1-2 minutes at time    In addition, we recommend that you review healthy lifestyles and methods for weight loss available through the National Institutes of Health patient information sites:  http://win.niddk.nih.gov/publications/index.htm    And look into health and wellness programs that may be available through your health insurance provider, employer, local community center, or [employer].    Weight management plan: Patient was referred to their PCP to discuss a diet and exercise plan.    Read the book Say Good Night To Insomnia
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
## 2021-04-28 DIAGNOSIS — I10 ESSENTIAL (PRIMARY) HYPERTENSION: ICD-10-CM

## 2021-04-29 ENCOUNTER — TELEPHONE (OUTPATIENT)
Dept: SLEEP MEDICINE | Facility: CLINIC | Age: 51
End: 2021-04-29

## 2021-04-29 RX ORDER — ATENOLOL 50 MG/1
TABLET ORAL
Qty: 180 TABLET | Refills: 3 | Status: SHIPPED | OUTPATIENT
Start: 2021-04-29 | End: 2022-04-05

## 2021-04-29 NOTE — TELEPHONE ENCOUNTER
Reason for call:  Other   Patient called regarding (reason for call): call back  Additional comments: Patient is requesting a pre-authorization for insurance to order suppiler for CPAP     Phone number to reach patient:  Cell number on file:    Telephone Information:   Mobile [phone]       Best Time:  Anytime    Can we leave a detailed message on this number?  YES    Travel screening: Not Applicable

## 2021-05-07 ENCOUNTER — MYC MEDICAL ADVICE (OUTPATIENT)
Dept: SLEEP MEDICINE | Facility: CLINIC | Age: 51
End: 2021-05-07

## 2021-05-07 DIAGNOSIS — R53.83 FATIGUE, UNSPECIFIED TYPE: Primary | ICD-10-CM

## 2021-05-08 ENCOUNTER — HEALTH MAINTENANCE LETTER (OUTPATIENT)
Age: 51
End: 2021-05-08

## 2021-05-11 DIAGNOSIS — I10 ESSENTIAL HYPERTENSION WITH GOAL BLOOD PRESSURE LESS THAN 140/90: ICD-10-CM

## 2021-05-12 RX ORDER — LOSARTAN POTASSIUM 25 MG/1
TABLET ORAL
Qty: 45 TABLET | Refills: 0 | Status: SHIPPED | OUTPATIENT
Start: 2021-05-12 | End: 2021-07-26

## 2021-05-13 ENCOUNTER — OFFICE VISIT (OUTPATIENT)
Dept: INTERNAL MEDICINE | Facility: CLINIC | Age: 51
End: 2021-05-13
Payer: COMMERCIAL

## 2021-05-13 VITALS
SYSTOLIC BLOOD PRESSURE: 119 MMHG | HEART RATE: 66 BPM | WEIGHT: 266 LBS | TEMPERATURE: 97.8 F | BODY MASS INDEX: 45.66 KG/M2 | OXYGEN SATURATION: 96 % | DIASTOLIC BLOOD PRESSURE: 79 MMHG

## 2021-05-13 DIAGNOSIS — I10 ESSENTIAL HYPERTENSION WITH GOAL BLOOD PRESSURE LESS THAN 140/90: ICD-10-CM

## 2021-05-13 DIAGNOSIS — I48.0 PAROXYSMAL ATRIAL FIBRILLATION (H): ICD-10-CM

## 2021-05-13 DIAGNOSIS — Z23 NEED FOR IMMUNIZATION AGAINST TETANUS ALONE: ICD-10-CM

## 2021-05-13 DIAGNOSIS — K51.90 ULCERATIVE COLITIS WITHOUT COMPLICATIONS, UNSPECIFIED LOCATION (H): ICD-10-CM

## 2021-05-13 DIAGNOSIS — Z11.59 ENCOUNTER FOR HEPATITIS C SCREENING TEST FOR LOW RISK PATIENT: Primary | ICD-10-CM

## 2021-05-13 DIAGNOSIS — I10 ESSENTIAL (PRIMARY) HYPERTENSION: ICD-10-CM

## 2021-05-13 DIAGNOSIS — G47.33 OSA (OBSTRUCTIVE SLEEP APNEA): ICD-10-CM

## 2021-05-13 DIAGNOSIS — E11.65 TYPE 2 DIABETES MELLITUS WITH HYPERGLYCEMIA, WITH LONG-TERM CURRENT USE OF INSULIN (H): ICD-10-CM

## 2021-05-13 DIAGNOSIS — Z79.4 TYPE 2 DIABETES MELLITUS WITH HYPERGLYCEMIA, WITH LONG-TERM CURRENT USE OF INSULIN (H): ICD-10-CM

## 2021-05-13 DIAGNOSIS — D84.9 IMMUNOSUPPRESSED STATUS (H): ICD-10-CM

## 2021-05-13 DIAGNOSIS — E78.5 HYPERLIPIDEMIA LDL GOAL <100: ICD-10-CM

## 2021-05-13 DIAGNOSIS — E66.01 MORBID OBESITY DUE TO EXCESS CALORIES (H): ICD-10-CM

## 2021-05-13 LAB
CHOLEST SERPL-MCNC: 159 MG/DL
HBA1C MFR BLD: 7.7 % (ref 0–5.6)
HDLC SERPL-MCNC: 39 MG/DL
LDLC SERPL CALC-MCNC: 105 MG/DL
NONHDLC SERPL-MCNC: 120 MG/DL
TRIGL SERPL-MCNC: 77 MG/DL

## 2021-05-13 PROCEDURE — 90714 TD VACC NO PRESV 7 YRS+ IM: CPT | Performed by: INTERNAL MEDICINE

## 2021-05-13 PROCEDURE — 99215 OFFICE O/P EST HI 40 MIN: CPT | Mod: 25 | Performed by: INTERNAL MEDICINE

## 2021-05-13 PROCEDURE — 80061 LIPID PANEL: CPT | Performed by: INTERNAL MEDICINE

## 2021-05-13 PROCEDURE — 90471 IMMUNIZATION ADMIN: CPT | Performed by: INTERNAL MEDICINE

## 2021-05-13 PROCEDURE — 86803 HEPATITIS C AB TEST: CPT | Performed by: INTERNAL MEDICINE

## 2021-05-13 PROCEDURE — 36415 COLL VENOUS BLD VENIPUNCTURE: CPT | Performed by: INTERNAL MEDICINE

## 2021-05-13 PROCEDURE — 83036 HEMOGLOBIN GLYCOSYLATED A1C: CPT | Performed by: INTERNAL MEDICINE

## 2021-05-13 RX ORDER — PEN NEEDLE, DIABETIC 32GX 5/32"
NEEDLE, DISPOSABLE MISCELLANEOUS
Qty: 200 EACH | Refills: 11 | Status: SHIPPED | OUTPATIENT
Start: 2021-05-13 | End: 2022-04-05

## 2021-05-13 NOTE — PATIENT INSTRUCTIONS
"Eye visit is due      Return to clinic 3 months:  BS update      Consider \"mealtime\" insulin (humulin or novolin)   "

## 2021-05-13 NOTE — PROGRESS NOTES
Assessment & Plan     Immunosuppressed status (H)  Gets infusions for Ps Arthritis q 6 weeks.   She uses the amaryl to help tamp the BS during infusions.. at some point, I would like to remove the med as it is redundant as a daily med while on insulin..    Encounter for hepatitis C screening test for low risk patient  Agreeable to population screening.   - **Hepatitis C Screen Reflex to RNA FUTURE anytime    Essential hypertension with goal blood pressure less than 140/90  Continue current management     Type 2 diabetes mellitus with hyperglycemia, with long-term current use of insulin (H)    We will increase the Levimer a little.  At this point, may need to add mealtime insulin (not MDI yet) to her biggest meal.  Discussed this at length.  She will consider for next appt.     - Hemoglobin A1c  - Lipid panel reflex to direct LDL Fasting  - EYE ADULT REFERRAL; Future  - insulin pen needle (BD SHAHNAZ U/F) 32G X 4 MM miscellaneous; Use 1 pen needles TWICE DAILY  - insulin detemir (LEVEMIR FLEXTOUCH) 100 UNIT/ML pen; Inject 70 Units Subcutaneous 2 times daily     Ulcerative colitis without complications, unspecified location (H)  On immunosuppressants.    She uses the amaryl to help tamp the BS during infusions.. at some point, I would like to remove the med as it is redundant as a daily med while on insulin...     Morbid obesity due to excess calories (H)  Unable to lose weight  Her insurance does not cover the weight loss clinic (????)  She will need to work on portions, changing lifestyle.     Paroxysmal atrial fibrillation (H)   Elliquis.     MAHAD (obstructive sleep apnea)- severe (AHI 80)   wearing mask.  It is uncomfortable  She is working with it.      Hyperlipidemia LDL goal <100     - Lipid panel reflex to direct LDL Fasting    Need for immunization against tetanus alone     - TD PRESERV FREE, IM (7+ YRS)      48 minutes spent on the date of the encounter doing chart review, history and exam, documentation and  further activities per the note             Return in about 3 months (around 8/13/2021).    Khushboo Barger MD  [facility]    ==========================================================  =========================================================      Subjective   Zaira is a 51 year old who presents for the following health issues     HPI     52 y/o F here for NANCY.      H/o Ulcerative Colitis (vedolizumab q6W via [facility]), Immunosuppression, HTN,  DMII, PAF (Eliquis), MAHAD (wears mask, with discomfort), MO,      She was referred to Lifestyle clinic, her insurance will not cover it.              Diabetes Follow-up    How often are you checking your blood sugar? Three times daily  Blood sugar testing frequency justification:  Frequent hypoglycemia, on insulin  What time of day are you checking your blood sugars (select all that apply)?  Before meals  Have you had any blood sugars above 200?  Yes 202,215,220,231,281  Have you had any blood sugars below 70?  No    What symptoms do you notice when your blood sugar is low?  None    What concerns do you have today about your diabetes? None     Do you have any of these symptoms? (Select all that apply)  No numbness or tingling in feet.  No redness, sores or blisters on feet.  No complaints of excessive thirst.  No reports of blurry vision.  No significant changes to weight.    Have you had a diabetic eye exam in the last 12 months? No        BP Readings from Last 2 Encounters:   05/13/21 119/79   10/23/20 118/78     Hemoglobin A1C (%)   Date Value   09/28/2020 7.8 (H)   01/13/2020 7.3 (H)     LDL Cholesterol Calculated (mg/dL)   Date Value   01/13/2020 87   06/06/2019 68           How many servings of fruits and vegetables do you eat daily?  2-3    On average, how many sweetened beverages do you drink each day (Examples: soda, juice, sweet tea, etc.  Do NOT count diet or artificially sweetened beverages)?   0    How many days per week do you exercise  enough to make your heart beat faster? 3 or less    How many minutes a day do you exercise enough to make your heart beat faster? 20 - 29    How many days per week do you miss taking your medication? 0    New Patient/Transfer of Care  Review of Systems   Constitutional, HEENT, cardiovascular, pulmonary, gi and gu systems are negative, except as otherwise noted.      Objective    /79 (BP Location: Right arm, Patient Position: Sitting, Cuff Size: Adult Large)   Pulse 66   Temp 97.8  F (36.6  C) (Oral)   Wt 120.7 kg (266 lb)   SpO2 96%   BMI 45.66 kg/m    Body mass index is 45.66 kg/m .  Physical Exam   GENERAL: healthy, alert and no distress  MS: no gross musculoskeletal defects noted, no edema  NEURO: Normal strength and tone, mentation intact and speech normal  PSYCH: mentation appears normal, affect normal/bright

## 2021-05-13 NOTE — NURSING NOTE
Prior to immunization administration, verified patients identity using patient s name and date of birth. Please see Immunization Activity for additional information.     Screening Questionnaire for Adult Immunization    Are you sick today?   No   Do you have allergies to medications, food, a vaccine component or latex?   No   Have you ever had a serious reaction after receiving a vaccination?   Yes   Do you have a long-term health problem with heart, lung, kidney, or metabolic disease (e.g., diabetes), asthma, a blood disorder, no spleen, complement component deficiency, a cochlear implant, or a spinal fluid leak?  Are you on long-term aspirin therapy?   No   Do you have cancer, leukemia, HIV/AIDS, or any other immune system problem?   Yes   Do you have a parent, brother, or sister with an immune system problem?   No   In the past 3 months, have you taken medications that affect  your immune system, such as prednisone, other steroids, or anticancer drugs; drugs for the treatment of rheumatoid arthritis, Crohn s disease, or psoriasis; or have you had radiation treatments?   No   Have you had a seizure, or a brain or other nervous system problem?   No   During the past year, have you received a transfusion of blood or blood    products, or been given immune (gamma) globulin or antiviral drug?   No   For women: Are you pregnant or is there a chance you could become       pregnant during the next month?   No   Have you received any vaccinations in the past 4 weeks?   No     Immunization questionnaire was positive for at least one answer.  Notified Dr. Barger.        Per orders of Dr. Barger, injection of Td given by Flori Sarmiento CMA. Patient instructed to remain in clinic for 15 minutes afterwards, and to report any adverse reaction to me immediately.       Screening performed by Flori Sarmiento CMA on 5/13/2021 at 12:00 PM.

## 2021-05-14 LAB — HCV AB SERPL QL IA: NONREACTIVE

## 2021-05-14 NOTE — RESULT ENCOUNTER NOTE
Zaira Villatoro    Labs enclosed, cholesterol looks great. Hep C screening will be in soon, thank you for agreeing to this population screening.     Sincerely,       GAIL WEAVER M.D.

## 2021-05-19 DIAGNOSIS — Z79.4 TYPE 2 DIABETES MELLITUS WITH HYPERGLYCEMIA, WITH LONG-TERM CURRENT USE OF INSULIN (H): Chronic | ICD-10-CM

## 2021-05-19 DIAGNOSIS — E11.65 TYPE 2 DIABETES MELLITUS WITH HYPERGLYCEMIA, WITH LONG-TERM CURRENT USE OF INSULIN (H): Chronic | ICD-10-CM

## 2021-05-19 RX ORDER — BLOOD SUGAR DIAGNOSTIC
STRIP MISCELLANEOUS
Qty: 500 STRIP | Refills: 2 | Status: SHIPPED | OUTPATIENT
Start: 2021-05-19 | End: 2022-02-20

## 2021-05-24 ENCOUNTER — MYC MEDICAL ADVICE (OUTPATIENT)
Dept: INTERNAL MEDICINE | Facility: CLINIC | Age: 51
End: 2021-05-24

## 2021-05-24 DIAGNOSIS — D84.9 IMMUNOSUPPRESSED STATUS (H): Primary | ICD-10-CM

## 2021-05-24 RX ORDER — ROPINIROLE 0.25 MG/1
.25-.5 TABLET, FILM COATED ORAL AT BEDTIME
Qty: 60 TABLET | Refills: 0 | Status: SHIPPED | OUTPATIENT
Start: 2021-05-24 | End: 2021-06-18

## 2021-05-25 DIAGNOSIS — D84.9 IMMUNOSUPPRESSED STATUS (H): ICD-10-CM

## 2021-05-25 PROCEDURE — 86769 SARS-COV-2 COVID-19 ANTIBODY: CPT | Mod: 59 | Performed by: INTERNAL MEDICINE

## 2021-05-25 PROCEDURE — 86769 SARS-COV-2 COVID-19 ANTIBODY: CPT | Performed by: INTERNAL MEDICINE

## 2021-05-25 PROCEDURE — 36415 COLL VENOUS BLD VENIPUNCTURE: CPT | Performed by: INTERNAL MEDICINE

## 2021-05-26 LAB
SARS-COV-2 AB PNL SERPL IA: POSITIVE
SARS-COV-2 IGG SERPL IA-ACNC: NORMAL

## 2021-05-26 NOTE — RESULT ENCOUNTER NOTE
Zaira RAE Irving    You have mounted an excellent immune response.      As far as whether to get additional vaccine doses, there has been no official guidance about that yet.   I am hoping this result can be reassuring to you in the meantime.    It is still wise to take COVID precautions in general to minimize any possible exposure.     Best,       GAIL WEAVER M.D.

## 2021-05-27 DIAGNOSIS — G47.33 OSA (OBSTRUCTIVE SLEEP APNEA): Primary | Chronic | ICD-10-CM

## 2021-05-27 NOTE — TELEPHONE ENCOUNTER
Returning patients call to clarify where she would like DME orders faxed.     Matilda Simpson MA

## 2021-06-29 DIAGNOSIS — R53.83 FATIGUE, UNSPECIFIED TYPE: ICD-10-CM

## 2021-06-29 LAB
FERRITIN SERPL-MCNC: 128 NG/ML (ref 8–252)
IRON SATN MFR SERPL: 22 % (ref 15–46)
IRON SERPL-MCNC: 70 UG/DL (ref 35–180)
TIBC SERPL-MCNC: 323 UG/DL (ref 240–430)

## 2021-06-29 PROCEDURE — 83550 IRON BINDING TEST: CPT | Performed by: INTERNAL MEDICINE

## 2021-06-29 PROCEDURE — 82728 ASSAY OF FERRITIN: CPT | Performed by: INTERNAL MEDICINE

## 2021-06-29 PROCEDURE — 83540 ASSAY OF IRON: CPT | Performed by: INTERNAL MEDICINE

## 2021-06-29 PROCEDURE — 36415 COLL VENOUS BLD VENIPUNCTURE: CPT | Performed by: INTERNAL MEDICINE

## 2021-07-01 ENCOUNTER — TRANSFERRED RECORDS (OUTPATIENT)
Dept: HEALTH INFORMATION MANAGEMENT | Facility: CLINIC | Age: 51
End: 2021-07-01

## 2021-07-01 LAB
ALT SERPL-CCNC: 24 U/L (ref 0–78)
AST SERPL-CCNC: 17 U/L (ref 0–37)

## 2021-07-04 DIAGNOSIS — Z79.4 TYPE 2 DIABETES MELLITUS WITH HYPERGLYCEMIA, WITH LONG-TERM CURRENT USE OF INSULIN (H): Chronic | ICD-10-CM

## 2021-07-04 DIAGNOSIS — E11.65 TYPE 2 DIABETES MELLITUS WITH HYPERGLYCEMIA, WITH LONG-TERM CURRENT USE OF INSULIN (H): Chronic | ICD-10-CM

## 2021-07-05 RX ORDER — GLIMEPIRIDE 1 MG/1
TABLET ORAL
Qty: 90 TABLET | Refills: 1 | Status: SHIPPED | OUTPATIENT
Start: 2021-07-05 | End: 2021-09-20

## 2021-07-08 ENCOUNTER — TELEPHONE (OUTPATIENT)
Dept: FAMILY MEDICINE | Facility: CLINIC | Age: 51
End: 2021-07-08

## 2021-07-08 NOTE — TELEPHONE ENCOUNTER
Reason for Call:  Other appointment    Detailed comments: Patient had an appointment scheduled for 8/16/21 due to PCP being out of clinic. She wants to be sure that coming in later will not affect her medication refills as she will be past due for labs and visit when she is rescheduled for, which is 8/26.     Phone Number Patient can be reached at: Cell number on file:    Telephone Information:   Mobile [phone]       Best Time: any    Can we leave a detailed message on this number? YES    Call taken on 7/8/2021 at 2:59 PM by Enrico Jain

## 2021-07-09 NOTE — TELEPHONE ENCOUNTER
Spoke with pt. She was worried that she would not be able to refill her medications when they are due because she was switching from Dr. Bergman to Dr. Barger and her appointment was pushed out. Pt stated she was trying to get an appointment sooner but due to her schedule and Dr. Barger's schedule, the earliest appointment she could get was 8/26/21 to establish care.    Pt wanted to make sure she can at least get her medications refilled when the refill requests are sent by the pharmacy. Writer encouraged pt to call the clinic if she has any questions or concerns with refills before her appointment.    Karin CONSTANTINO RN, BSN  [facility]

## 2021-07-23 DIAGNOSIS — I10 ESSENTIAL HYPERTENSION WITH GOAL BLOOD PRESSURE LESS THAN 140/90: ICD-10-CM

## 2021-07-26 RX ORDER — LOSARTAN POTASSIUM 25 MG/1
TABLET ORAL
Qty: 45 TABLET | Refills: 0 | Status: SHIPPED | OUTPATIENT
Start: 2021-07-26 | End: 2021-10-20

## 2021-07-26 NOTE — TELEPHONE ENCOUNTER
Prescription approved per [facility] Refill Protocol. Patient has upcoming appointment on 8/26.    Sagar Sheppard RN

## 2021-08-12 ENCOUNTER — TRANSFERRED RECORDS (OUTPATIENT)
Dept: HEALTH INFORMATION MANAGEMENT | Facility: CLINIC | Age: 51
End: 2021-08-12

## 2021-08-13 DIAGNOSIS — E11.65 TYPE 2 DIABETES MELLITUS WITH HYPERGLYCEMIA, WITH LONG-TERM CURRENT USE OF INSULIN (H): Chronic | ICD-10-CM

## 2021-08-13 DIAGNOSIS — Z79.4 TYPE 2 DIABETES MELLITUS WITH HYPERGLYCEMIA, WITH LONG-TERM CURRENT USE OF INSULIN (H): Chronic | ICD-10-CM

## 2021-08-16 RX ORDER — GLIMEPIRIDE 1 MG/1
TABLET ORAL
Qty: 90 TABLET | Refills: 1 | OUTPATIENT
Start: 2021-08-16

## 2021-08-26 ENCOUNTER — OFFICE VISIT (OUTPATIENT)
Dept: INTERNAL MEDICINE | Facility: CLINIC | Age: 51
End: 2021-08-26
Payer: COMMERCIAL

## 2021-08-26 VITALS
DIASTOLIC BLOOD PRESSURE: 74 MMHG | BODY MASS INDEX: 44.8 KG/M2 | OXYGEN SATURATION: 96 % | WEIGHT: 261 LBS | SYSTOLIC BLOOD PRESSURE: 129 MMHG | TEMPERATURE: 98.2 F | HEART RATE: 62 BPM

## 2021-08-26 DIAGNOSIS — D84.9 IMMUNOSUPPRESSED STATUS (H): ICD-10-CM

## 2021-08-26 DIAGNOSIS — I48.0 PAROXYSMAL ATRIAL FIBRILLATION (H): ICD-10-CM

## 2021-08-26 DIAGNOSIS — G47.33 OSA (OBSTRUCTIVE SLEEP APNEA): ICD-10-CM

## 2021-08-26 DIAGNOSIS — K51.90 ULCERATIVE COLITIS WITHOUT COMPLICATIONS, UNSPECIFIED LOCATION (H): ICD-10-CM

## 2021-08-26 DIAGNOSIS — E66.01 MORBID OBESITY DUE TO EXCESS CALORIES (H): ICD-10-CM

## 2021-08-26 DIAGNOSIS — Z23 HIGH PRIORITY FOR 2019-NCOV VACCINE: ICD-10-CM

## 2021-08-26 DIAGNOSIS — Z12.31 ENCOUNTER FOR SCREENING MAMMOGRAM FOR BREAST CANCER: ICD-10-CM

## 2021-08-26 DIAGNOSIS — Z79.4 TYPE 2 DIABETES MELLITUS WITH HYPERGLYCEMIA, WITH LONG-TERM CURRENT USE OF INSULIN (H): Primary | ICD-10-CM

## 2021-08-26 DIAGNOSIS — E11.65 TYPE 2 DIABETES MELLITUS WITH HYPERGLYCEMIA, WITH LONG-TERM CURRENT USE OF INSULIN (H): Primary | ICD-10-CM

## 2021-08-26 DIAGNOSIS — I10 ESSENTIAL HYPERTENSION WITH GOAL BLOOD PRESSURE LESS THAN 140/90: ICD-10-CM

## 2021-08-26 LAB
ANION GAP SERPL CALCULATED.3IONS-SCNC: 6 MMOL/L (ref 3–14)
BUN SERPL-MCNC: 19 MG/DL (ref 7–30)
CALCIUM SERPL-MCNC: 9.4 MG/DL (ref 8.5–10.1)
CHLORIDE BLD-SCNC: 108 MMOL/L (ref 94–109)
CO2 SERPL-SCNC: 24 MMOL/L (ref 20–32)
CREAT SERPL-MCNC: 1.03 MG/DL (ref 0.52–1.04)
ERYTHROCYTE [DISTWIDTH] IN BLOOD BY AUTOMATED COUNT: 13.7 % (ref 10–15)
GFR SERPL CREATININE-BSD FRML MDRD: 64 ML/MIN/1.73M2
GLUCOSE BLD-MCNC: 139 MG/DL (ref 70–99)
HBA1C MFR BLD: 8 % (ref 0–5.6)
HCT VFR BLD AUTO: 47.3 % (ref 35–47)
HGB BLD-MCNC: 15.9 G/DL (ref 11.7–15.7)
MCH RBC QN AUTO: 30.6 PG (ref 26.5–33)
MCHC RBC AUTO-ENTMCNC: 33.6 G/DL (ref 31.5–36.5)
MCV RBC AUTO: 91 FL (ref 78–100)
PLATELET # BLD AUTO: 279 10E3/UL (ref 150–450)
POTASSIUM BLD-SCNC: 4.5 MMOL/L (ref 3.4–5.3)
RBC # BLD AUTO: 5.19 10E6/UL (ref 3.8–5.2)
SODIUM SERPL-SCNC: 138 MMOL/L (ref 133–144)
WBC # BLD AUTO: 8.9 10E3/UL (ref 4–11)

## 2021-08-26 PROCEDURE — 83036 HEMOGLOBIN GLYCOSYLATED A1C: CPT | Performed by: INTERNAL MEDICINE

## 2021-08-26 PROCEDURE — 0013A COVID-19,PF,MODERNA: CPT | Performed by: INTERNAL MEDICINE

## 2021-08-26 PROCEDURE — 91301 COVID-19,PF,MODERNA: CPT | Performed by: INTERNAL MEDICINE

## 2021-08-26 PROCEDURE — 99214 OFFICE O/P EST MOD 30 MIN: CPT | Mod: 25 | Performed by: INTERNAL MEDICINE

## 2021-08-26 PROCEDURE — 80048 BASIC METABOLIC PNL TOTAL CA: CPT | Performed by: INTERNAL MEDICINE

## 2021-08-26 PROCEDURE — 82043 UR ALBUMIN QUANTITATIVE: CPT | Performed by: INTERNAL MEDICINE

## 2021-08-26 PROCEDURE — 85027 COMPLETE CBC AUTOMATED: CPT | Performed by: INTERNAL MEDICINE

## 2021-08-26 PROCEDURE — 36415 COLL VENOUS BLD VENIPUNCTURE: CPT | Performed by: INTERNAL MEDICINE

## 2021-08-26 NOTE — PROGRESS NOTES
Assessment & Plan   Power was out in clinic during this visit.  Note done later.     Essential hypertension with goal blood pressure less than 140/90  Controlled   - Basic metabolic panel  (Ca, Cl, CO2, Creat, Gluc, K, Na, BUN); Future  - Basic metabolic panel  (Ca, Cl, CO2, Creat, Gluc, K, Na, BUN)    Type 2 diabetes mellitus with hyperglycemia, with long-term current use of insulin (H)     If A1C not at goal, agreeable to start a mealtime insulin shot.   Later entry: HgbA1C 8.0  Patient declined mealtime insulin for now, wants to improve DM diet and see if she can lower it this way.  Will see her in late November for close f/u.     She is encouraged to reduce daily caloric intake, too.     - Basic metabolic panel  (Ca, Cl, CO2, Creat, Gluc, K, Na, BUN); Future  - Hemoglobin A1c; Future  - Albumin Random Urine Quantitative with Creat Ratio; Future  - Albumin Random Urine Quantitative with Creat Ratio  - Hemoglobin A1c  - Basic metabolic panel  (Ca, Cl, CO2, Creat, Gluc, K, Na, BUN)    Paroxysmal atrial fibrillation (H)  On blood thinners and rate control     Ulcerative colitis without complications, unspecified location (H)   on immunosuppressant  - CBC with platelets; Future  - CBC with platelets    MAHAD (obstructive sleep apnea)- severe (AHI 80)       Morbid obesity due to excess calories (H)   discussed changing daily routine.     Encounter for screening mammogram for breast cancer     - *MA Screening Digital Bilateral; Future    Immunosuppressed status (H)  High priority for 2019-nCoV vaccine     - COVID-19,PF,MODERNA booster      I spent a total of 30 minutes on the day of the visit.   Time spent doing chart review, history and exam, documentation and further activities per the note            Return in about 4 months (around 12/26/2021) for diabetes follow up.    Khushboo Barger MD  [facility]  [place]    ==========================================================  ==================================================================    Subjective   Zaira is a 51 year old who presents for the following health issues     50 y/o F here for DM f/u.   H/o Ulcerative Colitis (vedolizumab q6W via [facility]), Immunosuppression, HTN,  DMII, PAF (Eliquis), MAHAD (wears mask, with discomfort), MO.   At last visit, nida Fernandez, considering MDI    Works for an elementary school.  Although she is vaccinated, she is concerned about the exposure she gets.   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          HPI     Diabetes Follow-up    How often are you checking your blood sugar? Three times daily  Blood sugar testing frequency justification:  on zeb  What time of day are you checking your blood sugars (select all that apply)?  Before and after meals  Have you had any blood sugars above 200?  Yes 320  Have you had any blood sugars below 70?  No    What symptoms do you notice when your blood sugar is low?  None    What concerns do you have today about your diabetes? None     Do you have any of these symptoms? (Select all that apply)  No numbness or tingling in feet.  No redness, sores or blisters on feet.  No complaints of excessive thirst.  No reports of blurry vision.  No significant changes to weight.    Have you had a diabetic eye exam in the last 12 months? No        BP Readings from Last 2 Encounters:   08/26/21 129/74   05/13/21 119/79     Hemoglobin A1C (%)   Date Value   05/13/2021 7.7 (H)   09/28/2020 7.8 (H)     LDL Cholesterol Calculated (mg/dL)   Date Value   05/13/2021 105 (H)   01/13/2020 87                 How many servings of fruits and vegetables do you eat daily?  2-3    On average, how many sweetened beverages do you drink each day (Examples: soda, juice, sweet tea, etc.  Do NOT count diet or artificially sweetened beverages)?   0    How many  days per week do you exercise enough to make your heart beat faster? 3 or less    How many minutes a day do you exercise enough to make your heart beat faster? 9 or less    How many days per week do you miss taking your medication? 0        Review of Systems   Constitutional, HEENT, cardiovascular, pulmonary, gi and gu systems are negative, except as otherwise noted.      Objective    /74 (BP Location: Right arm, Patient Position: Sitting, Cuff Size: Adult Large)   Pulse 62   Temp 98.2  F (36.8  C) (Oral)   Wt 118.4 kg (261 lb)   SpO2 96%   BMI 44.80 kg/m    Body mass index is 44.8 kg/m .  Physical Exam   GENERAL: alert, no distress and obese  EYES: Eyes grossly normal to inspection, PERRL and conjunctivae and sclerae normal  MS: no gross musculoskeletal defects noted, no edema  Diabetic foot exam: normal DP and PT pulses, no trophic changes or ulcerative lesions and normal sensory exam

## 2021-08-26 NOTE — PATIENT INSTRUCTIONS
Call to schedule imaging   ... mammogram Fall 2021    Return to clinic late November/early December... as discussed by phone

## 2021-08-27 VITALS — BODY MASS INDEX: 44.56 KG/M2 | WEIGHT: 261 LBS | HEIGHT: 64 IN

## 2021-08-27 LAB
CREAT UR-MCNC: 82 MG/DL
MICROALBUMIN UR-MCNC: 8 MG/L
MICROALBUMIN/CREAT UR: 9.76 MG/G CR (ref 0–25)

## 2021-08-27 ASSESSMENT — MIFFLIN-ST. JEOR: SCORE: 1788.89

## 2021-08-27 NOTE — PATIENT INSTRUCTIONS
Your BMI is Body mass index is 44.8 kg/m .  Weight management is a personal decision.  If you are interested in exploring weight loss strategies, the following discussion covers the approaches that may be successful. Body mass index (BMI) is one way to tell whether you are at a healthy weight, overweight, or obese. It measures your weight in relation to your height.  A BMI of 18.5 to 24.9 is in the healthy range. A person with a BMI of 25 to 29.9 is considered overweight, and someone with a BMI of 30 or greater is considered obese. More than two-thirds of American adults are considered overweight or obese.  Being overweight or obese increases the risk for further weight gain. Excess weight may lead to heart disease and diabetes.  Creating and following plans for healthy eating and physical activity may help you improve your health.  Weight control is part of healthy lifestyle and includes exercise, emotional health, and healthy eating habits. Careful eating habits lifelong are the mainstay of weight control. Though there are significant health benefits from weight loss, long-term weight loss with diet alone may be very difficult to achieve- studies show long-term success with dietary management in less than 10% of people. Attaining a healthy weight may be especially difficult to achieve in those with severe obesity. In some cases, medications, devices and surgical management might be considered.  What can you do?  If you are overweight or obese and are interested in methods for weight loss, you should discuss this with your provider.     Consider reducing daily calorie intake by 500 calories.     Keep a food journal.     Avoiding skipping meals, consider cutting portions instead.    Diet combined with exercise helps maintain muscle while optimizing fat loss. Strength training is particularly important for building and maintaining muscle mass. Exercise helps reduce stress, increase energy, and improves fitness.  Increasing exercise without diet control, however, may not burn enough calories to loose weight.       Start walking three days a week 10-20 minutes at a time    Work towards walking thirty minutes five days a week     Eventually, increase the speed of your walking for 1-2 minutes at time    In addition, we recommend that you review healthy lifestyles and methods for weight loss available through the National Institutes of Health patient information sites:  http://win.niddk.nih.gov/publications/index.htm    And look into health and wellness programs that may be available through your health insurance provider, employer, local community center, or kare[employer] club.    Weight management plan: Patient was referred to their PCP to discuss a diet and exercise plan.    Take neurontin 1 tab at bedtime for 1-2 weeks. Then increase the dose every 1-2 weeks as tolerated to:   2 tablets at bedtime,   3 tablets at bedtime,   1 tablet in afternoon and 3 at bedtime,   2 in afternoon and 3 at bedtime,  and finally 3 twice a day.   Stop going up if you reach a dose which is effective for restless sleep.  If you get a side effects, reduce dose by 1-2 stapes, wait 1-2 weeks and then go back up    Maximun dose is 900 mg three times  aday

## 2021-08-27 NOTE — RESULT ENCOUNTER NOTE
Zaira Villatoro    Electrolytes, kidney function, blood count all look great.  As discussed, the Hgb A1C has gone up and is not at goal:   You had declined a trial of starting a mealtime insulin for now, planning to improve on your diabetes diet  -- this is reasonable for now, and hopefully you can lower the HgbA1C this way.  Will see you in late November for close f/u.    All the best,     GAIL WEAVER M.D.
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
## 2021-08-31 ENCOUNTER — VIRTUAL VISIT (OUTPATIENT)
Dept: SLEEP MEDICINE | Facility: CLINIC | Age: 51
End: 2021-08-31
Payer: COMMERCIAL

## 2021-08-31 DIAGNOSIS — E66.01 MORBID OBESITY DUE TO EXCESS CALORIES (H): ICD-10-CM

## 2021-08-31 DIAGNOSIS — G47.33 OSA (OBSTRUCTIVE SLEEP APNEA): ICD-10-CM

## 2021-08-31 DIAGNOSIS — F51.04 CHRONIC INSOMNIA: ICD-10-CM

## 2021-08-31 DIAGNOSIS — G47.61 PLMD (PERIODIC LIMB MOVEMENT DISORDER): Primary | ICD-10-CM

## 2021-08-31 PROCEDURE — 99214 OFFICE O/P EST MOD 30 MIN: CPT | Mod: GT | Performed by: INTERNAL MEDICINE

## 2021-08-31 RX ORDER — GABAPENTIN 300 MG/1
300-600 CAPSULE ORAL
Qty: 60 CAPSULE | Refills: 3 | Status: SHIPPED | OUTPATIENT
Start: 2021-08-31 | End: 2021-11-16

## 2021-09-16 DIAGNOSIS — Z79.4 TYPE 2 DIABETES MELLITUS WITH HYPERGLYCEMIA, WITH LONG-TERM CURRENT USE OF INSULIN (H): Chronic | ICD-10-CM

## 2021-09-16 DIAGNOSIS — E11.65 TYPE 2 DIABETES MELLITUS WITH HYPERGLYCEMIA, WITH LONG-TERM CURRENT USE OF INSULIN (H): Chronic | ICD-10-CM

## 2021-09-20 RX ORDER — GLIMEPIRIDE 1 MG/1
TABLET ORAL
Qty: 90 TABLET | Refills: 0 | Status: SHIPPED | OUTPATIENT
Start: 2021-09-20 | End: 2021-10-27

## 2021-09-20 NOTE — TELEPHONE ENCOUNTER
Prescription approved per [facility] Refill Protocol.    Pati Calix RN

## 2021-09-23 ENCOUNTER — TRANSFERRED RECORDS (OUTPATIENT)
Dept: HEALTH INFORMATION MANAGEMENT | Facility: CLINIC | Age: 51
End: 2021-09-23

## 2021-10-01 ENCOUNTER — ALLIED HEALTH/NURSE VISIT (OUTPATIENT)
Dept: FAMILY MEDICINE | Facility: CLINIC | Age: 51
End: 2021-10-01
Payer: COMMERCIAL

## 2021-10-01 DIAGNOSIS — Z23 NEED FOR PROPHYLACTIC VACCINATION AND INOCULATION AGAINST INFLUENZA: Primary | ICD-10-CM

## 2021-10-01 PROCEDURE — 90682 RIV4 VACC RECOMBINANT DNA IM: CPT

## 2021-10-01 PROCEDURE — 90471 IMMUNIZATION ADMIN: CPT

## 2021-10-01 PROCEDURE — 99207 PR NO CHARGE NURSE ONLY: CPT

## 2021-10-05 ENCOUNTER — NURSE TRIAGE (OUTPATIENT)
Dept: INTERNAL MEDICINE | Facility: CLINIC | Age: 51
End: 2021-10-05

## 2021-10-05 NOTE — TELEPHONE ENCOUNTER
Spoke with pt. She received her flu and shingles vaccine in her left arm on 10/1. Initially had itching at the site. Noticed redness and blotchiness on Saturday.  Is still a little itchy, but nothing bad. Is still sore where she received the shot, but nothing bad. Redness goes all the way across arm and has a wider edge where it is puffy.States the redness is below where she received the vaccine. Area is larger than the size of her hand. No pain. Is hot to the touch. No previous vaccine reactions. Denies difficulty breathing. Denies fever. Redness has been moving down her arm over the last couple of days.  Advised that the redness is likely from the shingrix. Recommended pt be seen for symptoms since symptoms > 3 days and worsening. Did also recommend pt report reaction to [email or web address] website per CDC. Pt would like PCP to advise. Should she still get the 2nd dose of Shingrix?    Reason for Disposition    Redness or red streak around the injection site begins > 48 hours after shot     Pt states itching started on Friday (same day as vaccines) thinks redness started on Saturday    Additional Information    Negative: Sounds like a severe, unusual reaction to the triager    Negative: Difficulty with breathing or swallowing starts within 2 hours after injection    Negative: Difficult to awaken or acting confused (e.g., disoriented, slurred speech)    Negative: Unresponsive, passed out, or very weak    Negative: Sounds like a life-threatening emergency to the triager    Negative: Measles vaccine and purple/blood-colored rash (onset day 6-12)    Negative: Fever > 100.0 F (37.8 C) and bedridden (e.g., nursing home patient, stroke, chronic illness, recovering from surgery)    Negative: Fever > 101 F (38.3 C) and over 60 years of age    Negative: Fever > 103 F (39.4 C)    Negative: Fever > 100.0 F (37.8 C) and has diabetes mellitus or a weak immune system (e.g., HIV positive, cancer chemotherapy, organ transplant, splenectomy,  chronic steroids)    Protocols used: IMMUNIZATION [id number]

## 2021-10-05 NOTE — TELEPHONE ENCOUNTER
I would recommend she be seen.  This may be a cellulitis from injection site.  I would hold on second dose of shingrix.    Maria C Garibay, CNP

## 2021-10-05 NOTE — TELEPHONE ENCOUNTER
Pt was given provider's message as written. Pt declined UC. Appt scheduled for tomorrow.     Indu Mcbride RN  [facility]

## 2021-10-06 ENCOUNTER — OFFICE VISIT (OUTPATIENT)
Dept: FAMILY MEDICINE | Facility: CLINIC | Age: 51
End: 2021-10-06
Payer: COMMERCIAL

## 2021-10-06 VITALS
SYSTOLIC BLOOD PRESSURE: 111 MMHG | HEART RATE: 62 BPM | OXYGEN SATURATION: 97 % | WEIGHT: 263 LBS | DIASTOLIC BLOOD PRESSURE: 70 MMHG | BODY MASS INDEX: 45.14 KG/M2 | TEMPERATURE: 98.7 F

## 2021-10-06 DIAGNOSIS — L03.114 CELLULITIS OF LEFT UPPER EXTREMITY: ICD-10-CM

## 2021-10-06 DIAGNOSIS — T50.Z95A IMMUNIZATION REACTION, INITIAL ENCOUNTER: Primary | ICD-10-CM

## 2021-10-06 PROCEDURE — 99213 OFFICE O/P EST LOW 20 MIN: CPT | Performed by: NURSE PRACTITIONER

## 2021-10-06 RX ORDER — CEPHALEXIN 500 MG/1
500 CAPSULE ORAL 4 TIMES DAILY
Qty: 28 CAPSULE | Refills: 0 | Status: SHIPPED | OUTPATIENT
Start: 2021-10-06 | End: 2021-10-13

## 2021-10-06 NOTE — PROGRESS NOTES
Assessment & Plan     Immunization reaction, initial encounter  Patient to hold on getting second shingrix vaccine.    Cellulitis of left upper extremity  Patient to contact clinic if not improving in the next 2-3 days.  - cephALEXin (KEFLEX) 500 MG capsule; Take 1 capsule (500 mg) by mouth 4 times daily for 7 days    Prescription drug management             Return in about 7 weeks (around 11/22/2021) for previously scheduled appointment with PCP..    LINUS Chavez CNP  [facility] KEISHA Meneses is a 51 year old who presents for the following health issues     HPI     Had a shingles and flu shot on 10/1/21 in her left arm and now has a red spot that is moving down her arm.    Patient notes redness and swelling moving down her left arm since her vaccine on 10/1/21.  She has not had previous reaction to flu shot.  This was her first shingrix vaccine.  She notes pruritis.  She denies fever, pain.        Review of Systems   Constitutional, HEENT, cardiovascular, pulmonary, gi and gu systems are negative, except as otherwise noted.      Objective    /70 (BP Location: Other (Comment), Patient Position: Sitting, Cuff Size: Adult Regular)   Pulse 62   Temp 98.7  F (37.1  C) (Oral)   Wt 119.3 kg (263 lb)   SpO2 97%   BMI 45.14 kg/m    Body mass index is 45.14 kg/m .  Physical Exam   GENERAL: healthy, alert and no distress  MS: no gross musculoskeletal defects noted, no edema  SKIN: large area of erythema, warmth, and induration to left upper arm extending to lower arm.

## 2021-10-17 DIAGNOSIS — I10 ESSENTIAL HYPERTENSION WITH GOAL BLOOD PRESSURE LESS THAN 140/90: ICD-10-CM

## 2021-10-20 RX ORDER — LOSARTAN POTASSIUM 25 MG/1
TABLET ORAL
Qty: 45 TABLET | Refills: 0 | Status: SHIPPED | OUTPATIENT
Start: 2021-10-20 | End: 2022-01-12

## 2021-10-27 DIAGNOSIS — E11.65 TYPE 2 DIABETES MELLITUS WITH HYPERGLYCEMIA, WITH LONG-TERM CURRENT USE OF INSULIN (H): Chronic | ICD-10-CM

## 2021-10-27 DIAGNOSIS — Z79.4 TYPE 2 DIABETES MELLITUS WITH HYPERGLYCEMIA, WITH LONG-TERM CURRENT USE OF INSULIN (H): Chronic | ICD-10-CM

## 2021-10-27 RX ORDER — GLIMEPIRIDE 1 MG/1
TABLET ORAL
Qty: 90 TABLET | Refills: 3 | Status: SHIPPED | OUTPATIENT
Start: 2021-10-27 | End: 2021-11-22

## 2021-11-03 ENCOUNTER — ANCILLARY PROCEDURE (OUTPATIENT)
Dept: MAMMOGRAPHY | Facility: CLINIC | Age: 51
End: 2021-11-03
Attending: INTERNAL MEDICINE
Payer: COMMERCIAL

## 2021-11-03 DIAGNOSIS — Z12.31 ENCOUNTER FOR SCREENING MAMMOGRAM FOR BREAST CANCER: ICD-10-CM

## 2021-11-03 PROCEDURE — 77067 SCR MAMMO BI INCL CAD: CPT | Mod: TC | Performed by: RADIOLOGY

## 2021-11-04 ENCOUNTER — TRANSFERRED RECORDS (OUTPATIENT)
Dept: HEALTH INFORMATION MANAGEMENT | Facility: CLINIC | Age: 51
End: 2021-11-04

## 2021-11-04 ENCOUNTER — ANCILLARY PROCEDURE (OUTPATIENT)
Dept: CARDIOLOGY | Facility: CLINIC | Age: 51
End: 2021-11-04
Attending: INTERNAL MEDICINE
Payer: COMMERCIAL

## 2021-11-04 ENCOUNTER — TELEPHONE (OUTPATIENT)
Dept: CARDIOLOGY | Facility: CLINIC | Age: 51
End: 2021-11-04

## 2021-11-04 DIAGNOSIS — I48.0 PAROXYSMAL ATRIAL FIBRILLATION (H): ICD-10-CM

## 2021-11-04 PROCEDURE — 93246 EXT ECG>7D<15D RECORDING: CPT | Performed by: INTERNAL MEDICINE

## 2021-11-04 PROCEDURE — 93248 EXT ECG>7D<15D REV&INTERPJ: CPT | Performed by: INTERNAL MEDICINE

## 2021-11-04 NOTE — PATIENT INSTRUCTIONS
We have applied a heart monitor (ZIO Patch) for you to wear for 14 days.  You may remove the heart monitor on 14 days  at 1130am.  Please see the instruction booklet for further information, as well as instructions for removal of the heart monitor and return mailing directions.  If you should have questions regarding your monitor, please call [employer] at [phone].  We will contact you with your results (please see result notification details at bottom of summary).    *PLEASE DO NOT SHOWER OR INDUCE EXCESSIVE SWEATING IN THE FIRST 24 HOURS OF WEARING*

## 2021-11-04 NOTE — PROGRESS NOTES
14 days ZioXT applied to patient today. Instructions on use and removal given and mail back instructions discussed with patient. All questions answered. Zio Device registered with [employer] via internet.   Device number: [id number].    Quinn Dove L.P.N.

## 2021-11-08 DIAGNOSIS — Z79.4 TYPE 2 DIABETES MELLITUS WITH HYPERGLYCEMIA, WITH LONG-TERM CURRENT USE OF INSULIN (H): Chronic | ICD-10-CM

## 2021-11-08 DIAGNOSIS — E11.65 TYPE 2 DIABETES MELLITUS WITH HYPERGLYCEMIA, WITH LONG-TERM CURRENT USE OF INSULIN (H): Chronic | ICD-10-CM

## 2021-11-10 NOTE — TELEPHONE ENCOUNTER
"Requested Prescriptions   Pending Prescriptions Disp Refills     empagliflozin (JARDIANCE) 25 MG TABS tablet 90 tablet 4     Sig: Take 1 tablet (25 mg) by mouth daily       Sodium Glucose Co-Transport Inhibitor Agents Passed - 11/8/2021  1:24 PM        Passed - Patient has documented A1c within the specified period of time.     If HgbA1C is 8 or greater, it needs to be on file within the past 3 months.  If less than 8, must be on file within the past 6 months.     Recent Labs   Lab Test 08/26/21  1506   A1C 8.0*             Passed - No creatinine >1.4 or GFR <45 within the past 12 mos     Recent Labs   Lab Test 08/26/21  1506 09/28/20  0940   GFRESTIMATED 64 79   GFRESTBLACK  --  >90       Recent Labs   Lab Test 08/26/21  1506   CR 1.03             Passed - Medication is active on med list        Passed - Patient is age 18 or older        Passed - Patient is not pregnant        Passed - Patient has documented normal Potassium within the last 12 mos.     Recent Labs   Lab Test 08/26/21  1506   POTASSIUM 4.5             Passed - Patient has no positive pregnancy test within the past 12 mos.        Passed - Recent (6 mo) or future (30 days) visit within the authorizing provider's specialty     Patient had office visit in the last 6 months or has a visit in the next 30 days with authorizing provider or within the authorizing provider's specialty.  See \"Patient Info\" tab in inbasket, or \"Choose Columns\" in Meds & Orders section of the refill encounter.               Prescription approved per [facility] Refill Protocol.    "

## 2021-11-16 ENCOUNTER — OFFICE VISIT (OUTPATIENT)
Dept: DERMATOLOGY | Facility: CLINIC | Age: 51
End: 2021-11-16
Payer: COMMERCIAL

## 2021-11-16 DIAGNOSIS — L81.0 POST-INFLAMMATORY HYPERPIGMENTATION: ICD-10-CM

## 2021-11-16 DIAGNOSIS — D84.9 IMMUNOSUPPRESSION (H): ICD-10-CM

## 2021-11-16 DIAGNOSIS — L81.4 SOLAR LENTIGO: ICD-10-CM

## 2021-11-16 DIAGNOSIS — L57.8 SUN-DAMAGED SKIN: ICD-10-CM

## 2021-11-16 DIAGNOSIS — D18.01 CHERRY ANGIOMA: ICD-10-CM

## 2021-11-16 DIAGNOSIS — D48.5 NEOPLASM OF UNCERTAIN BEHAVIOR OF SKIN: Primary | ICD-10-CM

## 2021-11-16 DIAGNOSIS — D22.9 MULTIPLE BENIGN NEVI: ICD-10-CM

## 2021-11-16 PROCEDURE — 88305 TISSUE EXAM BY PATHOLOGIST: CPT | Performed by: DERMATOLOGY

## 2021-11-16 PROCEDURE — 11102 TANGNTL BX SKIN SINGLE LES: CPT | Performed by: DERMATOLOGY

## 2021-11-16 PROCEDURE — 99213 OFFICE O/P EST LOW 20 MIN: CPT | Mod: 25 | Performed by: DERMATOLOGY

## 2021-11-16 RX ORDER — ESTRADIOL 0.1 MG/G
CREAM VAGINAL
COMMUNITY
Start: 2021-11-04 | End: 2023-01-04

## 2021-11-16 NOTE — PATIENT INSTRUCTIONS
For prevention, I recommend Neutrogena Clear Pore BPO wash as a body wash in the shower a couple times a week.      Wound Care After a Biopsy    What is a skin biopsy?  A skin biopsy allows the doctor to examine a very small piece of tissue under the microscope to determine the diagnosis and the best treatment for the skin condition. A local anesthetic (numbing medicine)  is injected with a very small needle into the skin area to be tested. A small piece of skin is taken from the area. Sometimes a suture (stitch) is used.     What are the risks of a skin biopsy?  I will experience scar, bleeding, swelling, pain, crusting and redness. I may experience incomplete removal or recurrence. Risks of this procedure are excessive bleeding, bruising, infection, nerve damage, numbness, thick (hypertrophic or keloidal) scar and non-diagnostic biopsy.    How should I care for my wound for the first 24 hours?    Keep the wound dry and covered for 24 hours    If it bleeds, hold direct pressure on the area for 15 minutes. If bleeding does not stop then go to the emergency room    Avoid strenuous exercise the first 1-2 days or as your doctor instructs you    How should I care for the wound after 24 hours?    After 24 hours, remove the bandage    You may bathe or shower as normal    If you had a scalp biopsy, you can shampoo as usual and can use shower water to clean the biopsy site daily    Clean the wound twice a day with gentle soap and water    Do not scrub, be gentle    Apply white petroleum/Vaseline after cleaning the wound with a cotton swab or a clean finger, and keep the site covered with a Bandaid /bandage. Bandages are not necessary with a scalp biopsy    If you are unable to cover the site with a Bandaid /bandage, re-apply ointment 2-3 times a day to keep the site moist. Moisture will help with healing    Avoid strenuous activity for first 1-2 days    Avoid lakes, rivers, pools, and oceans until the stitches are removed  or the site is healed    How do I clean my wound?    Wash hands thoroughly with soap or use hand  before all wound care    Clean the wound with gentle soap and water    Apply white petroleum/Vaseline  to wound after it is clean    Replace the Bandaid /bandage to keep the wound covered for the first few days or as instructed by your doctor    If you had a scalp biopsy, warm shower water to the area on a daily basis should suffice    What should I use to clean my wound?     Cotton-tipped applicators (Qtips )    White petroleum jelly (Vaseline ). Use a clean new container and use Q-tips to apply.    Bandaids   as needed    Gentle soap     How should I care for my wound long term?    Do not get your wound dirty    Keep up with wound care for one week or until the area is healed.    A small scab will form and fall off by itself when the area is completely healed. The area will be red and will become pink in color as it heals. Sun protection is very important for how your scar will turn out. Sunscreen with an SPF 30 or greater is recommended once the area is healed.    You should have some soreness but it should be mild and slowly go away over several days. Talk to your doctor about using tylenol for pain,    When should I call my doctor?  If you have increased:     Pain or swelling    Pus or drainage (clear or slightly yellow drainage is ok)    Temperature over 100F    Spreading redness or warmth around wound    When will I hear about my results?  The biopsy results can take 2 weeks to come back.  Your results will automatically release to [employer] before your provider has even reviewed them.  The clinic will call you with the results, send you a [employer] message, or have you schedule a follow-up clinic or phone time to discuss the results.  Contact our clinics if you do not hear from us in 2 weeks.    Who should I call with questions?    [facility]: [phone]    [facility]  [facility]: [phone]    For urgent needs outside of business hours call the [facility] at [phone] and ask for the dermatology resident on call

## 2021-11-16 NOTE — PROGRESS NOTES
[facility] Dermatology Note  Encounter Date: Nov 16, 2021  Office Visit     Dermatology Problem List:  Last skin check 11/16/21, recommended yearly  0. Right lower back, bx 11/16/21  1. Relevant medical history: Ulcerative colitis on entyvio currently, humira previously  2. AK - s/p cryo  3. HS, mild: BPO wash in the shower      Social History: Not currently working. 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 Has a cabin.  Family History: Mom with BCC.  ____________________________________________    Assessment & Plan:    # Immunosuppressed with entyvio for ulcerative colitis. Discussed increased risk of skin cancers with immunosuppressing medications.   - Recommend sunscreens SPF #30 or greater, protective clothing and avoidance of tanning beds.   - Recommended yearly skin exams.    # Sun damaged skin with solar lentigines: Chronic, stable.  - Recommend sunscreens SPF #30 or greater, protective clothing and avoidance of tanning beds.    # Benign skin findings including: cherry angioma, dermatofibroma - minor, self limited problem  - No further intervention required. Patient to report changes.   - Patient reassured of the benign nature of these lesions.    # Multiple clinically benign nevi: Chronic, stable  - No further intervention required. Patient to report changes.   - Patient reassured of the benign nature of these lesions.    # Hidradenitis suppurativa, mild with only PIH, left inguinal crease.  - Patient reports history of boils.  - Recommended Neutrogena Clear Pore BPO wash as a preventative measure.    # Neoplasm of uncertain behavior on the right lower back. The differential diagnosis includes irritated nevus vs other.   - See shave biopsy procedure note below.    Procedures Performed:   - Shave biopsy procedure note, location: right lower back. After discussion of benefits and risks including but not limited to bleeding, infection, scar, incomplete removal,  recurrence, and non-diagnostic biopsy, written consent and photographs were obtained. The area was cleaned with isopropyl alcohol. 0.5mL of 1% lidocaine with epinephrine was injected to obtain adequate anesthesia of lesion. Shave biopsy at site performed. Hemostasis was achieved with aluminium chloride. Petrolatum ointment and a sterile dressing were applied. The patient tolerated the procedure and no complications were noted. The patient was provided with verbal and written post care instructions.     Follow-up: pending path results, 1 year in-person for skin check, or earlier for new or changing lesions.    Staff and Scribe:     Scribe Disclosure:   I, Natasha Massey, am serving as a scribe to document services personally performed by this physician, Dr. Lynda Combs, based on data collection and the provider's statements to me.     Provider Disclosure:   The documentation recorded by the scribe accurately reflects the services I personally performed and the decisions made by me.    Lynda Combs MD    Department of Dermatology  [facility]: Phone: [phone], Fax:[phone]  [facility]: Phone: [phone], Fax: [phone]    ____________________________________________    CC: Skin Check (FBSE. Area of concern-chest. No personal hx of SC. Mother-BCC. Immunosuppressed )    HPI:  Ms. Zaira Villatoro is a(n) 51 year old female who presents today as a return patient for skin check.    Last seen 11/19/2020. At that time, cryotherapy was performed on 1 AK on the nasal supratip.    Today, patient notes that the chest feels like sandpaper. Also has a possible ingrown hair in the left groin area.    She is unsure if she has been on Imuran in the past.    Patient is otherwise feeling well, without additional skin concerns.    Labs Reviewed:  N/A    Physical Exam:  Vitals: There were no  vitals taken for this visit.  SKIN: Total skin excluding the undergarment areas was performed. The exam included the head/face, neck, both arms, chest, back, abdomen, buttocks, both legs, digits and/or nails.  - Mcintyre Type II  - Scattered brown macules on sun exposed areas.  - There are dome shaped bright red papules on the trunk.   - Multiple regular brown pigmented macules and papules are identified on the trunk and extremities. About 30 nevi in all. None are atypical.  - There is a firm tan/flesh colored papule that dimples with lateral pressure on the right lateral lower leg.  - PIH in the left inguinal crease.  - Right lower back: pink papule with surrounding erythema  - No other lesions of concern on areas examined.     Medications:  Current Outpatient Medications   Medication     apixaban ANTICOAGULANT (ELIQUIS ANTICOAGULANT) 5 MG tablet     atenolol (TENORMIN) 50 MG tablet     blood glucose (ACCU-CHEK SHARMILA PLUS) test strip     blood glucose monitoring (ACCU-CHEK FASTCLIX) lancets     cholecalciferol (VITAMIN D3) 125 mcg (5000 units) capsule     empagliflozin (JARDIANCE) 25 MG TABS tablet     estradiol (ESTRACE) 0.1 MG/GM vaginal cream     glimepiride (AMARYL) 1 MG tablet     glimepiride (AMARYL) 4 MG tablet     insulin detemir (LEVEMIR FLEXTOUCH) 100 UNIT/ML pen     losartan (COZAAR) 25 MG tablet     TYLENOL CAPS 500 MG OR     vedolizumab (ENTYVIO) 60 MG/ML injection     diphenhydrAMINE (BENADRYL ALLERGY) 25 MG tablet     DRAMAMINE OR     fluticasone (FLOVENT HFA) 110 MCG/ACT inhaler     gabapentin (NEURONTIN) 100 MG capsule     gabapentin (NEURONTIN) 300 MG capsule     insulin pen needle (BD SHAHNAZ U/F) 32G X 4 MM miscellaneous     order for DME     order for DME     STATIN NOT PRESCRIBED, INTENTIONAL,     No current facility-administered medications for this visit.      Past Medical History:   Patient Active Problem List   Diagnosis     Migraine     Ulcerative colitis (H)     Fatty liver      CARDIOVASCULAR SCREENING; LDL GOAL LESS THAN 100     Essential hypertension with goal blood pressure less than 140/90     Type 2 diabetes mellitus with hyperglycemia, with long-term current use of insulin (H)     Morbid obesity due to excess calories (H)     Incisional hernia, without obstruction or gangrene     Paroxysmal atrial fibrillation (H)     Low back pain     MAHAD (obstructive sleep apnea)- severe (AHI 80)     Immunosuppressed status (H)     Plantar fasciitis     Pelvic pain     Dermatochalasis of both upper eyelids     Brow ptosis, bilateral     Involutional ptosis, acquired, bilateral     Lichen sclerosus et atrophicus of the vulva     Hyperlipidemia LDL goal <100     Chronic insomnia     Past Medical History:   Diagnosis Date     Acute diverticulitis 7/31/2013     History of seizures as a child 1981    One grand mal in 5th grade.  Didn't tolerate phenobarb.     Moderate single current episode of major depressive disorder (H)      Perforation of sigmoid colon (H) 8/3/2013     S/P left hemicolectomy 8/16/2013 8/02/13      Sepsis (H) 8/1/2013        CC Referred Self, MD  No address on file on close of this encounter.

## 2021-11-16 NOTE — PATIENT INSTRUCTIONS
Decrease Fluoxetine to 20 mg daily.  After 1 month, if you feel less fatigued, we can stop it.  If you feel more depressed, we can go back up on the dose.  When your current supply of Victoza is gone, start Bydureon 1 injection weekly.  Start Amaryl (glimiperide) 2 mg daily, starting tomorrow morning with breakfast.  Make an appointment with Dr. Gill.    [facility]    If you have any questions regarding to your visit please contact your care team:     Team Pink:   Clinic Hours Telephone Number   Internal Medicine:  Dr. Tayler Garibay, NP       7am-7pm  Monday - Thursday   7am-5pm  Fridays  [phone]  (Appointment scheduling available 24/7)    Questions about your recent visit?  Team Line  [phone]   Urgent Care - [place] and [facility] - 11am-9pm Monday-Friday Saturday-Sunday- 9am-5pm   [place] - 5pm-9pm Monday-Friday Saturday-Sunday- 9am-5pm  [phone] - [place]  [phone] - [place]       What options do I have for a visit other than an office visit? We offer electronic visits (e-visits) and telephone visits, when medically appropriate.  Please check with your medical insurance to see if these types of visits are covered, as you will be responsible for any charges that are not paid by your insurance.      You can use CME (secure electronic communication) to access to your chart, send your primary care provider a message, or make an appointment. Ask a team member how to get started.     For a price quote for your services, please call our Consumer Price Line at [phone] or our Imaging Cost estimation line at [phone] (for imaging tests).     PROGRESS NOTE    Subjective:     Feliz Grandchild, [de-identified] y.o. male with a past medical history significant for coronary disease s/p PCI, dilated cardiomyopathy, afib, alcohol abuse, current smoker 2ppd presents to the ED with chief complaint of Shortness of Breath and sharp non-exertional non-radiating chest pain. 23-year-old male has a known \"weak heart\". Does not follow with cardiology. Says he has been having worsening shortness of breath has been progressive over the last 1 to 2 weeks. Has chronic intermittent cough. Unsure about any fevers. Is feeling weaker. No new swelling. No nausea vomiting diarrhea. Some breathing that is been bothering him. Not related to position or exertion. Upon work-up was found to be in afib with elevated troponin and elevated BNP. Concern for NSTEMI.    11/16/21:  Today patient is feeling very well. No complaints except for intermittent cough. Denies chest pain, SOB, fevers/chills. Hgb dropped lower to 6.8, requires transfusion. WBC's remain elevated but trended downward, cannot rule out dilution as a factor. Social History     Tobacco Use    Smoking status: 2packs per day since he was 7     Smokeless tobacco: Not on file   Substance Use Topics    Alcohol use: 40oz beer daily, sometimes liquor too        Prior to Admission medications    Medication Sig Start Date End Date Taking? Authorizing Provider   amLODIPine (NORVASC) 10 mg tablet Take 10 mg by mouth daily. 9/3/21  Yes Provider, Historical   clopidogreL (PLAVIX) 75 mg tab Take 75 mg by mouth daily. 9/3/21  Yes Provider, Historical   lisinopriL (PRINIVIL, ZESTRIL) 20 mg tablet Take 20 mg by mouth daily. 9/3/21  Yes Provider, Historical   omeprazole (PRILOSEC) 20 mg capsule Take 20 mg by mouth daily. 9/3/21  Yes Provider, Historical   tamsulosin (FLOMAX) 0.4 mg capsule Take 1 Capsule by mouth daily.  9/3/21  Yes Provider, Historical   traZODone (DESYREL) 50 mg tablet Take 1 Tablet by mouth nightly as needed. 9/3/21  Yes Provider, Historical     No Known Allergies       Objective: Intake and Output:    No intake/output data recorded. No intake/output data recorded. Physical Exam:   Visit Vitals  /72 (BP 1 Location: Left upper arm, BP Patient Position: At rest;Semi fowlers)   Pulse 79   Temp 98 °F (36.7 °C)   Resp 20   Ht 5' 11.5\" (1.816 m)   Wt 74.8 kg (165 lb)   SpO2 92%   BMI 22.69 kg/m²     General:  Alert, cooperative, slight difficult breathing, appears stated age. Head:  Normocephalic, without obvious abnormality, atraumatic. Eyes:  Conjunctivae/corneas clear. PERRL, EOMs intact. Fundi benign   Ears:  Normal TMs and external ear canals both ears. Nose: Nares normal. Septum midline. Mucosa normal. No drainage or sinus tenderness. Throat: Lips, mucosa, and tongue normal. Teeth and gums normal.   Neck: Supple, symmetrical, trachea midline, no adenopathy, thyroid: no enlargement/tenderness/nodules, no carotid bruit and no JVD. Back:   Symmetric, no curvature. ROM normal. No CVA tenderness. Lungs:   Rales and rhonchi, coughing on exam    Chest wall:  No tenderness or deformity. Heart:  Tachycardic and normal rhythm, S1, S2 normal, no murmur, click, rub or gallop. Abdomen:   Soft, non-tender. Bowel sounds normal. No masses,  No organomegaly. Genitalia:  Normal male without lesion, discharge or tenderness. Rectal:  Normal tone, normal prostate, no masses or tenderness  Guaiac negative stool. Extremities: Extremities normal, atraumatic, no cyanosis or edema. Pulses: 2+ and symmetric all extremities. Skin: Skin color, texture, turgor normal. No rashes or lesions   Lymph nodes: Cervical, supraclavicular, and axillary nodes normal.   Neurologic: CNII-XII intact. Normal strength, sensation and reflexes throughout. ECG:  Atrial fibrillation.  Nonspecific T wave abnormality      Data Review:   Recent Results (from the past 24 hour(s))   CULTURE, BLOOD, PAIRED    Collection Time: 11/15/21 12:00 PM    Specimen: Blood   Result Value Ref Range    Special Requests: No Special Requests      Culture result: No growth after 20 hours     LACTIC ACID    Collection Time: 11/15/21 12:00 PM   Result Value Ref Range    Lactic acid 1.4 0.4 - 2.0 mmol/L   COVID-19 RAPID TEST    Collection Time: 11/15/21  9:00 PM   Result Value Ref Range    Specimen source Please find results under separate order      COVID-19 rapid test Not Detected Not Detected     METABOLIC PANEL, BASIC    Collection Time: 11/15/21  9:47 PM   Result Value Ref Range    Sodium 139 136 - 145 mmol/L    Potassium 3.5 3.5 - 5.1 mmol/L    Chloride 105 97 - 108 mmol/L    CO2 28 21 - 32 mmol/L    Anion gap 6 5 - 15 mmol/L    Glucose 202 (H) 65 - 100 mg/dL    BUN 13 6 - 20 mg/dL    Creatinine 1.17 0.70 - 1.30 mg/dL    BUN/Creatinine ratio 11 (L) 12 - 20      GFR est AA >60 >60 ml/min/1.73m2    GFR est non-AA 60 (L) >60 ml/min/1.73m2    Calcium 8.6 8.5 - 09.5 mg/dL   METABOLIC PANEL, COMPREHENSIVE    Collection Time: 11/16/21  4:23 AM   Result Value Ref Range    Sodium 138 136 - 145 mmol/L    Potassium 4.1 3.5 - 5.1 mmol/L    Chloride 104 97 - 108 mmol/L    CO2 28 21 - 32 mmol/L    Anion gap 6 5 - 15 mmol/L    Glucose 143 (H) 65 - 100 mg/dL    BUN 19 6 - 20 mg/dL    Creatinine 1.19 0.70 - 1.30 mg/dL    BUN/Creatinine ratio 16 12 - 20      GFR est AA >60 >60 ml/min/1.73m2    GFR est non-AA 59 (L) >60 ml/min/1.73m2    Calcium 8.6 8.5 - 10.1 mg/dL    Bilirubin, total 0.5 0.2 - 1.0 mg/dL    AST (SGOT) 12 (L) 15 - 37 U/L    ALT (SGPT) 14 12 - 78 U/L    Alk.  phosphatase 75 45 - 117 U/L    Protein, total 6.3 (L) 6.4 - 8.2 g/dL    Albumin 3.2 (L) 3.5 - 5.0 g/dL    Globulin 3.1 2.0 - 4.0 g/dL    A-G Ratio 1.0 (L) 1.1 - 2.2     CBC WITH AUTOMATED DIFF    Collection Time: 11/16/21  4:23 AM   Result Value Ref Range    WBC 11.4 (H) 4.1 - 11.1 K/uL    RBC 3.50 (L) 4.10 - 5.70 M/uL    HGB 6.8 (L) 12.1 - 17.0 g/dL    HCT 24.0 (L) 36.6 - 50.3 %    MCV 68.6 (L) 80.0 - 99.0 FL    MCH 19.4 (L) 26.0 - 34.0 PG    MCHC 28.3 (L) 30.0 - 36.5 g/dL    RDW 19.4 (H) 11.5 - 14.5 %    PLATELET 247 105 - 522 K/uL    MPV 10.7 8.9 - 12.9 FL    NRBC 0.2 (H) 0.0  WBC    ABSOLUTE NRBC 0.02 (H) 0.00 - 0.01 K/uL    NEUTROPHILS 92 (H) 32 - 75 %    LYMPHOCYTES 5 (L) 12 - 49 %    MONOCYTES 3 (L) 5 - 13 %    EOSINOPHILS 0 0 - 7 %    BASOPHILS 0 0 - 1 %    IMMATURE GRANULOCYTES 0 0 - 0.5 %    ABS. NEUTROPHILS 10.5 (H) 1.8 - 8.0 K/UL    ABS. LYMPHOCYTES 0.5 (L) 0.8 - 3.5 K/UL    ABS. MONOCYTES 0.3 0.0 - 1.0 K/UL    ABS. EOSINOPHILS 0.0 0.0 - 0.4 K/UL    ABS. BASOPHILS 0.0 0.0 - 0.1 K/UL    ABS. IMM. GRANS. 0.1 (H) 0.00 - 0.04 K/UL    DF AUTOMATED       IMAGING  Chest x-ray 1. Bilateral pleural effusions with bilateral airspace disease or edema. 2. Multiple right-sided rib fractures, probably old, recommend follow-up as  Warranted. CONSULTS   Cardiology: Dx HFrEF, Afib with RVR. Continue IV Lasix BID. Strict I/O monitoring. Start metoprolol for rate control and aspirin 81mg daily. Order ECHO. Pulmonology: Dx resp fail, COPD. O2NC >93%, intubate and place on vent if NIV fails. Empiriv IV antibiotics pending culture results. Bronchodilators.     MEDICATIONS    Current Facility-Administered Medications:     budesonide-formoteroL (SYMBICORT) 160-4.5 mcg/actuation HFA inhaler 2 Puff, 2 Puff, Inhalation, BID RT, Wendy Apodaca MD, 2 Puff at 11/16/21 0741    0.9% sodium chloride infusion 250 mL, 250 mL, IntraVENous, PRN, Jose Santana MD    methylPREDNISolone (PF) (SOLU-MEDROL) injection 40 mg, 40 mg, IntraVENous, Q8H, Jose Santana MD    pantoprazole (PROTONIX) tablet 40 mg, 40 mg, Oral, DAILY, Wendy Apodaca MD    traMADoL (ULTRAM) tablet 50 mg, 50 mg, Oral, Q6H PRN, Wendy Apodaca MD    acetaminophen (TYLENOL) tablet 650 mg, 650 mg, Oral, Q6H PRN **OR** acetaminophen (TYLENOL) suppository 650 mg, 650 mg, Rectal, Q6H PRN, Citlali Apodaca MD    polyethylene glycol (MIRALAX) packet 17 g, 17 g, Oral, DAILY PRN, Soila Apodaca MD    ondansetron (ZOFRAN ODT) tablet 4 mg, 4 mg, Oral, Q8H PRN **OR** ondansetron (ZOFRAN) injection 4 mg, 4 mg, IntraVENous, Q6H PRN, Wendy Apodaca MD    piperacillin-tazobactam (ZOSYN) 3.375 g in 0.9% sodium chloride (MBP/ADV) 100 mL MBP, 3.375 g, IntraVENous, Q8H, Wendy Apodaca MD, Last Rate: 200 mL/hr at 11/16/21 0549, 3.375 g at 11/16/21 0549    furosemide (LASIX) injection 40 mg, 40 mg, IntraVENous, BID, Wendy Apodaca MD, 40 mg at 11/16/21 4547    aspirin chewable tablet 81 mg, 81 mg, Oral, DAILY, Saniya Tamayo MD, 81 mg at 11/16/21 0810    metoprolol succinate (TOPROL-XL) XL tablet 25 mg, 25 mg, Oral, DAILY, Saniya Tamayo MD, 25 mg at 11/16/21 0811    albuterol (PROVENTIL HFA, VENTOLIN HFA, PROAIR HFA) inhaler 2 Puff, 2 Puff, Inhalation, Q4H PRN, Jose Santana MD    influenza vaccine 2021-22 (6 mos+)(PF) (FLUARIX/FLULAVAL/FLUZONE QUAD) injection 0.5 mL, 1 Each, IntraMUSCular, PRIOR TO DISCHARGE, Wendy Apodaca MD    traZODone (DESYREL) tablet 25 mg, 25 mg, Oral, QHS, Saniya Tamayo MD, 25 mg at 11/15/21 2217    Assessment/plan:   Congestive heart failure with reduced EF  - CXR:  Bilateral pleural effusions with bilateral airspace disease or edema. Multiple right-sided rib fractures, probably old, recommend follow-up as warranted.    -Continue IV Lasix BID. Strict I/O monitoring. Order ECHO. Atrial Fibrillation with RVR  Start metoprolol for rate control and aspirin 81mg daily. PNA due to infectious organism  - elevated WBC  - start on abx     hgb at 6.9  - transfuse if goes below 7    Acute hypoxic respiratory failure  COPD with severe acute exacerbation, poor airway clearance  -O2NC >93%, intubate and place on vent if NIV fails.   -Empiric IV antibiotics pending culture results.    -Bronchodilators.  -Order ABG    HTN  -Continue home BP medications    Hyperlipidemia    Alcohol Abuse  -PATRICK protocol  -Seizure precautions  -Ativan on stand-by    Nicotine dependence  -Nicotine patch prn    Frequent falls  -Fall precautions    At time of admission was unable to start any anticoagulation due to anemia hemoglobin 6.8 .  Was started on IV Lasix 40 twice daily IV Solu-Medrol nebulizer treatment IV Zosyn cardiology pulmonary consult    Need home medication not available at this time

## 2021-11-16 NOTE — NURSING NOTE
Zaira Villatoro's goals for this visit include:   Chief Complaint   Patient presents with     Skin Check     FBSE. Area of concern-chest. No personal hx of SC. Mother-BCC. Immunosuppressed        She requests these members of her care team be copied on today's visit information:     PCP: Khushboo Barger    Referring Provider:  Referred Self, MD  No address on file    There were no vitals taken for this visit.    Do you need any medication refills at today's visit? Yesenia Martinez on 11/16/2021 at 12:37 PM

## 2021-11-16 NOTE — NURSING NOTE
The following medication was given:     MEDICATION:  Lidocaine with epinephrine 1% [id number]  ROUTE: SQ  SITE: see procedure note  DOSE: 0.5cc  LOT #: -[id number]  : Jason  EXPIRATION DATE: 6/1/22  NDC#: 7717-1002-78  Was there drug waste? 1.5cc  Multi-dose vial: Yes            Vanda Martinez on 11/16/2021 at 1:22 PM

## 2021-11-16 NOTE — LETTER
11/16/2021         RE: Zaira Villatoro  [address] [place] [address]        Dear Colleague,    Thank you for referring your patient, Zaira Villatoro, to the [facility]. Please see a copy of my visit note below.    [facility] Dermatology Note  Encounter Date: Nov 16, 2021  Office Visit     Dermatology Problem List:  Last skin check 11/16/21, recommended yearly  0. Right lower back, bx 11/16/21  1. Relevant medical history: Ulcerative colitis on entyvio currently, humira previously  2. AK - s/p cryo  3. HS, mild: BPO wash in the shower      Social History: Not currently working. 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 Has a cabin.  Family History: Mom with BCC.  ____________________________________________    Assessment & Plan:    # Immunosuppressed with entyvio for ulcerative colitis. Discussed increased risk of skin cancers with immunosuppressing medications.   - Recommend sunscreens SPF #30 or greater, protective clothing and avoidance of tanning beds.   - Recommended yearly skin exams.    # Sun damaged skin with solar lentigines: Chronic, stable.  - Recommend sunscreens SPF #30 or greater, protective clothing and avoidance of tanning beds.    # Benign skin findings including: cherry angioma, dermatofibroma - minor, self limited problem  - No further intervention required. Patient to report changes.   - Patient reassured of the benign nature of these lesions.    # Multiple clinically benign nevi: Chronic, stable  - No further intervention required. Patient to report changes.   - Patient reassured of the benign nature of these lesions.    # Hidradenitis suppurativa, mild with only PIH, left inguinal crease.  - Patient reports history of boils.  - Recommended Neutrogena Clear Pore BPO wash as a preventative measure.    # Neoplasm of uncertain behavior on the right lower back. The differential diagnosis includes irritated nevus vs  other.   - See shave biopsy procedure note below.    Procedures Performed:   - Shave biopsy procedure note, location: right lower back. After discussion of benefits and risks including but not limited to bleeding, infection, scar, incomplete removal, recurrence, and non-diagnostic biopsy, written consent and photographs were obtained. The area was cleaned with isopropyl alcohol. 0.5mL of 1% lidocaine with epinephrine was injected to obtain adequate anesthesia of lesion. Shave biopsy at site performed. Hemostasis was achieved with aluminium chloride. Petrolatum ointment and a sterile dressing were applied. The patient tolerated the procedure and no complications were noted. The patient was provided with verbal and written post care instructions.     Follow-up: pending path results, 1 year in-person for skin check, or earlier for new or changing lesions.    Staff and Scribe:     Scribe Disclosure:   I, Natasha Massey, am serving as a scribe to document services personally performed by this physician, Dr. Lynda Combs, based on data collection and the provider's statements to me.     Provider Disclosure:   The documentation recorded by the scribe accurately reflects the services I personally performed and the decisions made by me.    Lynda Combs MD    Department of Dermatology  [facility]: Phone: [phone], Fax:[phone]  [facility]: Phone: [phone], Fax: [phone]    ____________________________________________    CC: Skin Check (FBSE. Area of concern-chest. No personal hx of SC. Mother-BCC. Immunosuppressed )    HPI:  Ms. Zaira Villatoro is a(n) 51 year old female who presents today as a return patient for skin check.    Last seen 11/19/2020. At that time, cryotherapy was performed on 1 AK on the nasal supratip.    Today, patient notes that the chest feels like  sandpaper. Also has a possible ingrown hair in the left groin area.    She is unsure if she has been on Imuran in the past.    Patient is otherwise feeling well, without additional skin concerns.    Labs Reviewed:  N/A    Physical Exam:  Vitals: There were no vitals taken for this visit.  SKIN: Total skin excluding the undergarment areas was performed. The exam included the head/face, neck, both arms, chest, back, abdomen, buttocks, both legs, digits and/or nails.  - Mcintyre Type II  - Scattered brown macules on sun exposed areas.  - There are dome shaped bright red papules on the trunk.   - Multiple regular brown pigmented macules and papules are identified on the trunk and extremities. About 30 nevi in all. None are atypical.  - There is a firm tan/flesh colored papule that dimples with lateral pressure on the right lateral lower leg.  - PIH in the left inguinal crease.  - Right lower back: pink papule with surrounding erythema  - No other lesions of concern on areas examined.     Medications:  Current Outpatient Medications   Medication     apixaban ANTICOAGULANT (ELIQUIS ANTICOAGULANT) 5 MG tablet     atenolol (TENORMIN) 50 MG tablet     blood glucose (ACCU-CHEK SHARMILA PLUS) test strip     blood glucose monitoring (ACCU-CHEK FASTCLIX) lancets     cholecalciferol (VITAMIN D3) 125 mcg (5000 units) capsule     empagliflozin (JARDIANCE) 25 MG TABS tablet     estradiol (ESTRACE) 0.1 MG/GM vaginal cream     glimepiride (AMARYL) 1 MG tablet     glimepiride (AMARYL) 4 MG tablet     insulin detemir (LEVEMIR FLEXTOUCH) 100 UNIT/ML pen     losartan (COZAAR) 25 MG tablet     TYLENOL CAPS 500 MG OR     vedolizumab (ENTYVIO) 60 MG/ML injection     diphenhydrAMINE (BENADRYL ALLERGY) 25 MG tablet     DRAMAMINE OR     fluticasone (FLOVENT HFA) 110 MCG/ACT inhaler     gabapentin (NEURONTIN) 100 MG capsule     gabapentin (NEURONTIN) 300 MG capsule     insulin pen needle (BD SHAHNAZ U/F) 32G X 4 MM miscellaneous     order for DME      order for DME     STATIN NOT PRESCRIBED, INTENTIONAL,     No current facility-administered medications for this visit.      Past Medical History:   Patient Active Problem List   Diagnosis     Migraine     Ulcerative colitis (H)     Fatty liver     CARDIOVASCULAR SCREENING; LDL GOAL LESS THAN 100     Essential hypertension with goal blood pressure less than 140/90     Type 2 diabetes mellitus with hyperglycemia, with long-term current use of insulin (H)     Morbid obesity due to excess calories (H)     Incisional hernia, without obstruction or gangrene     Paroxysmal atrial fibrillation (H)     Low back pain     MAHAD (obstructive sleep apnea)- severe (AHI 80)     Immunosuppressed status (H)     Plantar fasciitis     Pelvic pain     Dermatochalasis of both upper eyelids     Brow ptosis, bilateral     Involutional ptosis, acquired, bilateral     Lichen sclerosus et atrophicus of the vulva     Hyperlipidemia LDL goal <100     Chronic insomnia     Past Medical History:   Diagnosis Date     Acute diverticulitis 7/31/2013     History of seizures as a child 1981    One grand mal in 5th grade.  Didn't tolerate phenobarb.     Moderate single current episode of major depressive disorder (H)      Perforation of sigmoid colon (H) 8/3/2013     S/P left hemicolectomy 8/16/2013 8/02/13      Sepsis (H) 8/1/2013        CC Referred Self, MD  No address on file on close of this encounter.       Again, thank you for allowing me to participate in the care of your patient.        Sincerely,        Lynda Combs MD

## 2021-11-18 ENCOUNTER — MYC MEDICAL ADVICE (OUTPATIENT)
Dept: INTERNAL MEDICINE | Facility: CLINIC | Age: 51
End: 2021-11-18

## 2021-11-18 ENCOUNTER — ANCILLARY PROCEDURE (OUTPATIENT)
Dept: CARDIOLOGY | Facility: CLINIC | Age: 51
End: 2021-11-18
Attending: INTERNAL MEDICINE
Payer: COMMERCIAL

## 2021-11-18 DIAGNOSIS — Z20.822 EXPOSURE TO 2019 NOVEL CORONAVIRUS: Primary | ICD-10-CM

## 2021-11-18 DIAGNOSIS — I48.0 PAROXYSMAL ATRIAL FIBRILLATION (H): ICD-10-CM

## 2021-11-18 LAB
LVEF ECHO: NORMAL
PATH REPORT.COMMENTS IMP SPEC: NORMAL
PATH REPORT.COMMENTS IMP SPEC: NORMAL
PATH REPORT.FINAL DX SPEC: NORMAL
PATH REPORT.GROSS SPEC: NORMAL
PATH REPORT.MICROSCOPIC SPEC OTHER STN: NORMAL
PATH REPORT.RELEVANT HX SPEC: NORMAL

## 2021-11-18 PROCEDURE — 93306 TTE W/DOPPLER COMPLETE: CPT | Performed by: INTERNAL MEDICINE

## 2021-11-19 NOTE — TELEPHONE ENCOUNTER
LVM for pt to give us a call back to let us know if she wants to do a video or telephone visit. Please assist with scheduling.     Karin REICH

## 2021-11-19 NOTE — TELEPHONE ENCOUNTER
If negative could come in on Monday..   Otherwise yes, if negative could do the labs but visit via video/tel if desired.

## 2021-11-22 ENCOUNTER — OFFICE VISIT (OUTPATIENT)
Dept: INTERNAL MEDICINE | Facility: CLINIC | Age: 51
End: 2021-11-22
Payer: COMMERCIAL

## 2021-11-22 VITALS
HEART RATE: 57 BPM | WEIGHT: 264 LBS | DIASTOLIC BLOOD PRESSURE: 70 MMHG | OXYGEN SATURATION: 97 % | TEMPERATURE: 97.8 F | BODY MASS INDEX: 45.32 KG/M2 | SYSTOLIC BLOOD PRESSURE: 133 MMHG

## 2021-11-22 DIAGNOSIS — I48.0 PAROXYSMAL ATRIAL FIBRILLATION (H): ICD-10-CM

## 2021-11-22 DIAGNOSIS — E11.65 TYPE 2 DIABETES MELLITUS WITH HYPERGLYCEMIA, WITH LONG-TERM CURRENT USE OF INSULIN (H): Primary | ICD-10-CM

## 2021-11-22 DIAGNOSIS — K51.90 ULCERATIVE COLITIS WITHOUT COMPLICATIONS, UNSPECIFIED LOCATION (H): ICD-10-CM

## 2021-11-22 DIAGNOSIS — Z20.822 EXPOSURE TO 2019 NOVEL CORONAVIRUS: ICD-10-CM

## 2021-11-22 DIAGNOSIS — I10 ESSENTIAL HYPERTENSION WITH GOAL BLOOD PRESSURE LESS THAN 140/90: ICD-10-CM

## 2021-11-22 DIAGNOSIS — D84.9 IMMUNOSUPPRESSED STATUS (H): ICD-10-CM

## 2021-11-22 DIAGNOSIS — G47.33 OSA (OBSTRUCTIVE SLEEP APNEA): ICD-10-CM

## 2021-11-22 DIAGNOSIS — E66.01 MORBID OBESITY DUE TO EXCESS CALORIES (H): ICD-10-CM

## 2021-11-22 DIAGNOSIS — Z79.4 TYPE 2 DIABETES MELLITUS WITH HYPERGLYCEMIA, WITH LONG-TERM CURRENT USE OF INSULIN (H): Primary | ICD-10-CM

## 2021-11-22 LAB
HBA1C MFR BLD: 7.7 % (ref 0–5.6)
HOLD SPECIMEN: NORMAL
HOLD SPECIMEN: NORMAL
VIT B12 SERPL-MCNC: 414 PG/ML (ref 193–986)

## 2021-11-22 PROCEDURE — 82306 VITAMIN D 25 HYDROXY: CPT | Performed by: INTERNAL MEDICINE

## 2021-11-22 PROCEDURE — 36415 COLL VENOUS BLD VENIPUNCTURE: CPT | Performed by: INTERNAL MEDICINE

## 2021-11-22 PROCEDURE — 82607 VITAMIN B-12: CPT | Performed by: INTERNAL MEDICINE

## 2021-11-22 PROCEDURE — 83036 HEMOGLOBIN GLYCOSYLATED A1C: CPT | Performed by: INTERNAL MEDICINE

## 2021-11-22 PROCEDURE — 99214 OFFICE O/P EST MOD 30 MIN: CPT | Performed by: INTERNAL MEDICINE

## 2021-11-22 RX ORDER — GLIMEPIRIDE 1 MG/1
TABLET ORAL
Qty: 30 TABLET | Refills: 3 | Status: SHIPPED | OUTPATIENT
Start: 2021-11-22 | End: 2022-04-08 | Stop reason: DRUGHIGH

## 2021-11-22 RX ORDER — GLIMEPIRIDE 4 MG/1
TABLET ORAL
Qty: 135 TABLET | Refills: 3 | Status: SHIPPED | OUTPATIENT
Start: 2021-11-22 | End: 2022-04-05

## 2021-11-22 NOTE — PROGRESS NOTES
Assessment & Plan     Type 2 diabetes mellitus with hyperglycemia, with long-term current use of insulin (H)  Has been able to reign in her BS without adding MDI.   Continue current management   - Hemoglobin A1c; Future  - Hemoglobin A1c  - glimepiride (AMARYL) 1 MG tablet; Take one tab twice daily for the day of infusion AND the day after   (approximately every 6 weeks)-- will increase the amaryl, give extra, on days of infustion.   - glimepiride (AMARYL) 4 MG tablet; Take 1 and 1/2 tablets (6mg) with breakfast....ongoing usual daily does...     Essential hypertension with goal blood pressure less than 140/90  Continue current management     Ulcerative colitis without complications, unspecified location (H)  - Vitamin D Deficiency; Future  - Vitamin B12; Future  - Vitamin D Deficiency  - Vitamin B12  Immunosuppressed status (H)  Educated her on her 3-dose Covid series (vs 2 dose) due to immunosuppression  She will be due to have Booster around February... done with Series now.     MAHAD (obstructive sleep apnea)- severe (AHI 80)       Morbid obesity due to excess calories (H)       Paroxysmal atrial fibrillation (H)       Exposure to 2019 novel coronavirus   she has tested negative on 2 occasions recently  Will recheck today         I spent a total of 30 minutes on the day of the visit.   Time spent doing chart review, history and exam, documentation and further activities per the note            Return in about 6 months (around 5/22/2022).    Khushboo Barger MD  [facility]    ===================================================  ==============================================      Subjective   Zaira is a 51 year old who presents for the following health issues     50 y/o F here for DM f/u.      H/o Ulcerative Colitis (vedolizumab q6W via [facility]), Immunosuppression, HTN,  DMII, PAF (Eliquis), MAHAD (wears mask, with discomfort), MO.       At last visit HgbA1C was 8.0, she declined mealtime  insulin (MDI), wishing to work on diet  Today, A1C is improved, at 7.6      She has her Entvyio infusions q6months, these include prednisone.  Her BS will go up to 250 - 350 for 24-48 hours after.            HPI     Diabetes Follow-up    How often are you checking your blood sugar? Three times daily  Blood sugar testing frequency justification:  Risk of hypoglycemia with medication(s) insulin  What time of day are you checking your blood sugars (select all that apply)?  Before meals  Have you had any blood sugars above 200?  Yes 302  Have you had any blood sugars below 70?  No    What symptoms do you notice when your blood sugar is low?  Shaky    What concerns do you have today about your diabetes? None and Other: A1C      Do you have any of these symptoms? (Select all that apply)  No numbness or tingling in feet.  No redness, sores or blisters on feet.  No complaints of excessive thirst.  No reports of blurry vision.  No significant changes to weight.    Have you had a diabetic eye exam in the last 12 months? No        BP Readings from Last 2 Encounters:   11/22/21 133/70   10/06/21 111/70     Hemoglobin A1C (%)   Date Value   08/26/2021 8.0 (H)   05/13/2021 7.7 (H)   09/28/2020 7.8 (H)     LDL Cholesterol Calculated (mg/dL)   Date Value   05/13/2021 105 (H)   01/13/2020 87                 How many servings of fruits and vegetables do you eat daily?  2-3    On average, how many sweetened beverages do you drink each day (Examples: soda, juice, sweet tea, etc.  Do NOT count diet or artificially sweetened beverages)?   0    How many days per week do you exercise enough to make your heart beat faster? 3 or less    How many minutes a day do you exercise enough to make your heart beat faster? 10 - 19    How many days per week do you miss taking your medication? 0        Review of Systems   Constitutional, HEENT, cardiovascular, pulmonary, gi and gu systems are negative, except as otherwise noted.      Objective    BP  133/70 (BP Location: Other (Comment), Patient Position: Sitting, Cuff Size: Adult Regular)   Pulse 57   Temp 97.8  F (36.6  C) (Oral)   Wt 119.7 kg (264 lb)   SpO2 97%   BMI 45.32 kg/m    Body mass index is 45.32 kg/m .  Physical Exam   GENERAL: healthy, alert and no distress  EYES: Eyes grossly normal to inspection, PERRL and conjunctivae and sclerae normal  MS: no gross musculoskeletal defects noted, no edema  NEURO: Normal strength and tone, mentation intact and speech normal  PSYCH: mentation appears normal, affect normal/bright    Results for orders placed or performed in visit on 11/22/21 (from the past 24 hour(s))   Hemoglobin A1c   Result Value Ref Range    Hemoglobin A1C 7.7 (H) 0.0 - 5.6 %   Extra Tube    Narrative    The following orders were created for panel order Extra Tube.  Procedure                               Abnormality         Status                     ---------                               -----------         ------                     Extra Serum Separator Tu...[[id number]]                      Final result               Extra Green Top (Lithium...[[id number]]                      Final result                 Please view results for these tests on the individual orders.   Extra Serum Separator Tube (SST)   Result Value Ref Range    Hold Specimen JIC    Extra Green Top (Lithium Heparin) Tube   Result Value Ref Range    Hold Specimen JIC

## 2021-11-22 NOTE — PATIENT INSTRUCTIONS
EYE exam is due    COVID Booster late February 2022    If COVID (+) :    Antibody treatments are available for patients with mild to moderate COVID illness in order to prevent severe illness. In general, only patients with risk factors for severe illness are eligible for treatment. For more information, to see if you are eligible, and to find treatment, go to the [facility]:  [email or web address]

## 2021-11-23 LAB — DEPRECATED CALCIDIOL+CALCIFEROL SERPL-MC: 62 UG/L (ref 20–75)

## 2021-12-06 ENCOUNTER — VIRTUAL VISIT (OUTPATIENT)
Dept: SLEEP MEDICINE | Facility: CLINIC | Age: 51
End: 2021-12-06
Payer: COMMERCIAL

## 2021-12-06 DIAGNOSIS — E66.01 MORBID OBESITY DUE TO EXCESS CALORIES (H): ICD-10-CM

## 2021-12-06 DIAGNOSIS — F51.04 CHRONIC INSOMNIA: ICD-10-CM

## 2021-12-06 DIAGNOSIS — G47.33 OSA (OBSTRUCTIVE SLEEP APNEA): Primary | ICD-10-CM

## 2021-12-06 PROCEDURE — 99214 OFFICE O/P EST MOD 30 MIN: CPT | Mod: GT | Performed by: INTERNAL MEDICINE

## 2021-12-06 ASSESSMENT — SLEEP AND FATIGUE QUESTIONNAIRES
HOW LIKELY ARE YOU TO NOD OFF OR FALL ASLEEP WHILE WATCHING TV: MODERATE CHANCE OF DOZING
HOW LIKELY ARE YOU TO NOD OFF OR FALL ASLEEP WHILE SITTING AND READING: MODERATE CHANCE OF DOZING
HOW LIKELY ARE YOU TO NOD OFF OR FALL ASLEEP WHILE SITTING INACTIVE IN A PUBLIC PLACE: SLIGHT CHANCE OF DOZING
HOW LIKELY ARE YOU TO NOD OFF OR FALL ASLEEP IN A CAR, WHILE STOPPED FOR A FEW MINUTES IN TRAFFIC: WOULD NEVER DOZE
HOW LIKELY ARE YOU TO NOD OFF OR FALL ASLEEP WHILE SITTING QUIETLY AFTER LUNCH WITHOUT ALCOHOL: SLIGHT CHANCE OF DOZING
HOW LIKELY ARE YOU TO NOD OFF OR FALL ASLEEP WHEN YOU ARE A PASSENGER IN A CAR FOR AN HOUR WITHOUT A BREAK: WOULD NEVER DOZE
HOW LIKELY ARE YOU TO NOD OFF OR FALL ASLEEP WHILE LYING DOWN TO REST IN THE AFTERNOON WHEN CIRCUMSTANCES PERMIT: WOULD NEVER DOZE
HOW LIKELY ARE YOU TO NOD OFF OR FALL ASLEEP WHILE SITTING AND TALKING TO SOMEONE: WOULD NEVER DOZE

## 2021-12-06 NOTE — NURSING NOTE
2 year appointment reminder card created and will be mailed when appropriate. DME orders have been automatically faxed to [facility].  Jennifer Alcaraz, CMA
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
## 2021-12-16 ENCOUNTER — TRANSFERRED RECORDS (OUTPATIENT)
Dept: HEALTH INFORMATION MANAGEMENT | Facility: CLINIC | Age: 51
End: 2021-12-16
Payer: COMMERCIAL

## 2021-12-16 LAB
ALT SERPL-CCNC: 21 LU/L (ref 0–32)
AST SERPL-CCNC: 15 LU/L (ref 0–40)

## 2021-12-18 ENCOUNTER — HEALTH MAINTENANCE LETTER (OUTPATIENT)
Age: 51
End: 2021-12-18

## 2021-12-20 ENCOUNTER — VIRTUAL VISIT (OUTPATIENT)
Dept: CARDIOLOGY | Facility: CLINIC | Age: 51
End: 2021-12-20
Payer: COMMERCIAL

## 2021-12-20 DIAGNOSIS — I48.0 PAROXYSMAL ATRIAL FIBRILLATION (H): Primary | ICD-10-CM

## 2021-12-20 PROCEDURE — 99213 OFFICE O/P EST LOW 20 MIN: CPT | Mod: GT | Performed by: INTERNAL MEDICINE

## 2021-12-20 NOTE — LETTER
12/20/2021      RE: Zaira Villatoro  [address] [facility] [address]       Dear Colleague,    Thank you for the opportunity to participate in the care of your patient, Zaira Villatoro, at the [facility]. Please see a copy of my visit note below.    Zaira is a 51 year old who is being evaluated via a billable video visit.      How would you like to ob tain your AVS? MyChart  If the video visit is dropped, the invitation should be resent by: Text to cell phone: [phone]    Video Start Time:: 10:23 AM  Video-Visit Details    Type of service:  Video Visit    Video End Time: 10:31 AM    Originating Location (pt. Location): Home    Distant Location (provider location):  [facility]     Platform used for Video Visit: [email or web address]     HPI: Purpose of visit: Follow-up of paroxysmal atrial fibrillation        Zaira Villatoro is a 51 year old female who presents for follow up of AF.  The patient has a past medical history significant for PAF, HTN, DM, Ulcerative colitis s/p sigmoid colon perforation repair and colostomy(2013).      Patient's last visit with me was in December 2020. In the last 1 year, patient has done well. She does not report any symptoms of irregular heartbeat sensation, palpitations, exertional dyspnea, exertional angina, frequent lightheadedness, presyncope or syncope. A recent transthoracic echocardiogram showed preserved ejection fraction and a 2-week Zio patch monitor showed absence of atrial fibrillation    PAST MEDICAL HISTORY:  Past Medical History:   Diagnosis Date     Acute diverticulitis 7/31/2013     History of seizures as a child 1981    One grand mal in 5th grade.  Didn't tolerate phenobarb.     Moderate single current episode of major depressive disorder (H)      Perforation of sigmoid colon (H) 8/3/2013     S/P left hemicolectomy 8/16/2013 8/02/13       Sepsis (H) 8/1/2013       CURRENT MEDICATIONS:  Current Outpatient Medications   Medication Sig Dispense Refill     apixaban ANTICOAGULANT (ELIQUIS ANTICOAGULANT) 5 MG tablet Take 1 tablet (5 mg) by mouth 2 times daily 60 tablet 11     atenolol (TENORMIN) 50 MG tablet TAKE 1 TABLET BY MOUTH TWICE A  tablet 3     blood glucose (ACCU-CHEK SHARMILA PLUS) test strip Use to test blood sugar 5 times daily 500 strip 2     blood glucose monitoring (ACCU-CHEK FASTCLIX) lancets 1 each 4 times daily Use to test blood sugar 4 times daily or as directed. 300 each 11     cholecalciferol (VITAMIN D3) 125 mcg (5000 units) capsule Take by mouth daily       empagliflozin (JARDIANCE) 25 MG TABS tablet Take 1 tablet (25 mg) by mouth daily 90 tablet 0     estradiol (ESTRACE) 0.1 MG/GM vaginal cream insert (1G)  by vaginal route nightly for 14 days and then twice weekly there after.       glimepiride (AMARYL) 1 MG tablet Take one tab twice daily for the day of infusion AND the day after   (approximately every 6 weeks) 30 tablet 3     glimepiride (AMARYL) 4 MG tablet Take 1 and 1/2 tablets (6mg) with breakfast. 135 tablet 3     insulin detemir (LEVEMIR FLEXTOUCH) 100 UNIT/ML pen Inject 70 Units Subcutaneous 2 times daily 120 mL 3     insulin pen needle (BD SHAHNAZ U/F) 32G X 4 MM miscellaneous Use 1 pen needles TWICE DAILY 200 each 11     losartan (COZAAR) 25 MG tablet TAKE 1/2 TABLET BY MOUTH EVERY DAY. NO FURTHER REFILLS UNTIL FOLLOW-UP APPOINTMENT 45 tablet 0     order for DME Equipment being ordered: CPAP 9 c, 1 Units 0     order for DME Glucometer, brand as covered by insurance. 1 each 0     STATIN NOT PRESCRIBED, INTENTIONAL, 1 each continuous prn Statin not prescribed intentionally due to Refusal by patient and Other:LDL is below 100. 0 each 0     TYLENOL CAPS 500 MG OR 1 CAPSULE EVERY 4 HOURS AS NEEDED       vedolizumab (ENTYVIO) 60 MG/ML injection Every six weeks         PAST SURGICAL HISTORY:  Past Surgical History:   Procedure  Laterality Date     APPENDECTOMY  2014     ARTHROSCOPY KNEE RT/LT  2004    RT      BIOPSY      many  and on     BLEPHAROPLASTY Bilateral 2020    Procedure: Both upper eyelid blepharoplasty and ptosis repair,;  Surgeon: Chelsie Knight MD;  Location: [facility]     COLONOSCOPY  2012    lt sided colitis     COLONOSCOPY  2014     COSMETIC BROWPEXY Left 2020    Procedure: left internal browpexy;  Surgeon: Chelsie Knight MD;  Location: [facility]     CRYOTHERAPY, CERVICAL       EXPLORATORY LAPAROTOMY, PARTIAL LEFT ROLANDA COLLECTOMY WITH HARTMANS PROCEDURE, COLOSTOMY  2013    lt rolanda colectomy, bowel perforation, colostomy, Karen's pouche     GI SURGERY      abrupted  bowl     HC TOOTH EXTRACTION W/FORCEP  2003    Abscess Tooth / Hospitalized     HERNIA REPAIR  2014    found at time of takedown     SINUS SURGERY  03    LT sinus cyst removal      TAKEDOWN COLOSTOMY  2014       ALLERGIES:     Allergies   Allergen Reactions     Demerol      Very low blood pressure     Fish      Pt reports allergy to all fish     Meperidine Other (See Comments)     Other reaction(s): Hypotension  Drops blood pressure       Metformin GI Disturbance and Diarrhea     GI Disturbance       Naproxen      No Clinical Screening - See Comments      Pt reports allergy to all fish     Nubain  [Nalbuphine]      Seafood Swelling     Throat swells     Shingrix [Zoster Vac Recomb Adjuvanted]      Cellulitis at injection site     Topiramate Other (See Comments) and Hives     Sleepy and confused.  Shaky, disoriented       Latex Rash       FAMILY HISTORY:  - Premature coronary artery disease  - Atrial fibrillation  - Sudden cardiac death     SOCIAL HISTORY:  Social History     Tobacco Use     Smoking status: Former Smoker     Packs/day: 1.00     Years: 15.00     Pack years: 15.00     Types: Cigarettes     Start date: 1985     Quit date: 1998     Years since quittin.4     Smokeless tobacco: Never  Used     Tobacco comment: soke free household.   Substance Use Topics     Alcohol use: Yes     Alcohol/week: 0.0 standard drinks     Comment: occ     Drug use: No       ROS:   Constitutional: No fever, chills, or sweats. Weight stable.   ENT: No visual disturbance, ear ache, epistaxis, sore throat.   Cardiovascular: As per HPI.   Respiratory: No cough, hemoptysis.    GI: No nausea, vomiting, hematemesis, melena, or hematochezia.   : No hematuria.   Integument: Negative.   Psychiatric: Negative.   Hematologic:  Easy bruising, no easy bleeding.  Neuro: Negative.   Endocrinology: No significant heat or cold intolerance   Musculoskeletal: No myalgia.    Exam:  There were no vitals taken for this visit.  GENERAL APPEARANCE: healthy, alert and no distress    Labs:  CBC RESULTS:   Lab Results   Component Value Date    WBC 8.9 08/26/2021    WBC 7.9 09/28/2020    RBC 5.19 08/26/2021    RBC 5.24 (H) 09/28/2020    HGB 15.9 (H) 08/26/2021    HGB 15.6 09/28/2020    HCT 47.3 (H) 08/26/2021    HCT 47.3 (H) 09/28/2020    MCV 91 08/26/2021    MCV 90 09/28/2020    MCH 30.6 08/26/2021    MCH 29.8 09/28/2020    MCHC 33.6 08/26/2021    MCHC 33.0 09/28/2020    RDW 13.7 08/26/2021    RDW 14.3 09/28/2020     08/26/2021     09/28/2020       BMP RESULTS:  Lab Results   Component Value Date     08/26/2021     09/28/2020    POTASSIUM 4.5 08/26/2021    POTASSIUM 3.9 09/28/2020    CHLORIDE 108 08/26/2021    CHLORIDE 110 (H) 09/28/2020    CO2 24 08/26/2021    CO2 20 09/28/2020    ANIONGAP 6 08/26/2021    ANIONGAP 10 09/28/2020     (H) 08/26/2021     (H) 09/28/2020    BUN 19 08/26/2021    BUN 16 09/28/2020    CR 1.03 08/26/2021    CR 0.86 09/28/2020    GFRESTIMATED 64 08/26/2021    GFRESTIMATED 79 09/28/2020    GFRESTBLACK >90 09/28/2020    SAMANTA 9.4 08/26/2021    SAMANTA 8.8 09/28/2020        INR RESULTS:  No results found for: INR    Procedures:      Assessment and Plan:     Paroxysmal atrial  fibrillation-well-controlled    It is encouraging that patient's atrial fibrillation is well controlled. We will continue current medications including apixaban 5 mg twice daily for stroke prophylaxis. We will see patient again by video clinic in approximately 1 year and prior to that visit patient will wear the Zio patch monitor for 2 weeks.    All questions and concerns were addressed and patient is happy with the plan.        Please do not hesitate to contact me if you have any questions/concerns.     Sincerely,     Leila Miller MD      CC  Patient Care Team:  Khushboo Barger MD as PCP - General (Internal Medicine)  Aron Tellez MD as MD (Cardiology)  Tang Fitzgerald as Zaira Cruz PA-C as Physician Assistant (Gastroenterology)  Nicole Maki RD as Diabetes Educator (Dietitian, Registered)  Ramana Palencia MD as Assigned Sleep Provider  Lynda Combs MD as Assigned Surgical Provider

## 2021-12-20 NOTE — PROGRESS NOTES
Zaira is a 51 year old who is being evaluated via a billable video visit.      How would you like to ob tain your AVS? MyChart  If the video visit is dropped, the invitation should be resent by: Text to cell phone: [phone]    Video Start Time:: 10:23 AM  Video-Visit Details    Type of service:  Video Visit    Video End Time: 10:31 AM    Originating Location (pt. Location): Home    Distant Location (provider location):  [facility] [email or web address]     Platform used for Video Visit: Hypemarks     HPI: Purpose of visit: Follow-up of paroxysmal atrial fibrillation        Zaira Villatoro is a 51 year old female who presents for follow up of AF.  The patient has a past medical history significant for PAF, HTN, DM, Ulcerative colitis s/p sigmoid colon perforation repair and colostomy(2013).      Patient's last visit with me was in December 2020. In the last 1 year, patient has done well. She does not report any symptoms of irregular heartbeat sensation, palpitations, exertional dyspnea, exertional angina, frequent lightheadedness, presyncope or syncope. A recent transthoracic echocardiogram showed preserved ejection fraction and a 2-week Zio patch monitor showed absence of atrial fibrillation    PAST MEDICAL HISTORY:  Past Medical History:   Diagnosis Date     Acute diverticulitis 7/31/2013     History of seizures as a child 1981    One grand mal in 5th grade.  Didn't tolerate phenobarb.     Moderate single current episode of major depressive disorder (H)      Perforation of sigmoid colon (H) 8/3/2013     S/P left hemicolectomy 8/16/2013 8/02/13      Sepsis (H) 8/1/2013       CURRENT MEDICATIONS:  Current Outpatient Medications   Medication Sig Dispense Refill     apixaban ANTICOAGULANT (ELIQUIS ANTICOAGULANT) 5 MG tablet Take 1 tablet (5 mg) by mouth 2 times daily 60 tablet 11     atenolol (TENORMIN) 50 MG tablet TAKE 1 TABLET BY MOUTH TWICE A  tablet 3     blood glucose (ACCU-CHEK SHARMILA  PLUS) test strip Use to test blood sugar 5 times daily 500 strip 2     blood glucose monitoring (ACCU-CHEK FASTCLIX) lancets 1 each 4 times daily Use to test blood sugar 4 times daily or as directed. 300 each 11     cholecalciferol (VITAMIN D3) 125 mcg (5000 units) capsule Take by mouth daily       empagliflozin (JARDIANCE) 25 MG TABS tablet Take 1 tablet (25 mg) by mouth daily 90 tablet 0     estradiol (ESTRACE) 0.1 MG/GM vaginal cream insert (1G)  by vaginal route nightly for 14 days and then twice weekly there after.       glimepiride (AMARYL) 1 MG tablet Take one tab twice daily for the day of infusion AND the day after   (approximately every 6 weeks) 30 tablet 3     glimepiride (AMARYL) 4 MG tablet Take 1 and 1/2 tablets (6mg) with breakfast. 135 tablet 3     insulin detemir (LEVEMIR FLEXTOUCH) 100 UNIT/ML pen Inject 70 Units Subcutaneous 2 times daily 120 mL 3     insulin pen needle (BD SHAHNAZ U/F) 32G X 4 MM miscellaneous Use 1 pen needles TWICE DAILY 200 each 11     losartan (COZAAR) 25 MG tablet TAKE 1/2 TABLET BY MOUTH EVERY DAY. NO FURTHER REFILLS UNTIL FOLLOW-UP APPOINTMENT 45 tablet 0     order for DME Equipment being ordered: CPAP 9 c, 1 Units 0     order for DME Glucometer, brand as covered by insurance. 1 each 0     STATIN NOT PRESCRIBED, INTENTIONAL, 1 each continuous prn Statin not prescribed intentionally due to Refusal by patient and Other:LDL is below 100. 0 each 0     TYLENOL CAPS 500 MG OR 1 CAPSULE EVERY 4 HOURS AS NEEDED       vedolizumab (ENTYVIO) 60 MG/ML injection Every six weeks         PAST SURGICAL HISTORY:  Past Surgical History:   Procedure Laterality Date     APPENDECTOMY  04/2014     ARTHROSCOPY KNEE RT/LT  2004    RT      BIOPSY  2011    many 2011 and on     BLEPHAROPLASTY Bilateral 1/27/2020    Procedure: Both upper eyelid blepharoplasty and ptosis repair,;  Surgeon: Chelsie Knight MD;  Location: [facility]     COLONOSCOPY  02/2012    lt sided colitis     COLONOSCOPY  1/9/2014      COSMETIC BROWPEXY Left 2020    Procedure: left internal browpexy;  Surgeon: Chelsie Knight MD;  Location: [facility]     CRYOTHERAPY, CERVICAL       EXPLORATORY LAPAROTOMY, PARTIAL LEFT ROLANDA COLLECTOMY WITH HARTMANS PROCEDURE, COLOSTOMY  2013    lt rolanda colectomy, bowel perforation, colostomy, Karen's pouche     GI SURGERY      abrupted  bowl     HC TOOTH EXTRACTION W/FORCEP  2003    Abscess Tooth / Hospitalized     HERNIA REPAIR  2014    found at time of takedown     SINUS SURGERY  03    LT sinus cyst removal      TAKEDOWN COLOSTOMY  2014       ALLERGIES:     Allergies   Allergen Reactions     Demerol      Very low blood pressure     Fish      Pt reports allergy to all fish     Meperidine Other (See Comments)     Other reaction(s): Hypotension  Drops blood pressure       Metformin GI Disturbance and Diarrhea     GI Disturbance       Naproxen      No Clinical Screening - See Comments      Pt reports allergy to all fish     Nubain  [Nalbuphine]      Seafood Swelling     Throat swells     Shingrix [Zoster Vac Recomb Adjuvanted]      Cellulitis at injection site     Topiramate Other (See Comments) and Hives     Sleepy and confused.  Shaky, disoriented       Latex Rash       FAMILY HISTORY:  - Premature coronary artery disease  - Atrial fibrillation  - Sudden cardiac death     SOCIAL HISTORY:  Social History     Tobacco Use     Smoking status: Former Smoker     Packs/day: 1.00     Years: 15.00     Pack years: 15.00     Types: Cigarettes     Start date: 1985     Quit date: 1998     Years since quittin.4     Smokeless tobacco: Never Used     Tobacco comment: soke free household.   Substance Use Topics     Alcohol use: Yes     Alcohol/week: 0.0 standard drinks     Comment: occ     Drug use: No       ROS:   Constitutional: No fever, chills, or sweats. Weight stable.   ENT: No visual disturbance, ear ache, epistaxis, sore throat.   Cardiovascular: As per HPI.   Respiratory: No  cough, hemoptysis.    GI: No nausea, vomiting, hematemesis, melena, or hematochezia.   : No hematuria.   Integument: Negative.   Psychiatric: Negative.   Hematologic:  Easy bruising, no easy bleeding.  Neuro: Negative.   Endocrinology: No significant heat or cold intolerance   Musculoskeletal: No myalgia.    Exam:  There were no vitals taken for this visit.  GENERAL APPEARANCE: healthy, alert and no distress    Labs:  CBC RESULTS:   Lab Results   Component Value Date    WBC 8.9 08/26/2021    WBC 7.9 09/28/2020    RBC 5.19 08/26/2021    RBC 5.24 (H) 09/28/2020    HGB 15.9 (H) 08/26/2021    HGB 15.6 09/28/2020    HCT 47.3 (H) 08/26/2021    HCT 47.3 (H) 09/28/2020    MCV 91 08/26/2021    MCV 90 09/28/2020    MCH 30.6 08/26/2021    MCH 29.8 09/28/2020    MCHC 33.6 08/26/2021    MCHC 33.0 09/28/2020    RDW 13.7 08/26/2021    RDW 14.3 09/28/2020     08/26/2021     09/28/2020       BMP RESULTS:  Lab Results   Component Value Date     08/26/2021     09/28/2020    POTASSIUM 4.5 08/26/2021    POTASSIUM 3.9 09/28/2020    CHLORIDE 108 08/26/2021    CHLORIDE 110 (H) 09/28/2020    CO2 24 08/26/2021    CO2 20 09/28/2020    ANIONGAP 6 08/26/2021    ANIONGAP 10 09/28/2020     (H) 08/26/2021     (H) 09/28/2020    BUN 19 08/26/2021    BUN 16 09/28/2020    CR 1.03 08/26/2021    CR 0.86 09/28/2020    GFRESTIMATED 64 08/26/2021    GFRESTIMATED 79 09/28/2020    GFRESTBLACK >90 09/28/2020    SAMANTA 9.4 08/26/2021    SAMANTA 8.8 09/28/2020        INR RESULTS:  No results found for: INR    Procedures:      Assessment and Plan:     Paroxysmal atrial fibrillation-well-controlled    It is encouraging that patient's atrial fibrillation is well controlled. We will continue current medications including apixaban 5 mg twice daily for stroke prophylaxis. We will see patient again by video clinic in approximately 1 year and prior to that visit patient will wear the Zio patch monitor for 2 weeks.    All questions and  concerns were addressed and patient is happy with the plan.        CC  Patient Care Team:  Khushboo Barger MD as PCP - General (Internal Medicine)  Aron Tellez MD as MD (Cardiology)  Tang Fitzgerald as Zaira Cruz PA-C as Physician Assistant (Gastroenterology)  Nicole Maki RD as Diabetes Educator (Dietitian, Registered)  Tito Padilla MD as Assigned Heart and Vascular Provider  Ramana Palencia MD as Assigned Sleep Provider  Khushboo Barger MD as Assigned PCP  Lynda Combs MD as Assigned Surgical Provider  TITO PADILLA

## 2022-01-12 DIAGNOSIS — I10 ESSENTIAL HYPERTENSION WITH GOAL BLOOD PRESSURE LESS THAN 140/90: ICD-10-CM

## 2022-01-12 RX ORDER — LOSARTAN POTASSIUM 25 MG/1
TABLET ORAL
Qty: 45 TABLET | Refills: 2 | Status: SHIPPED | OUTPATIENT
Start: 2022-01-12 | End: 2022-12-08

## 2022-01-27 ENCOUNTER — TRANSFERRED RECORDS (OUTPATIENT)
Dept: HEALTH INFORMATION MANAGEMENT | Facility: CLINIC | Age: 52
End: 2022-01-27
Payer: COMMERCIAL

## 2022-02-05 DIAGNOSIS — E11.65 TYPE 2 DIABETES MELLITUS WITH HYPERGLYCEMIA, WITH LONG-TERM CURRENT USE OF INSULIN (H): Chronic | ICD-10-CM

## 2022-02-05 DIAGNOSIS — Z79.4 TYPE 2 DIABETES MELLITUS WITH HYPERGLYCEMIA, WITH LONG-TERM CURRENT USE OF INSULIN (H): Chronic | ICD-10-CM

## 2022-02-06 RX ORDER — EMPAGLIFLOZIN 25 MG/1
TABLET, FILM COATED ORAL
Qty: 90 TABLET | Refills: 2 | Status: SHIPPED | OUTPATIENT
Start: 2022-02-06 | End: 2022-12-08

## 2022-02-10 ENCOUNTER — MYC MEDICAL ADVICE (OUTPATIENT)
Dept: INTERNAL MEDICINE | Facility: CLINIC | Age: 52
End: 2022-02-10
Payer: COMMERCIAL

## 2022-02-10 NOTE — TELEPHONE ENCOUNTER
Tell her she is due for booster Moderna    Due to immunosuppression, her shot series consisted of THREE shots rather than two...   She has had her series of THREE. Now due for booster  I have not heard of a 5th shot yet.       For the time being, offer to help schedule lab (hgb A1C is due) and Booster shot visit (does not need to see me)    AFE / Dm recheck in late May//early June 2022 (would get labs just prior to that visit, too)    Thank you

## 2022-02-18 DIAGNOSIS — Z79.4 TYPE 2 DIABETES MELLITUS WITH HYPERGLYCEMIA, WITH LONG-TERM CURRENT USE OF INSULIN (H): Chronic | ICD-10-CM

## 2022-02-18 DIAGNOSIS — E11.65 TYPE 2 DIABETES MELLITUS WITH HYPERGLYCEMIA, WITH LONG-TERM CURRENT USE OF INSULIN (H): Chronic | ICD-10-CM

## 2022-02-20 RX ORDER — BLOOD SUGAR DIAGNOSTIC
STRIP MISCELLANEOUS
Qty: 500 STRIP | Refills: 1 | Status: SHIPPED | OUTPATIENT
Start: 2022-02-20 | End: 2022-10-13

## 2022-03-11 ENCOUNTER — TRANSFERRED RECORDS (OUTPATIENT)
Dept: HEALTH INFORMATION MANAGEMENT | Facility: CLINIC | Age: 52
End: 2022-03-11
Payer: COMMERCIAL

## 2022-03-25 ENCOUNTER — TELEPHONE (OUTPATIENT)
Dept: FAMILY MEDICINE | Facility: CLINIC | Age: 52
End: 2022-03-25
Payer: COMMERCIAL

## 2022-03-25 DIAGNOSIS — I48.0 PAF (PAROXYSMAL ATRIAL FIBRILLATION) (H): ICD-10-CM

## 2022-03-25 NOTE — TELEPHONE ENCOUNTER
Reason for Call:  Medication or medication refill:    Do you use a [facility]?  Name of the pharmacy and phone number for the current request:  [id number] [place] [facility], [place] - [address]    Name of the medication requested: apixaban ANTICOAGULANT (ELIQUIS ANTICOAGULANT) 5 MG tablet    Other request: Pt called and is requesting a refill she is completely out and the pharmacy said on there end it is     Can we leave a detailed message on this number? YES    Phone number patient can be reached at: Home number on file [phone] (home)    Best Time: anytime    Call taken on 3/25/2022 at 11:44 AM by Maria C Iniguez

## 2022-03-25 NOTE — TELEPHONE ENCOUNTER
Prescription approved per [facility] Refill Protocol.    Please notify patient    Marianne Khan RN  [facility]

## 2022-03-25 NOTE — TELEPHONE ENCOUNTER
Called and informed pt rx was sent. Pt verbalized good understanding, will  at her earliest convenience.    Steph Chi MA on 3/25/2022 at 12:31 PM

## 2022-03-31 NOTE — TELEPHONE ENCOUNTER
1. An order for a compression brace has been sent to [facility], [address] [place] [address] [address], [place] [address]; Please call [phone] to make an appointment for brace measurement and fitting.   2. Wear your brace for 12-22 hours a day as directed by the surgeon. You may sleep in your brace. You may take the brace off for bathing and swimming.  3. Monitor your skin for pressure areas daily. If you think the brace needs adjusting please make an appointment at [facility] to have the brace re-fit.  4. Make a follow-up appointment with the surgeon in 6 weeks and again in 6 months.     Diabetes Education Scheduling Outreach #2:    Call to patient to schedule. Left message with phone number to call to schedule.    Erlinda Turner  [facility]  Diabetes and Nutrition Scheduling

## 2022-04-05 ENCOUNTER — OFFICE VISIT (OUTPATIENT)
Dept: FAMILY MEDICINE | Facility: CLINIC | Age: 52
End: 2022-04-05
Payer: COMMERCIAL

## 2022-04-05 VITALS
OXYGEN SATURATION: 96 % | SYSTOLIC BLOOD PRESSURE: 114 MMHG | RESPIRATION RATE: 19 BRPM | DIASTOLIC BLOOD PRESSURE: 65 MMHG | HEIGHT: 66 IN | BODY MASS INDEX: 43.3 KG/M2 | HEART RATE: 59 BPM | WEIGHT: 269.4 LBS

## 2022-04-05 DIAGNOSIS — E11.65 TYPE 2 DIABETES MELLITUS WITH HYPERGLYCEMIA, WITH LONG-TERM CURRENT USE OF INSULIN (H): Primary | ICD-10-CM

## 2022-04-05 DIAGNOSIS — E66.01 MORBID OBESITY DUE TO EXCESS CALORIES (H): ICD-10-CM

## 2022-04-05 DIAGNOSIS — R53.82 CHRONIC FATIGUE: ICD-10-CM

## 2022-04-05 DIAGNOSIS — I48.0 PAROXYSMAL ATRIAL FIBRILLATION (H): ICD-10-CM

## 2022-04-05 DIAGNOSIS — K51.90 ULCERATIVE COLITIS WITHOUT COMPLICATIONS, UNSPECIFIED LOCATION (H): ICD-10-CM

## 2022-04-05 DIAGNOSIS — Z23 NEED FOR VACCINATION: ICD-10-CM

## 2022-04-05 DIAGNOSIS — D84.9 IMMUNOSUPPRESSED STATUS (H): ICD-10-CM

## 2022-04-05 DIAGNOSIS — I10 ESSENTIAL (PRIMARY) HYPERTENSION: ICD-10-CM

## 2022-04-05 DIAGNOSIS — Z79.4 TYPE 2 DIABETES MELLITUS WITH HYPERGLYCEMIA, WITH LONG-TERM CURRENT USE OF INSULIN (H): Primary | ICD-10-CM

## 2022-04-05 LAB
HBA1C MFR BLD: 8 % (ref 0–5.6)
HOLD SPECIMEN: NORMAL
HOLD SPECIMEN: NORMAL

## 2022-04-05 PROCEDURE — 91306 COVID-19,PF,MODERNA (18+ YRS BOOSTER .25ML): CPT | Performed by: FAMILY MEDICINE

## 2022-04-05 PROCEDURE — 83036 HEMOGLOBIN GLYCOSYLATED A1C: CPT | Performed by: FAMILY MEDICINE

## 2022-04-05 PROCEDURE — 99214 OFFICE O/P EST MOD 30 MIN: CPT | Performed by: FAMILY MEDICINE

## 2022-04-05 PROCEDURE — 36415 COLL VENOUS BLD VENIPUNCTURE: CPT | Performed by: FAMILY MEDICINE

## 2022-04-05 PROCEDURE — 0064A COVID-19,PF,MODERNA (18+ YRS BOOSTER .25ML): CPT | Performed by: FAMILY MEDICINE

## 2022-04-05 RX ORDER — INSULIN DETEMIR 100 [IU]/ML
70 INJECTION, SOLUTION SUBCUTANEOUS 2 TIMES DAILY
Qty: 120 ML | Refills: 3 | Status: SHIPPED | OUTPATIENT
Start: 2022-04-05 | End: 2023-05-01

## 2022-04-05 RX ORDER — PEN NEEDLE, DIABETIC 32GX 5/32"
NEEDLE, DISPOSABLE MISCELLANEOUS
Qty: 200 EACH | Refills: 11 | Status: SHIPPED | OUTPATIENT
Start: 2022-04-05 | End: 2023-09-11

## 2022-04-05 RX ORDER — GLUCOSAMINE HCL/CHONDROITIN SU 500-400 MG
CAPSULE ORAL
Qty: 100 EACH | Refills: 3 | Status: SHIPPED | OUTPATIENT
Start: 2022-04-05 | End: 2023-01-04

## 2022-04-05 RX ORDER — ATENOLOL 50 MG/1
TABLET ORAL
Qty: 180 TABLET | Refills: 3 | Status: SHIPPED | OUTPATIENT
Start: 2022-04-05 | End: 2023-05-19

## 2022-04-05 RX ORDER — GLIMEPIRIDE 4 MG/1
TABLET ORAL
Qty: 135 TABLET | Refills: 3 | Status: SHIPPED | OUTPATIENT
Start: 2022-04-05 | End: 2022-04-08

## 2022-04-05 RX ORDER — LANCETS
EACH MISCELLANEOUS
Qty: 200 EACH | Refills: 6 | Status: SHIPPED | OUTPATIENT
Start: 2022-04-05 | End: 2024-05-14

## 2022-04-05 ASSESSMENT — PAIN SCALES - GENERAL: PAINLEVEL: SEVERE PAIN (6)

## 2022-04-05 NOTE — PROGRESS NOTES
Assessment & Plan   Zaira is a 53 yo F with DM2, UC, fatty liver, htn (losartan), obesity, A fib, lisa, hpld, chronic insomnia, lichen sclerosis (vulva)incisional hernia here for routine follow up.    Type 2 diabetes mellitus with hyperglycemia, with long-term current use of insulin (H)   A1c above goal and bumped up from previous. Does admit not eating right and is hard to exercise due to chronic fatigue and has gained some weight. Her sugars also tend to be high after entyvio infusions; has extra glimepiride for 3 days. Per her chart the blood sugars stay high for over 10 days. Discussed doing mealtime insulin or carb counting cannot afford as buys needles out of pocket; will try option of doing Glimepride to 8 mg daily and recheck in 2 months; if nott helping might consider switching to a different oral or a weekly GLP 1 receptor agonist    - HEMOGLOBIN A1C; Future  - HEMOGLOBIN A1C  - glimepiride (AMARYL) 4 MG tablet; Take 1 and 1/2 tablets (6mg) with breakfast.  - insulin detemir (LEVEMIR FLEXTOUCH) 100 UNIT/ML pen; Inject 70 Units Subcutaneous 2 times daily  - blood glucose monitoring (NO BRAND SPECIFIED) meter device kit; Use to test blood sugar 4 times daily or as directed. Preferred blood glucose meter OR supplies to accompany: Blood Glucose Monitor Brands: per insurance.  - blood glucose (NO BRAND SPECIFIED) test strip; Use to test blood sugar 3 times daily or as directed. To accompany: Blood Glucose Monitor Brands: per insurance.  - blood glucose calibration (NO BRAND SPECIFIED) solution; To accompany: Blood Glucose Monitor Brands: per insurance.  - thin (NO BRAND SPECIFIED) lancets; Use with lanceting device. To accompany: Blood Glucose Monitor Brands: per insurance.  - alcohol swab prep pads; Use to swab area of injection/kenton as directed.  - insulin pen needle (BD SHAHNAZ U/F) 32G X 4 MM miscellaneous; Use 1 pen needles TWICE DAILY    Essential (primary) hypertension    BP at goal  - atenolol  "(TENORMIN) 50 MG tablet; TAKE 1 TABLET BY MOUTH TWICE A DAY    Paroxysmal atrial fibrillation (H)    -  On Elquis and atenolol    Immunosuppressed status (H)  Ulcerative colitis without complications, unspecified location (H)    -  Doing infusions every 6 weeks.    Morbid obesity due to excess calories (H)    -  Hard to exercise, discussed watching diet more closely    Need for vaccination  - COVID-19,PF,MODERNA (18+ YRS BOOSTER .25ML)956}     BMI:   Estimated body mass index is 43.3 kg/m  as calculated from the following:    Height as of this encounter: 1.68 m (5' 6.14\").    Weight as of this encounter: 122.2 kg (269 lb 6.4 oz).   Weight management plan: Discussed healthy diet and exercise guidelines    Return in about 3 months (around 7/5/2022) for Routine Visit.    Christian Baez MD  [facility] KEISHA Meneses is a 52 year old who presents for the following health issues  History of Present Illness       Diabetes:   She presents for follow up of diabetes.  She is checking home blood glucose four or more times daily. She checks blood glucose before meals and at bedtime.  Blood glucose is sometimes over 200 and never under 70. When her blood glucose is low, the patient is asymptomatic for confusion, blurred vision, lethargy and reports not feeling dizzy, shaky, or weak.  She has no concerns regarding her diabetes at this time.  She is having weight gain. The patient has not had a diabetic eye exam in the last 12 months.         Reason for visit:  Following up for a1c and general health per my normal Dr    She eats 2-3 servings of fruits and vegetables daily.She consumes 0 sweetened beverage(s) daily.She exercises with enough effort to increase her heart rate 9 or less minutes per day.  She exercises with enough effort to increase her heart rate 3 or less days per week. She is missing 1 dose(s) of medications per week.  She is not taking prescribed medications regularly due " to cost of medication and remembering to take.       Diabetes Follow-up    Been eating late lately, exercise (has huge problem with fatigue; working with sleep doctor)   Has chronic fatigue and been working out less   Blood sguar; evening numbers we above mid 250s from 3/14/22 till about 3/26/22; had had the infusion prior 3/11/22;     Weight been going up  Wt Readings from Last 4 Encounters:   04/05/22 122.2 kg (269 lb 6.4 oz)   11/22/21 119.7 kg (264 lb)   10/06/21 119.3 kg (263 lb)   08/27/21 118.4 kg (261 lb)     How often are you checking your blood sugar? Three or four times daily  Blood sugar testing frequency justification:  Uncontrolled diabetes and Risk of hypoglycemia with medication(s)  What time of day are you checking your blood sugars (select all that apply)?  Before meals, After meals, At bedtime and prn  Have you had any blood sugars above 200?  Yes dania after infusions for ulcerative colitis  Have you had any blood sugars below 70?  No    What symptoms do you notice when your blood sugar is low?  None    What concerns do you have today about your diabetes? None     Do you have any of these symptoms? (Select all that apply)  No numbness or tingling in feet.  No redness, sores or blisters on feet.  No complaints of excessive thirst.  No reports of blurry vision.  No significant changes to weight.    Have you had a diabetic eye exam in the last 12 months? Due and will schedule appointment    BP Readings from Last 2 Encounters:   04/05/22 114/65   11/22/21 133/70     Hemoglobin A1C POCT (%)   Date Value   05/13/2021 7.7 (H)   09/28/2020 7.8 (H)     Hemoglobin A1C (%)   Date Value   04/05/2022 8.0 (H)   11/22/2021 7.7 (H)     LDL Cholesterol Calculated (mg/dL)   Date Value   05/13/2021 105 (H)   01/13/2020 87       Hypertension Follow-up      Do you check your blood pressure regularly outside of the clinic? Yes     Are you following a low salt diet? Yes    Are your blood pressures ever more than 140 on  "the top number (systolic) OR more   than 90 on the bottom number (diastolic), for example 140/90? No    Atrial Fibrillation     On Atenolol and Eliqiuis    Ulcerative Colitis/Immunosuppression:     On infusions every 6 weeks.     Has an extra Amaryl 1 mg takes after infusions as blood sugars tend to go up.    Chronic Fatigue:   Not been able to work out    A stimulant is being considered for fatigue but concerns interactions with with Atrial Fibrillation not been resolved      Review of Systems   Constitutional, HEENT, cardiovascular, pulmonary, gi and gu systems are negative, except as otherwise noted.      Objective    /65 (BP Location: Left arm, Cuff Size: Adult Regular)   Pulse 59   Resp 19   Ht 1.68 m (5' 6.14\")   Wt 122.2 kg (269 lb 6.4 oz)   SpO2 96%   BMI 43.30 kg/m    Body mass index is 43.3 kg/m .  Physical Exam   GENERAL: healthy, alert and no distress  RESP: lungs clear to auscultation - no rales, rhonchi or wheezes  CV: regular rate and rhythm, no murmur, click or rub, no peripheral edema   MS: no gross musculoskeletal defects noted, no edema  PSYCH: mentation appears normal, affect normal/bright    "

## 2022-04-07 ENCOUNTER — VIRTUAL VISIT (OUTPATIENT)
Dept: SLEEP MEDICINE | Facility: CLINIC | Age: 52
End: 2022-04-07
Payer: COMMERCIAL

## 2022-04-07 DIAGNOSIS — E66.01 MORBID OBESITY DUE TO EXCESS CALORIES (H): ICD-10-CM

## 2022-04-07 DIAGNOSIS — F51.04 CHRONIC INSOMNIA: ICD-10-CM

## 2022-04-07 DIAGNOSIS — R53.83 FATIGUE, UNSPECIFIED TYPE: Primary | ICD-10-CM

## 2022-04-07 DIAGNOSIS — G47.33 OSA (OBSTRUCTIVE SLEEP APNEA): ICD-10-CM

## 2022-04-07 PROCEDURE — 99214 OFFICE O/P EST MOD 30 MIN: CPT | Mod: 95 | Performed by: INTERNAL MEDICINE

## 2022-04-07 ASSESSMENT — SLEEP AND FATIGUE QUESTIONNAIRES
HOW LIKELY ARE YOU TO NOD OFF OR FALL ASLEEP WHILE LYING DOWN TO REST IN THE AFTERNOON WHEN CIRCUMSTANCES PERMIT: SLIGHT CHANCE OF DOZING
HOW LIKELY ARE YOU TO NOD OFF OR FALL ASLEEP WHILE WATCHING TV: MODERATE CHANCE OF DOZING
HOW LIKELY ARE YOU TO NOD OFF OR FALL ASLEEP WHILE SITTING INACTIVE IN A PUBLIC PLACE: SLIGHT CHANCE OF DOZING
HOW LIKELY ARE YOU TO NOD OFF OR FALL ASLEEP WHILE SITTING QUIETLY AFTER LUNCH WITHOUT ALCOHOL: SLIGHT CHANCE OF DOZING
HOW LIKELY ARE YOU TO NOD OFF OR FALL ASLEEP WHEN YOU ARE A PASSENGER IN A CAR FOR AN HOUR WITHOUT A BREAK: SLIGHT CHANCE OF DOZING
HOW LIKELY ARE YOU TO NOD OFF OR FALL ASLEEP WHILE SITTING AND TALKING TO SOMEONE: WOULD NEVER DOZE
HOW LIKELY ARE YOU TO NOD OFF OR FALL ASLEEP WHILE SITTING AND READING: HIGH CHANCE OF DOZING
HOW LIKELY ARE YOU TO NOD OFF OR FALL ASLEEP IN A CAR, WHILE STOPPED FOR A FEW MINUTES IN TRAFFIC: WOULD NEVER DOZE
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
## 2022-04-08 RX ORDER — GLIMEPIRIDE 4 MG/1
TABLET ORAL
Qty: 135 TABLET | Refills: 0
Start: 2022-04-08 | End: 2022-04-08

## 2022-04-08 RX ORDER — GLIMEPIRIDE 4 MG/1
4 TABLET ORAL
Qty: 180 TABLET | Refills: 0
Start: 2022-04-08 | End: 2022-10-12 | Stop reason: ALTCHOICE

## 2022-04-21 ENCOUNTER — TRANSFERRED RECORDS (OUTPATIENT)
Dept: HEALTH INFORMATION MANAGEMENT | Facility: CLINIC | Age: 52
End: 2022-04-21
Payer: COMMERCIAL

## 2022-04-27 ENCOUNTER — TELEPHONE (OUTPATIENT)
Dept: OPHTHALMOLOGY | Facility: CLINIC | Age: 52
End: 2022-04-27
Payer: COMMERCIAL

## 2022-04-27 NOTE — TELEPHONE ENCOUNTER
Patient called complaining of eye discharge. Started yesterday. Stable. Eyes feel sore. No vision changes. Some redness. Patient will come in tomorrow to see Dr. Taylor.

## 2022-04-28 ENCOUNTER — OFFICE VISIT (OUTPATIENT)
Dept: OPHTHALMOLOGY | Facility: CLINIC | Age: 52
End: 2022-04-28
Payer: COMMERCIAL

## 2022-04-28 DIAGNOSIS — H16.8 MARGINAL KERATITIS: Primary | ICD-10-CM

## 2022-04-28 PROCEDURE — 92012 INTRM OPH EXAM EST PATIENT: CPT | Performed by: OPHTHALMOLOGY

## 2022-04-28 RX ORDER — NEOMYCIN SULFATE, POLYMYXIN B SULFATE AND DEXAMETHASONE 3.5; 10000; 1 MG/ML; [USP'U]/ML; MG/ML
1 SUSPENSION/ DROPS OPHTHALMIC
Qty: 5 ML | Refills: 1 | Status: SHIPPED | OUTPATIENT
Start: 2022-04-28 | End: 2022-11-15

## 2022-04-28 ASSESSMENT — VISUAL ACUITY
CORRECTION_TYPE: GLASSES
OD_CC: 20/20
OS_CC+: -1
OD_CC+: -2
METHOD: SNELLEN - LINEAR
OS_CC: 20/20

## 2022-04-28 ASSESSMENT — EXTERNAL EXAM - RIGHT EYE: OD_EXAM: PROLAPSED FAT PADS: LOWER

## 2022-04-28 ASSESSMENT — EXTERNAL EXAM - LEFT EYE: OS_EXAM: PROLAPSED FAT PADS: LOWER

## 2022-04-28 NOTE — LETTER
4/28/2022         RE: Zaira Villatoro  [address] [facility]  [place] [address]        Dear Colleague,    Thank you for referring your patient, Zaira Villatoro, to the [facility]. Please see a copy of my visit note below.     Current Eye Medications:  None     Subjective:  Here for MD Check for eye soreness and discharge.   Patient complains of right eye being red and painful.   Pain is described as sharp jabs. Started yesterday. Stable.   Sometimes light sensitive. Some discomfort with blinking.   Vision is not affected.        Objective:  See Ophthalmology Exam.       Assessment:  Marginal corneal infiltrate right eye.      Plan:  Start Behzad/ Dex drop every couple hours for a few days, then as needed.  May use artificial tears up to 4 times daily both eyes. (Refresh Tears, Systane Ultra/Balance, or Theratears)   Return visit if problem persists.  Omid Taylor M.D.  [phone]          Again, thank you for allowing me to participate in the care of your patient.        Sincerely,        Omid Taylor MD

## 2022-04-28 NOTE — PATIENT INSTRUCTIONS
Start Behzad/ Dex drop every couple hours for a few days, then as needed.  May use artificial tears up to 4 times daily both eyes. (Refresh Tears, Systane Ultra/Balance, or Theratears)   Return visit if problem persist.  Omid Taylor M.D.  [phone]

## 2022-04-28 NOTE — PROGRESS NOTES
Current Eye Medications:  None     Subjective:  Here for MD Check for eye soreness and discharge.   Patient complains of right eye being red and painful.   Pain is described as sharp jabs. Started yesterday. Stable.   Sometimes light sensitive. Some discomfort with blinking.   Vision is not affected.        Objective:  See Ophthalmology Exam.       Assessment:  Marginal corneal infiltrate right eye.      Plan:  Start Behzad/ Dex drop every couple hours for a few days, then as needed.  May use artificial tears up to 4 times daily both eyes. (Refresh Tears, Systane Ultra/Balance, or Theratears)   Return visit if problem persists.  Omid Taylor M.D.  [phone]

## 2022-05-13 ENCOUNTER — TRANSFERRED RECORDS (OUTPATIENT)
Dept: HEALTH INFORMATION MANAGEMENT | Facility: CLINIC | Age: 52
End: 2022-05-13
Payer: COMMERCIAL

## 2022-06-02 ENCOUNTER — TRANSFERRED RECORDS (OUTPATIENT)
Dept: HEALTH INFORMATION MANAGEMENT | Facility: CLINIC | Age: 52
End: 2022-06-02
Payer: COMMERCIAL

## 2022-06-02 LAB
ALT SERPL-CCNC: 19 IU/L (ref 0–32)
AST SERPL-CCNC: 16 IU/L (ref 0–40)

## 2022-07-08 ENCOUNTER — TRANSFERRED RECORDS (OUTPATIENT)
Dept: HEALTH INFORMATION MANAGEMENT | Facility: CLINIC | Age: 52
End: 2022-07-08

## 2022-07-14 ENCOUNTER — TRANSFERRED RECORDS (OUTPATIENT)
Dept: HEALTH INFORMATION MANAGEMENT | Facility: CLINIC | Age: 52
End: 2022-07-14

## 2022-07-30 ENCOUNTER — HEALTH MAINTENANCE LETTER (OUTPATIENT)
Age: 52
End: 2022-07-30

## 2022-08-13 DIAGNOSIS — I48.0 PAF (PAROXYSMAL ATRIAL FIBRILLATION) (H): ICD-10-CM

## 2022-08-15 RX ORDER — APIXABAN 5 MG/1
TABLET, FILM COATED ORAL
Qty: 60 TABLET | Refills: 4 | Status: SHIPPED | OUTPATIENT
Start: 2022-08-15 | End: 2023-01-05

## 2022-08-25 ENCOUNTER — TRANSFERRED RECORDS (OUTPATIENT)
Dept: HEALTH INFORMATION MANAGEMENT | Facility: CLINIC | Age: 52
End: 2022-08-25

## 2022-09-18 DIAGNOSIS — E11.65 TYPE 2 DIABETES MELLITUS WITH HYPERGLYCEMIA (H): ICD-10-CM

## 2022-09-19 RX ORDER — LANCETS 33 GAUGE
EACH MISCELLANEOUS
Qty: 100 EACH | Refills: 0 | Status: SHIPPED | OUTPATIENT
Start: 2022-09-19 | End: 2023-01-02

## 2022-09-26 NOTE — TELEPHONE ENCOUNTER
Spoke to patient let her know that she need appointment for a diabete check and schedule her with Dr Marcano 10/12 because Dr Barger didn't have no appt    Ana Giron. MA

## 2022-10-06 ENCOUNTER — TRANSFERRED RECORDS (OUTPATIENT)
Dept: HEALTH INFORMATION MANAGEMENT | Facility: CLINIC | Age: 52
End: 2022-10-06

## 2022-10-10 ENCOUNTER — HEALTH MAINTENANCE LETTER (OUTPATIENT)
Age: 52
End: 2022-10-10

## 2022-10-12 ENCOUNTER — TELEPHONE (OUTPATIENT)
Dept: FAMILY MEDICINE | Facility: CLINIC | Age: 52
End: 2022-10-12

## 2022-10-12 ENCOUNTER — ANCILLARY PROCEDURE (OUTPATIENT)
Dept: GENERAL RADIOLOGY | Facility: CLINIC | Age: 52
End: 2022-10-12
Attending: STUDENT IN AN ORGANIZED HEALTH CARE EDUCATION/TRAINING PROGRAM
Payer: COMMERCIAL

## 2022-10-12 ENCOUNTER — OFFICE VISIT (OUTPATIENT)
Dept: FAMILY MEDICINE | Facility: CLINIC | Age: 52
End: 2022-10-12
Payer: COMMERCIAL

## 2022-10-12 VITALS
DIASTOLIC BLOOD PRESSURE: 68 MMHG | RESPIRATION RATE: 18 BRPM | BODY MASS INDEX: 41.54 KG/M2 | TEMPERATURE: 98.4 F | HEART RATE: 65 BPM | OXYGEN SATURATION: 97 % | WEIGHT: 258.5 LBS | SYSTOLIC BLOOD PRESSURE: 112 MMHG | HEIGHT: 66 IN

## 2022-10-12 DIAGNOSIS — Z23 NEED FOR VACCINATION: Primary | ICD-10-CM

## 2022-10-12 DIAGNOSIS — E11.65 TYPE 2 DIABETES MELLITUS WITH HYPERGLYCEMIA, WITH LONG-TERM CURRENT USE OF INSULIN (H): ICD-10-CM

## 2022-10-12 DIAGNOSIS — Z79.4 TYPE 2 DIABETES MELLITUS WITH HYPERGLYCEMIA, WITH LONG-TERM CURRENT USE OF INSULIN (H): ICD-10-CM

## 2022-10-12 DIAGNOSIS — M54.50 CHRONIC LOW BACK PAIN WITHOUT SCIATICA, UNSPECIFIED BACK PAIN LATERALITY: Chronic | ICD-10-CM

## 2022-10-12 DIAGNOSIS — G89.29 CHRONIC LOW BACK PAIN WITHOUT SCIATICA, UNSPECIFIED BACK PAIN LATERALITY: Chronic | ICD-10-CM

## 2022-10-12 PROBLEM — K46.9 ABDOMINAL HERNIA: Status: ACTIVE | Noted: 2022-10-12

## 2022-10-12 PROBLEM — K62.5 HEMORRHAGE OF RECTUM AND ANUS: Status: ACTIVE | Noted: 2021-04-01

## 2022-10-12 PROBLEM — K58.0 IRRITABLE BOWEL SYNDROME WITH DIARRHEA: Status: ACTIVE | Noted: 2022-10-12

## 2022-10-12 LAB — HBA1C MFR BLD: 7.6 % (ref 0–5.6)

## 2022-10-12 PROCEDURE — 80048 BASIC METABOLIC PNL TOTAL CA: CPT | Performed by: STUDENT IN AN ORGANIZED HEALTH CARE EDUCATION/TRAINING PROGRAM

## 2022-10-12 PROCEDURE — 90682 RIV4 VACC RECOMBINANT DNA IM: CPT | Performed by: STUDENT IN AN ORGANIZED HEALTH CARE EDUCATION/TRAINING PROGRAM

## 2022-10-12 PROCEDURE — 82043 UR ALBUMIN QUANTITATIVE: CPT | Performed by: STUDENT IN AN ORGANIZED HEALTH CARE EDUCATION/TRAINING PROGRAM

## 2022-10-12 PROCEDURE — 99207 PR FOOT EXAM NO CHARGE: CPT | Performed by: STUDENT IN AN ORGANIZED HEALTH CARE EDUCATION/TRAINING PROGRAM

## 2022-10-12 PROCEDURE — 80061 LIPID PANEL: CPT | Performed by: STUDENT IN AN ORGANIZED HEALTH CARE EDUCATION/TRAINING PROGRAM

## 2022-10-12 PROCEDURE — 99214 OFFICE O/P EST MOD 30 MIN: CPT | Mod: 25 | Performed by: STUDENT IN AN ORGANIZED HEALTH CARE EDUCATION/TRAINING PROGRAM

## 2022-10-12 PROCEDURE — 90471 IMMUNIZATION ADMIN: CPT | Performed by: STUDENT IN AN ORGANIZED HEALTH CARE EDUCATION/TRAINING PROGRAM

## 2022-10-12 PROCEDURE — 72100 X-RAY EXAM L-S SPINE 2/3 VWS: CPT | Mod: TC | Performed by: RADIOLOGY

## 2022-10-12 PROCEDURE — 83036 HEMOGLOBIN GLYCOSYLATED A1C: CPT | Performed by: STUDENT IN AN ORGANIZED HEALTH CARE EDUCATION/TRAINING PROGRAM

## 2022-10-12 PROCEDURE — 36415 COLL VENOUS BLD VENIPUNCTURE: CPT | Performed by: STUDENT IN AN ORGANIZED HEALTH CARE EDUCATION/TRAINING PROGRAM

## 2022-10-12 RX ORDER — ORAL SEMAGLUTIDE 3 MG/1
3 TABLET ORAL DAILY
Qty: 30 TABLET | Refills: 0 | Status: SHIPPED | OUTPATIENT
Start: 2022-10-12 | End: 2022-12-19

## 2022-10-12 NOTE — NURSING NOTE
Blood Sugars   10/12 - 110  10/11 - 165 - 100-127  10/10 - 121-104-153  10/9 - 132- 128-171  10/8- 137- 134-225  10/7 - 133-153-238  10/6- 99 - 280-283   10/5 - 142-152- 135  10/4 - 153- 136 - forgot   10/3 - 116-154- 248   10/2 - 119- 107- 140   10/1 - 88 - 85 - 195   9/30 -  93 - 103 - 142   9/29 - 117-137-144  9/28 - 150-124-183  9/27  - 117 - 128- 216  9/26 - 131 - 115-141  9/25 - 145 - 230 - 207   9/24 - 168 - 177 - 198   9/23 - 150 - 151 - 205   9/22 - 153 - 125 - 142

## 2022-10-12 NOTE — PROGRESS NOTES
Assessment & Plan     (Z23) Need for vaccination  (primary encounter diagnosis)  Comment: Stable  Plan: INFLUENZA QUAD, RECOMBINANT, P-FREE (RIV4)         (FLUBLOK) AGE 50-64 [MYJ122]        Pt to receive flu shot today during office visit     (E11.65,  Z79.4) Type 2 diabetes mellitus with hyperglycemia, with long-term current use of insulin (H)  Comment: Improving. Last a1c per chart review noted to be 8.0 on 4/5/22 and today patient is at 7.6%. Fasting sugars and non fasting's continue to remain out of goal at times particularly during UC infusion (when steroid is given). Discussed with patient discontinuation of glimepiride and starting Rybelsus 3mg daily. Pt in agreement- pending prior authorization. Placed consult for MTM Dr. Pritchett with goals of decreasing Levemir and creating better goals of management of DM. Pt encouraged to continue medications (glimepiride until Rybelsus is approved then can discontinue) however to continue Jardiance 25mg and Levemir 70 unit(s) BID.  Plan: Lipid panel reflex to direct LDL Non-fasting,         HEMOGLOBIN A1C, BASIC METABOLIC PANEL, Albumin         Random Urine Quantitative with Creat Ratio,         FOOT EXAM, Adult Eye  Referral,         Semaglutide (RYBELSUS) 3 MG TABS, Med Therapy         Management Referral        Pending remaining labs, pending eye exam however consult placed    (M54.50,  G89.29) Chronic low back pain without sciatica, unspecified back pain laterality  Comment: Chronic, worsening. History of steroid injections into lumbar back with most recent being over 10 years ago. Physical exam positive for straight leg test on left. Will order x-ray of lumbar spine today and provide patient with ortho referral for further evaluation   Plan: XR Lumbar Spine 2/3 Views, Orthopedic         Referral        Pending evaluation with ortho.                BMI:   Estimated body mass index is 41.55 kg/m  as calculated from the following:    Height as of this  "encounter: 1.68 m (5' 6.14\").    Weight as of this encounter: 117.3 kg (258 lb 8 oz).   Weight management plan: Discussed healthy diet and exercise guidelines    Work on weight loss  Regular exercise  See Patient Instructions    Return in about 4 weeks (around 11/9/2022) for Follow up- DM.    EMILY KUNZ MD  [facility] KEISHA Meneses is a 52 year old accompanied by her NONE, presenting for the following health issues:  Diabetes      History of Present Illness       Back Pain:  She presents for follow up of back pain. Patient's back pain is a recurring problem.  Location of back pain:  Right lower back and left lower back  Description of back pain: dull ache  Back pain spreads: nowhere    Since patient first noticed back pain, pain is: gradually worsening  Does back pain interfere with her job:  Not applicable      Diabetes:   She presents for follow up of diabetes.  She is checking home blood glucose four or more times daily. She checks blood glucose before meals and at bedtime.  Blood glucose is sometimes over 200 and never under 70. When her blood glucose is low, the patient is asymptomatic for confusion, blurred vision, lethargy and reports not feeling dizzy, shaky, or weak.  She has no concerns regarding her diabetes at this time.  She is having weight gain. The patient has not had a diabetic eye exam in the last 12 months.         She eats 2-3 servings of fruits and vegetables daily.She consumes 0 sweetened beverage(s) daily.She exercises with enough effort to increase her heart rate 10 to 19 minutes per day.  She exercises with enough effort to increase her heart rate 3 or less days per week.   She is taking medications regularly.     DM  Patient reports taking 6 mg glimepiride   Average fasting's:    Non fasting's: 100-283  She endorses elevated sugars during her time of her UC infusions and reports using a half dose of glimepiride more meaning use of 6.5 mg.  She " "reports being complaint with taking her Levemir 70u BID, Jardiance 25 mg     Back Pain  Pt reports that she has had about 3 injections in her lower back. She reports her first injection occurred at age 14. She endorses bulging disc and ruptured discs. She reports the first injection she had lasted about 20-25 years and the next injection lasted about an average of 10 years.   She reports that over the last 3 months she reports worsening of back pain. She endorses achiness of the back, soreness upon awakening.  She reports use of Tylenol has mild  alleviation of pain.          Review of Systems   CONSTITUTIONAL: NEGATIVE for fever, chills, change in weight  ENT/MOUTH: NEGATIVE for ear, mouth and throat problems  RESP: NEGATIVE for significant cough or SOB  CV: NEGATIVE for chest pain, palpitations or peripheral edema  MUSCULOSKELETAL: POSITIVE  for back pain low back pain constantly      Objective    /68   Pulse 65   Temp 98.4  F (36.9  C) (Oral)   Resp 18   Ht 1.68 m (5' 6.14\")   Wt 117.3 kg (258 lb 8 oz)   SpO2 97%   BMI 41.55 kg/m    Body mass index is 41.55 kg/m .  Physical Exam   GENERAL: healthy, alert and no distress  RESP: lungs clear to auscultation - no rales, rhonchi or wheezes  CV: irregularly irregular rhythm, no murmur, click or rub, peripheral pulses strong and no peripheral edema  MS: spine exam shows positive straight leg test     Results for orders placed or performed in visit on 10/12/22   HEMOGLOBIN A1C     Status: Abnormal   Result Value Ref Range    Hemoglobin A1C 7.6 (H) 0.0 - 5.6 %                   "

## 2022-10-12 NOTE — PATIENT INSTRUCTIONS
We are waiting for your prior authorization for the Rybelsus. Please continue to take the Glimepiride until we get you on that medication    Continue to take your glucose levels and remain on the same meds as discussed- Levemir 70u BID, Jardiance 25 mg daily and Glimepiride 4mg (6mg) daily. Goals of fastin-130 and non fasting: less than 180    Please wait for a phone call from the Medication Management team- Dr. Ryanne Pritchett    Please follow-up with Ortho. Lets hope we can get a resolution to your back pain     Thank you for choosing  [facility] and also for choosing me as your Physician. :)    Have a wonderful day     -Dr. Zaira Marcano

## 2022-10-12 NOTE — TELEPHONE ENCOUNTER
Reason for Call:  Other call back    Detailed comments: [facility] requesting a 3 day hold of Eliquis because patient has Colonoscopy with [facility] on 10/25/2022. Please call with orders.     Phone Number Patient can be reached at: Other phone number:  Amanda at [facility]- [phone]    Best Time: Any    Can we leave a detailed message on this number? YES    Call taken on 10/12/2022 at 11:00 AM by Maria C English

## 2022-10-13 DIAGNOSIS — Z79.4 TYPE 2 DIABETES MELLITUS WITH HYPERGLYCEMIA, WITH LONG-TERM CURRENT USE OF INSULIN (H): Chronic | ICD-10-CM

## 2022-10-13 DIAGNOSIS — E11.65 TYPE 2 DIABETES MELLITUS WITH HYPERGLYCEMIA, WITH LONG-TERM CURRENT USE OF INSULIN (H): Chronic | ICD-10-CM

## 2022-10-13 LAB
ANION GAP SERPL CALCULATED.3IONS-SCNC: 3 MMOL/L (ref 3–14)
BUN SERPL-MCNC: 14 MG/DL (ref 7–30)
CALCIUM SERPL-MCNC: 9 MG/DL (ref 8.5–10.1)
CHLORIDE BLD-SCNC: 110 MMOL/L (ref 94–109)
CHOLEST SERPL-MCNC: 168 MG/DL
CO2 SERPL-SCNC: 26 MMOL/L (ref 20–32)
CREAT SERPL-MCNC: 0.76 MG/DL (ref 0.52–1.04)
CREAT UR-MCNC: 94 MG/DL
FASTING STATUS PATIENT QL REPORTED: ABNORMAL
GFR SERPL CREATININE-BSD FRML MDRD: >90 ML/MIN/1.73M2
GLUCOSE BLD-MCNC: 129 MG/DL (ref 70–99)
HDLC SERPL-MCNC: 42 MG/DL
LDLC SERPL CALC-MCNC: 107 MG/DL
MICROALBUMIN UR-MCNC: 16 MG/L
MICROALBUMIN/CREAT UR: 17.02 MG/G CR (ref 0–25)
NONHDLC SERPL-MCNC: 126 MG/DL
POTASSIUM BLD-SCNC: 4.6 MMOL/L (ref 3.4–5.3)
SODIUM SERPL-SCNC: 139 MMOL/L (ref 133–144)
TRIGL SERPL-MCNC: 94 MG/DL

## 2022-10-14 RX ORDER — GLIMEPIRIDE 1 MG/1
TABLET ORAL
Qty: 180 TABLET | Refills: 1 | Status: SHIPPED | OUTPATIENT
Start: 2022-10-14 | End: 2022-10-19 | Stop reason: ALTCHOICE

## 2022-10-17 NOTE — PROGRESS NOTES
"  Dr. Miguel Sifuentes  [facility]  Neurosurgery Clinic Visit      CC:  Bilateral arm paresthesias    Primary care Provider: Tayler Bergman      Reason For Visit:   The patient presents for evaluation of neck pain.      HPI: Zaira Villatoro is a 48 year old female with N/T in the BLE, Rt>Lt, for the past couple of months.  She states she doesn't really experience neck pain, \"Just sometimes.\"  She describes numbness along the right lateral arm into the forearm and hand, noting mostly the thumb and/or 4th and 5th fingers.  She has similar symptoms on the left, but more intermittent. She was seen at [facility] ED a couple of weeks ago and had a cervical MRI showing foraminal and canal narrowing at C5-6, C6-7.  She denies feeling weakness to BUE or dropping objects.  She states her symptoms increase with using her keyboard, driving with her hands on the wheel for extended amounts of time.  She recently started an oral steroid taper which she states has relieved her symptoms. She denies difficulty with fine motor skills. She denies issues with balance or falls.  She denies changes to bowel or bladder.      Pain right now:  1    Past Medical History:   Diagnosis Date     Acute diverticulitis 7/31/2013     Gestational diabetes 2000 2000/2001     History of seizures as a child 1981    One grand mal in 5th grade.  Didn't tolerate phenobarb.     Hypertension 2000     Menorrhagia      Mild cervical dysplasia 1988     Moderate single current episode of major depressive disorder (H)      Pelvic pain      Perforation of sigmoid colon (H) 8/3/2013     PONV (postoperative nausea and vomiting)      S/P left hemicolectomy 8/16/2013 8/02/13      Sepsis (H) 8/1/2013     Transient atrial fibrillation or flutter 08/03/2013    one documented episode of perioperative atrial fibrillation that occurred in association with a sigmoid colon perforation, ulcerative colitis and colostomy       Past Medical " Anesthesia Post Evaluation    Patient: Yo Pink    Procedure(s) Performed: Procedure(s) (LRB):  DEBRIDEMENT, WOUND, RIGHT FOOT (Right)    Final Anesthesia Type: general      Patient location during evaluation: PACU  Patient participation: Yes- Able to Participate  Level of consciousness: awake and alert, oriented and awake  Post-procedure vital signs: reviewed and stable  Pain management: adequate  Airway patency: patent    PONV status at discharge: No PONV  Anesthetic complications: no      Cardiovascular status: blood pressure returned to baseline, hemodynamically stable and stable  Respiratory status: unassisted and spontaneous ventilation  Hydration status: euvolemic  Follow-up not needed.          Vitals Value Taken Time   /74 10/17/22 1421   Temp 36.6 °C (97.8 °F) 10/17/22 1421   Pulse 63 10/17/22 1427   Resp 15 10/17/22 1427   SpO2 100 % 10/17/22 1427   Vitals shown include unvalidated device data.      No case tracking events are documented in the log.      Pain/Farhad Score: No data recorded       History reviewed with patient during visit.    Past Surgical History:   Procedure Laterality Date     APPENDECTOMY  04/2014     ARTHROSCOPY KNEE RT/LT  2004    RT      BIOPSY  2011 many 2011 and on     COLONOSCOPY  02/2012    lt sided colitis     COLONOSCOPY  1/9/2014     CRYOTHERAPY, CERVICAL  1988     EXPLORATORY LAPAROTOMY, PARTIAL LEFT ROLANDA COLLECTOMY WITH HARTMANS PROCEDURE, COLOSTOMY  8/2013    lt rolanda colectomy, bowel perforation, colostomy, Karen's pouche     GI SURGERY  2013    abrupted  bowl     HC TOOTH EXTRACTION W/FORCEP  6/2003    Abscess Tooth / Hospitalized     HERNIA REPAIR  04/2014    found at time of takedown     SINUS SURGERY  2/11/03    LT sinus cyst removal      TAKEDOWN COLOSTOMY  04/2014     Past Surgical History reviewed with patient during visit.    Current Outpatient Medications   Medication     aspirin 81 MG tablet     atenolol (TENORMIN) 50 MG tablet     blood glucose monitoring (ACCU-CHEK SHARMILA PLUS) test strip     blood glucose monitoring (ACCU-CHEK FASTCLIX) lancets     Budesonide (UCERIS) 2 MG/ACT FOAM     cholecalciferol 2000 UNITS tablet     DRAMAMINE OR     exenatide ER (BYDUREON BCISE) 2 MG/0.85ML SQ autoinjector for weekly inj     FLUoxetine (PROZAC) 20 MG capsule     fluticasone (FLOVENT HFA) 110 MCG/ACT Inhaler     glimepiride (AMARYL) 2 MG tablet     HUMIRA PEN 40 MG/0.8ML pen kit     insulin detemir (LEVEMIR FLEXTOUCH) 100 UNIT/ML injection     insulin pen needle (B-D U/F) 31G X 5 MM     losartan (COZAAR) 25 MG tablet     order for DME     order for DME     order for DME     oxyCODONE-acetaminophen (PERCOCET) 5-325 MG per tablet     STATIN NOT PRESCRIBED, INTENTIONAL,     TYLENOL CAPS 500 MG OR     atenolol (TENORMIN) 25 MG tablet     diphenhydrAMINE (BENADRYL ALLERGY) 25 MG tablet     eletriptan (RELPAX) 20 MG tablet     No current facility-administered medications for this visit.        Allergies   Allergen Reactions     Demerol      Very low blood pressure      Metformin GI Disturbance     Naproxen      No Clinical Screening - See Comments      Pt reports allergy to all fish     Nubain  [Nalbuphine]      Topamax [Topiramate] Other (See Comments)     Sleepy and confused.     Tylenol Sinus [Tylenol Sinus Max]      Feet swelling       Social History     Socioeconomic History     Marital status:      Spouse name: Maged     Number of children: 2     Years of education: 15     Highest education level: None   Occupational History     Occupation: Substitute clerical/cook/health assistant/Para     Employer: [employer]   Social Needs     Financial resource strain: None     Food insecurity:     Worry: None     Inability: None     Transportation needs:     Medical: None     Non-medical: None   Tobacco Use     Smoking status: Former Smoker     Packs/day: 1.00     Years: 15.00     Pack years: 15.00     Types: Cigarettes     Start date: 1985     Last attempt to quit: 1998     Years since quittin.6     Smokeless tobacco: Never Used     Tobacco comment: soke free household.   Substance and Sexual Activity     Alcohol use: Yes     Alcohol/week: 0.0 oz     Comment: occ     Drug use: No     Sexual activity: Yes     Partners: Male     Birth control/protection: Other     Comment:  has vasectomy   Lifestyle     Physical activity:     Days per week: None     Minutes per session: None     Stress: None   Relationships     Social connections:     Talks on phone: None     Gets together: None     Attends Jainism service: None     Active member of club or organization: None     Attends meetings of clubs or organizations: None     Relationship status: None     Intimate partner violence:     Fear of current or ex partner: None     Emotionally abused: None     Physically abused: None     Forced sexual activity: None   Other Topics Concern     Parent/sibling w/ CABG, MI or angioplasty before 65F 55M? No      Service No     Blood Transfusions No     Comment:  "doesn't want one     Caffeine Concern No     Occupational Exposure No     Hobby Hazards No     Sleep Concern No     Stress Concern No     Weight Concern Yes     Special Diet Yes     Comment: weight loss, colitis     Back Care Yes     Exercise Yes     Comment: does     Bike Helmet No     Comment: only stationary bike     Seat Belt Yes     Self-Exams Yes   Social History Narrative     None       Family History   Problem Relation Age of Onset     Diabetes Mother      Allergies Mother      Asthma Mother      Arthritis Mother      Heart Disease Mother         heart murmur     Depression Mother      Obesity Mother      Eye Disorder Father      Diabetes Father      Cerebrovascular Disease Father      Heart Disease Father      Hypertension Father      Diabetes Maternal Grandmother      Cerebrovascular Disease Maternal Grandfather      Unknown/Adopted Paternal Grandmother      Heart Disease Paternal Grandfather         left ventrical failure     Cerebrovascular Disease Paternal Grandfather      Gynecology Sister         endometriosis     Depression Sister      Asthma Daughter      Breast Cancer Maternal Aunt      Thyroid Disease Maternal Aunt      Breast Cancer Other         fathers sister     Anesthesia Reaction Other      Thyroid Disease Other      Osteoporosis Other      Asthma Daughter      Breast Cancer Other         mothers sister     Glaucoma No family hx of      Macular Degeneration No family hx of          ROS: 10 point ROS neg other than the symptoms noted above in the HPI.    Vital Signs: /72 (BP Location: Right arm, Patient Position: Sitting, Cuff Size: Adult Large)   Pulse 68   Temp 98.3  F (36.8  C) (Oral)   Ht 1.651 m (5' 5\")   Wt 116.9 kg (257 lb 12.8 oz)   SpO2 97%   BMI 42.90 kg/m          Examination:  Constitutional:  Alert, well nourished, NAD.  HEENT: Normocephalic, atraumatic.   Pulm:  Without shortness of breath   CV:  No pitting edema of BLE.    Neurological:  Awake  Alert  Oriented x " "3  Speech clear  Cranial nerves II - XII intact    Motor exam   Shoulder Abduction:  Right:  5/5   Left:  5/5  Biceps:                      Right:  5/5   Left:  5/5  Triceps:                     Right:  5/5   Left:  5/5  Wrist Extensors:       Right:  5/5   Left:  5/5  Wrist Flexors:           Right:  5/5   Left:  5/5  Intrinsics:                   Right:  5/5   Left:  5/5  Hip Flexor:                Right: 5/5  Left:  5/5  Hip Adductor:             Right:  5/5  Left:  5/5  Hip Abductor:             Right:  5/5  Left:  5/5  Gastroc Soleus:        Right:  5/5  Left:  5/5  Tib/Ant:                      Right:  5/5  Left:  5/5  EHL:                          Right:  5/5  Left:  5/5   Sensation normal to bilateral upper and lower extremities  DTRs 1+ symmetric  Haney's negative  Clonus negative  Steady gait, non-antalgic, non-myelopathic gait.  Able to heel/toe walk without loss of balance  Cervical examination reveals good range of motion.  No tenderness to palpation of the cervical spine or paraspinous muscles bilaterally.    Lumbar examination reveals tenderness of the spine or paraspinous muscles.  Hip height is symmetrical. Negative SI joint, sciatic notch or greater trochanteric tenderness to palpation bilaterally.  Straight leg raise is negative bilaterally.      Imaging: MRI:  -Moderate to severe bilateral foraminal narrowing at C6-7, left>right with mild central stenosis  -Moderate to severe neural foraminal narrowing at C5-6, right>left with mild central stenosis.    Assessment/Plan:   Cervical Radiculopathy      Zaira Villatoro is a 48 year old female with N/T in the BLE, Rt>Lt, for the past couple of months.  She states she doesn't really experience neck pain, \"Just sometimes.\"  She describes numbness along the right lateral arm into the forearm and hand, noting mostly the thumb and/or 4th and 5th fingers.  She has similar symptoms on the left, but more intermittent. She was seen at [facility] a " couple of weeks ago and had a cervical MRI showing foraminal and canal narrowing at C5-6, C6-7.  She denies feeling weakness to BUE or dropping objects.  She states her symptoms increase with using her keyboard, driving with her hands on the wheel for extended amounts of time.  She recently started an oral steroid taper which she states has relieved her symptoms. She denies difficulty with fine motor skills. She denies issues with balance or falls.  She denies changes to bowel or bladder.  We reviewed MRI results and discussed options of PT and/or injections. The patient is interested in conservative care before any consideration of surgery.  She feels with the use of recent oral steroids relieved her symptoms and she is going to hold off on an injection at this time.  If she has any weakness she will contact us and follow-up with Dr. Sifuentes.    Patient Instructions   -Schedule physical therapy (someone will contact you)  -Please contact the clinic if pain persists at [phone].          Phyllis Newton [facility]  [facility]  [address] [address]    Tel [phone]  Pager [phone]

## 2022-10-19 ENCOUNTER — MYC MEDICAL ADVICE (OUTPATIENT)
Dept: PHARMACY | Facility: CLINIC | Age: 52
End: 2022-10-19

## 2022-10-19 ENCOUNTER — OFFICE VISIT (OUTPATIENT)
Dept: PHARMACY | Facility: CLINIC | Age: 52
End: 2022-10-19
Payer: COMMERCIAL

## 2022-10-19 VITALS — DIASTOLIC BLOOD PRESSURE: 68 MMHG | BODY MASS INDEX: 41.95 KG/M2 | SYSTOLIC BLOOD PRESSURE: 102 MMHG | WEIGHT: 261 LBS

## 2022-10-19 DIAGNOSIS — I10 ESSENTIAL HYPERTENSION WITH GOAL BLOOD PRESSURE LESS THAN 140/90: Chronic | ICD-10-CM

## 2022-10-19 DIAGNOSIS — Z79.4 TYPE 2 DIABETES MELLITUS WITH HYPERGLYCEMIA, WITH LONG-TERM CURRENT USE OF INSULIN (H): Primary | Chronic | ICD-10-CM

## 2022-10-19 DIAGNOSIS — Z78.9 TAKES DIETARY SUPPLEMENTS: ICD-10-CM

## 2022-10-19 DIAGNOSIS — E78.5 HYPERLIPIDEMIA LDL GOAL <100: ICD-10-CM

## 2022-10-19 DIAGNOSIS — I48.0 PAROXYSMAL ATRIAL FIBRILLATION (H): Chronic | ICD-10-CM

## 2022-10-19 DIAGNOSIS — E11.65 TYPE 2 DIABETES MELLITUS WITH HYPERGLYCEMIA, WITH LONG-TERM CURRENT USE OF INSULIN (H): Primary | Chronic | ICD-10-CM

## 2022-10-19 DIAGNOSIS — K51.90 ULCERATIVE COLITIS WITHOUT COMPLICATIONS, UNSPECIFIED LOCATION (H): Chronic | ICD-10-CM

## 2022-10-19 PROCEDURE — 99605 MTMS BY PHARM NP 15 MIN: CPT | Performed by: PHARMACIST

## 2022-10-19 PROCEDURE — 99607 MTMS BY PHARM ADDL 15 MIN: CPT | Performed by: PHARMACIST

## 2022-10-19 RX ORDER — GLIMEPIRIDE 4 MG/1
6 TABLET ORAL
COMMUNITY
End: 2022-10-19 | Stop reason: ALTCHOICE

## 2022-10-19 NOTE — Clinical Note
Brian Marcano - I spoke with [facility] and had them retry the Rybelsus, went through with $0 copay!- I'll let her know and have her start (and stop the glimepiride)  Ryanne Pritchett, PharmD Medication Therapy Management Pharmacist [phone]

## 2022-10-19 NOTE — PATIENT INSTRUCTIONS
"Recommendations from today's MTM visit:                                                    MTM (medication therapy management) is a service provided by a clinical pharmacist designed to help you get the most of out of your medicines.   Today we reviewed what your medicines are for, how to know if they are working, that your medicines are safe and how to make your medicine regimen as easy as possible.      1. Goal blood sugars we are looking for to get your A1C less than 7% are between  mg/dL right away in the morning or before meals, and less than 180 mg/dL. 2-hours after a meal.    2.  Try to check blood sugar two hours after lunch.     Follow-up: Return in about 2 weeks (around 11/2/2022) for Medication Therapy Management.    It was great speaking with you today.  I value your experience and would be very thankful for your time in providing feedback in our clinic survey. In the next few days, you may receive an email or text message from [employer] with a link to a survey related to your  clinical pharmacist.\"     To schedule another MTM appointment, please call the clinic directly or you may call the MTM scheduling line at [phone] or toll-free at [phone].     My Clinical Pharmacist's contact information:                                                      Please feel free to contact me with any questions or concerns you have.      Ryanne Pritchett, PharmD  Medication Therapy Management Pharmacist  [phone]   "

## 2022-10-19 NOTE — PROGRESS NOTES
Medication Therapy Management (MTM) Encounter    ASSESSMENT:                            Medication Adherence/Access: No issues identified    Type 2 Diabetes: Patient is not meeting A1c goal of < 7%. Self monitoring of blood glucose is not at goal of fasting  mg/dL and post prandial < 180 mg/dL. May benefit from Freestyle Marcelina, however Zaira prefers to think about this. Awaiting Rybelsus coverage information.     Ulcerative colitis: Stable per patient.     Afib/Hypertension: blood pressure at goal < 140/90.     Hyperlipidemia: Patient is not on moderate intensity statin which is indicated based on 2019 ACC/AHA guidelines for lipid management - discussed and provided education, Zaira prefers to discuss again in future, wants to figure out Rybelsus first. Will aim for 30% reduction in LDL.     Supplements: Discussed she should avoid increasing Vitamin D, fat soluble vitamin (vs water soluble).     PLAN:                            1. Goal blood sugars we are looking for to get your A1C less than 7% are between  mg/dL right away in the morning or before meals, and less than 180 mg/dL. 2-hours after a meal.    2.  Try to check blood sugar two hours after lunch.     Ryanne will follow-up with [facility] to see if PA on Rybelsus has gone through.     Follow-up: Return in about 2 weeks (around 11/2/2022) for Medication Therapy Management.    SUBJECTIVE/OBJECTIVE:                          Zaira Villatoro is a 52 year old female coming in for an initial visit. She was referred to me from Dr. Marcano for Wanting for patient decrease use of Levemir and will start Rybelsus. Have discontinued used of Glimerpridie. Pending authorization for Rybelsus.      Reason for visit: med review, would like to be off all if possible.    Allergies/ADRs: Reviewed in chart  Past Medical History: Reviewed in chart  Tobacco: She reports that she quit smoking about 24 years ago. Her smoking use included cigarettes. She started smoking  about 37 years ago. She has a 15.00 pack-year smoking history. She has never used smokeless tobacco.  Alcohol: Less than 1 beverage / month  Caffeine: 16 oz coffee/day    Medication Adherence/Access:   Patient takes medications directly from bottles.  Patient takes medications 3 time(s) per day. (morning and evening and afternoon is the shot)  Per patient, misses medication 0 times per week.   The patient fills medications at [place]: NO, fills medications at [facility].    Type 2 Diabetes:   glimepiride 6 mg every morning plus an additional 1-2 mg after steroid infusion when gets UC treatment.  Jardiance 25 mg daily   Levemir 70 units twice daily - 2pm and midnight    Still waiting on PA for Rybelsus.   Has out of state insurance that tends to take longer on things.   Patient is experiencing the following side effects: increased urination, nocturia  Blood sugar monitoring: 3 time(s) daily. Ranges (patient reported): See below  Mornin-160, usually 100-130  Lunch: , usually 100-150  Bedtime: 140-300, usually 180-230  Symptoms of low blood sugar? Did not discuss  Symptoms of high blood sugar? ?increased urination, nocturia.   Eye exam: scheduled  Foot exam: up to date  Diet/Exercise: Got rid of pop a year ago, now gets no sugar coffee. Does love sweets.   Breakfast, 9 am: Protein drink and coffee with sugar free creamer  Lunch, 2-3 pm: no typical, leftovers or sandwhich or fried egg, sometimes Fredi Ferreira's unwhich, usually aims for < 30 g carbs at that meal.   Snack, sometimes: beef stick or nuts or cheese  6-7 pm snacking before later supper on beef stick or nuts or cheese  Supper: eating and snacking later,  works late certain times.   Aspirin: Not taking due to Eliquis  Statin: No   ACEi/ARB: Yes: losartan.   Urine Albumin:   Lab Results   Component Value Date    UMALCR 17.02 10/12/2022      Lab Results   Component Value Date    A1C 7.6 10/12/2022    A1C 8.0 2022    A1C 7.7 2021     A1C 8.0 08/26/2021    A1C 7.7 05/13/2021    A1C 7.8 09/28/2020    A1C 7.3 01/13/2020    A1C 8.0 10/04/2019    A1C 7.4 06/05/2019     Ulcerative colitis:   Entyvio every 6 weeks, given with IV steroid.     Follows with [facility]. Every 8 weeks doesn't work for her. Has had a perferrated bowel, history of colostomy but no longer, has had a reversal.  Has looser stools and stomach pain at baseline, metformin made this worse in the past.     Afib/Hypertension:   Eliquis 5mg twice daily for anticoagulation  Losartan 12.5 mg daily  Atenolol 50 mg twice daily     Follows with cardiology.   Patient reports blood in stool from the ulcerative colitis - not very often, but happened before starting Eliquis. History of perforated bowel, but no history of stomach bleed/ulcer. Denies side effects.  BP Readings from Last 3 Encounters:   10/19/22 102/68   10/12/22 112/68   04/05/22 114/65     Hyperlipidemia:   None    Strong family history of cardiac issues.   The 10-year ASCVD risk score (Zack POMPA, et al., 2019) is: 2.5%    Values used to calculate the score:      Age: 52 years      Sex: Female      Is Non- : No      Diabetic: Yes      Tobacco smoker: No      Systolic Blood Pressure: 102 mmHg      Is BP treated: Yes      HDL Cholesterol: 42 mg/dL      Total Cholesterol: 168 mg/dL  Recent Labs   Lab Test 10/12/22  1403 05/13/21  1207 11/07/16  1124 11/04/15  1138 12/10/14  1034   CHOL 168 159   < > 136 154   HDL 42* 39*   < > 42* 51   * 105*   < > 82 89   TRIG 94 77   < > 61 68   CHOLHDLRATIO  --   --   --  3.2 3.0    < > = values in this interval not displayed.     Supplements:   Vitamin D 5000 international unit(s)     No reported issues at this time, history of deficiency.  Vitamin D Deficiency Screening Results:  Lab Results   Component Value Date    VITDT 62 11/22/2021    VITDT 50 03/29/2019    VITDT 50 11/04/2015    VITDT 62 06/17/2015    VITDT 22 (L) 02/07/2014     GFR Estimate   Date Value Ref  Range Status   10/12/2022 >90 >60 mL/min/1.73m2 Final     Comment:     Effective December 21, 2021 eGFRcr in adults is calculated using the 2021 CKD-EPI creatinine equation which includes age and gender (John lopez al., NEJM, DOI: 10.1056/GDYDrj9213807)   08/26/2021 64 >60 mL/min/1.73m2 Final     Comment:     As of July 11, 2021, eGFR is calculated by the CKD-EPI creatinine equation, without race adjustment. eGFR can be influenced by muscle mass, exercise, and diet. The reported eGFR is an estimation only and is only applicable if the renal function is stable.   09/28/2020 79 >60 mL/min/[1.73_m2] Final     Comment:     Non  GFR Calc  Starting 12/18/2018, serum creatinine based estimated GFR (eGFR) will be   calculated using the Chronic Kidney Disease Epidemiology Collaboration   (CKD-EPI) equation.     01/13/2020 85 >60 mL/min/[1.73_m2] Final     Comment:     Non  GFR Calc  Starting 12/18/2018, serum creatinine based estimated GFR (eGFR) will be   calculated using the Chronic Kidney Disease Epidemiology Collaboration   (CKD-EPI) equation.     10/04/2019 90 >60 mL/min/[1.73_m2] Final     Comment:     Non  GFR Calc  Starting 12/18/2018, serum creatinine based estimated GFR (eGFR) will be   calculated using the Chronic Kidney Disease Epidemiology Collaboration   (CKD-EPI) equation.       Today's Vitals: /68   Wt 261 lb (118.4 kg)   BMI 41.95 kg/m    ----------------      I spent 60 minutes with this patient today. All changes were made via collaborative practice agreement with Dr. Marcano. A copy of the visit note was provided to the patient's provider(s).    The patient was given a summary of these recommendations.     Ryanne Pritchett, PharmD  Medication Therapy Management Pharmacist  [phone]     Medication Therapy Recommendations  Type 2 diabetes mellitus with hyperglycemia, with long-term current use of insulin (H)    Current Medication: ACCU-CHEK SHARMILA PLUS test  strip (Discontinued)   Rationale: Does not understand instructions - Adherence - Adherence   Recommendation: Provide Education   Status: Accepted per CPA

## 2022-10-21 NOTE — TELEPHONE ENCOUNTER
Zaira called and left me a message stating her insurance sent a letter saying Yayo needs supporting documentation from provider for approval. Called back and LVM, might be old info from insurance? It is covered at pharmacy for no copay.  She can let us know if further questions.    Ryanne Pritchett, PharmD  Medication Therapy Management Pharmacist  [phone]

## 2022-10-25 ENCOUNTER — TRANSFERRED RECORDS (OUTPATIENT)
Dept: HEALTH INFORMATION MANAGEMENT | Facility: CLINIC | Age: 52
End: 2022-10-25

## 2022-11-03 ENCOUNTER — IMMUNIZATION (OUTPATIENT)
Dept: FAMILY MEDICINE | Facility: CLINIC | Age: 52
End: 2022-11-03
Payer: COMMERCIAL

## 2022-11-03 DIAGNOSIS — Z23 HIGH PRIORITY FOR 2019-NCOV VACCINE: Primary | ICD-10-CM

## 2022-11-03 PROCEDURE — 0134A COVID-19,PF,MODERNA BIVALENT: CPT

## 2022-11-03 PROCEDURE — 91313 COVID-19,PF,MODERNA BIVALENT: CPT

## 2022-11-03 PROCEDURE — 99207 PR NO CHARGE LOS: CPT

## 2022-11-07 ENCOUNTER — OFFICE VISIT (OUTPATIENT)
Dept: OPHTHALMOLOGY | Facility: CLINIC | Age: 52
End: 2022-11-07
Attending: STUDENT IN AN ORGANIZED HEALTH CARE EDUCATION/TRAINING PROGRAM
Payer: COMMERCIAL

## 2022-11-07 ENCOUNTER — VIRTUAL VISIT (OUTPATIENT)
Dept: PHARMACY | Facility: CLINIC | Age: 52
End: 2022-11-07

## 2022-11-07 DIAGNOSIS — H52.4 PRESBYOPIA: ICD-10-CM

## 2022-11-07 DIAGNOSIS — E11.65 TYPE 2 DIABETES MELLITUS WITH HYPERGLYCEMIA, WITH LONG-TERM CURRENT USE OF INSULIN (H): Primary | ICD-10-CM

## 2022-11-07 DIAGNOSIS — Z78.9 TAKES DIETARY SUPPLEMENTS: ICD-10-CM

## 2022-11-07 DIAGNOSIS — Z79.4 TYPE 2 DIABETES MELLITUS WITH HYPERGLYCEMIA, WITH LONG-TERM CURRENT USE OF INSULIN (H): Primary | ICD-10-CM

## 2022-11-07 DIAGNOSIS — I48.0 PAROXYSMAL ATRIAL FIBRILLATION (H): ICD-10-CM

## 2022-11-07 DIAGNOSIS — K51.90 ULCERATIVE COLITIS WITHOUT COMPLICATIONS, UNSPECIFIED LOCATION (H): ICD-10-CM

## 2022-11-07 DIAGNOSIS — E78.5 HYPERLIPIDEMIA LDL GOAL <100: ICD-10-CM

## 2022-11-07 DIAGNOSIS — I10 ESSENTIAL HYPERTENSION WITH GOAL BLOOD PRESSURE LESS THAN 140/90: ICD-10-CM

## 2022-11-07 PROCEDURE — 99607 MTMS BY PHARM ADDL 15 MIN: CPT | Performed by: PHARMACIST

## 2022-11-07 PROCEDURE — 99606 MTMS BY PHARM EST 15 MIN: CPT | Performed by: PHARMACIST

## 2022-11-07 PROCEDURE — 92014 COMPRE OPH EXAM EST PT 1/>: CPT | Performed by: OPHTHALMOLOGY

## 2022-11-07 RX ORDER — GLIMEPIRIDE 2 MG/1
2 TABLET ORAL
COMMUNITY
End: 2023-06-26

## 2022-11-07 RX ORDER — ORAL SEMAGLUTIDE 7 MG/1
7 TABLET ORAL DAILY
Qty: 30 TABLET | Refills: 1 | Status: SHIPPED | OUTPATIENT
Start: 2022-11-07 | End: 2022-12-20

## 2022-11-07 ASSESSMENT — REFRACTION_WEARINGRX
OD_CYLINDER: +1.00
OS_SPHERE: -4.25
OD_SPHERE: -5.75
SPECS_TYPE: PAL
OS_ADD: +1.75
OS_CYLINDER: +0.75
OD_AXIS: 101
OD_ADD: +1.75
OS_AXIS: 089

## 2022-11-07 ASSESSMENT — TONOMETRY
IOP_METHOD: APPLANATION
OS_IOP_MMHG: 21
OD_IOP_MMHG: 19

## 2022-11-07 ASSESSMENT — CONF VISUAL FIELD
OS_SUPERIOR_NASAL_RESTRICTION: 0
METHOD: COUNTING FINGERS
OD_SUPERIOR_TEMPORAL_RESTRICTION: 0
OD_INFERIOR_TEMPORAL_RESTRICTION: 0
OS_SUPERIOR_TEMPORAL_RESTRICTION: 0
OD_INFERIOR_NASAL_RESTRICTION: 0
OD_NORMAL: 1
OS_INFERIOR_NASAL_RESTRICTION: 0
OS_NORMAL: 1
OS_INFERIOR_TEMPORAL_RESTRICTION: 0
OD_SUPERIOR_NASAL_RESTRICTION: 0

## 2022-11-07 ASSESSMENT — CUP TO DISC RATIO
OS_RATIO: 0.3
OD_RATIO: 0.3

## 2022-11-07 ASSESSMENT — VISUAL ACUITY
OS_CC: 20/20
OS_CC+: -2
METHOD: SNELLEN - LINEAR
OD_CC: 20/20

## 2022-11-07 ASSESSMENT — EXTERNAL EXAM - LEFT EYE: OS_EXAM: PROLAPSED FAT PADS: LOWER

## 2022-11-07 ASSESSMENT — EXTERNAL EXAM - RIGHT EYE: OD_EXAM: PROLAPSED FAT PADS: LOWER

## 2022-11-07 NOTE — PROGRESS NOTES
Medication Therapy Management (MTM) Encounter    ASSESSMENT:                            Medication Adherence/Access: No issues identified    Type 2 Diabetes: Patient is not meeting A1c goal of < 7%. Self monitoring of blood glucose is not at goal of fasting  mg/dL and post prandial < 180 mg/dL. May benefit from continued titration up on Rybelsus. Due to blood sugar spikes after Entyvio + IV steroids, may benefit from glimepriide after this month's injection, as she used to do. May also benefit from Freestyle Marcelina, however Zaira prefers to think about this.     Ulcerative colitis: Stable per patient.     Afib/Hypertension: blood pressure at goal < 140/90.     Hyperlipidemia: Patient is not on moderate intensity statin which is indicated based on 2019 ACC/AHA guidelines for lipid management - discussed and provided education, Zaira prefers to discuss again in future, wants to figure out Rybelsus first. Will aim for 30% reduction in LDL.     Supplements: Discussed she should avoid increasing Vitamin D, fat soluble vitamin (vs water soluble).     PLAN:                            1. This month, take glimepiride 2 mg after your infusion like you normally would (but don't take daily otherwise).     2. After 30 days of Rybelsus 3 mg once daily, go ahead and increase to 7 mg daily and we can follow up a couple weeks after that.     Follow-up: Return in about 5 weeks (around 12/12/2022) for Medication Therapy Management.    SUBJECTIVE/OBJECTIVE:                          Zaira Villatoro is a 52 year old female called for a follow-up visit.  Today's visit is a follow-up MTM visit from 10/19/22.     Reason for visit: diabetes follow-up.    Allergies/ADRs: Reviewed in chart  Past Medical History: Reviewed in chart  Tobacco: She reports that she quit smoking about 24 years ago. Her smoking use included cigarettes. She started smoking about 37 years ago. She has a 15.00 pack-year smoking history. She has never used  smokeless tobacco.  Alcohol: Less than 1 beverage / month  Caffeine: 16 oz coffee/day    Medication Adherence/Access:   Patient takes medications directly from bottles.  Patient takes medications 3 time(s) per day. (morning and evening and afternoon is the shot)  Per patient, misses medication 0 times per week.   The patient fills medications at [place]: NO, fills [facility] at [facility].    Type 2 Diabetes:   Jardiance 25 mg daily   Levemir 70 units twice daily - 2pm and midnight  Rybelsus 3 mg daily - started 10/30    Rybelsus pills taste bad, but otherwise no other side effects. Stopped glimepiride. Noticing she's hungry in the afternoons earlier now, 1 pm vs 4 pm. Is concerned blood sugar will be higher after next Entyvio + IV steroid injection, coming up on .  Patient is experiencing the following side effects: increased urination, nocturia  Blood sugar monitoring: 3 time(s) daily. Ranges (patient reported): See below  Mornin, 154, 139, 120, 140, 138, 162 (higher than previous, )  Lunch: 123, 151, 224, 124   Bedtime: 180-247  Symptoms of low blood sugar? None  Symptoms of high blood sugar? ?increased urination, nocturia.   Eye exam: scheduled  Foot exam: up to date  Diet/Exercise: Got rid of pop a year ago, now gets no sugar coffee. Does love sweets.   Breakfast, 9 am: Protein drink and coffee with sugar free creamer  Lunch, 2-3 pm: no typical, leftovers or sandwhich or fried egg, sometimes Fredilaexus Ferreira's unwhich, usually aims for < 30 g carbs at that meal.   Snack, sometimes: beef stick or nuts or cheese  6-7 pm snacking before later supper on beef stick or nuts or cheese  Supper: eating and snacking later,  works late certain times.   Aspirin: Not taking due to Eliquis  Statin: No   ACEi/ARB: Yes: losartan.   Urine Albumin:   Lab Results   Component Value Date    UMALCR 17.02 10/12/2022     Lab Results   Component Value Date    A1C 7.6 10/12/2022    A1C 8.0 2022    A1C 7.7  11/22/2021    A1C 8.0 08/26/2021    A1C 7.7 05/13/2021    A1C 7.8 09/28/2020    A1C 7.3 01/13/2020    A1C 8.0 10/04/2019    A1C 7.4 06/05/2019     Ulcerative colitis:   Entyvio every 6 weeks, given with IV steroid.     Follows with [facility]. Every 8 weeks doesn't work for her. Has had a perferrated bowel, history of colostomy but no longer, has had a reversal.  Has looser stools and stomach pain at baseline, metformin made this worse in the past. Blood sugars typically rise in response to IV steroid.     Afib/Hypertension:   Eliquis 5mg twice daily for anticoagulation  Losartan 12.5 mg daily  Atenolol 50 mg twice daily     Follows with cardiology.   Patient reports blood in stool from the ulcerative colitis - not very often, but happened before starting Eliquis. History of perforated bowel, but no history of stomach bleed/ulcer. Denies side effects.  BP Readings from Last 3 Encounters:   10/19/22 102/68   10/12/22 112/68   04/05/22 114/65     Hyperlipidemia:   None    Strong family history of cardiac issues.   The 10-year ASCVD risk score (Zack POMPA, et al., 2019) is: 2.5%    Values used to calculate the score:      Age: 52 years      Sex: Female      Is Non- : No      Diabetic: Yes      Tobacco smoker: No      Systolic Blood Pressure: 102 mmHg      Is BP treated: Yes      HDL Cholesterol: 42 mg/dL      Total Cholesterol: 168 mg/dL  Recent Labs   Lab Test 10/12/22  1403 05/13/21  1207 11/07/16  1124 11/04/15  1138 12/10/14  1034   CHOL 168 159   < > 136 154   HDL 42* 39*   < > 42* 51   * 105*   < > 82 89   TRIG 94 77   < > 61 68   CHOLHDLRATIO  --   --   --  3.2 3.0    < > = values in this interval not displayed.     Supplements:   Vitamin D 5000 international unit(s)     No reported issues at this time, history of deficiency.  Vitamin D Deficiency Screening Results:  Lab Results   Component Value Date    VITDT 62 11/22/2021    VITDT 50 03/29/2019    VITDT 50 11/04/2015    VITDT 62  06/17/2015    VITDT 22 (L) 02/07/2014     GFR Estimate   Date Value Ref Range Status   10/12/2022 >90 >60 mL/min/1.73m2 Final     Comment:     Effective December 21, 2021 eGFRcr in adults is calculated using the 2021 CKD-EPI creatinine equation which includes age and gender (John lopez al., NE, DOI: 10.1056/TNUJik8574464)   08/26/2021 64 >60 mL/min/1.73m2 Final     Comment:     As of July 11, 2021, eGFR is calculated by the CKD-EPI creatinine equation, without race adjustment. eGFR can be influenced by muscle mass, exercise, and diet. The reported eGFR is an estimation only and is only applicable if the renal function is stable.   09/28/2020 79 >60 mL/min/[1.73_m2] Final     Comment:     Non  GFR Calc  Starting 12/18/2018, serum creatinine based estimated GFR (eGFR) will be   calculated using the Chronic Kidney Disease Epidemiology Collaboration   (CKD-EPI) equation.     01/13/2020 85 >60 mL/min/[1.73_m2] Final     Comment:     Non  GFR Calc  Starting 12/18/2018, serum creatinine based estimated GFR (eGFR) will be   calculated using the Chronic Kidney Disease Epidemiology Collaboration   (CKD-EPI) equation.     10/04/2019 90 >60 mL/min/[1.73_m2] Final     Comment:     Non  GFR Calc  Starting 12/18/2018, serum creatinine based estimated GFR (eGFR) will be   calculated using the Chronic Kidney Disease Epidemiology Collaboration   (CKD-EPI) equation.       Today's Vitals: There were no vitals taken for this visit.  ----------------      I spent 17 minutes with this patient today. All changes were made via collaborative practice agreement with Khushboo Barger MD. A copy of the visit note was provided to the patient's provider(s).    The patient was sent via [employer] a summary of these recommendations.     Ryanne Pritchett, PharmD  Medication Therapy Management Pharmacist  [phone]    Telemedicine Visit Details  Type of service:  Telephone visit  Start Time: 1:58 PM  End  Time: 2:15 PM  Originating Location (pt. Location): Home      Distant Location (provider location):  Off-site  Provider has received verbal consent for a visit from the patient? Yes     Medication Therapy Recommendations  Type 2 diabetes mellitus with hyperglycemia, with long-term current use of insulin (H)    Current Medication: Semaglutide (RYBELSUS) 3 MG TABS   Rationale: Synergistic therapy - Needs additional medication therapy - Indication   Recommendation: Start Medication - glimepiride 1 MG tablet   Status: Accepted per CPA

## 2022-11-07 NOTE — Clinical Note
DAJUAN HERMOSILLO note, thanks!  Ryanne Pritchett, PharmD Medication Therapy Management Pharmacist [phone]

## 2022-11-07 NOTE — PROGRESS NOTES
" Current Eye Medications:  none     Subjective:  Diabetic, dilated exam - vision overall is stable with current presrciption. Diabetic levels are elevated at this time - trying to work with Dr. Barger to try to get levels under control.     Type II DM - insulin controlled.   Last blood sugar - 152 this AM    Lab Results   Component Value Date    A1C 7.6 10/12/2022    A1C 8.0 04/05/2022    A1C 7.7 11/22/2021    A1C 8.0 08/26/2021    A1C 7.7 05/13/2021    A1C 7.8 09/28/2020    A1C 7.3 01/13/2020    A1C 8.0 10/04/2019    A1C 7.4 06/05/2019     Previous symptoms resolved without problems.     Objective:  See Ophthalmology Exam.       Assessment:  Baseline dilated exam in patient with diabetes.  No diabetic retinopathy.      ICD-10-CM    1. Type 2 diabetes mellitus with hyperglycemia, with long-term current use of insulin (H)  E11.65 Adult Eye  Referral    Z79.4       2. Presbyopia  H52.4            Plan:  May use artificial tears up to four times a day (like Refresh Optive, Systane Balance, TheraTears, or generic artificial tears are ok. Avoid \"get the red out\" drops).   Call in July 2023 for an appointment in November 2023 for Complete Exam    Dr. Taylor [phone]           "

## 2022-11-07 NOTE — LETTER
"    11/7/2022         RE: Zaira Villatoro  [address]  [place] [address]        Dear Colleague,    Thank you for referring your patient, Zaira Villatoro, to the [facility]. Please see a copy of my visit note below.     Current Eye Medications:  none     Subjective:  Diabetic, dilated exam - vision overall is stable with current presrciption. Diabetic levels are elevated at this time - trying to work with Dr. Barger to try to get levels under control.     Type II DM - insulin controlled.   Last blood sugar - 152 this AM    Lab Results   Component Value Date    A1C 7.6 10/12/2022    A1C 8.0 04/05/2022    A1C 7.7 11/22/2021    A1C 8.0 08/26/2021    A1C 7.7 05/13/2021    A1C 7.8 09/28/2020    A1C 7.3 01/13/2020    A1C 8.0 10/04/2019    A1C 7.4 06/05/2019     Previous symptoms resolved without problems.     Objective:  See Ophthalmology Exam.       Assessment:  Baseline dilated exam in patient with diabetes.  No diabetic retinopathy.      ICD-10-CM    1. Type 2 diabetes mellitus with hyperglycemia, with long-term current use of insulin (H)  E11.65 Adult Eye  Referral    Z79.4       2. Presbyopia  H52.4            Plan:  May use artificial tears up to four times a day (like Refresh Optive, Systane Balance, TheraTears, or generic artificial tears are ok. Avoid \"get the red out\" drops).   Call in July 2023 for an appointment in November 2023 for Complete Exam    Dr. Taylor [phone]               Again, thank you for allowing me to participate in the care of your patient.        Sincerely,        Omid Taylor MD    "

## 2022-11-07 NOTE — PATIENT INSTRUCTIONS
"May use artificial tears up to four times a day (like Refresh Optive, Systane Balance, TheraTears, or generic artificial tears are ok. Avoid \"get the red out\" drops).   Call in July 2023 for an appointment in November 2023 for Complete Exam    Dr. Taylor [phone]      Patient Education   Diabetes weakens the blood vessels all over the body, including the eyes. Damage to the blood vessels in the eyes can cause swelling or bleeding into part of the eye (called the retina). This is called diabetic retinopathy (ALYSSA-tin--pu-thee). If not treated, this disease can cause vision loss or blindness.   Symptoms may include blurred or distorted vision, but many people have no symptoms. It's important to see your eye doctor regularly to check for problems.   Early treatment and good control can help protect your vision. Here are the things you can do to help prevent vision loss:      1. Keep your blood sugar levels under tight control.      2. Bring high blood pressure under control.      3. No smoking.      4. Have yearly dilated eye exams.       "

## 2022-11-15 ENCOUNTER — VIRTUAL VISIT (OUTPATIENT)
Dept: FAMILY MEDICINE | Facility: CLINIC | Age: 52
End: 2022-11-15
Payer: COMMERCIAL

## 2022-11-15 DIAGNOSIS — Z79.4 TYPE 2 DIABETES MELLITUS WITH HYPERGLYCEMIA, WITH LONG-TERM CURRENT USE OF INSULIN (H): Primary | Chronic | ICD-10-CM

## 2022-11-15 DIAGNOSIS — E11.65 TYPE 2 DIABETES MELLITUS WITH HYPERGLYCEMIA, WITH LONG-TERM CURRENT USE OF INSULIN (H): Primary | Chronic | ICD-10-CM

## 2022-11-15 PROCEDURE — 99215 OFFICE O/P EST HI 40 MIN: CPT | Mod: 95 | Performed by: STUDENT IN AN ORGANIZED HEALTH CARE EDUCATION/TRAINING PROGRAM

## 2022-11-15 NOTE — PROGRESS NOTES
Emily is a 52 year old who is being evaluated via a billable telephone visit.      What phone number would you like to be contacted at? [phone]   How would you like to obtain your AVS? Constantinehart    Assessment & Plan     (E11.65,  Z79.4) Type 2 diabetes mellitus with hyperglycemia, with long-term current use of insulin (H)  (primary encounter diagnosis)  Comment: Chronic. At home blood sugars noted to be not within goal ranges of fastings () and non-fasting less than 180 post prandial. Pt scheduled to start Rybelsus 7mg on 11/29 and has medication picked up from pharmacy to begin. Discussed at length of sugar goals and associated diet changes that can assist with regulating overall sugar goals. At this time, pt declined Freestyle librae due to concerns of adhesion allergies and days missed between needle changes however is open to continual discussion in the near future. In addition, pt also declined start of a statin as well. Discussed the indications and risk and benefits with pt and pt verbalized understanding. Pt instructed to utilize glimiperide once after infusion this Thursday to help regulate sugar levels however to monitor of sugars continue to rise into the high 300-400s and seek immediate medical care- pt verbalized understanding. Due to infusion center gather labs prior, will place a future order to have an a1c completed after 1/12/2022. Pt scheduled to follow-up with Dr. Pritchett next month and follow-up with me the week of the 19th  Plan: Hemoglobin A1c        Pending [facility] pharmacy follow-up in several weeks       Prescription drug management  10 minutes spent on the date of the encounter doing chart review        Work on weight loss  Regular exercise    No follow-ups on file.    EMILY KUNZ MD  [facility] [place]    Subjective   Emily is a 52 year old, presenting for the following health issues:  Diabetes      History of Present Illness       Diabetes:   She presents for  follow up of diabetes.  She is checking home blood glucose four or more times daily. She checks blood glucose before meals and at bedtime.  Blood glucose is sometimes over 200 and never under 70. When her blood glucose is low, the patient is asymptomatic for confusion, blurred vision, lethargy and reports not feeling dizzy, shaky, or weak.  She has no concerns regarding her diabetes at this time.  She is having weight gain.         She eats 2-3 servings of fruits and vegetables daily.She consumes 0 sweetened beverage(s) daily.She exercises with enough effort to increase her heart rate 9 or less minutes per day.  She exercises with enough effort to increase her heart rate 3 or less days per week.   She is taking medications regularly.     Patient reports that she has talked with Dr. Pritchett recently and states she has not had any side effects to the medication however endorses being hungry more    Fastings: 118-189  Non fastings: 306(once) 140-203    Patient reports that she already has the 7 mg of Rybelsus and is pending starting on Nov 29. She states she is concerned about her sugars levels going high and low and spoke if this to Dr. Pritchett. Pt reports being compliant with taking the medication however would like to start seeing weight-loss sooner rather than later          Review of Systems   Constitutional, HEENT, cardiovascular, pulmonary, gi and gu systems are negative, except as otherwise noted.      Objective           Vitals:  No vitals were obtained today due to virtual visit.    Physical Exam   healthy, alert and no distress  PSYCH: Alert and oriented times 3; coherent speech, normal   rate and volume, able to articulate logical thoughts, able   to abstract reason, no tangential thoughts, no hallucinations   or delusions  Her affect is normal  RESP: No cough, no audible wheezing, able to talk in full sentences  Remainder of exam unable to be completed due to telephone visits    No results found for any visits  on 11/15/22.            Phone call duration: 50 minutes

## 2022-11-16 ENCOUNTER — OFFICE VISIT (OUTPATIENT)
Dept: DERMATOLOGY | Facility: CLINIC | Age: 52
End: 2022-11-16
Payer: COMMERCIAL

## 2022-11-16 DIAGNOSIS — L82.1 SEBORRHEIC KERATOSES: ICD-10-CM

## 2022-11-16 DIAGNOSIS — D22.9 MULTIPLE BENIGN NEVI: ICD-10-CM

## 2022-11-16 DIAGNOSIS — L81.4 SOLAR LENTIGO: ICD-10-CM

## 2022-11-16 DIAGNOSIS — D84.9 IMMUNOSUPPRESSED STATUS (H): Primary | ICD-10-CM

## 2022-11-16 DIAGNOSIS — L50.3 DERMATOGRAPHISM: ICD-10-CM

## 2022-11-16 DIAGNOSIS — D18.01 CHERRY ANGIOMA: ICD-10-CM

## 2022-11-16 PROCEDURE — 99213 OFFICE O/P EST LOW 20 MIN: CPT | Performed by: PHYSICIAN ASSISTANT

## 2022-11-16 ASSESSMENT — PAIN SCALES - GENERAL: PAINLEVEL: NO PAIN (0)

## 2022-11-16 NOTE — NURSING NOTE
Zaira Villatoro's goals for this visit include:   Chief Complaint   Patient presents with     Skin Check     FBSC; nose was cryo last visit and patient would like to get that rechecked. Patient states her arms have been itchy      She requests these members of her care team be copied on today's visit information:     PCP: Khushboo Barger    Referring Provider:  No referring provider defined for this encounter.    There were no vitals taken for this visit.    Do you need any medication refills at today's visit? No    Bisi Villalobos CMA ......... 11/16/2022  11:28 AM

## 2022-11-16 NOTE — PROGRESS NOTES
[facility] Dermatology Note  Encounter Date: Nov 16, 2022  Office Visit     Dermatology Problem List:  Last skin check 11/16/22, recommended yearly  1. Relevant medical history: Ulcerative colitis on entyvio currently, humira previously  2. AK - s/p cryo (nose)  3. HS, mild: BPO wash in the shower  4. Intradermal melanocytic nevus, right lower back, bx 11/16/21  5. 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  Family History: Mom with BCC.  ____________________________________________    Assessment & Plan:    # Immunosuppressed, currently on Entyvio for ulcerative colitis. Reviewed increased risks of skin cancer.  - ABCDEs: Counseled ABCDEs of melanoma: Asymmetry, Border (irregularity), Color (not uniform, changes in color), Diameter (greater than 6 mm which is about the size of a pencil eraser), and Evolving (any changes in preexisting moles).  - Sun protection: Counseled SPF30+ sunscreen, UPF clothing, sun avoidance, tanning bed avoidance.  - Continue annual skin exams     # Cherry angioma(s).    - No further intervention needed.    # Multiple clinically benign nevi.    - No further intervention needed.   - Signs and Symptoms of non-melanoma skin cancer and ABCDEs of melanoma reviewed with patient. Patient encouraged to perform monthly self skin exams and educated on how to perform them. UV precautions reviewed with patient. Patient was asked about new or changing moles/lesions on body.   - Sunscreen: Apply 20 minutes prior to going outdoors and reapply every two hours, when wet or sweating. We recommend using an SPF 30 or higher, and to use one that is water resistant.     - Continue annual skin exams    # Seborrheic keratosis, non irritated.   - No further intervention needed.     # Solar lentigines.   - No further intervention needed.     # Dermatographism. Patient has this ongoing for 40 years,  not bothersome to her   - Recommend starting daily Zyrtec if interested.       Procedures Performed:   None.    Follow-up: 1 year(s) in-person, or earlier for new or changing lesions    Staff and Scribe:     Scribe Disclosure:   I, Andrey Toussaint, am serving as a scribe to document services personally performed by Radha Self PA-C, based on data collection and the provider's statements to me.  Provider Disclosure:   The documentation recorded by the scribe accurately reflects the services I personally performed and the decisions made by me.    All risks, benefits and alternatives were discussed with patient.  Patient is in agreement and understands the assessment and plan.  All questions were answered.    Radha Self PA-C, AMRILYNS  [facility]: Phone: [phone], Fax: [phone]  [facility]: Phone: [phone],  Fax: [phone]  [facility]: Phone: [phone], Fax: [phone]  ____________________________________________    CC: Skin Check (FBS; nose was cryo last visit and patient would like to get that rechecked. Patient states her arms have been itchy with two raise white spots )    HPI:  Ms. Zaira Villatoro is a(n) 52 year old female who presents today as a return patient for a skin check.    Last seen 11/16/21 for a skin check. No concerning lesions noted.    Today, she has an area of concern on the nose that was treated with cryo. She would like to have this area reexamined today. She does still notice this area. She also notes that her arms have been itchy recently.    Patient is otherwise feeling well, without additional concerns.    Labs:  NA    Physical Exam:  Vitals: There were no vitals taken for this visit.  SKIN: Total skin excluding the undergarment areas was performed. The exam included the head/face, neck, both arms, chest, back, abdomen, both legs, buttocks, digits and/or  nails.   - Mcintyre Type II  - There are dome shaped bright red papules on the trunk and extremities.   - Multiple regular brown pigmented macules and papules are identified on the trunk and extremities.   - Scattered brown macules on sun exposed areas.  - There are waxy stuck on tan to brown papules on the trunk and extremities.   - Positive dermatographism noted following scratching on the back.    - No other lesions of concern on areas examined.     Medications:  Current Outpatient Medications   Medication     alcohol swab prep pads     atenolol (TENORMIN) 50 MG tablet     blood glucose (NO BRAND SPECIFIED) test strip     blood glucose calibration (NO BRAND SPECIFIED) solution     blood glucose monitoring (NO BRAND SPECIFIED) meter device kit     cholecalciferol (VITAMIN D3) 125 mcg (5000 units) capsule     ELIQUIS ANTICOAGULANT 5 MG tablet     estradiol (ESTRACE) 0.1 MG/GM vaginal cream     glimepiride (AMARYL) 2 MG tablet     insulin detemir (LEVEMIR FLEXTOUCH) 100 UNIT/ML pen     insulin pen needle (BD SHAHNAZ U/F) 32G X 4 MM miscellaneous     JARDIANCE 25 MG TABS tablet     Lancets (ONETOUCH DELICA PLUS [id number]) MISC     losartan (COZAAR) 25 MG tablet     order for DME     Semaglutide (RYBELSUS) 3 MG TABS     Semaglutide (RYBELSUS) 7 MG TABS     STATIN NOT PRESCRIBED, INTENTIONAL,     thin (NO BRAND SPECIFIED) lancets     TYLENOL CAPS 500 MG OR     vedolizumab (ENTYVIO) 60 MG/ML injection     No current facility-administered medications for this visit.      Past Medical History:   Patient Active Problem List   Diagnosis     Migraine     Ulcerative colitis (H)     Fatty liver     CARDIOVASCULAR SCREENING; LDL GOAL LESS THAN 100     Essential hypertension with goal blood pressure less than 140/90     Type 2 diabetes mellitus with hyperglycemia, with long-term current use of insulin (H)     Morbid obesity due to excess calories (H)     Incisional hernia, without obstruction or gangrene     Paroxysmal atrial  fibrillation (H)     Low back pain     MAHAD (obstructive sleep apnea)- severe (AHI 80)     Immunosuppressed status (H)     Plantar fasciitis     Pelvic pain     Dermatochalasis of both upper eyelids     Brow ptosis, bilateral     Involutional ptosis, acquired, bilateral     Lichen sclerosus et atrophicus of the vulva     Hyperlipidemia LDL goal <100     Chronic insomnia     Abdominal hernia     Chronic ulcerative rectosigmoiditis (H)     Irritable bowel syndrome with diarrhea     Hemorrhage of rectum and anus     Past Medical History:   Diagnosis Date     Acute diverticulitis 7/31/2013     History of seizures as a child 1981    One grand mal in 5th grade.  Didn't tolerate phenobarb.     Moderate single current episode of major depressive disorder (H)      Perforation of sigmoid colon (H) 8/3/2013     S/P left hemicolectomy 8/16/2013 8/02/13      Sepsis (H) 8/1/2013

## 2022-11-16 NOTE — LETTER
11/16/2022         RE: Zaira Villatoro  [address] [place] [address]        Dear Colleague,    Thank you for referring your patient, Zaira Villatoro, to the [facility]. Please see a copy of my visit note below.    [facility] Dermatology Note  Encounter Date: Nov 16, 2022  Office Visit     Dermatology Problem List:  Last skin check 11/16/22, recommended yearly  1. Relevant medical history: Ulcerative colitis on entyvio currently, humira previously  2. AK - s/p cryo (nose)  3. HS, mild: BPO wash in the shower  4. Intradermal melanocytic nevus, right lower back, bx 11/16/21  5. 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  Family History: Mom with BCC.  ____________________________________________    Assessment & Plan:    # Immunosuppressed, currently on Entyvio for ulcerative colitis. Reviewed increased risks of skin cancer.  - ABCDEs: Counseled ABCDEs of melanoma: Asymmetry, Border (irregularity), Color (not uniform, changes in color), Diameter (greater than 6 mm which is about the size of a pencil eraser), and Evolving (any changes in preexisting moles).  - Sun protection: Counseled SPF30+ sunscreen, UPF clothing, sun avoidance, tanning bed avoidance.  - Continue annual skin exams     # Cherry angioma(s).    - No further intervention needed.    # Multiple clinically benign nevi.    - No further intervention needed.   - Signs and Symptoms of non-melanoma skin cancer and ABCDEs of melanoma reviewed with patient. Patient encouraged to perform monthly self skin exams and educated on how to perform them. UV precautions reviewed with patient. Patient was asked about new or changing moles/lesions on body.   - Sunscreen: Apply 20 minutes prior to going outdoors and reapply every two hours, when wet or sweating. We recommend using an SPF 30 or higher, and to use  one that is water resistant.     - Continue annual skin exams    # Seborrheic keratosis, non irritated.   - No further intervention needed.     # Solar lentigines.   - No further intervention needed.     # Dermatographism. Patient has this ongoing for 40 years, not bothersome to her   - Recommend starting daily Zyrtec if interested.       Procedures Performed:   None.    Follow-up: 1 year(s) in-person, or earlier for new or changing lesions    Staff and Scribe:     Scribe Disclosure:   I, Andrey Toussaint, am serving as a scribe to document services personally performed by Radha Self PA-C, based on data collection and the provider's statements to me.  Provider Disclosure:   The documentation recorded by the scribe accurately reflects the services I personally performed and the decisions made by me.    All risks, benefits and alternatives were discussed with patient.  Patient is in agreement and understands the assessment and plan.  All questions were answered.    Radha Self PA-C, MPAS  [facility]: Phone: [phone], Fax: [phone]  [facility]: Phone: [phone],  Fax: [phone]  [facility]: Phone: [phone], Fax: [phone]  ____________________________________________    CC: Skin Check (FBS; nose was cryo last visit and patient would like to get that rechecked. Patient states her arms have been itchy with two raise white spots )    HPI:  Ms. Zaira Villatoro is a(n) 52 year old female who presents today as a return patient for a skin check.    Last seen 11/16/21 for a skin check. No concerning lesions noted.    Today, she has an area of concern on the nose that was treated with cryo. She would like to have this area reexamined today. She does still notice this area. She also notes that her arms have been itchy recently.    Patient is otherwise feeling well, without additional  concerns.    Labs:  NA    Physical Exam:  Vitals: There were no vitals taken for this visit.  SKIN: Total skin excluding the undergarment areas was performed. The exam included the head/face, neck, both arms, chest, back, abdomen, both legs, buttocks, digits and/or nails.   - Mcintyre Type II  - There are dome shaped bright red papules on the trunk and extremities.   - Multiple regular brown pigmented macules and papules are identified on the trunk and extremities.   - Scattered brown macules on sun exposed areas.  - There are waxy stuck on tan to brown papules on the trunk and extremities.   - Positive dermatographism noted following scratching on the back.    - No other lesions of concern on areas examined.     Medications:  Current Outpatient Medications   Medication     alcohol swab prep pads     atenolol (TENORMIN) 50 MG tablet     blood glucose (NO BRAND SPECIFIED) test strip     blood glucose calibration (NO BRAND SPECIFIED) solution     blood glucose monitoring (NO BRAND SPECIFIED) meter device kit     cholecalciferol (VITAMIN D3) 125 mcg (5000 units) capsule     ELIQUIS ANTICOAGULANT 5 MG tablet     estradiol (ESTRACE) 0.1 MG/GM vaginal cream     glimepiride (AMARYL) 2 MG tablet     insulin detemir (LEVEMIR FLEXTOUCH) 100 UNIT/ML pen     insulin pen needle (BD SHAHNAZ U/F) 32G X 4 MM miscellaneous     JARDIANCE 25 MG TABS tablet     Lancets (ONETOUCH DELICA PLUS [id number]) MISC     losartan (COZAAR) 25 MG tablet     order for DME     Semaglutide (RYBELSUS) 3 MG TABS     Semaglutide (RYBELSUS) 7 MG TABS     STATIN NOT PRESCRIBED, INTENTIONAL,     thin (NO BRAND SPECIFIED) lancets     TYLENOL CAPS 500 MG OR     vedolizumab (ENTYVIO) 60 MG/ML injection     No current facility-administered medications for this visit.      Past Medical History:   Patient Active Problem List   Diagnosis     Migraine     Ulcerative colitis (H)     Fatty liver     CARDIOVASCULAR SCREENING; LDL GOAL LESS THAN 100     Essential  hypertension with goal blood pressure less than 140/90     Type 2 diabetes mellitus with hyperglycemia, with long-term current use of insulin (H)     Morbid obesity due to excess calories (H)     Incisional hernia, without obstruction or gangrene     Paroxysmal atrial fibrillation (H)     Low back pain     MAHAD (obstructive sleep apnea)- severe (AHI 80)     Immunosuppressed status (H)     Plantar fasciitis     Pelvic pain     Dermatochalasis of both upper eyelids     Brow ptosis, bilateral     Involutional ptosis, acquired, bilateral     Lichen sclerosus et atrophicus of the vulva     Hyperlipidemia LDL goal <100     Chronic insomnia     Abdominal hernia     Chronic ulcerative rectosigmoiditis (H)     Irritable bowel syndrome with diarrhea     Hemorrhage of rectum and anus     Past Medical History:   Diagnosis Date     Acute diverticulitis 7/31/2013     History of seizures as a child 1981    One grand mal in 5th grade.  Didn't tolerate phenobarb.     Moderate single current episode of major depressive disorder (H)      Perforation of sigmoid colon (H) 8/3/2013     S/P left hemicolectomy 8/16/2013 8/02/13      Sepsis (H) 8/1/2013                Again, thank you for allowing me to participate in the care of your patient.        Sincerely,        Radha Self PA-C

## 2022-11-17 ENCOUNTER — TRANSFERRED RECORDS (OUTPATIENT)
Dept: HEALTH INFORMATION MANAGEMENT | Facility: CLINIC | Age: 52
End: 2022-11-17

## 2022-11-17 LAB
ALT SERPL-CCNC: 23 IU/L (ref 0–32)
AST SERPL-CCNC: 42 IU/L (ref 0–40)

## 2022-11-18 ENCOUNTER — ANCILLARY PROCEDURE (OUTPATIENT)
Dept: CARDIOLOGY | Facility: CLINIC | Age: 52
End: 2022-11-18
Attending: INTERNAL MEDICINE
Payer: COMMERCIAL

## 2022-11-18 DIAGNOSIS — I48.0 PAROXYSMAL ATRIAL FIBRILLATION (H): ICD-10-CM

## 2022-11-18 PROCEDURE — 93248 EXT ECG>7D<15D REV&INTERPJ: CPT | Performed by: INTERNAL MEDICINE

## 2022-11-18 PROCEDURE — 93246 EXT ECG>7D<15D RECORDING: CPT | Performed by: INTERNAL MEDICINE

## 2022-11-18 NOTE — DISCHARGE INSTRUCTIONS
We have applied a Zio Patch (heart) monitor for you to wear for 14 days.  You may remove the heart monitor on 12/2/22 at 10:45am.  Please see the enclosed instructions for further information, as well as instructions for removal of the heart monitor and return mailing directions.  If you should have questions regarding your monitor, please call [email or web address] at [phone].  The Cardiology Nurse will contact you with your results (please see result notification details at bottom of summary).    *PLEASE DO NOT SHOWER OR INDUCE EXCESSIVE SWEATING IN THE FIRST 24 HOURS OF WEARING*    Please also call your insurance company for any billing questions regarding the monitor.

## 2022-12-06 DIAGNOSIS — Z79.4 TYPE 2 DIABETES MELLITUS WITH HYPERGLYCEMIA, WITH LONG-TERM CURRENT USE OF INSULIN (H): Chronic | ICD-10-CM

## 2022-12-06 DIAGNOSIS — E11.65 TYPE 2 DIABETES MELLITUS WITH HYPERGLYCEMIA, WITH LONG-TERM CURRENT USE OF INSULIN (H): Chronic | ICD-10-CM

## 2022-12-06 DIAGNOSIS — I10 ESSENTIAL HYPERTENSION WITH GOAL BLOOD PRESSURE LESS THAN 140/90: ICD-10-CM

## 2022-12-08 RX ORDER — LOSARTAN POTASSIUM 25 MG/1
TABLET ORAL
Qty: 45 TABLET | Refills: 1 | Status: SHIPPED | OUTPATIENT
Start: 2022-12-08 | End: 2023-06-26

## 2022-12-08 RX ORDER — EMPAGLIFLOZIN 25 MG/1
TABLET, FILM COATED ORAL
Qty: 90 TABLET | Refills: 1 | Status: SHIPPED | OUTPATIENT
Start: 2022-12-08 | End: 2023-06-26

## 2022-12-13 ENCOUNTER — OFFICE VISIT (OUTPATIENT)
Dept: NEUROSURGERY | Facility: CLINIC | Age: 52
End: 2022-12-13
Attending: STUDENT IN AN ORGANIZED HEALTH CARE EDUCATION/TRAINING PROGRAM
Payer: COMMERCIAL

## 2022-12-13 VITALS
OXYGEN SATURATION: 96 % | DIASTOLIC BLOOD PRESSURE: 72 MMHG | HEIGHT: 66 IN | BODY MASS INDEX: 42.43 KG/M2 | HEART RATE: 64 BPM | WEIGHT: 264 LBS | SYSTOLIC BLOOD PRESSURE: 122 MMHG

## 2022-12-13 DIAGNOSIS — M54.50 CHRONIC LOW BACK PAIN WITHOUT SCIATICA, UNSPECIFIED BACK PAIN LATERALITY: Chronic | ICD-10-CM

## 2022-12-13 DIAGNOSIS — G89.29 CHRONIC LOW BACK PAIN WITHOUT SCIATICA, UNSPECIFIED BACK PAIN LATERALITY: Chronic | ICD-10-CM

## 2022-12-13 PROCEDURE — 99203 OFFICE O/P NEW LOW 30 MIN: CPT | Performed by: NURSE PRACTITIONER

## 2022-12-13 ASSESSMENT — PAIN SCALES - GENERAL: PAINLEVEL: MILD PAIN (3)

## 2022-12-13 NOTE — PATIENT INSTRUCTIONS
-Physical therapy ordered. They will contact you to schedule.  -If symptoms persist or worsen, please follow up in clinic.  -Please contact our clinic with questions or concerns at [phone].

## 2022-12-13 NOTE — LETTER
12/13/2022         RE: Zaira Villatoro  [address] [facility] [address]        Dear Colleague,    Thank you for referring your patient, Zaira Villatoro, to the [facility]. Please see a copy of my visit note below.    [facility] Neurosurgery  Neurosurgery Clinic Visit      CC: back pain    Primary care Provider: Khushboo Barger    Reason For Visit:   I was asked by Dr. Zaira Marcano to consult on the patient for chronic low back pain without sciatica.    HPI: Zaira Villatoro is a 52 year old female with a history of 10+ years of back pain who presents for evaluation. She describes ongoing bilateral low back pain. She denies any radicular leg pain, paresthesias, and overt weakness. No bowel/bladder complaints or foot drop. She states pain is worse in the morning and describes the pain as stiff and sore. She states the pain improves throughout the day. She has not had any recent treatments. She has had injections in the past which were helpful.    Past Medical History:   Diagnosis Date     Acute diverticulitis 7/31/2013     History of seizures as a child 1981    One grand mal in 5th grade.  Didn't tolerate phenobarb.     Moderate single current episode of major depressive disorder (H)      Perforation of sigmoid colon (H) 8/3/2013     S/P left hemicolectomy 8/16/2013 8/02/13      Sepsis (H) 8/1/2013       Past Medical History reviewed with patient during visit.    Past Surgical History:   Procedure Laterality Date     APPENDECTOMY  04/2014     ARTHROSCOPY KNEE RT/LT  2004    RT      BIOPSY  2011    many 2011 and on     BLEPHAROPLASTY Bilateral 1/27/2020    Procedure: Both upper eyelid blepharoplasty and ptosis repair,;  Surgeon: Chelsie Knight MD;  Location: [facility] OR     COLONOSCOPY  02/2012    lt sided colitis     COLONOSCOPY  1/9/2014     COSMETIC BROWPEXY Left 1/27/2020    Procedure: left internal browpexy;  Surgeon: Chelsie Knight MD;   Location: [facility] OR     CRYOTHERAPY, CERVICAL  1988     EXPLORATORY LAPAROTOMY, PARTIAL LEFT ROLANDA COLLECTOMY WITH HARTMANS PROCEDURE, COLOSTOMY  8/2013    lt rolanda colectomy, bowel perforation, colostomy, Karen's pouche     GI SURGERY  2013    abrupted  bowl     HC TOOTH EXTRACTION W/FORCEP  6/2003    Abscess Tooth / Hospitalized     HERNIA REPAIR  04/2014    found at time of takedown     SINUS SURGERY  2/11/03    LT sinus cyst removal      TAKEDOWN COLOSTOMY  04/2014     Past Surgical History reviewed with patient during visit.    Current Outpatient Medications   Medication     alcohol swab prep pads     atenolol (TENORMIN) 50 MG tablet     blood glucose (NO BRAND SPECIFIED) test strip     blood glucose calibration (NO BRAND SPECIFIED) solution     blood glucose monitoring (NO BRAND SPECIFIED) meter device kit     cholecalciferol (VITAMIN D3) 125 mcg (5000 units) capsule     ELIQUIS ANTICOAGULANT 5 MG tablet     estradiol (ESTRACE) 0.1 MG/GM vaginal cream     glimepiride (AMARYL) 2 MG tablet     insulin detemir (LEVEMIR FLEXTOUCH) 100 UNIT/ML pen     insulin pen needle (BD SHAHNAZ U/F) 32G X 4 MM miscellaneous     JARDIANCE 25 MG TABS tablet     Lancets (ONETOUCH DELICA PLUS [id number]) MISC     losartan (COZAAR) 25 MG tablet     order for DME     Semaglutide (RYBELSUS) 3 MG TABS     Semaglutide (RYBELSUS) 7 MG TABS     STATIN NOT PRESCRIBED, INTENTIONAL,     thin (NO BRAND SPECIFIED) lancets     TYLENOL CAPS 500 MG OR     vedolizumab (ENTYVIO) 60 MG/ML injection     No current facility-administered medications for this visit.       Allergies   Allergen Reactions     Demerol      Very low blood pressure     Fish      Pt reports allergy to all fish     Meperidine Other (See Comments)     Other reaction(s): Hypotension  Drops blood pressure       Metformin GI Disturbance and Diarrhea     GI Disturbance       No Clinical Screening - See Comments      Pt reports allergy to all fish     Nubain  [Nalbuphine]      Seafood  Swelling     Throat swells     Shingrix [Zoster Vac Recomb Adjuvanted]      Cellulitis at injection site     Topiramate Other (See Comments) and Hives     Sleepy and confused.  Shaky, disoriented       Latex Rash     Might be to just adhesive (sunburn)       Social History     Socioeconomic History     Marital status:      Spouse name: Maged     Number of children: 2     Years of education: 15   Occupational History     Occupation: Substitute clerical/cook/health assistant/Para     Employer: [employer]   Tobacco Use     Smoking status: Former     Packs/day: 1.00     Years: 15.00     Pack years: 15.00     Types: Cigarettes     Start date: 1985     Quit date: 1998     Years since quittin.4     Smokeless tobacco: Never     Tobacco comments:     soke free household.   Substance and Sexual Activity     Alcohol use: Yes     Alcohol/week: 0.0 standard drinks     Comment: occ     Drug use: No     Sexual activity: Not Currently     Partners: Male     Birth control/protection: Other     Comment:  has vasectomy   Other Topics Concern     Parent/sibling w/ CABG, MI or angioplasty before 65F 55M? No      Service No     Blood Transfusions No     Comment: doesn't want one     Caffeine Concern No     Occupational Exposure No     Hobby Hazards No     Sleep Concern No     Stress Concern No     Weight Concern Yes     Special Diet Yes     Comment: weight loss, colitis     Back Care Yes     Exercise Yes     Comment: does     Bike Helmet No     Comment: only stationary bike     Seat Belt Yes     Self-Exams Yes       Family History   Problem Relation Age of Onset     Diabetes Mother      Allergies Mother      Asthma Mother      Arthritis Mother      Heart Disease Mother         heart murmur     Depression Mother      Obesity Mother      Basal cell carcinoma Mother      Skin Cancer Mother      Eye Disorder Father      Diabetes Father      Cerebrovascular Disease Father      Heart Disease  "Father      Hypertension Father      Diabetes Maternal Grandmother      Cerebrovascular Disease Maternal Grandfather      Unknown/Adopted Paternal Grandmother      Heart Disease Paternal Grandfather         left ventrical failure     Cerebrovascular Disease Paternal Grandfather      Gynecology Sister         endometriosis     Depression Sister      Asthma Daughter      Breast Cancer Maternal Aunt      Thyroid Disease Maternal Aunt      Breast Cancer Other         fathers sister     Anesthesia Reaction Other      Thyroid Disease Other      Osteoporosis Other      Asthma Daughter      Breast Cancer Other         mothers sister     Glaucoma No family hx of      Macular Degeneration No family hx of          ROS: 10 point ROS neg other than the symptoms noted above in the HPI.    Vital Signs:   /72   Pulse 64   Ht 5' 6.14\" (1.68 m)   Wt 264 lb (119.7 kg)   SpO2 96%   BMI 42.43 kg/m        Examination:  Constitutional:  Alert, well nourished, NAD.  Memory: recent and remote memory   HEENT: Normocephalic, atraumatic.   Pulm:  Without shortness of breath   CV:  No pitting edema of BLE.      Neurological:  Awake  Alert  Oriented x 3  Speech clear    Motor exam:   Hip Flexion:                 Right: 5/5  Left:  5/5  Hip Abduction:             Right:  5/5  Left:  5/5  Hip Adduction:             Right:  5/5  Left:  5/5  Plantar Flexion:           Right:  5/5  Left:  5/5  Dorsal Flexion:            Right:  5/5  Left:  5/5  EHL:                            Right:  5/5  Left:  5/5     Sensation normal to bilateral upper and lower extremities  Muscle tone to bilateral upper and lower extremities   Gait: Able to stand from a seated position. Normal non-antalgic, non-myelopathic gait.  Able to heel/toe walk without loss of balance    Lumbar examination reveals no tenderness of the spine or paraspinous muscles.  Hip height is symmetrical. Negative SI joint, sciatic notch or greater trochanteric tenderness to palpation " bilaterally.  Straight leg raise is negative bilaterally.      Imaging:   Lumbar XR 10/12/2022  IMPRESSION:   Thoracolumbar mild dextroscoliosis. Mild multilevel spondylosis.    Assessment/Plan:   Chronic bilateral low back pain without sciatica    Will start PT. She will follow up if symptoms persist or worsen and we would consider lumbar MRI at that time. She verbalized understanding and agreement.    Patient Instructions   -Physical therapy ordered. They will contact you to schedule.  -If symptoms persist or worsen, please follow up in clinic.  -Please contact our clinic with questions or concerns at [phone].      Jennifer James, DANNY  [facility] Neurosurgery  [address] [place] [id number]  Tel [phone]  Fax [phone]        Again, thank you for allowing me to participate in the care of your patient.        Sincerely,        Jennifer James, NP

## 2022-12-13 NOTE — PROGRESS NOTES
[facility] Neurosurgery  Neurosurgery Clinic Visit      CC: back pain    Primary care Provider: Khushboo Barger    Reason For Visit:   I was asked by Dr. Zaira Marcano to consult on the patient for chronic low back pain without sciatica.    HPI: Zaira Villatoro is a 52 year old female with a history of 10+ years of back pain who presents for evaluation. She describes ongoing bilateral low back pain. She denies any radicular leg pain, paresthesias, and overt weakness. No bowel/bladder complaints or foot drop. She states pain is worse in the morning and describes the pain as stiff and sore. She states the pain improves throughout the day. She has not had any recent treatments. She has had injections in the past which were helpful.    Past Medical History:   Diagnosis Date     Acute diverticulitis 7/31/2013     History of seizures as a child 1981    One grand mal in 5th grade.  Didn't tolerate phenobarb.     Moderate single current episode of major depressive disorder (H)      Perforation of sigmoid colon (H) 8/3/2013     S/P left hemicolectomy 8/16/2013 8/02/13      Sepsis (H) 8/1/2013       Past Medical History reviewed with patient during visit.    Past Surgical History:   Procedure Laterality Date     APPENDECTOMY  04/2014     ARTHROSCOPY KNEE RT/LT  2004    RT      BIOPSY  2011    many 2011 and on     BLEPHAROPLASTY Bilateral 1/27/2020    Procedure: Both upper eyelid blepharoplasty and ptosis repair,;  Surgeon: Chelsie Knight MD;  Location: [facility]     COLONOSCOPY  02/2012    lt sided colitis     COLONOSCOPY  1/9/2014     COSMETIC BROWPEXY Left 1/27/2020    Procedure: left internal browpexy;  Surgeon: Chelsie Knight MD;  Location: [facility]     CRYOTHERAPY, CERVICAL  1988     EXPLORATORY LAPAROTOMY, PARTIAL LEFT ROLANDA COLLECTOMY WITH HARTMANS PROCEDURE, COLOSTOMY  8/2013    lt rolanda colectomy, bowel perforation, colostomy, Karen's pouche     GI SURGERY  2013    abrupted  bowl     HC TOOTH  EXTRACTION W/FORCEP  6/2003    Abscess Tooth / Hospitalized     HERNIA REPAIR  04/2014    found at time of takedown     SINUS SURGERY  2/11/03    LT sinus cyst removal      TAKEDOWN COLOSTOMY  04/2014     Past Surgical History reviewed with patient during visit.    Current Outpatient Medications   Medication     alcohol swab prep pads     atenolol (TENORMIN) 50 MG tablet     blood glucose (NO BRAND SPECIFIED) test strip     blood glucose calibration (NO BRAND SPECIFIED) solution     blood glucose monitoring (NO BRAND SPECIFIED) meter device kit     cholecalciferol (VITAMIN D3) 125 mcg (5000 units) capsule     ELIQUIS ANTICOAGULANT 5 MG tablet     estradiol (ESTRACE) 0.1 MG/GM vaginal cream     glimepiride (AMARYL) 2 MG tablet     insulin detemir (LEVEMIR FLEXTOUCH) 100 UNIT/ML pen     insulin pen needle (BD SHAHNAZ U/F) 32G X 4 MM miscellaneous     JARDIANCE 25 MG TABS tablet     Lancets (ONETOUCH DELICA PLUS [id number]) MISC     losartan (COZAAR) 25 MG tablet     order for DME     Semaglutide (RYBELSUS) 3 MG TABS     Semaglutide (RYBELSUS) 7 MG TABS     STATIN NOT PRESCRIBED, INTENTIONAL,     thin (NO BRAND SPECIFIED) lancets     TYLENOL CAPS 500 MG OR     vedolizumab (ENTYVIO) 60 MG/ML injection     No current facility-administered medications for this visit.       Allergies   Allergen Reactions     Demerol      Very low blood pressure     Fish      Pt reports allergy to all fish     Meperidine Other (See Comments)     Other reaction(s): Hypotension  Drops blood pressure       Metformin GI Disturbance and Diarrhea     GI Disturbance       No Clinical Screening - See Comments      Pt reports allergy to all fish     Nubain  [Nalbuphine]      Seafood Swelling     Throat swells     Shingrix [Zoster Vac Recomb Adjuvanted]      Cellulitis at injection site     Topiramate Other (See Comments) and Hives     Sleepy and confused.  Shaky, disoriented       Latex Rash     Might be to just adhesive (sunburn)       Social History      Socioeconomic History     Marital status:      Spouse name: Bill     Number of children: 2     Years of education: 15   Occupational History     Occupation: Substitute clerical/cook/health assistant/Para     [employer]er: [employer]   Tobacco Use     Smoking status: Former     Packs/day: 1.00     Years: 15.00     Pack years: 15.00     Types: Cigarettes     Start date: 1985     Quit date: 1998     Years since quittin.4     Smokeless tobacco: Never     Tobacco comments:     soke free household.   Substance and Sexual Activity     Alcohol use: Yes     Alcohol/week: 0.0 standard drinks     Comment: occ     Drug use: No     Sexual activity: Not Currently     Partners: Male     Birth control/protection: Other     Comment:  has vasectomy   Other Topics Concern     Parent/sibling w/ CABG, MI or angioplasty before 65F 55M? No      Service No     Blood Transfusions No     Comment: doesn't want one     Caffeine Concern No     Occupational Exposure No     Hobby Hazards No     Sleep Concern No     Stress Concern No     Weight Concern Yes     Special Diet Yes     Comment: weight loss, colitis     Back Care Yes     Exercise Yes     Comment: does     Bike Helmet No     Comment: only stationary bike     Seat Belt Yes     Self-Exams Yes       Family History   Problem Relation Age of Onset     Diabetes Mother      Allergies Mother      Asthma Mother      Arthritis Mother      Heart Disease Mother         heart murmur     Depression Mother      Obesity Mother      Basal cell carcinoma Mother      Skin Cancer Mother      Eye Disorder Father      Diabetes Father      Cerebrovascular Disease Father      Heart Disease Father      Hypertension Father      Diabetes Maternal Grandmother      Cerebrovascular Disease Maternal Grandfather      Unknown/Adopted Paternal Grandmother      Heart Disease Paternal Grandfather         left ventrical failure     Cerebrovascular Disease Paternal Grandfather   "    Gynecology Sister         endometriosis     Depression Sister      Asthma Daughter      Breast Cancer Maternal Aunt      Thyroid Disease Maternal Aunt      Breast Cancer Other         fathers sister     Anesthesia Reaction Other      Thyroid Disease Other      Osteoporosis Other      Asthma Daughter      Breast Cancer Other         mothers sister     Glaucoma No family hx of      Macular Degeneration No family hx of          ROS: 10 point ROS neg other than the symptoms noted above in the HPI.    Vital Signs:   /72   Pulse 64   Ht 5' 6.14\" (1.68 m)   Wt 264 lb (119.7 kg)   SpO2 96%   BMI 42.43 kg/m        Examination:  Constitutional:  Alert, well nourished, NAD.  Memory: recent and remote memory   HEENT: Normocephalic, atraumatic.   Pulm:  Without shortness of breath   CV:  No pitting edema of BLE.      Neurological:  Awake  Alert  Oriented x 3  Speech clear    Motor exam:   Hip Flexion:                 Right: 5/5  Left:  5/5  Hip Abduction:             Right:  5/5  Left:  5/5  Hip Adduction:             Right:  5/5  Left:  5/5  Plantar Flexion:           Right:  5/5  Left:  5/5  Dorsal Flexion:            Right:  5/5  Left:  5/5  EHL:                            Right:  5/5  Left:  5/5     Sensation normal to bilateral upper and lower extremities  Muscle tone to bilateral upper and lower extremities   Gait: Able to stand from a seated position. Normal non-antalgic, non-myelopathic gait.  Able to heel/toe walk without loss of balance    Lumbar examination reveals no tenderness of the spine or paraspinous muscles.  Hip height is symmetrical. Negative SI joint, sciatic notch or greater trochanteric tenderness to palpation bilaterally.  Straight leg raise is negative bilaterally.      Imaging:   Lumbar XR 10/12/2022  IMPRESSION:   Thoracolumbar mild dextroscoliosis. Mild multilevel spondylosis.    Assessment/Plan:   Chronic bilateral low back pain without sciatica    Will start PT. She will follow up if " symptoms persist or worsen and we would consider lumbar MRI at that time. She verbalized understanding and agreement.    Patient Instructions   -Physical therapy ordered. They will contact you to schedule.  -If symptoms persist or worsen, please follow up in clinic.  -Please contact our clinic with questions or concerns at [phone].      Jennifer James [facility] Neurosurgery  [address] [place] [id number]  Tel [phone]  Fax [phone]

## 2022-12-13 NOTE — NURSING NOTE
"Zaira Villatoro is a 52 year old female who presents for:  Chief Complaint   Patient presents with     Neurologic Problem     Chronic LBP. Xray 10/12/22. Onset: 40 years. Patient notes she has bulging herniated discs. She has been treated with cortisone injections, last one was about 10 years ago. Pain has gotten worse. She thinks the cortisone is starting to wear off but wants to be a head of the game. Denies any pain or N/T in the legs. She has done physical therapy in the past but didn't really do anything for her.         Vitals:    Vitals:    12/13/22 0926   BP: 122/72   Pulse: 64   SpO2: 96%   Weight: 264 lb (119.7 kg)   Height: 5' 6.14\" (1.68 m)       BMI:  Estimated body mass index is 42.43 kg/m  as calculated from the following:    Height as of this encounter: 5' 6.14\" (1.68 m).    Weight as of this encounter: 264 lb (119.7 kg).    Pain Score:  Mild Pain (3)        Leslie Richards CMA      "

## 2022-12-14 ENCOUNTER — ANCILLARY PROCEDURE (OUTPATIENT)
Dept: MAMMOGRAPHY | Facility: CLINIC | Age: 52
End: 2022-12-14
Attending: INTERNAL MEDICINE
Payer: COMMERCIAL

## 2022-12-14 DIAGNOSIS — Z12.31 VISIT FOR SCREENING MAMMOGRAM: ICD-10-CM

## 2022-12-14 PROCEDURE — 77067 SCR MAMMO BI INCL CAD: CPT | Mod: TC | Performed by: RADIOLOGY

## 2022-12-19 ENCOUNTER — VIRTUAL VISIT (OUTPATIENT)
Dept: CARDIOLOGY | Facility: CLINIC | Age: 52
End: 2022-12-19
Attending: INTERNAL MEDICINE
Payer: COMMERCIAL

## 2022-12-19 DIAGNOSIS — I48.0 PAROXYSMAL ATRIAL FIBRILLATION (H): Primary | ICD-10-CM

## 2022-12-19 PROCEDURE — 99213 OFFICE O/P EST LOW 20 MIN: CPT | Mod: 95 | Performed by: INTERNAL MEDICINE

## 2022-12-19 NOTE — LETTER
"12/19/2022      RE: Zaira Villatoro  [address] [facility] [address]       Dear Colleague,    Thank you for the opportunity to participate in the care of your patient, Zaira Villatoro, at the [facility] HEART [facility]. Please see a copy of my visit note below.    Electrophysiology Clinic Video Virtual Visit    Zaira Villatoro is a 52 year old female who is being evaluated via a billable video visit.      The patient has been notified of following:     \"This video visit will be conducted via a call between you and your physician/provider. We have found that certain health care needs can be provided without the need for an in-person physical exam.  This service lets us provide the care you need with a video conversation.  If a prescription is necessary we can send it directly to your pharmacy.  If lab work is needed we can place an order for that and you can then stop by our lab to have the test done at a later time.    If during the course of the call the physician/provider feels a video visit is not appropriate, you will not be charged for this service.\"     Physician/provider has received verbal consent for a Video Visit from the patient? Yes      HPI:   Zaira Villatoro is a 52 year old female with a past medical history significant for PAF, HTN, DM, Ulcerative colitis s/p sigmoid colon perforation repair and colostomy(2013).  She presents for a follow up of paroxysmal atrial fibrillation.     Her last visit in EP clinic was Dec 2020. Since that time she has been doing well without complaints. She does not report any symptoms of irregular heartbeat sensation, palpitations, exertional dyspnea, exertional angina, frequent lightheadedness, presyncope or syncope. She is tolerating her medications well. Has intermittent blood in her stool due to her UC but she monitors it closely.    PAST MEDICAL HISTORY:  Past Medical History: "   Diagnosis Date     Acute diverticulitis 7/31/2013     History of seizures as a child 1981    One grand mal in 5th grade.  Didn't tolerate phenobarb.     Moderate single current episode of major depressive disorder (H)      Perforation of sigmoid colon (H) 8/3/2013     S/P left hemicolectomy 8/16/2013 8/02/13      Sepsis (H) 8/1/2013       CURRENT MEDICATIONS:  Current Outpatient Medications   Medication Sig Dispense Refill     alcohol swab prep pads Use to swab area of injection/kenton as directed. 100 each 3     atenolol (TENORMIN) 50 MG tablet TAKE 1 TABLET BY MOUTH TWICE A  tablet 3     blood glucose (NO BRAND SPECIFIED) test strip Use to test blood sugar 3 times daily or as directed. To accompany: Blood Glucose Monitor Brands: per insurance. 100 strip 6     blood glucose calibration (NO BRAND SPECIFIED) solution To accompany: Blood Glucose Monitor Brands: per insurance. 1 each 0     blood glucose monitoring (NO BRAND SPECIFIED) meter device kit Use to test blood sugar 4 times daily or as directed. Preferred blood glucose meter OR supplies to accompany: Blood Glucose Monitor Brands: per insurance. 1 kit 0     cholecalciferol (VITAMIN D3) 125 mcg (5000 units) capsule Take by mouth daily       ELIQUIS ANTICOAGULANT 5 MG tablet TAKE 1 TABLET BY MOUTH TWICE A DAY 60 tablet 4     estradiol (ESTRACE) 0.1 MG/GM vaginal cream        glimepiride (AMARYL) 2 MG tablet Take 2 mg by mouth every morning (before breakfast) Following infusion - one time, month of November only.       insulin detemir (LEVEMIR FLEXTOUCH) 100 UNIT/ML pen Inject 70 Units Subcutaneous 2 times daily 120 mL 3     insulin pen needle (BD SHAHNAZ U/F) 32G X 4 MM miscellaneous Use 1 pen needles TWICE DAILY 200 each 11     JARDIANCE 25 MG TABS tablet TAKE 1 TABLET BY MOUTH EVERY DAY 90 tablet 1     Lancets (ONETOUCH DELICA PLUS [id number]) MISC TO ACCOMPANY: BLOOD GLUCOSE MONITOR BRANDS: 100 each 0     losartan (COZAAR) 25 MG tablet TAKE 1/2 TABLET  BY MOUTH EVERY DAY 45 tablet 1     order for DME Equipment being ordered: CPAP 9 c, 1 Units 0     Semaglutide (RYBELSUS) 7 MG TABS Take 7 mg by mouth daily 30 tablet 1     STATIN NOT PRESCRIBED, INTENTIONAL, 1 each continuous prn Statin not prescribed intentionally due to Refusal by patient and Other:LDL is below 100. 0 each 0     thin (NO BRAND SPECIFIED) lancets Use with lanceting device. To accompany: Blood Glucose Monitor Brands: per insurance. 200 each 6     TYLENOL CAPS 500 MG OR 1 CAPSULE EVERY 4 HOURS AS NEEDED       vedolizumab (ENTYVIO) 60 MG/ML injection Every six weeks         PAST SURGICAL HISTORY:  Past Surgical History:   Procedure Laterality Date     APPENDECTOMY  04/2014     ARTHROSCOPY KNEE RT/LT  2004    RT      BIOPSY  2011    many 2011 and on     BLEPHAROPLASTY Bilateral 1/27/2020    Procedure: Both upper eyelid blepharoplasty and ptosis repair,;  Surgeon: Chelsie Knight MD;  Location: [facility] OR     COLONOSCOPY  02/2012    lt sided colitis     COLONOSCOPY  1/9/2014     COSMETIC BROWPEXY Left 1/27/2020    Procedure: left internal browpexy;  Surgeon: Chelsie Knight MD;  Location: [facility] OR     CRYOTHERAPY, CERVICAL  1988     EXPLORATORY LAPAROTOMY, PARTIAL LEFT ROLANDA COLLECTOMY WITH HARTMANS PROCEDURE, COLOSTOMY  8/2013    lt rolanda colectomy, bowel perforation, colostomy, Karen's pouche     GI SURGERY  2013    abrupted  bowl     HC TOOTH EXTRACTION W/FORCEP  6/2003    Abscess Tooth / Hospitalized     HERNIA REPAIR  04/2014    found at time of takedown     SINUS SURGERY  2/11/03    LT sinus cyst removal      TAKEDOWN COLOSTOMY  04/2014       ALLERGIES:     Allergies   Allergen Reactions     Demerol      Very low blood pressure     Fish      Pt reports allergy to all fish     Meperidine Other (See Comments)     Other reaction(s): Hypotension  Drops blood pressure       Metformin GI Disturbance and Diarrhea     GI Disturbance       No Clinical Screening - See Comments      Pt reports allergy to all  fish     Nubain  [Nalbuphine]      Seafood Swelling     Throat swells     Shingrix [Zoster Vac Recomb Adjuvanted]      Cellulitis at injection site     Topiramate Other (See Comments) and Hives     Sleepy and confused.  Shaky, disoriented       Latex Rash     Might be to just adhesive (sunburn)       FAMILY HISTORY:  Family History   Problem Relation Age of Onset     Diabetes Mother      Allergies Mother      Asthma Mother      Arthritis Mother      Heart Disease Mother         heart murmur     Depression Mother      Obesity Mother      Basal cell carcinoma Mother      Skin Cancer Mother      Eye Disorder Father      Diabetes Father      Cerebrovascular Disease Father      Heart Disease Father      Hypertension Father      Diabetes Maternal Grandmother      Cerebrovascular Disease Maternal Grandfather      Unknown/Adopted Paternal Grandmother      Heart Disease Paternal Grandfather         left ventrical failure     Cerebrovascular Disease Paternal Grandfather      Gynecology Sister         endometriosis     Depression Sister      Asthma Daughter      Breast Cancer Maternal Aunt      Thyroid Disease Maternal Aunt      Breast Cancer Other         fathers sister     Anesthesia Reaction Other      Thyroid Disease Other      Osteoporosis Other      Asthma Daughter      Breast Cancer Other         mothers sister     Glaucoma No family hx of      Macular Degeneration No family hx of        SOCIAL HISTORY:  Social History     Tobacco Use     Smoking status: Former     Packs/day: 1.00     Years: 15.00     Pack years: 15.00     Types: Cigarettes     Start date: 1985     Quit date: 1998     Years since quittin.4     Smokeless tobacco: Never     Tobacco comments:     soke free household.   Substance Use Topics     Alcohol use: Yes     Alcohol/week: 0.0 standard drinks     Comment: occ     Drug use: No       ROS:  10 point ROS neg other than the symptoms noted above in the HPI.    Exam:  The rest of a comprehensive  physical examination is deferred due to public health emergency video visit restrictions.  CONSITUTIONAL: no acute distress  HEENT: no icterus, no redness or discharge, neck supple  CV: no visible edema of visualized extremities.   RESPIRATORY: respirations nonlabored, no cough  NEURO: AA&Ox3, speech fluent/appropriate, motor grossly nonfocal  PSYCH: cooperative, affect appropriate  DERM: no rashes on visualized face/neck/upper extremities    Labs:  Reviewed.     Testing/Procedures:  ZioPatch Monitor  NSVT, 1 episode 4 beats  SVT 13 episodes, longest 12.8 sec      Assessment and Plan:   Paroxysmal atrial fibrillation    She is doing well on her current medication regimen without symptoms. Ziopatch without any evidence of atrial fibrillation. We will continue her current medications and apixaban 5mg twice daily for stroke prophylaxis. We will see her in virtual clinic again in 1 year.    I have seen and interviewed patient. I have reviewed the laboratory tests, imaging, and other investigations. I have reviewed the management plan with the patient. I discussed with the team and agree with the findings and plan in this resident/fellow/nurse practitioner's note. In addition, assessment and plan have been incorporated into the note by myself, as to make it a single cohesive document.     Leila Miller MD, MS  Cardiology/EP Staff Attending        Video-Visit Details    Type of service:  Video Visit    Video Start: 2:06 PM  Video End: 2:13 PM      Originating Location (pt. Location): Home    Distant Location (provider location):  [facility] HEART [facility]     Mode of Communication:  Video Conference via [employer]

## 2022-12-19 NOTE — NURSING NOTE
Zaira is a 52 year old who is being evaluated via a billable video visit.       How would you like to obtain your AVS? MyChart  If the video visit is dropped, the invitation should be resent by: Text to cell phone: [phone]   Will anyone else be joining your video visit? No       Video-Visit Details     Originating Location (pt. Location): Home       Distant Location (provider location):  Off-site     Platform used for Video Visit: [facility]    Chest Pain/Tightness/Pressure: No  SOB or MARTIN: No  Heart Palpitations or fluttering: No  Lightheadedness or Dizziness: No  Stamina: No     Weight: 260 lbs  Height: 5 ft 4 in    Annamarie Crandall CMA ([facility])

## 2022-12-19 NOTE — PROGRESS NOTES
"Electrophysiology Clinic Video Virtual Visit    Zaira Villatoro is a 52 year old female who is being evaluated via a billable video visit.      The patient has been notified of following:     \"This video visit will be conducted via a call between you and your physician/provider. We have found that certain health care needs can be provided without the need for an in-person physical exam.  This service lets us provide the care you need with a video conversation.  If a prescription is necessary we can send it directly to your pharmacy.  If lab work is needed we can place an order for that and you can then stop by our lab to have the test done at a later time.    If during the course of the call the physician/provider feels a video visit is not appropriate, you will not be charged for this service.\"     Physician/provider has received verbal consent for a Video Visit from the patient? Yes      HPI:   Zaira Villatoro is a 52 year old female with a past medical history significant for PAF, HTN, DM, Ulcerative colitis s/p sigmoid colon perforation repair and colostomy(2013).  She presents for a follow up of paroxysmal atrial fibrillation.     Her last visit in EP clinic was Dec 2020. Since that time she has been doing well without complaints. She does not report any symptoms of irregular heartbeat sensation, palpitations, exertional dyspnea, exertional angina, frequent lightheadedness, presyncope or syncope. She is tolerating her medications well. Has intermittent blood in her stool due to her UC but she monitors it closely.    PAST MEDICAL HISTORY:  Past Medical History:   Diagnosis Date     Acute diverticulitis 7/31/2013     History of seizures as a child 1981    One grand mal in 5th grade.  Didn't tolerate phenobarb.     Moderate single current episode of major depressive disorder (H)      Perforation of sigmoid colon (H) 8/3/2013     S/P left hemicolectomy 8/16/2013 8/02/13      Sepsis (H) 8/1/2013       CURRENT " MEDICATIONS:  Current Outpatient Medications   Medication Sig Dispense Refill     alcohol swab prep pads Use to swab area of injection/kenton as directed. 100 each 3     atenolol (TENORMIN) 50 MG tablet TAKE 1 TABLET BY MOUTH TWICE A  tablet 3     blood glucose (NO BRAND SPECIFIED) test strip Use to test blood sugar 3 times daily or as directed. To accompany: Blood Glucose Monitor Brands: per insurance. 100 strip 6     blood glucose calibration (NO BRAND SPECIFIED) solution To accompany: Blood Glucose Monitor Brands: per insurance. 1 each 0     blood glucose monitoring (NO BRAND SPECIFIED) meter device kit Use to test blood sugar 4 times daily or as directed. Preferred blood glucose meter OR supplies to accompany: Blood Glucose Monitor Brands: per insurance. 1 kit 0     cholecalciferol (VITAMIN D3) 125 mcg (5000 units) capsule Take by mouth daily       ELIQUIS ANTICOAGULANT 5 MG tablet TAKE 1 TABLET BY MOUTH TWICE A DAY 60 tablet 4     estradiol (ESTRACE) 0.1 MG/GM vaginal cream        glimepiride (AMARYL) 2 MG tablet Take 2 mg by mouth every morning (before breakfast) Following infusion - one time, month of November only.       insulin detemir (LEVEMIR FLEXTOUCH) 100 UNIT/ML pen Inject 70 Units Subcutaneous 2 times daily 120 mL 3     insulin pen needle (BD SHAHNAZ U/F) 32G X 4 MM miscellaneous Use 1 pen needles TWICE DAILY 200 each 11     JARDIANCE 25 MG TABS tablet TAKE 1 TABLET BY MOUTH EVERY DAY 90 tablet 1     Lancets (ONETOUCH DELICA PLUS [id number]) MISC TO ACCOMPANY: BLOOD GLUCOSE MONITOR BRANDS: 100 each 0     losartan (COZAAR) 25 MG tablet TAKE 1/2 TABLET BY MOUTH EVERY DAY 45 tablet 1     order for DME Equipment being ordered: CPAP 9 c, 1 Units 0     Semaglutide (RYBELSUS) 7 MG TABS Take 7 mg by mouth daily 30 tablet 1     STATIN NOT PRESCRIBED, INTENTIONAL, 1 each continuous prn Statin not prescribed intentionally due to Refusal by patient and Other:LDL is below 100. 0 each 0     thin (NO BRAND  SPECIFIED) lancets Use with lanceting device. To accompany: Blood Glucose Monitor Brands: per insurance. 200 each 6     TYLENOL CAPS 500 MG OR 1 CAPSULE EVERY 4 HOURS AS NEEDED       vedolizumab (ENTYVIO) 60 MG/ML injection Every six weeks         PAST SURGICAL HISTORY:  Past Surgical History:   Procedure Laterality Date     APPENDECTOMY  04/2014     ARTHROSCOPY KNEE RT/LT  2004    RT      BIOPSY  2011    many 2011 and on     BLEPHAROPLASTY Bilateral 1/27/2020    Procedure: Both upper eyelid blepharoplasty and ptosis repair,;  Surgeon: Chelsie Knight MD;  Location: [facility] OR     COLONOSCOPY  02/2012    lt sided colitis     COLONOSCOPY  1/9/2014     COSMETIC BROWPEXY Left 1/27/2020    Procedure: left internal browpexy;  Surgeon: Chelsie Knight MD;  Location: [facility] OR     CRYOTHERAPY, CERVICAL  1988     EXPLORATORY LAPAROTOMY, PARTIAL LEFT ROLANDA COLLECTOMY WITH HARTMANS PROCEDURE, COLOSTOMY  8/2013    lt rolanda colectomy, bowel perforation, colostomy, Karen's pouche     GI SURGERY  2013    abrupted  bowl     HC TOOTH EXTRACTION W/FORCEP  6/2003    Abscess Tooth / Hospitalized     HERNIA REPAIR  04/2014    found at time of takedown     SINUS SURGERY  2/11/03    LT sinus cyst removal      TAKEDOWN COLOSTOMY  04/2014       ALLERGIES:     Allergies   Allergen Reactions     Demerol      Very low blood pressure     Fish      Pt reports allergy to all fish     Meperidine Other (See Comments)     Other reaction(s): Hypotension  Drops blood pressure       Metformin GI Disturbance and Diarrhea     GI Disturbance       No Clinical Screening - See Comments      Pt reports allergy to all fish     Nubain  [Nalbuphine]      Seafood Swelling     Throat swells     Shingrix [Zoster Vac Recomb Adjuvanted]      Cellulitis at injection site     Topiramate Other (See Comments) and Hives     Sleepy and confused.  Shaky, disoriented       Latex Rash     Might be to just adhesive (sunburn)       FAMILY HISTORY:  Family History   Problem  Relation Age of Onset     Diabetes Mother      Allergies Mother      Asthma Mother      Arthritis Mother      Heart Disease Mother         heart murmur     Depression Mother      Obesity Mother      Basal cell carcinoma Mother      Skin Cancer Mother      Eye Disorder Father      Diabetes Father      Cerebrovascular Disease Father      Heart Disease Father      Hypertension Father      Diabetes Maternal Grandmother      Cerebrovascular Disease Maternal Grandfather      Unknown/Adopted Paternal Grandmother      Heart Disease Paternal Grandfather         left ventrical failure     Cerebrovascular Disease Paternal Grandfather      Gynecology Sister         endometriosis     Depression Sister      Asthma Daughter      Breast Cancer Maternal Aunt      Thyroid Disease Maternal Aunt      Breast Cancer Other         fathers sister     Anesthesia Reaction Other      Thyroid Disease Other      Osteoporosis Other      Asthma Daughter      Breast Cancer Other         mothers sister     Glaucoma No family hx of      Macular Degeneration No family hx of        SOCIAL HISTORY:  Social History     Tobacco Use     Smoking status: Former     Packs/day: 1.00     Years: 15.00     Pack years: 15.00     Types: Cigarettes     Start date: 1985     Quit date: 1998     Years since quittin.4     Smokeless tobacco: Never     Tobacco comments:     soke free household.   Substance Use Topics     Alcohol use: Yes     Alcohol/week: 0.0 standard drinks     Comment: occ     Drug use: No       ROS:  10 point ROS neg other than the symptoms noted above in the HPI.    Exam:  The rest of a comprehensive physical examination is deferred due to public health emergency video visit restrictions.  CONSITUTIONAL: no acute distress  HEENT: no icterus, no redness or discharge, neck supple  CV: no visible edema of visualized extremities.   RESPIRATORY: respirations nonlabored, no cough  NEURO: AA&Ox3, speech fluent/appropriate, motor grossly  nonfocal  PSYCH: cooperative, affect appropriate  DERM: no rashes on visualized face/neck/upper extremities    Labs:  Reviewed.     Testing/Procedures:  ZioPatch Monitor  NSVT, 1 episode 4 beats  SVT 13 episodes, longest 12.8 sec      Assessment and Plan:   Paroxysmal atrial fibrillation    She is doing well on her current medication regimen without symptoms. Ziopatch without any evidence of atrial fibrillation. We will continue her current medications and apixaban 5mg twice daily for stroke prophylaxis. We will see her in virtual clinic again in 1 year.    I have seen and interviewed patient. I have reviewed the laboratory tests, imaging, and other investigations. I have reviewed the management plan with the patient. I discussed with the team and agree with the findings and plan in this resident/fellow/nurse practitioner's note. In addition, assessment and plan have been incorporated into the note by myself, as to make it a single cohesive document.     Leila Miller MD, MS  Cardiology/EP Staff Attending        Video-Visit Details    Type of service:  Video Visit    Video Start: 2:06 PM  Video End: 2:13 PM      Originating Location (pt. Location): Home    Distant Location (provider location):  [facility]ART [facility]     Mode of Communication:  Video Conference via [employer] Data Source

## 2022-12-19 NOTE — PATIENT INSTRUCTIONS
Thank you for coming to the [facility] at [place]; please note the following instructions:    1. Follow-up virtually in 1 year with Dr. Leila Miller.  The cardiology team will contact you when the time gets closer        If you have any questions regarding your visit, please contact your care team:     CARDIOLOGY  TELEPHONE NUMBER   Vanda MARC, Registered Nurse  Nenita CONSTANTINO, Registered Nurse  Laya PAK, Registered Medical Assistant  Annamarie JORGE, Certified Medical Assistant  Shae ROJAS, Visit Facilitator [phone] (select option 1)    *After hours: [phone]   For Scheduling Appts:     [phone] (select option 1)    *After hours: [phone]   For the Device Clinic (Pacemakers and ICD's)  Karen GILLIAM, Registered Nurse   During business hours: [phone]    *After business hours:  [phone] (select option 4)      Normal test result notifications will be released via [employer] or mailed within 7 business days.  All other test results, will be communicated via telephone once reviewed by your cardiologist.    If you need a medication refill, please contact your pharmacy.  Please allow 3 business days for your refill to be completed.    As always, thank you for trusting us with your health care needs!

## 2022-12-20 ENCOUNTER — THERAPY VISIT (OUTPATIENT)
Dept: PHYSICAL THERAPY | Facility: CLINIC | Age: 52
End: 2022-12-20
Attending: NURSE PRACTITIONER
Payer: COMMERCIAL

## 2022-12-20 ENCOUNTER — VIRTUAL VISIT (OUTPATIENT)
Dept: FAMILY MEDICINE | Facility: CLINIC | Age: 52
End: 2022-12-20
Payer: COMMERCIAL

## 2022-12-20 ENCOUNTER — TELEPHONE (OUTPATIENT)
Dept: FAMILY MEDICINE | Facility: CLINIC | Age: 52
End: 2022-12-20

## 2022-12-20 DIAGNOSIS — E11.65 TYPE 2 DIABETES MELLITUS WITH HYPERGLYCEMIA, WITH LONG-TERM CURRENT USE OF INSULIN (H): Primary | ICD-10-CM

## 2022-12-20 DIAGNOSIS — G89.29 CHRONIC LOW BACK PAIN WITHOUT SCIATICA, UNSPECIFIED BACK PAIN LATERALITY: Chronic | ICD-10-CM

## 2022-12-20 DIAGNOSIS — Z79.4 TYPE 2 DIABETES MELLITUS WITH HYPERGLYCEMIA, WITH LONG-TERM CURRENT USE OF INSULIN (H): Primary | ICD-10-CM

## 2022-12-20 DIAGNOSIS — M54.50 CHRONIC LOW BACK PAIN WITHOUT SCIATICA, UNSPECIFIED BACK PAIN LATERALITY: Chronic | ICD-10-CM

## 2022-12-20 PROCEDURE — 99214 OFFICE O/P EST MOD 30 MIN: CPT | Mod: TEL | Performed by: STUDENT IN AN ORGANIZED HEALTH CARE EDUCATION/TRAINING PROGRAM

## 2022-12-20 PROCEDURE — 97110 THERAPEUTIC EXERCISES: CPT | Mod: GP | Performed by: PHYSICAL THERAPIST

## 2022-12-20 PROCEDURE — 97161 PT EVAL LOW COMPLEX 20 MIN: CPT | Mod: GP | Performed by: PHYSICAL THERAPIST

## 2022-12-20 RX ORDER — ORAL SEMAGLUTIDE 14 MG/1
1 TABLET ORAL DAILY
Qty: 90 TABLET | Refills: 1 | Status: SHIPPED | OUTPATIENT
Start: 2022-12-20 | End: 2023-05-19

## 2022-12-20 RX ORDER — ORAL SEMAGLUTIDE 3 MG/1
TABLET ORAL EVERY 24 HOURS
COMMUNITY
Start: 2022-11-17 | End: 2022-12-20

## 2022-12-20 NOTE — PROGRESS NOTES
Emily is a 52 year old who is being evaluated via a billable telephone visit.     What phone number would you like to be contacted at? [phone]   How would you like to obtain your AVS? Janeth    Distant Location (provider location):  On-site    Assessment & Plan     (E11.65,  Z79.4) Type 2 diabetes mellitus with hyperglycemia, with long-term current use of insulin (H)  (primary encounter diagnosis)  Comment: Chronic, mildly uncontrolled.  At this time patient is not taking postprandial sugars however was encouraged to send alarms to enable us to see what her sugars are after meals and to see if there is correlation to types of meals that she is consuming that cause elevated numbers.  At this time patient is due for an increase of her Rybelsus from 7 mg to 14 mg and will send to pharmacy (pending preauthorization).  Of note patient will be having infusion next week and instructed by Dr. Pearl to take glimepiride if needed for elevated sugars-in agreement with assessment.  Will send letter to [facility] regarding adding A1c to February 9 labs.  Plan: Semaglutide (RYBELSUS) 14 MG TABS        Pending pickup                   No follow-ups on file.    EMILY KUNZ MD  [facility]   Emily is a 52 year old, presenting for the following health issues:  Diabetes      History of Present Illness       Diabetes:   She presents for follow up of diabetes.  She is checking home blood glucose four or more times daily. She checks blood glucose before meals.  Blood glucose is sometimes over 200 and never under 70. When her blood glucose is low, the patient is asymptomatic for confusion, blurred vision, lethargy and reports not feeling dizzy, shaky, or weak.  She has no concerns regarding her diabetes at this time.  She is having weight gain and none of these symptoms.         She eats 2-3 servings of fruits and vegetables daily.She consumes 0 sweetened beverage(s) daily.She exercises  with enough effort to increase her heart rate 9 or less minutes per day.  She exercises with enough effort to increase her heart rate 3 or less days per week.   She is taking medications regularly.     Fastings: 106-243. Patient states that she forgets to her post-prandial sugars     130-160s have been averages that she states that her sugars are.  She states that she has not seen any decline in her weight which still remains frustrating and is hoping with an increase in the Rybelsus that this will start to improve.  She attributes her elevated blood sugars to holiday season and making cookies for her children.  She denies any hypoglycemic episodes.  Patient reports that she was informed by [facility] pharmacist Ryanne that her insurance was not able to cover her [facility] pharmacy referral and she will no longer be seeing Ryanne on a regular basis.          Review of Systems   CONSTITUTIONAL: NEGATIVE for fever, chills, change in weight  ENT/MOUTH: NEGATIVE for ear, mouth and throat problems  RESP: NEGATIVE for significant cough or SOB  CV: NEGATIVE for chest pain, palpitations or peripheral edema      Objective             Physical Exam   healthy, alert and no distress  PSYCH: Alert and oriented times 3; coherent speech, normal   rate and volume, able to articulate logical thoughts, able   to abstract reason, no tangential thoughts, no hallucinations   or delusions  Her affect is normal  RESP: No cough, no audible wheezing, able to talk in full sentences  Remainder of exam unable to be completed due to telephone visits    No results found for any visits on 12/20/22.            Phone call duration: 30 minutes

## 2022-12-20 NOTE — PROGRESS NOTES
Physical Therapy Initial Evaluation  Subjective:  The history is provided by the patient. No  was used.   Patient Health History  Zaira Villatoro being seen for Low back pain stiffness.          Pain is reported as 2/10 on pain scale.  General health as reported by patient is poor.  Pertinent medical history includes: diabetes, heart problems, high blood pressure, migraines/headaches, overweight, osteoarthritis and pain at night/rest.     Medical allergies: latex and adhesives.   Surgeries include:  Orthopedic surgery and other. Other surgery history details: Intestinal and sinus.    Current medications:  High blood pressure medication and other. Other medications details: Please see chart to many to list.    Current occupation is Office.   Primary job tasks include:  Computer work, driving, lifting/carrying, prolonged sitting and pushing/pulling.                  Therapist Generated HPI Evaluation  Problem details: Patient reports that she has a long history of low back pain that has been onging for 40 years.  She has had three cortisone injections that have helped and usually last for 10-20 years.  Is now back at PT to learn exercises and try to hold off on injection.  Right now, she does not have radicular symptoms, but feels like she has soreness and stiffness.  History of radicular pain, has had episodes of having to roll out of bed and not able to stand, but has been a long time since she has had that type of pain.     Goal:  Stiffness is slowly getting worse and bothering her more days of the week, would like to decrease potential for worsening of back pain and extend timeframe for next cortisone injection.     Current activity: low, walking and has pedal bike; not currently working due to immunocompromised status. .         Type of problem:  Lumbar.    This is a recurrent condition.  Condition occurred with:  Degenerative joint disease and insidious onset.  Where condition occurred: at  home.  Patient reports pain:  Lower lumbar spine and central lumbar spine.  Pain is described as aching (warm(radiates) and stiff)   Pain radiates to:  No radiation. Pain is worse in the A.M. (gets better over time throughout the day).  Since onset symptoms are gradually worsening.  Associated symptoms:  Loss of motion/stiffness. Symptoms are exacerbated by sitting and standing (morning, sitting too long, standing for long periods)  and relieved by activity/movement.  Special tests included:  X-ray.                            Objective:  System         Lumbar/SI Evaluation  ROM:  Arom wnl lumbar: pain with supine position and L KTC   AROM Lumbar:   Flexion:          Min rest   Ext:                    Min rest    Side Bend:        Left:     Right:   Rotation:           Left:     Right:   Side Glide:        Left:  Wnl    Right:  Wnl        Strength: Abdominal coordination is difficult, hard to feel proper TrA; hernia and coning with pressure changes   Lumbar Myotomes:    T12-L3 (Hip Flex):  Left: 4    Right: 4+  L2-4 (Quads):  Left:  5    Right:  5  L4 (Ankle DF):  Left:  5    Right:  5  L5 (Great Toe Ext): Left: 5    Right: 5   S1 (Toe Raise):  Left: 5    Right: 5      Lumbar Dermtomes:  normal                Neural Tension/Mobility:      Left side:SLR; SLR w/DF or Slump  negative.   Right side:   Slump positive.  Right side:   SLR w/DF or SLR  negative.   Lumbar Palpation:    Tenderness present at Left:    PSIS  Tenderness present at Right: PSIS    Lumbar Provocation:    Left positive with:  PROM hip      SI joint/Sacrum:      Provocation:  Symmetrical leg length and neutral pelvic alignment     Left negative at:    Forward bend standing    Right negative at:  Forward bend standing                                                   Ozzie Lumbar Evaluation      Movement Loss:  Flexion (Flex): min  Extension (EXT): min  Side Glide R (SG R): nil  Side Glide L (SG L): nil  Test Movements:    EIS: During: decreases   After: better  Mechanical Response: no effect  Repeat EIS: During: no effect  After: better  Mechanical Response: IncROM  JUWAN: During: increases  After: worse    Repeat JUWAN: During: produces  After: worse                                                     ROS    Assessment/Plan:    Patient is a 52 year old female with lumbar complaints.    Patient has the following significant findings with corresponding treatment plan.                Diagnosis 1:  Low back pain  Pain -  manual therapy, self management, directional preference exercise and home program  Decreased ROM/flexibility - manual therapy and therapeutic exercise  Decreased joint mobility - manual therapy and therapeutic exercise  Decreased strength - therapeutic exercise and therapeutic activities  Impaired muscle performance - neuro re-education  Decreased function - therapeutic activities  Impaired posture - neuro re-education    Therapy Evaluation Codes:   1) History comprised of:   Personal factors that impact the plan of care:      Time since onset of symptoms.    Comorbidity factors that impact the plan of care are:      None.     Medications impacting care: None.  2) Examination of Body Systems comprised of:   Body structures and functions that impact the plan of care:      Lumbar spine.   Activity limitations that impact the plan of care are:      Bending, Cooking, Driving, Lifting, Reading/Computer work, Sitting and Standing.  3) Clinical presentation characteristics are:   Stable/Uncomplicated.  4) Decision-Making    Low complexity using standardized patient assessment instrument and/or measureable assessment of functional outcome.  Cumulative Therapy Evaluation is: Low complexity.    Previous and current functional limitations:  (See Goal Flow Sheet for this information)    Short term and Long term goals: (See Goal Flow Sheet for this information)     Communication ability:  Patient appears to be able to clearly communicate and understand verbal  and written communication and follow directions correctly.  Treatment Explanation - The following has been discussed with the patient:   RX ordered/plan of care  Anticipated outcomes  Possible risks and side effects  This patient would benefit from PT intervention to resume normal activities.   Rehab potential is good.    Frequency:  2 X a month, once daily  Duration:  for 12 weeks  Discharge Plan:  Achieve all LTG.  Independent in home treatment program.  Reach maximal therapeutic benefit.    Please refer to the daily flowsheet for treatment today, total treatment time and time spent performing 1:1 timed codes.

## 2022-12-21 ENCOUNTER — TRANSFERRED RECORDS (OUTPATIENT)
Dept: HEALTH INFORMATION MANAGEMENT | Facility: CLINIC | Age: 52
End: 2022-12-21

## 2022-12-21 LAB — RETINOPATHY: NEGATIVE

## 2022-12-26 ENCOUNTER — TELEPHONE (OUTPATIENT)
Dept: FAMILY MEDICINE | Facility: CLINIC | Age: 52
End: 2022-12-26

## 2022-12-26 NOTE — TELEPHONE ENCOUNTER
----- Message from Zaira Marcano MD sent at 12/24/2022  8:46 AM CST -----  Please call patient and inform her that her colonoscopy was completed and it will need to be repeated in 2 years. Overall, everything looked good.       Dr. Zaira Marcano

## 2022-12-26 NOTE — TELEPHONE ENCOUNTER
Patient called back gave her information and she thought it was weird that we knew this information and she already knew it because they told her and she has been going there for years.  Rhina Cano   [facility]

## 2022-12-28 DIAGNOSIS — D58.2 OTHER HEMOGLOBINOPATHIES (H): Primary | ICD-10-CM

## 2022-12-29 ENCOUNTER — THERAPY VISIT (OUTPATIENT)
Dept: PHYSICAL THERAPY | Facility: CLINIC | Age: 52
End: 2022-12-29
Attending: NURSE PRACTITIONER
Payer: COMMERCIAL

## 2022-12-29 ENCOUNTER — TRANSFERRED RECORDS (OUTPATIENT)
Dept: HEALTH INFORMATION MANAGEMENT | Facility: CLINIC | Age: 52
End: 2022-12-29

## 2022-12-29 DIAGNOSIS — G89.29 CHRONIC LOW BACK PAIN WITHOUT SCIATICA, UNSPECIFIED BACK PAIN LATERALITY: Primary | Chronic | ICD-10-CM

## 2022-12-29 DIAGNOSIS — E11.65 TYPE 2 DIABETES MELLITUS WITH HYPERGLYCEMIA (H): ICD-10-CM

## 2022-12-29 DIAGNOSIS — M54.50 CHRONIC LOW BACK PAIN WITHOUT SCIATICA, UNSPECIFIED BACK PAIN LATERALITY: Primary | Chronic | ICD-10-CM

## 2022-12-29 PROCEDURE — 97110 THERAPEUTIC EXERCISES: CPT | Mod: GP | Performed by: PHYSICAL THERAPIST

## 2022-12-29 PROCEDURE — 97140 MANUAL THERAPY 1/> REGIONS: CPT | Mod: GP | Performed by: PHYSICAL THERAPIST

## 2022-12-30 ENCOUNTER — LAB (OUTPATIENT)
Dept: LAB | Facility: CLINIC | Age: 52
End: 2022-12-30
Payer: COMMERCIAL

## 2022-12-30 DIAGNOSIS — D58.2 OTHER HEMOGLOBINOPATHIES (H): ICD-10-CM

## 2022-12-30 LAB
BASOPHILS # BLD AUTO: 0 10E3/UL (ref 0–0.2)
BASOPHILS NFR BLD AUTO: 0 %
EOSINOPHIL # BLD AUTO: 0.4 10E3/UL (ref 0–0.7)
EOSINOPHIL NFR BLD AUTO: 5 %
ERYTHROCYTE [DISTWIDTH] IN BLOOD BY AUTOMATED COUNT: 13.6 % (ref 10–15)
HCT VFR BLD AUTO: 46.5 % (ref 35–47)
HGB BLD-MCNC: 15.4 G/DL (ref 11.7–15.7)
LYMPHOCYTES # BLD AUTO: 2.7 10E3/UL (ref 0.8–5.3)
LYMPHOCYTES NFR BLD AUTO: 29 %
MCH RBC QN AUTO: 30.6 PG (ref 26.5–33)
MCHC RBC AUTO-ENTMCNC: 33.1 G/DL (ref 31.5–36.5)
MCV RBC AUTO: 92 FL (ref 78–100)
MONOCYTES # BLD AUTO: 0.8 10E3/UL (ref 0–1.3)
MONOCYTES NFR BLD AUTO: 8 %
NEUTROPHILS # BLD AUTO: 5.5 10E3/UL (ref 1.6–8.3)
NEUTROPHILS NFR BLD AUTO: 58 %
PLATELET # BLD AUTO: 278 10E3/UL (ref 150–450)
RBC # BLD AUTO: 5.03 10E6/UL (ref 3.8–5.2)
RETICS # AUTO: 0.09 10E6/UL (ref 0.03–0.1)
RETICS/RBC NFR AUTO: 1.7 % (ref 0.5–2)
WBC # BLD AUTO: 9.4 10E3/UL (ref 4–11)

## 2022-12-30 PROCEDURE — 85025 COMPLETE CBC W/AUTO DIFF WBC: CPT

## 2022-12-30 PROCEDURE — 36415 COLL VENOUS BLD VENIPUNCTURE: CPT

## 2022-12-30 PROCEDURE — 85060 BLOOD SMEAR INTERPRETATION: CPT | Performed by: PATHOLOGY

## 2022-12-30 PROCEDURE — 85045 AUTOMATED RETICULOCYTE COUNT: CPT

## 2023-01-02 DIAGNOSIS — Z79.4 TYPE 2 DIABETES MELLITUS WITH HYPERGLYCEMIA, WITH LONG-TERM CURRENT USE OF INSULIN (H): ICD-10-CM

## 2023-01-02 DIAGNOSIS — E11.65 TYPE 2 DIABETES MELLITUS WITH HYPERGLYCEMIA, WITH LONG-TERM CURRENT USE OF INSULIN (H): ICD-10-CM

## 2023-01-02 DIAGNOSIS — E11.65 TYPE 2 DIABETES MELLITUS WITH HYPERGLYCEMIA (H): ICD-10-CM

## 2023-01-02 LAB
PATH REPORT.COMMENTS IMP SPEC: NORMAL
PATH REPORT.FINAL DX SPEC: NORMAL
PATH REPORT.MICROSCOPIC SPEC OTHER STN: NORMAL
PATH REPORT.MICROSCOPIC SPEC OTHER STN: NORMAL
PATH REPORT.RELEVANT HX SPEC: NORMAL

## 2023-01-02 RX ORDER — LANCETS 33 GAUGE
EACH MISCELLANEOUS
Qty: 100 EACH | Refills: 0 | Status: SHIPPED | OUTPATIENT
Start: 2023-01-02 | End: 2023-06-02

## 2023-01-03 ENCOUNTER — TRANSFERRED RECORDS (OUTPATIENT)
Dept: HEALTH INFORMATION MANAGEMENT | Facility: CLINIC | Age: 53
End: 2023-01-03

## 2023-01-03 ENCOUNTER — THERAPY VISIT (OUTPATIENT)
Dept: PHYSICAL THERAPY | Facility: CLINIC | Age: 53
End: 2023-01-03
Attending: NURSE PRACTITIONER
Payer: COMMERCIAL

## 2023-01-03 DIAGNOSIS — G89.29 CHRONIC LOW BACK PAIN WITHOUT SCIATICA, UNSPECIFIED BACK PAIN LATERALITY: Primary | Chronic | ICD-10-CM

## 2023-01-03 DIAGNOSIS — M54.50 CHRONIC LOW BACK PAIN WITHOUT SCIATICA, UNSPECIFIED BACK PAIN LATERALITY: Primary | Chronic | ICD-10-CM

## 2023-01-03 PROCEDURE — 97110 THERAPEUTIC EXERCISES: CPT | Mod: GP | Performed by: PHYSICAL THERAPIST

## 2023-01-04 ENCOUNTER — ANCILLARY PROCEDURE (OUTPATIENT)
Dept: GENERAL RADIOLOGY | Facility: CLINIC | Age: 53
End: 2023-01-04
Attending: FAMILY MEDICINE
Payer: COMMERCIAL

## 2023-01-04 ENCOUNTER — OFFICE VISIT (OUTPATIENT)
Dept: FAMILY MEDICINE | Facility: CLINIC | Age: 53
End: 2023-01-04
Payer: COMMERCIAL

## 2023-01-04 ENCOUNTER — TRANSFERRED RECORDS (OUTPATIENT)
Dept: HEALTH INFORMATION MANAGEMENT | Facility: CLINIC | Age: 53
End: 2023-01-04

## 2023-01-04 VITALS
DIASTOLIC BLOOD PRESSURE: 84 MMHG | TEMPERATURE: 98.8 F | HEART RATE: 74 BPM | OXYGEN SATURATION: 95 % | WEIGHT: 256 LBS | BODY MASS INDEX: 43.71 KG/M2 | HEIGHT: 64 IN | SYSTOLIC BLOOD PRESSURE: 123 MMHG

## 2023-01-04 DIAGNOSIS — M25.511 ACUTE PAIN OF RIGHT SHOULDER: ICD-10-CM

## 2023-01-04 DIAGNOSIS — S66.911A WRIST STRAIN, RIGHT, INITIAL ENCOUNTER: ICD-10-CM

## 2023-01-04 DIAGNOSIS — S69.91XA FINGER INJURY, RIGHT, INITIAL ENCOUNTER: Primary | ICD-10-CM

## 2023-01-04 DIAGNOSIS — I10 ESSENTIAL HYPERTENSION WITH GOAL BLOOD PRESSURE LESS THAN 140/90: Chronic | ICD-10-CM

## 2023-01-04 DIAGNOSIS — S63.639A JERSEY FINGER, INITIAL ENCOUNTER: ICD-10-CM

## 2023-01-04 DIAGNOSIS — S69.91XA FINGER INJURY, RIGHT, INITIAL ENCOUNTER: ICD-10-CM

## 2023-01-04 PROBLEM — M72.2 PLANTAR FASCIITIS: Status: ACTIVE | Noted: 2019-07-24

## 2023-01-04 PROBLEM — K46.9 ABDOMINAL HERNIA: Status: RESOLVED | Noted: 2022-10-12 | Resolved: 2023-01-04

## 2023-01-04 PROBLEM — E78.5 HYPERLIPIDEMIA LDL GOAL <100: Status: ACTIVE | Noted: 2021-05-13

## 2023-01-04 PROBLEM — F51.04 CHRONIC INSOMNIA: Status: ACTIVE | Noted: 2021-08-31

## 2023-01-04 PROBLEM — N90.4 LICHEN SCLEROSUS ET ATROPHICUS OF THE VULVA: Status: ACTIVE | Noted: 2020-02-19

## 2023-01-04 PROBLEM — H02.403 INVOLUTIONAL PTOSIS, ACQUIRED, BILATERAL: Status: RESOLVED | Noted: 2019-12-11 | Resolved: 2023-01-04

## 2023-01-04 PROBLEM — K58.0 IRRITABLE BOWEL SYNDROME WITH DIARRHEA: Status: RESOLVED | Noted: 2022-10-12 | Resolved: 2023-01-04

## 2023-01-04 PROBLEM — K62.5 HEMORRHAGE OF RECTUM AND ANUS: Status: RESOLVED | Noted: 2021-04-01 | Resolved: 2023-01-04

## 2023-01-04 PROBLEM — H02.831 DERMATOCHALASIS OF BOTH UPPER EYELIDS: Status: RESOLVED | Noted: 2019-12-11 | Resolved: 2023-01-04

## 2023-01-04 PROBLEM — H02.834 DERMATOCHALASIS OF BOTH UPPER EYELIDS: Status: RESOLVED | Noted: 2019-12-11 | Resolved: 2023-01-04

## 2023-01-04 PROBLEM — H57.813 BROW PTOSIS, BILATERAL: Status: RESOLVED | Noted: 2019-12-11 | Resolved: 2023-01-04

## 2023-01-04 PROBLEM — D84.9 IMMUNOSUPPRESSED STATUS (H): Status: ACTIVE | Noted: 2018-08-24

## 2023-01-04 PROBLEM — R10.2 FEMALE PELVIC PAIN: Status: RESOLVED | Noted: 2019-07-24 | Resolved: 2023-01-04

## 2023-01-04 PROCEDURE — 99214 OFFICE O/P EST MOD 30 MIN: CPT | Performed by: FAMILY MEDICINE

## 2023-01-04 PROCEDURE — 73140 X-RAY EXAM OF FINGER(S): CPT | Mod: TC | Performed by: RADIOLOGY

## 2023-01-04 RX ORDER — LANCETS 33 GAUGE
EACH MISCELLANEOUS
Qty: 100 EACH | Refills: 0 | OUTPATIENT
Start: 2023-01-04

## 2023-01-04 NOTE — PROGRESS NOTES
Assessment & Plan     (S69.91XA) Finger injury, right, initial encounter  (primary encounter diagnosis)  (S63.639A) [place] finger, initial encounter  Plan: Orthopedic  Referral        Patient was given a finger splint to use for comfort as needed; rest, ice, elevation, over-the-counter pain medication as needed     (S66.911A) Wrist strain, right, initial encounter  (M25.511) Acute pain of right shoulder  Plan: Over the counter pain medication as needed, ice or heat as she finds helpful, avoidance of aggravating activities, and return for persistence for consideration of further imaging or referral.     (I10) Essential hypertension with goal blood pressure less than 140/90  Comment: Well controlled with medications without side effects.                See Patient Instructions    Return in about 1 month (around 2/4/2023) for diabetes, blood pressure.    Anh Tamez MD  [facility] KEISHA Meneses is a 52 year old, presenting for the following health issues:  Hand Injury (Right hand ring finger. Fall while snow blowing driveway. Had went into the shoot of the . )      HPI     Concern - Ring finger right hand  Onset: This afternoon  Description: Fell and had went into .  Intensity: mild, moderate  Progression of Symptoms:  worsening  Accompanying Signs & Symptoms: Fall  Previous history of similar problem: NA  Precipitating factors:        Worsened by: None  Alleviating factors:        Improved by:   Therapies tried and outcome: Tylenol        Review of Systems   CONSTITUTIONAL: NEGATIVE for fever, chills, change in weight  INTEGUMENTARY/SKIN: bruising and small abrasion of right 4th finger   ENT/MOUTH: NEGATIVE for ear, mouth and throat problems  RESP: NEGATIVE for significant cough or SOB  MUSCULOSKELETAL: swelling of right 3rd finger   NEURO: weakness of right 3rd and 4th fingers - poor flexion   ENDOCRINE: Hx diabetes      Objective    BP (!) 139/91  "(BP Location: Left arm, Patient Position: Sitting, Cuff Size: Adult Large)   Pulse 74   Temp 98.8  F (37.1  C) (Oral)   Ht 1.626 m (5' 4\")   Wt 116.1 kg (256 lb)   SpO2 95%   BMI 43.94 kg/m    Body mass index is 43.94 kg/m .  Physical Exam   GENERAL: alert, no distress and obese  MS: right 4th finger is mildly swollen distally   SKIN: bruising and small superficial abrasion of right distal 4th finger   NEURO: weakness of 3rd and 4th fingers and sensory deficit of right 3rd finger   PSYCH: mentation appears normal, affect normal/bright    Xray - no fracture - formal reading pending                 "

## 2023-01-05 DIAGNOSIS — I48.0 PAF (PAROXYSMAL ATRIAL FIBRILLATION) (H): ICD-10-CM

## 2023-01-05 NOTE — TELEPHONE ENCOUNTER
DIAGNOSIS: Finger injury, right   APPOINTMENT DATE: 01/09/2023   NOTES STATUS DETAILS   OFFICE NOTE from referring provider Internal 01/04/2023 Dr Tamez [facility]    OFFICE NOTE from other specialist N/A    DISCHARGE SUMMARY from hospital N/A    DISCHARGE REPORT from the ER N/A    OPERATIVE REPORT N/A    MEDICATION LIST N/A    EMG (for Spine) N/A    IMPLANT RECORD/STICKER N/A    LABS     CBC/DIFF N/A    CULTURES N/A    INJECTIONS DONE IN RADIOLOGY N/A    MRI N/A    CT SCAN N/A    XRAYS (IMAGES & REPORTS) Internal 01/04/2023 RT finger   TUMOR     PATHOLOGY  Slides & report N/A

## 2023-01-06 RX ORDER — APIXABAN 5 MG/1
TABLET, FILM COATED ORAL
Qty: 60 TABLET | Refills: 4 | Status: SHIPPED | OUTPATIENT
Start: 2023-01-06 | End: 2023-05-31

## 2023-01-09 ENCOUNTER — PRE VISIT (OUTPATIENT)
Dept: ORTHOPEDICS | Facility: CLINIC | Age: 53
End: 2023-01-09

## 2023-01-10 ENCOUNTER — OFFICE VISIT (OUTPATIENT)
Dept: ORTHOPEDICS | Facility: CLINIC | Age: 53
End: 2023-01-10
Attending: FAMILY MEDICINE
Payer: COMMERCIAL

## 2023-01-10 DIAGNOSIS — S63.639A JERSEY FINGER, INITIAL ENCOUNTER: ICD-10-CM

## 2023-01-10 DIAGNOSIS — S69.91XA FINGER INJURY, RIGHT, INITIAL ENCOUNTER: ICD-10-CM

## 2023-01-10 PROCEDURE — 99203 OFFICE O/P NEW LOW 30 MIN: CPT | Performed by: PLASTIC SURGERY

## 2023-01-10 ASSESSMENT — PAIN SCALES - GENERAL: PAINLEVEL: MODERATE PAIN (5)

## 2023-01-10 NOTE — NURSING NOTE
Chief Complaint   Patient presents with     Right Hand - Pain, New Patient     Right hand 4th finger and possible 3rd finger       There were no vitals filed for this visit.    There is no height or weight on file to calculate BMI.      CORINA Oates NREMT

## 2023-01-10 NOTE — LETTER
1/10/2023         RE: Zaira Villatoro  [address] [id number]        Dear Colleague,    Thank you for referring your patient, Zaira Villatoro, to the [facility]. Please see a copy of my visit note below.    REFERRING PROVIDER:  Anh Tamez MD    PRESENTING COMPLAINT:  Consultation for injury to her right long and ring fingers.    HISTORY OF PRESENTING COMPLAINT:  Ms. Villatoro is 52 years old.  She is right-hand dominant.  About a week ago while snow blowing, she placed her hand in the  and it got hit.  There was a very small laceration, but obviously injured her 2 fingers that got quite bruised.  She was seen at an outside facility where x-rays showed no fractures or dislocations and nothing really need to be done for the skin laceration.  She is here because she continues to have difficulty flexing the fingers.    PAST MEDICAL HISTORY:  Diabetes, atrial fibrillation, osteoarthritis.    PAST SURGICAL HISTORY:  Colostomy, sinus surgery, and knee surgery.    MEDICATIONS:  Eliquis, atenolol, losartan, Jardiance, insulin, semaglutide.    ALLERGIES:  Demerol, meperidine, metformin, Nubain, topiramate, latex.    SOCIAL HISTORY:  Does not smoke.  Drinks socially.  Lives in [place].  Works in an office.    REVIEW OF SYSTEMS:  Denies chest pain, shortness of breath, MI, CVA, DVT and PE.    PHYSICAL EXAMINATION:  Vital signs are stable.  She is afebrile, in no obvious distress.  On examination of her right long and ring fingers, she has some bruising and swelling, stable fingers.  Able to flex her hand, not fully those 2 fingers.    ASSESSMENT AND PLAN:  Based upon the above findings, a diagnosis of swollen injured fingers, inability to fully flex due to swelling, no fracture or dislocation was made.  I think at this time, just giving it time, I think the swelling and bruising will go away.  Continue with hand range-of-motion exercises is all that is needed.   I will see her back in 2-3 weeks to monitor her progress.  Reassured her.  All her questions were answered.  She was happy with the visit.  All exam and discussion done in presence of my resident, Red Tapia.    Total time spent in the encounter today, including chart review, visit itself and post-visit paperwork, was 30 minutes.    NURIS Mann MD    cc:  [place] [address]          Again, thank you for allowing me to participate in the care of your patient.        Sincerely,        PRESLEY Mann MD

## 2023-01-11 NOTE — PROGRESS NOTES
REFERRING PROVIDER:  Anh Tamez MD    PRESENTING COMPLAINT:  Consultation for injury to her right long and ring fingers.    HISTORY OF PRESENTING COMPLAINT:  Ms. Villatoro is 52 years old.  She is right-hand dominant.  About a week ago while snow blowing, she placed her hand in the  and it got hit.  There was a very small laceration, but obviously injured her 2 fingers that got quite bruised.  She was seen at an outside facility where x-rays showed no fractures or dislocations and nothing really need to be done for the skin laceration.  She is here because she continues to have difficulty flexing the fingers.    PAST MEDICAL HISTORY:  Diabetes, atrial fibrillation, osteoarthritis.    PAST SURGICAL HISTORY:  Colostomy, sinus surgery, and knee surgery.    MEDICATIONS:  Eliquis, atenolol, losartan, Jardiance, insulin, semaglutide.    ALLERGIES:  Demerol, meperidine, metformin, Nubain, topiramate, latex.    SOCIAL HISTORY:  Does not smoke.  Drinks socially.  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    REVIEW OF SYSTEMS:  Denies chest pain, shortness of breath, MI, CVA, DVT and PE.    PHYSICAL EXAMINATION:  Vital signs are stable.  She is afebrile, in no obvious distress.  On examination of her right long and ring fingers, she has some bruising and swelling, stable fingers.  Able to flex her hand, not fully those 2 fingers.    ASSESSMENT AND PLAN:  Based upon the above findings, a diagnosis of swollen injured fingers, inability to fully flex due to swelling, no fracture or dislocation was made.  I think at this time, just giving it time, I think the swelling and bruising will go away.  Continue with hand range-of-motion exercises is all that is needed.  I will see her back in 2-3 weeks to monitor her progress.  Reassured her.  All her questions were answered.  She was happy with the visit.  All exam and discussion done in presence of my resident, Red Tapia.    Total time spent in the encounter today,  including chart review, visit itself and post-visit paperwork, was 30 minutes.    NURIS Mann MD    cc:  [place] [address]

## 2023-01-26 ENCOUNTER — THERAPY VISIT (OUTPATIENT)
Dept: PHYSICAL THERAPY | Facility: CLINIC | Age: 53
End: 2023-01-26
Payer: COMMERCIAL

## 2023-01-26 DIAGNOSIS — M54.59 MECHANICAL LOW BACK PAIN: Primary | ICD-10-CM

## 2023-01-26 PROCEDURE — 97110 THERAPEUTIC EXERCISES: CPT | Mod: GP | Performed by: PHYSICAL THERAPIST

## 2023-02-12 ENCOUNTER — NURSE TRIAGE (OUTPATIENT)
Dept: NURSING | Facility: CLINIC | Age: 53
End: 2023-02-12
Payer: COMMERCIAL

## 2023-02-12 ENCOUNTER — NURSE TRIAGE (OUTPATIENT)
Dept: NURSING | Facility: CLINIC | Age: 53
End: 2023-02-12

## 2023-02-12 ENCOUNTER — OFFICE VISIT (OUTPATIENT)
Dept: URGENT CARE | Facility: URGENT CARE | Age: 53
End: 2023-02-12
Payer: COMMERCIAL

## 2023-02-12 VITALS
BODY MASS INDEX: 43.6 KG/M2 | WEIGHT: 254 LBS | TEMPERATURE: 97.8 F | SYSTOLIC BLOOD PRESSURE: 132 MMHG | OXYGEN SATURATION: 96 % | DIASTOLIC BLOOD PRESSURE: 82 MMHG | HEART RATE: 71 BPM

## 2023-02-12 DIAGNOSIS — K08.89 PAIN, DENTAL: Primary | ICD-10-CM

## 2023-02-12 PROCEDURE — 99214 OFFICE O/P EST MOD 30 MIN: CPT | Performed by: FAMILY MEDICINE

## 2023-02-12 RX ORDER — HYDROCODONE BITARTRATE AND ACETAMINOPHEN 5; 325 MG/1; MG/1
1 TABLET ORAL EVERY 8 HOURS PRN
Qty: 10 TABLET | Refills: 0 | Status: SHIPPED | OUTPATIENT
Start: 2023-02-12 | End: 2023-02-15

## 2023-02-12 NOTE — TELEPHONE ENCOUNTER
"Pt is phoning stating that she has toothache - upper left side 2nd tooth from the back     Pt states that pain is radiating up her neck to her left ear and around to the back of her head     No fever     Rates pain 8/10    Per disposition: See HCP Within 4 Hours (Or PCP Triage)    Pt is going to [facility] for evaluation     Care advice given per protocol and when to call back. Pt verbalized understanding and agrees to plan of care.    Silvia Cruz RN  [place] Nurse Advisor  11:34 AM 2/12/2023        Reason for Disposition    [1] SEVERE pain (e.g., excruciating, unable to do any normal activities) AND [2] not improved 2 hours after pain medicine    Additional Information    Negative: Shock suspected (e.g., cold/pale/clammy skin, too weak to stand, low BP, rapid pulse)    Negative: [1] Similar pain previously AND [2] it was from \"heart attack\"    Negative: [1] Similar pain previously AND [2] it was from \"angina\" AND [3] not relieved by nitroglycerin    Negative: Sounds like a life-threatening emergency to the triager    Negative: Chest pain    Negative: Toothache followed tooth injury    Negative: Toothache or mouth pain after tooth extraction    Negative: Canker sore (i.e., aphthous ulcer)    Negative: Tongue is very swollen and tender    Negative: [1] Face is swollen AND [2] fever    Negative: Patient sounds very sick or weak to the triager    Protocols used: TOOTHACHE-A-AH      "

## 2023-02-12 NOTE — PROGRESS NOTES
Chief complaint: tooth pain     Called the dentist and patient can't get in until next week   Called them back and she is now on schedule for 2 days from now  Pain is moderate to severe   Patient has been alternating tylenol and ibuprofen without much avail  Patient able to eat and drink and swallow.   Has tried OTC medications     Problem list, Medication list, Allergies, and Medical/Social/Surgical histories reviewed in [facility] and updated as appropriate.      ROS:  Constitutional: NO fevers  ENT: as above  No fevers or chills chest pain or shortness of breath      OBJECTIVE:   Blood pressure 132/82, pulse 71, temperature 97.8  F (36.6  C), temperature source Tympanic, weight 115.2 kg (254 lb), SpO2 96 %, not currently breastfeeding.  Eye exam : conjunctiva clear.  ENT: normal. No TMJ tenderness  Mouth:  Airway intact.  Dental carries left upper molars with gum swelling and tenderness no trismus.   NECK:  The neck is supple. No neck swelling. No extension beyond the jaw    ASSESSMENT:      ICD-10-CM    1. Pain, dental  K08.89 amoxicillin-clavulanate (AUGMENTIN) 875-125 MG tablet     HYDROcodone-acetaminophen (NORCO) 5-325 MG tablet            PLAN:  Prescribed with augmentin  Side effects discussed warned about GI side effects and risk of cdiff.  Patient was looked up in [facility] and there are No concerns for controlled substance abuse.   Prescribed with above  Aware vicodin sedating and habit forming  Do not take with other sedating meds or alcohol.   Follow up with dentist  Adverse reactions to medications discussed  Aware to come back in if with worsening symptoms or concerns or no relief despite treatment plan  Please go to ER or come in to be seen immediately especially if with any difficulty swallowing or opening your mouth or worsening swelling.   Patient voiced understanding     Monie Marin MD

## 2023-02-13 NOTE — TELEPHONE ENCOUNTER
Zaira calls and says that she went to an  clinic today, for her toothache. Pt. Says that she was prescribed 2 antibiotics and pain medicine. Pt. Says that she took her Norco and wants to take her 1000 mg of Tylenol. Pt. Says that she does not want to overdose on Tylenol. RN then referred pt. To a [facility], for further assistance with her medication question. COVID 19 Nurse Triage Plan/Patient Instructions    Please be aware that novel coronavirus (COVID-19) may be circulating in the community. If you develop symptoms such as fever, cough, or SOB or if you have concerns about the presence of another infection including coronavirus (COVID-19), please contact your health care provider or visit [email or web address].     Disposition/Instructions    Virtual Visit with provider recommended. Reference Visit Selection Guide.    Thank you for taking steps to prevent the spread of this virus.  o Limit your contact with others.  o Wear a simple mask to cover your cough.  o Wash your hands well and often.    Resources    [facility]: About COVID-19: [email or web address]    CDC: What to Do If You're Sick: www.cdc.gov/coronavirus/2019-ncov/about/steps-when-sick.html    CDC: Ending Home Isolation: www.cdc.gov/coronavirus/2019-ncov/hcp/disposition-in-home-patients.html     CDC: Caring for Someone: www.cdc.gov/coronavirus/2019-ncov/if-you-are-sick/care-for-someone.html     [facility]: Interim Guidance for Hospital Discharge to Home: www.health.[facility].[place]/diseases/coronavirus/hcp/hospdischarge.pdf    [facility] clinical trials (COVID-19 research studies): [email or web address]     Below are the COVID-19 hotlines at the [place] Department of Health ([facility]). Interpreters are available.   o For health questions: Call 042-409-9867 or 1-347.179.8891 (7 a.m. to 7 p.m.)  o For questions about schools and childcare: Call 853-730-8854 or 1-831.614.4225 (7 a.m. to 7 p.m.)  "                    Reason for Disposition    Tooth is painful or swelling around a tooth    Toothache present > 24 hours    Additional Information    Negative: SEVERE difficulty breathing (e.g., struggling for each breath, speaks in single words, stridor)    Negative: Sounds like a life-threatening emergency to the triager    Negative: Followed a tooth (or teeth) injury    Negative: Followed a mouth injury    Negative: Throat is painful    Negative: Canker sore suspected (i.e., aphthous ulcer) in the mouth    Negative: Cold sore suspected (i.e., fever blister sore) on the outer lip    Negative: Shock suspected (e.g., cold/pale/clammy skin, too weak to stand, low BP, rapid pulse)    Negative: [1] Similar pain previously AND [2] it was from \"heart attack\"    Negative: [1] Similar pain previously AND [2] it was from \"angina\" AND [3] not relieved by nitroglycerin    Negative: Sounds like a life-threatening emergency to the triager    Negative: Chest pain    Negative: Toothache followed tooth injury    Negative: Toothache or mouth pain after tooth extraction    Negative: Canker sore (i.e., aphthous ulcer)    Negative: Tongue is very swollen and tender    Negative: [1] Face is swollen AND [2] fever    Negative: Patient sounds very sick or weak to the triager    Negative: [1] SEVERE pain (e.g., excruciating, unable to do any normal activities) AND [2] not improved 2 hours after pain medicine    Negative: Face is very swollen    Negative: Fever    Negative: [1] Face is swollen AND [2] no fever    Protocols used: MOUTH PAIN-A-AH, TOOTHACHE-A-AH      "

## 2023-02-18 ENCOUNTER — HEALTH MAINTENANCE LETTER (OUTPATIENT)
Age: 53
End: 2023-02-18

## 2023-02-27 ENCOUNTER — TRANSFERRED RECORDS (OUTPATIENT)
Dept: HEALTH INFORMATION MANAGEMENT | Facility: CLINIC | Age: 53
End: 2023-02-27
Payer: COMMERCIAL

## 2023-03-06 ENCOUNTER — TRANSFERRED RECORDS (OUTPATIENT)
Dept: HEALTH INFORMATION MANAGEMENT | Facility: CLINIC | Age: 53
End: 2023-03-06
Payer: COMMERCIAL

## 2023-03-23 ENCOUNTER — TRANSFERRED RECORDS (OUTPATIENT)
Dept: HEALTH INFORMATION MANAGEMENT | Facility: CLINIC | Age: 53
End: 2023-03-23
Payer: COMMERCIAL

## 2023-03-24 ENCOUNTER — TELEPHONE (OUTPATIENT)
Dept: FAMILY MEDICINE | Facility: CLINIC | Age: 53
End: 2023-03-24
Payer: COMMERCIAL

## 2023-03-24 NOTE — TELEPHONE ENCOUNTER
Patient Quality Outreach      Summary: Patient had a virtual visit with Dr. Marcano on 22 for Diabetes.     Patient has the following on her problem list/HM:   Diabetes    Last A1C:  Lab Results   Component Value Date    A1C 7.6 10/12/2022    A1C 8.0 2022    A1C 7.7 2021    A1C 7.8 2020       Last LDL:    Lab Results   Component Value Date     10/12/2022     2021       Is the patient on a Statin? No          Is the patient on Aspirin? No    Medications     HMG CoA Reductase Inhibitors     STATIN NOT PRESCRIBED, INTENTIONAL,             Last three blood pressure readings:  BP Readings from Last 3 Encounters:   23 132/82   23 123/84   22 122/72          Tobacco Use      Smoking status: Former        Packs/day: 1.00        Years: 15.00        Pack years: 15        Types: Cigarettes        Start date: 1985        Quit date: 1998        Years since quittin.7      Smokeless tobacco: Never      Tobacco comments: soke free household.          Patient is due/failing the following:   Physical Preventive Adult Physical    Type of outreach:    Sent [employer] message.    Questions for provider review:    None                                                                                                                                     Keisha Spencer MA        Chart routed to self.

## 2023-04-04 ENCOUNTER — LAB (OUTPATIENT)
Dept: LAB | Facility: CLINIC | Age: 53
End: 2023-04-04
Payer: COMMERCIAL

## 2023-04-04 ENCOUNTER — OFFICE VISIT (OUTPATIENT)
Dept: FAMILY MEDICINE | Facility: CLINIC | Age: 53
End: 2023-04-04
Payer: COMMERCIAL

## 2023-04-04 ENCOUNTER — NURSE TRIAGE (OUTPATIENT)
Dept: FAMILY MEDICINE | Facility: CLINIC | Age: 53
End: 2023-04-04

## 2023-04-04 VITALS
BODY MASS INDEX: 43.46 KG/M2 | SYSTOLIC BLOOD PRESSURE: 123 MMHG | WEIGHT: 254.6 LBS | HEART RATE: 74 BPM | HEIGHT: 64 IN | DIASTOLIC BLOOD PRESSURE: 77 MMHG | OXYGEN SATURATION: 99 % | TEMPERATURE: 97.7 F

## 2023-04-04 DIAGNOSIS — H10.13 ALLERGIC CONJUNCTIVITIS, BILATERAL: Primary | ICD-10-CM

## 2023-04-04 DIAGNOSIS — Z79.4 TYPE 2 DIABETES MELLITUS WITH HYPERGLYCEMIA, WITH LONG-TERM CURRENT USE OF INSULIN (H): Chronic | ICD-10-CM

## 2023-04-04 DIAGNOSIS — E11.65 TYPE 2 DIABETES MELLITUS WITH HYPERGLYCEMIA, WITH LONG-TERM CURRENT USE OF INSULIN (H): Chronic | ICD-10-CM

## 2023-04-04 LAB — HBA1C MFR BLD: 7.5 % (ref 0–5.6)

## 2023-04-04 PROCEDURE — 36415 COLL VENOUS BLD VENIPUNCTURE: CPT

## 2023-04-04 PROCEDURE — 99213 OFFICE O/P EST LOW 20 MIN: CPT | Performed by: FAMILY MEDICINE

## 2023-04-04 PROCEDURE — 83036 HEMOGLOBIN GLYCOSYLATED A1C: CPT

## 2023-04-04 RX ORDER — OLOPATADINE HYDROCHLORIDE 1 MG/ML
1 SOLUTION/ DROPS OPHTHALMIC 2 TIMES DAILY
Qty: 5 ML | Refills: 0 | Status: SHIPPED | OUTPATIENT
Start: 2023-04-04 | End: 2023-05-01

## 2023-04-04 ASSESSMENT — ENCOUNTER SYMPTOMS: EYE DISCHARGE: 1

## 2023-04-04 NOTE — TELEPHONE ENCOUNTER
Reason for Call:  Appointment Request    Patient requesting this type of appt:  Office visit     Requested provider: Zaira Marcano    Reason patient unable to be scheduled: Not within requested timeframe    When does patient want to be seen/preferred time: Same day    Comments: Patient has weepy eye. Patient declined virtual and e-visits. Wants to see someone at the [facility] today.    Could we send this information to you in [employer] or would you prefer to receive a phone call?:   Patient would prefer a phone call   Okay to leave a detailed message?: Yes at Cell number on file:    Telephone Information:   Mobile [phone]       Call taken on 4/4/2023 at 7:11 AM by Lakeshia Izquierdo

## 2023-04-04 NOTE — TELEPHONE ENCOUNTER
"Spoke with patient. She has currently been experiencing a weepy eye that has been going on for the past 2 weeks. She describes there is a clear fluid that comes out most of the time.    She describes has much drainage that it looks like a black eye from redness.    Patient denies pain on eyeball, however surrounding skin around eye is tender. She describes she does not notice pain when she is not touching her skin. Skin around eyes are not swollen.     When waking up in the morning, patient states she has \"goop\" in her eye which makes it blurry. Discharge is white with tint of yellow.     Patient denies fever, headache, nasal discharge, facial rash.     COVID negative.     Per protocol, patient should be seen in office today. Assisted with booking appointment at  clinic today at 4:20 pm. Patient requested assistance with booking lab appointment, assisted with booking lab appointment at 4:00 pm.    SOHAM Mann RN  [facility]    Reason for Disposition    Patient wants to be seen    Additional Information    Negative: Followed an eye injury    Negative: Eye pain from chemical in the eye    Negative: Eye pain from foreign body in eye    Negative: Has sinus pain or pressure    Negative: SEVERE eye pain    Negative: Complete loss of vision in one or both eyes    Negative: Eyelids are very swollen (shut or almost) and fever    Negative: Eyelid (outer) is very red and fever    Negative: Foreign body sensation ('feels like something is in there') and irrigation didn't help    Negative: Vomiting    Negative: Ulcer or sore seen on the cornea (clear center part of the eye)    Negative: Recent eye surgery and increasing eye pain    Negative: Blurred vision and new or worsening    Negative: Patient sounds very sick or weak to the triager    Negative: MODERATE eye pain or discomfort (e.g., interferes with normal activities or awakens from sleep; more than mild)    Negative: Looking at light causes " MODERATE to SEVERE eye pain (i.e., photophobia)    Negative: Eyelids are very swollen (shut or almost) and no fever    Negative: Painful rash near eye and multiple small blisters grouped together    Negative: Pain followed bright light exposure from welding    Negative: Yellow or green pus occurs    Negative: Eye pain present > 24 hours    Protocols used: EYE PAIN AND OTHER SYMPTOMS-A-OH

## 2023-04-04 NOTE — PROGRESS NOTES
"  Assessment & Plan     Allergic conjunctivitis, bilateral  Advised use claritin otc  Follow up if not better  - olopatadine (PATANOL) 0.1 % ophthalmic solution; Place 1 drop into both eyes 2 times daily  Follow up 1 week if not better/sooner if worse    Julia Carey MD  [facility] KEISHA Meneses is a 53 year old, presenting for the following health issues:  Eye Problem (Started with watery eyes then in the last 4-5 days wake up with crusted eyes and the skin around the eyes feel painful to the touch. Eyes symptoms x 2 weeks)        4/4/2023     3:55 PM   Additional Questions   Roomed by Kristie GRUBBS CMA         4/4/2023     3:55 PM   Patient Reported Additional Medications   Patient reports taking the following new medications none     Eye Problem   Associated symptoms include discharge.   History of Present Illness       Reason for visit:  Eye irritation  Symptom onset:  1-2 weeks ago  Symptoms include:  Sore red and weeping  Symptom intensity:  Moderate  Had these symptoms before:  No    She eats 2-3 servings of fruits and vegetables daily.She consumes 0 sweetened beverage(s) daily.She exercises with enough effort to increase her heart rate 10 to 19 minutes per day.  She exercises with enough effort to increase her heart rate 4 days per week.   She is taking medications regularly.     Eyes are teary had some erythema  Around her eyes which has resoved  They have been teary  No runny nose   Has a cat   No known allergies  No vision Problems  Has seen Dr Taylor  No glaucoma history    Review of Systems   Eyes: Positive for discharge.      CONSTITUTIONAL: NEGATIVE for fever, chills, change in weight  EYES: as above  ENT/MOUTH: NEGATIVE for ear, mouth and throat problems  RESP: NEGATIVE for significant cough or SOB      Objective    /77   Pulse 74   Temp 97.7  F (36.5  C) (Temporal)   Ht 1.626 m (5' 4.02\")   Wt 115.5 kg (254 lb 9.6 oz)   SpO2 99%   BMI 43.68 kg/m    Body mass " index is 43.68 kg/m .  Physical Exam   GENERAL: healthy, alert and no distress  EYES: Eyes grossly normal to inspection, PERRL and conjunctivae and sclerae normal  Mild  Tears  No matting  HENT: normal cephalic/atraumatic, nose and mouth without ulcers or lesions, oropharynx clear and oral mucous membranes moist  NECK: no adenopathy, no asymmetry, masses, or scars and thyroid normal to palpation

## 2023-04-12 ENCOUNTER — OFFICE VISIT (OUTPATIENT)
Dept: FAMILY MEDICINE | Facility: CLINIC | Age: 53
End: 2023-04-12
Payer: COMMERCIAL

## 2023-04-12 VITALS
SYSTOLIC BLOOD PRESSURE: 108 MMHG | OXYGEN SATURATION: 98 % | WEIGHT: 253.4 LBS | HEART RATE: 70 BPM | HEIGHT: 64 IN | TEMPERATURE: 97.4 F | DIASTOLIC BLOOD PRESSURE: 72 MMHG | BODY MASS INDEX: 43.26 KG/M2 | RESPIRATION RATE: 16 BRPM

## 2023-04-12 DIAGNOSIS — Z00.00 ROUTINE GENERAL MEDICAL EXAMINATION AT A HEALTH CARE FACILITY: Primary | ICD-10-CM

## 2023-04-12 PROCEDURE — 36415 COLL VENOUS BLD VENIPUNCTURE: CPT | Performed by: STUDENT IN AN ORGANIZED HEALTH CARE EDUCATION/TRAINING PROGRAM

## 2023-04-12 PROCEDURE — 99396 PREV VISIT EST AGE 40-64: CPT | Performed by: STUDENT IN AN ORGANIZED HEALTH CARE EDUCATION/TRAINING PROGRAM

## 2023-04-12 PROCEDURE — 80053 COMPREHEN METABOLIC PANEL: CPT | Performed by: STUDENT IN AN ORGANIZED HEALTH CARE EDUCATION/TRAINING PROGRAM

## 2023-04-12 ASSESSMENT — ENCOUNTER SYMPTOMS
FEVER: 0
SHORTNESS OF BREATH: 0
CONSTIPATION: 0
CHILLS: 0
MYALGIAS: 0
COUGH: 0
NERVOUS/ANXIOUS: 0
JOINT SWELLING: 0
ABDOMINAL PAIN: 0
NAUSEA: 0
ARTHRALGIAS: 0
WEAKNESS: 0
BREAST MASS: 0
DIZZINESS: 0
HEMATOCHEZIA: 0
SORE THROAT: 0
EYE PAIN: 0
DYSURIA: 0
PARESTHESIAS: 0
DIARRHEA: 0
HEMATURIA: 0
HEARTBURN: 0
FREQUENCY: 0
PALPITATIONS: 0
HEADACHES: 0

## 2023-04-12 NOTE — PROGRESS NOTES
SUBJECTIVE:   CC: Zaira is an 53 year old who presents for preventive health visit.  Patient has been advised of split billing requirements and indicates understanding: Yes  Healthy Habits:     Getting at least 3 servings of Calcium per day:  Yes    Bi-annual eye exam:  Yes    Dental care twice a year:  Yes    Sleep apnea or symptoms of sleep apnea:  Sleep apnea    Diet:  Diabetic, Carbohydrate counting and Other    Frequency of exercise:  2-3 days/week    Duration of exercise:  Less than 15 minutes    Taking medications regularly:  Yes    PHQ-2 Total Score: 0    Additional concerns today:  Yes              Today's PHQ-2 Score:       2023     9:21 AM   PHQ-2 (  [employer])   Q1: Little interest or pleasure in doing things 0   Q2: Feeling down, depressed or hopeless 0   PHQ-2 Score 0   Q1: Little interest or pleasure in doing things Not at all   Q2: Feeling down, depressed or hopeless Not at all   PHQ-2 Score 0       Have you ever done Advance Care Planning? (For example, a Health Directive, POLST, or a discussion with a medical provider or your loved ones about your wishes): No, advance care planning information given to patient to review.  Patient plans to discuss their wishes with loved ones or provider.      Social History     Tobacco Use     Smoking status: Former     Packs/day: 1.00     Years: 15.00     Pack years: 15.00     Types: Cigarettes     Start date: 1985     Quit date: 1998     Years since quittin.7     Smokeless tobacco: Never     Tobacco comments:     soke free household.   Vaping Use     Vaping status: Never Used   Substance Use Topics     Alcohol use: Yes     Alcohol/week: 0.0 standard drinks of alcohol     Comment: occ           2023     9:21 AM   Alcohol Use   Prescreen: >3 drinks/day or >7 drinks/week? No     Reviewed orders with patient.  Reviewed health maintenance and updated orders accordingly - Yes  Lab work is in process  Labs reviewed in EPIC    Breast Cancer  Screening:    FHS-7:       11/3/2021     9:52 AM 12/14/2022    11:07 AM 4/12/2023     9:25 AM   Breast CA Risk Assessment (FHS-7)   Did any of your first-degree relatives have breast or ovarian cancer? No No No   Did any of your relatives have bilateral breast cancer? No No Yes   Did any man in your family have breast cancer? No No No   Did any woman in your family have breast and ovarian cancer? No No Yes   Did any woman in your family have breast cancer before age 50 y? No No Yes   Do you have 2 or more relatives with breast and/or ovarian cancer? No No Yes   Do you have 2 or more relatives with breast and/or bowel cancer? No No Yes       Mammogram Screening: Recommended annual mammography  Pertinent mammograms are reviewed under the imaging tab.    History of abnormal Pap smear: NO - age 30-65 PAP every 5 years with negative HPV co-testing recommended      Latest Ref Rng & Units 9/28/2020    10:30 AM 9/28/2020    10:27 AM 11/4/2015    12:25 PM   PAP / HPV   PAP (Historical)   NIL      HPV 16 DNA NEG^Negative Negative    Negative     HPV 18 DNA NEG^Negative Negative    Negative     Other HR HPV NEG^Negative Negative    Negative       Reviewed and updated as needed this visit by clinical staff   Tobacco  Allergies  Meds              Reviewed and updated as needed this visit by Provider                     Review of Systems   Constitutional: Negative for chills and fever.   HENT: Positive for congestion and ear pain. Negative for hearing loss and sore throat.    Eyes: Negative for pain and visual disturbance.   Respiratory: Negative for cough and shortness of breath.    Cardiovascular: Negative for chest pain, palpitations and peripheral edema.   Gastrointestinal: Negative for abdominal pain, constipation, diarrhea, heartburn, hematochezia and nausea.   Breasts:  Negative for tenderness, breast mass and discharge.   Genitourinary: Positive for vaginal bleeding and vaginal discharge. Negative for dysuria,  "frequency, genital sores, hematuria, pelvic pain and urgency.   Musculoskeletal: Negative for arthralgias, joint swelling and myalgias.   Skin: Negative for rash.   Neurological: Negative for dizziness, weakness, headaches and paresthesias.   Psychiatric/Behavioral: Negative for mood changes. The patient is not nervous/anxious.           OBJECTIVE:   /72   Pulse 70   Temp 97.4  F (36.3  C) (Temporal)   Resp 16   Ht 1.626 m (5' 4.02\")   Wt 114.9 kg (253 lb 6.4 oz)   SpO2 98%   BMI 43.47 kg/m    Physical Exam  GENERAL: healthy, alert and no distress  EYES: Eyes grossly normal to inspection, PERRL and conjunctivae and sclerae normal  HENT: ear canals and TM's normal, nose and mouth without ulcers or lesions  NECK: no adenopathy, no asymmetry, masses, or scars and thyroid normal to palpation  RESP: lungs clear to auscultation - no rales, rhonchi or wheezes  CV: regular rate and rhythm, normal S1 S2, no S3 or S4, no murmur, click or rub, no peripheral edema and peripheral pulses strong  ABDOMEN: soft, nontender, no hepatosplenomegaly, no masses and bowel sounds normal  MS: no gross musculoskeletal defects noted, no edema  SKIN: no suspicious lesions or rashes  NEURO: Normal strength and tone, mentation intact and speech normal  PSYCH: mentation appears normal, affect normal/bright    Diagnostic Test Results:  Labs reviewed in Epic  No results found for any visits on 04/12/23.    ASSESSMENT/PLAN:   (Z00.00) Routine general medical examination at a health care facility  (primary encounter diagnosis)  Comment: Stable  Plan: Comprehensive metabolic panel (BMP + Alb, Alk         Phos, ALT, AST, Total. Bili, TP)        Pending labs              COUNSELING:  Reviewed preventive health counseling, as reflected in patient instructions      BMI:   Estimated body mass index is 43.47 kg/m  as calculated from the following:    Height as of this encounter: 1.626 m (5' 4.02\").    Weight as of this encounter: 114.9 kg (253 " lb 6.4 oz).   Weight management plan: Discussed healthy diet and exercise guidelines      She reports that she quit smoking about 24 years ago. Her smoking use included cigarettes. She started smoking about 38 years ago. She has a 15.00 pack-year smoking history. She has never used smokeless tobacco.          EMILY KUNZ MD  [facility]

## 2023-04-13 LAB
ALBUMIN SERPL-MCNC: 3.7 G/DL (ref 3.4–5)
ALP SERPL-CCNC: 57 U/L (ref 40–150)
ALT SERPL W P-5'-P-CCNC: 29 U/L (ref 0–50)
ANION GAP SERPL CALCULATED.3IONS-SCNC: 3 MMOL/L (ref 3–14)
AST SERPL W P-5'-P-CCNC: 18 U/L (ref 0–45)
BILIRUB SERPL-MCNC: 0.7 MG/DL (ref 0.2–1.3)
BUN SERPL-MCNC: 12 MG/DL (ref 7–30)
CALCIUM SERPL-MCNC: 9 MG/DL (ref 8.5–10.1)
CHLORIDE BLD-SCNC: 106 MMOL/L (ref 94–109)
CO2 SERPL-SCNC: 28 MMOL/L (ref 20–32)
CREAT SERPL-MCNC: 0.85 MG/DL (ref 0.52–1.04)
GFR SERPL CREATININE-BSD FRML MDRD: 82 ML/MIN/1.73M2
GLUCOSE BLD-MCNC: 138 MG/DL (ref 70–99)
POTASSIUM BLD-SCNC: 4.2 MMOL/L (ref 3.4–5.3)
PROT SERPL-MCNC: 7.3 G/DL (ref 6.8–8.8)
SODIUM SERPL-SCNC: 137 MMOL/L (ref 133–144)

## 2023-04-21 NOTE — PATIENT INSTRUCTIONS
Thank you for coming to the [facility] @ [place]; please note the following instructions:    1. Follow up in 1 year with Dr Miller - video visit.     2. Dr Miller would like you to wear a 2 week ziopatch monitor in 1 year, prior to your appointment.         If you have any questions regarding your visit please contact your care team:     Cardiology  Telephone Number   Vanda DUGANKat, RN  Nenita CONSTANTINO,RN  Jocelyn ESTRELLA, JONE PAK, MA  Quinn MACIEL, LPN   [phone]   (select option 1)    *After hours: [phone]     For scheduling appts:     [phone] or    [phone] (select option 1)    *After hours: [phone]     For the Device Clinic (Pacemakers and ICD's)  RN's :  Karen Brennan   During business hours: [phone]    *After business hours:  [phone] (select option 4)      Normal test result notifications will be released via [employer] or mailed within 7 business days.  All other test results, will be communicated via telephone once reviewed by your cardiologist.    If you need a medication refill please contact your pharmacy.  Please allow 3 business days for your refill to be completed.    As always, thank you for trusting us with your health care needs!    
180.34

## 2023-04-30 ENCOUNTER — MYC MEDICAL ADVICE (OUTPATIENT)
Dept: FAMILY MEDICINE | Facility: CLINIC | Age: 53
End: 2023-04-30
Payer: COMMERCIAL

## 2023-04-30 ENCOUNTER — NURSE TRIAGE (OUTPATIENT)
Dept: NURSING | Facility: CLINIC | Age: 53
End: 2023-04-30
Payer: COMMERCIAL

## 2023-04-30 DIAGNOSIS — Z79.4 TYPE 2 DIABETES MELLITUS WITH HYPERGLYCEMIA, WITH LONG-TERM CURRENT USE OF INSULIN (H): ICD-10-CM

## 2023-04-30 DIAGNOSIS — E11.65 TYPE 2 DIABETES MELLITUS WITH HYPERGLYCEMIA, WITH LONG-TERM CURRENT USE OF INSULIN (H): ICD-10-CM

## 2023-05-01 DIAGNOSIS — H10.13 ALLERGIC CONJUNCTIVITIS, BILATERAL: ICD-10-CM

## 2023-05-01 RX ORDER — INSULIN DETEMIR 100 [IU]/ML
70 INJECTION, SOLUTION SUBCUTANEOUS 2 TIMES DAILY
Qty: 135 ML | Refills: 0 | Status: SHIPPED | OUTPATIENT
Start: 2023-05-01 | End: 2023-10-19

## 2023-05-01 RX ORDER — OLOPATADINE HYDROCHLORIDE 1 MG/ML
SOLUTION/ DROPS OPHTHALMIC
Qty: 5 ML | Refills: 0 | Status: SHIPPED | OUTPATIENT
Start: 2023-05-01 | End: 2023-05-25

## 2023-05-01 NOTE — TELEPHONE ENCOUNTER
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 She had been taking care of him all week when he was ill.  They tested for COVID home tests negative yesterday and today. She has a sore throat=\"3-4\" and headache=\"3\", cough which began today. O2 sat=96% during call.   Hx A fib, diabetic, ulcerative colitis, overweight.  Appointment scheduled with [facility] on Thursday for infusion therapy. Advised to retest either Tues or Weds.  Call transferred to  to schedule appointment with PCP. (she wants to know if she does not have COVID if she might have a sinus infection or strep throat ?).     Matilda Díaz RN Triage Nurse Advisor 11:18 PM 4/30/2023  .   Reason for Disposition    [1] HIGH RISK for severe COVID complications (e.g., weak immune system, age > 64 years, obesity with BMI > 25, pregnant, chronic lung disease or other chronic medical condition) AND [2] COVID symptoms (e.g., cough, fever)  (Exceptions: Already seen by PCP and no new or worsening symptoms.)    Additional Information    Negative: SEVERE difficulty breathing (e.g., struggling for each breath, speaks in single words)    Negative: Difficult to awaken or acting confused (e.g., disoriented, slurred speech)    Negative: Bluish (or gray) lips or face now    Negative: Shock suspected (e.g., cold/pale/clammy skin, too weak to stand, low BP, rapid pulse)    Negative: Sounds like a life-threatening emergency to the triager    Negative: [1] Diagnosed or suspected COVID-19 AND [2] symptoms lasting 3 or more weeks    Negative: [1] COVID-19 exposure AND [2] no symptoms    Negative: COVID-19 vaccine reaction suspected (e.g., fever, headache, muscle aches) occurring 1 to 3 days after getting vaccine    Negative: COVID-19 vaccine, questions about    Negative: [1] Lives with someone known to have influenza (flu test positive) AND [2] flu-like symptoms (e.g., cough, runny nose, sore throat, SOB; with or without " fever)    Negative: [1] Adult with possible COVID-19 symptoms AND [2] triager concerned about severity of symptoms or other causes    Negative: COVID-19 and breastfeeding, questions about    Negative: SEVERE or constant chest pain or pressure  (Exception: Mild central chest pain, present only when coughing.)    Negative: MODERATE difficulty breathing (e.g., speaks in phrases, SOB even at rest, pulse 100-120)    Negative: [1] Headache AND [2] stiff neck (can't touch chin to chest)    Negative: Oxygen level (e.g., pulse oximetry) 90 percent or lower    Negative: Chest pain or pressure    Negative: Patient sounds very sick or weak to the triager    Negative: MILD difficulty breathing (e.g., minimal/no SOB at rest, SOB with walking, pulse <100)    Negative: Fever > 103 F (39.4 C)    Negative: [1] Fever > 101 F (38.3 C) AND [2] age > 60 years    Negative: [1] Fever > 100.0 F (37.8 C) AND [2] bedridden (e.g., nursing home patient, CVA, chronic illness, recovering from surgery)    Protocols used: CORONAVIRUS (COVID-19) DIAGNOSED OR [id number]

## 2023-05-02 ENCOUNTER — VIRTUAL VISIT (OUTPATIENT)
Dept: FAMILY MEDICINE | Facility: CLINIC | Age: 53
End: 2023-05-02
Payer: COMMERCIAL

## 2023-05-02 ENCOUNTER — TELEPHONE (OUTPATIENT)
Dept: FAMILY MEDICINE | Facility: CLINIC | Age: 53
End: 2023-05-02

## 2023-05-02 DIAGNOSIS — U07.1 INFECTION DUE TO 2019 NOVEL CORONAVIRUS: Primary | ICD-10-CM

## 2023-05-02 PROCEDURE — 99214 OFFICE O/P EST MOD 30 MIN: CPT | Mod: VID | Performed by: FAMILY MEDICINE

## 2023-05-02 NOTE — TELEPHONE ENCOUNTER
Pt is wondering if PCP can add onto schedule. Huddled with PCP who stated that she is unable to add to schedule today. Writer scheduled patient for virtual covid-19 treatment visit with available provider today.    +Covid-19 at home test.   Date of positive test: 05/02/2023  Date of symptom onset: 04/30/2023    RN COVID TREATMENT VISIT  05/02/23      The patient has been triaged and does not require a higher level of care.    Zaira Villatoro  53 year old  Current weight? 260 lbs    Has the patient been seen by a primary care provider at an [facility] or [facility] within the past two years? Yes.   Have you been in close proximity to/do you have a known exposure to a person with a confirmed case of influenza? No.     General treatment eligibility:  Date of positive COVID test (PCR or at home)?  05/02/2023    Are you or have you been hospitalized for this COVID-19 infection? No.   Have you received monoclonal antibodies or antiviral treatment for COVID-19 since this positive test? No.   Do you have any of the following conditions that place you at risk of being very sick from COVID-19?   - Age 50 years or older  - Chronic liver diseases such as alcoholic liver disease, autoimmune hepatitis, cirrhosis, non-alcoholic fatty liver   - Diabetes mellitus, type 1 and type 2  - Heart conditions such as cardiomyopathies, congenital heart defects, coronary artery disease, heart arrhythmias, heart failure, hypertension, valve disorders   - Overweight or Obesity (BMI >85th percentile or BMI 25 or higher)  - Primary Immune Deficiency Disease such as (not exhaustive - see standing order for complete list): Autoimmune Lymphoproliferative Syndrome (ALPS), Chronic Granulomatous Disease (CGD), Glycosylation Disorders with Immunodeficiency, Severe Combined Immunodeficiency (SCID), X-Linked   - Current or former smoker  - Patient reports taking medicines that suppress the immune system such as: Greater than  or equal to 20mg prednisone or equivalent per day, cyclophosphamide or cisplatin, methotrexate, cancer chemotherapeutic agents classified as severely immunosuppressive, tumor-necrosis (TNF) blockers, and other biologics  Yes, patient has at least one high risk condition as noted above.     Current COVID symptoms:   - fever or chills  - cough  - shortness of breath or difficulty breathing  - fatigue  - muscle or body aches  - headache  - sore throat  - congestion or runny nose  - nausea or vomiting  Yes. Patient has at least one symptom as selected.     How many days since symptoms started? 5 days or less. Established patient, 12 years or older weighing at least 88.2 lbs, who has symptoms that started in the past 5 days, has not been hospitalized nor received treatment already, and is at risk for being very sick from COVID-19.     Treatment eligibility by RN:    Are you currently pregnant or nursing? No    Do you have a clinically significant hypersensitivity to nirmatrelvir or ritonavir, or toxic epidermal necrolysis (TEN) or Teran-Quang Syndrome? No    Do you have a history of hepatitis, any hepatic impairment on the Problem List (such as Child-Desir Class C, cirrhosis, fatty liver disease, alcoholic liver disease), or was the last liver lab (hepatic panel, ALT, AST, ALK Phos, bilirubin) elevated in the past 6 months? No    Do you have any history of severe renal impairment (eGFR < 30mL/min)? No    Is patient eligible to continue?   Yes, patient meets all eligibility requirements for the RN COVID treatment (as denoted by all no responses above).     Current Outpatient Medications   Medication Sig Dispense Refill     olopatadine (PATANOL) 0.1 % ophthalmic solution INSTILL 1 DROP INTO BOTH EYES TWICE A DAY 5 mL 0     atenolol (TENORMIN) 50 MG tablet TAKE 1 TABLET BY MOUTH TWICE A  tablet 3     blood glucose (NO BRAND SPECIFIED) test strip Use to test blood sugar 3 times daily or as directed. To accompany:  Blood Glucose Monitor Brands: per insurance. 100 strip 6     blood glucose calibration (NO BRAND SPECIFIED) solution To accompany: Blood Glucose Monitor Brands: per insurance. 1 each 0     blood glucose monitoring (NO BRAND SPECIFIED) meter device kit Use to test blood sugar 4 times daily or as directed. Preferred blood glucose meter OR supplies to accompany: Blood Glucose Monitor Brands: per insurance. 1 kit 0     cholecalciferol (VITAMIN D3) 125 mcg (5000 units) capsule Take by mouth daily       ELIQUIS ANTICOAGULANT 5 MG tablet TAKE 1 TABLET BY MOUTH TWICE A DAY 60 tablet 4     glimepiride (AMARYL) 2 MG tablet Take 2 mg by mouth every morning (before breakfast) Following infusion - one time, month of November only.       insulin pen needle (BD SHAHNAZ U/F) 32G X 4 MM miscellaneous Use 1 pen needles TWICE DAILY 200 each 11     JARDIANCE 25 MG TABS tablet TAKE 1 TABLET BY MOUTH EVERY DAY 90 tablet 1     Lancets (ONETOUCH DELICA PLUS [id number]) MISC TO ACCOMPANY: BLOOD GLUCOSE MONITOR BRANDS: 100 each 0     LEVEMIR FLEXTOUCH 100 UNIT/ML pen INJECT 70 UNITS SUBCUTANEOUS 2 TIMES DAILY 135 mL 0     losartan (COZAAR) 25 MG tablet TAKE 1/2 TABLET BY MOUTH EVERY DAY 45 tablet 1     order for DME Equipment being ordered: CPAP 9 c, 1 Units 0     Semaglutide (RYBELSUS) 14 MG TABS Take 1 tablet by mouth daily 90 tablet 1     STATIN NOT PRESCRIBED, INTENTIONAL, 1 each continuous prn Statin not prescribed intentionally due to Refusal by patient and Other:LDL is below 100. 0 each 0     thin (NO BRAND SPECIFIED) lancets Use with lanceting device. To accompany: Blood Glucose Monitor Brands: per insurance. 200 each 6     TYLENOL CAPS 500 MG OR 1 CAPSULE EVERY 4 HOURS AS NEEDED       vedolizumab (ENTYVIO) 60 MG/ML injection Every six weeks       OTC: occasionally ibuprofen.    Medications from List 1 of the standing order (on medications that exclude the use of Paxlovid) that patient is taking: NONE. Is patient taking [place]'s Wort?  No  Is patient taking [place]'s Wort or any meds from List 1? No.   Medications from List 2 of the standing order (on meds that provider needs to adjust) that patient is taking: apixaban (Eliquis), explained a provider visit is necessary to discuss medication adjustments while taking Paxlovid.     Kimberlee Strickland RN   [facility]

## 2023-05-02 NOTE — TELEPHONE ENCOUNTER
Hello,    An FYI.     I just did a video visit with Zaira.  She tested positive for COVID-19 today and I reviewed treatment options.  You can refer to my note.  Given potential medication interactions she is not a candidate for Paxlovid so I prescribed Molnupravir.  Given her history of ulcerative colitis she is worried about potential side effects including diarrhea.  Her home oxygen levels have been 97-98%.  She is going to reach out to her gastroenterologist about this medication may reach out to you if you have any other recommendations.    Please let me know if you wish me to do anything differently.    Thank you!    Juanpablo Dillon MD

## 2023-05-02 NOTE — PROGRESS NOTES
Zaira is a 53 year old who is being evaluated via a billable video visit.      What phone number would you like to be contacted at? [phone]  How would you like to obtain your AVS? Janeth    Distant Location (provider location):  On-site    Assessment & Plan     Infection due to 2019 novel coronavirus    Reviewed patient history  She has a medical history notable for type 2 diabetes mellitus, ulcerative colitis, paroxysmal atrial fibrillation  Given that she is treated with Eliquis she is not a candidate for Paxlovid  Reviewed other treatment options  Recommend molnupiravir as an option to help reduce risk of hospitalization  Reviewed potential side effects  She will check with her gastroenterologist prior to starting this medication  Reviewed warning signs and recommend immediate follow-up with emergency department if she should develop shortness of breath or worsening symptoms  Patient agrees to plan      - molnupiravir (LAGEVRIO) 200 MG capsule  Dispense: 40 each; Refill: 0    Spent 30 minutes including time for chart review and preparation as well as time with patient and reviewing the medication and treatment options    Review of external notes as documented elsewhere in note             Luis Dillon MD  [facility]   Zaira is a 53 year old, presenting for the following health issues:  covid treatment  (Tested positive today, temp of 103, cough )    This is a pleasant 53-year-old female who presents via video visit she tested positive for COVID-19 today.  She has had multiple family members test positive for COVID-19.  She developed symptoms 2 days ago and initially had nasal congestion and facial pressure as well as a mild headache.  She tested negative 2 days ago and yesterday but tested positive today.  She has had a mild cough.  She denies shortness of breath.  She does have an oximeter and her oxygen saturation levels have been 97-98%.  She does  have a complex medical history including atrial fibrillation and is treated with Eliquis.  She has a history of type 2 diabetes mellitus requiring treatment with insulin.  She also has ulcerative colitis.  She is worried about the potential risks of diarrhea with treatment.        5/2/2023     1:11 PM   Additional Questions   Roomed by Katja CHÁVEZ             Review of Systems   Constitutional, HEENT, cardiovascular, pulmonary, GI, , musculoskeletal, neuro, skin, endocrine and psych systems are negative, except as otherwise noted.      Objective           Vitals:  No vitals were obtained today due to virtual visit.    Physical Exam   healthy, alert and no distress  PSYCH: Alert and oriented times 3; coherent speech, normal   rate and volume, able to articulate logical thoughts, able   to abstract reason, no tangential thoughts, no hallucinations   or delusions  Her affect is normal  RESP: No cough, no audible wheezing, able to talk in full sentences  Remainder of exam unable to be completed due to telephone visits                Video Visit Details:    Platform: Doximity  Beginning 1:22  End 1:40  Provider: On-site

## 2023-05-11 ENCOUNTER — TELEPHONE (OUTPATIENT)
Dept: FAMILY MEDICINE | Facility: CLINIC | Age: 53
End: 2023-05-11
Payer: COMMERCIAL

## 2023-05-11 DIAGNOSIS — E11.65 TYPE 2 DIABETES MELLITUS WITH HYPERGLYCEMIA (H): ICD-10-CM

## 2023-05-11 DIAGNOSIS — E11.65 TYPE 2 DIABETES MELLITUS WITH HYPERGLYCEMIA, WITH LONG-TERM CURRENT USE OF INSULIN (H): Primary | ICD-10-CM

## 2023-05-11 DIAGNOSIS — Z79.4 TYPE 2 DIABETES MELLITUS WITH HYPERGLYCEMIA, WITH LONG-TERM CURRENT USE OF INSULIN (H): Primary | ICD-10-CM

## 2023-05-11 RX ORDER — PEN NEEDLE, DIABETIC 32GX 5/32"
NEEDLE, DISPOSABLE MISCELLANEOUS
Qty: 400 EACH | Refills: 11 | Status: CANCELLED | OUTPATIENT
Start: 2023-05-11

## 2023-05-11 NOTE — TELEPHONE ENCOUNTER
Received call from patient. She states that there is an issue with her Levemir prescription and that the pharmacy sent her an automated message saying that clarification is needed from her doctor's office. Per chart review, there is no documentation of a fax or call received. Patient does not know what the issue is, she states that the pharmacy was supposed to be contacting us. Per our records, refill was last sent on 05/01/2023 for Levemir. Told patient that writer will call pharmacy to clarify the issue.     Called pharmacy and they are closed for lunch break until 2 pm. Called after 2 pm. Pharmacy staff states that the Levemir flextouch has been discontinued. The alternative now is Levemir flex pen and this will only inject up to 60 units for maximum dose, so patient would have to inject 60 units first and then 10 units to reach 70 units.    Called patient and notified her of response from pharmacy. She verbalized understanding. She asked if we could send in a new prescription for the insulin pen needles since she will need to use 4 a day now.     Patient asked if she can give injection for 60 units and then while the needle is still in her body dial up the additional 10 units and inject that and never have to remove the pen or place a new needle etc. Notified her that you cannot do this, medication can't dialed up this way and needles are single use. She asked that message be sent to Dr. Marcano to ask provider if she can do this. Please advise. Pt states that if she is not able to do this, she would like to know if there are any other alternatives so that she does not need to do 4 injections daily. Please advise.    Kimberlee Strickland RN   [facility]

## 2023-05-11 NOTE — TELEPHONE ENCOUNTER
I agree with nursing staff. Patient can either take the injection 4 times as this is the only way it can be taken or the alternative would be try to Lantus  instead of the levemir     One thing that patient should keep in mind is that the best way for insulin to be absorbed is if it is given in units 50 and below and so she would benefit from taking a 50 unit option and a 20 unit for better absorbability overall. This is just a recommendation    She can let us know if she would like to proceed with the 60/10 unit injection, do the 50/20 unit or completely switch to Lantus and remain at 70 units      Thanks    MG

## 2023-05-12 ENCOUNTER — TELEPHONE (OUTPATIENT)
Dept: FAMILY MEDICINE | Facility: CLINIC | Age: 53
End: 2023-05-12
Payer: COMMERCIAL

## 2023-05-12 DIAGNOSIS — E11.65 TYPE 2 DIABETES MELLITUS WITH HYPERGLYCEMIA, WITH LONG-TERM CURRENT USE OF INSULIN (H): Primary | ICD-10-CM

## 2023-05-12 DIAGNOSIS — Z79.4 TYPE 2 DIABETES MELLITUS WITH HYPERGLYCEMIA, WITH LONG-TERM CURRENT USE OF INSULIN (H): Primary | ICD-10-CM

## 2023-05-12 NOTE — TELEPHONE ENCOUNTER
Per pharmacy, levemir has been discontinued and needs new alternative script.       Ramona ESTRELLA CMA ([facility])

## 2023-05-12 NOTE — TELEPHONE ENCOUNTER
Please call patient and let her know that the Levemir has been removed from the pharmacy and that she will now be taking Lantus (glargine) 70 units at night only.    We can adjust titrations accordingly but I would like to start here and she can schedule a virtual visit to discuss her numbers within 1-2 weeks.    Thanks    MG

## 2023-05-12 NOTE — TELEPHONE ENCOUNTER
Spoke with patient. Relayed provider's message as written. Patient verbalized understanding and has no further questions at this time.    Patient plans to call clinic back at a later time to schedule follow-up.     SOHAM Mann RN  [facility]

## 2023-05-15 ENCOUNTER — NURSE TRIAGE (OUTPATIENT)
Dept: NURSING | Facility: CLINIC | Age: 53
End: 2023-05-15
Payer: COMMERCIAL

## 2023-05-15 ENCOUNTER — OFFICE VISIT (OUTPATIENT)
Dept: FAMILY MEDICINE | Facility: CLINIC | Age: 53
End: 2023-05-15
Payer: COMMERCIAL

## 2023-05-15 VITALS
DIASTOLIC BLOOD PRESSURE: 78 MMHG | OXYGEN SATURATION: 98 % | WEIGHT: 253 LBS | HEART RATE: 72 BPM | BODY MASS INDEX: 43.19 KG/M2 | RESPIRATION RATE: 18 BRPM | TEMPERATURE: 97.8 F | SYSTOLIC BLOOD PRESSURE: 126 MMHG | HEIGHT: 64 IN

## 2023-05-15 DIAGNOSIS — J01.90 ACUTE SINUSITIS WITH SYMPTOMS > 10 DAYS: Primary | ICD-10-CM

## 2023-05-15 DIAGNOSIS — Z86.16 HISTORY OF 2019 NOVEL CORONAVIRUS DISEASE (COVID-19): ICD-10-CM

## 2023-05-15 PROCEDURE — 99213 OFFICE O/P EST LOW 20 MIN: CPT | Performed by: FAMILY MEDICINE

## 2023-05-15 ASSESSMENT — PAIN SCALES - GENERAL: PAINLEVEL: NO PAIN (0)

## 2023-05-15 NOTE — PROGRESS NOTES
"     Subjective   Zaira is a 53 year old, presenting for the following health issues:  Cold Symptoms        5/15/2023     3:01 PM   Additional Questions   Roomed by Cordelia Grijalva     History of Present Illness       Reason for visit:  Covid lingering on  Symptom onset:  1-2 weeks ago  Symptoms include:  Cough congestion sore throat fatigue sinus pressure  Symptom intensity:  Moderate  Symptom progression:  Staying the same  Had these symptoms before:  Yes  Has tried/received treatment for these symptoms:  No  What makes it worse:  Movement talking    She eats 2-3 servings of fruits and vegetables daily.She consumes 0 sweetened beverage(s) daily.She exercises with enough effort to increase her heart rate 9 or less minutes per day.  She exercises with enough effort to increase her heart rate 3 or less days per week.   She is taking medications regularly.          Review of Systems   1st case covid dx May 1 or 2     Voice not normal    Sore throat    Head congested    Tired    Not much mucus    Some liquid coming with cough, feels in upper chest    Sometimes cough to almost vomit     Feels hernias with coughing    No fever for a week    Tested neg covid yest    History of chronic sinusitis, not in a few years    Had sinus surgery, helped    Diabetes - working on it per patient     Stayed home with covid            Objective    /78 (BP Location: Right arm, Patient Position: Chair, Cuff Size: Adult Large)   Pulse 72   Temp 97.8  F (36.6  C) (Temporal)   Resp 18   Ht 1.627 m (5' 4.07\")   Wt 114.8 kg (253 lb)   LMP  (LMP Unknown)   SpO2 98%   BMI 43.33 kg/m    Body mass index is 43.33 kg/m .  Physical Exam  Constitutional:       Appearance: Normal appearance.   HENT:      Head: Normocephalic and atraumatic.      Right Ear: Tympanic membrane, ear canal and external ear normal.      Left Ear: Tympanic membrane, ear canal and external ear normal.      Nose:      Comments: Some congestion  / mucus       " Mouth/Throat:      Comments: Tongue coated, some posterior drainage; a bit tender over sinuses  Not in submandib area    Eyes:      Conjunctiva/sclera: Conjunctivae normal.   Cardiovascular:      Rate and Rhythm: Normal rate and regular rhythm.      Heart sounds: Normal heart sounds.   Pulmonary:      Effort: Pulmonary effort is normal. No respiratory distress.      Breath sounds: Normal breath sounds.   Abdominal:      Palpations: Abdomen is soft.   Neurological:      Mental Status: She is alert.                   ASSESSMENT / PLAN:  (J01.90) Acute sinusitis with symptoms > 10 days  (primary encounter diagnosis)  Comment: patient may have bacterial sinus infection in addition to recent covid.  Symptoms not improving.  Past history of sinusitis.    Plan: amoxicillin-clavulanate (AUGMENTIN) 875-125 MG         tablet        Reasonable to cover with antibiotics.  Follow up if symptoms not resolving.     (Z86.16) History of 2019 novel coronavirus disease (COVID-19)  Comment:    Plan: now testing neg. Discussed in detail.  May have some residual symptoms.           I reviewed the patient's medications, allergies, medical history, family history, and social history.    Tray Pina MD

## 2023-05-15 NOTE — TELEPHONE ENCOUNTER
4/30/23 first symptoms of covid.  Tested positive on 5/1/ or 5/2    Cough persists. Non stop.  Some pressure in chest.  HR 77  O2 Sat is 99%  Head filled with goop.  Has since tested negative for covid.  SOB with walking up stairs. Recovers quickly.  Per the protocol, I recommended she be seen now in clinic.  Caller stated unde  Call back as needed.    Reason for Disposition    [1] Typical COVID-19 symptoms AND [2] lasting less than 3 weeks    Chest pain or pressure  (Exception: MILD central chest pain, present only when coughing)    Additional Information    Negative: SEVERE difficulty breathing (e.g., struggling for each breath, speaks in single words)    Negative: [1] SEVERE weakness (e.g., can't stand or can barely walk) AND [2] new-onset or WORSE    Negative: Difficult to awaken or acting confused (e.g., disoriented, slurred speech)    Negative: Bluish (or gray) lips or face now    Negative: Sounds like a life-threatening emergency to the triager    Negative: SEVERE difficulty breathing (e.g., struggling for each breath, speaks in single words)    Negative: Difficult to awaken or acting confused (e.g., disoriented, slurred speech)    Negative: Bluish (or gray) lips or face now    Negative: Shock suspected (e.g., cold/pale/clammy skin, too weak to stand, low BP, rapid pulse)    Negative: Sounds like a life-threatening emergency to the triager    Negative: SEVERE or constant chest pain or pressure  (Exception: Mild central chest pain, present only when coughing.)    Negative: MODERATE difficulty breathing (e.g., speaks in phrases, SOB even at rest, pulse 100-120)    Negative: Headache and stiff neck (can't touch chin to chest)    Negative: Oxygen level (e.g., pulse oximetry) 90 percent or lower    Protocols used: CORONAVIRUS (COVID-19) PERSISTING SYMPTOMS FOLLOW-UP CALL-A-OH, CORONAVIRUS (COVID-19) DIAGNOSED OR [id number]    Shey ZIMMER RN [facility]

## 2023-05-19 DIAGNOSIS — E11.65 TYPE 2 DIABETES MELLITUS WITH HYPERGLYCEMIA, WITH LONG-TERM CURRENT USE OF INSULIN (H): ICD-10-CM

## 2023-05-19 DIAGNOSIS — Z79.4 TYPE 2 DIABETES MELLITUS WITH HYPERGLYCEMIA, WITH LONG-TERM CURRENT USE OF INSULIN (H): ICD-10-CM

## 2023-05-19 RX ORDER — ORAL SEMAGLUTIDE 14 MG/1
14 TABLET ORAL DAILY
Qty: 90 TABLET | Refills: 0 | Status: SHIPPED | OUTPATIENT
Start: 2023-05-19 | End: 2023-06-29

## 2023-05-25 ENCOUNTER — VIRTUAL VISIT (OUTPATIENT)
Dept: FAMILY MEDICINE | Facility: CLINIC | Age: 53
End: 2023-05-25
Payer: COMMERCIAL

## 2023-05-25 DIAGNOSIS — Z86.16 HISTORY OF 2019 NOVEL CORONAVIRUS DISEASE (COVID-19): Primary | ICD-10-CM

## 2023-05-25 PROCEDURE — 99214 OFFICE O/P EST MOD 30 MIN: CPT | Mod: VID | Performed by: STUDENT IN AN ORGANIZED HEALTH CARE EDUCATION/TRAINING PROGRAM

## 2023-05-25 RX ORDER — BENZONATATE 200 MG/1
200 CAPSULE ORAL 3 TIMES DAILY PRN
Qty: 90 CAPSULE | Refills: 1 | Status: SHIPPED | OUTPATIENT
Start: 2023-05-25 | End: 2024-05-14

## 2023-05-25 NOTE — PROGRESS NOTES
Zaira is a 53 year old who is being evaluated via a billable video visit.      How would you like to obtain your AVS? MyChart  If the video visit is dropped, the invitation should be resent by: Text to cell phone: [phone]  Will anyone else be joining your video visit? No        Patient saw Dr. Pina on 5/15/23 for  acute sinusitis with history of covid. Patient was prescribed Augmentin 875-125 MG twice daily for 14 days. Ongoing lingering cough.     Patient reports that she is still experiencing COVID symptoms. She reports chest congestion and fatigued. She reports she lost her voice a few days ago and states things are not improving.     Patient endorses that she has 4 days left of her Augmentin however states that she was informed that she needed to return to the clinic in 10 days if her symptoms have not improved.  Patient states that she is concerned with her ulcerative colitis flareups as she was unable to receive her infusion due to her COVID infection.  She states that she has been having severe abdominal pain and having severe episodes of diarrhea secondary to the ulcerative colitis.  She reports pending a conversation with gastroenterology soon.    Patient states that she is concerned about how long her symptoms will last for and what her next office may need to be.    Assessment & Plan     (Z86.16) History of 2019 novel coronavirus disease (COVID-19)  (primary encounter diagnosis)  Comment: Chronic, uncontrolled.  Patient still experiencing COVID-like symptoms as well as ulcerative colitis flareup.  Follow-up visit evidence of severe coughing spells.  Will send patient Tacho Mathew to assist with cough and patient encouraged to complete antibiotic treatment.  Due to severity of symptoms and patient being in an immunocompromised individual we did discuss the importance of going into urgent care or the emergency room on day 4 of completion of antibiotic if symptoms have not improved.  Patient may  benefit at that time from a chest x-ray and a further work-up to rule out any other pathology that may be associated with COVID.  Patient also encouraged to follow-up with gastroenterology regarding infusion and stressing the importance that patient is out of infectious window and that obtaining inpatient will be necessary to help with her abdominal symptoms.  Plan: benzonatate (TESSALON) 200 MG capsule                           MD PRESLEY KAMINSKI [facility]    Rigo Meneses is a 53 year old, presenting for the following health issues:  f/u on covid  HPI             Review of Systems   CONSTITUTIONAL: NEGATIVE for fever, chills, change in weight  ENT/MOUTH: NEGATIVE for ear, mouth and throat problems  RESP:POSITIVE for cough-non productive and dyspnea on exertion  CV: POSITIVE for chest pain/chest pressure  GI: POSITIVE for abdominal pain generalized and diarrhea      Objective           Vitals:  No vitals were obtained today due to virtual visit.    Physical Exam   GENERAL: alert and mild distress  EYES: Eyes grossly normal to inspection.  No discharge or erythema, or obvious scleral/conjunctival abnormalities.  RESP: severe coughing spells during visit  SKIN: Visible skin clear. No significant rash, abnormal pigmentation or lesions.  NEURO: Cranial nerves grossly intact.  Mentation and speech appropriate for age.  PSYCH: Mentation appears normal, affect normal/bright, judgement and insight intact, normal speech and appearance well-groomed.    No results found for any visits on 05/25/23.            Video-Visit Details    Type of service:  Video Visit     Originating Location (pt. Location): Home    Distant Location (provider location):  On-site  Platform used for Video Visit: Visibiz

## 2023-05-30 ENCOUNTER — MYC MEDICAL ADVICE (OUTPATIENT)
Dept: FAMILY MEDICINE | Facility: CLINIC | Age: 53
End: 2023-05-30
Payer: COMMERCIAL

## 2023-05-31 ENCOUNTER — TRANSFERRED RECORDS (OUTPATIENT)
Dept: HEALTH INFORMATION MANAGEMENT | Facility: CLINIC | Age: 53
End: 2023-05-31
Payer: COMMERCIAL

## 2023-05-31 DIAGNOSIS — I48.0 PAF (PAROXYSMAL ATRIAL FIBRILLATION) (H): ICD-10-CM

## 2023-05-31 NOTE — TELEPHONE ENCOUNTER
Patient informed of provider's message as written via [employer] message.    RAMIREZ MannN RN  [facility]

## 2023-05-31 NOTE — TELEPHONE ENCOUNTER
Pt can continue with tessalon pearls if they are helpful for cough. Her symptoms may persist as we discussed for an extended period of time and if she feels like they are getting worse then going into urgent care will be better than trying to get an appointment here so she can be evaluated in a timely matter. She should also know that COVID symptoms can persist for several months for some patients and so sadly this may be her symptoms for some time irrespective the medication she has completed or use    MG

## 2023-05-31 NOTE — TELEPHONE ENCOUNTER
"Please see [place] message and advise. Patient was seen in virtual visit on 5/25 for follow-up from 5/15 for acute sinusitis with history of COVID. Tessalon Perles prescribed to assist with cough and patient encouraged to complete antibiotic treatment (Augmentin 875-125 MG twice daily for 14 days).    Per Virtual Visit notes from 5/25:  \"Due to severity of symptoms and patient being in an immunocompromised individual we did discuss the importance of going into urgent care or the emergency room on day 4 of completion of antibiotic if symptoms have not improved.  Patient may benefit at that time from a chest x-ray and a further work-up to rule out any other pathology that may be associated with COVID. \"    Tessalon had helped ease cough, however, patient is still experiencing cough, lung discomfort, sinus pressure, and some fatigue.     Please advise if chest x-ray is advised at this time or next steps patient should take.     SOHAM Mann RN  [facility]  "

## 2023-05-31 NOTE — TELEPHONE ENCOUNTER
Pending Prescriptions:                       Disp   Refills    apixaban ANTICOAGULANT (ELIQUIS ANTICOAGU*60 tab*4            Sig: Take 1 tablet (5 mg) by mouth 2 times daily

## 2023-06-02 ENCOUNTER — ANCILLARY PROCEDURE (OUTPATIENT)
Dept: GENERAL RADIOLOGY | Facility: CLINIC | Age: 53
End: 2023-06-02
Attending: STUDENT IN AN ORGANIZED HEALTH CARE EDUCATION/TRAINING PROGRAM
Payer: COMMERCIAL

## 2023-06-02 DIAGNOSIS — K51.50 LEFT SIDED ULCERATIVE COLITIS WITHOUT COMPLICATION (H): ICD-10-CM

## 2023-06-02 DIAGNOSIS — R06.02 SOB (SHORTNESS OF BREATH): ICD-10-CM

## 2023-06-02 DIAGNOSIS — E11.65 TYPE 2 DIABETES MELLITUS WITH HYPERGLYCEMIA (H): ICD-10-CM

## 2023-06-02 PROCEDURE — 71046 X-RAY EXAM CHEST 2 VIEWS: CPT | Performed by: RADIOLOGY

## 2023-06-02 PROCEDURE — 2894A XR CHEST 2 VIEWS: CPT | Mod: TC | Performed by: RADIOLOGY

## 2023-06-02 RX ORDER — LANCETS 33 GAUGE
EACH MISCELLANEOUS
Qty: 100 EACH | Refills: 0 | Status: SHIPPED | OUTPATIENT
Start: 2023-06-02

## 2023-06-26 DIAGNOSIS — I10 ESSENTIAL HYPERTENSION WITH GOAL BLOOD PRESSURE LESS THAN 140/90: ICD-10-CM

## 2023-06-26 DIAGNOSIS — E11.65 TYPE 2 DIABETES MELLITUS WITH HYPERGLYCEMIA, WITH LONG-TERM CURRENT USE OF INSULIN (H): Chronic | ICD-10-CM

## 2023-06-26 DIAGNOSIS — Z79.4 TYPE 2 DIABETES MELLITUS WITH HYPERGLYCEMIA, WITH LONG-TERM CURRENT USE OF INSULIN (H): Chronic | ICD-10-CM

## 2023-06-26 RX ORDER — LOSARTAN POTASSIUM 25 MG/1
12.5 TABLET ORAL DAILY
Qty: 45 TABLET | Refills: 0 | Status: SHIPPED | OUTPATIENT
Start: 2023-06-26 | End: 2023-09-11

## 2023-06-27 RX ORDER — GLIMEPIRIDE 2 MG/1
2 TABLET ORAL
Qty: 90 TABLET | Refills: 0 | Status: SHIPPED | OUTPATIENT
Start: 2023-06-27 | End: 2023-09-11

## 2023-06-29 DIAGNOSIS — Z79.4 TYPE 2 DIABETES MELLITUS WITH HYPERGLYCEMIA, WITH LONG-TERM CURRENT USE OF INSULIN (H): ICD-10-CM

## 2023-06-29 DIAGNOSIS — E11.65 TYPE 2 DIABETES MELLITUS WITH HYPERGLYCEMIA, WITH LONG-TERM CURRENT USE OF INSULIN (H): ICD-10-CM

## 2023-06-29 RX ORDER — ORAL SEMAGLUTIDE 14 MG/1
14 TABLET ORAL DAILY
Qty: 90 TABLET | Refills: 0 | Status: SHIPPED | OUTPATIENT
Start: 2023-06-29 | End: 2023-11-10

## 2023-07-18 ENCOUNTER — TRANSFERRED RECORDS (OUTPATIENT)
Dept: HEALTH INFORMATION MANAGEMENT | Facility: CLINIC | Age: 53
End: 2023-07-18
Payer: COMMERCIAL

## 2023-07-18 LAB
ALT SERPL-CCNC: 33 IU/L (ref 0–32)
AST SERPL-CCNC: 29 IU/L (ref 0–40)

## 2023-08-01 DIAGNOSIS — Z79.4 TYPE 2 DIABETES MELLITUS WITH HYPERGLYCEMIA, WITH LONG-TERM CURRENT USE OF INSULIN (H): ICD-10-CM

## 2023-08-01 DIAGNOSIS — E11.65 TYPE 2 DIABETES MELLITUS WITH HYPERGLYCEMIA, WITH LONG-TERM CURRENT USE OF INSULIN (H): ICD-10-CM

## 2023-08-19 ENCOUNTER — HEALTH MAINTENANCE LETTER (OUTPATIENT)
Age: 53
End: 2023-08-19

## 2023-08-19 DIAGNOSIS — E11.65 TYPE 2 DIABETES MELLITUS WITH HYPERGLYCEMIA, WITH LONG-TERM CURRENT USE OF INSULIN (H): ICD-10-CM

## 2023-08-19 DIAGNOSIS — Z79.4 TYPE 2 DIABETES MELLITUS WITH HYPERGLYCEMIA, WITH LONG-TERM CURRENT USE OF INSULIN (H): ICD-10-CM

## 2023-08-21 RX ORDER — GLIMEPIRIDE 4 MG/1
TABLET ORAL
Qty: 135 TABLET | Refills: 3 | OUTPATIENT
Start: 2023-08-21

## 2023-08-21 NOTE — TELEPHONE ENCOUNTER
Dose adjustment or medication change previously sent to pharmacy. Refused to pharmacy with notification.

## 2023-08-28 ENCOUNTER — TRANSFERRED RECORDS (OUTPATIENT)
Dept: HEALTH INFORMATION MANAGEMENT | Facility: CLINIC | Age: 53
End: 2023-08-28
Payer: COMMERCIAL

## 2023-09-08 DIAGNOSIS — E11.65 TYPE 2 DIABETES MELLITUS WITH HYPERGLYCEMIA, WITH LONG-TERM CURRENT USE OF INSULIN (H): ICD-10-CM

## 2023-09-08 DIAGNOSIS — Z79.4 TYPE 2 DIABETES MELLITUS WITH HYPERGLYCEMIA, WITH LONG-TERM CURRENT USE OF INSULIN (H): ICD-10-CM

## 2023-09-08 DIAGNOSIS — E11.65 TYPE 2 DIABETES MELLITUS WITH HYPERGLYCEMIA, WITH LONG-TERM CURRENT USE OF INSULIN (H): Chronic | ICD-10-CM

## 2023-09-08 DIAGNOSIS — Z79.4 TYPE 2 DIABETES MELLITUS WITH HYPERGLYCEMIA, WITH LONG-TERM CURRENT USE OF INSULIN (H): Chronic | ICD-10-CM

## 2023-09-08 DIAGNOSIS — I48.0 PAF (PAROXYSMAL ATRIAL FIBRILLATION) (H): ICD-10-CM

## 2023-09-09 DIAGNOSIS — I10 ESSENTIAL HYPERTENSION WITH GOAL BLOOD PRESSURE LESS THAN 140/90: ICD-10-CM

## 2023-09-09 DIAGNOSIS — Z79.4 TYPE 2 DIABETES MELLITUS WITH HYPERGLYCEMIA, WITH LONG-TERM CURRENT USE OF INSULIN (H): Chronic | ICD-10-CM

## 2023-09-09 DIAGNOSIS — E11.65 TYPE 2 DIABETES MELLITUS WITH HYPERGLYCEMIA, WITH LONG-TERM CURRENT USE OF INSULIN (H): Chronic | ICD-10-CM

## 2023-09-11 RX ORDER — PEN NEEDLE, DIABETIC 32GX 5/32"
NEEDLE, DISPOSABLE MISCELLANEOUS
Qty: 200 EACH | Refills: 11 | Status: SHIPPED | OUTPATIENT
Start: 2023-09-11 | End: 2024-07-22

## 2023-09-11 RX ORDER — GLIMEPIRIDE 4 MG/1
TABLET ORAL
Qty: 135 TABLET | Refills: 3 | Status: SHIPPED | OUTPATIENT
Start: 2023-09-11 | End: 2024-07-22

## 2023-09-11 RX ORDER — EMPAGLIFLOZIN 25 MG/1
25 TABLET, FILM COATED ORAL DAILY
Qty: 90 TABLET | Refills: 0 | Status: SHIPPED | OUTPATIENT
Start: 2023-09-11 | End: 2023-12-11

## 2023-09-11 RX ORDER — GLIMEPIRIDE 2 MG/1
2 TABLET ORAL
Qty: 90 TABLET | Refills: 0 | Status: SHIPPED | OUTPATIENT
Start: 2023-09-11 | End: 2024-01-15

## 2023-09-11 RX ORDER — APIXABAN 5 MG/1
5 TABLET, FILM COATED ORAL 2 TIMES DAILY
Qty: 60 TABLET | Refills: 3 | Status: SHIPPED | OUTPATIENT
Start: 2023-09-11 | End: 2024-01-16

## 2023-09-11 RX ORDER — LOSARTAN POTASSIUM 25 MG/1
12.5 TABLET ORAL DAILY
Qty: 45 TABLET | Refills: 0 | Status: SHIPPED | OUTPATIENT
Start: 2023-09-11 | End: 2023-12-11

## 2023-09-26 DIAGNOSIS — E11.65 TYPE 2 DIABETES MELLITUS WITH HYPERGLYCEMIA, WITH LONG-TERM CURRENT USE OF INSULIN (H): ICD-10-CM

## 2023-09-26 DIAGNOSIS — Z79.4 TYPE 2 DIABETES MELLITUS WITH HYPERGLYCEMIA, WITH LONG-TERM CURRENT USE OF INSULIN (H): ICD-10-CM

## 2023-09-26 RX ORDER — INSULIN GLARGINE 100 [IU]/ML
70 INJECTION, SOLUTION SUBCUTANEOUS AT BEDTIME
Qty: 15 ML | Refills: 0 | Status: SHIPPED | OUTPATIENT
Start: 2023-09-26 | End: 2023-10-23

## 2023-10-06 DIAGNOSIS — F51.04 CHRONIC INSOMNIA: ICD-10-CM

## 2023-10-06 DIAGNOSIS — E66.01 MORBID OBESITY DUE TO EXCESS CALORIES (H): Primary | Chronic | ICD-10-CM

## 2023-10-09 ENCOUNTER — TRANSFERRED RECORDS (OUTPATIENT)
Dept: HEALTH INFORMATION MANAGEMENT | Facility: CLINIC | Age: 53
End: 2023-10-09
Payer: COMMERCIAL

## 2023-10-19 ENCOUNTER — OFFICE VISIT (OUTPATIENT)
Dept: FAMILY MEDICINE | Facility: CLINIC | Age: 53
End: 2023-10-19
Payer: COMMERCIAL

## 2023-10-19 VITALS
SYSTOLIC BLOOD PRESSURE: 124 MMHG | TEMPERATURE: 98.1 F | DIASTOLIC BLOOD PRESSURE: 66 MMHG | HEIGHT: 64 IN | WEIGHT: 249.5 LBS | OXYGEN SATURATION: 97 % | RESPIRATION RATE: 18 BRPM | HEART RATE: 71 BPM | BODY MASS INDEX: 42.59 KG/M2

## 2023-10-19 DIAGNOSIS — E11.65 TYPE 2 DIABETES MELLITUS WITH HYPERGLYCEMIA, WITH LONG-TERM CURRENT USE OF INSULIN (H): Primary | ICD-10-CM

## 2023-10-19 DIAGNOSIS — Z79.4 TYPE 2 DIABETES MELLITUS WITH HYPERGLYCEMIA, WITH LONG-TERM CURRENT USE OF INSULIN (H): Primary | ICD-10-CM

## 2023-10-19 DIAGNOSIS — Z23 NEED FOR VACCINATION: ICD-10-CM

## 2023-10-19 LAB
CHOLEST SERPL-MCNC: 142 MG/DL
CREAT UR-MCNC: 141 MG/DL
HBA1C MFR BLD: 7.1 % (ref 0–5.6)
HDLC SERPL-MCNC: 47 MG/DL
LDLC SERPL CALC-MCNC: 81 MG/DL
MICROALBUMIN UR-MCNC: 16.9 MG/L
MICROALBUMIN/CREAT UR: 11.99 MG/G CR (ref 0–25)
NONHDLC SERPL-MCNC: 95 MG/DL
TRIGL SERPL-MCNC: 72 MG/DL

## 2023-10-19 PROCEDURE — 83036 HEMOGLOBIN GLYCOSYLATED A1C: CPT | Performed by: STUDENT IN AN ORGANIZED HEALTH CARE EDUCATION/TRAINING PROGRAM

## 2023-10-19 PROCEDURE — 91320 SARSCV2 VAC 30MCG TRS-SUC IM: CPT | Performed by: STUDENT IN AN ORGANIZED HEALTH CARE EDUCATION/TRAINING PROGRAM

## 2023-10-19 PROCEDURE — 80061 LIPID PANEL: CPT | Performed by: STUDENT IN AN ORGANIZED HEALTH CARE EDUCATION/TRAINING PROGRAM

## 2023-10-19 PROCEDURE — 36415 COLL VENOUS BLD VENIPUNCTURE: CPT | Performed by: STUDENT IN AN ORGANIZED HEALTH CARE EDUCATION/TRAINING PROGRAM

## 2023-10-19 PROCEDURE — 90471 IMMUNIZATION ADMIN: CPT | Performed by: STUDENT IN AN ORGANIZED HEALTH CARE EDUCATION/TRAINING PROGRAM

## 2023-10-19 PROCEDURE — 90682 RIV4 VACC RECOMBINANT DNA IM: CPT | Performed by: STUDENT IN AN ORGANIZED HEALTH CARE EDUCATION/TRAINING PROGRAM

## 2023-10-19 PROCEDURE — 90480 ADMN SARSCOV2 VAC 1/ONLY CMP: CPT | Performed by: STUDENT IN AN ORGANIZED HEALTH CARE EDUCATION/TRAINING PROGRAM

## 2023-10-19 PROCEDURE — 82570 ASSAY OF URINE CREATININE: CPT | Performed by: STUDENT IN AN ORGANIZED HEALTH CARE EDUCATION/TRAINING PROGRAM

## 2023-10-19 PROCEDURE — 82043 UR ALBUMIN QUANTITATIVE: CPT | Performed by: STUDENT IN AN ORGANIZED HEALTH CARE EDUCATION/TRAINING PROGRAM

## 2023-10-19 PROCEDURE — 99214 OFFICE O/P EST MOD 30 MIN: CPT | Mod: 25 | Performed by: STUDENT IN AN ORGANIZED HEALTH CARE EDUCATION/TRAINING PROGRAM

## 2023-10-19 NOTE — PROGRESS NOTES
"  Assessment & Plan     (E11.65,  Z79.4) Type 2 diabetes mellitus with hyperglycemia, with long-term current use of insulin (H)  (primary encounter diagnosis)  Comment: Chronic, stable. Last A1c noted to be 7.5%. pending repeat labs today. Pt has lost 12 lbs since being on rybelsus and encouraged to keep up the good work   Plan: HEMOGLOBIN A1C, Lipid panel reflex to direct         LDL Fasting, Albumin Random Urine Quantitative         with Creat Ratio            (Z23) Need for vaccination  Comment: Stable  Plan: COVID-19 12+ (2023-24) (PFIZER), INFLUENZA         VACCINE 18-64Y (FLUBLOK)            Dictation Disclaimer: Some of this Note has been completed with voice-recognition dictation software. Although errors are generally corrected real-time, there is the potential for a rare error to be present in the completed chart.                BMI:   Estimated body mass index is 42.83 kg/m  as calculated from the following:    Height as of this encounter: 1.626 m (5' 4\").    Weight as of this encounter: 113.2 kg (249 lb 8 oz).           EMILY KUNZ MD  [facility] is a 53 year old, presenting for the following health issues:  Diabetes        10/19/2023     9:56 AM   Additional Questions   Roomed by Keisha       History of Present Illness       Diabetes:   She presents for follow up of diabetes.  She is checking home blood glucose two times daily.   She checks blood glucose before meals and at bedtime.  Blood glucose is sometimes over 200 and never under 70. She is aware of hypoglycemia symptoms including none.    She has no concerns regarding her diabetes at this time.   She is not experiencing numbness or burning in feet, excessive thirst, blurry vision, weight changes or redness, sores or blisters on feet.           Reason for visit:  Follow ups and get current on shots    She eats 2-3 servings of fruits and vegetables daily.She consumes 0 sweetened beverage(s) " "daily.She exercises with enough effort to increase her heart rate 9 or less minutes per day.  She exercises with enough effort to increase her heart rate 3 or less days per week.   She is taking medications regularly.   The patient presents for evaluation of multiple medical concerns.    Her diabetes has remained the same. Her blood glucose was 90 this morning, but she has also had 188 in the morning within the last month. She has been taking care of her father-in-law who had COVID-19 in 05/2023 and ever since then things have just been going downhill. It has brought on a little bit of dementia he had before. Her highest blood glucose was 180 in the morning and close to 300 in the evening. They are usually 160 to 200. She was switched from Levemir to Lantus because they would not give her the Levemir anymore. She is also on Rybelsus 14. She has lost 10 to 15 pounds.    She has a red dot on her neck that has been there for 2 weeks. It did itch at first, but it does not now. She has been putting rubbing alcohol on it every day.    She still has post COVID-19 drainage. She does not have to take the cough pill very often anymore.              Review of Systems   Constitutional, HEENT, cardiovascular, pulmonary, gi and gu systems are negative, except as otherwise noted.      Objective    /66   Pulse 71   Temp 98.1  F (36.7  C) (Temporal)   Resp 18   Ht 1.626 m (5' 4\")   Wt 113.2 kg (249 lb 8 oz)   SpO2 97%   BMI 42.83 kg/m    Body mass index is 42.83 kg/m .  Physical Exam   GENERAL: healthy, alert and no distress  EYES: Eyes grossly normal to inspection, PERRL and conjunctivae and sclerae normal  Neck: No visible JVD or lymphadenopathy   RESP: symmetrical rise in chest   CV: No peripheral edema notable   MS: no gross musculoskeletal defects noted  SKIN: small red papule on anterior neck   PSYCH: mentation appears normal, affect normal/bright      No results found for any visits on 10/19/23.                  "

## 2023-10-23 DIAGNOSIS — Z79.4 TYPE 2 DIABETES MELLITUS WITH HYPERGLYCEMIA, WITH LONG-TERM CURRENT USE OF INSULIN (H): ICD-10-CM

## 2023-10-23 DIAGNOSIS — E11.65 TYPE 2 DIABETES MELLITUS WITH HYPERGLYCEMIA, WITH LONG-TERM CURRENT USE OF INSULIN (H): ICD-10-CM

## 2023-10-23 RX ORDER — INSULIN GLARGINE 100 [IU]/ML
70 INJECTION, SOLUTION SUBCUTANEOUS AT BEDTIME
Qty: 15 ML | Refills: 0 | Status: SHIPPED | OUTPATIENT
Start: 2023-10-23 | End: 2023-11-27

## 2023-11-09 DIAGNOSIS — E11.65 TYPE 2 DIABETES MELLITUS WITH HYPERGLYCEMIA, WITH LONG-TERM CURRENT USE OF INSULIN (H): ICD-10-CM

## 2023-11-09 DIAGNOSIS — Z79.4 TYPE 2 DIABETES MELLITUS WITH HYPERGLYCEMIA, WITH LONG-TERM CURRENT USE OF INSULIN (H): ICD-10-CM

## 2023-11-10 RX ORDER — ORAL SEMAGLUTIDE 14 MG/1
14 TABLET ORAL DAILY
Qty: 90 TABLET | Refills: 0 | Status: SHIPPED | OUTPATIENT
Start: 2023-11-10 | End: 2024-02-19

## 2023-11-21 ENCOUNTER — TRANSFERRED RECORDS (OUTPATIENT)
Dept: HEALTH INFORMATION MANAGEMENT | Facility: CLINIC | Age: 53
End: 2023-11-21
Payer: COMMERCIAL

## 2023-11-21 DIAGNOSIS — I10 ESSENTIAL (PRIMARY) HYPERTENSION: ICD-10-CM

## 2023-11-21 LAB
ALT SERPL-CCNC: 25 IU/L (ref 0–32)
AST SERPL-CCNC: 24 IU/L (ref 0–40)

## 2023-11-21 RX ORDER — ATENOLOL 50 MG/1
TABLET ORAL
Qty: 180 TABLET | Refills: 1 | Status: SHIPPED | OUTPATIENT
Start: 2023-11-21 | End: 2024-06-05

## 2023-11-26 DIAGNOSIS — Z79.4 TYPE 2 DIABETES MELLITUS WITH HYPERGLYCEMIA, WITH LONG-TERM CURRENT USE OF INSULIN (H): ICD-10-CM

## 2023-11-26 DIAGNOSIS — E11.65 TYPE 2 DIABETES MELLITUS WITH HYPERGLYCEMIA, WITH LONG-TERM CURRENT USE OF INSULIN (H): ICD-10-CM

## 2023-11-27 RX ORDER — INSULIN GLARGINE 100 [IU]/ML
70 INJECTION, SOLUTION SUBCUTANEOUS AT BEDTIME
Qty: 75 ML | Refills: 0 | Status: SHIPPED | OUTPATIENT
Start: 2023-11-27 | End: 2024-01-16

## 2023-12-06 ENCOUNTER — TELEPHONE (OUTPATIENT)
Dept: DERMATOLOGY | Facility: CLINIC | Age: 53
End: 2023-12-06

## 2023-12-06 ENCOUNTER — OFFICE VISIT (OUTPATIENT)
Dept: DERMATOLOGY | Facility: CLINIC | Age: 53
End: 2023-12-06
Payer: COMMERCIAL

## 2023-12-06 DIAGNOSIS — D84.9 IMMUNOSUPPRESSED STATUS (H): ICD-10-CM

## 2023-12-06 DIAGNOSIS — D18.01 CHERRY ANGIOMA: ICD-10-CM

## 2023-12-06 DIAGNOSIS — L81.0 POST-INFLAMMATORY HYPERPIGMENTATION: ICD-10-CM

## 2023-12-06 DIAGNOSIS — L70.8 OTHER ACNE: ICD-10-CM

## 2023-12-06 DIAGNOSIS — D22.9 MULTIPLE BENIGN NEVI: Primary | ICD-10-CM

## 2023-12-06 DIAGNOSIS — L82.1 SEBORRHEIC KERATOSES: ICD-10-CM

## 2023-12-06 DIAGNOSIS — L81.4 SOLAR LENTIGO: ICD-10-CM

## 2023-12-06 PROBLEM — L50.3 DERMATOGRAPHISM: Status: ACTIVE | Noted: 2023-12-06

## 2023-12-06 PROCEDURE — 99214 OFFICE O/P EST MOD 30 MIN: CPT | Performed by: PHYSICIAN ASSISTANT

## 2023-12-06 RX ORDER — BENZOYL PEROXIDE 5 G/100G
GEL TOPICAL DAILY
Qty: 42.5 G | Refills: 0 | Status: SHIPPED | OUTPATIENT
Start: 2023-12-06 | End: 2024-01-09
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
## 2023-12-07 DIAGNOSIS — L70.8 OTHER ACNE: ICD-10-CM

## 2023-12-07 RX ORDER — TRETINOIN 0.25 MG/G
CREAM TOPICAL
Qty: 60 G | Refills: 0 | OUTPATIENT
Start: 2023-12-07

## 2023-12-07 NOTE — TELEPHONE ENCOUNTER
Prescription sent 12/6/23  tretinoin (RENOVA) 0.02 % external cream 60 g 0 12/6/2023 12/6/2023  [facility]     Radha Self PA-C  Dermatology       Routing refill request to clinic    Alternative Requested:PA REQUIRED.

## 2023-12-09 DIAGNOSIS — I10 ESSENTIAL HYPERTENSION WITH GOAL BLOOD PRESSURE LESS THAN 140/90: ICD-10-CM

## 2023-12-09 DIAGNOSIS — E11.65 TYPE 2 DIABETES MELLITUS WITH HYPERGLYCEMIA, WITH LONG-TERM CURRENT USE OF INSULIN (H): Chronic | ICD-10-CM

## 2023-12-09 DIAGNOSIS — Z79.4 TYPE 2 DIABETES MELLITUS WITH HYPERGLYCEMIA, WITH LONG-TERM CURRENT USE OF INSULIN (H): Chronic | ICD-10-CM

## 2023-12-09 NOTE — TELEPHONE ENCOUNTER
PA Initiation    Medication: RENOVA 0.02 % EX CREA  Insurance Company: [employer] ([facility]) - Phone [phone] Fax [phone]  Pharmacy Filling the Rx: [id number] [place] [facility] - [place] - [address]  Filling Pharmacy Phone: [phone]  Filling Pharmacy Fax:    Start Date: 12/9/2023

## 2023-12-11 RX ORDER — EMPAGLIFLOZIN 25 MG/1
25 TABLET, FILM COATED ORAL DAILY
Qty: 90 TABLET | Refills: 0 | Status: SHIPPED | OUTPATIENT
Start: 2023-12-11 | End: 2024-05-14

## 2023-12-11 RX ORDER — LOSARTAN POTASSIUM 25 MG/1
12.5 TABLET ORAL DAILY
Qty: 45 TABLET | Refills: 0 | Status: SHIPPED | OUTPATIENT
Start: 2023-12-11 | End: 2024-03-11

## 2023-12-12 RX ORDER — TRETINOIN 0.25 MG/G
CREAM TOPICAL
Qty: 45 G | OUTPATIENT
Start: 2023-12-12

## 2023-12-12 NOTE — TELEPHONE ENCOUNTER
tretinoin (RETIN-A) 0.025 % external cream   Pharmacy comment: Alternative Requested:PRIOR AUTHORIZATION REQUIRES.

## 2023-12-12 NOTE — TELEPHONE ENCOUNTER
PRIOR AUTHORIZATION DENIED    Medication: RENOVA 0.02 % EX CREA  Insurance Company: [employer] - Phone [phone] Fax [phone]  Denial Date: 12/12/2023  Denial Reason(s): medications used for cosmetic reasons not covered        Appeal Information: N/A  Patient Notified: No

## 2023-12-12 NOTE — TELEPHONE ENCOUNTER
PA Initiation    Medication: RENOVA 0.02 % EX CREA  Insurance Company: [facility] - Phone [phone] Fax [phone]  Pharmacy [facility]illing the Rx[id number] [place] [facility] [place] - [address]  Filling Pharmacy Phone: [phone]  Filling Pharmacy Fax:    Start Date: 12/9/2023

## 2023-12-18 ENCOUNTER — VIRTUAL VISIT (OUTPATIENT)
Dept: SLEEP MEDICINE | Facility: CLINIC | Age: 53
End: 2023-12-18
Payer: COMMERCIAL

## 2023-12-18 VITALS — WEIGHT: 256 LBS | BODY MASS INDEX: 43.71 KG/M2 | HEIGHT: 64 IN

## 2023-12-18 DIAGNOSIS — E66.01 MORBID OBESITY DUE TO EXCESS CALORIES (H): Chronic | ICD-10-CM

## 2023-12-18 DIAGNOSIS — G47.33 OSA (OBSTRUCTIVE SLEEP APNEA): Primary | ICD-10-CM

## 2023-12-18 PROCEDURE — 99213 OFFICE O/P EST LOW 20 MIN: CPT | Mod: 95 | Performed by: INTERNAL MEDICINE

## 2023-12-18 ASSESSMENT — SLEEP AND FATIGUE QUESTIONNAIRES
HOW LIKELY ARE YOU TO NOD OFF OR FALL ASLEEP IN A CAR, WHILE STOPPED FOR A FEW MINUTES IN TRAFFIC: WOULD NEVER DOZE
HOW LIKELY ARE YOU TO NOD OFF OR FALL ASLEEP WHILE SITTING AND TALKING TO SOMEONE: WOULD NEVER DOZE
HOW LIKELY ARE YOU TO NOD OFF OR FALL ASLEEP WHILE SITTING AND READING: MODERATE CHANCE OF DOZING
HOW LIKELY ARE YOU TO NOD OFF OR FALL ASLEEP WHILE SITTING INACTIVE IN A PUBLIC PLACE: WOULD NEVER DOZE
HOW LIKELY ARE YOU TO NOD OFF OR FALL ASLEEP WHILE WATCHING TV: SLIGHT CHANCE OF DOZING
HOW LIKELY ARE YOU TO NOD OFF OR FALL ASLEEP WHEN YOU ARE A PASSENGER IN A CAR FOR AN HOUR WITHOUT A BREAK: WOULD NEVER DOZE
HOW LIKELY ARE YOU TO NOD OFF OR FALL ASLEEP WHILE LYING DOWN TO REST IN THE AFTERNOON WHEN CIRCUMSTANCES PERMIT: MODERATE CHANCE OF DOZING
HOW LIKELY ARE YOU TO NOD OFF OR FALL ASLEEP WHILE SITTING QUIETLY AFTER LUNCH WITHOUT ALCOHOL: WOULD NEVER DOZE

## 2023-12-18 ASSESSMENT — PAIN SCALES - GENERAL: PAINLEVEL: SEVERE PAIN (6)
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
## 2023-12-18 NOTE — NURSING NOTE
Is the patient currently in the state of [place]? YES    Visit mode:VIDEO    If the visit is dropped, the patient can be reconnected by: VIDEO VISIT: Text to cell phone:   Telephone Information:   Mobile [phone]       Will anyone else be joining the visit? NO  (If patient encounters technical issues they should call [phone]    How would you like to obtain your AVS? MyChart    Are changes needed to the allergy or medication list? Pt stated no med changes    Reason for visit: RECHECK    Jazmine ANDERSON   Has patient had flu shot for current/most recent flu season? If so, when? Yes: 10/19/23

## 2024-01-02 ENCOUNTER — TRANSFERRED RECORDS (OUTPATIENT)
Dept: HEALTH INFORMATION MANAGEMENT | Facility: CLINIC | Age: 54
End: 2024-01-02
Payer: COMMERCIAL

## 2024-01-05 DIAGNOSIS — L70.8 OTHER ACNE: ICD-10-CM

## 2024-01-09 RX ORDER — METHOCARBAMOL 750 MG/1
TABLET ORAL
Qty: 42.5 G | Refills: 2 | Status: SHIPPED | OUTPATIENT
Start: 2024-01-09 | End: 2024-05-14

## 2024-01-09 NOTE — TELEPHONE ENCOUNTER
BENZOYL PEROXIDE 5% GEL   Last Written Prescription Date:   12/6/2023  Last Fill Quantity: 42.5,   # refills: 0  Last Office Visit : 12/6/2023  Future Office visit:  12/11/2024  42.5 g,  2 Refills sent to onel Dave RN  Central Triage Red Flags/Med Refills  Topical Acne Medications Protocol [id number]/05/2024 12:35 AM   Protocol Details Patient is 12 years of age or older    Recent (12 mo) or future (30 days) visit within the authorizing provider's specialty    Medication is active on med list       To be filled at: [facility] [id number] [place] [facility] [place] - [address]

## 2024-01-14 DIAGNOSIS — E11.65 TYPE 2 DIABETES MELLITUS WITH HYPERGLYCEMIA, WITH LONG-TERM CURRENT USE OF INSULIN (H): Chronic | ICD-10-CM

## 2024-01-14 DIAGNOSIS — Z79.4 TYPE 2 DIABETES MELLITUS WITH HYPERGLYCEMIA, WITH LONG-TERM CURRENT USE OF INSULIN (H): Chronic | ICD-10-CM

## 2024-01-15 RX ORDER — GLIMEPIRIDE 2 MG/1
2 TABLET ORAL
Qty: 90 TABLET | Refills: 0 | Status: SHIPPED | OUTPATIENT
Start: 2024-01-15 | End: 2024-04-16

## 2024-01-16 DIAGNOSIS — I48.0 PAF (PAROXYSMAL ATRIAL FIBRILLATION) (H): ICD-10-CM

## 2024-01-16 DIAGNOSIS — E11.65 TYPE 2 DIABETES MELLITUS WITH HYPERGLYCEMIA, WITH LONG-TERM CURRENT USE OF INSULIN (H): ICD-10-CM

## 2024-01-16 DIAGNOSIS — Z79.4 TYPE 2 DIABETES MELLITUS WITH HYPERGLYCEMIA, WITH LONG-TERM CURRENT USE OF INSULIN (H): ICD-10-CM

## 2024-01-16 RX ORDER — INSULIN GLARGINE 100 [IU]/ML
70 INJECTION, SOLUTION SUBCUTANEOUS AT BEDTIME
Qty: 75 ML | Refills: 0 | Status: SHIPPED | OUTPATIENT
Start: 2024-01-16 | End: 2024-05-14

## 2024-01-16 RX ORDER — APIXABAN 5 MG/1
5 TABLET, FILM COATED ORAL 2 TIMES DAILY
Qty: 60 TABLET | Refills: 3 | Status: SHIPPED | OUTPATIENT
Start: 2024-01-16 | End: 2024-05-29

## 2024-01-21 DIAGNOSIS — E11.65 TYPE 2 DIABETES MELLITUS WITH HYPERGLYCEMIA, WITH LONG-TERM CURRENT USE OF INSULIN (H): ICD-10-CM

## 2024-01-21 DIAGNOSIS — Z79.4 TYPE 2 DIABETES MELLITUS WITH HYPERGLYCEMIA, WITH LONG-TERM CURRENT USE OF INSULIN (H): ICD-10-CM

## 2024-01-22 RX ORDER — BLOOD SUGAR DIAGNOSTIC
STRIP MISCELLANEOUS
Qty: 300 STRIP | Refills: 1 | Status: SHIPPED | OUTPATIENT
Start: 2024-01-22 | End: 2024-07-01

## 2024-02-07 ENCOUNTER — TRANSFERRED RECORDS (OUTPATIENT)
Dept: HEALTH INFORMATION MANAGEMENT | Facility: CLINIC | Age: 54
End: 2024-02-07
Payer: COMMERCIAL

## 2024-02-07 LAB
CREATININE (EXTERNAL): 0.9 MG/DL (ref 0.57–1)
GFR ESTIMATED (EXTERNAL): 77 ML/MIN/1.73
GLUCOSE (EXTERNAL): 185 MG/DL (ref 70–99)
POTASSIUM (EXTERNAL): 4.6 MMOL/L (ref 3.5–5.2)

## 2024-02-13 ENCOUNTER — TRANSFERRED RECORDS (OUTPATIENT)
Dept: HEALTH INFORMATION MANAGEMENT | Facility: CLINIC | Age: 54
End: 2024-02-13
Payer: COMMERCIAL

## 2024-02-15 ENCOUNTER — TRANSFERRED RECORDS (OUTPATIENT)
Dept: HEALTH INFORMATION MANAGEMENT | Facility: CLINIC | Age: 54
End: 2024-02-15
Payer: COMMERCIAL

## 2024-02-17 DIAGNOSIS — Z79.4 TYPE 2 DIABETES MELLITUS WITH HYPERGLYCEMIA, WITH LONG-TERM CURRENT USE OF INSULIN (H): ICD-10-CM

## 2024-02-17 DIAGNOSIS — E11.65 TYPE 2 DIABETES MELLITUS WITH HYPERGLYCEMIA, WITH LONG-TERM CURRENT USE OF INSULIN (H): ICD-10-CM

## 2024-02-19 RX ORDER — ORAL SEMAGLUTIDE 14 MG/1
TABLET ORAL DAILY
Qty: 90 TABLET | Refills: 0 | Status: SHIPPED | OUTPATIENT
Start: 2024-02-19 | End: 2024-05-03

## 2024-03-08 DIAGNOSIS — I10 ESSENTIAL HYPERTENSION WITH GOAL BLOOD PRESSURE LESS THAN 140/90: ICD-10-CM

## 2024-03-11 RX ORDER — LOSARTAN POTASSIUM 25 MG/1
12.5 TABLET ORAL DAILY
Qty: 45 TABLET | Refills: 0 | Status: SHIPPED | OUTPATIENT
Start: 2024-03-11 | End: 2024-06-11

## 2024-03-16 ENCOUNTER — HEALTH MAINTENANCE LETTER (OUTPATIENT)
Age: 54
End: 2024-03-16

## 2024-03-26 ENCOUNTER — TRANSFERRED RECORDS (OUTPATIENT)
Dept: HEALTH INFORMATION MANAGEMENT | Facility: CLINIC | Age: 54
End: 2024-03-26
Payer: COMMERCIAL

## 2024-03-28 ENCOUNTER — TELEPHONE (OUTPATIENT)
Dept: FAMILY MEDICINE | Facility: CLINIC | Age: 54
End: 2024-03-28
Payer: COMMERCIAL

## 2024-03-28 NOTE — TELEPHONE ENCOUNTER
[facility] calling, patient having colonoscopy 4/22/24.    Patient is on eliJOSE DE JESUS collins requesting 2 day hold prior to procedure.    If  provider does not approve the hold, then needs to have creatinine current in last 90 days in order to do the procedure.    Creatinine   Date Value Ref Range Status   04/12/2023 0.85 0.52 - 1.04 mg/dL Final   09/28/2020 0.86 0.52 - 1.04 mg/dL Final     Routed to PCP to advise.    Okay to leave detailed voicemail at the call back number provider for [facility].    Madeleine EPSTEIN RN  [facility] Triage

## 2024-03-29 NOTE — TELEPHONE ENCOUNTER
Spoke with Cherie with [facility]. Informed of provider's orders as written.    RAMIREZ RodríguezN RN  [facility]

## 2024-03-29 NOTE — TELEPHONE ENCOUNTER
Spoke with patient. Informed of holding instructions from [facility] of a 7 day hold for rybelsus and a 4 day hold of jardiance. Also informed patient that clarification regarding further cardiac testing will be up to cardiology, and message was sent to team to further clarify.    Called [facility]. Left detailed voice message on identified voicemail box regarding provider's message and informed that patient was updated, advised to return call at [phone] for any further questions.     RAMIREZ RodríguezN RN  [facility]

## 2024-03-29 NOTE — TELEPHONE ENCOUNTER
Should have cardiology determine if she needs testing or not.    Agree with [facility] regarding glp-1     [facility]

## 2024-03-29 NOTE — TELEPHONE ENCOUNTER
Patient calling for medication instructions; informed 2 day hold of eliquis     Also needing hlding instructions for rybelsus 14mg tablet daily and jardiance 25mg tablet daily       [facility] recommends a 7 day hold for rybelsus and a 4 day hold of jardiance - routing to PCP    [facility] also wanting to know if any cardiac testing should be completed prior to colonoscopy - routing to cardiology     Maria C Elliott RN  [facility]

## 2024-04-02 NOTE — TELEPHONE ENCOUNTER
Spoke to [facility], they are requesting a 2 day hold prior to colonoscopy.      Pt is scheduled to see Dr. Miller on 4/15.  This can be addressed at time of visit.  If additional testing needed [facility] would like to know.    RN will follow-up with [facility] after visit.    Vanda Pena RN  Cardiology Care Coordinator  [facility]  [phone] option 1

## 2024-04-15 ENCOUNTER — VIRTUAL VISIT (OUTPATIENT)
Dept: CARDIOLOGY | Facility: CLINIC | Age: 54
End: 2024-04-15
Payer: COMMERCIAL

## 2024-04-15 VITALS — HEIGHT: 64 IN | BODY MASS INDEX: 43.19 KG/M2 | WEIGHT: 253 LBS

## 2024-04-15 DIAGNOSIS — I48.0 PAROXYSMAL ATRIAL FIBRILLATION (H): Primary | ICD-10-CM

## 2024-04-15 PROCEDURE — 99212 OFFICE O/P EST SF 10 MIN: CPT | Mod: 95 | Performed by: INTERNAL MEDICINE

## 2024-04-15 ASSESSMENT — PAIN SCALES - GENERAL: PAINLEVEL: MODERATE PAIN (4)

## 2024-04-15 NOTE — NURSING NOTE
No other vitals to report today, pt states they were taken a couple weeks ago at infusion. 138/84 estimate     Is the patient currently in the state of [place]? YES    Visit mode:TELEPHONE    If the visit is dropped, the patient can be reconnected by: TELEPHONE VISIT: Phone number:   Telephone Information:   Mobile [phone]       Will anyone else be joining the visit? NO  (If patient encounters technical issues they should call [phone]    How would you like to obtain your AVS? MyChart    Are changes needed to the allergy or medication list? No    Patient denies any changes and states that all information remains accurate since last reviewed/verified.     Are refills needed on medications prescribed by this physician? YES, possibly Eliquis, Atenolol, Losartan per pt    Reason for visit: TEJAL Kay MA VVF

## 2024-04-15 NOTE — PATIENT INSTRUCTIONS
Thank you for coming to the [facility] [place]; please note the following instructions:    1. Dr. Leila Miller recommends a clinic follow up in 1 year with an echo prior.  The cardiology team will contact you to schedule when the time gets closer.          If you have any questions regarding your visit, please contact your care team:     CARDIOLOGY  TELEPHONE NUMBER   Vanda DUGAN., Registered Nurse  Sharifa NOVOA, Registered Nurse  Laya PAK, Registered Medical Assistant  Annamarie JORGE, Certified Medical Assistant  Shae ROJAS, Clinic Assistant [phone] (select option 1)    *After hours: [phone]   For Scheduling Appts:     [phone] (select option 1)    *After hours: [phone]   For the Device Clinic (Pacemakers and ICD's)  Karen GILLIAM, Registered Nurse   During business hours: [phone]    *After business hours:  [phone] (select option 4)      Normal test result notifications will be released via [employer] or mailed within 7 business days.  All other test results, will be communicated via telephone once reviewed by your cardiologist.    If you need a medication refill, please contact your pharmacy.  Please allow 3 business days for your refill to be completed.    As always, thank you for trusting us with your health care needs!
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
## 2024-04-16 DIAGNOSIS — E11.65 TYPE 2 DIABETES MELLITUS WITH HYPERGLYCEMIA, WITH LONG-TERM CURRENT USE OF INSULIN (H): Chronic | ICD-10-CM

## 2024-04-16 DIAGNOSIS — Z79.4 TYPE 2 DIABETES MELLITUS WITH HYPERGLYCEMIA, WITH LONG-TERM CURRENT USE OF INSULIN (H): Chronic | ICD-10-CM

## 2024-04-16 RX ORDER — GLIMEPIRIDE 2 MG/1
2 TABLET ORAL
Qty: 90 TABLET | Refills: 0 | Status: SHIPPED | OUTPATIENT
Start: 2024-04-16 | End: 2024-07-01

## 2024-04-16 NOTE — TELEPHONE ENCOUNTER
Ok to hold eliquis 1-2 days prior to colonoscopy per Dr. Miller.  Detailed message left with [facility].      Vanda Pena, RN  Cardiology Care Coordinator  [facility]  [phone] option 1

## 2024-04-22 ENCOUNTER — TRANSFERRED RECORDS (OUTPATIENT)
Dept: HEALTH INFORMATION MANAGEMENT | Facility: CLINIC | Age: 54
End: 2024-04-22
Payer: COMMERCIAL

## 2024-05-02 DIAGNOSIS — Z79.4 TYPE 2 DIABETES MELLITUS WITH HYPERGLYCEMIA, WITH LONG-TERM CURRENT USE OF INSULIN (H): ICD-10-CM

## 2024-05-02 DIAGNOSIS — E11.65 TYPE 2 DIABETES MELLITUS WITH HYPERGLYCEMIA, WITH LONG-TERM CURRENT USE OF INSULIN (H): ICD-10-CM

## 2024-05-03 RX ORDER — ORAL SEMAGLUTIDE 14 MG/1
TABLET ORAL DAILY
Qty: 90 TABLET | Refills: 0 | Status: SHIPPED | OUTPATIENT
Start: 2024-05-03 | End: 2024-07-22

## 2024-05-07 ENCOUNTER — TRANSFERRED RECORDS (OUTPATIENT)
Dept: HEALTH INFORMATION MANAGEMENT | Facility: CLINIC | Age: 54
End: 2024-05-07
Payer: COMMERCIAL

## 2024-05-07 LAB
ALT SERPL-CCNC: 19 IU/L (ref 0–32)
AST SERPL-CCNC: 16 IU/L (ref 0–40)

## 2024-05-13 ENCOUNTER — TELEPHONE (OUTPATIENT)
Dept: FAMILY MEDICINE | Facility: CLINIC | Age: 54
End: 2024-05-13
Payer: COMMERCIAL

## 2024-05-13 NOTE — TELEPHONE ENCOUNTER
Forms/Letter Request    Type of form/letter: Patient requires official letter head for diabetic supplies and list of medication     Do we have the form/letter: No    Who is the form from? Patient is requesting this for travel to [place]     Where did/will the form come from? No form     When is form/letter needed by: Patient leaves for her trip on Laine 3, 2024    How would you like the form/letter returned:  [facility]    Patient Notified form requests are processed in 5-7 business days:Yes    Could we send this information to you in [employer] or would you prefer to receive a phone call?:   Patient would prefer a phone call   Okay to leave a detailed message?: Yes at Cell number on file [phone]

## 2024-05-13 NOTE — LETTER
[facility]  [address]  [place] [address]  Phone: [phone]    May 15, 2024      Zaira Villatoro  [address] [id number]        Medication Permission Form        Patient's Name:  Zaira Villatoro    YOB: 1970    Zaira Villatoro is a patient of mine and I have refilled and managed the following prescriptions. In addition, there are several blood glucose brands that patient utilizes for her Type 2 Diabetes management.   Medication:    Current Outpatient Medications   Medication Sig Dispense Refill    cetirizine (ZYRTEC) 10 MG tablet Take 1 tablet (10 mg) by mouth daily      meclizine (ANTIVERT) 25 MG tablet Take 1 tablet (25 mg) by mouth 2 times daily as needed for dizziness      atenolol (TENORMIN) 50 MG tablet TAKE 1 TABLET BY MOUTH TWICE A  tablet 1    BD PEN NEEDLE SHAHNAZ 2ND GEN 32G X 4 MM miscellaneous USE 1 PEN NEEDLES TWICE DAILY 200 each 11    blood glucose (ONETOUCH ULTRA) test strip USE TO TEST BLOOD SUGAR 3 TIMES DAILY OR AS DIRECTED. TO ACCOMPANY: BLOOD GLUCOSE MONITOR BRANDS: PER INSURANCE. 300 strip 1    blood glucose calibration (NO BRAND SPECIFIED) solution To accompany: Blood Glucose Monitor Brands: per insurance. 1 each 0    blood glucose monitoring (NO BRAND SPECIFIED) meter device kit Use to test blood sugar 4 times daily or as directed. Preferred blood glucose meter OR supplies to accompany: Blood Glucose Monitor Brands: per insurance. 1 kit 0    cholecalciferol (VITAMIN D3) 125 mcg (5000 units) capsule Take by mouth daily      ELIQUIS ANTICOAGULANT 5 MG tablet TAKE 1 TABLET BY MOUTH TWICE A DAY 60 tablet 3    glimepiride (AMARYL) 2 MG tablet TAKE 1 TABLET (2 MG) BY MOUTH EVERY MORNING (BEFORE BREAKFAST) FOLLOWING INFUSION - ONE TIME, MONTH OF NOVEMBER ONLY. 90 tablet 0    glimepiride (AMARYL) 4 MG tablet TAKE 1 AND 1/2 TABLETS (6MG) WITH BREAKFAST. 135 tablet 3    insulin glargine (LANTUS SOLOSTAR) 100 UNIT/ML pen  INJECT 70 UNITS SUBCUTANEOUS AT BEDTIME 75 mL 1    JARDIANCE 25 MG TABS tablet TAKE 1 TABLET BY MOUTH EVERY DAY 90 tablet 0    Lancets (ONETOUCH DELICA PLUS [id number]) MISC USE AS DIRECTED 100 each 0    losartan (COZAAR) 25 MG tablet TAKE 1/2 TABLET BY MOUTH DAILY 45 tablet 0    order for DME Equipment being ordered: CPAP 9 c, 1 Units 0    RYBELSUS 14 MG tablet TAKE 1 TABLET BY MOUTH EVERY DAY 90 tablet 0    STATIN NOT PRESCRIBED, INTENTIONAL, 1 each continuous prn Statin not prescribed intentionally due to Refusal by patient and Other:LDL is below 100. 0 each 0    TYLENOL CAPS 500 MG OR 1 CAPSULE EVERY 4 HOURS AS NEEDED      vedolizumab (ENTYVIO) 60 MG/ML injection Every six weeks       No current facility-administered medications for this visit.     Sincerely,    Dr. Zaira Marcano [facility]

## 2024-05-14 DIAGNOSIS — E11.65 TYPE 2 DIABETES MELLITUS WITH HYPERGLYCEMIA, WITH LONG-TERM CURRENT USE OF INSULIN (H): ICD-10-CM

## 2024-05-14 DIAGNOSIS — Z79.4 TYPE 2 DIABETES MELLITUS WITH HYPERGLYCEMIA, WITH LONG-TERM CURRENT USE OF INSULIN (H): Chronic | ICD-10-CM

## 2024-05-14 DIAGNOSIS — Z79.4 TYPE 2 DIABETES MELLITUS WITH HYPERGLYCEMIA, WITH LONG-TERM CURRENT USE OF INSULIN (H): ICD-10-CM

## 2024-05-14 DIAGNOSIS — E11.65 TYPE 2 DIABETES MELLITUS WITH HYPERGLYCEMIA, WITH LONG-TERM CURRENT USE OF INSULIN (H): Chronic | ICD-10-CM

## 2024-05-14 RX ORDER — INSULIN GLARGINE 100 [IU]/ML
70 INJECTION, SOLUTION SUBCUTANEOUS AT BEDTIME
Qty: 75 ML | Refills: 1 | Status: SHIPPED | OUTPATIENT
Start: 2024-05-14 | End: 2024-07-22

## 2024-05-14 RX ORDER — MECLIZINE HYDROCHLORIDE 25 MG/1
25 TABLET ORAL 2 TIMES DAILY PRN
COMMUNITY
Start: 2024-05-14

## 2024-05-14 RX ORDER — EMPAGLIFLOZIN 25 MG/1
25 TABLET, FILM COATED ORAL DAILY
Qty: 90 TABLET | Refills: 0 | Status: SHIPPED | OUTPATIENT
Start: 2024-05-14 | End: 2024-07-22

## 2024-05-14 RX ORDER — CETIRIZINE HYDROCHLORIDE 10 MG/1
10 TABLET ORAL DAILY
COMMUNITY
Start: 2024-05-14 | End: 2024-07-22

## 2024-05-14 NOTE — TELEPHONE ENCOUNTER
"I called patient to get clarification.  She is traveling to [place] and she will need on letterhead all of the medications that she takes including generic names, how often she takes,etc.  Also will need letter to state any equipment including Cpap and diabetic supplies (needles, syringes, lancets, meter, etc).    The ordering prescriber of each medication or equipment must match what is on the pill bottle or equipment.  She thinks Cpap was ordered by another doctor and that the eliquis was maybe from her cardiologist?    She mentioned if maybe [place] had a \"travel health specialist\" who we could try to get help from?    Routing to triage to verify with patient that our medication list is correct before a letter can be done and make sure nothing is missed?    Yvette Carmona,     "

## 2024-05-14 NOTE — TELEPHONE ENCOUNTER
Med reconciliation completed over the phone with patient.     Discontinued medications from list that pt reports no longer taking.  Added OTC medication dramamine and cetirizine.     She also notes that her blood glucose supplies brands are as follows and she would like them listed on letter with brand names:  Lancets-- pt is using Accu-Check Fastclix  Strips are One Touch.  Blood glucose meter- One Touch ultra    Pt notes that she is still doing vedolizumab (ENTYVIO) infusions, but she won't need this on the list since she won't be bringing this.    Blood glucose calibration solution- using, but would not bring with, so does not need to be on list.    DME order for CPAP- notified her to contact sleep medicine for this.    Kimberlee Strickland RN

## 2024-05-14 NOTE — TELEPHONE ENCOUNTER
Not sure what she is asking for? A list of her medications (which can be printed and given to her) but what specifically about diabetic supplies is she needing on formal letter    [facility]

## 2024-05-15 NOTE — TELEPHONE ENCOUNTER
"Spoke with patient and notified. She said, \"I would like the letter in [employer] but I want to  a copy too\". I told already in [employer] but I will print and leave with  second floor for her to  when able. Agreeable. No further concerns. Thankful toward provider and team.     Thanks,  JEY Todd  [facility]     "

## 2024-05-17 ENCOUNTER — TELEPHONE (OUTPATIENT)
Dept: SLEEP MEDICINE | Facility: CLINIC | Age: 54
End: 2024-05-17
Payer: COMMERCIAL

## 2024-05-17 NOTE — LETTER
May 23, 2024    RE: CPAP Medical Device    Zaira Villatoro  [address] [address]        To Whom It May Concern,       Zaira Villatoro, date of birth 1970 is a patient managed by our clinic for obstructive sleep apnea. Treatment requires use of a continuous positive airway pressure (CPAP) machine. Due to the fragile nature of this equipment, it does need to be carried onto the plane.     If there are any questions, please contact our office at [phone].    Sincerely,      Felisha Funes RN

## 2024-05-17 NOTE — TELEPHONE ENCOUNTER
Forms/Letter Request    Type of form/letter: OTHER: Letter for travel       Do we have the form/letter: No    Who is the form from? Pt needs a letter explaining what DME she has and she needs it for her CPAP machine for when she travels to  on 6/3. Pt stated it needs to be on letter head and needs a live signature.    When is form/letter needed by: Before June 3rd    How would you like the form/letter returned:  and [employer]    Patient Notified form requests are processed in 5-7 business days:Yes    Could we send this information to you in [place] or would you prefer to receive a phone call?:   Patient would prefer a phone call   Okay to leave a detailed message?: Yes at Cell number on file:    Telephone Information:   Mobile [phone]

## 2024-05-17 NOTE — LETTER
May 24, 2024      Zaira Villatoro  [address] [address]    RE: CPAP Medical Device        To Whom It May Concern,       Zaira Villatoro, date of birth 1970 is a patient managed by our clinic for obstructive sleep apnea. Treatment requires use of a continuous positive airway pressure (CPAP) machine. Due to the fragile nature of this equipment, it does need to be carried onto the plane. Zaira will also require use of the machine during hours of sleep on flights with extended hours.     If there are any questions, please contact our office at [phone].    Sincerely,      Felisha Funes RN

## 2024-05-21 ENCOUNTER — TELEPHONE (OUTPATIENT)
Dept: FAMILY MEDICINE | Facility: CLINIC | Age: 54
End: 2024-05-21
Payer: COMMERCIAL

## 2024-05-21 NOTE — TELEPHONE ENCOUNTER
Patient Quality Outreach    Patient is due for the following:   Diabetes -  A1C, Eye Exam, Diabetic Follow-Up Visit, and Foot Exam  Physical Preventive Adult Physical    Next Steps:   Schedule a Adult Preventative    Type of outreach:    Sent [employer] message.      Questions for provider review:    None           Pro Steinberg MA

## 2024-05-24 NOTE — TELEPHONE ENCOUNTER
Letter and current CPAP prescription printed and placed up front for patient. Copy of CPA prescription also sent in [employer]    Patient notified.     Felisha SOUZA RN  [facility]

## 2024-05-25 ENCOUNTER — HEALTH MAINTENANCE LETTER (OUTPATIENT)
Age: 54
End: 2024-05-25

## 2024-05-29 DIAGNOSIS — I48.0 PAF (PAROXYSMAL ATRIAL FIBRILLATION) (H): ICD-10-CM

## 2024-05-29 RX ORDER — APIXABAN 5 MG/1
5 TABLET, FILM COATED ORAL 2 TIMES DAILY
Qty: 180 TABLET | Refills: 1 | Status: SHIPPED | OUTPATIENT
Start: 2024-05-29 | End: 2024-07-22

## 2024-06-05 DIAGNOSIS — I10 ESSENTIAL (PRIMARY) HYPERTENSION: ICD-10-CM

## 2024-06-05 RX ORDER — ATENOLOL 50 MG/1
TABLET ORAL
Qty: 180 TABLET | Refills: 0 | Status: SHIPPED | OUTPATIENT
Start: 2024-06-05 | End: 2024-07-22

## 2024-06-11 DIAGNOSIS — I10 ESSENTIAL HYPERTENSION WITH GOAL BLOOD PRESSURE LESS THAN 140/90: ICD-10-CM

## 2024-06-11 RX ORDER — LOSARTAN POTASSIUM 25 MG/1
TABLET ORAL
Qty: 45 TABLET | Refills: 0 | Status: SHIPPED | OUTPATIENT
Start: 2024-06-11 | End: 2024-07-06

## 2024-06-18 ENCOUNTER — TRANSFERRED RECORDS (OUTPATIENT)
Dept: HEALTH INFORMATION MANAGEMENT | Facility: CLINIC | Age: 54
End: 2024-06-18
Payer: COMMERCIAL

## 2024-06-26 ENCOUNTER — MYC MEDICAL ADVICE (OUTPATIENT)
Dept: MIDWIFE SERVICES | Facility: CLINIC | Age: 54
End: 2024-06-26

## 2024-06-26 ENCOUNTER — OFFICE VISIT (OUTPATIENT)
Dept: MIDWIFE SERVICES | Facility: CLINIC | Age: 54
End: 2024-06-26
Payer: COMMERCIAL

## 2024-06-26 VITALS
SYSTOLIC BLOOD PRESSURE: 132 MMHG | DIASTOLIC BLOOD PRESSURE: 84 MMHG | HEART RATE: 77 BPM | WEIGHT: 254 LBS | TEMPERATURE: 98.4 F | BODY MASS INDEX: 43.6 KG/M2

## 2024-06-26 DIAGNOSIS — N89.8 VAGINAL ITCHING: Primary | ICD-10-CM

## 2024-06-26 LAB
ALBUMIN UR-MCNC: NEGATIVE MG/DL
ANION GAP SERPL CALCULATED.3IONS-SCNC: 13 MMOL/L (ref 7–15)
APPEARANCE UR: ABNORMAL
BACTERIA #/AREA URNS HPF: ABNORMAL /HPF
BILIRUB UR QL STRIP: NEGATIVE
BUN SERPL-MCNC: 11.2 MG/DL (ref 6–20)
CALCIUM SERPL-MCNC: 9.6 MG/DL (ref 8.6–10)
CHLORIDE SERPL-SCNC: 102 MMOL/L (ref 98–107)
CLUE CELLS: ABNORMAL
COLOR UR AUTO: YELLOW
CREAT SERPL-MCNC: 0.84 MG/DL (ref 0.51–0.95)
DEPRECATED HCO3 PLAS-SCNC: 25 MMOL/L (ref 22–29)
EGFRCR SERPLBLD CKD-EPI 2021: 83 ML/MIN/1.73M2
FSH SERPL IRP2-ACNC: 29.8 MIU/ML
GLUCOSE SERPL-MCNC: 216 MG/DL (ref 70–99)
GLUCOSE UR STRIP-MCNC: 500 MG/DL
HBA1C MFR BLD: 7.9 % (ref 0–5.6)
HGB UR QL STRIP: ABNORMAL
KETONES UR STRIP-MCNC: NEGATIVE MG/DL
LEUKOCYTE ESTERASE UR QL STRIP: ABNORMAL
NITRATE UR QL: NEGATIVE
PH UR STRIP: 5.5 [PH] (ref 5–7)
POTASSIUM SERPL-SCNC: 4.5 MMOL/L (ref 3.4–5.3)
RBC #/AREA URNS AUTO: ABNORMAL /HPF
SODIUM SERPL-SCNC: 140 MMOL/L (ref 135–145)
SP GR UR STRIP: 1.01 (ref 1–1.03)
SQUAMOUS #/AREA URNS AUTO: ABNORMAL /LPF
TRICHOMONAS, WET PREP: ABNORMAL
UROBILINOGEN UR STRIP-ACNC: 0.2 E.U./DL
WBC #/AREA URNS AUTO: ABNORMAL /HPF
WBC'S/HIGH POWER FIELD, WET PREP: ABNORMAL
YEAST, WET PREP: ABNORMAL

## 2024-06-26 PROCEDURE — 87210 SMEAR WET MOUNT SALINE/INK: CPT | Performed by: ADVANCED PRACTICE MIDWIFE

## 2024-06-26 PROCEDURE — 83001 ASSAY OF GONADOTROPIN (FSH): CPT | Performed by: ADVANCED PRACTICE MIDWIFE

## 2024-06-26 PROCEDURE — 83036 HEMOGLOBIN GLYCOSYLATED A1C: CPT | Performed by: ADVANCED PRACTICE MIDWIFE

## 2024-06-26 PROCEDURE — 87086 URINE CULTURE/COLONY COUNT: CPT | Performed by: ADVANCED PRACTICE MIDWIFE

## 2024-06-26 PROCEDURE — 81001 URINALYSIS AUTO W/SCOPE: CPT | Performed by: ADVANCED PRACTICE MIDWIFE

## 2024-06-26 PROCEDURE — 87106 FUNGI IDENTIFICATION YEAST: CPT | Performed by: ADVANCED PRACTICE MIDWIFE

## 2024-06-26 PROCEDURE — 36415 COLL VENOUS BLD VENIPUNCTURE: CPT | Performed by: ADVANCED PRACTICE MIDWIFE

## 2024-06-26 PROCEDURE — 87491 CHLMYD TRACH DNA AMP PROBE: CPT | Performed by: ADVANCED PRACTICE MIDWIFE

## 2024-06-26 PROCEDURE — 80048 BASIC METABOLIC PNL TOTAL CA: CPT | Performed by: ADVANCED PRACTICE MIDWIFE

## 2024-06-26 PROCEDURE — 87102 FUNGUS ISOLATION CULTURE: CPT | Performed by: ADVANCED PRACTICE MIDWIFE

## 2024-06-26 PROCEDURE — 87591 N.GONORRHOEAE DNA AMP PROB: CPT | Performed by: ADVANCED PRACTICE MIDWIFE

## 2024-06-26 PROCEDURE — 99204 OFFICE O/P NEW MOD 45 MIN: CPT | Performed by: ADVANCED PRACTICE MIDWIFE

## 2024-06-26 RX ORDER — FLUCONAZOLE 150 MG/1
150 TABLET ORAL
Qty: 5 TABLET | Refills: 0 | Status: SHIPPED | OUTPATIENT
Start: 2024-06-26 | End: 2024-07-08

## 2024-06-26 NOTE — PROGRESS NOTES
S: Patient presents with c/o ongoing vaginal itching and irritation for about two years now. She has been seen a couple of times and sent to other providers. Has once been told that it was perimenopause and left clinic without any testing done. Reports persistent discomfort and itching that can get better and worse but doesn't resolve. Has never had a wet prep done. Open to all testing as her goal is to have symptoms resolve.     O:   /84   Pulse 77   Temp 98.4  F (36.9  C)   Wt 115.2 kg (254 lb)   BMI 43.60 kg/m      General: well appearing, in NAD  Cardiac: well perfused  Respiratory: non-labored breathing on room air   Psych: alert and oriented   Pelvic exam: bilateral labia appear red and irritated. There are multiple lines of excoriation extending from clitoral edmondson and down both labia. Tissue appears normal on inner labia and into vagina. Yellow discharge noted, no odor.     Wet prep: negative, 4+ WBCS  Yeast culture: 1+ yeast (prelim result)  UA: cloudy appearance, 500 glucose, trace blood, trace LE  UA micro: few bacteria, few squamous epis  UC: 10,000-50,000 mixture of urogenital kyler  HgbA1c: 7.9  Glucose on BMP: 216    A/P:   (N89.8) Vaginal itching  (primary encounter diagnosis)  Comment:   Plan: Yeast culture, UA reflex to Microscopic - lab         collect, Urine Culture Aerobic Bacterial - lab         collect, Wet prep - Clinic Collect, Hemoglobin         A1c, Basic metabolic panel  (Ca, Cl, CO2,         Creat, Gluc, K, Na, BUN), Follicle stimulating         hormone, NEISSERIA GONORRHOEA PCR, CHLAMYDIA         TRACHOMATIS PCR, fluconazole (DIFLUCAN) 150 MG         tablet, Urine Microscopic Exam          Saw Lichen Sclerosus noted in chart from 2020. Patient did not recall this being discussed. She does have Clobetasol cream at home.     Discussed trying to keep vulvar area dry as much as possible. Use blow dryer when out of shower to dry off. Sleep without underwear.     Initial plan to  start with oral diflucan as suspect yeast levels are high due to uncontrolled diabetes and elevated sugar levels.     Restart clobetasol daily    Plan for appointment with OB/GYN for second opinion, may consider biopsy if not responding to first line treatments.     Toshia Nevarez CNM

## 2024-06-27 LAB
BACTERIA UR CULT: NORMAL
C TRACH DNA SPEC QL NAA+PROBE: NEGATIVE
N GONORRHOEA DNA SPEC QL NAA+PROBE: NEGATIVE

## 2024-06-29 ENCOUNTER — TELEPHONE (OUTPATIENT)
Dept: OBGYN | Facility: CLINIC | Age: 54
End: 2024-06-29
Payer: COMMERCIAL

## 2024-06-29 DIAGNOSIS — I10 ESSENTIAL (PRIMARY) HYPERTENSION: ICD-10-CM

## 2024-06-29 DIAGNOSIS — B37.31 YEAST INFECTION INVOLVING THE VAGINA AND SURROUNDING AREA: Primary | ICD-10-CM

## 2024-06-29 DIAGNOSIS — E11.65 TYPE 2 DIABETES MELLITUS WITH HYPERGLYCEMIA, WITH LONG-TERM CURRENT USE OF INSULIN (H): Chronic | ICD-10-CM

## 2024-06-29 DIAGNOSIS — I10 ESSENTIAL HYPERTENSION WITH GOAL BLOOD PRESSURE LESS THAN 140/90: ICD-10-CM

## 2024-06-29 DIAGNOSIS — Z79.4 TYPE 2 DIABETES MELLITUS WITH HYPERGLYCEMIA, WITH LONG-TERM CURRENT USE OF INSULIN (H): Chronic | ICD-10-CM

## 2024-06-29 DIAGNOSIS — L30.9 VULVAR DERMATITIS: ICD-10-CM

## 2024-06-29 RX ORDER — CLOBETASOL PROPIONATE 0.5 MG/G
OINTMENT TOPICAL 2 TIMES DAILY
Qty: 45 G | Refills: 1 | Status: SHIPPED | OUTPATIENT
Start: 2024-06-29

## 2024-06-29 RX ORDER — FLUCONAZOLE 150 MG/1
150 TABLET ORAL
Qty: 5 TABLET | Refills: 0 | Status: SHIPPED | OUTPATIENT
Start: 2024-06-29 | End: 2024-07-12

## 2024-06-30 LAB — BACTERIA VAG AEROBE CULT: ABNORMAL

## 2024-07-01 ENCOUNTER — TELEPHONE (OUTPATIENT)
Dept: MIDWIFE SERVICES | Facility: CLINIC | Age: 54
End: 2024-07-01
Payer: COMMERCIAL

## 2024-07-01 RX ORDER — GLIMEPIRIDE 4 MG/1
TABLET ORAL
Qty: 135 TABLET | Refills: 3 | OUTPATIENT
Start: 2024-07-01

## 2024-07-01 RX ORDER — ATENOLOL 50 MG/1
TABLET ORAL
Qty: 180 TABLET | Refills: 0 | OUTPATIENT
Start: 2024-07-01

## 2024-07-01 RX ORDER — EMPAGLIFLOZIN 25 MG/1
25 TABLET, FILM COATED ORAL DAILY
Qty: 90 TABLET | Refills: 0 | OUTPATIENT
Start: 2024-07-01

## 2024-07-01 RX ORDER — GLIMEPIRIDE 2 MG/1
2 TABLET ORAL
Qty: 90 TABLET | Refills: 0 | Status: SHIPPED | OUTPATIENT
Start: 2024-07-01 | End: 2024-07-22

## 2024-07-01 RX ORDER — LOSARTAN POTASSIUM 25 MG/1
TABLET ORAL
Qty: 45 TABLET | Refills: 0 | OUTPATIENT
Start: 2024-07-01

## 2024-07-01 RX ORDER — BLOOD SUGAR DIAGNOSTIC
STRIP MISCELLANEOUS
Qty: 300 STRIP | Refills: 1 | Status: SHIPPED | OUTPATIENT
Start: 2024-07-01 | End: 2024-07-22

## 2024-07-01 NOTE — TELEPHONE ENCOUNTER
Called and LVM for patient to call back to schedule with MD (not Midwife or CNP) for evaluation of vulvar itching/irritation. Dr. He available 7/10.

## 2024-07-03 NOTE — TELEPHONE ENCOUNTER
General Call    Contacts       Contact Date/Time Type Contact Phone/Fax    06/29/2024 06:39 PM CDT Interface (Incoming) [facility] [address] [place] [facility] [address] [phone]    07/03/2024 04:13 PM CDT Phone (Outgoing) Zaira Villatoro (Self) [phone] (M)    Left Message           Reason for Call:      Losartan (COZAAR) 25 MG tablet [Pharmacy Med Name: LOSARTAN POTASSIUM 25 MG TAB] 45 tablet 0 7/1/2024 -- No   Request refused: Patient needs appointment   Sig: TAKE HALF A TABLET (12.5MG) BY MOUTH DAILY   Class: E-Prescribe   Order: [id number]          Jul 22, 2024 1:30 PM  (Arrive by 1:10 PM)  Provider Visit with LINUS Fulton CNP  [facility] ([facility] ) [address] [place] [id number]  [phone]        [facility] [address] [place] [facility] [address]    Could we send this information to you in [employer] or would you prefer to receive a phone call?:   Patient would prefer a phone call   Okay to leave a detailed message?: Yes at Cell number on file:    Telephone Information:   Mobile [phone]

## 2024-07-03 NOTE — TELEPHONE ENCOUNTER
Left message for pt to schedule an appt by phone or [employer].  Pro Steinberg MA on 7/3/2024 at 4:15 PM

## 2024-07-05 RX ORDER — LOSARTAN POTASSIUM 25 MG/1
TABLET ORAL
Qty: 45 TABLET | Refills: 0 | OUTPATIENT
Start: 2024-07-05

## 2024-07-05 NOTE — TELEPHONE ENCOUNTER
General Call     Contacts         Contact Date/Time Type Contact Phone/Fax     06/29/2024 06:39 PM CDT Interface (Incoming) [facility] [address] [place] [facility] [address] [phone]     07/03/2024 04:13 PM CDT Phone (Outgoing) Zaira Villatoro (Self) [phone] (M)     Left Message              Reason for Call:       Losartan (COZAAR) 25 MG tablet [Pharmacy Med Name: LOSARTAN POTASSIUM 25 MG TAB] 45 tablet 0 7/1/2024 -- No   Request refused: Patient needs appointment   Sig: TAKE HALF A TABLET (12.5MG) BY MOUTH DAILY   Class: E-Prescribe   Order: [id number]             Jul 22, 2024 1:30 PM  (Arrive by 1:10 PM)  Provider Visit with LINUS Fulton CNP  [facility] ([facility] ) [address] [place] [address]  [phone]          [facility] [address] [place] [facility] [address]     Could we send this information to you in [employer] or would you prefer to receive a phone call?:   Patient would prefer a phone call   Okay to leave a detailed message?: Yes at Cell number on file:        Telephone Information:   Mobile [phone]

## 2024-07-08 RX ORDER — LOSARTAN POTASSIUM 25 MG/1
12.5 TABLET ORAL DAILY
Qty: 45 TABLET | Refills: 0 | Status: SHIPPED | OUTPATIENT
Start: 2024-07-08 | End: 2024-07-22

## 2024-07-09 ENCOUNTER — TELEPHONE (OUTPATIENT)
Dept: SLEEP MEDICINE | Facility: CLINIC | Age: 54
End: 2024-07-09
Payer: COMMERCIAL

## 2024-07-09 DIAGNOSIS — G47.33 OSA (OBSTRUCTIVE SLEEP APNEA): Primary | Chronic | ICD-10-CM

## 2024-07-09 NOTE — TELEPHONE ENCOUNTER
"      Order/Referral Request    Who is requesting: Pt    Orders being requested: Pt is receiving the following error msg on cpap machine \"motor life has been exceeded\" Pt's machine is apprx 7 yrs old.  does not have any appts available until Jan, 2025     Plz also turn on communication with Ramana Palencia thru [employer] so that Pt can then send him a message     Reason service is needed/diagnosis: error msg on machine    When are orders needed by: asap, new cpap machine    Has this been discussed with Provider: No    Does patient have a preference on a Group/Provider/Facility? [facility]    Does patient have an appointment scheduled?: No. Pt wanting to see Ramana Palencia and no appts until Jan, 2025    Where to send orders:   Home [facility]    Could we send this information to you in [employer] or would you prefer to receive a phone call?:   Patient would like to be contacted via [employer]    "

## 2024-07-12 DIAGNOSIS — B37.31 YEAST INFECTION INVOLVING THE VAGINA AND SURROUNDING AREA: ICD-10-CM

## 2024-07-12 NOTE — TELEPHONE ENCOUNTER
Pt requesting script refill (not pharmacy)    Requested Prescriptions   Pending Prescriptions Disp Refills    fluconazole (DIFLUCAN) 150 MG tablet 5 tablet 0     Sig: Take 1 tablet (150 mg) by mouth every 3 days       Antifungal Agents Failed - 7/12/2024 11:40 AM        Failed - Always Fail Criteria        Passed - Medication is active on med list        Passed - Recent (12 mo) or future (90 days) visit within the authorizing provider's specialty     The patient must have completed an in-person or virtual visit within the past 12 months or has a future visit scheduled within the next 90 days with the authorizing provider s specialty.  Urgent care and e-visits do not quality as an office visit for this protocol.             Last Written Prescription Date:  6/29/24  Last Fill Quantity: 5,  # refills: 0   Last office visit: 6/26/24 with Toshia JACOBSEN CNM  Future Office Visit:   Next 5 appointments (look out 90 days)      Jul 22, 2024 1:30 PM  (Arrive by 1:10 PM)  Provider Visit with LINUS Fulton CNP  [facility] ([facility] ) [address] [address] [phone]           Routing to provider to advise.  Leanna Coley RN on 7/12/2024 at 12:58 PM

## 2024-07-15 RX ORDER — FLUCONAZOLE 150 MG/1
150 TABLET ORAL
Qty: 5 TABLET | Refills: 0 | Status: SHIPPED | OUTPATIENT
Start: 2024-07-15 | End: 2024-07-22

## 2024-07-17 ENCOUNTER — DOCUMENTATION ONLY (OUTPATIENT)
Dept: SLEEP MEDICINE | Facility: CLINIC | Age: 54
End: 2024-07-17
Payer: COMMERCIAL

## 2024-07-17 DIAGNOSIS — E66.01 MORBID OBESITY DUE TO EXCESS CALORIES (H): Chronic | ICD-10-CM

## 2024-07-17 DIAGNOSIS — G47.33 OBSTRUCTIVE SLEEP APNEA: ICD-10-CM

## 2024-07-17 DIAGNOSIS — G47.33 OSA (OBSTRUCTIVE SLEEP APNEA): Primary | Chronic | ICD-10-CM

## 2024-07-17 DIAGNOSIS — F51.04 CHRONIC INSOMNIA: ICD-10-CM

## 2024-07-18 NOTE — PROGRESS NOTES
Patient was offered choice of vendor and chose [facility].  Patient Zaira Villatoro was set up at [place] on July 18, 2024. Patient received a Resmed Airsense 11 Pressures were set at  8 cm H2O.   Patient s ramp is 5 cm H2O for Auto and FLEX/EPR is 2.  Patient received a Resmed Mask name: AIRFIT P10  Pillow mask size Standard, heated tubing and heated humidifier.  Patient has the following compliance requirements: none    Lilia Cavanaugh

## 2024-07-22 ENCOUNTER — OFFICE VISIT (OUTPATIENT)
Dept: INTERNAL MEDICINE | Facility: CLINIC | Age: 54
End: 2024-07-22
Payer: COMMERCIAL

## 2024-07-22 VITALS
DIASTOLIC BLOOD PRESSURE: 80 MMHG | WEIGHT: 259 LBS | SYSTOLIC BLOOD PRESSURE: 131 MMHG | HEIGHT: 64 IN | RESPIRATION RATE: 18 BRPM | BODY MASS INDEX: 44.22 KG/M2 | TEMPERATURE: 98.8 F | OXYGEN SATURATION: 97 % | HEART RATE: 70 BPM

## 2024-07-22 DIAGNOSIS — E11.65 TYPE 2 DIABETES MELLITUS WITH HYPERGLYCEMIA, WITH LONG-TERM CURRENT USE OF INSULIN (H): Primary | ICD-10-CM

## 2024-07-22 DIAGNOSIS — Z79.4 TYPE 2 DIABETES MELLITUS WITH HYPERGLYCEMIA, WITH LONG-TERM CURRENT USE OF INSULIN (H): Primary | ICD-10-CM

## 2024-07-22 DIAGNOSIS — J30.2 SEASONAL ALLERGIES: ICD-10-CM

## 2024-07-22 DIAGNOSIS — I10 ESSENTIAL HYPERTENSION WITH GOAL BLOOD PRESSURE LESS THAN 140/90: ICD-10-CM

## 2024-07-22 DIAGNOSIS — R73.09 ELEVATED HEMOGLOBIN A1C: ICD-10-CM

## 2024-07-22 DIAGNOSIS — I48.0 PAF (PAROXYSMAL ATRIAL FIBRILLATION) (H): ICD-10-CM

## 2024-07-22 DIAGNOSIS — I10 ESSENTIAL (PRIMARY) HYPERTENSION: ICD-10-CM

## 2024-07-22 DIAGNOSIS — B37.31 YEAST INFECTION INVOLVING THE VAGINA AND SURROUNDING AREA: ICD-10-CM

## 2024-07-22 PROCEDURE — 99214 OFFICE O/P EST MOD 30 MIN: CPT

## 2024-07-22 RX ORDER — ATENOLOL 50 MG/1
TABLET ORAL
Qty: 180 TABLET | Refills: 3 | Status: SHIPPED | OUTPATIENT
Start: 2024-07-22

## 2024-07-22 RX ORDER — ORAL SEMAGLUTIDE 14 MG/1
14 TABLET ORAL DAILY
Qty: 90 TABLET | Refills: 3 | Status: SHIPPED | OUTPATIENT
Start: 2024-07-22

## 2024-07-22 RX ORDER — CETIRIZINE HYDROCHLORIDE 10 MG/1
10 TABLET ORAL DAILY
Qty: 90 TABLET | Refills: 3 | Status: SHIPPED | OUTPATIENT
Start: 2024-07-22

## 2024-07-22 RX ORDER — BLOOD SUGAR DIAGNOSTIC
STRIP MISCELLANEOUS
Qty: 300 STRIP | Refills: 3 | Status: SHIPPED | OUTPATIENT
Start: 2024-07-22

## 2024-07-22 RX ORDER — LOSARTAN POTASSIUM 25 MG/1
12.5 TABLET ORAL DAILY
Qty: 45 TABLET | Refills: 3 | Status: SHIPPED | OUTPATIENT
Start: 2024-07-22

## 2024-07-22 RX ORDER — GLIMEPIRIDE 2 MG/1
2 TABLET ORAL
Qty: 90 TABLET | Refills: 0 | Status: SHIPPED | OUTPATIENT
Start: 2024-07-22

## 2024-07-22 RX ORDER — PEN NEEDLE, DIABETIC 32GX 5/32"
NEEDLE, DISPOSABLE MISCELLANEOUS
Qty: 200 EACH | Refills: 11 | Status: SHIPPED | OUTPATIENT
Start: 2024-07-22

## 2024-07-22 RX ORDER — GLIMEPIRIDE 4 MG/1
TABLET ORAL
Qty: 135 TABLET | Refills: 3 | Status: SHIPPED | OUTPATIENT
Start: 2024-07-22

## 2024-07-22 RX ORDER — INSULIN GLARGINE 100 [IU]/ML
70 INJECTION, SOLUTION SUBCUTANEOUS AT BEDTIME
Qty: 75 ML | Refills: 1 | Status: SHIPPED | OUTPATIENT
Start: 2024-07-22

## 2024-07-22 RX ORDER — FLUCONAZOLE 150 MG/1
150 TABLET ORAL
Qty: 5 TABLET | Refills: 2 | Status: SHIPPED | OUTPATIENT
Start: 2024-07-22

## 2024-07-22 NOTE — PROGRESS NOTES
Assessment & Plan     (E11.65,  Z79.4) Type 2 diabetes mellitus with hyperglycemia, with long-term current use of insulin (H)  (primary encounter diagnosis)  Comment: Patient seen in clinic for refill of medications and eye referral.   Plan: Adult Eye  Referral, insulin pen         needle (BD PEN NEEDLE SHAHNAZ 2ND GEN) 32G X 4 MM         miscellaneous, glimepiride (AMARYL) 2 MG         tablet, glimepiride (AMARYL) 4 MG tablet,         insulin glargine (LANTUS SOLOSTAR) 100 UNIT/ML         pen, empagliflozin (JARDIANCE) 25 MG TABS         tablet, blood glucose (ONETOUCH ULTRA) test         strip, Semaglutide (RYBELSUS) 14 MG tablet,   Prescription sent to pharmacy         REVIEW OF HEALTH MAINTENANCE PROTOCOL ORDERS            (I48.0) PAF (paroxysmal atrial fibrillation) (H)  Comment: Chronic, stable. Discussed need for refill.  Plan: apixaban ANTICOAGULANT (ELIQUIS ANTICOAGULANT)         5 MG tablet        Prescription sent to pharmacy     (I10) Essential (primary) hypertension  Comment: Chronic, stable. Discussed need for refill.  Plan: atenolol (TENORMIN) 50 MG tablet        Prescription sent to pharmacy     (B37.31) Yeast infection involving the vagina and surrounding area  Comment: Discussed need for medication refill.   Plan: fluconazole (DIFLUCAN) 150 MG tablet        Prescription sent to pharmacy     (I10) Essential hypertension with goal blood pressure less than 140/90  Comment: Chronic, stable. Discussed need for refill.  Plan: losartan (COZAAR) 25 MG tablet              Prescription sent to pharmacy     (J30.2) Seasonal allergies  Comment: Chronic, stable. Discussed need for refill.  Plan: cetirizine (ZYRTEC) 10 MG tablet        Prescription sent to pharmacy     (R73.09) Elevated hemoglobin A1c  Comment: Discussed rechecking A1C in 3 months due to elevated A1C when last checked. Patient was traveling and feels that this is what caused increase.   Plan: Hemoglobin A1c        Future 3 months  "          BMI  Estimated body mass index is 44.46 kg/m  as calculated from the following:    Height as of this encounter: 1.626 m (5' 4\").    Weight as of this encounter: 117.5 kg (259 lb).         CONSULTATION/REFERRAL to eye exam    Subjective   Zaira is a 54 year old, presenting for the following health issues:  Recheck Medication      7/22/2024     1:16 PM   Additional Questions   Roomed by Flori ONEIL     History of Present Illness       Reason for visit:  I was told I had to come in to get refills which make no sense to me but ok    She eats 0-1 servings of fruits and vegetables daily.She consumes 0 sweetened beverage(s) daily.She exercises with enough effort to increase her heart rate 10 to 19 minutes per day.  She exercises with enough effort to increase her heart rate 3 or less days per week.   She is taking medications regularly.                 Review of Systems  Constitutional, HEENT, cardiovascular, pulmonary, gi and gu systems are negative, except as otherwise noted.      Objective    There were no vitals taken for this visit.  There is no height or weight on file to calculate BMI.  Physical Exam   GENERAL: alert and no distress  NECK: no adenopathy, no asymmetry, masses, or scars  RESP: lungs clear to auscultation - no rales, rhonchi or wheezes  CV: regular rate and rhythm, normal S1 S2, no S3 or S4, no murmur, click or rub, no peripheral edema  ABDOMEN: soft, nontender, no hepatosplenomegaly, no masses and bowel sounds normal  MS: no gross musculoskeletal defects noted, no edema          Signed Electronically by: LINUS Fulton CNP  "

## 2024-07-25 ENCOUNTER — MYC MEDICAL ADVICE (OUTPATIENT)
Dept: MIDWIFE SERVICES | Facility: CLINIC | Age: 54
End: 2024-07-25
Payer: COMMERCIAL

## 2024-07-26 ENCOUNTER — TELEPHONE (OUTPATIENT)
Dept: MIDWIFE SERVICES | Facility: CLINIC | Age: 54
End: 2024-07-26
Payer: COMMERCIAL

## 2024-07-26 DIAGNOSIS — B37.31 YEAST INFECTION INVOLVING THE VAGINA AND SURROUNDING AREA: Primary | ICD-10-CM

## 2024-07-26 RX ORDER — FLUCONAZOLE 150 MG/1
150 TABLET ORAL
Qty: 30 TABLET | Refills: 1 | Status: SHIPPED | OUTPATIENT
Start: 2024-07-26 | End: 2024-09-23

## 2024-07-26 NOTE — TELEPHONE ENCOUNTER
Telephone call to patient to check in on vulvar itching/irritation symptoms. She notices general improvement but can tell when it is coming time for another dose of Diflucan as the 12-24 hours prior to her time to take the dose, the itching returns. Once she takes the next dose it improves so it seems the itching is more related to yeast vs lichen as she can directly relate symptom improvement to Diflucan. Discussed taking Diflucan every other day to see if this improves symptoms even more. She is open to this. Have discussed case with OB colleague at [place] and would like for Zaira to follow up with this provider. Will send chart to nurse team to help get scheduled. Zaira verbalizes understanding and agrees to plan. Toshia Nevarez CNM

## 2024-07-30 ENCOUNTER — TRANSFERRED RECORDS (OUTPATIENT)
Dept: HEALTH INFORMATION MANAGEMENT | Facility: CLINIC | Age: 54
End: 2024-07-30
Payer: COMMERCIAL

## 2024-07-31 NOTE — TELEPHONE ENCOUNTER
She shouldn't need any additional refills as I sent for 30 tablets with one additional refill. She should be good until her appointment on 9/3 but can reach out with any questions or concerns before that time. Thanks, Toshia Nevarez CNM

## 2024-09-03 ENCOUNTER — OFFICE VISIT (OUTPATIENT)
Dept: OBGYN | Facility: CLINIC | Age: 54
End: 2024-09-03
Payer: COMMERCIAL

## 2024-09-03 VITALS
SYSTOLIC BLOOD PRESSURE: 131 MMHG | TEMPERATURE: 97.5 F | OXYGEN SATURATION: 98 % | DIASTOLIC BLOOD PRESSURE: 80 MMHG | HEART RATE: 71 BPM | WEIGHT: 263.1 LBS | BODY MASS INDEX: 45.16 KG/M2

## 2024-09-03 DIAGNOSIS — B96.89 BV (BACTERIAL VAGINOSIS): ICD-10-CM

## 2024-09-03 DIAGNOSIS — Z13.9 ENCOUNTER FOR HEALTH-RELATED SCREENING: ICD-10-CM

## 2024-09-03 DIAGNOSIS — K76.0 FATTY LIVER: ICD-10-CM

## 2024-09-03 DIAGNOSIS — N89.8 VAGINAL DISCHARGE: Primary | ICD-10-CM

## 2024-09-03 DIAGNOSIS — N76.0 BV (BACTERIAL VAGINOSIS): ICD-10-CM

## 2024-09-03 LAB
CLUE CELLS: ABNORMAL
TRICHOMONAS, WET PREP: ABNORMAL
WBC'S/HIGH POWER FIELD, WET PREP: ABNORMAL
YEAST, WET PREP: ABNORMAL

## 2024-09-03 PROCEDURE — 86376 MICROSOMAL ANTIBODY EACH: CPT | Performed by: OBSTETRICS & GYNECOLOGY

## 2024-09-03 PROCEDURE — G2211 COMPLEX E/M VISIT ADD ON: HCPCS | Performed by: OBSTETRICS & GYNECOLOGY

## 2024-09-03 PROCEDURE — 84439 ASSAY OF FREE THYROXINE: CPT | Performed by: OBSTETRICS & GYNECOLOGY

## 2024-09-03 PROCEDURE — 36415 COLL VENOUS BLD VENIPUNCTURE: CPT | Performed by: OBSTETRICS & GYNECOLOGY

## 2024-09-03 PROCEDURE — 86140 C-REACTIVE PROTEIN: CPT | Performed by: OBSTETRICS & GYNECOLOGY

## 2024-09-03 PROCEDURE — 80076 HEPATIC FUNCTION PANEL: CPT | Performed by: OBSTETRICS & GYNECOLOGY

## 2024-09-03 PROCEDURE — 84443 ASSAY THYROID STIM HORMONE: CPT | Performed by: OBSTETRICS & GYNECOLOGY

## 2024-09-03 PROCEDURE — 99215 OFFICE O/P EST HI 40 MIN: CPT | Performed by: OBSTETRICS & GYNECOLOGY

## 2024-09-03 PROCEDURE — 82306 VITAMIN D 25 HYDROXY: CPT | Performed by: OBSTETRICS & GYNECOLOGY

## 2024-09-03 PROCEDURE — 99459 PELVIC EXAMINATION: CPT | Performed by: OBSTETRICS & GYNECOLOGY

## 2024-09-03 PROCEDURE — 87210 SMEAR WET MOUNT SALINE/INK: CPT | Performed by: OBSTETRICS & GYNECOLOGY

## 2024-09-03 RX ORDER — FLUCONAZOLE 150 MG/1
150 TABLET ORAL
Qty: 9 TABLET | Refills: 0 | Status: SHIPPED | OUTPATIENT
Start: 2024-09-03 | End: 2024-09-28

## 2024-09-03 RX ORDER — METRONIDAZOLE 7.5 MG/G
1 GEL VAGINAL AT BEDTIME
Qty: 35 G | Refills: 0 | Status: SHIPPED | OUTPATIENT
Start: 2024-09-03
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
## 2024-09-04 LAB
ALBUMIN SERPL BCG-MCNC: 4.3 G/DL (ref 3.5–5.2)
ALP SERPL-CCNC: 60 U/L (ref 40–150)
ALT SERPL W P-5'-P-CCNC: 34 U/L (ref 0–50)
AST SERPL W P-5'-P-CCNC: 30 U/L (ref 0–45)
BILIRUB DIRECT SERPL-MCNC: <0.2 MG/DL (ref 0–0.3)
BILIRUB SERPL-MCNC: 0.6 MG/DL
CRP SERPL-MCNC: 5.42 MG/L
PROT SERPL-MCNC: 7 G/DL (ref 6.4–8.3)
T4 FREE SERPL-MCNC: 0.99 NG/DL (ref 0.9–1.7)
TSH SERPL DL<=0.005 MIU/L-ACNC: 2.19 UIU/ML (ref 0.3–4.2)
VIT D+METAB SERPL-MCNC: 71 NG/ML (ref 20–50)

## 2024-09-05 LAB — THYROPEROXIDASE AB SERPL-ACNC: <10 IU/ML

## 2024-09-10 ENCOUNTER — TRANSFERRED RECORDS (OUTPATIENT)
Dept: HEALTH INFORMATION MANAGEMENT | Facility: CLINIC | Age: 54
End: 2024-09-10
Payer: COMMERCIAL

## 2024-10-12 ENCOUNTER — TELEPHONE (OUTPATIENT)
Dept: FAMILY MEDICINE | Facility: CLINIC | Age: 54
End: 2024-10-12
Payer: COMMERCIAL

## 2024-10-12 ENCOUNTER — HEALTH MAINTENANCE LETTER (OUTPATIENT)
Age: 54
End: 2024-10-12

## 2024-10-12 NOTE — TELEPHONE ENCOUNTER
"  General Call    Contacts       Contact Date/Time Type Contact Phone/Fax    10/12/2024 02:55 PM CDT Phone (Incoming) Zaira Villatoro (Self) [phone] (M)          Reason for Call:  Questions about appointments needed or not?     What are your questions or concerns:  Pt has questions if Annual preventative visit & A1C are \"actually needed\"? Last annual was 04/12/2023 & pt thinks A1c was completed recently. PT wants a call back to see if she needs these or not.     Date of last appointment with provider: 10/19/2023    Could we send this information to you in [employer] or would you prefer to receive a phone call?:   No preference   Okay to leave a detailed message?: Yes at Cell number on file:    Telephone Information:   Mobile [phone]       "

## 2024-10-22 ENCOUNTER — TRANSFERRED RECORDS (OUTPATIENT)
Dept: HEALTH INFORMATION MANAGEMENT | Facility: CLINIC | Age: 54
End: 2024-10-22
Payer: COMMERCIAL

## 2024-10-23 SDOH — HEALTH STABILITY: PHYSICAL HEALTH: ON AVERAGE, HOW MANY DAYS PER WEEK DO YOU ENGAGE IN MODERATE TO STRENUOUS EXERCISE (LIKE A BRISK WALK)?: 1 DAY

## 2024-10-23 SDOH — HEALTH STABILITY: PHYSICAL HEALTH: ON AVERAGE, HOW MANY MINUTES DO YOU ENGAGE IN EXERCISE AT THIS LEVEL?: 20 MIN

## 2024-10-23 ASSESSMENT — SOCIAL DETERMINANTS OF HEALTH (SDOH): HOW OFTEN DO YOU GET TOGETHER WITH FRIENDS OR RELATIVES?: TWICE A WEEK

## 2024-10-24 ENCOUNTER — OFFICE VISIT (OUTPATIENT)
Dept: FAMILY MEDICINE | Facility: CLINIC | Age: 54
End: 2024-10-24
Payer: COMMERCIAL

## 2024-10-24 VITALS
HEIGHT: 65 IN | RESPIRATION RATE: 16 BRPM | BODY MASS INDEX: 43.05 KG/M2 | HEART RATE: 60 BPM | OXYGEN SATURATION: 98 % | TEMPERATURE: 97 F | WEIGHT: 258.4 LBS | DIASTOLIC BLOOD PRESSURE: 76 MMHG | SYSTOLIC BLOOD PRESSURE: 117 MMHG

## 2024-10-24 DIAGNOSIS — Z12.31 VISIT FOR SCREENING MAMMOGRAM: ICD-10-CM

## 2024-10-24 DIAGNOSIS — Z00.00 ROUTINE GENERAL MEDICAL EXAMINATION AT A HEALTH CARE FACILITY: Primary | ICD-10-CM

## 2024-10-24 DIAGNOSIS — E11.65 TYPE 2 DIABETES MELLITUS WITH HYPERGLYCEMIA, WITH LONG-TERM CURRENT USE OF INSULIN (H): ICD-10-CM

## 2024-10-24 DIAGNOSIS — Z79.4 TYPE 2 DIABETES MELLITUS WITH HYPERGLYCEMIA, WITH LONG-TERM CURRENT USE OF INSULIN (H): ICD-10-CM

## 2024-10-24 DIAGNOSIS — Z23 NEED FOR VACCINATION: ICD-10-CM

## 2024-10-24 LAB
CHOLEST SERPL-MCNC: 152 MG/DL
EST. AVERAGE GLUCOSE BLD GHB EST-MCNC: 166 MG/DL
FASTING STATUS PATIENT QL REPORTED: NO
HBA1C MFR BLD: 7.4 % (ref 0–5.6)
HDLC SERPL-MCNC: 41 MG/DL
LDLC SERPL CALC-MCNC: 93 MG/DL
NONHDLC SERPL-MCNC: 111 MG/DL
TRIGL SERPL-MCNC: 92 MG/DL

## 2024-10-24 PROCEDURE — 99214 OFFICE O/P EST MOD 30 MIN: CPT | Mod: 25 | Performed by: STUDENT IN AN ORGANIZED HEALTH CARE EDUCATION/TRAINING PROGRAM

## 2024-10-24 PROCEDURE — 90471 IMMUNIZATION ADMIN: CPT | Performed by: STUDENT IN AN ORGANIZED HEALTH CARE EDUCATION/TRAINING PROGRAM

## 2024-10-24 PROCEDURE — 90673 RIV3 VACCINE NO PRESERV IM: CPT | Performed by: STUDENT IN AN ORGANIZED HEALTH CARE EDUCATION/TRAINING PROGRAM

## 2024-10-24 PROCEDURE — 82043 UR ALBUMIN QUANTITATIVE: CPT | Performed by: STUDENT IN AN ORGANIZED HEALTH CARE EDUCATION/TRAINING PROGRAM

## 2024-10-24 PROCEDURE — 82570 ASSAY OF URINE CREATININE: CPT | Performed by: STUDENT IN AN ORGANIZED HEALTH CARE EDUCATION/TRAINING PROGRAM

## 2024-10-24 PROCEDURE — 36415 COLL VENOUS BLD VENIPUNCTURE: CPT | Performed by: STUDENT IN AN ORGANIZED HEALTH CARE EDUCATION/TRAINING PROGRAM

## 2024-10-24 PROCEDURE — 80061 LIPID PANEL: CPT | Performed by: STUDENT IN AN ORGANIZED HEALTH CARE EDUCATION/TRAINING PROGRAM

## 2024-10-24 PROCEDURE — 83036 HEMOGLOBIN GLYCOSYLATED A1C: CPT | Performed by: STUDENT IN AN ORGANIZED HEALTH CARE EDUCATION/TRAINING PROGRAM

## 2024-10-24 PROCEDURE — 99396 PREV VISIT EST AGE 40-64: CPT | Mod: 25 | Performed by: STUDENT IN AN ORGANIZED HEALTH CARE EDUCATION/TRAINING PROGRAM

## 2024-10-24 NOTE — PROGRESS NOTES
"Preventive Care Visit  [facility], [facility]  Oct 24, 2024      Assessment & Plan     (Z00.00) Routine general medical examination at a health care facility  (primary encounter diagnosis)  Comment: Stable  Plan: pending labs    (E11.65,  Z79.4) Type 2 diabetes mellitus with hyperglycemia, with long-term current use of insulin (H)  Comment: Chronic, controlled at this time we will recommend patient to switch from Rybelsus to Wegovy to have added benefit of glycemic control along with weight loss benefits.  Patient also has infusions which cause sugar spikes and do recommend the use of the Rybelsus will be helpful minimizing the fluctuations in her sugar levels.  Improved weight loss will also help her overall type 2 diabetes as well as blood pressure.  Plan: Lipid panel reflex to direct LDL Non-fasting,         Albumin Random Urine Quantitative with Creat         Ratio, Hemoglobin A1c, Semaglutide-Weight         Management (WEGOVY) 0.25 MG/0.5ML pen            (Z23) Need for vaccination  Comment: Stable  Plan: INFLUENZA VACCINE TRIVALENT(FLUBLOK)            (Z12.31) Visit for screening mammogram  Comment: Stable  Plan: MA Screen Bilateral w/Matthias            Dictation Disclaimer: Some of this Note has been completed with voice-recognition dictation software. Although errors are generally corrected real-time, there is the potential for a rare error to be present in the completed chart.                 BMI  Estimated body mass index is 43.05 kg/m  as calculated from the following:    Height as of this encounter: 1.65 m (5' 4.96\").    Weight as of this encounter: 117.2 kg (258 lb 6.4 oz).   Weight management plan: Specific weight management program called wegovy discussed    Counseling  Appropriate preventive services were addressed with this patient via screening, questionnaire, or discussion as appropriate for fall prevention, nutrition, physical activity, " Tobacco-use cessation, social engagement, weight loss and cognition.  Checklist reviewing preventive services available has been given to the patient.  Reviewed patient's diet, addressing concerns and/or questions.   She is at risk for lack of exercise and has been provided with information to increase physical activity for the benefit of her well-being.   She is at risk for psychosocial distress and has been provided with information to reduce risk.           Subjective   Zaira is a 54 year old, presenting for the following:  Physical           HPI  Patient is a 54-year-old woman presenting for routine physical.  She states that she has noticed that her weight fluctuates between  15 pounds.  She reports that she been on the Rybelsus for the management have her diabetes however has not noticed any weight changes.  Patient reports being interested in an alternative medication to help with weight loss as well as glycemic control.        Health Care Directive  Patient does not have a Health Care Directive: Discussed advance care planning with patient; information given to patient to review.      10/23/2024   General Health   How would you rate your overall physical health? (!) FAIR   Feel stress (tense, anxious, or unable to sleep) Only a little      (!) STRESS CONCERN      10/23/2024   Nutrition   Three or more servings of calcium each day? Yes   Diet: Diabetic    Other   If other, please elaborate: No yeast and sugar   How many servings of fruit and vegetables per day? (!) 2-3   How many sweetened beverages each day? 0-1       Multiple values from one day are sorted in reverse-chronological order         10/23/2024   Exercise   Days per week of moderate/strenous exercise 1 day   Average minutes spent exercising at this level 20 min      (!) EXERCISE CONCERN      10/23/2024   Social Factors   Frequency of gathering with friends or relatives Twice a week   Worry food won't last until get money to buy more No   Food  not last or not have enough money for food? No   Do you have housing? (Housing is defined as stable permanent housing and does not include staying ouside in a car, in a tent, in an abandoned building, in an overnight shelter, or couch-surfing.) Yes   Are you worried about losing your housing? No   Lack of transportation? No   Unable to get utilities (heat,electricity)? No            10/23/2024   Fall Risk   Fallen 2 or more times in the past year? No    Trouble with walking or balance? No        Patient-reported          10/23/2024   Dental   Dentist two times every year? Yes                 Today's PHQ-2 Score:       4/15/2024    10:07 AM   PHQ-2 (  [employer])   Q1: Little interest or pleasure in doing things 0   Q2: Feeling down, depressed or hopeless 0   PHQ-2 Score 0         10/23/2024   Substance Use   Alcohol more than 3/day or more than 7/wk No   Do you use any other substances recreationally? No        Social History     Tobacco Use    Smoking status: Former     Current packs/day: 0.00     Average packs/day: 1 pack/day for 15.0 years (15.0 ttl pk-yrs)     Types: Cigarettes     Start date: 1985     Quit date: 1998     Years since quittin.3    Smokeless tobacco: Never    Tobacco comments:     soke free household.   Vaping Use    Vaping status: Never Used   Substance Use Topics    Alcohol use: Yes     Comment: occ    Drug use: No           2023   LAST FHS-7 RESULTS   1st degree relative breast or ovarian cancer No    Any relative bilateral breast cancer Yes    Any male have breast cancer No    Any ONE woman have BOTH breast AND ovarian cancer Yes    Any woman with breast cancer before 50yrs Yes    2 or more relatives with breast AND/OR ovarian cancer Yes    2 or more relatives with breast AND/OR bowel cancer Yes        Patient-reported        Mammogram Screening - Mammogram every 1-2 years updated in Health Maintenance based on mutual decision making        10/23/2024   STI Screening   New  "sexual partner(s) since last STI/HIV test? No        History of abnormal Pap smear: No - age 30- 64 PAP with HPV every 5 years recommended        Latest Ref Rng & Units 9/28/2020    10:30 AM 9/28/2020    10:27 AM 11/4/2015    12:25 PM   PAP / HPV   PAP (Historical)   NIL     HPV 16 DNA NEG^Negative Negative   Negative    HPV 18 DNA NEG^Negative Negative   Negative    Other HR HPV NEG^Negative Negative   Negative      ASCVD Risk   The 10-year ASCVD risk score (Zack POMPA, et al., 2019) is: 2.8%    Values used to calculate the score:      Age: 54 years      Sex: Female      Is Non- : No      Diabetic: Yes      Tobacco smoker: No      Systolic Blood Pressure: 117 mmHg      Is BP treated: Yes      HDL Cholesterol: 47 mg/dL      Total Cholesterol: 142 mg/dL           Reviewed and updated as needed this visit by Provider                         ROS: 10 point ROS neg other than the symptoms noted above in the HPI.       Objective    Exam  /76   Pulse 60   Temp 97  F (36.1  C) (Temporal)   Resp 16   Ht 1.65 m (5' 4.96\")   Wt 117.2 kg (258 lb 6.4 oz)   SpO2 98%   BMI 43.05 kg/m     Estimated body mass index is 43.05 kg/m  as calculated from the following:    Height as of this encounter: 1.65 m (5' 4.96\").    Weight as of this encounter: 117.2 kg (258 lb 6.4 oz).    Physical Exam  GENERAL: healthy, alert and no distress  EYES: Eyes grossly normal to inspection, PERRL and conjunctivae and sclerae normal  Neck: No visible JVD or lymphadenopathy   RESP: symmetrical rise in chest   CV: No peripheral edema notable   MS: no gross musculoskeletal defects noted  SKIN: no suspicious lesions or rashes  PSYCH: mentation appears normal, affect normal/bright          Signed Electronically by: EMILY HIGHTOWER MD    "

## 2024-10-24 NOTE — PATIENT INSTRUCTIONS
Options for continuing GLP1/GIP agonist in 2025:  Pay out of pocket for Wegovy or Zepbound pens (>$1000/month cash price without savings card)   Zepbound cost with Zepbound savings card (link below to sign up for this): $650/month with card  https://www.enrollment.zepbound.[employer]/enroll/checkEnrollment  Wegovy cost with Wegovy savings card (link below to sign up for this): $650/month with card   https://www.[employer]Â®/coverage-and-savings/save-on-wegovy.html  Compounded semaglutide (same active ingredient as Wegovy) from [facility] ($230/month for doses of 1mg or less; $370/month for doses higher than 1mg)  This is an available option for as long as Wegovy is on FDA shortage list, Wegovy has been on the list for the last several months with no foreseeable end in sight as of now   Zepbound Vials through [facility] pharmacy - vials only available in 2.5 mg and 5 mg doses  $399/month for 2.5mg vials  $549/month for 5mg vials   $5 per month for administrations supplies (syringe/needles, etc)         Patient Education   Preventive Care Advice   This is general advice given by our system to help you stay healthy. However, your care team may have specific advice just for you. Please talk to your care team about your preventive care needs.  Nutrition  Eat 5 or more servings of fruits and vegetables each day.  Try wheat bread, brown rice and whole grain pasta (instead of white bread, rice, and pasta).  Get enough calcium and vitamin D. Check the label on foods and aim for 100% of the RDA (recommended daily allowance).  Lifestyle  Exercise at least 150 minutes each week  (30 minutes a day, 5 days a week).  Do muscle strengthening activities 2 days a week. These help control your weight and prevent disease.  No smoking.  Wear sunscreen to prevent skin cancer.  Have a dental exam and cleaning every 6 months.  Yearly exams  See your health care team every year to talk about:  Any changes in your  health.  Any medicines your care team has prescribed.  Preventive care, family planning, and ways to prevent chronic diseases.  Shots (vaccines)   HPV shots (up to age 26), if you've never had them before.  Hepatitis B shots (up to age 59), if you've never had them before.  COVID-19 shot: Get this shot when it's due.  Flu shot: Get a flu shot every year.  Tetanus shot: Get a tetanus shot every 10 years.  Pneumococcal, hepatitis A, and RSV shots: Ask your care team if you need these based on your risk.  Shingles shot (for age 50 and up)  General health tests  Diabetes screening:  Starting at age 35, Get screened for diabetes at least every 3 years.  If you are younger than age 35, ask your care team if you should be screened for diabetes.  Cholesterol test: At age 39, start having a cholesterol test every 5 years, or more often if advised.  Bone density scan (DEXA): At age 50, ask your care team if you should have this scan for osteoporosis (brittle bones).  Hepatitis C: Get tested at least once in your life.  STIs (sexually transmitted infections)  Before age 24: Ask your care team if you should be screened for STIs.  After age 24: Get screened for STIs if you're at risk. You are at risk for STIs (including HIV) if:  You are sexually active with more than one person.  You don't use condoms every time.  You or a partner was diagnosed with a sexually transmitted infection.  If you are at risk for HIV, ask about PrEP medicine to prevent HIV.  Get tested for HIV at least once in your life, whether you are at risk for HIV or not.  Cancer screening tests  Cervical cancer screening: If you have a cervix, begin getting regular cervical cancer screening tests starting at age 21.  Breast cancer scan (mammogram): If you've ever had breasts, begin having regular mammograms starting at age 40. This is a scan to check for breast cancer.  Colon cancer screening: It is important to start screening for colon cancer at age 45.  Have  a colonoscopy test every 10 years (or more often if you're at risk) Or, ask your provider about stool tests like a FIT test every year or Cologuard test every 3 years.  To learn more about your testing options, visit:   .  For help making a decision, visit:   https://bit.landen/zp94456.  Prostate cancer screening test: If you have a prostate, ask your care team if a prostate cancer screening test (PSA) at age 55 is right for you.  Lung cancer screening: If you are a current or former smoker ages 50 to 80, ask your care team if ongoing lung cancer screenings are right for you.  For informational purposes only. Not to replace the advice of your health care provider. Copyright   2023 [facility]. All rights reserved. Clinically reviewed by the [facility] Transitions Program. [employer] 188055 - REV 01/24.

## 2024-10-25 ENCOUNTER — PATIENT OUTREACH (OUTPATIENT)
Dept: CARE COORDINATION | Facility: CLINIC | Age: 54
End: 2024-10-25
Payer: COMMERCIAL

## 2024-10-25 ENCOUNTER — TELEPHONE (OUTPATIENT)
Dept: FAMILY MEDICINE | Facility: CLINIC | Age: 54
End: 2024-10-25
Payer: COMMERCIAL

## 2024-10-25 LAB
CREAT UR-MCNC: 75.1 MG/DL
MICROALBUMIN UR-MCNC: <12 MG/L
MICROALBUMIN/CREAT UR: NORMAL MG/G{CREAT}

## 2024-10-25 NOTE — TELEPHONE ENCOUNTER
Patient is calling and stated that Wegovy has been denied according to the pharmacy, as it needs a PA.    Patient is currently is on Rybelsus, and was going to switch to Wegovy.  Should she still switch, or any other advise, PA?  Routed to PCP.    Tali ROUSSEAU RN  Triage Nurse  [facility]

## 2024-10-25 NOTE — TELEPHONE ENCOUNTER
Team- please let patient know that we will initiate a PA for Wegovy then send to PA team    Marianne Khan RN  [facility]

## 2024-11-05 ENCOUNTER — IMMUNIZATION (OUTPATIENT)
Dept: FAMILY MEDICINE | Facility: CLINIC | Age: 54
End: 2024-11-05
Payer: COMMERCIAL

## 2024-11-05 DIAGNOSIS — Z23 ENCOUNTER FOR IMMUNIZATION: Primary | ICD-10-CM

## 2024-11-05 PROCEDURE — 99207 PR NO CHARGE NURSE ONLY: CPT

## 2024-11-05 PROCEDURE — 91320 SARSCV2 VAC 30MCG TRS-SUC IM: CPT

## 2024-11-05 PROCEDURE — 90480 ADMN SARSCOV2 VAC 1/ONLY CMP: CPT

## 2024-11-05 NOTE — PROGRESS NOTES
Prior to immunization administration, verified patients identity using patient s name and date of birth. Please see Immunization Activity for additional information.     Is the patient's temperature normal (100.5 or less)? Yes     Patient MEETS CRITERIA. PROCEED with vaccine administration.      Patient instructed to remain in clinic for 15 minutes afterwards, and to report any adverse reactions.      Link to Ancillary Visit Immunization Standing Orders SmartSet     Screening performed by Ana Giron CMA on 11/5/2024 at 10:39 AM.          
unknown

## 2024-11-07 ENCOUNTER — OFFICE VISIT (OUTPATIENT)
Dept: OBGYN | Facility: CLINIC | Age: 54
End: 2024-11-07
Payer: COMMERCIAL

## 2024-11-07 ENCOUNTER — MYC MEDICAL ADVICE (OUTPATIENT)
Dept: OBGYN | Facility: CLINIC | Age: 54
End: 2024-11-07

## 2024-11-07 VITALS
TEMPERATURE: 97.6 F | BODY MASS INDEX: 41.32 KG/M2 | SYSTOLIC BLOOD PRESSURE: 114 MMHG | DIASTOLIC BLOOD PRESSURE: 67 MMHG | HEART RATE: 79 BPM | WEIGHT: 248 LBS

## 2024-11-07 DIAGNOSIS — N90.89 VULVAR FISSURE: ICD-10-CM

## 2024-11-07 DIAGNOSIS — N76.3 CHRONIC VULVITIS: Primary | ICD-10-CM

## 2024-11-07 DIAGNOSIS — R73.09 ELEVATED HEMOGLOBIN A1C: ICD-10-CM

## 2024-11-07 DIAGNOSIS — Z13.29 SCREENING FOR THYROID DISORDER: ICD-10-CM

## 2024-11-07 DIAGNOSIS — R79.82 ELEVATED C-REACTIVE PROTEIN (CRP): ICD-10-CM

## 2024-11-07 DIAGNOSIS — N89.8 VAGINAL DISCHARGE: ICD-10-CM

## 2024-11-07 LAB
CRP SERPL-MCNC: 42.3 MG/L
EST. AVERAGE GLUCOSE BLD GHB EST-MCNC: 160 MG/DL
HBA1C MFR BLD: 7.2 % (ref 0–5.6)
TSH SERPL DL<=0.005 MIU/L-ACNC: 2.19 UIU/ML (ref 0.3–4.2)

## 2024-11-07 PROCEDURE — 99214 OFFICE O/P EST MOD 30 MIN: CPT | Performed by: OBSTETRICS & GYNECOLOGY

## 2024-11-07 PROCEDURE — 83036 HEMOGLOBIN GLYCOSYLATED A1C: CPT | Performed by: OBSTETRICS & GYNECOLOGY

## 2024-11-07 PROCEDURE — 99459 PELVIC EXAMINATION: CPT | Performed by: OBSTETRICS & GYNECOLOGY

## 2024-11-07 PROCEDURE — 36415 COLL VENOUS BLD VENIPUNCTURE: CPT | Performed by: OBSTETRICS & GYNECOLOGY

## 2024-11-07 PROCEDURE — 84443 ASSAY THYROID STIM HORMONE: CPT | Performed by: OBSTETRICS & GYNECOLOGY

## 2024-11-07 PROCEDURE — 86140 C-REACTIVE PROTEIN: CPT | Performed by: OBSTETRICS & GYNECOLOGY

## 2024-11-07 RX ORDER — HYDROCORTISONE VALERATE 2 MG/G
OINTMENT TOPICAL AT BEDTIME
Qty: 45 G | Refills: 1 | Status: SHIPPED | OUTPATIENT
Start: 2024-11-07
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
## 2024-11-08 ENCOUNTER — MYC MEDICAL ADVICE (OUTPATIENT)
Dept: FAMILY MEDICINE | Facility: CLINIC | Age: 54
End: 2024-11-08
Payer: COMMERCIAL

## 2024-11-08 DIAGNOSIS — R79.82 ELEVATED C-REACTIVE PROTEIN (CRP): Primary | ICD-10-CM

## 2024-11-08 NOTE — TELEPHONE ENCOUNTER
Results discussed with patient over the phone.   Her wet prep was negative for infection but  with 2+ WBC   CRP went high likely due to a IBS flare.

## 2024-11-15 ENCOUNTER — ANCILLARY PROCEDURE (OUTPATIENT)
Dept: MAMMOGRAPHY | Facility: CLINIC | Age: 54
End: 2024-11-15
Attending: STUDENT IN AN ORGANIZED HEALTH CARE EDUCATION/TRAINING PROGRAM
Payer: COMMERCIAL

## 2024-11-15 DIAGNOSIS — Z12.31 VISIT FOR SCREENING MAMMOGRAM: ICD-10-CM

## 2024-11-15 PROCEDURE — 77067 SCR MAMMO BI INCL CAD: CPT | Mod: TC | Performed by: RADIOLOGY

## 2024-11-15 PROCEDURE — 77063 BREAST TOMOSYNTHESIS BI: CPT | Mod: TC | Performed by: RADIOLOGY

## 2024-11-18 ENCOUNTER — TELEPHONE (OUTPATIENT)
Dept: FAMILY MEDICINE | Facility: CLINIC | Age: 54
End: 2024-11-18
Payer: COMMERCIAL

## 2024-11-18 NOTE — TELEPHONE ENCOUNTER
Patient calling, she sees her mammogram results showed scattered fibroglandular densities and understands that malignancies might be harder to detect in those areas.    Says he has a strong family history on both sides of her family (maternal and paternal aunts) with breast cancer that took the life of one aunt and another aunts breast cancer was treated but recurred and is now stage 4.    Wonders if she should be worried or do any additional screening?    Routed to PCP to address.      Madeleine EPSTEIN RN  [facility] Triage

## 2024-11-19 NOTE — TELEPHONE ENCOUNTER
No further imaging required as she did have a 3D mammogram which was able to detect those fibroglandular areas which is another word for dense breast tissue. If she has no symptoms than there is not an indication to do a diagnostic however if she is requesting for one she will have to pay the out of pocket costs as it does go from screening to diagnostic and I am unsure of what that will cost. If she wants the diagnostic mammogram I can place it but as mentioned in her recent mammogram there was no concern for malignancy     [facility] [place]

## 2024-11-19 NOTE — TELEPHONE ENCOUNTER
Patient notified of provider's message as written.  Patient verbalized understanding.  She decided to just follow-up annually with screening mammo as recommended    Marianne Khan RN  [facility]

## 2024-12-03 ENCOUNTER — TRANSFERRED RECORDS (OUTPATIENT)
Dept: HEALTH INFORMATION MANAGEMENT | Facility: CLINIC | Age: 54
End: 2024-12-03
Payer: COMMERCIAL

## 2024-12-04 ENCOUNTER — TRANSFERRED RECORDS (OUTPATIENT)
Dept: HEALTH INFORMATION MANAGEMENT | Facility: CLINIC | Age: 54
End: 2024-12-04
Payer: COMMERCIAL

## 2024-12-11 ENCOUNTER — OFFICE VISIT (OUTPATIENT)
Dept: DERMATOLOGY | Facility: CLINIC | Age: 54
End: 2024-12-11
Attending: PHYSICIAN ASSISTANT
Payer: COMMERCIAL

## 2024-12-11 DIAGNOSIS — D84.9 IMMUNOSUPPRESSED STATUS (H): Primary | ICD-10-CM

## 2024-12-11 DIAGNOSIS — D22.9 MULTIPLE BENIGN NEVI: ICD-10-CM

## 2024-12-11 DIAGNOSIS — L82.1 SEBORRHEIC KERATOSES: ICD-10-CM

## 2024-12-11 DIAGNOSIS — L81.4 SOLAR LENTIGO: ICD-10-CM

## 2024-12-11 DIAGNOSIS — D18.01 CHERRY ANGIOMA: ICD-10-CM

## 2024-12-11 RX ORDER — INSULIN DETEMIR 100 [IU]/ML
INJECTION, SOLUTION SUBCUTANEOUS
COMMUNITY

## 2024-12-11 RX ORDER — METHYLPREDNISOLONE SODIUM SUCCINATE 40 MG/ML
INJECTION, POWDER, FOR SOLUTION INTRAMUSCULAR; INTRAVENOUS
COMMUNITY

## 2024-12-11 ASSESSMENT — PAIN SCALES - GENERAL: PAINLEVEL_OUTOF10: NO PAIN (0)
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
## 2024-12-11 NOTE — PATIENT INSTRUCTIONS
Patient Education       Proper skin care from [facility]:    -Eliminate harsh soaps as they strip the natural oils from the skin, often resulting in dry itchy skin ( i.e. Dial, Zest, [place])  -Use mild soaps such as Cetaphil or Dove Sensitive Skin in the shower. You do not need to use soap on arms, legs, and trunk every time you shower unless visibly soiled.   -Avoid hot or cold showers.  -After showering, lightly dry off and apply moisturizing within 2-3 minutes. This will help trap moisture in the skin.   -Aggressive use of a moisturizer at least 1-2 times a day to the entire body (including -Vanicream, Cetaphil, Aquaphor or Cerave) and moisturize hands after every washing.  -We recommend using moisturizers that come in a tub that needs to be scooped out, not a pump. This has more of an oil base. It will hold moisture in your skin much better than a water base moisturizer. The above recommended are non-pore clogging.      Wear a sunscreen with at least SPF 30 on your face, ears, neck and V of the chest daily. Wear sunscreen on other areas of the body if those areas are exposed to the sun throughout the day. Sunscreens can contain physical and/or chemical blockers. Physical blockers are less likely to clog pores, these include zinc oxide and titanium dioxide. Reapply every two hour and after swimming.     Sunscreen examples: https://www.ewg.org/sunscreen/    UV radiation  UVA radiation remains constant throughout the day and throughout the year. It is a longer wavelength than UVB and therefore penetrates deeper into the skin leading to immediate and delayed tanning, photoaging, and skin cancer. 70-80% of UVA and UVB radiation occurs between the hours of 10am-2pm.  UVB radiation  UVB radiation causes the most harmful effects and is more significant during the summer months. However, snow and ice can reflect UVB radiation leading to skin damage during the winter months as well. UVB radiation is  responsible for tanning, burning, inflammation, delayed erythema (pinkness), pigmentation (brown spots), and skin cancer.     I recommend self monthly full body exams and yearly full body exams with a dermatology provider. If you develop a new or changing lesion please follow up for examination. Most skin cancers are pink and scaly or pink and pearly. However, we do see blue/brown/black skin cancers.  Consider the ABCDEs of melanoma when giving yourself your monthly full body exam ( don't forget the groin, buttocks, feet, toes, etc). A-asymmetry, B-borders, C-color, D-diameter, E-elevation or evolving. If you see any of these changes please follow up in clinic. If you cannot see your back I recommend purchasing a hand held mirror to use with a larger wall mirror.       Checking for Skin Cancer  You can find cancer early by checking your skin each month. There are 3 kinds of skin cancer. They are melanoma, basal cell carcinoma, and squamous cell carcinoma. Doing monthly skin checks is the best way to find new marks or skin changes. Follow the instructions below for checking your skin.   The ABCDEs of checking moles for melanoma   Check your moles or growths for signs of melanoma using ABCDE:   Asymmetry: the sides of the mole or growth don t match  Border: the edges are ragged, notched, or blurred  Color: the color within the mole or growth varies  Diameter: the mole or growth is larger than 6 mm (size of a pencil eraser)  Evolving: the size, shape, or color of the mole or growth is changing (evolving is not shown in the images below)    Checking for other types of skin cancer  Basal cell carcinoma or squamous cell carcinoma have symptoms such as:     A spot or mole that looks different from all other marks on your skin  Changes in how an area feels, such as itching, tenderness, or pain  Changes in the skin's surface, such as oozing, bleeding, or scaliness  A sore that does not heal  New swelling or redness beyond  the border of a mole    Who s at risk?  Anyone can get skin cancer. But you are at greater risk if you have:   Fair skin, light-colored hair, or light-colored eyes  Many moles or abnormal moles on your skin  A history of sunburns from sunlight or tanning beds  A family history of skin cancer  A history of exposure to radiation or chemicals  A weakened immune system  If you have had skin cancer in the past, you are at risk for recurring skin cancer.   How to check your skin  Do your monthly skin checkups in front of a full-length mirror. Check all parts of your body, including your:   Head (ears, face, neck, and scalp)  Torso (front, back, and sides)  Arms (tops, undersides, upper, and lower armpits)  Hands (palms, backs, and fingers, including under the nails)  Buttocks and genitals  Legs (front, back, and sides)  Feet (tops, soles, toes, including under the nails, and between toes)  If you have a lot of moles, take digital photos of them each month. Make sure to take photos both up close and from a distance. These can help you see if any moles change over time.   Most skin changes are not cancer. But if you see any changes in your skin, call your doctor right away. Only he or she can diagnose a problem. If you have skin cancer, seeing your doctor can be the first step toward getting the treatment that could save your life.   [employer] last reviewed this educational content on 4/1/2019 2000-2020 [employer]. [address] [place] [address], [place] [address]. All rights reserved. This information is not intended as a substitute for professional medical care. Always follow your healthcare professional's instructions.

## 2024-12-11 NOTE — NURSING NOTE
Zaira Villatoro's goals for this visit include:   Chief Complaint   Patient presents with    Skin Check     FBSC, spot on back itches, no oozing or bleeding       She requests these members of her care team be copied on today's visit information:     PCP: Zaira Munson    Referring Provider:  Radha Self PA-C  [address] [place] [id number]    There were no vitals taken for this visit.    Do you need any medication refills at today's visit?     Koki Lee MA on 12/11/2024 at 10:58 AM
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
## 2025-01-14 ENCOUNTER — TRANSFERRED RECORDS (OUTPATIENT)
Dept: HEALTH INFORMATION MANAGEMENT | Facility: CLINIC | Age: 55
End: 2025-01-14
Payer: COMMERCIAL

## 2025-02-22 ENCOUNTER — HEALTH MAINTENANCE LETTER (OUTPATIENT)
Age: 55
End: 2025-02-22

## 2025-02-25 ENCOUNTER — MYC REFILL (OUTPATIENT)
Dept: FAMILY MEDICINE | Facility: CLINIC | Age: 55
End: 2025-02-25
Payer: COMMERCIAL

## 2025-02-25 DIAGNOSIS — E11.65 TYPE 2 DIABETES MELLITUS WITH HYPERGLYCEMIA, WITH LONG-TERM CURRENT USE OF INSULIN (H): ICD-10-CM

## 2025-02-25 DIAGNOSIS — Z79.4 TYPE 2 DIABETES MELLITUS WITH HYPERGLYCEMIA, WITH LONG-TERM CURRENT USE OF INSULIN (H): ICD-10-CM

## 2025-03-25 ENCOUNTER — MYC REFILL (OUTPATIENT)
Dept: FAMILY MEDICINE | Facility: CLINIC | Age: 55
End: 2025-03-25
Payer: COMMERCIAL

## 2025-03-25 ENCOUNTER — TELEPHONE (OUTPATIENT)
Dept: FAMILY MEDICINE | Facility: CLINIC | Age: 55
End: 2025-03-25
Payer: COMMERCIAL

## 2025-03-25 DIAGNOSIS — E11.65 TYPE 2 DIABETES MELLITUS WITH HYPERGLYCEMIA, WITH LONG-TERM CURRENT USE OF INSULIN (H): Primary | ICD-10-CM

## 2025-03-25 DIAGNOSIS — E11.65 TYPE 2 DIABETES MELLITUS WITH HYPERGLYCEMIA, WITH LONG-TERM CURRENT USE OF INSULIN (H): ICD-10-CM

## 2025-03-25 DIAGNOSIS — Z79.4 TYPE 2 DIABETES MELLITUS WITH HYPERGLYCEMIA, WITH LONG-TERM CURRENT USE OF INSULIN (H): ICD-10-CM

## 2025-03-25 DIAGNOSIS — Z79.4 TYPE 2 DIABETES MELLITUS WITH HYPERGLYCEMIA, WITH LONG-TERM CURRENT USE OF INSULIN (H): Primary | ICD-10-CM

## 2025-03-26 NOTE — TELEPHONE ENCOUNTER
Angelina Desain, [facility]  [facility] Medication Refill Pool - Primary Care16 hours ago (3:52 PM)    DD  Patient was prescribed mounjaro last month and it did go through insurance. Possible for the 5mg dose now, instead of changing to wegovy? Thank you!

## 2025-04-29 DIAGNOSIS — Z79.4 TYPE 2 DIABETES MELLITUS WITH HYPERGLYCEMIA, WITH LONG-TERM CURRENT USE OF INSULIN (H): ICD-10-CM

## 2025-04-29 DIAGNOSIS — E11.65 TYPE 2 DIABETES MELLITUS WITH HYPERGLYCEMIA, WITH LONG-TERM CURRENT USE OF INSULIN (H): ICD-10-CM

## 2025-05-03 ENCOUNTER — HEALTH MAINTENANCE LETTER (OUTPATIENT)
Age: 55
End: 2025-05-03

## 2025-05-19 DIAGNOSIS — E11.65 TYPE 2 DIABETES MELLITUS WITH HYPERGLYCEMIA, WITH LONG-TERM CURRENT USE OF INSULIN (H): ICD-10-CM

## 2025-05-19 DIAGNOSIS — Z79.4 TYPE 2 DIABETES MELLITUS WITH HYPERGLYCEMIA, WITH LONG-TERM CURRENT USE OF INSULIN (H): ICD-10-CM

## 2025-05-19 RX ORDER — TIRZEPATIDE 5 MG/.5ML
INJECTION, SOLUTION SUBCUTANEOUS
Qty: 2 ML | Refills: 0 | OUTPATIENT
Start: 2025-05-19

## 2025-05-20 ENCOUNTER — TRANSFERRED RECORDS (OUTPATIENT)
Dept: MULTI SPECIALTY CLINIC | Facility: CLINIC | Age: 55
End: 2025-05-20
Payer: COMMERCIAL

## 2025-05-20 ENCOUNTER — TRANSFERRED RECORDS (OUTPATIENT)
Dept: ADMINISTRATIVE | Facility: CLINIC | Age: 55
End: 2025-05-20
Payer: COMMERCIAL

## 2025-05-20 LAB
ALT SERPL-CCNC: 33 IU/L (ref 0–32)
AST SERPL-CCNC: 26 IU/L (ref 0–40)

## 2025-05-21 NOTE — ADDENDUM NOTE
Addended by: TONNY LUO on: 5/21/2025 02:14 PM     Modules accepted: Orders     Chief Complaint   Patient presents with     RECHECK     Kidney stone follow up     Lesvia Box, CMA

## 2025-05-21 NOTE — PROGRESS NOTES
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 of Treat.   07/24/2024 Entyvio 300mg  ml/hr    6 Weeks   07/24/2024 Solu-Medrol 40mg IV Push per protocol    6 Weeks   Inf. Date Medication % Conc. Inf. # Therapy Type Bag # Vials Total mgs Lot # Exp. Date   05/20/2025 Entyvio  50 maintenance  1 300.00 [id number] 08/31/2026 05/20/2025 Solu-Medrol  50 pre-infusion  1 40.00 [id number] 09/30/2026   Inf. Date Medication IV Site IV Gauge Start Stop Total Time Wt. (lbs) Wt. (kgs)   05/20/2025 Entyvio left hand 24 3:10 PM 3:40 PM 30 minutes 263.80 119.637   05/20/2025 Solu-Medrol left hand 24 3:02 PM 3:04 PM 2 minutes 263.80 119.637     Vitals Flowsheet  Time Temp Pulse Resp Sys BP Jen BP Reaction Conc Rate   2:52 PM 96.8 73 16 141 63      3:40 PM 97.7 67 16 131 60        Post Infusion  The patient did tolerate the infusion.     Nursing Notes  Order date: 07/24/24  Last infusion date: 04/08/25  Oral premeds per order: N/A  Specialty Pharmacy: N  Change in health status per assessment? N  IV maintenance 0.9% NS 500ml TKO  Start time: 3:01 PM  IV start by: Justin Del Rio RN  IV and Fluid D/C time: 3:55 PM  NS volume infused: <50mL  Site condition: CDI and patent throughout infusion, no redness/swelling at IV site  D/C instructions reviewed, Pt verbalized understanding.  Justin Del Rio RN    *Labs drawn off of left hand IV before infusion*    Date Medication Total mg Used mg Wasted mg   05/20/2025 Solu-Medrol 40.00 40.00 0.00   05/20/2025 Entyvio 300.00 300.00 0.00   04/08/2025 Solu-Medrol 40.00 40.00 0.00   04/08/2025  Entyvio 300.00 300.00 0.00   02/25/2025 Solu-Medrol 40.00 40.00 0.00   02/25/2025 Entyvio 300.00 300.00 0.00       Visit oversight provided by:  Sj Bingham MD 05/20/2025 03:15 PM

## 2025-05-21 NOTE — TELEPHONE ENCOUNTER
Patient called back.  Increased dose is requiring a PA. for increased dosage, of Mounjaro to 7.5 mg.    Patient uses pharmacy at [facility], and they are closed all weekend, and Monday for the holiday weekend.    Patient is also concerned if she misses a dose, due to pharmacy closed on holiday weekend, or PA does not go through, that she will have symptoms with this.    Patient is waiting for a PA to go through.    Can she get another dosage of the 5 mg until PA goes through.    Tali ROUSSEAU RN  Triage Nurse  [facility]

## 2025-05-22 ENCOUNTER — TRANSFERRED RECORDS (OUTPATIENT)
Dept: ADMINISTRATIVE | Facility: CLINIC | Age: 55
End: 2025-05-22
Payer: COMMERCIAL

## 2025-05-23 NOTE — PROCEDURES
2025        Zaira Villatoro   [address]  [place] [id number]      Zaira Duarteten,  :  1970    I'm writing to let you know about the tests that were taken recently.   Thank you for allowing [facility] the opportunity to take part in your healthcare.  At [facility] we strive to provide each patient with the finest gastroenterology care available.  We hope your experience was pleasant and informative.    Your labs look stable from previous with mild elevation of one of the liver markers (ALT) and some elevation of hemoglobin similar to previous labs.  Hepatic Function Panel (7) 2025 15:05   Description Result Units Flags Range   Bilirubin, Total 0.9 mg/dL  0.0-1.2   Bilirubin, Direct 0.27 mg/dL  0.00-0.40   AST (SGOT) 26 IU/L  0-40   ALT (SGPT) 33 IU/L H 0-32   Albumin 4.1 g/dL  3.8-4.9   Alkaline Phosphatase 68 IU/L     Protein, Total 6.4 g/dL  6.0-8.5   Comments   Performed At: [facility]  [address], [place], [place], [id number]  Enrike Vasquez MD, Phone: [phone]   CBC With Differential/Platelet 2025 15:05   Description Result Units Flags Range   Hemoglobin 16.1 g/dL H 11.1-15.9   Hematocrit 48.5 % H 34.0-46.6   MCV 93 fL  79-97   MCHC 33.2 g/dL  31.5-35.7   MCH 30.7 pg  26.6-33.0   RDW 12.0 %  11.7-15.4   Platelets 230 x10E3/uL  150-450   Neutrophils 59 %  Not Estab.   Lymphs 26 %  Not Estab.   Monocytes 10 %  Not Estab.   Eos 4 %  Not Estab.   Basos 1 %  Not Estab.   Neutrophils (Absolute) 3.2 x10E3/uL  1.4-7.0   Lymphs (Absolute) 1.4 x10E3/uL  0.7-3.1   Monocytes(Absolute) 0.5 x10E3/uL  0.1-0.9   Eos (Absolute) 0.2 x10E3/uL  0.0-0.4   Baso (Absolute) 0.1 x10E3/uL  0.0-0.2   Immature Grans (Abs) 0.0 x10E3/uL  0.0-0.1   Immature Granulocytes 0 %  Not Estab.   RBC 5.24 x10E6/uL  3.77-5.28   WBC 5.4 x10E3/uL  3.4-10.8   Comments   First Available              Performed At: [facility], [facility] [address], [place], [place], [id number]  Enrike Vasquez MD, Phone:  [phone]         I hope you are feeling better.        Thank you.    Electronically signed by:  Zaira GRIMALDO 05/22/2025 01:02 PM  Document generated by:  Zaira GRIMALDO  05/22/2025  If your provider ordered multiple tests; the results may not become available at the same time.  If multiple test results are received within 14 days of one another, you may receive a duplicate.  cc:  Zaira Pastor MD

## 2025-05-24 ENCOUNTER — HEALTH MAINTENANCE LETTER (OUTPATIENT)
Age: 55
End: 2025-05-24

## 2025-06-18 ENCOUNTER — OFFICE VISIT (OUTPATIENT)
Dept: FAMILY MEDICINE | Facility: CLINIC | Age: 55
End: 2025-06-18
Payer: COMMERCIAL

## 2025-06-18 ENCOUNTER — RESULTS FOLLOW-UP (OUTPATIENT)
Dept: FAMILY MEDICINE | Facility: CLINIC | Age: 55
End: 2025-06-18

## 2025-06-18 ENCOUNTER — ANCILLARY PROCEDURE (OUTPATIENT)
Dept: GENERAL RADIOLOGY | Facility: CLINIC | Age: 55
End: 2025-06-18
Attending: STUDENT IN AN ORGANIZED HEALTH CARE EDUCATION/TRAINING PROGRAM
Payer: COMMERCIAL

## 2025-06-18 VITALS
WEIGHT: 263.6 LBS | RESPIRATION RATE: 18 BRPM | DIASTOLIC BLOOD PRESSURE: 82 MMHG | OXYGEN SATURATION: 97 % | SYSTOLIC BLOOD PRESSURE: 123 MMHG | HEART RATE: 61 BPM | BODY MASS INDEX: 43.92 KG/M2 | HEIGHT: 65 IN | TEMPERATURE: 97.5 F

## 2025-06-18 DIAGNOSIS — Z79.4 TYPE 2 DIABETES MELLITUS WITH HYPERGLYCEMIA, WITH LONG-TERM CURRENT USE OF INSULIN (H): Chronic | ICD-10-CM

## 2025-06-18 DIAGNOSIS — M54.2 CERVICALGIA: ICD-10-CM

## 2025-06-18 DIAGNOSIS — E11.65 TYPE 2 DIABETES MELLITUS WITH HYPERGLYCEMIA, WITH LONG-TERM CURRENT USE OF INSULIN (H): Chronic | ICD-10-CM

## 2025-06-18 DIAGNOSIS — M54.2 CERVICALGIA: Primary | ICD-10-CM

## 2025-06-18 LAB
ALBUMIN SERPL BCG-MCNC: 4.1 G/DL (ref 3.5–5.2)
ALP SERPL-CCNC: 51 U/L (ref 40–150)
ALT SERPL W P-5'-P-CCNC: 26 U/L (ref 0–50)
ANION GAP SERPL CALCULATED.3IONS-SCNC: 10 MMOL/L (ref 7–15)
AST SERPL W P-5'-P-CCNC: 26 U/L (ref 0–45)
BILIRUB SERPL-MCNC: 0.7 MG/DL
BUN SERPL-MCNC: 11.2 MG/DL (ref 6–20)
CALCIUM SERPL-MCNC: 9.3 MG/DL (ref 8.8–10.4)
CHLORIDE SERPL-SCNC: 105 MMOL/L (ref 98–107)
CHOLEST SERPL-MCNC: 140 MG/DL
CREAT SERPL-MCNC: 0.94 MG/DL (ref 0.51–0.95)
EGFRCR SERPLBLD CKD-EPI 2021: 72 ML/MIN/1.73M2
EST. AVERAGE GLUCOSE BLD GHB EST-MCNC: 169 MG/DL
FASTING STATUS PATIENT QL REPORTED: NO
FASTING STATUS PATIENT QL REPORTED: NO
GLUCOSE SERPL-MCNC: 130 MG/DL (ref 70–99)
HBA1C MFR BLD: 7.5 % (ref 0–5.6)
HCO3 SERPL-SCNC: 24 MMOL/L (ref 22–29)
HDLC SERPL-MCNC: 44 MG/DL
LDLC SERPL CALC-MCNC: 82 MG/DL
NONHDLC SERPL-MCNC: 96 MG/DL
POTASSIUM SERPL-SCNC: 4.5 MMOL/L (ref 3.4–5.3)
PROT SERPL-MCNC: 6.7 G/DL (ref 6.4–8.3)
SODIUM SERPL-SCNC: 139 MMOL/L (ref 135–145)
TRIGL SERPL-MCNC: 71 MG/DL

## 2025-06-18 PROCEDURE — 72040 X-RAY EXAM NECK SPINE 2-3 VW: CPT | Mod: TC | Performed by: RADIOLOGY

## 2025-06-18 PROCEDURE — 36415 COLL VENOUS BLD VENIPUNCTURE: CPT | Performed by: STUDENT IN AN ORGANIZED HEALTH CARE EDUCATION/TRAINING PROGRAM

## 2025-06-18 PROCEDURE — 99214 OFFICE O/P EST MOD 30 MIN: CPT | Performed by: STUDENT IN AN ORGANIZED HEALTH CARE EDUCATION/TRAINING PROGRAM

## 2025-06-18 PROCEDURE — 80053 COMPREHEN METABOLIC PANEL: CPT | Performed by: STUDENT IN AN ORGANIZED HEALTH CARE EDUCATION/TRAINING PROGRAM

## 2025-06-18 PROCEDURE — G2211 COMPLEX E/M VISIT ADD ON: HCPCS | Performed by: STUDENT IN AN ORGANIZED HEALTH CARE EDUCATION/TRAINING PROGRAM

## 2025-06-18 PROCEDURE — 80061 LIPID PANEL: CPT | Performed by: STUDENT IN AN ORGANIZED HEALTH CARE EDUCATION/TRAINING PROGRAM

## 2025-06-18 PROCEDURE — 83036 HEMOGLOBIN GLYCOSYLATED A1C: CPT | Performed by: STUDENT IN AN ORGANIZED HEALTH CARE EDUCATION/TRAINING PROGRAM

## 2025-06-19 PROBLEM — L50.3 DERMATOGRAPHISM: Status: RESOLVED | Noted: 2023-12-06 | Resolved: 2025-06-19

## 2025-06-19 PROBLEM — D18.01 CHERRY ANGIOMA: Status: RESOLVED | Noted: 2023-12-06 | Resolved: 2025-06-19

## 2025-06-23 PROBLEM — L82.1 SEBORRHEIC KERATOSES: Status: RESOLVED | Noted: 2023-12-06 | Resolved: 2025-06-23

## 2025-06-23 PROBLEM — D22.9 MULTIPLE BENIGN NEVI: Status: RESOLVED | Noted: 2023-12-06 | Resolved: 2025-06-23

## 2025-07-01 ENCOUNTER — TRANSFERRED RECORDS (OUTPATIENT)
Dept: ADMINISTRATIVE | Facility: CLINIC | Age: 55
End: 2025-07-01
Payer: COMMERCIAL

## 2025-07-02 NOTE — PROGRESS NOTES
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 of Treat.   06/18/2025 Entyvio 300mg  ml/hr    6 Weeks   06/18/2025 Solu-Medrol 40mg IV Push per protocol    6 Weeks   Inf. Date Medication % Conc. Inf. # Therapy Type Bag # Vials Total mgs Lot # Exp. Date   07/01/2025 Entyvio  51 maintenance  1 300.00 [id number] 07/31/2027 07/01/2025 Solu-Medrol  51 pre-infusion  1 40.00 [id number] 11/30/2026   Inf. Date Medication IV Site IV Gauge Start Stop Total Time Wt. (lbs) Wt. (kgs)   07/01/2025 Entyvio left hand 24 10:05 AM 10:35 AM 30 minutes 261.00 118.367   07/01/2025 Solu-Medrol left hand 24 9:59 AM 10:01 AM 2 minutes 261.00 118.367     Vitals Flowsheet  Time Temp Pulse Resp Sys BP Jen BP Reaction Conc Rate   9:57 AM 97.0 71 16 174 80      10:35 AM 97.9 66 14 164 85        Post Infusion  The patient did tolerate the infusion.     Nursing Notes  Order date: 06/18/25  Last infusion date: 05/20/25  Oral premeds per order: N/A  Specialty Pharmacy: N  Change in health status per assessment? N  IV maintenance 0.9% NS 500ml TKO  Start time: 9:59 AM  IV start by: Justin Del Rio RN  IV and Fluid D/C time: 10:50 AM  NS volume infused: <50mL  Site condition: CDI and patent throughout infusion, no redness/swelling at IV site  D/C instructions reviewed, Pt verbalized understanding.  Justin Del Rio RN    Date Medication Total mg Used mg Wasted mg   07/01/2025 Solu-Medrol 40.00 40.00 0.00   07/01/2025 Entyvio 300.00 300.00 0.00   05/20/2025 Solu-Medrol 40.00 40.00 0.00   05/20/2025 Entyvio 300.00 300.00 0.00   04/08/2025  Solu-Medrol 40.00 40.00 0.00   04/08/2025 Entyvio 300.00 300.00 0.00       Visit oversight provided by:  Marilin Wells MD 07/01/2025 10:15 AM

## 2025-07-10 ENCOUNTER — OFFICE VISIT (OUTPATIENT)
Dept: FAMILY MEDICINE | Facility: CLINIC | Age: 55
End: 2025-07-10
Payer: COMMERCIAL

## 2025-07-10 ENCOUNTER — ANCILLARY PROCEDURE (OUTPATIENT)
Dept: GENERAL RADIOLOGY | Facility: CLINIC | Age: 55
End: 2025-07-10
Attending: INTERNAL MEDICINE
Payer: COMMERCIAL

## 2025-07-10 VITALS
HEIGHT: 64 IN | SYSTOLIC BLOOD PRESSURE: 110 MMHG | HEART RATE: 80 BPM | TEMPERATURE: 98.7 F | WEIGHT: 259.8 LBS | BODY MASS INDEX: 44.35 KG/M2 | DIASTOLIC BLOOD PRESSURE: 76 MMHG | RESPIRATION RATE: 16 BRPM | OXYGEN SATURATION: 98 %

## 2025-07-10 DIAGNOSIS — Z12.4 CERVICAL CANCER SCREENING: Primary | ICD-10-CM

## 2025-07-10 DIAGNOSIS — B96.89 BACTERIAL URI: ICD-10-CM

## 2025-07-10 DIAGNOSIS — J06.9 BACTERIAL URI: ICD-10-CM

## 2025-07-10 LAB
BASOPHILS # BLD AUTO: 0 10E3/UL (ref 0–0.2)
BASOPHILS NFR BLD AUTO: 0 %
EOSINOPHIL # BLD AUTO: 0.3 10E3/UL (ref 0–0.7)
EOSINOPHIL NFR BLD AUTO: 3 %
ERYTHROCYTE [DISTWIDTH] IN BLOOD BY AUTOMATED COUNT: 12.4 % (ref 10–15)
HCT VFR BLD AUTO: 49.6 % (ref 35–47)
HGB BLD-MCNC: 15.9 G/DL (ref 11.7–15.7)
IMM GRANULOCYTES # BLD: 0 10E3/UL
IMM GRANULOCYTES NFR BLD: 0 %
LYMPHOCYTES # BLD AUTO: 1.5 10E3/UL (ref 0.8–5.3)
LYMPHOCYTES NFR BLD AUTO: 13 %
MCH RBC QN AUTO: 29.4 PG (ref 26.5–33)
MCHC RBC AUTO-ENTMCNC: 32.1 G/DL (ref 31.5–36.5)
MCV RBC AUTO: 92 FL (ref 78–100)
MONOCYTES # BLD AUTO: 0.8 10E3/UL (ref 0–1.3)
MONOCYTES NFR BLD AUTO: 7 %
NEUTROPHILS # BLD AUTO: 9.1 10E3/UL (ref 1.6–8.3)
NEUTROPHILS NFR BLD AUTO: 78 %
PLATELET # BLD AUTO: 262 10E3/UL (ref 150–450)
RBC # BLD AUTO: 5.41 10E6/UL (ref 3.8–5.2)
SARS-COV-2 RNA RESP QL NAA+PROBE: NEGATIVE
WBC # BLD AUTO: 11.7 10E3/UL (ref 4–11)

## 2025-07-10 RX ORDER — BENZONATATE 100 MG/1
100 CAPSULE ORAL 3 TIMES DAILY PRN
Qty: 30 CAPSULE | Refills: 2 | Status: SHIPPED | OUTPATIENT
Start: 2025-07-10

## 2025-07-10 ASSESSMENT — PAIN SCALES - GENERAL: PAINLEVEL_OUTOF10: SEVERE PAIN (8)

## 2025-07-10 ASSESSMENT — ENCOUNTER SYMPTOMS: COUGH: 1

## 2025-07-10 NOTE — PROGRESS NOTES
"  Assessment & Plan   Problem List Items Addressed This Visit    None  Visit Diagnoses         Cervical cancer screening    -  Primary      Bacterial URI        Relevant Medications    benzonatate (TESSALON PERLES) 100 MG capsule    Other Relevant Orders    CBC with platelets and differential (Completed)    XR Chest 2 Views (Completed)    COVID-19 Virus (Coronavirus) by PCR Nasopharyngeal             BMI  Estimated body mass index is 44.59 kg/m  as calculated from the following:    Height as of this encounter: 1.626 m (5' 4\").    Weight as of this encounter: 117.8 kg (259 lb 12.8 oz).   Weight management plan: Discussed healthy diet and exercise guidelines        Rigo Meneses is a 55 year old, presenting for the following health issues:  Cough (Has been going on X 3 days with sore throat productive cough with milky and yellowish SOB)        7/10/2025     7:46 AM   Additional Questions   Roomed by Francia   Accompanied by self         7/10/2025     7:46 AM   Patient Reported Additional Medications   Patient reports taking the following new medications none     Cough    History of Present Illness       Reason for visit:  Sore throat cough  Symptom onset:  1-3 days ago  Symptoms include:  Sore throat cough  Symptom intensity:  Moderate  Symptom progression:  Worsening  Had these symptoms before:  No  What makes it worse:  Everything  What makes it better:  No   She is taking medications regularly.       sick last 10 days . 4 medications   No antibiotic .   3-4 days ago cough came on   Sore throat   Pain in the ears   No stomach or nausea or vomiting   Burning when coughing in the hernia area   Ulcerative colitis   Diabetes and afib   Blood sugars elevated        tests at home .         Cough X 3 days        Review of Systems  Constitutional, HEENT, cardiovascular, pulmonary, gi and gu systems are negative, except as otherwise noted.      Objective    /76 (BP Location: Left arm, Patient " "Position: Sitting, Cuff Size: Adult Large)   Pulse 80   Temp 98.7  F (37.1  C) (Temporal)   Resp 16   Ht 1.626 m (5' 4\")   Wt 117.8 kg (259 lb 12.8 oz)   SpO2 98%   BMI 44.59 kg/m    Body mass index is 44.59 kg/m .  Physical Exam   GENERAL: alert and no distress  EYES: Eyes grossly normal to inspection, PERRL and conjunctivae and sclerae normal  HENT: ear canals and TM's normal, nose and mouth without ulcers or lesions  NECK: no adenopathy, no asymmetry, masses, or scars  RESP: lungs clear to auscultation - no rales, rhonchi or wheezes  CV: regular rate and rhythm, normal S1 S2, no S3 or S4, no murmur, click or rub, no peripheral edema  ABDOMEN: soft, nontender, no hepatosplenomegaly, no masses and bowel sounds normal  MS: no gross musculoskeletal defects noted, no edema  SKIN: no suspicious lesions or rashes  NEURO: Normal strength and tone, mentation intact and speech normal  BACK: no CVA tenderness, no paralumbar tenderness  PSYCH: mentation appears normal, affect normal/bright  LYMPH: no cervical, supraclavicular, axillary, or inguinal adenopathy    Results for orders placed or performed in visit on 07/10/25   XR Chest 2 Views     Status: None    Narrative    EXAM: XR CHEST 2 VIEWS  LOCATION: [facility]  DATE: 7/10/2025    INDICATION:  Bacterial URI, Bacterial URI  COMPARISON: 2/13/2024 CT      Impression    IMPRESSION: No acute findings. Lungs appear clear. Normal heart size with prominent fat pad right cardiophrenic angle. No pleural fluid.   Results for orders placed or performed in visit on 07/10/25   CBC with platelets and differential     Status: Abnormal   Result Value Ref Range    WBC Count 11.7 (H) 4.0 - 11.0 10e3/uL    RBC Count 5.41 (H) 3.80 - 5.20 10e6/uL    Hemoglobin 15.9 (H) 11.7 - 15.7 g/dL    Hematocrit 49.6 (H) 35.0 - 47.0 %    MCV 92 78 - 100 fL    MCH 29.4 26.5 - 33.0 pg    MCHC 32.1 31.5 - 36.5 g/dL    RDW 12.4 10.0 - 15.0 %    Platelet Count 262 150 - 450 " 10e3/uL    % Neutrophils 78 %    % Lymphocytes 13 %    % Monocytes 7 %    % Eosinophils 3 %    % Basophils 0 %    % Immature Granulocytes 0 %    Absolute Neutrophils 9.1 (H) 1.6 - 8.3 10e3/uL    Absolute Lymphocytes 1.5 0.8 - 5.3 10e3/uL    Absolute Monocytes 0.8 0.0 - 1.3 10e3/uL    Absolute Eosinophils 0.3 0.0 - 0.7 10e3/uL    Absolute Basophils 0.0 0.0 - 0.2 10e3/uL    Absolute Immature Granulocytes 0.0 <=0.4 10e3/uL   CBC with platelets and differential     Status: Abnormal    Narrative    The following orders were created for panel order CBC with platelets and differential.  Procedure                               Abnormality         Status                     ---------                               -----------         ------                     CBC with platelets and ...[[id number]]  Abnormal            Final result                 Please view results for these tests on the individual orders.           Signed Electronically by: Davon Cuellar MD

## 2025-07-14 ENCOUNTER — NURSE TRIAGE (OUTPATIENT)
Dept: FAMILY MEDICINE | Facility: CLINIC | Age: 55
End: 2025-07-14

## 2025-07-14 ENCOUNTER — ANCILLARY PROCEDURE (OUTPATIENT)
Dept: CARDIOLOGY | Facility: CLINIC | Age: 55
End: 2025-07-14
Attending: INTERNAL MEDICINE
Payer: COMMERCIAL

## 2025-07-14 DIAGNOSIS — J06.9 BACTERIAL URI: Primary | ICD-10-CM

## 2025-07-14 DIAGNOSIS — B96.89 BACTERIAL URI: Primary | ICD-10-CM

## 2025-07-14 DIAGNOSIS — I48.0 PAROXYSMAL ATRIAL FIBRILLATION (H): ICD-10-CM

## 2025-07-14 LAB — LVEF ECHO: NORMAL

## 2025-07-14 PROCEDURE — 93306 TTE W/DOPPLER COMPLETE: CPT | Performed by: INTERNAL MEDICINE

## 2025-07-14 NOTE — TELEPHONE ENCOUNTER
"S-(situation): Patient calling regarding cough.     B-(background): Pt seen by Dr. Carr on 7/10/25. No antibiotics prescribed. Patient states provider discussed that if symptoms continued for 7 days, antibiotic would be prescribed.   Was given Rx for Tessalon Perles - no improvement noted. Symptoms have continued with worsening cough.     A-(assessment): c/o cough, chest congestion, runny nose. Yellow/brown sputum with occasional flecks of blood. Bloody nose several times due to coughing so hard.     R-(recommendations): Patient requesting message sent to provider from 7/10/25 visit and requesting antibiotic and follow up on labs results. Discussed UC/ED if symptoms worsening before receiving response from primary care.     Routing to Dr. Cuellar and clinic RN pool to review and follow up with patient.           Reason for Disposition   MILD difficulty breathing (e.g., minimal/no SOB at rest, SOB with walking, pulse <100) and still present when not coughing    Additional Information   Negative: Bluish (or gray) lips or face   Negative: SEVERE difficulty breathing (e.g., struggling for each breath, speaks in single words)   Negative: Rapid onset of cough and has hives   Negative: Coughing started suddenly after medicine, an allergic food or bee sting   Negative: Difficulty breathing after exposure to flames, smoke, or fumes   Negative: Sounds like a life-threatening emergency to the triager   Negative: MODERATE difficulty breathing (e.g., speaks in phrases, SOB even at rest, pulse 100-120) and still present when not coughing   Negative: Chest pain present when not coughing   Negative: Passed out (e.g., fainted, lost consciousness, blacked out and was not responding)   Negative: Patient sounds very sick or weak to the triager    Answer Assessment - Initial Assessment Questions  1. ONSET: \"When did the cough begin?\"       1 week ago today  2. SEVERITY: \"How bad is the cough today?\"       Moderate to severe  3. " "SPUTUM: \"Describe the color of your sputum\" (e.g., none, dry cough; clear, white, yellow, green)      Brown, yellow   4. HEMOPTYSIS: \"Are you coughing up any blood?\" If Yes, ask: \"How much?\" (e.g., flecks, streaks, tablespoons, etc.)      Flecks/streaks in sputum  5. DIFFICULTY BREATHING: \"Are you having difficulty breathing?\" If Yes, ask: \"How bad is it?\" (e.g., mild, moderate, severe)       Occasionally -mild to moderate  6. FEVER: \"Do you have a fever?\" If Yes, ask: \"What is your temperature, how was it measured, and when did it start?\"      none  7. CARDIAC HISTORY: \"Do you have any history of heart disease?\" (e.g., heart attack, congestive heart failure)       AFIB  8. LUNG HISTORY: \"Do you have any history of lung disease?\"  (e.g., pulmonary embolus, asthma, emphysema)      none  9. PE RISK FACTORS: \"Do you have a history of blood clots?\" (or: recent major surgery, recent prolonged travel, bedridden)      none  10. OTHER SYMPTOMS: \"Do you have any other symptoms?\" (e.g., runny nose, wheezing, chest pain)        Occasional wheezing, runny nose  11. PREGNANCY: \"Is there any chance you are pregnant?\" \"When was your last menstrual period?\"        N/a  12. TRAVEL: \"Have you traveled out of the country in the last month?\" (e.g., travel history, exposures)        none    Protocols used: Cough-A-[place]    "

## 2025-07-14 NOTE — TELEPHONE ENCOUNTER
I will send the script for antibiotics  Provider Response to 2nd Level Triage Request    I have reviewed the RN documentation. My recommendation is:  As above

## 2025-07-14 NOTE — TELEPHONE ENCOUNTER
Writer called patient and relayed provider's message. Patient verbalized understanding. Patient picked up prescription prior to the pharmacy closing. Pt had no further question on the antibiotic.     Alta Bashir RN  [facility]

## 2025-07-14 NOTE — TELEPHONE ENCOUNTER
New Medication Request        What medication are you requesting?: patient was seen shilo Del Rosario at [facility]     Would like antibodic today for cough.    FNA transferred as well.    Thank you.    Reason for medication request: cough    Have you taken this medication before?: No    Controlled Substance Agreement on file:   CSA -- Patient Level:    CSA: None found at the patient level.         Patient offered an appointment? No    Preferred Pharmacy:       [facility] [address]  [address]  [facility] [address]  Phone: [phone] Fax: [phone]      Could we send this information to you in [employer] or would you prefer to receive a phone call?:   Patient would prefer a phone call   Okay to leave a detailed message?: Yes at Cell number on file:    Telephone Information:   Mobile [phone]

## 2025-07-22 NOTE — CONFIDENTIAL NOTE
NEUROSURGERY - NEW PREVISIT PLANNING    Referring Provider: Zaira Munson MD    OVN 6/18/2025   Reason For Visit: M54.2 (ICD-10-CM) - Cervicalgia        IMAGING STATUS/LOCATION DATE/TYPE   MRI *requested 7/22 02/13/2019  Cervical  [facility]   CT N/A    XRAY PACS 06/18/2025  Cervical  [facility]   NOTES STATUS/LOCATION DATE/TYPE   Other specialist OVN: N/A    EMG N/A    INJECTION N/A (lumbar only)    PHYSICAL THERAPY Encounters 2019   SURGERY N/A      Does patient have C2C?  Year last updated Action     YES   []      Please update at appointment if outdated more than 5 years       NO     [x]   N/A   Please complete C2C at appointment

## 2025-07-28 ENCOUNTER — OFFICE VISIT (OUTPATIENT)
Dept: CARDIOLOGY | Facility: CLINIC | Age: 55
End: 2025-07-28
Payer: COMMERCIAL

## 2025-07-28 VITALS
OXYGEN SATURATION: 96 % | WEIGHT: 261 LBS | BODY MASS INDEX: 44.8 KG/M2 | DIASTOLIC BLOOD PRESSURE: 74 MMHG | SYSTOLIC BLOOD PRESSURE: 136 MMHG | HEART RATE: 72 BPM

## 2025-07-28 DIAGNOSIS — I48.0 PAROXYSMAL ATRIAL FIBRILLATION (H): Primary | ICD-10-CM

## 2025-07-28 PROCEDURE — 99214 OFFICE O/P EST MOD 30 MIN: CPT | Performed by: INTERNAL MEDICINE
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
## 2025-07-28 NOTE — PATIENT INSTRUCTIONS
Thank you for coming to the [facility] [place]; please note the following instructions:    1. Dr. Leila Miller recommends to follow up in 1 year.  The cardiology team will contact you to schedule when the time gets closer.          If you have any questions regarding your visit please contact your care team:     Cardiology  Telephone Number   Vanda DUGAN., RN  Sharifa NOVOA, RN  Karin LAWLER, RN  Jocelyn ESTRELLA, RMA  Laya PAK, JONE ROJAS, [place]  Zaira NOVOA, VF  Gerald PAK, [phone] (option 1)   For scheduling appts:     [phone] (select option 1)       For the Device Clinic (Pacemakers and ICD's)  RN's :  Karen Brennan   During business hours: [phone]    *After business hours:  [phone] (select option 4)      Normal test result notifications will be released via [employer] or mailed within 7 business days.  All other test results, will be communicated via telephone once reviewed by your cardiologist.    If you need a medication refill please contact your pharmacy.  Please allow 3 business days for your refill to be completed.    As always, thank you for trusting us with your health care needs!

## 2025-07-28 NOTE — NURSING NOTE
"Chief Complaint   Patient presents with    RECHECK     Return EP cardiology for annual follow-up       Initial /74 (BP Location: Left arm, Patient Position: Chair, Cuff Size: Adult Large)   Pulse 72   Wt 118.4 kg (261 lb)   SpO2 96%   BMI 44.80 kg/m   Estimated body mass index is 44.8 kg/m  as calculated from the following:    Height as of 7/10/25: 1.626 m (5' 4\").    Weight as of this encounter: 118.4 kg (261 lb)..  BP completed using cuff size: jagdeep REICH, EMT    "
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
## 2025-07-29 ENCOUNTER — PRE VISIT (OUTPATIENT)
Dept: NEUROSURGERY | Facility: CLINIC | Age: 55
End: 2025-07-29

## 2025-07-29 ENCOUNTER — OFFICE VISIT (OUTPATIENT)
Dept: NEUROSURGERY | Facility: CLINIC | Age: 55
End: 2025-07-29
Attending: STUDENT IN AN ORGANIZED HEALTH CARE EDUCATION/TRAINING PROGRAM
Payer: COMMERCIAL

## 2025-07-29 VITALS
OXYGEN SATURATION: 96 % | BODY MASS INDEX: 44.63 KG/M2 | HEIGHT: 64 IN | SYSTOLIC BLOOD PRESSURE: 125 MMHG | WEIGHT: 261.4 LBS | DIASTOLIC BLOOD PRESSURE: 84 MMHG | HEART RATE: 70 BPM

## 2025-07-29 DIAGNOSIS — M54.12 CERVICAL RADICULOPATHY: Primary | ICD-10-CM

## 2025-07-29 PROCEDURE — 99244 OFF/OP CNSLTJ NEW/EST MOD 40: CPT | Performed by: NURSE PRACTITIONER

## 2025-07-29 ASSESSMENT — PAIN SCALES - GENERAL: PAINLEVEL_OUTOF10: MODERATE PAIN (4)
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
## 2025-07-29 NOTE — NURSING NOTE
"Zaira Villatoro is a 55 year old female who presents for:  Chief Complaint   Patient presents with    Neurologic Problem     Neck issues. Xray 6/18/25. Onset: 4-5 years.Patient notes she has bilateral hand/arm N/T. N/T started in the hands and eventually went into her arms. At one time she was seen in ER because she couldn't get her left arm awake. No tx. Primary care physician said she had a pinched nerve.         Vitals:    Vitals:    07/29/25 0922   BP: 125/84   Pulse: 70   SpO2: 96%   Weight: 261 lb 6.4 oz (118.6 kg)   Height: 5' 4\" (1.626 m)       BMI:  Estimated body mass index is 44.87 kg/m  as calculated from the following:    Height as of this encounter: 5' 4\" (1.626 m).    Weight as of this encounter: 261 lb 6.4 oz (118.6 kg).    Pain Score:  Moderate Pain (4)        Leslie Richards CMA      "

## 2025-07-29 NOTE — PATIENT INSTRUCTIONS
Orders:   Cervical spine MRI   I will contact you with the results and next steps    Referrals:   Physical therapy     Please call our clinic if symptoms persist, change, or worsen at any time.
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
## 2025-07-31 DIAGNOSIS — E11.65 TYPE 2 DIABETES MELLITUS WITH HYPERGLYCEMIA, WITH LONG-TERM CURRENT USE OF INSULIN (H): ICD-10-CM

## 2025-07-31 DIAGNOSIS — Z79.4 TYPE 2 DIABETES MELLITUS WITH HYPERGLYCEMIA, WITH LONG-TERM CURRENT USE OF INSULIN (H): ICD-10-CM

## 2025-07-31 RX ORDER — INSULIN GLARGINE 100 [IU]/ML
INJECTION, SOLUTION SUBCUTANEOUS
Qty: 75 ML | Refills: 0 | Status: SHIPPED | OUTPATIENT
Start: 2025-07-31

## 2025-08-05 ENCOUNTER — MYC REFILL (OUTPATIENT)
Dept: FAMILY MEDICINE | Facility: CLINIC | Age: 55
End: 2025-08-05
Payer: COMMERCIAL

## 2025-08-05 DIAGNOSIS — E11.65 TYPE 2 DIABETES MELLITUS WITH HYPERGLYCEMIA, WITH LONG-TERM CURRENT USE OF INSULIN (H): Chronic | ICD-10-CM

## 2025-08-05 DIAGNOSIS — Z79.4 TYPE 2 DIABETES MELLITUS WITH HYPERGLYCEMIA, WITH LONG-TERM CURRENT USE OF INSULIN (H): Chronic | ICD-10-CM

## 2025-08-06 DIAGNOSIS — Z79.4 TYPE 2 DIABETES MELLITUS WITH HYPERGLYCEMIA, WITH LONG-TERM CURRENT USE OF INSULIN (H): Primary | ICD-10-CM

## 2025-08-06 DIAGNOSIS — E11.65 TYPE 2 DIABETES MELLITUS WITH HYPERGLYCEMIA, WITH LONG-TERM CURRENT USE OF INSULIN (H): Primary | ICD-10-CM

## 2025-08-12 ENCOUNTER — TRANSFERRED RECORDS (OUTPATIENT)
Dept: ADMINISTRATIVE | Facility: CLINIC | Age: 55
End: 2025-08-12
Payer: COMMERCIAL

## 2025-08-19 ENCOUNTER — ANCILLARY PROCEDURE (OUTPATIENT)
Dept: MRI IMAGING | Facility: CLINIC | Age: 55
End: 2025-08-19
Attending: NURSE PRACTITIONER
Payer: COMMERCIAL

## 2025-08-19 DIAGNOSIS — M54.12 CERVICAL RADICULOPATHY: ICD-10-CM

## 2025-08-19 PROCEDURE — 72141 MRI NECK SPINE W/O DYE: CPT | Mod: TC | Performed by: RADIOLOGY

## (undated) DEVICE — PREP CHLORAPREP 26ML TINTED ORANGE  260815

## (undated) DEVICE — MARKER SKIN DOUBLE TIP W/FLEXI-RULER W/LABELS

## (undated) DEVICE — NDL 30GA 1" 305128

## (undated) DEVICE — GLOVE PROTEXIS W/NEU-THERA 7.5  2D73TE75

## (undated) DEVICE — SOL WATER IRRIG 1000ML BOTTLE 07139-09

## (undated) DEVICE — ESU ELEC NDL 1" COATED/INSULATED E1465

## (undated) DEVICE — ESU EYE HIGH TEMP 65410-183

## (undated) DEVICE — PACK MINOR EYE

## (undated) DEVICE — NDL 30GA 0.5" 305106

## (undated) DEVICE — SYR 03ML LL W/O NDL

## (undated) DEVICE — ESU PENCIL W/HOLSTER

## (undated) RX ORDER — ERYTHROMYCIN 5 MG/G
OINTMENT OPHTHALMIC
Status: DISPENSED
Start: 2020-01-27

## (undated) RX ORDER — BUPIVACAINE HYDROCHLORIDE 5 MG/ML
INJECTION, SOLUTION PERINEURAL
Status: DISPENSED
Start: 2020-01-27

## (undated) RX ORDER — ONDANSETRON 2 MG/ML
INJECTION INTRAMUSCULAR; INTRAVENOUS
Status: DISPENSED
Start: 2020-01-27

## (undated) RX ORDER — OXYCODONE HYDROCHLORIDE 5 MG/1
TABLET ORAL
Status: DISPENSED
Start: 2020-01-27

## (undated) RX ORDER — LIDOCAINE HCL/EPINEPHRINE/PF 2%-1:200K
VIAL (ML) INJECTION
Status: DISPENSED
Start: 2020-01-27

## (undated) RX ORDER — ACETAMINOPHEN 325 MG/1
TABLET ORAL
Status: DISPENSED
Start: 2020-01-27

## (undated) RX ORDER — FENTANYL CITRATE 50 UG/ML
INJECTION, SOLUTION INTRAMUSCULAR; INTRAVENOUS
Status: DISPENSED
Start: 2020-01-27

## (undated) RX ORDER — PROPOFOL 10 MG/ML
INJECTION, EMULSION INTRAVENOUS
Status: DISPENSED
Start: 2020-01-27

## (undated) RX ORDER — TETRACAINE HYDROCHLORIDE 5 MG/ML
SOLUTION OPHTHALMIC
Status: DISPENSED
Start: 2020-01-27

## (undated) RX ORDER — GABAPENTIN 300 MG/1
CAPSULE ORAL
Status: DISPENSED
Start: 2020-01-27